# Patient Record
Sex: MALE | Race: WHITE | NOT HISPANIC OR LATINO | Employment: OTHER | URBAN - METROPOLITAN AREA
[De-identification: names, ages, dates, MRNs, and addresses within clinical notes are randomized per-mention and may not be internally consistent; named-entity substitution may affect disease eponyms.]

---

## 2017-01-09 ENCOUNTER — ALLSCRIPTS OFFICE VISIT (OUTPATIENT)
Dept: OTHER | Facility: OTHER | Age: 47
End: 2017-01-09

## 2017-01-20 ENCOUNTER — GENERIC CONVERSION - ENCOUNTER (OUTPATIENT)
Dept: OTHER | Facility: OTHER | Age: 47
End: 2017-01-20

## 2017-01-30 ENCOUNTER — HOSPITAL ENCOUNTER (OUTPATIENT)
Dept: RADIOLOGY | Facility: HOSPITAL | Age: 47
Discharge: HOME | End: 2017-01-30
Attending: ORTHOPAEDIC SURGERY
Payer: COMMERCIAL

## 2017-01-30 ENCOUNTER — ALLSCRIPTS OFFICE VISIT (OUTPATIENT)
Dept: OTHER | Facility: OTHER | Age: 47
End: 2017-01-30

## 2017-01-30 DIAGNOSIS — E55.9 VITAMIN D DEFICIENCY: ICD-10-CM

## 2017-01-30 DIAGNOSIS — M54.16 RADICULOPATHY OF LUMBAR REGION: ICD-10-CM

## 2017-01-30 DIAGNOSIS — Z79.899 OTHER LONG TERM (CURRENT) DRUG THERAPY: ICD-10-CM

## 2017-01-30 DIAGNOSIS — R03.0 ELEVATED BLOOD PRESSURE READING WITHOUT DIAGNOSIS OF HYPERTENSION: ICD-10-CM

## 2017-01-30 PROCEDURE — 72170 X-RAY EXAM OF PELVIS: CPT

## 2017-01-30 PROCEDURE — 72070 X-RAY EXAM THORAC SPINE 2VWS: CPT

## 2017-02-13 ENCOUNTER — ALLSCRIPTS OFFICE VISIT (OUTPATIENT)
Dept: OTHER | Facility: OTHER | Age: 47
End: 2017-02-13

## 2017-03-22 ENCOUNTER — GENERIC CONVERSION - ENCOUNTER (OUTPATIENT)
Dept: OTHER | Facility: OTHER | Age: 47
End: 2017-03-22

## 2017-03-22 DIAGNOSIS — G89.4 CHRONIC PAIN SYNDROME: ICD-10-CM

## 2017-03-27 ENCOUNTER — TRANSCRIBE ORDERS (OUTPATIENT)
Dept: ADMINISTRATIVE | Facility: HOSPITAL | Age: 47
End: 2017-03-27

## 2017-03-27 DIAGNOSIS — G89.4 CHRONIC PAIN SYNDROME: Primary | ICD-10-CM

## 2017-05-10 ENCOUNTER — ALLSCRIPTS OFFICE VISIT (OUTPATIENT)
Dept: OTHER | Facility: OTHER | Age: 47
End: 2017-05-10

## 2017-05-15 ENCOUNTER — ALLSCRIPTS OFFICE VISIT (OUTPATIENT)
Dept: OTHER | Facility: OTHER | Age: 47
End: 2017-05-15

## 2017-05-16 ENCOUNTER — OFFICE VISIT (OUTPATIENT)
Dept: LAB | Facility: CLINIC | Age: 47
End: 2017-05-16
Payer: COMMERCIAL

## 2017-05-16 ENCOUNTER — LAB REQUISITION (OUTPATIENT)
Dept: LAB | Facility: HOSPITAL | Age: 47
End: 2017-05-16
Payer: COMMERCIAL

## 2017-05-16 ENCOUNTER — TRANSCRIBE ORDERS (OUTPATIENT)
Dept: LAB | Facility: CLINIC | Age: 47
End: 2017-05-16

## 2017-05-16 ENCOUNTER — HOSPITAL ENCOUNTER (OUTPATIENT)
Dept: RADIOLOGY | Facility: HOSPITAL | Age: 47
Discharge: HOME | End: 2017-05-16
Attending: NEUROLOGICAL SURGERY
Payer: COMMERCIAL

## 2017-05-16 ENCOUNTER — APPOINTMENT (OUTPATIENT)
Dept: LAB | Facility: CLINIC | Age: 47
End: 2017-05-16
Payer: COMMERCIAL

## 2017-05-16 DIAGNOSIS — R03.0 ELEVATED BLOOD PRESSURE READING WITHOUT DIAGNOSIS OF HYPERTENSION: ICD-10-CM

## 2017-05-16 DIAGNOSIS — G89.4 CHRONIC PAIN SYNDROME: Primary | ICD-10-CM

## 2017-05-16 DIAGNOSIS — Z01.812 ENCOUNTER FOR PREPROCEDURAL LABORATORY EXAMINATION: ICD-10-CM

## 2017-05-16 DIAGNOSIS — Z79.01 LONG TERM (CURRENT) USE OF ANTICOAGULANTS: ICD-10-CM

## 2017-05-16 DIAGNOSIS — Z79.899 OTHER LONG TERM (CURRENT) DRUG THERAPY: ICD-10-CM

## 2017-05-16 DIAGNOSIS — Z01.812 PRE-OPERATIVE LABORATORY EXAMINATION: ICD-10-CM

## 2017-05-16 DIAGNOSIS — G89.4 CHRONIC PAIN SYNDROME: ICD-10-CM

## 2017-05-16 DIAGNOSIS — E55.9 VITAMIN D DEFICIENCY: ICD-10-CM

## 2017-05-16 DIAGNOSIS — Z79.01 LONG TERM CURRENT USE OF ANTICOAGULANT: ICD-10-CM

## 2017-05-16 LAB
25(OH)D3 SERPL-MCNC: 7.9 NG/ML (ref 30–100)
ABO GROUP BLD: NORMAL
ALBUMIN SERPL BCP-MCNC: 3.3 G/DL (ref 3.5–5)
ALP SERPL-CCNC: 82 U/L (ref 46–116)
ALT SERPL W P-5'-P-CCNC: 27 U/L (ref 12–78)
ANION GAP SERPL CALCULATED.3IONS-SCNC: 10 MMOL/L (ref 4–13)
APTT PPP: 30 SECONDS (ref 23–35)
AST SERPL W P-5'-P-CCNC: 44 U/L (ref 5–45)
ATRIAL RATE: 53 BPM
BASOPHILS # BLD AUTO: 0.06 THOUSANDS/ΜL (ref 0–0.1)
BASOPHILS NFR BLD AUTO: 1 % (ref 0–1)
BILIRUB SERPL-MCNC: 0.3 MG/DL (ref 0.2–1)
BILIRUB UR QL STRIP: NEGATIVE
BLD GP AB SCN SERPL QL: NEGATIVE
BUN SERPL-MCNC: 11 MG/DL (ref 5–25)
CALCIUM SERPL-MCNC: 8.5 MG/DL (ref 8.3–10.1)
CHLORIDE SERPL-SCNC: 110 MMOL/L (ref 100–108)
CLARITY UR: CLEAR
CO2 SERPL-SCNC: 27 MMOL/L (ref 21–32)
COLOR UR: YELLOW
CREAT SERPL-MCNC: 0.93 MG/DL (ref 0.6–1.3)
EOSINOPHIL # BLD AUTO: 0.5 THOUSAND/ΜL (ref 0–0.61)
EOSINOPHIL NFR BLD AUTO: 4 % (ref 0–6)
ERYTHROCYTE [DISTWIDTH] IN BLOOD BY AUTOMATED COUNT: 15.4 % (ref 11.6–15.1)
ERYTHROCYTE [DISTWIDTH] IN BLOOD BY AUTOMATED COUNT: 15.4 % (ref 11.6–15.1)
EST. AVERAGE GLUCOSE BLD GHB EST-MCNC: 117 MG/DL
GFR SERPL CREATININE-BSD FRML MDRD: >60 ML/MIN/1.73SQ M
GLUCOSE P FAST SERPL-MCNC: 104 MG/DL (ref 65–99)
GLUCOSE UR STRIP-MCNC: NEGATIVE MG/DL
HBA1C MFR BLD: 5.7 % (ref 4.2–6.3)
HCT VFR BLD AUTO: 46.1 % (ref 36.5–49.3)
HCT VFR BLD AUTO: 46.6 % (ref 36.5–49.3)
HGB BLD-MCNC: 15.1 G/DL (ref 12–17)
HGB BLD-MCNC: 15.3 G/DL (ref 12–17)
HGB UR QL STRIP.AUTO: NEGATIVE
INR PPP: 0.87 (ref 0.86–1.16)
KETONES UR STRIP-MCNC: NEGATIVE MG/DL
LEUKOCYTE ESTERASE UR QL STRIP: NEGATIVE
LYMPHOCYTES # BLD AUTO: 4.04 THOUSANDS/ΜL (ref 0.6–4.47)
LYMPHOCYTES NFR BLD AUTO: 35 % (ref 14–44)
MCH RBC QN AUTO: 29.7 PG (ref 26.8–34.3)
MCH RBC QN AUTO: 29.8 PG (ref 26.8–34.3)
MCHC RBC AUTO-ENTMCNC: 32.8 G/DL (ref 31.4–37.4)
MCHC RBC AUTO-ENTMCNC: 32.8 G/DL (ref 31.4–37.4)
MCV RBC AUTO: 91 FL (ref 82–98)
MCV RBC AUTO: 91 FL (ref 82–98)
MONOCYTES # BLD AUTO: 1.08 THOUSAND/ΜL (ref 0.17–1.22)
MONOCYTES NFR BLD AUTO: 9 % (ref 4–12)
NEUTROPHILS # BLD AUTO: 5.79 THOUSANDS/ΜL (ref 1.85–7.62)
NEUTS SEG NFR BLD AUTO: 51 % (ref 43–75)
NITRITE UR QL STRIP: NEGATIVE
P AXIS: 57 DEGREES
PH UR STRIP.AUTO: 5.5 [PH] (ref 4.5–8)
PLATELET # BLD AUTO: 344 THOUSANDS/UL (ref 149–390)
PLATELET # BLD AUTO: 350 THOUSANDS/UL (ref 149–390)
PMV BLD AUTO: 10.9 FL (ref 8.9–12.7)
PMV BLD AUTO: 11.5 FL (ref 8.9–12.7)
POTASSIUM SERPL-SCNC: 5 MMOL/L (ref 3.5–5.3)
PR INTERVAL: 158 MS
PROT SERPL-MCNC: 6.3 G/DL (ref 6.4–8.2)
PROT UR STRIP-MCNC: NEGATIVE MG/DL
PROTHROMBIN TIME: 12.1 SECONDS (ref 12.1–14.4)
QRS AXIS: 77 DEGREES
QRSD INTERVAL: 96 MS
QT INTERVAL: 408 MS
QTC INTERVAL: 382 MS
RBC # BLD AUTO: 5.07 MILLION/UL (ref 3.88–5.62)
RBC # BLD AUTO: 5.15 MILLION/UL (ref 3.88–5.62)
RH BLD: POSITIVE
SODIUM SERPL-SCNC: 147 MMOL/L (ref 136–145)
SP GR UR STRIP.AUTO: >=1.03 (ref 1–1.03)
SPECIMEN EXPIRATION DATE: NORMAL
T WAVE AXIS: 65 DEGREES
UROBILINOGEN UR QL STRIP.AUTO: 0.2 E.U./DL
VENTRICULAR RATE: 53 BPM
WBC # BLD AUTO: 11.23 THOUSAND/UL (ref 4.31–10.16)
WBC # BLD AUTO: 11.47 THOUSAND/UL (ref 4.31–10.16)

## 2017-05-16 PROCEDURE — 85730 THROMBOPLASTIN TIME PARTIAL: CPT

## 2017-05-16 PROCEDURE — 81003 URINALYSIS AUTO W/O SCOPE: CPT | Performed by: NEUROLOGICAL SURGERY

## 2017-05-16 PROCEDURE — 85025 COMPLETE CBC W/AUTO DIFF WBC: CPT

## 2017-05-16 PROCEDURE — 82306 VITAMIN D 25 HYDROXY: CPT

## 2017-05-16 PROCEDURE — 86900 BLOOD TYPING SEROLOGIC ABO: CPT | Performed by: NEUROLOGICAL SURGERY

## 2017-05-16 PROCEDURE — 80053 COMPREHEN METABOLIC PANEL: CPT

## 2017-05-16 PROCEDURE — 85610 PROTHROMBIN TIME: CPT

## 2017-05-16 PROCEDURE — 86850 RBC ANTIBODY SCREEN: CPT | Performed by: NEUROLOGICAL SURGERY

## 2017-05-16 PROCEDURE — 83036 HEMOGLOBIN GLYCOSYLATED A1C: CPT

## 2017-05-16 PROCEDURE — 86901 BLOOD TYPING SEROLOGIC RH(D): CPT | Performed by: NEUROLOGICAL SURGERY

## 2017-05-16 PROCEDURE — 85027 COMPLETE CBC AUTOMATED: CPT

## 2017-05-16 PROCEDURE — 93005 ELECTROCARDIOGRAM TRACING: CPT

## 2017-05-16 PROCEDURE — 36415 COLL VENOUS BLD VENIPUNCTURE: CPT

## 2017-05-16 PROCEDURE — 71020 HB CHEST X-RAY 2VW FRONTAL&LATL: CPT

## 2017-05-19 ENCOUNTER — ALLSCRIPTS OFFICE VISIT (OUTPATIENT)
Dept: OTHER | Facility: OTHER | Age: 47
End: 2017-05-19

## 2017-05-19 ENCOUNTER — HOSPITAL ENCOUNTER (OUTPATIENT)
Dept: CT IMAGING | Facility: HOSPITAL | Age: 47
Discharge: HOME | End: 2017-05-19
Attending: NEUROLOGICAL SURGERY
Payer: COMMERCIAL

## 2017-05-19 DIAGNOSIS — G89.4 CHRONIC PAIN SYNDROME: ICD-10-CM

## 2017-05-19 PROCEDURE — 72128 CT CHEST SPINE W/O DYE: CPT

## 2017-05-23 ENCOUNTER — ALLSCRIPTS OFFICE VISIT (OUTPATIENT)
Dept: OTHER | Facility: OTHER | Age: 47
End: 2017-05-23

## 2017-06-08 ENCOUNTER — ANESTHESIA EVENT (OUTPATIENT)
Dept: PERIOP | Facility: HOSPITAL | Age: 47
End: 2017-06-08
Payer: COMMERCIAL

## 2017-06-09 ENCOUNTER — HOSPITAL ENCOUNTER (OUTPATIENT)
Facility: HOSPITAL | Age: 47
Setting detail: OUTPATIENT SURGERY
Discharge: HOME | End: 2017-06-09
Attending: NEUROLOGICAL SURGERY | Admitting: NEUROLOGICAL SURGERY
Payer: COMMERCIAL

## 2017-06-09 ENCOUNTER — APPOINTMENT (OUTPATIENT)
Dept: RADIOLOGY | Facility: HOSPITAL | Age: 47
End: 2017-06-09
Payer: COMMERCIAL

## 2017-06-09 ENCOUNTER — ANESTHESIA (OUTPATIENT)
Dept: PERIOP | Facility: HOSPITAL | Age: 47
End: 2017-06-09
Payer: COMMERCIAL

## 2017-06-09 ENCOUNTER — GENERIC CONVERSION - ENCOUNTER (OUTPATIENT)
Dept: PERIOP | Facility: HOSPITAL | Age: 47
End: 2017-06-09

## 2017-06-09 VITALS
WEIGHT: 200 LBS | RESPIRATION RATE: 16 BRPM | DIASTOLIC BLOOD PRESSURE: 64 MMHG | HEIGHT: 72 IN | OXYGEN SATURATION: 99 % | TEMPERATURE: 97.6 F | SYSTOLIC BLOOD PRESSURE: 110 MMHG | HEART RATE: 64 BPM | BODY MASS INDEX: 27.09 KG/M2

## 2017-06-09 LAB
ABO GROUP BLD: NORMAL
BLD GP AB SCN SERPL QL: NEGATIVE
RH BLD: POSITIVE
SPECIMEN EXPIRATION DATE: NORMAL

## 2017-06-09 PROCEDURE — 86901 BLOOD TYPING SEROLOGIC RH(D): CPT | Performed by: NEUROLOGICAL SURGERY

## 2017-06-09 PROCEDURE — 86900 BLOOD TYPING SEROLOGIC ABO: CPT | Performed by: NEUROLOGICAL SURGERY

## 2017-06-09 PROCEDURE — 72070 X-RAY EXAM THORAC SPINE 2VWS: CPT

## 2017-06-09 PROCEDURE — 86850 RBC ANTIBODY SCREEN: CPT | Performed by: NEUROLOGICAL SURGERY

## 2017-06-09 RX ORDER — ROCURONIUM BROMIDE 10 MG/ML
INJECTION, SOLUTION INTRAVENOUS AS NEEDED
Status: DISCONTINUED | OUTPATIENT
Start: 2017-06-09 | End: 2017-06-09 | Stop reason: SURG

## 2017-06-09 RX ORDER — OXYCODONE HYDROCHLORIDE AND ACETAMINOPHEN 5; 325 MG/1; MG/1
2 TABLET ORAL EVERY 4 HOURS PRN
Status: DISCONTINUED | OUTPATIENT
Start: 2017-06-09 | End: 2017-06-09 | Stop reason: HOSPADM

## 2017-06-09 RX ORDER — SODIUM CHLORIDE, SODIUM LACTATE, POTASSIUM CHLORIDE, CALCIUM CHLORIDE 600; 310; 30; 20 MG/100ML; MG/100ML; MG/100ML; MG/100ML
20 INJECTION, SOLUTION INTRAVENOUS CONTINUOUS
Status: DISCONTINUED | OUTPATIENT
Start: 2017-06-09 | End: 2017-06-09 | Stop reason: HOSPADM

## 2017-06-09 RX ORDER — PROPOFOL 10 MG/ML
INJECTION, EMULSION INTRAVENOUS CONTINUOUS PRN
Status: DISCONTINUED | OUTPATIENT
Start: 2017-06-09 | End: 2017-06-09 | Stop reason: SURG

## 2017-06-09 RX ORDER — LIDOCAINE HYDROCHLORIDE AND EPINEPHRINE 5; 5 MG/ML; UG/ML
INJECTION, SOLUTION INFILTRATION; PERINEURAL AS NEEDED
Status: DISCONTINUED | OUTPATIENT
Start: 2017-06-09 | End: 2017-06-09 | Stop reason: HOSPADM

## 2017-06-09 RX ORDER — METOCLOPRAMIDE HYDROCHLORIDE 5 MG/ML
10 INJECTION INTRAMUSCULAR; INTRAVENOUS ONCE AS NEEDED
Status: DISCONTINUED | OUTPATIENT
Start: 2017-06-09 | End: 2017-06-09 | Stop reason: HOSPADM

## 2017-06-09 RX ORDER — ONDANSETRON 2 MG/ML
4 INJECTION INTRAMUSCULAR; INTRAVENOUS EVERY 6 HOURS PRN
Status: DISCONTINUED | OUTPATIENT
Start: 2017-06-09 | End: 2017-06-09 | Stop reason: HOSPADM

## 2017-06-09 RX ORDER — ONDANSETRON 2 MG/ML
4 INJECTION INTRAMUSCULAR; INTRAVENOUS ONCE AS NEEDED
Status: DISCONTINUED | OUTPATIENT
Start: 2017-06-09 | End: 2017-06-09 | Stop reason: HOSPADM

## 2017-06-09 RX ORDER — MIDAZOLAM HYDROCHLORIDE 1 MG/ML
INJECTION INTRAMUSCULAR; INTRAVENOUS AS NEEDED
Status: DISCONTINUED | OUTPATIENT
Start: 2017-06-09 | End: 2017-06-09 | Stop reason: SURG

## 2017-06-09 RX ORDER — ONDANSETRON 2 MG/ML
INJECTION INTRAMUSCULAR; INTRAVENOUS AS NEEDED
Status: DISCONTINUED | OUTPATIENT
Start: 2017-06-09 | End: 2017-06-09 | Stop reason: SURG

## 2017-06-09 RX ORDER — GLYCOPYRROLATE 0.2 MG/ML
INJECTION INTRAMUSCULAR; INTRAVENOUS AS NEEDED
Status: DISCONTINUED | OUTPATIENT
Start: 2017-06-09 | End: 2017-06-09 | Stop reason: SURG

## 2017-06-09 RX ORDER — LORAZEPAM 2 MG/ML
0.5 INJECTION INTRAMUSCULAR
Status: DISCONTINUED | OUTPATIENT
Start: 2017-06-09 | End: 2017-06-09 | Stop reason: HOSPADM

## 2017-06-09 RX ORDER — PROPOFOL 10 MG/ML
INJECTION, EMULSION INTRAVENOUS AS NEEDED
Status: DISCONTINUED | OUTPATIENT
Start: 2017-06-09 | End: 2017-06-09 | Stop reason: SURG

## 2017-06-09 RX ORDER — BUPIVACAINE HYDROCHLORIDE 5 MG/ML
INJECTION, SOLUTION EPIDURAL; INTRACAUDAL AS NEEDED
Status: DISCONTINUED | OUTPATIENT
Start: 2017-06-09 | End: 2017-06-09 | Stop reason: HOSPADM

## 2017-06-09 RX ORDER — FENTANYL CITRATE 50 UG/ML
INJECTION, SOLUTION INTRAMUSCULAR; INTRAVENOUS AS NEEDED
Status: DISCONTINUED | OUTPATIENT
Start: 2017-06-09 | End: 2017-06-09 | Stop reason: SURG

## 2017-06-09 RX ORDER — FENTANYL CITRATE/PF 50 MCG/ML
25 SYRINGE (ML) INJECTION
Status: DISCONTINUED | OUTPATIENT
Start: 2017-06-09 | End: 2017-06-09 | Stop reason: HOSPADM

## 2017-06-09 RX ADMIN — SODIUM CHLORIDE, SODIUM LACTATE, POTASSIUM CHLORIDE, AND CALCIUM CHLORIDE 20 ML/HR: .6; .31; .03; .02 INJECTION, SOLUTION INTRAVENOUS at 07:23

## 2017-06-09 RX ADMIN — FENTANYL CITRATE 25 MCG: 50 INJECTION INTRAMUSCULAR; INTRAVENOUS at 10:00

## 2017-06-09 RX ADMIN — FENTANYL CITRATE 100 MCG: 50 INJECTION, SOLUTION INTRAMUSCULAR; INTRAVENOUS at 08:36

## 2017-06-09 RX ADMIN — MIDAZOLAM HYDROCHLORIDE 2 MG: 1 INJECTION, SOLUTION INTRAMUSCULAR; INTRAVENOUS at 08:37

## 2017-06-09 RX ADMIN — DEXAMETHASONE SODIUM PHOSPHATE 4 MG: 10 INJECTION INTRAMUSCULAR; INTRAVENOUS at 08:59

## 2017-06-09 RX ADMIN — CEFAZOLIN SODIUM 2000 MG: 2 SOLUTION INTRAVENOUS at 08:36

## 2017-06-09 RX ADMIN — ONDANSETRON 4 MG: 2 INJECTION INTRAMUSCULAR; INTRAVENOUS at 09:36

## 2017-06-09 RX ADMIN — GLYCOPYRROLATE 0.8 MG: 0.2 INJECTION, SOLUTION INTRAMUSCULAR; INTRAVENOUS at 09:38

## 2017-06-09 RX ADMIN — GLYCOPYRROLATE 0.2 MG: 0.2 INJECTION, SOLUTION INTRAMUSCULAR; INTRAVENOUS at 08:36

## 2017-06-09 RX ADMIN — PROPOFOL 100 MCG/KG/MIN: 10 INJECTION, EMULSION INTRAVENOUS at 08:59

## 2017-06-09 RX ADMIN — SODIUM CHLORIDE, SODIUM LACTATE, POTASSIUM CHLORIDE, AND CALCIUM CHLORIDE 20 ML/HR: .6; .31; .03; .02 INJECTION, SOLUTION INTRAVENOUS at 10:31

## 2017-06-09 RX ADMIN — SODIUM CHLORIDE, SODIUM LACTATE, POTASSIUM CHLORIDE, AND CALCIUM CHLORIDE: .6; .31; .03; .02 INJECTION, SOLUTION INTRAVENOUS at 08:36

## 2017-06-09 RX ADMIN — PROPOFOL 200 MG: 10 INJECTION, EMULSION INTRAVENOUS at 08:46

## 2017-06-09 RX ADMIN — ROCURONIUM BROMIDE 50 MG: 10 INJECTION, SOLUTION INTRAVENOUS at 08:44

## 2017-06-09 RX ADMIN — OXYCODONE HYDROCHLORIDE AND ACETAMINOPHEN 2 TABLET: 5; 325 TABLET ORAL at 11:03

## 2017-06-09 RX ADMIN — MIDAZOLAM HYDROCHLORIDE 2 MG: 1 INJECTION, SOLUTION INTRAMUSCULAR; INTRAVENOUS at 08:36

## 2017-06-09 RX ADMIN — FENTANYL CITRATE 100 MCG: 50 INJECTION, SOLUTION INTRAMUSCULAR; INTRAVENOUS at 08:40

## 2017-06-09 RX ADMIN — NEOSTIGMINE METHYLSULFATE 4 MG: 1 INJECTION, SOLUTION INTRAMUSCULAR; INTRAVENOUS; SUBCUTANEOUS at 09:38

## 2017-06-23 ENCOUNTER — ALLSCRIPTS OFFICE VISIT (OUTPATIENT)
Dept: OTHER | Facility: OTHER | Age: 47
End: 2017-06-23

## 2017-07-18 ENCOUNTER — TRANSCRIBE ORDERS (OUTPATIENT)
Dept: ADMINISTRATIVE | Facility: HOSPITAL | Age: 47
End: 2017-07-18

## 2017-07-18 ENCOUNTER — ALLSCRIPTS OFFICE VISIT (OUTPATIENT)
Dept: OTHER | Facility: OTHER | Age: 47
End: 2017-07-18

## 2017-07-18 DIAGNOSIS — E87.5 HYPERKALEMIA: ICD-10-CM

## 2017-07-18 DIAGNOSIS — R61 GENERALIZED HYPERHIDROSIS: ICD-10-CM

## 2017-07-18 DIAGNOSIS — R29.810 FACIAL WEAKNESS: ICD-10-CM

## 2017-07-18 DIAGNOSIS — H93.12 TINNITUS OF LEFT EAR: ICD-10-CM

## 2017-07-18 DIAGNOSIS — R29.810 FACIAL DROOP: Primary | ICD-10-CM

## 2017-07-19 ENCOUNTER — APPOINTMENT (OUTPATIENT)
Dept: LAB | Facility: CLINIC | Age: 47
End: 2017-07-19
Payer: COMMERCIAL

## 2017-07-19 ENCOUNTER — HOSPITAL ENCOUNTER (OUTPATIENT)
Dept: NON INVASIVE DIAGNOSTICS | Facility: CLINIC | Age: 47
Discharge: HOME | End: 2017-07-19
Payer: COMMERCIAL

## 2017-07-19 DIAGNOSIS — R29.810 FACIAL WEAKNESS: ICD-10-CM

## 2017-07-19 LAB
ALBUMIN SERPL BCP-MCNC: 4 G/DL (ref 3.5–5)
ALP SERPL-CCNC: 88 U/L (ref 46–116)
ALT SERPL W P-5'-P-CCNC: 38 U/L (ref 12–78)
ANION GAP SERPL CALCULATED.3IONS-SCNC: 8 MMOL/L (ref 4–13)
AST SERPL W P-5'-P-CCNC: 21 U/L (ref 5–45)
BASOPHILS # BLD AUTO: 0.06 THOUSANDS/ΜL (ref 0–0.1)
BASOPHILS NFR BLD AUTO: 0 % (ref 0–1)
BILIRUB SERPL-MCNC: 0.2 MG/DL (ref 0.2–1)
BUN SERPL-MCNC: 18 MG/DL (ref 5–25)
CALCIUM SERPL-MCNC: 9.6 MG/DL (ref 8.3–10.1)
CHLORIDE SERPL-SCNC: 107 MMOL/L (ref 100–108)
CHOLEST SERPL-MCNC: 180 MG/DL (ref 50–200)
CO2 SERPL-SCNC: 29 MMOL/L (ref 21–32)
CREAT SERPL-MCNC: 1.07 MG/DL (ref 0.6–1.3)
EOSINOPHIL # BLD AUTO: 0.56 THOUSAND/ΜL (ref 0–0.61)
EOSINOPHIL NFR BLD AUTO: 4 % (ref 0–6)
ERYTHROCYTE [DISTWIDTH] IN BLOOD BY AUTOMATED COUNT: 15.2 % (ref 11.6–15.1)
GFR SERPL CREATININE-BSD FRML MDRD: >60 ML/MIN/1.73SQ M
GLUCOSE P FAST SERPL-MCNC: 102 MG/DL (ref 65–99)
HCT VFR BLD AUTO: 48.9 % (ref 36.5–49.3)
HDLC SERPL-MCNC: 54 MG/DL (ref 40–60)
HGB BLD-MCNC: 16 G/DL (ref 12–17)
LDLC SERPL CALC-MCNC: 97 MG/DL (ref 0–100)
LYMPHOCYTES # BLD AUTO: 3.83 THOUSANDS/ΜL (ref 0.6–4.47)
LYMPHOCYTES NFR BLD AUTO: 26 % (ref 14–44)
MCH RBC QN AUTO: 29 PG (ref 26.8–34.3)
MCHC RBC AUTO-ENTMCNC: 32.7 G/DL (ref 31.4–37.4)
MCV RBC AUTO: 89 FL (ref 82–98)
MONOCYTES # BLD AUTO: 1.1 THOUSAND/ΜL (ref 0.17–1.22)
MONOCYTES NFR BLD AUTO: 7 % (ref 4–12)
NEUTROPHILS # BLD AUTO: 9.24 THOUSANDS/ΜL (ref 1.85–7.62)
NEUTS SEG NFR BLD AUTO: 63 % (ref 43–75)
PLATELET # BLD AUTO: 395 THOUSANDS/UL (ref 149–390)
PMV BLD AUTO: 10.1 FL (ref 8.9–12.7)
POTASSIUM SERPL-SCNC: 5.7 MMOL/L (ref 3.5–5.3)
PROT SERPL-MCNC: 7.3 G/DL (ref 6.4–8.2)
RBC # BLD AUTO: 5.51 MILLION/UL (ref 3.88–5.62)
SODIUM SERPL-SCNC: 144 MMOL/L (ref 136–145)
TRIGL SERPL-MCNC: 144 MG/DL
WBC # BLD AUTO: 14.79 THOUSAND/UL (ref 4.31–10.16)

## 2017-07-19 PROCEDURE — 80053 COMPREHEN METABOLIC PANEL: CPT

## 2017-07-19 PROCEDURE — 85025 COMPLETE CBC W/AUTO DIFF WBC: CPT

## 2017-07-19 PROCEDURE — 93880 EXTRACRANIAL BILAT STUDY: CPT

## 2017-07-19 PROCEDURE — 80061 LIPID PANEL: CPT

## 2017-07-19 PROCEDURE — 36415 COLL VENOUS BLD VENIPUNCTURE: CPT

## 2017-07-20 ENCOUNTER — GENERIC CONVERSION - ENCOUNTER (OUTPATIENT)
Dept: OTHER | Facility: OTHER | Age: 47
End: 2017-07-20

## 2017-07-20 ENCOUNTER — APPOINTMENT (OUTPATIENT)
Dept: LAB | Facility: CLINIC | Age: 47
End: 2017-07-20
Payer: COMMERCIAL

## 2017-07-20 DIAGNOSIS — E87.5 HYPERKALEMIA: ICD-10-CM

## 2017-07-20 LAB
ANION GAP SERPL CALCULATED.3IONS-SCNC: 11 MMOL/L (ref 4–13)
BUN SERPL-MCNC: 22 MG/DL (ref 5–25)
CALCIUM SERPL-MCNC: 9.4 MG/DL (ref 8.3–10.1)
CHLORIDE SERPL-SCNC: 103 MMOL/L (ref 100–108)
CO2 SERPL-SCNC: 28 MMOL/L (ref 21–32)
CREAT SERPL-MCNC: 0.94 MG/DL (ref 0.6–1.3)
GFR SERPL CREATININE-BSD FRML MDRD: >60 ML/MIN/1.73SQ M
GLUCOSE SERPL-MCNC: 107 MG/DL (ref 65–140)
POTASSIUM SERPL-SCNC: 4.8 MMOL/L (ref 3.5–5.3)
SODIUM SERPL-SCNC: 142 MMOL/L (ref 136–145)

## 2017-07-20 PROCEDURE — 80048 BASIC METABOLIC PNL TOTAL CA: CPT

## 2017-07-20 PROCEDURE — 36415 COLL VENOUS BLD VENIPUNCTURE: CPT

## 2017-07-21 ENCOUNTER — APPOINTMENT (EMERGENCY)
Dept: ULTRASOUND IMAGING | Facility: HOSPITAL | Age: 47
End: 2017-07-21
Payer: COMMERCIAL

## 2017-07-21 ENCOUNTER — HOSPITAL ENCOUNTER (EMERGENCY)
Facility: HOSPITAL | Age: 47
Discharge: HOME | End: 2017-07-21
Attending: EMERGENCY MEDICINE | Admitting: EMERGENCY MEDICINE
Payer: COMMERCIAL

## 2017-07-21 ENCOUNTER — APPOINTMENT (EMERGENCY)
Dept: CT IMAGING | Facility: HOSPITAL | Age: 47
End: 2017-07-21
Payer: COMMERCIAL

## 2017-07-21 VITALS
OXYGEN SATURATION: 99 % | DIASTOLIC BLOOD PRESSURE: 84 MMHG | SYSTOLIC BLOOD PRESSURE: 132 MMHG | TEMPERATURE: 98.4 F | HEART RATE: 61 BPM | RESPIRATION RATE: 19 BRPM

## 2017-07-21 DIAGNOSIS — N50.811 PAIN IN RIGHT TESTICLE: Primary | ICD-10-CM

## 2017-07-21 DIAGNOSIS — M79.604 RIGHT LEG PAIN: ICD-10-CM

## 2017-07-21 LAB
ANION GAP SERPL CALCULATED.3IONS-SCNC: 11 MMOL/L (ref 4–13)
BASOPHILS # BLD AUTO: 0.05 THOUSANDS/ΜL (ref 0–0.1)
BASOPHILS NFR BLD AUTO: 0 % (ref 0–1)
BUN SERPL-MCNC: 22 MG/DL (ref 5–25)
CALCIUM SERPL-MCNC: 9.3 MG/DL (ref 8.3–10.1)
CHLORIDE SERPL-SCNC: 105 MMOL/L (ref 100–108)
CLARITY, POC: CLEAR
CO2 SERPL-SCNC: 27 MMOL/L (ref 21–32)
COLOR, POC: YELLOW
CREAT SERPL-MCNC: 1 MG/DL (ref 0.6–1.3)
EOSINOPHIL # BLD AUTO: 0.5 THOUSAND/ΜL (ref 0–0.61)
EOSINOPHIL NFR BLD AUTO: 4 % (ref 0–6)
ERYTHROCYTE [DISTWIDTH] IN BLOOD BY AUTOMATED COUNT: 15 % (ref 11.6–15.1)
EXT BILIRUBIN, UA: NEGATIVE
EXT BLOOD URINE: NEGATIVE
EXT GLUCOSE, UA: NEGATIVE
EXT KETONES: NEGATIVE
EXT NITRITE, UA: NEGATIVE
EXT PH, UA: 6
EXT PROTEIN, UA: NORMAL
EXT SPECIFIC GRAVITY, UA: 1.02
EXT UROBILINOGEN: 0.2
GFR SERPL CREATININE-BSD FRML MDRD: >60 ML/MIN/1.73SQ M
GLUCOSE SERPL-MCNC: 97 MG/DL (ref 65–140)
HCT VFR BLD AUTO: 45.7 % (ref 36.5–49.3)
HGB BLD-MCNC: 15.2 G/DL (ref 12–17)
LYMPHOCYTES # BLD AUTO: 3.15 THOUSANDS/ΜL (ref 0.6–4.47)
LYMPHOCYTES NFR BLD AUTO: 24 % (ref 14–44)
MCH RBC QN AUTO: 29.2 PG (ref 26.8–34.3)
MCHC RBC AUTO-ENTMCNC: 33.3 G/DL (ref 31.4–37.4)
MCV RBC AUTO: 88 FL (ref 82–98)
MONOCYTES # BLD AUTO: 1.02 THOUSAND/ΜL (ref 0.17–1.22)
MONOCYTES NFR BLD AUTO: 8 % (ref 4–12)
NEUTROPHILS # BLD AUTO: 8.47 THOUSANDS/ΜL (ref 1.85–7.62)
NEUTS SEG NFR BLD AUTO: 64 % (ref 43–75)
PLATELET # BLD AUTO: 365 THOUSANDS/UL (ref 149–390)
PMV BLD AUTO: 10 FL (ref 8.9–12.7)
POTASSIUM SERPL-SCNC: 4.5 MMOL/L (ref 3.5–5.3)
RBC # BLD AUTO: 5.21 MILLION/UL (ref 3.88–5.62)
SODIUM SERPL-SCNC: 143 MMOL/L (ref 136–145)
WBC # BLD AUTO: 13.19 THOUSAND/UL (ref 4.31–10.16)
WBC # BLD EST: NEGATIVE 10*3/UL

## 2017-07-21 PROCEDURE — 80048 BASIC METABOLIC PNL TOTAL CA: CPT | Performed by: EMERGENCY MEDICINE

## 2017-07-21 PROCEDURE — 75635 CT ANGIO ABDOMINAL ARTERIES: CPT

## 2017-07-21 PROCEDURE — 99284 EMERGENCY DEPT VISIT MOD MDM: CPT

## 2017-07-21 PROCEDURE — 96361 HYDRATE IV INFUSION ADD-ON: CPT

## 2017-07-21 PROCEDURE — 36415 COLL VENOUS BLD VENIPUNCTURE: CPT | Performed by: EMERGENCY MEDICINE

## 2017-07-21 PROCEDURE — 96374 THER/PROPH/DIAG INJ IV PUSH: CPT

## 2017-07-21 PROCEDURE — 76870 US EXAM SCROTUM: CPT

## 2017-07-21 PROCEDURE — 81002 URINALYSIS NONAUTO W/O SCOPE: CPT | Performed by: EMERGENCY MEDICINE

## 2017-07-21 PROCEDURE — 85025 COMPLETE CBC W/AUTO DIFF WBC: CPT | Performed by: EMERGENCY MEDICINE

## 2017-07-21 RX ORDER — OXYCODONE HYDROCHLORIDE 10 MG/1
10 TABLET ORAL ONCE
Status: COMPLETED | OUTPATIENT
Start: 2017-07-21 | End: 2017-07-21

## 2017-07-21 RX ORDER — MORPHINE SULFATE 10 MG/ML
10 INJECTION, SOLUTION INTRAMUSCULAR; INTRAVENOUS ONCE
Status: COMPLETED | OUTPATIENT
Start: 2017-07-21 | End: 2017-07-21

## 2017-07-21 RX ORDER — IBUPROFEN 400 MG/1
400 TABLET ORAL ONCE
Status: COMPLETED | OUTPATIENT
Start: 2017-07-21 | End: 2017-07-21

## 2017-07-21 RX ORDER — DIPHENHYDRAMINE HCL 12.5MG/5ML
25 LIQUID (ML) ORAL ONCE
Status: COMPLETED | OUTPATIENT
Start: 2017-07-21 | End: 2017-07-21

## 2017-07-21 RX ADMIN — DIPHENHYDRAMINE HYDROCHLORIDE 25 MG: 12.5 SOLUTION ORAL at 18:31

## 2017-07-21 RX ADMIN — SODIUM CHLORIDE 1000 ML: 0.9 INJECTION, SOLUTION INTRAVENOUS at 17:29

## 2017-07-21 RX ADMIN — MORPHINE SULFATE 10 MG: 10 INJECTION, SOLUTION INTRAMUSCULAR; INTRAVENOUS at 17:29

## 2017-07-21 RX ADMIN — OXYCODONE HYDROCHLORIDE 10 MG: 10 TABLET ORAL at 14:51

## 2017-07-21 RX ADMIN — IBUPROFEN 400 MG: 400 TABLET, FILM COATED ORAL at 14:50

## 2017-07-21 RX ADMIN — IOHEXOL 100 ML: 350 INJECTION, SOLUTION INTRAVENOUS at 18:00

## 2017-07-31 ENCOUNTER — HOSPITAL ENCOUNTER (OUTPATIENT)
Dept: RADIOLOGY | Facility: HOSPITAL | Age: 47
Discharge: HOME | End: 2017-07-31
Payer: COMMERCIAL

## 2017-07-31 DIAGNOSIS — R29.810 FACIAL WEAKNESS: ICD-10-CM

## 2017-07-31 PROCEDURE — 70551 MRI BRAIN STEM W/O DYE: CPT

## 2017-08-02 ENCOUNTER — GENERIC CONVERSION - ENCOUNTER (OUTPATIENT)
Dept: OTHER | Facility: OTHER | Age: 47
End: 2017-08-02

## 2017-08-15 ENCOUNTER — ALLSCRIPTS OFFICE VISIT (OUTPATIENT)
Dept: OTHER | Facility: OTHER | Age: 47
End: 2017-08-15

## 2017-08-24 ENCOUNTER — APPOINTMENT (EMERGENCY)
Dept: RADIOLOGY | Facility: HOSPITAL | Age: 47
End: 2017-08-24
Payer: COMMERCIAL

## 2017-08-24 ENCOUNTER — HOSPITAL ENCOUNTER (EMERGENCY)
Facility: HOSPITAL | Age: 47
Discharge: HOME | End: 2017-08-24
Attending: EMERGENCY MEDICINE
Payer: COMMERCIAL

## 2017-08-24 ENCOUNTER — ALLSCRIPTS OFFICE VISIT (OUTPATIENT)
Dept: OTHER | Facility: OTHER | Age: 47
End: 2017-08-24

## 2017-08-24 VITALS
RESPIRATION RATE: 18 BRPM | BODY MASS INDEX: 26.41 KG/M2 | HEART RATE: 59 BPM | TEMPERATURE: 98.5 F | DIASTOLIC BLOOD PRESSURE: 63 MMHG | OXYGEN SATURATION: 99 % | WEIGHT: 195 LBS | SYSTOLIC BLOOD PRESSURE: 128 MMHG | HEIGHT: 72 IN

## 2017-08-24 DIAGNOSIS — Z96.89 STATUS POST INSERTION OF SPINAL CORD STIMULATOR: ICD-10-CM

## 2017-08-24 DIAGNOSIS — G44.009 CLUSTER HEADACHE: Primary | ICD-10-CM

## 2017-08-24 LAB
ALBUMIN SERPL BCP-MCNC: 3.8 G/DL (ref 3.5–5)
ALP SERPL-CCNC: 85 U/L (ref 46–116)
ALT SERPL W P-5'-P-CCNC: 20 U/L (ref 12–78)
ANION GAP SERPL CALCULATED.3IONS-SCNC: 5 MMOL/L (ref 4–13)
AST SERPL W P-5'-P-CCNC: 16 U/L (ref 5–45)
ATRIAL RATE: 58 BPM
BASOPHILS # BLD AUTO: 0.03 THOUSANDS/ΜL (ref 0–0.1)
BASOPHILS NFR BLD AUTO: 0 % (ref 0–1)
BILIRUB SERPL-MCNC: 0.43 MG/DL (ref 0.2–1)
BUN SERPL-MCNC: 19 MG/DL (ref 5–25)
CALCIUM SERPL-MCNC: 9 MG/DL (ref 8.3–10.1)
CHLORIDE SERPL-SCNC: 110 MMOL/L (ref 100–108)
CO2 SERPL-SCNC: 27 MMOL/L (ref 21–32)
CREAT SERPL-MCNC: 0.98 MG/DL (ref 0.6–1.3)
EOSINOPHIL # BLD AUTO: 0.52 THOUSAND/ΜL (ref 0–0.61)
EOSINOPHIL NFR BLD AUTO: 4 % (ref 0–6)
ERYTHROCYTE [DISTWIDTH] IN BLOOD BY AUTOMATED COUNT: 15 % (ref 11.6–15.1)
GFR SERPL CREATININE-BSD FRML MDRD: 90 ML/MIN/1.73SQ M
GLUCOSE SERPL-MCNC: 95 MG/DL (ref 65–140)
HCT VFR BLD AUTO: 45.2 % (ref 36.5–49.3)
HGB BLD-MCNC: 15.6 G/DL (ref 12–17)
HOLD SPECIMEN: NORMAL
LYMPHOCYTES # BLD AUTO: 2.63 THOUSANDS/ΜL (ref 0.6–4.47)
LYMPHOCYTES NFR BLD AUTO: 22 % (ref 14–44)
MCH RBC QN AUTO: 30.5 PG (ref 26.8–34.3)
MCHC RBC AUTO-ENTMCNC: 34.5 G/DL (ref 31.4–37.4)
MCV RBC AUTO: 88 FL (ref 82–98)
MONOCYTES # BLD AUTO: 0.72 THOUSAND/ΜL (ref 0.17–1.22)
MONOCYTES NFR BLD AUTO: 6 % (ref 4–12)
NEUTROPHILS # BLD AUTO: 7.86 THOUSANDS/ΜL (ref 1.85–7.62)
NEUTS SEG NFR BLD AUTO: 68 % (ref 43–75)
NRBC BLD AUTO-RTO: 0 /100 WBCS
P AXIS: 77 DEGREES
PLATELET # BLD AUTO: 361 THOUSANDS/UL (ref 149–390)
PMV BLD AUTO: 10.1 FL (ref 8.9–12.7)
POTASSIUM SERPL-SCNC: 4.3 MMOL/L (ref 3.5–5.3)
PR INTERVAL: 158 MS
PROT SERPL-MCNC: 7.2 G/DL (ref 6.4–8.2)
QRS AXIS: 86 DEGREES
QRSD INTERVAL: 86 MS
QT INTERVAL: 394 MS
QTC INTERVAL: 386 MS
RBC # BLD AUTO: 5.12 MILLION/UL (ref 3.88–5.62)
SODIUM SERPL-SCNC: 142 MMOL/L (ref 136–145)
T WAVE AXIS: 79 DEGREES
TROPONIN I SERPL-MCNC: <0.02 NG/ML
TSH SERPL DL<=0.05 MIU/L-ACNC: 1.94 UIU/ML (ref 0.36–3.74)
VENTRICULAR RATE: 58 BPM
WBC # BLD AUTO: 11.8 THOUSAND/UL (ref 4.31–10.16)

## 2017-08-24 PROCEDURE — 71020 HB CHEST X-RAY 2VW FRONTAL&LATL: CPT

## 2017-08-24 PROCEDURE — 96375 TX/PRO/DX INJ NEW DRUG ADDON: CPT

## 2017-08-24 PROCEDURE — 99285 EMERGENCY DEPT VISIT HI MDM: CPT

## 2017-08-24 PROCEDURE — 36415 COLL VENOUS BLD VENIPUNCTURE: CPT | Performed by: EMERGENCY MEDICINE

## 2017-08-24 PROCEDURE — 84484 ASSAY OF TROPONIN QUANT: CPT | Performed by: EMERGENCY MEDICINE

## 2017-08-24 PROCEDURE — 86617 LYME DISEASE ANTIBODY: CPT | Performed by: EMERGENCY MEDICINE

## 2017-08-24 PROCEDURE — 80053 COMPREHEN METABOLIC PANEL: CPT | Performed by: EMERGENCY MEDICINE

## 2017-08-24 PROCEDURE — 70450 CT HEAD/BRAIN W/O DYE: CPT

## 2017-08-24 PROCEDURE — 85025 COMPLETE CBC W/AUTO DIFF WBC: CPT | Performed by: EMERGENCY MEDICINE

## 2017-08-24 PROCEDURE — 84443 ASSAY THYROID STIM HORMONE: CPT | Performed by: EMERGENCY MEDICINE

## 2017-08-24 PROCEDURE — 93005 ELECTROCARDIOGRAM TRACING: CPT | Performed by: EMERGENCY MEDICINE

## 2017-08-24 PROCEDURE — 96365 THER/PROPH/DIAG IV INF INIT: CPT

## 2017-08-24 RX ORDER — KETOROLAC TROMETHAMINE 30 MG/ML
15 INJECTION, SOLUTION INTRAMUSCULAR; INTRAVENOUS ONCE
Status: COMPLETED | OUTPATIENT
Start: 2017-08-24 | End: 2017-08-24

## 2017-08-24 RX ORDER — METOCLOPRAMIDE HYDROCHLORIDE 5 MG/ML
10 INJECTION INTRAMUSCULAR; INTRAVENOUS ONCE
Status: COMPLETED | OUTPATIENT
Start: 2017-08-24 | End: 2017-08-24

## 2017-08-24 RX ADMIN — KETOROLAC TROMETHAMINE 15 MG: 30 INJECTION, SOLUTION INTRAMUSCULAR at 12:09

## 2017-08-24 RX ADMIN — HYDROMORPHONE HYDROCHLORIDE 1 MG: 1 INJECTION, SOLUTION INTRAMUSCULAR; INTRAVENOUS; SUBCUTANEOUS at 14:33

## 2017-08-24 RX ADMIN — SODIUM CHLORIDE 1000 MG: 0.9 INJECTION, SOLUTION INTRAVENOUS at 15:00

## 2017-08-24 RX ADMIN — METOCLOPRAMIDE 10 MG: 5 INJECTION, SOLUTION INTRAMUSCULAR; INTRAVENOUS at 11:10

## 2017-08-25 LAB
B BURGDOR IGG PATRN SER IB-IMP: NEGATIVE
B BURGDOR IGM PATRN SER IB-IMP: NEGATIVE
B BURGDOR18KD IGG SER QL IB: ABNORMAL
B BURGDOR23KD IGG SER QL IB: ABNORMAL
B BURGDOR23KD IGM SER QL IB: ABNORMAL
B BURGDOR28KD IGG SER QL IB: ABNORMAL
B BURGDOR30KD IGG SER QL IB: ABNORMAL
B BURGDOR39KD IGG SER QL IB: ABNORMAL
B BURGDOR39KD IGM SER QL IB: ABNORMAL
B BURGDOR41KD IGG SER QL IB: PRESENT
B BURGDOR41KD IGM SER QL IB: ABNORMAL
B BURGDOR45KD IGG SER QL IB: ABNORMAL
B BURGDOR58KD IGG SER QL IB: ABNORMAL
B BURGDOR66KD IGG SER QL IB: ABNORMAL
B BURGDOR93KD IGG SER QL IB: ABNORMAL

## 2017-08-28 ENCOUNTER — TRANSCRIBE ORDERS (OUTPATIENT)
Dept: ADMINISTRATIVE | Facility: HOSPITAL | Age: 47
End: 2017-08-28

## 2017-08-28 DIAGNOSIS — R29.810 FACIAL WEAKNESS: Primary | ICD-10-CM

## 2017-08-29 ENCOUNTER — HOSPITAL ENCOUNTER (OUTPATIENT)
Dept: NEUROLOGY | Facility: HOSPITAL | Age: 47
Discharge: HOME | End: 2017-08-29
Attending: FAMILY MEDICINE
Payer: COMMERCIAL

## 2017-08-29 ENCOUNTER — HOSPITAL ENCOUNTER (OUTPATIENT)
Dept: RADIOLOGY | Facility: HOSPITAL | Age: 47
Discharge: HOME | End: 2017-08-29
Payer: COMMERCIAL

## 2017-08-29 DIAGNOSIS — R29.810 FACIAL WEAKNESS: ICD-10-CM

## 2017-08-29 DIAGNOSIS — R29.810 FACIAL DROOP: ICD-10-CM

## 2017-08-29 PROCEDURE — 95816 EEG AWAKE AND DROWSY: CPT

## 2017-08-29 PROCEDURE — A9585 GADOBUTROL INJECTION: HCPCS | Performed by: RADIOLOGY

## 2017-08-29 PROCEDURE — 70553 MRI BRAIN STEM W/O & W/DYE: CPT

## 2017-08-29 RX ADMIN — GADOBUTROL 9 ML: 604.72 INJECTION INTRAVENOUS at 11:24

## 2017-08-30 ENCOUNTER — ALLSCRIPTS OFFICE VISIT (OUTPATIENT)
Dept: OTHER | Facility: OTHER | Age: 47
End: 2017-08-30

## 2017-10-05 NOTE — CONSULTS
Assessment  1  Cluster headache (339 00) (G44 009)   2  Ptosis of left eyelid (374 30) (H02 402)   3  Vasovagal syncope (780 2) (R55)    Plan  Weakness on left side of face    · 1 Makenzie Bertrand MD, Arlette CUEVAS Neurology Co-Management  Spoke with Dr Laine Chavez about  this patient  Patient has been having multiple episodes of facial weakness that resolve  in < 30 mins  We are concern for seizures  I have started him on Topamax  25mg with a plan of tapers  He is schedule to see you on Wednesday at 9am  I have  also ordered Repreat MRI, and EEG  PLease let me know if you need any further  informations  Status: Active  Requested for: 77Mhx5817   Ordered; For: Weakness on left side of face; Ordered By: Tia Curry Performed:  Due: 00Pdq5341; Last Updated By: Tia Curry; 8/28/2017 9:44:15 AM  Care Summary provided  : Yes   · * MRI BRAIN W WO CONTRAST; Status:Need Information - Financial Authorization; Requested for:[id number];    Perform:[facility]; Order Comments:Given persistent; Due:76Wal5002; Last Updated Eveliamarce Samantha; 8/28/2017 3:55:31 PM;Ordered; Stat;For:Weakness on left side of face; Ordered By:Breonna Garcia; Discussion/Summary  39-year-old man with long history of cluster headaches associated with left side ptosis more recently has developed intermittent left ptosis without headache  He also had 2 episodes of vasovagal syncope the summer  His neurological exam is essentially normal  No ptosis on exam today  --Cluster headaches  Headaches are actually improved  We discussed the idea of starting a preventative medication to treat his a cephalgia cluster headaches manifest as left-sided ptosis, but the left ptosis is not particularly bothersome and does not skip his vision and he does not want to start a new medication at this point  I have refered him to our headache specialist     --Vasovagal syncope  Two episodes in July were consistent with syncope    The event that occurred in the bathroom may have been prolonged and included some jerking movements because he was maintained on upright position which delayed adequate cerebral perfusion  We discussed ways to avoid syncope and safety  I do not believe he has ever had a seizure in his recent EEG was normal     --Abnormal brain MRI with small white matter lesion in the right frontal lobe present since at least 2012 and stable  Lesions nonspecific and does not need to be followed unless there are concerning symptoms  Discussion Summary:   Today's Plan: Schedule visit with headache specialist  Let us know if headaches or episodes of eyelid droop (left ptosis) get worse  Hold off on starting topiramate  Your passing out episodes were vasovagal syncope  Your recent Brain MRIs were stable  The small lesion showing up as a white spot in the right frontal lobe on your recent MRIs was also present on your 2012 CT scan and is not responsible for your recent symptoms  Your recent EEG was normal  There is no evidence of epilepsy or seizure  Counseling Documentation With Imm: The patient was counseled regarding diagnostic results,-instructions for management,-risk factor reductions,-prognosis,-patient and family education,-impressions,-risks and benefits of treatment options,-importance of compliance with treatment  Chief Complaint  Chief Complaint Free Text Note Form: Patient present for consultation regarding abnormal head CT      History of Present Illness    The patient presents to the [facility] for an initial visit regarding recent episodes of left ptosis without headache in the setting of long history of left sided cluster headache with associated with left ptosis, as well as abnormal brain MRI and episodes of passing out  His history also includes chronic pain syndrome, post laminectomy syndrome status post removal of thoracic spinal cord stimulator  He has had cluster headaches for about 25 years    There has been no cluster headaches for about 3 years until June or July of this year  The pain is left retro-orbital and left temporal   The pain is severe, like someone stepped and knife into his head  There is associated nausea and photo/phonophobia  The pain improves with Imitrex injection and would last hours without Imitrex  There is tearing in the left eye and rhinorrhea in the left nostril  Often his headaches involve left-sided eyelid droop and left-sided facial numbness  A trial oxygen during prior hospitalization did not help his headache  He presented to his PCP on 7/8/2017 complete 3 months of daily episodes of left-sided facial weakness and intermittent blurred vision the episodes would last 15 minutes to an hour  A brain MRI was ordered  There is a 7 mm focus of FLAIR hyperintensity involving the anterior right frontal periventricular white matter  Differential was listed as migraine headaches, neoplasm, solitary MS plaque or possible Lyme disease  Repeat MRI done 8/29/2017 with without contrast was stable  Again noted was subcentimeter nonspecific focus of high signal adjacent to the right frontal horn  No enhancement  He started aspirin for the possibility of TIA/stroke  8/24/2017 he was seen in the [facility] ER and Neurology was consulted  He was sent to the ER after left ptosis was witnessed by Neurosurgery in clinic when he was there regarding thoracic spine stimulator removal   In the ER the patient described in addition to left ptosis, left cheek numbness as well as pain when touching the left eyebrow  He did not have significant headache at the time  Neurology is impression was that he was suffering from a cephalgia variant of cluster headache manifesting as left ptosis that previously occurred with his painful cluster headaches    Further review of prior imaging showed that the lesion seen on the recent MRI was also seen on the CT scan in 2014 and therefore unrelated to the current presentation  He was instructed to continue with baby aspirin, manages blood pressure and have an outpatient carotid Doppler  He was given IV steroids and a steroid taper  There was an episode in July while he was sitting on the toilet  He felt like things were coming down on him  Like his body was out of control  He thinks he might have passed out  He remains sitting on the toilet  He had shaking for about 15 seconds in realized the shaking was occurring  He had just had a bowel movement but he was not straining  There is another episode of syncope in July when he was feeling sick  Reports daytime sleepiness and snoring  NELLI diagnosed about 1 year ago, but did not tolerate CPAP  Seizures/Spell characteristics:  1  2 episodes of vasovagal syncope in late June/early July 2017  Special Features:  - History of status epilepticus: No  - Self injury due to seizures: No  - Precipitating factors: None    Seizure risk factors:  He has suffered concussions  Normal birth and development  No history of seizures as a child  No family history of seizures  No history of brain tumor, stroke or CNS infection  Past Medical History includes:  NELLI on sleep study about a year ago, did not tolerate CPAP    Social History:    Current AEDs:  None    Past AEDs:  None    A full 12 system review was performed and is negative except for what is mentioned in the ROS section or in the HPI     ================================================================  Prior Work-up:  EEG done 8/29/2017:  Normal awake, drowsy and brief sleep  Review of Systems  Neurological ROS:   Constitutional: fatigue  HEENT: blurred vision-and-tinnitus  Musculoskeletal: myalgias,-head/neck/back pain-and-pain while walking  Psychiatric: mood swings  Neurological General: headache,-increased sleepiness-and-trouble falling asleep  Neurological Sensory: numbness  ROS Reviewed:   ROS reviewed  Active Problems  1  Abnormal blood chemistry (790 6) (R79 9)   2  Abnormal head CT (793 0) (R93 0)   3  Allergic rhinitis (477 9) (J30 9)   4  Anxiety (300 00) (F41 9)   5  Backache (724 5) (M54 9)   6  Blood pressure elevated without history of HTN (796 2) (R03 0)   7  Blurry vision (368 8) (H53 8)   8  Candidiasis of other specified sites (112 89) (B37 89)   9  Chronic left sacroiliac joint pain (724 6,338 29) (M53 3,G89 29)   10  Chronic pain syndrome (338 4) (G89 4)   11  Chronic right SI joint pain (724 6,338 29) (M53 3,G89 29)   12  Cluster headache (339 00) (G44 009)   13  Concussion (850 9) (S06 0X9A)   14  Cough (786 2) (R05)   15  Depression with anxiety (300 4) (F41 8)   16  Double vision (368 2) (H53 2)   17  Dry eye syndrome of right lacrimal gland (375 15) (H04 121)   18  Dyshidrotic dermatitis (705 81) (L30 1)   19  Elevated WBC count (288 60) (D72 829)   20  Erectile dysfunction (607 84) (N52 9)   21  Erectile dysfunction of non-organic origin (302 72) (F52 21)   22  Fatigue (780 79) (R53 83)   23  Fatigue (780 79) (R53 83)   24  Folliculitis (920 4) (Q48 5)   25  Head pain (784 0) (R51)   26  Hyperkalemia (276 7) (E87 5)   27  Jaw pain, non-TMJ (784 92) (R68 84)   28  Joint arthrodesis status (V45 4) (Z98 1)   29  Laryngospasm (478 75) (J38 5)   30  Left-sided tinnitus (388 30) (H93 12)   31  Leukocytosis (288 60) (D72 829)   32  Lightheadedness (780 4) (R42)   33  Lumbar degenerative disc disease (722 52) (M51 36)   34  Lumbar radiculopathy (724 4) (M54 16)   35  Lumbosacral spinal stenosis (724 02) (M48 07)   36  Medication management (V58 69) (Z79 899)   37  Migraine headache (346 90) (G43 909)   38  Moderate persistent asthma without complication (376 60) (N53 54)   39  Myalgia (729 1) (M79 1)   40  Need for immunization against influenza (V04 81) (Z23)   41  Nephrolithiasis (592 0) (N20 0)   42  Neutrophilia (288 8) (D72 9)   43  Night sweats (780 8) (R61)   44   Otitis externa (380 10) (H60 90)   45  Pain of finger, unspecified laterality (729 5) (M79 646)   46  Pain of upper extremity, unspecified laterality (729 5) (M79 603)   47  Post laminectomy syndrome (722 80) (M96 1)   48  Post-operative state (V45 89) (Z98 890)   49  Preoperative clearance (V72 84) (Z01 818)   50  Pre-operative clearance (V72 84) (Z01 818)   51  Sacroiliitis (720 2) (M46 1)   52  Screening for STD (sexually transmitted disease) (V74 5) (Z11 3)   53  Shortness of breath (786 05) (R06 02)   54  Sinusitis (473 9) (J32 9)   55  Smoking (305 1) (F17 200)   56  Snoring (786 09) (R06 83)   57  Temporomandibular joint pain (524 62) (M26 629)   58  Tingling (782 0) (R20 2)   59  TMJ syndrome (524 69) (M26 629)   60  Visual disturbances (368 9) (H53 9)   61  Vitamin D deficiency (268 9) (E55 9)   62  Vomiting (787 03) (R11 10)   63  Weakness on left side of face (781 94) (R29 810)    Past Medical History  1  History of Acute Myocardial Infarction (V12 59)   2  History of Aftercare following surgery of the musculoskeletal system (V58 78) (Z47 89)   3  History of Chronic obstructive asthma (493 20) (J44 9)   4  History of acute bronchitis (V12 69) (Z87 09)   5  History of acute bronchitis (V12 69) (Z87 09)   6  History of acute otitis media (V12 49) (Z86 69)   7  History of acute sinusitis (V12 69) (Z87 09)   8  History of depression (V11 8) (Z86 59)   9  History of hypertension (V12 59) (Z86 79)   10  History of wheezing (V12 69) (Z87 898)   11  History of Organic impotence (607 84) (N52 9)   12  History of Tick Bites (919 4)   13  History of Weakness on left side of face (781 94) (R29 810)  Active Problems And Past Medical History Reviewed: The active problems and past medical history were reviewed and updated today  Surgical History  1  History of Back Surgery   2  History of Complete Colonoscopy   3  History of Jaw Surgery   4  History of Kidney Surgery   5  History of Knee Surgery   6   History of Shoulder Surgery  Surgical History Reviewed: The surgical history was reviewed and updated today  Family History  Mother    1  Family history of leukemia (V16 6) (Z80 6)   2  Family history of Hypertension (V17 49)  Father    3  Family history of Diabetes Mellitus (V18 0)   4  Family history of Heart Disease (V17 49)  Brother    5  Family history of Hypertension (V17 49)  Family History    6  Family history of Reported Family History Of Cancer  Family History Reviewed: The family history was reviewed and updated today  Social History   · Alcohol Use (History)   · Denied: History of Current every day smoker   · Current Smoker (305 1)   · Disability   · Denied: History of Marijuana   · Marital History -  (V61 03)   · No drug use   · Smoking (305 1) (F17 200)  Social History Reviewed: The social history was reviewed and updated today  Current Meds   1  Advair Diskus 250-50 MCG/DOSE Inhalation Aerosol Powder Breath Activated; INHALE 1   PUFF BY MOUTH EVERY 12 HOURS; Therapy: 94GYO7477 to (Evaluate:67Ayn8203)  Requested for: 34Vnn2095; Last   Rx:49Omm8973 Ordered   2  Aspirin 81 MG Oral Tablet Delayed Release; ONE TAB DAILY; Therapy: 28Hxo9021 to (Evaluate:28Jan2018)  Requested for: 95Rlf1753; Last   Rx:01Aug2017 Ordered   3  DiazePAM 5 MG Oral Tablet; TAKE 1 TABLET 3 times daily PRN spasm; Therapy: 34PWS6494 to (Evaluate:18Zrt6465); Last Rx:03Kkw3892 Ordered   4  Fluticasone Propionate 50 MCG/ACT Nasal Suspension; USE 2 SPRAYS IN EACH   NOSTRIL ONCE DAILY; Therapy: 84VWK0647 to (Last Rx:58Zuu8930)  Requested for: 52CDN2269 Ordered   5  Ibuprofen 800 MG Oral Tablet; TAKE ONE (1) TABLET THREE TIMES DAILY ASNEEDED; Therapy: 14ZRX8028 to (Evaluate:84Swr5532)  Requested for: 99Slm2017; Last   Rx:74Bct7195 Ordered   6  OxyCODONE HCl - 10 MG Oral Tablet; 1 tab by mouth every 6 hours as needed for pain; Therapy: 82YXP0809 to (Evaluate:75Vku2478); Last Rx:06Lrf9636 Ordered   7   SUMAtriptan Succinate 6 MG/0 5ML Subcutaneous Solution; INJECT 0 5ML AT ONSET   OF HEADACHE  MAY REPEAT IN 2 HOURS  MAXIMUM OF 2 INJECTIONS PER   DAY, NO MORE THAN 2 DAYS PER WEEK; Therapy: 35EQP2191 to (Last Rx:30Ivb6569)  Requested for: 63JUL8128 Ordered   8  Topiramate 25 MG Oral Tablet; 25mg PO at bedtime for 1 week, then increase to 50mg at   bedtime; Therapy: 11Tjd9345 to (21 )  Requested for: 99Ciw7204; Last   Rx:54Jeb6028 Ordered   9  Ventolin  (90 Base) MCG/ACT Inhalation Aerosol Solution; INHALE 1 PUFF   EVERY 4 HOURS AS NEEDED; Therapy: 77XTX8394 to (Last SI:35TTC4358)  Requested for: 56VCE3986 Ordered   10  ZyrTEC Allergy 10 MG Oral Capsule; take 1 capsule PO QD; Therapy: 43NNF5416 to (Last Rx:31Vdp8986)  Requested for: 03Fyk6750 Ordered  Medication List Reviewed: The medication list was reviewed and updated today  Allergies  1  Adhesive Bandage MISC   2  OxyCONTIN T12A    Vitals  Signs   Recorded: 30Aug2017 09:15AM   Heart Rate: 61  Respiration: 16  Systolic: 786, RUE, Sitting  Diastolic: 68, RUE, Sitting  Weight: 208 lb 9 oz  BMI Calculated: 28 29  BSA Calculated: 2 17  O2 Saturation: 98    Physical Exam   GENERAL EXAM  General: well developed, no acute distress  HEENT: mucous membranes moist, anicteric sclera  Extremities: no clubbing, cyanosis or edema  Skin: no rash on visible skin  NEUROLOGICAL EXAM  Mental Status: awake, alert, and fully oriented to person, place, time, and situation  Attention and memory intact  Fund of knowledge is appropriate for age and education  There is no neglect  Language: fluency, naming, comprehension, and repetition normal        Cranial Nerves: Pupils equal and reactive to light  Visual fields full to confrontation  Fundoscopic exam showed sharp discs and normal vasculature  Extraocular motions intact with full versions, normal pursuits and saccades  No ptosis  Facial strength full and symmetric  Facial sensation intact in V1-V3  Hearing intact to finger rub bilaterally  Tongue protrudes to midline  Palate elevates symmetrically  Speech clear without notable dysarthria  Shoulder shrug activation full and symmetric  Motor: Normal bulk and tone  No pronator drift  Strength is 5/5 proximally and distally in all 4 extremities  No involuntary movements  Sensory: Sensation intact to light touch distally in all extremities  Coordination: Normal rapid alternating movements  Normal finger-to-nose  Station and gait: Casual and tandem gait normal     Reflexes: Reflexes 2+ BR, no patellar response  Results/Data  * MRI BRAIN W WO CONTRAST [id number] 08:03AM Roberth Glow     Test Name Result Flag Reference   MRI BRAIN W WO CONTRAST (Report)     This is a summary report  The complete report is available in the patient's medical record  If you cannot access the medical record, please contact the sending organization for a detailed fax or copy  MRI BRAIN WITH AND WITHOUT CONTRAST     INDICATION: Left-sided facial weakness, left eye drooping, left-sided migraine     COMPARISON: MR 7/31/2017     TECHNIQUE:   Sagittal T1, axial T2, axial FLAIR, axial T1, axial T2*, axial diffusion  Sagittal, axial and coronal T1 postcontrast  Axial BRAVO post contrast       IV Contrast: 9 mL of gadobutrol injection (MULTI-DOSE)       IMAGE QUALITY:  Diagnostic  FINDINGS:     BRAIN PARENCHYMA: MR appearance of brain is stable  There is no acute disease  No intracranial mass, mass effect or edema  No acute ischemia or pathologic enhancement  No hemosiderin deposition  Stable focus of high signal along the lateral margin of the right frontal horn  Given difference in slice selection, no substantive change in this  No additional lesions  This is entirely nonspecific as previously mentioned include, be related to    complicated migraine, collagen vascular or Lyme disease  No enhancement, no new lesions, no progression       VENTRICLES: Normal      SELLA AND PITUITARY GLAND: Normal      ORBITS: Normal      PARANASAL SINUSES: Trace sinus mucosal disease  Small retention cyst or polyp right sphenoid sinus  VASCULATURE: Evaluation of the major intracranial vasculature demonstrates appropriate flow voids  CALVARIUM AND SKULL BASE: Normal      EXTRACRANIAL SOFT TISSUES: Normal        IMPRESSION:     Stable MR appearance the brain  No acute disease  Subcentimeter nonspecific focus of high signal adjacent to the right frontal horn  No enhancement  No additional lesions  Workstation performed: [id number]     Signed by: Lois Virgen MD   8/29/17     (1) LYME ANTIBODY, WESTERN BLOT 83Dqw6719 10:13AM EPIC, Provider   Test ordered by: Omega Soles     Test Name Result Flag Reference   LYME 18 KD IGG Absent     LYME 23 KD IGG Absent     LYME 28 KD IGG Absent     LYME 30 KD IGG Absent     LYME 39 KD IGG Absent     LYME 41 KD IGG Present A    LYME 45 KD IGG Absent     LYME 58 KD IGG Absent     LYME 66 KD IGG Absent     LYME 93 KD IGG Absent     LYME 23 KD IGM Absent     LYME 39 KD IGM Absent     LYME 41 KD IGM Absent     LYME IGG WB INTERP  Negative     Positive: 5 of the following                                 Borrelia-specific bands:                                 18,23,28,30,39,41,45,58,                                 66, and 93  Negative: No bands or banding                                 patterns which do not                                 meet positive criteria  LYME IGM WB INTERP  Negative     Note: An equivocal or positive EIA result followed by a negative  Western Blot result is considered NEGATIVE  An equivocal or positive  EIA result followed by a positive Western Blot is considered POSITIVE  by the CDC  Positive: 2 of the following bands: 23,39 or 41  Negative: No bands or banding patterns which do not meet positive  criteria    Criteria for positivity are those recommended by CDC/ASTPHLD   p23=Osp C, p55=ogmvrxxcg  Note:  Sera from individuals with the following may cross react in the  Lyme Western Blot assays: other spirochetal diseases (periodontal  disease, leptospirosis, relapsing fever, yaws, and pinta);  connective autoimmune (Rheumatoid Arthritis and Systemic Lupus  Erythematosus and also individuals with Antinuclear Antibody);  other infections COFFEE [facility] Spotted Fever; Lowell-Barr Virus,  and Cytomegalovirus)  Performed at:  [address]  [id number]  : Ramya Schwarz MD, Phone:  [phone]     * MRI BRAIN [facility] 04:48PM Karl Anthony Order Number: [id number]    - Patient Instructions: To schedule this appointment, please contact Central Scheduling at [phone]  Test Name Result Flag Reference   MRI BRAIN WO CONTRAST (Report)     This is a summary report  The complete report is available in the patient's medical record  If you cannot access the medical record, please contact the sending organization for a detailed fax or copy  MRI BRAIN WITHOUT CONTRAST     INDICATION: 28-year-old male left facial droop, left-sided headaches     COMPARISON:  1/21/2012 head CT     TECHNIQUE: Sagittal T1, axial T2, axial FLAIR, axial T1, axial Wheeler and axial diffusion imaging  IV contrast was not utilized due to technician's inability to obtain reliable venous access     IMAGE QUALITY: Diagnostic  FINDINGS:     BRAIN PARENCHYMA:    A focus of FLAIR hyperintensity measuring approximately 7 mm is present within the periventricular white matter adjacent to the frontal horn of the right lateral ventricle in the anterior frontal region  The lesion is nonspecific  Such findings can be   associated with complicated migraine headaches  A small neoplastic lesion could appear similarly, however  Solitary focus of MS or Lyme disease not excluded   Further evaluation via IV contrast-enhanced MRI recommended, possibly with ultrasound-guided   venous access  There is no discrete mass, mass effect or midline shift  No additional abnormal white matter signal identified  Brainstem and cerebellum demonstrate normal signal  There is no intracranial hemorrhage  There is no evidence of acute infarction and    diffusion imaging is unremarkable  VENTRICLES: The ventricles are normal in size and contour  SELLA AND PITUITARY GLAND: Normal      ORBITS: Normal      PARANASAL SINUSES: Scattered paranasal sinus mucosal thickening     VASCULATURE: Evaluation of the major intracranial vasculature demonstrates appropriate flow voids  CALVARIUM AND SKULL BASE: Normal      EXTRACRANIAL SOFT TISSUES: Normal        IMPRESSION:   Approximately 7 mm focus of FLAIR hyperintensity involving anterior right frontal periventricular white matter  Possible etiologies include complicated migraine headaches, neoplasm, solitary MS plaque or possibly Lyme disease  Further evaluation via IV contrast-enhanced MRI recommended, possibly with ultrasound-guided venous access if necessary  ##sigslh##sigslh       Workstation performed: [id number]     Signed by:   Glo Medina MD   8/1/17     (1) BASIC METABOLIC PROFILE [id number] 12:46PM [facility] Order Number: [id number]     Test Name Result Flag Reference   GLUCOSE,RANDM 107 mg/dL     If the patient is fasting, the ADA then defines impaired fasting glucose as > 100 mg/dL and diabetes as > or equal to 123 mg/dL  SODIUM 142 mmol/L  136-145   POTASSIUM 4 8 mmol/L  3 5-5 3   Slightly Hemolyzed;  Results May be Affected   CHLORIDE 103 mmol/L  100-108   CARBON DIOXIDE 28 mmol/L  21-32   ANION GAP (CALC) 11 mmol/L  4-13   BLOOD UREA NITROGEN 22 mg/dL  5-25   CREATININE 0 94 mg/dL  0 60-1 30   Standardized to IDMS reference method   CALCIUM 9 4 mg/dL  8 3-10 1   eGFR Non-African American      >60 0 ml/min/1 73sq m   [facility] Disease Education Program recommendations are as follows:  GFR calculation is accurate only with a steady state creatinine  Chronic Kidney disease less than 60 ml/min/1 73 sq  meters  Kidney failure less than 15 ml/min/1 73 sq  meters  VAS CAROTID COMPLETE STUDY [id number] 02:17PM Zachariah Clayton Order Number: [id number]    - Patient Instructions: To schedule this appointment, please contact Central Scheduling at [phone]  Test Name Result Flag Reference   VAS CAROTID COMPLETE STUDY (Report)     [facility] REPORT   CLINICAL:   Indications: Patient presents with recent episode of dizziness / near syncope   lasting several minutes  He is currently asymptomatic  Risk Factors   The patient has history of smoking (current) 0 ppd  Clinical   Right Brachial Pressure: 128/80 mmHg, Left Brachial Pressure: 120/90 mmHg  FINDINGS:      Right    Impression PSV EDV (cm/s) Ratio    Dist  ICA         58     27  0 72    Mid  ICA         60     25  0 75    Prox  ICA  Normal    58      0  0 72    Dist CCA         65     22        Mid CCA          80     27  1 00    Prox CCA         80     19  1 13    Ext Carotid        55      4  0 69    Prox Vert         66     27        Subclavian        49      0        Innominate        71     12           Left     Impression PSV EDV (cm/s) Ratio    Dist  ICA         58     31  0 72    Mid  ICA         67     30  0 83    Prox  ICA  Normal    61     32  0 76    Dist CCA         84     33        Mid CCA          81     27  0 80    Prox CCA         101     32        Ext Carotid        74     21  0 92    Prox Vert         69     35        Subclavian        80      0                 CONCLUSION:      Impression   RIGHT:   There is no evidence of significant stenosis noted  Vertebral artery flow is antegrade  There is no significant subclavian artery   disease  LEFT:   There is no evidence of significant stenosis noted  Vertebral artery flow is antegrade   There is no significant subclavian artery disease  No prior study for comparison  Internal carotid artery stenosis determination by consensus criteria from:   Kristyn Pollard et al  Carotid Artery Stenosis: Gray-Scale and Doppler US Diagnosis   - [employer] in [address] [place] [address] Drive, Radiology 2003;   [phone]  SIGNATURE:   Electronically Signed by: Kevin Kelley on 2017-07-19 03:21:46 PM     (1) CBC/PLT/DIFF [id number] 08:40AM Breonna Garcia   TW Order Number: [id number]     Test Name Result Flag Reference   WBC COUNT 14 79 Thousand/uL H 4 31-10 16   RBC COUNT 5 51 Million/uL  3 88-5 62   HEMOGLOBIN 16 0 g/dL  12 0-17 0   HEMATOCRIT 48 9 %  36 5-49 3   MCV 89 fL  82-98   MCH 29 0 pg  26 8-34 3   MCHC 32 7 g/dL  31 4-37 4   RDW 15 2 % H 11 6-15 1   MPV 10 1 fL  8 9-12 7   PLATELET COUNT 419 Thousands/uL H 149-390   NEUTROPHILS RELATIVE PERCENT 63 %  43-75   LYMPHOCYTES RELATIVE PERCENT 26 %  14-44   MONOCYTES RELATIVE PERCENT 7 %  4-12   EOSINOPHILS RELATIVE PERCENT 4 %  0-6   BASOPHILS RELATIVE PERCENT 0 %  0-1   NEUTROPHILS ABSOLUTE COUNT 9 24 Thousands/? ??L H 1 85-7 62   LYMPHOCYTES ABSOLUTE COUNT 3 83 Thousands/? ??L  0 60-4 47   MONOCYTES ABSOLUTE COUNT 1 10 Thousand/? ??L  0 17-1 22   EOSINOPHILS ABSOLUTE COUNT 0 56 Thousand/? ??L  0 00-0 61   BASOPHILS ABSOLUTE COUNT 0 06 Thousands/? ??L  0 00-0 10     (1) COMPREHENSIVE METABOLIC PANEL 03BST1580 95:97YD Reford Phlegm Order Number: [id number]     Test Name Result Flag Reference   SODIUM 144 mmol/L  136-145   POTASSIUM 5 7 mmol/L H 3 5-5 3   CHLORIDE 107 mmol/L  100-108   CARBON DIOXIDE 29 mmol/L  21-32   ANION GAP (CALC) 8 mmol/L  4-13   BLOOD UREA NITROGEN 18 mg/dL  5-25   CREATININE 1 07 mg/dL  0 60-1 30   Standardized to IDMS reference method   CALCIUM 9 6 mg/dL  8 3-10 1   BILI, TOTAL 0 20 mg/dL  0 20-1 00   ALK PHOSPHATAS 88 U/L     ALT (SGPT) 38 U/L  12-78   AST(SGOT) 21 U/L  5-45   ALBUMIN 4 0 g/dL  3 5-5 0   TOTAL PROTEIN 7 3 g/dL  6 4-8 2   eGFR Non-African American      >60 0 ml/min/1 73sq m   [facility] [employer] Disease Education Program recommendations are as follows:  GFR calculation is accurate only with a steady state creatinine  Chronic Kidney disease less than 60 ml/min/1 73 sq  meters  Kidney failure less than 15 ml/min/1 73 sq  meters  GLUCOSE FASTING 102 mg/dL H 65-99     Future Appointments    Date/Time Provider Specialty Site   02/22/2018 09:30 AM Kaitlin Garner MD Neurology [place] [address]   11/13/2017 03:30 PM La Call [facility] Neurology [facility]  [facility]   10/12/2017 01:40 PM KHALIDA Mancia   [address] [facility]     Signatures   Electronically signed by : KHALIDA Carmen ; Oct  4 2017  5:21PM EST                       (Author)

## 2017-10-12 ENCOUNTER — GENERIC CONVERSION - ENCOUNTER (OUTPATIENT)
Dept: OTHER | Facility: OTHER | Age: 47
End: 2017-10-12

## 2017-10-12 DIAGNOSIS — R13.10 DYSPHAGIA: ICD-10-CM

## 2017-11-28 ENCOUNTER — GENERIC CONVERSION - ENCOUNTER (OUTPATIENT)
Dept: OTHER | Facility: OTHER | Age: 47
End: 2017-11-28

## 2017-12-07 ENCOUNTER — TRANSCRIBE ORDERS (OUTPATIENT)
Dept: LAB | Facility: CLINIC | Age: 47
End: 2017-12-07

## 2017-12-07 ENCOUNTER — APPOINTMENT (OUTPATIENT)
Dept: LAB | Facility: CLINIC | Age: 47
End: 2017-12-07
Payer: COMMERCIAL

## 2017-12-07 ENCOUNTER — ALLSCRIPTS OFFICE VISIT (OUTPATIENT)
Dept: OTHER | Facility: OTHER | Age: 47
End: 2017-12-07

## 2017-12-07 DIAGNOSIS — R53.83 OTHER FATIGUE: ICD-10-CM

## 2017-12-07 DIAGNOSIS — R93.0 ABNORMAL FINDINGS ON DIAGNOSTIC IMAGING OF SKULL AND HEAD, NOT ELSEWHERE CLASSIFIED (CODE): ICD-10-CM

## 2017-12-07 DIAGNOSIS — R61 GENERALIZED HYPERHIDROSIS: ICD-10-CM

## 2017-12-07 DIAGNOSIS — D72.829 ELEVATED WHITE BLOOD CELL COUNT: ICD-10-CM

## 2017-12-07 DIAGNOSIS — R79.9 ABNORMAL FINDING OF BLOOD CHEMISTRY: ICD-10-CM

## 2017-12-07 LAB
BASOPHILS # BLD AUTO: 0.07 THOUSANDS/ΜL (ref 0–0.1)
BASOPHILS NFR BLD AUTO: 1 % (ref 0–1)
CRP SERPL QL: <3 MG/L
EOSINOPHIL # BLD AUTO: 0.51 THOUSAND/ΜL (ref 0–0.61)
EOSINOPHIL NFR BLD AUTO: 4 % (ref 0–6)
ERYTHROCYTE [DISTWIDTH] IN BLOOD BY AUTOMATED COUNT: 14.2 % (ref 11.6–15.1)
ERYTHROCYTE [SEDIMENTATION RATE] IN BLOOD: 1 MM/HOUR (ref 0–10)
EST. AVERAGE GLUCOSE BLD GHB EST-MCNC: 123 MG/DL
HBA1C MFR BLD: 5.9 % (ref 4.2–6.3)
HCT VFR BLD AUTO: 47.2 % (ref 36.5–49.3)
HGB BLD-MCNC: 15.5 G/DL (ref 12–17)
LDH SERPL-CCNC: 176 U/L (ref 81–234)
LYMPHOCYTES # BLD AUTO: 3.32 THOUSANDS/ΜL (ref 0.6–4.47)
LYMPHOCYTES NFR BLD AUTO: 28 % (ref 14–44)
MCH RBC QN AUTO: 29.4 PG (ref 26.8–34.3)
MCHC RBC AUTO-ENTMCNC: 32.8 G/DL (ref 31.4–37.4)
MCV RBC AUTO: 89 FL (ref 82–98)
MONOCYTES # BLD AUTO: 1.08 THOUSAND/ΜL (ref 0.17–1.22)
MONOCYTES NFR BLD AUTO: 9 % (ref 4–12)
NEUTROPHILS # BLD AUTO: 6.97 THOUSANDS/ΜL (ref 1.85–7.62)
NEUTS SEG NFR BLD AUTO: 58 % (ref 43–75)
PLATELET # BLD AUTO: 428 THOUSANDS/UL (ref 149–390)
PMV BLD AUTO: 10.3 FL (ref 8.9–12.7)
RBC # BLD AUTO: 5.28 MILLION/UL (ref 3.88–5.62)
TSH SERPL DL<=0.05 MIU/L-ACNC: 2.94 UIU/ML (ref 0.36–3.74)
URATE SERPL-MCNC: 5.7 MG/DL (ref 4.2–8)
VIT B12 SERPL-MCNC: 847 PG/ML (ref 100–900)
WBC # BLD AUTO: 11.95 THOUSAND/UL (ref 4.31–10.16)

## 2017-12-07 PROCEDURE — 83615 LACTATE (LD) (LDH) ENZYME: CPT

## 2017-12-07 PROCEDURE — 81270 JAK2 GENE: CPT

## 2017-12-07 PROCEDURE — 84443 ASSAY THYROID STIM HORMONE: CPT

## 2017-12-07 PROCEDURE — 88184 FLOWCYTOMETRY/ TC 1 MARKER: CPT

## 2017-12-07 PROCEDURE — 85652 RBC SED RATE AUTOMATED: CPT

## 2017-12-07 PROCEDURE — 81206 BCR/ABL1 GENE MAJOR BP: CPT

## 2017-12-07 PROCEDURE — 36415 COLL VENOUS BLD VENIPUNCTURE: CPT

## 2017-12-07 PROCEDURE — 84550 ASSAY OF BLOOD/URIC ACID: CPT

## 2017-12-07 PROCEDURE — 82607 VITAMIN B-12: CPT

## 2017-12-07 PROCEDURE — 86038 ANTINUCLEAR ANTIBODIES: CPT

## 2017-12-07 PROCEDURE — 83036 HEMOGLOBIN GLYCOSYLATED A1C: CPT

## 2017-12-07 PROCEDURE — 85025 COMPLETE CBC W/AUTO DIFF WBC: CPT

## 2017-12-07 PROCEDURE — 86140 C-REACTIVE PROTEIN: CPT

## 2017-12-07 PROCEDURE — 81207 BCR/ABL1 GENE MINOR BP: CPT

## 2017-12-07 PROCEDURE — 88185 FLOWCYTOMETRY/TC ADD-ON: CPT

## 2017-12-08 LAB — RYE IGE QN: NEGATIVE

## 2017-12-08 NOTE — PROGRESS NOTES
Assessment  1  Leukocytosis (288 60) (D72 829)   2  Night sweats (780 8) (R61)   3  Fatigue (780 79) (R53 83)    Plan  Abnormal blood chemistry, Night sweats    · (1) HEMOGLOBIN A1C; Status:Active; Requested for:07Dec2017;    Perform:[facility]; ELIZABETH:01CQN4930; Ordered; For:Abnormal blood chemistry, Night sweats; Ordered By:Isabel Boyd; Abnormal head CT, Fatigue    · (1) TSH WITH FT4 REFLEX; Status:Active; Requested for:07Dec2017;    Perform:[facility]; OXO:57KXH9682; Ordered; For:Abnormal head CT, Fatigue; Ordered By:Isabel Boyd; Fatigue    · (1) TESTOSTERONE; Status:Active; Requested for:07Dec2017;    Perform:[facility]; HYW:98HJJ3384; Ordered; For:Fatigue; Ordered By:Martha Boyd;  Leukocytosis    · (1) BCR/ABL, PCR; Status:Active; Requested for:07Dec2017;    Perform:[facility]; IGJ:54GJP6195; Ordered; For:Leukocytosis; Ordered By:Isabel Boyd;   · (1) CBC/PLT/DIFF; Status:Active; Requested for:07Dec2017;    Perform:[facility]; UDT:32RAN7489; Ordered; For:Leukocytosis; Ordered By:Isabel Boyd;   · (1) JAK2; Status:Active; Requested for:07Dec2017;    Perform:[facility]; WCK:32XIN0815; Ordered; For:Leukocytosis; Ordered By:Isabel Boyd;   · (1) LEUKEMIA PROFILE, CHRONIC; Status:Active; Requested for:07Dec2017;    Perform:[facility]; XYB:40VWC3407; Ordered;Ordered By:Isabel Boyd;  Night sweats    · (1) MACKENZIE SCREEN W/REFLEX TO TITER/PATTERN; Status:Active; Requestedfor:07Dec2017;    Perform:[facility]; NOQ:91AZP0970; Ordered;For:Night sweats; Ordered By:Isabel Boyd;   · (1) C-REACTIVE PROTEIN; Status:Active; Requested for:11Bze8374;    Perform:[facility]; BD:02KHJ4420; Ordered;For:Night sweats; Ordered By:Martha Boyd;   · (1) LD (LDH); Status:Active; Requested for:04Qfp3908;    Perform:[facility]; ZSP:76OBV6279; Ordered;For:Night sweats;  Ordered By:Everton Boyd;   · (1) SED RATE; Status:Active; Requested for:25Msv9542;    Perform:[facility]; XJB:00LTY5074; Ordered;For:Night sweats; Ordered By:Everton Boyd;   · (1) URIC ACID; Status:Active; Requested for:47Ixl8578;    Perform:[facility]; YG82FEJ3089; Ordered;For:Night sweats; Ordered By:Everton Boyd;   · (1) VITAMIN B12; Status:Active; Requested for:35Eui5737;    Perform:[facility]; QSM:44WUN0976; Ordered;sweats; Ordered By:Everton Byod;   · Follow-up visit in 3 weeks Evaluation and Treatment  Follow-up  Status: Complete  Done:37Kfy4989   Ordered;Night sweats; Ordered By: Khloe Conklin Performed:  Due: 30XQP0959; Last Updated By: Ericka Zacarias; 2017 2:36:43 PM    Discussion/Summary  Discussion Summary:   Leukocytosis intermittent since at least 3/2014 Range has been 8 8 2016 - 31 10/2014  Differential has been normal  no evidence of abnormal hemoglobin or platelets  CMP has been normal  there is no evidence of abnormal liver spleen on CT imaging 2017  He is not on any medications suspected of causing leukocytosis no evidence of chronic infection  No palpable adenopathy  This may be related to his cigarette smoking  past month, he has had hypersomnolence, fatigue, feeling poorly  Will perform additional laboratory assessment  He is asked to f/u in approximately 2-3 weeks for review  Any issues or concerns in the the interim, he is asked to call the office  Chief Complaint  Chief Complaint Free Text Note Form: I have elevated white blood count      History of Present Illness  HPI: Patient is a 50 yeal Old gentleman seen 2017 regarding chronic leukocytosis  He was seen x1 2016 regarding the same issue  From the office, he was advised to go to the ED secondary to pain over his kidney  recollection of that day was that when I heard bone marrow, I just walked out  has a history of nephrolithiasis, nicotine abuse, lower back pain, multiple tattoos, no evidence of hepatitis C or HIV 5/2016  May 2016 WBC 9 0, hemoglobin 15 8, platelets 614, normal differentialApril 2016 WBC 15 2, hemoglobin 17 1, platelets 916,016NMQKNHHI 2015 WBC 10 9, hemoglobin 15 9, Platelets 989,183ATFMBJN 5, sedimentation rate 2smokes 1 pack of cigarette daily, he denied any drug abuse he drinks alcohol occasionally  unremarkable on CT A/P 7/2017  Interval History: Lyme via WB negative  TIA; Cluster HA  Low grade frontal HA x 2 weeks  right teeth issues  Root canal recommended  Doesn't have money to do so at this time  Was put on antibiotics 3 separate occasions  States he had eval with PCP previously and was told he had LEFT sided cervical adenopathy  by PCP for possible flu eating  Chronic night sweats x 1 year, have become drenching x 2 weeks, sleeping excessively  States he can wake up and go back to sleep in 1 hour  sore throat  Thought he may have had a sinus infection 1 month ago  Denies any GI or  symptoms  LBP  Review of Systems  Complete-Male:  Constitutional: feeling poorly,-- chills,-- feeling tired-- and-- secondary to nephrolithiasis  Eyes: No complaints of eye pain, no red eyes, no discharge from eyes, no itchy eyes  ENT: as noted in HPI  Cardiovascular: No complaints of slow heart rate, no fast heart rate, no chest pain, no palpitations, no leg claudication, no lower extremity  Respiratory: No complaints of shortness of breath, no wheezing, no cough, no SOB on exertion, no orthopnea or PND  Gastrointestinal: No complaints of abdominal pain, no constipation, no nausea or vomiting, no diarrhea or bloody stools  Genitourinary: No complaints of dysuria, no incontinence, no hesitancy, no nocturia, no genital lesion, no testicular pain  Musculoskeletal: arthralgias-- and-- joint stiffness  Integumentary: No complaints of skin rash or skin lesions, no itching, no skin wound, no dry skin    Neurological: No compliants of headache, no confusion, no convulsions, no numbness or tingling, no dizziness or fainting, no limb weakness, no difficulty walking  Psychiatric: Is not suicidal, no sleep disturbances, no anxiety or depression, no change in personality, no emotional problems  Endocrine: No complaints of proptosis, no hot flashes, no muscle weakness, no erectile dysfunction, no deepening of the voice, no feelings of weakness  Hematologic/Lymphatic: No complaints of swollen glands, no swollen glands in the neck, does not bleed easily, no easy bruising  Active Problems  1  Abnormal blood chemistry (790 6) (R79 9)   2  Abnormal head CT (793 0) (R93 0)   3  Allergic rhinitis (477 9) (J30 9)   4  Anxiety (300 00) (F41 9)   5  Backache (724 5) (M54 9)   6  Blood pressure elevated without history of HTN (796 2) (R03 0)   7  Blurry vision (368 8) (H53 8)   8  Candidiasis of other specified sites (112 89) (B37 89)   9  Chronic left sacroiliac joint pain (724 6,338 29) (M53 3,G89 29)   10  Chronic pain syndrome (338 4) (G89 4)   11  Chronic right SI joint pain (724 6,338 29) (M53 3,G89 29)   12  Cluster headache (339 00) (G44 009)   13  Concussion (850 9) (S06 0X9A)   14  Cough (786 2) (R05)   15  Depression with anxiety (300 4) (F41 8)   16  Difficulty in swallowing (787 20) (R13 10)   17  Double vision (368 2) (H53 2)   18  Dry eye syndrome of right lacrimal gland (375 15) (H04 121)   19  Dyshidrotic dermatitis (705 81) (L30 1)   20  Elevated WBC count (288 60) (D72 829)   21  Erectile dysfunction (607 84) (N52 9)   22  Erectile dysfunction of non-organic origin (302 72) (F52 21)   23  Fatigue (780 79) (R53 83)   24  Fatigue (780 79) (R53 83)   25  Folliculitis (689 6) (L78 7)   26  Head pain (784 0) (R51)   27  Hyperkalemia (276 7) (E87 5)   28  Jaw pain, non-TMJ (784 92) (R68 84)   29  Joint arthrodesis status (V45 4) (Z98 1)   30  Laryngospasm (478 75) (J38 5)   31  Left-sided tinnitus (388 30) (H93 12)   32  Leukocytosis (288 60) (D72 829)   33  Lightheadedness (780 4) (R42)   34  Lumbar degenerative disc disease (722 52) (M51 36)   35  Lumbar radiculopathy (724 4) (M54 16)   36  Lumbosacral spinal stenosis (724 02) (M48 07)   37  Medication management (V58 69) (Z79 899)   38  Migraine headache (346 90) (G43 909)   39  Moderate persistent asthma without complication (186 29) (Q82 97)   40  Myalgia (729 1) (M79 1)   41  Need for immunization against influenza (V04 81) (Z23)   42  Nephrolithiasis (592 0) (N20 0)   43  Neutrophilia (288 8) (D72 9)   44  Night sweats (780 8) (R61)   45  Otitis externa (380 10) (H60 90)   46  Pain of finger, unspecified laterality (729 5) (M79 646)   47  Pain of upper extremity, unspecified laterality (729 5) (M79 603)   48  Post laminectomy syndrome (722 80) (M96 1)   49  Post-operative state (V45 89) (Z98 890)   50  Preoperative clearance (V72 84) (Z01 818)   51  Pre-operative clearance (V72 84) (Z01 818)   52  Ptosis of left eyelid (374 30) (H02 402)   53  Sacroiliitis (720 2) (M46 1)   54  Screening for STD (sexually transmitted disease) (V74 5) (Z11 3)   55  Shortness of breath (786 05) (R06 02)   56  Sinusitis (473 9) (J32 9)   57  Smoking (305 1) (F17 200)   58  Snoring (786 09) (R06 83)   59  Temporomandibular joint pain (524 62) (M26 629)   60  Tingling (782 0) (R20 2)   61  TMJ syndrome (524 69) (M26 629)   62  Tooth pain (525 9) (K08 89)   63  Vasovagal syncope (780 2) (R55)   64  Visual disturbances (368 9) (H53 9)   65  Vitamin D deficiency (268 9) (E55 9)   66  Vomiting (787 03) (R11 10)   67  Weakness on left side of face (781 94) (R29 810)    Past Medical History  1  History of Acute Myocardial Infarction (V12 59)   2  History of Aftercare following surgery of the musculoskeletal system (V58 78) (Z47 89)   3  History of Chronic obstructive asthma (493 20) (J44 9)   4  History of acute bronchitis (V12 69) (Z87 09)   5  History of acute bronchitis (V12 69) (Z87 09)   6   History of acute otitis media (V12 49) (Z86 69)   7  History of acute sinusitis (V12 69) (Z87 09)   8  History of depression (V11 8) (Z86 59)   9  History of hypertension (V12 59) (Z86 79)   10  History of wheezing (V12 69) (Z87 898)   11  History of Organic impotence (607 84) (N52 9)   12  History of Tick Bites (919 4)   13  History of Weakness on left side of face (781 94) (R29 810)  Active Problems And Past Medical History Reviewed: The active problems and past medical history were reviewed and updated today  Surgical History  1  History of Back Surgery   2  History of Complete Colonoscopy   3  History of Jaw Surgery   4  History of Kidney Surgery   5  History of Knee Surgery   6  History of Shoulder Surgery  Surgical History Reviewed: The surgical history was reviewed and updated today  Family History  Mother    1  Family history of leukemia (V16 6) (Z80 6)   2  Family history of Hypertension (V17 49)  Father    3  Family history of Diabetes Mellitus (V18 0)   4  Family history of Heart Disease (V17 49)  Brother    5  Family history of Hypertension (V17 49)  Family History    6  Family history of Reported Family History Of Cancer  Family History Reviewed: The family history was reviewed and updated today  Social History     · Alcohol Use (History)   · Denied: History of Current every day smoker   · Current Smoker (305 1)   · Disability   · Denied: History of Marijuana   · Marital History -  (V61 03)   · No drug use   · Smoking (305 1) (F17 200)  Social History Reviewed: The social history was reviewed and updated today  Current Meds   1  Advair Diskus 250-50 MCG/DOSE Inhalation Aerosol Powder Breath Activated; INHALE 1 PUFF BY MOUTH EVERY 12 HOURS; Therapy: 16CMR7862 to (Evaluate:11Nov2017)  Requested for: 85PLZ4552; Last Rx:12Oct2017 Ordered   2  Aspirin 81 MG Oral Tablet Delayed Release; ONE TAB DAILY; Therapy: 53Egc6620 to (Evaluate:28Jan2018)  Requested for: 70Mny7522; Last Rx:94Lxw6153 Ordered   3  Cyclobenzaprine HCl - 5 MG Oral Tablet; Take 1 tablet 2 times daily as needed; Therapy: 37BPI6841 to (Bettyjo Manner)  Requested for: 10NMB8406; Last Rx:12Oct2017 Ordered   4  DiazePAM 5 MG Oral Tablet; TAKE 1 TABLET 3 times daily PRN spasm; Therapy: 90MTK8297 to (Evaluate:27Qbn2901); Last Rx:16Nov2017 Ordered   5  Fluticasone Propionate 50 MCG/ACT Nasal Suspension; USE 2 SPRAYS IN EACH NOSTRIL ONCE DAILY; Therapy: 37WAA5043 to (Last Sebastian Paddock)  Requested for: 98SYU0512 Ordered   6  Ibuprofen 800 MG Oral Tablet; TAKE ONE (1) TABLET THREE TIMES DAILY ASNEEDED; Therapy: 81HFP0110 to (Evaluate:84Luj4335)  Requested for: 81Ijr2141; Last Rx:36Xyz5657 Ordered   7  OxyCODONE HCl - 10 MG Oral Tablet; 1 tab by mouth every 6 hours as needed for pain; Therapy: 00SSI3187 to (Evaluate:21Eqy2644); Last Rx:16Nov2017 Ordered   8  Topiramate 25 MG Oral Tablet; 25mg PO at bedtime for 1 week, then increase to 50mg at bedtime; Therapy: 74Nff6496 to [phone])  Requested for: 05Szk8292; Last Rx:43Bbj6469 Ordered   9  Ventolin  (90 Base) MCG/ACT Inhalation Aerosol Solution; INHALE 1 PUFF EVERY 4 HOURS AS NEEDED; Therapy: 63VTF5387 to (Last LT:02IGL4140)  Requested for: 54GTP0840 Ordered   10  ZyrTEC Allergy 10 MG Oral Capsule; take 1 capsule PO QD; Therapy: 97RUO1800 to (Last Sebastian Paddock)  Requested for: 37KSW4943 Ordered  Medication List Reviewed: The medication list was reviewed and updated today  Allergies  1  Adhesive Bandage MISC   2  OxyCONTIN T12A    Vitals  Vital Signs    Recorded: 72XBZ3662 01:55PM   Temperature 97 2 F   Heart Rate 75   Respiration 16   Systolic 293   Diastolic 88   Height 6 ft 2 in   Weight 203 lb    BMI Calculated 26 06   BSA Calculated 2 19   O2 Saturation 98   Pain Scale 0       Physical Exam   Constitutional  General appearance: Abnormal  -- looks tired  Eyes  Conjunctiva and lids: No swelling, erythema, or discharge  Pupils and irises: Equal, round and reactive to light  Ears, Nose, Mouth, and Throat  External inspection of ears and nose: Normal    Oropharynx: Normal with no erythema, edema, exudate or lesions  Pulmonary  Respiratory effort: No increased work of breathing or signs of respiratory distress  Auscultation of lungs: Clear to auscultation, equal breath sounds bilaterally, no wheezes, no rales, no rhonci  Cardiovascular  Auscultation of heart: Normal rate and rhythm, normal S1 and S2, without murmurs  Examination of extremities for edema and/or varicosities: Normal    Abdomen  Abdomen: Non-tender, no masses  Liver and spleen: No hepatomegaly or splenomegaly  Lymphatic  Palpation of lymph nodes in neck: No lymphadenopathy  Musculoskeletal  Gait and station: Normal    Digits and nails: Normal without clubbing or cyanosis  Inspection/palpation of joints, bones, and muscles: Normal    Skin  Skin and subcutaneous tissue: Abnormal   multiple tattoos  Psychiatric  Orientation to person, place and time: Normal    Mood and affect: Normal         ECOG 1       Future Appointments    Date/Time Provider Specialty Site   02/22/2018 09:30 AM Jyoti Watt MD Neurology [address]   12/18/2017 08:20 AM KHALIDA Snowden  Hematology Oncology [facility] [place]   02/13/2018 08:15 AM Amado Hood [facility] Neurology [address]   12/28/2017 04:40 PM KHALIDA Rojas  [address]       Signatures   Electronically signed by : Dharmesh Guan [facility]; Dec  7 2017  2:40PM EST                       (Author)    Electronically signed by :  KHALIDA Castillo ; Dec  7 2017  4:14PM EST                       (Author)

## 2017-12-11 LAB — SCAN RESULT: NORMAL

## 2017-12-12 LAB — SCAN RESULT: NORMAL

## 2017-12-13 LAB — SCAN RESULT: NORMAL

## 2017-12-18 ENCOUNTER — APPOINTMENT (OUTPATIENT)
Dept: LAB | Facility: CLINIC | Age: 47
End: 2017-12-18
Payer: COMMERCIAL

## 2017-12-18 ENCOUNTER — GENERIC CONVERSION - ENCOUNTER (OUTPATIENT)
Dept: OTHER | Facility: OTHER | Age: 47
End: 2017-12-18

## 2017-12-18 DIAGNOSIS — R53.83 OTHER FATIGUE: ICD-10-CM

## 2017-12-18 LAB — TESTOST SERPL-MCNC: 296 NG/DL (ref 113–1065)

## 2017-12-18 PROCEDURE — 36415 COLL VENOUS BLD VENIPUNCTURE: CPT

## 2017-12-18 PROCEDURE — 84403 ASSAY OF TOTAL TESTOSTERONE: CPT

## 2017-12-23 ENCOUNTER — GENERIC CONVERSION - ENCOUNTER (OUTPATIENT)
Dept: OTHER | Facility: OTHER | Age: 47
End: 2017-12-23

## 2018-01-09 NOTE — MISCELLANEOUS
Message  S/w pt on telephone who is scheduled for surgery tomorrow, 6/9/17  Verified allergies and pharmacy with patient and went over pre-op instructions, including bathing and NPO status  He currently denies taking any blood thinning medications  Script for Keflex was sent to patient's pharmacy and he understands that he is to start this medication tomorrow after surgery  No pain medication script printed for patient since he recently had percocet and oxycodone filled by his family doctor  He does not have any questions or concerns at this time        Signatures   Electronically signed by : Roberto Mejia RN; Jun 8 2017  3:13PM EST                       (Author)

## 2018-01-10 NOTE — MISCELLANEOUS
Provider Comments  Provider Comments:   pt no showed today      Signatures   Electronically signed by : Sania Rider, ; May 23 2017  9:01AM EST                       (Author)

## 2018-01-10 NOTE — PROGRESS NOTES
Assessment    1  Chronic pain syndrome (338 4) (G89 4)   2  Post laminectomy syndrome (722 80) (M96 1)   3  Lumbar degenerative disc disease (722 52) (M51 36)    Discussion/Summary    -year-old male, presents for preoperative evaluation, he has planned "removal of a thoracic SCS placed via laminectomy and removal left buttocks generator", scheduled for 06/09/17  The procedure is scheduled with Dr Glory Neri, and the patient reports Dr Glory Neri has explained in a very detailed fashion the proposed surgical procedure, risks and possible complications, as well as the benefits of the procedure  He expresses understanding of this information/explanation, and is in agreement with the proposed procedure and wishes to proceed  As noted:  He is a "former smoker "having discontinued 5 months ago, prior 1-1/2 packs per day for 30 years    He has a prior history of surgery performed under general anesthesia which was tolerated without complication    He denies SOB or CP with activity  He denies bleeding disorder or history of same  He reports reactive airway which exacerbated his regular each year with tree pollen and and grass pollen, treated with Advair inhaler and albuterol inhaler in season  He is under the care of a primary care provider and is followed regularly for wellness and health maintenance    He denies medication allergies, but does report multiple skin tapes causes reaction  He understands to discontinue aspirin and Motrin 800 mg 7 days prior to his surgery  He understands he is to take his usual medications with a small sip of water in early AM day of surgery  Postoperative activities were briefly reviewed, he understands he is to lift no greater than 10 pounds until follow-up in approximately 6 weeks, he also understands to avoid immersion in water for approximately 3 weeks, and additionally understands he may ambulate as tolerated and is encouraged to do so      Pre operative skin preparation was discussed with the patient and they understand to wash/ shower with Hibiclens (chlorhexidine) and utilize Messi cloths as directed  These findings, impressions and recommendations are reviewed in great detail with the patient, he expressed understanding and agreement, his questions were answered completely and to his satisfaction  Follow up has been scheduled  The patient was counseled regarding instructions for management, importance of compliance with treatment  Patient is able to Self-Care  Chief Complaint  Preoperative history and physical, planned removal of spinal cord stimulator system      History of Present Illness  Pleasant 43-year-old male, presents as noted above  He has planned "removal of a thoracic SCS placed via laminectomy and removal left buttocks generator", this is scheduled for 6/9/17, to be performed by Dr Deuce Hamm  He has a history of multiple lumbar surgeries in the past, has had spinal cord severely replaced and finds it is ineffective and has requested be removed  He is a former smoker, he reports he stopped smoking approximately 5 months ago, prior to that he has a 30 year 1 1/2 pack per day history  He is a history of multiple surgeries under general anesthesia including ORIF for multiple facial fractures, left shoulder rotator cuff repair, left shoulder impingement surgery, and multiple spine surgeries these were all performed under general anesthesia which he tolerated without    He denies shortness of breath and chest pain with activity including climbing 2 flights of steps  He reports a history of reactive airway exacerbated in the spring each year by tree pollen and grass pollen, he reports this is only bothersome March through about May  The balance of the year he has no regular medications for reactive airway  He is currently treated with albuterol inhaler and Advair inhaler daily  He denies history of bleeding tendencies      He reports he takes aspirin 81 mg once daily and ibuprofen 800 mg 3 times daily, he understands and discontinue these medications 7 days prior surgery, he is on no antiplatelet agents  He follows his primary care provider [facility] on a periodic basis for wellness, and his reactive airway  He reports no known medication allergies although he does report several different skin tapes causes skin reaction  He otherwise denies diabetes mellitus, heart disease, lung disease, kidney or liver disorder, seizure, cancer, ulcer, rheumatic fever, tuberculosis  He has planned preoperative clearance his primary care provider in the next week, and is proceeding for his preoperative laboratory studies following visit today  Review of Systems    Constitutional: feeling tired  Eyes: No complaints of eye pain, no red eyes, no discharge from eyes, no itchy eyes  ENT: no complaints of earache, no hearing loss, no nosebleeds, no nasal discharge, no sore throat, no hoarseness  Cardiovascular: No complaints of slow heart rate, no fast heart rate, no chest pain, no palpitations, no leg claudication, no lower extremity  Respiratory: No complaints of shortness of breath, no wheezing, no cough, no SOB on exertion, no orthopnea or PND  Gastrointestinal: constipation  Genitourinary: No complaints of dysuria, no incontinence, no hesitancy, no nocturia, no genital lesion, no testicular pain  Musculoskeletal: limb pain, myalgias and joint stiffness  Integumentary: No complaints of skin rash or skin lesions, no itching, no skin wound, no dry skin  Neurological: headache  Psychiatric: Is not suicidal, no sleep disturbances, no anxiety or depression, no change in personality, no emotional problems  Endocrine: No complaints of proptosis, no hot flashes, no muscle weakness, no erectile dysfunction, no deepening of the voice, no feelings of weakness     Hematologic/Lymphatic: No complaints of swollen glands, no swollen glands in the neck, does not bleed easily, no easy bruising  ROS reviewed  Active Problems    1  Abnormal blood chemistry (790 6) (R79 9)   2  Aftercare following surgery of the musculoskeletal system (V58 78) (Z47 89)   3  Allergic rhinitis (477 9) (J30 9)   4  Anxiety (300 00) (F41 9)   5  Backache (724 5) (M54 9)   6  Blood pressure elevated without history of HTN (796 2) (R03 0)   7  Blurry vision (368 8) (H53 8)   8  Candidiasis of other specified sites (112 89) (B37 89)   9  Chronic left sacroiliac joint pain (724 6,338 29) (M53 3,G89 29)   10  Chronic pain syndrome (338 4) (G89 4)   11  Chronic right SI joint pain (724 6,338 29) (M53 3,G89 29)   12  Cluster headache (339 00) (G44 009)   13  Concussion (850 9) (S06 0X9A)   14  Cough (786 2) (R05)   15  Depression with anxiety (300 4) (F41 8)   16  Double vision (368 2) (H53 2)   17  Dry eye syndrome of right lacrimal gland (375 15) (H04 121)   18  Dyshidrotic dermatitis (705 81) (L30 1)   19  Erectile dysfunction (607 84) (N52 9)   20  Erectile dysfunction of non-organic origin (302 72) (F52 21)   21  Fatigue (780 79) (R53 83)   22  Fatigue (780 79) (R53 83)   23  Folliculitis (090 7) (P34 4)   24  Head pain (784 0) (R51)   25  Jaw pain, non-TMJ (784 92) (R68 84)   26  Joint arthrodesis status (V45 4) (Z98 1)   27  Laryngospasm (478 75) (J38 5)   28  Left-sided tinnitus (388 30) (H93 12)   29  Leukocytosis (288 60) (D72 829)   30  Lightheadedness (780 4) (R42)   31  Lumbar degenerative disc disease (722 52) (M51 36)   32  Lumbar radiculopathy (724 4) (M54 16)   33  Lumbosacral spinal stenosis (724 02) (M48 07)   34  Medication management (V58 69) (Z79 899)   35  Migraine headache (346 90) (G43 909)   36  Moderate persistent asthma without complication (562 40) (O46 64)   37  Myalgia (729 1) (M79 1)   38  Need for immunization against influenza (V04 81) (Z23)   39  Nephrolithiasis (592 0) (N20 0)   40   Neutrophilia (288 8) (D72 9)   41  Night sweats (780 8) (R61)   42  Otitis externa (380 10) (H60 90)   43  Pain of finger, unspecified laterality (729 5) (M79 646)   44  Pain of upper extremity, unspecified laterality (729 5) (M79 603)   45  Post laminectomy syndrome (722 80) (M96 1)   46  Preoperative clearance (V72 84) (Z01 818)   47  Sacroiliitis (720 2) (M46 1)   48  Screening for STD (sexually transmitted disease) (V74 5) (Z11 3)   49  Shortness of breath (786 05) (R06 02)   50  Sinusitis (473 9) (J32 9)   51  Smoking (305 1) (F17 200)   52  Snoring (786 09) (R06 83)   53  Temporomandibular joint pain (524 62) (M26 629)   54  Tick Bites (919 4)   55  Tingling (782 0) (R20 2)   56  TMJ syndrome (524 69) (M26 629)   57  Visual disturbances (368 9) (H53 9)   58  Vitamin D deficiency (268 9) (E55 9)   59  Vomiting (787 03) (R11 10)   60  Wheezing (786 07) (R06 2)    Past Medical History    1  History of Acute Myocardial Infarction (V12 59)   2  History of Chronic obstructive asthma (493 20) (J44 9,J45 909)   3  History of acute bronchitis (V12 69) (Z87 09)   4  History of acute bronchitis (V12 69) (Z87 09)   5  History of acute otitis media (V12 49) (Z86 69)   6  History of acute sinusitis (V12 69) (Z87 09)   7  History of depression (V11 8) (Z86 59)   8  History of hypertension (V12 59) (Z86 79)   9  History of Organic impotence (607 84) (N52 9)    The active problems and past medical history were reviewed and updated today  Surgical History    1  History of Back Surgery   2  History of Complete Colonoscopy   3  History of Jaw Surgery   4  History of Kidney Surgery   5  History of Knee Surgery   6  History of Shoulder Surgery    The surgical history was reviewed and updated today  Family History  Mother    1  Family history of leukemia (V16 6) (Z80 6)   2  Family history of Hypertension (V17 49)  Father    3  Family history of Diabetes Mellitus (V18 0)   4  Family history of Heart Disease (V17 49)  Brother    5   Family history of Hypertension (V17 49)  Family History    6  Family history of Reported Family History Of Cancer    The family history was reviewed and updated today  Social History    · Alcohol Use (History)   · Denied: History of Current every day smoker   · Current Smoker (305 1)   · Disability   · Denied: History of Marijuana   · Marital History -  (V61 03)   · No drug use   · Smoking (305 1) (F17 200)  The social history was reviewed and updated today  Current Meds   1  Advair Diskus 250-50 MCG/DOSE Inhalation Aerosol Powder Breath Activated; inhale 1   puff every 12 hours; Therapy: 06AVV7577 to (Evaluate:79Hri7563)  Requested for: 47GMC0084; Last   Rx:72Hvk8957 Ordered   2  DiazePAM 5 MG Oral Tablet; TAKE 1 TABLET 3 times daily PRN spasm; Therapy: 40HUL6634 to (Evaluate:05Jun2017); Last IW:41TKW5093 Ordered   3  Fluticasone Propionate 50 MCG/ACT Nasal Suspension; USE 2 SPRAYS IN EACH   NOSTRIL ONCE DAILY; Therapy: 64CDA3188 to (Last Rx:94Kml6653)  Requested for: 72NTR2960 Ordered   4  Ibuprofen 800 MG Oral Tablet; TAKE ONE (1) TABLET THREE TIMES DAILY ASNEEDED; Therapy: 66VSV4558 to (Evaluate:08Apr2017)  Requested for: 08WWS3613; Last   Rx:11Zfe4493 Ordered   5  Oxycodone-Acetaminophen  MG Oral Tablet; TAKE 1 TABLET 4 TIMES DAILY AS   NEEDED FOR PAIN;   Therapy: 28ZDF9665 to (Evaluate:05Jun2017); Last QS:18AZS3571 Ordered   6  ProAir  (90 Base) MCG/ACT Inhalation Aerosol Solution; INHALE 1-2 PUFFS   EVERY 4-6 HOURS AS NEEDED AND AS DIRECTED; Therapy: 16GDR9932 to (Last Rx:45Ucw6932)  Requested for: 68IDG2496 Ordered   7  TraMADol HCl - 50 MG Oral Tablet; TAKE 1 TABLET Every 8 hours; Therapy: 80QCE0696 to (Evaluate:05Jun2017)  Requested for: 01VPP5244; Last   EV:48CZS7279 Ordered   8  ZyrTEC Allergy 10 MG Oral Capsule; take 1 capsule PO QD; Therapy: 48MAQ6674 to (Last Rx:42Rbq2668)  Requested for: 38Spp2136 Ordered    The medication list was reviewed and updated today  Allergies    1  Adhesive Bandage MISC   2  OxyCONTIN T12A    Vitals  Vital Signs    Recorded: 79KKW6617 11:04AM   Temperature 97 5 F   Heart Rate 103   Respiration 16   Systolic 429   Diastolic 97   Height 6 ft    Weight 198 lb    BMI Calculated 26 85   BSA Calculated 2 12     Physical Exam     Constitutional Patient appears healthy and well developed  No signs of acute distress present  Respiratory Respiratory effort: Normal   Auscultation of lungs: Clear to auscultation bilaterally  Cardiovascular Auscultation of heart: Rate is regular  Rhythm is regular  No heart murmur appreciated  Neurologic - Mental Status: Alert and Oriented x3  Mood and affect: Affect is normal   Grossly nonfocal    Motor System General Motor Strength: No pronator drift and no parietal drift  Gait and Station: [place] with a normal gait  Future Appointments    Date/Time Provider Specialty Site   06/09/2017 10:00 AM KHALIDA Hayes  Neurosurgery [address] OR   07/21/2017 11:30 AM KHALIDA Hayes   Neurosurgery [facility]   06/23/2017 11:30 AM NeuroSX, [facility]ur[facility] Schedule  [facility]     Signatures   Electronically signed by : CHELSEY Myrick; May 10 2017 12:00PM EST                       (Author)    Electronically signed by : KHALIDA Leonardo ; May 11 2017 12:31PM EST                       (Author)    Electronically signed by : KHALIDA Leonardo ; May 11 2017 12:31PM EST                       (Author)

## 2018-01-11 NOTE — PROGRESS NOTES
Assessment    1  Sacroiliitis (720 2) (M46 1)   2  Anxiety (300 00) (F41 9)    Plan  Anxiety    · ALPRAZolam 1 MG Oral Tablet (Xanax); TAKE 1 TABLET Daily PRN anxiety  Lumbar radiculopathy    · TraMADol HCl - 50 MG Oral Tablet; TAKE 1 TABLET EVERY DAY AS NEEDED  FOR PAIN  Moderate persistent asthma without complication    · Advair Diskus 250-50 MCG/DOSE Inhalation Aerosol Powder Breath Activated;  INHALE 1 PUFF EVERY 12 HOURS    Discussion/Summary    The patient is a 50year old male  1  Sacroiliitis - The patient is being sent with a refill of his tramadol  The patient will continue with his PT exercises  The patient will follow up with orthopedic surgery for possible treatment with a nerve stimulator  2  Anxiety - The patient had his alprazolam refilled  The possibility of switching to clonazepam or another medication to control his symptoms was discussed  The patient would like to finish his orthopedic work up and treatment before switching medications  3  Asthma - Well controlled  The patient had his Advair refilled  Chief Complaint  Sacroiliitis, Anxiety      History of Present Illness  The patient continues to have sacroiliac  He states since his fusion surgery he has improved greatly and has weaned himself off percocet  He states he still experiences pain and stiffness  He is being evaluated by orthopedic surgery for a nerve stimulator  He continues to do his PT exercises, and recently joined a gym with a pool to keep active  The patient states that his anxiety is well controlled  He states he most frequently has symptoms at night when he is going to bed  He states that alprazolam has been helpful in decreasing symptoms and helping him sleep  Review of Systems    Constitutional: no fever and no chills  Eyes: no eye pain, no eyesight problems, eyes not red, no purulent discharge from the eyes and no itching of the eyes     ENT: no earache, no nosebleeds, no sore throat, no hearing loss and no nasal discharge  Cardiovascular: no chest pain and no palpitations  Respiratory: no shortness of breath, no cough, no wheezing and no shortness of breath during exertion  Gastrointestinal: no nausea and no diarrhea  Musculoskeletal: arthralgias, but as noted in HPI and no myalgias  Neurological: no headache, no numbness, no tingling, no dizziness, no limb weakness and no fainting  Psychiatric: no anxiety and no depression  Active Problems    1  Abnormal blood chemistry (790 6) (R79 9)   2  Acute bronchitis (466 0) (J20 9)   3  Acute otitis media (382 9) (H66 90)   4  Allergic rhinitis (477 9) (J30 9)   5  Anxiety (300 00) (F41 9)   6  Backache (724 5) (M54 9)   7  Chronic right SI joint pain (724 6,338 29) (M53 3,G89 29)   8  Cluster headache (339 00) (G44 009)   9  Cough (786 2) (R05)   10  Depression with anxiety (300 4) (F41 8)   11  Double vision (368 2) (H53 2)   12  Dry eye syndrome of right lacrimal gland (375 15) (H04 121)   13  Dyshidrotic dermatitis (705 81) (L30 1)   14  Erectile dysfunction of non-organic origin (302 72) (F52 21)   15  Fatigue (780 79) (R53 83)   16  Folliculitis (021 2) (K57 5)   17  Joint arthrodesis status (V45 4) (Z98 1)   18  Laryngospasm (478 75) (J38 5)   19  Leukocytosis (288 60) (D72 829)   20  Lightheadedness (780 4) (R42)   21  Lumbar radiculopathy (724 4) (M54 16)   22  Migraine headache (346 90) (G43 909)   23  Moderate persistent asthma without complication (894 55) (D75 53)   24  Nephrolithiasis (592 0) (N20 0)   25  Otitis externa (380 10) (H60 90)   26  Pain of finger, unspecified laterality (729 5) (M79 646)   27  Pain of upper extremity, unspecified laterality (729 5) (M79 603)   28  Preoperative clearance (V72 84) (Z01 818)   29  Sacroiliitis (720 2) (M46 1)   30  Screening for STD (sexually transmitted disease) (V74 5) (Z11 3)   31  Shortness of breath (786 05) (R06 02)   32  Sinusitis (473 9) (J32 9)   33  Smoking (305 1) (F17 200)   34   Snoring (786 09) (R06 83)   35  Tick Bites (919 4)   36  Tingling (782 0) (R20 2)   37  Wheezing (786 07) (R06 2)    Past Medical History    1  History of Acute Myocardial Infarction (V12 59)   2  History of hypertension (V12 59) (Z86 79)   3  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)    Surgical History    1  History of Back Surgery   2  History of Complete Colonoscopy   3  History of Jaw Surgery   4  History of Kidney Surgery   5  History of Knee Surgery   6  History of Shoulder Surgery    Family History    1  Family history of Hypertension (V17 49)    2  Family history of Diabetes Mellitus (V18 0)   3  Family history of Heart Disease (V17 49)    4  Family history of Hypertension (V17 49)    5  Family history of Reported Family History Of Cancer    Social History    · Alcohol Use (History)   · Current every day smoker (305 1) (F17 200)   · Current Smoker (305 1)   · Marital History -  (V61 03)   · Smoking (305 1) (F17 200)    Current Meds   1  Advair Diskus 250-50 MCG/DOSE Inhalation Aerosol Powder Breath Activated; INHALE 1   PUFF EVERY 12 HOURS; Therapy: 61XUL8651 to [id number])  Requested for: 39JVQ0269; Last   Rx:26Oct2015 Ordered   2  ALPRAZolam 1 MG Oral Tablet; TAKE 1 TABLET Daily PRN anxiety; Therapy: 81NVI3338 to (Teod[place])  Requested for: 444 14 907; Last   Rx:26Oct2015 Ordered   3  Loratadine 10 MG Oral Tablet; Please take 1 tab daily; Therapy: 91IXL8726 to (Evaluate:19Jan2016)  Requested for: 68Hsq2139; Last   Rx:50Ftx3450 Ordered   4  ProAir  (90 Base) MCG/ACT Inhalation Aerosol Solution; INHALE 1-2 PUFFS   EVERY 4-6 HOURS AS NEEDED AND AS DIRECTED; Therapy: 67EMJ9974 to (Last Rx:42Tts5386)  Requested for: 13CVE1831 Ordered   5  TraMADol HCl - 50 MG Oral Tablet; TAKE 1 TABLET EVERY DAY AS NEEDED FOR PAIN;   Therapy: 86URT5936 to (Genie Rascon)  Requested for: 03MXV5877; Last   Rx:66Szd7109 Ordered    Allergies    1   OxyCONTIN T12A    Vitals  Vital Signs Kimberley Simmons Includes: Current Encounter]    Recorded: 70YYV9565 02:35PM   Temperature 97 3 F   Heart Rate 76   Respiration 16   Systolic 701   Diastolic 84   Weight 429 lb 6 08 oz     Physical Exam    Constitutional   General appearance: No acute distress, well appearing and well nourished  Pulmonary   Respiratory effort: No increased work of breathing or signs of respiratory distress  Auscultation of lungs: Clear to auscultation, equal breath sounds bilaterally, no wheezes, no rales, no rhonci  Cardiovascular   Auscultation of heart: Normal rate and rhythm, normal S1 and S2, without murmurs  Carotid pulses: Normal     Musculoskeletal   Gait and station: Normal     Digits and nails: Normal without clubbing or cyanosis  Inspection/palpation of joints, bones, and muscles: Abnormal   TTP over R SI joint with positive compression test    Neurologic   Sensation: No sensory loss  Psychiatric   Orientation to person, place and time: Normal     Mood and affect: Normal          Attending Note  Attending Note: I discussed the case with the Resident and reviewed the Resident's note  Level of Participation: I was present in clinic, but did not examine the patient  I agree with the Resident's note  I agree with the Resident's note except Readress anxiety at next visit        Future Appointments    Date/Time Provider Specialty Site   02/11/2016 11:00 AM Iona Le, DO Orthopedic Surgery [facility]     Signatures   Electronically signed by : Gerardo Crocker, ; Jan 18 2016  7:47AM EST                       (Author)    Electronically signed by : KHALIDA Maher ; Jan 26 2016 12:57PM EST                       (Review)

## 2018-01-12 VITALS
SYSTOLIC BLOOD PRESSURE: 101 MMHG | WEIGHT: 205 LBS | DIASTOLIC BLOOD PRESSURE: 72 MMHG | HEART RATE: 75 BPM | BODY MASS INDEX: 27.8 KG/M2

## 2018-01-12 NOTE — MISCELLANEOUS
Message  Message Free Text Note Form: Patient called the after hours triage line for complaints of rash on the back of his neck and groin/testicle pain  He notes that the rash started after having carotid ultrasound completed yesterday; he notes that the jelly used was very warm/hot and thinks it may have caused a skin reaction  He describes his groin/testicular pain as sharp, stabbing, traveling from his right testicle to his inner groin to calf  Notes his skin feels very sensitive, as if it's on fire  Denies swelling, redness  Notes that he feels his testicle has migrated up his groin  He has used ice over the area off and on for the past 1 5-2 hours  Spoke at length with the patient and extensively counseled that his symptoms are concerning for possible testicular torsion  Discussed that it's also possible that the prolonged ice usage could have caused skin irritation and could lead to frostbite  Advised to seek medical attention in the ER, but patient was hesitant 2/2 previous poor experiences with prejudice 2/2 his tattoos and oxycodone use  I further advised that if his pain does not improve, if the pain worsens, develops redness or color change, or scrotal swelling that it could lead to irreversible damage and should immediately seek medical attention  Patient voiced understanding and agreed that if his symptoms worsened as above, he would go to the hospital for evaluation  Also advised he could try Benadryl for potential irritation reaction from hives on back of neck  Denies trouble breathing, new facial droop, or other complaints  Will task triage to follow up on his condition in the morning        Signatures   Electronically signed by : Dennis Riddle DO; Jul 20 2017  9:48PM EST                       (Author)    Electronically signed by : KHALIDA Jackman ; Jul 23 2017  6:49PM EST                       (Author)

## 2018-01-12 NOTE — PROGRESS NOTES
Assessment    1  Concussion (850 9) (S06 0X9A)   2  TMJ syndrome (524 69) (M26 69)    Plan  Allergic rhinitis    · ZyrTEC Allergy 10 MG Oral Capsule; take 1 capsule PO QD  Backache    · Lidoderm 5 % External Patch (Lidocaine); APPLY 1 PATCH TO THE AFFECTED  AREA AND LEAVE IN PLACE FOR 12 HOURS, THEN REMOVE AND LEAVE OFF FOR 12  HOURS  Concussion    · 1 - [facility], [facility]  (Orthopedic Surgery) Physician Referral  Consult  Status: Hold  For - Scheduling  Requested for: 05CIM6600  Care Summary provided  : Yes  TMJ syndrome    · Cyclobenzaprine HCl - 5 MG Oral Tablet; TAKE 1 TABLET AT BEDTIME AS NEEDED    Discussion/Summary    50year old male here with   1  concussion - no acute neurologic deficits  importance of cognitive rest emphasized  Hydration  Referral to concussion clinic at [facility]  2  TMJ - x ray shows no fracture  Flexeril 5 mg po qhs prn pain  ibuprofen with meals  RTC 2 weeks for recheck  Possible side effects of new medications were reviewed with the patient/guardian today  The treatment plan was reviewed with the patient/guardian  The patient/guardian understands and agrees with the treatment plan      Chief Complaint  f/u head trauma      History of Present Illness  50year old male here for follow up head trauma  Around 10 days ago, hit the left side of his head and jaw against an attic ceiling rafter when he stood up from vacuuming  He was diagnosed with concussion on 1/27/16, he vomited again yesterday morning  Still nauseated, feels like he cannot remember things like usual  He states his vision is blurry since this occurred and he cannot read anything on his phone  His jaw continuously clicks and feels like its "popping out "      Review of Systems    Constitutional: feeling poorly, but no fever and no chills  Eyes: eyesight problems, but as noted in HPI  Cardiovascular: no chest pain  Respiratory: no shortness of breath  Gastrointestinal: nausea  Neurological: headache  ROS reviewed  Active Problems    1  Abnormal blood chemistry (790 6) (R79 9)   2  Acute bronchitis (466 0) (J20 9)   3  Acute otitis media (382 9) (H66 90)   4  Allergic rhinitis (477 9) (J30 9)   5  Anxiety (300 00) (F41 9)   6  Backache (724 5) (M54 9)   7  Blurry vision (368 8) (H53 8)   8  Chronic right SI joint pain (724 6,338 29) (M53 3,G89 29)   9  Cluster headache (339 00) (G44 009)   10  Concussion (850 9) (S06 0X9A)   11  Cough (786 2) (R05)   12  Depression with anxiety (300 4) (F41 8)   13  Double vision (368 2) (H53 2)   14  Dry eye syndrome of right lacrimal gland (375 15) (H04 121)   15  Dyshidrotic dermatitis (705 81) (L30 1)   16  Erectile dysfunction of non-organic origin (302 72) (F52 21)   17  Fatigue (780 79) (R53 83)   18  Folliculitis (059 9) (Z65 5)   19  Head pain (784 0) (R51)   20  Jaw pain, non-TMJ (784 92) (R68 84)   21  Joint arthrodesis status (V45 4) (Z98 1)   22  Laryngospasm (478 75) (J38 5)   23  Leukocytosis (288 60) (D72 829)   24  Lightheadedness (780 4) (R42)   25  Lumbar radiculopathy (724 4) (M54 16)   26  Migraine headache (346 90) (G43 909)   27  Moderate persistent asthma without complication (799 14) (M03 28)   28  Nephrolithiasis (592 0) (N20 0)   29  Otitis externa (380 10) (H60 90)   30  Pain of finger, unspecified laterality (729 5) (M79 646)   31  Pain of upper extremity, unspecified laterality (729 5) (M79 603)   32  Preoperative clearance (V72 84) (Z01 818)   33  Sacroiliitis (720 2) (M46 1)   34  Screening for STD (sexually transmitted disease) (V74 5) (Z11 3)   35  Shortness of breath (786 05) (R06 02)   36  Sinusitis (473 9) (J32 9)   37  Smoking (305 1) (F17 200)   38  Snoring (786 09) (R06 83)   39  Tick Bites (919 4)   40  Tingling (782 0) (R20 2)   41  Vomiting (787 03) (R11 10)   42  Wheezing (786 07) (R06 2)    Past Medical History    1  History of Acute Myocardial Infarction (V12 59)   2  History of hypertension (V12 59) (Z86 79)   3   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)    The active problems and past medical history were reviewed and updated today  Surgical History    1  History of Back Surgery   2  History of Complete Colonoscopy   3  History of Jaw Surgery   4  History of Kidney Surgery   5  History of Knee Surgery   6  History of Shoulder Surgery    Family History    1  Family history of Hypertension (V17 49)    2  Family history of Diabetes Mellitus (V18 0)   3  Family history of Heart Disease (V17 49)    4  Family history of Hypertension (V17 49)    5  Family history of Reported Family History Of Cancer    Social History    · Alcohol Use (History)   · Current every day smoker (305 1) (F17 200)   · Current Smoker (305 1)   · Marital History -  (V61 03)   · Smoking (305 1) (F17 200)    Current Meds   1  Advair Diskus 250-50 MCG/DOSE Inhalation Aerosol Powder Breath Activated; INHALE 1   PUFF EVERY 12 HOURS; Therapy: 97WCJ2411 to (Evaluate:16Vmu7223)  Requested for: 09BGE0545; Last   Rx:11Jan2016 Ordered   2  ALPRAZolam 1 MG Oral Tablet; TAKE 1 TABLET Daily PRN anxiety; Therapy: 90MUP6226 to (Evaluate:90Orr9468)  Requested for: 69KIW8538; Last   Rx:11Jan2016 Ordered   3  Loratadine 10 MG Oral Tablet; take 1 tablet by mouth daily; Therapy: 73MHQ7729 to (Renard Omer)  Requested for: 65FNR8755; Last   Rx:27Jan2016 Ordered   4  ProAir  (90 Base) MCG/ACT Inhalation Aerosol Solution; INHALE 1-2 PUFFS   EVERY 4-6 HOURS AS NEEDED AND AS DIRECTED; Therapy: 45XPG6364 to (Last Rx:28Pdv7781)  Requested for: 63OYC3970 Ordered   5  TraMADol HCl - 50 MG Oral Tablet; TAKE 1 TABLET EVERY DAY AS NEEDED FOR PAIN;   Therapy: 10QBY5033 to (Evaluate:73Evf7195)  Requested for: 12EJV2113; Last   Rx:11Jan2016 Ordered    Allergies    1   OxyCONTIN T12A    Vitals  Vital Signs [Data Includes: Current Encounter]    Recorded: [id number] 09:27AM   Temperature 96 3 F   Heart Rate 82   Respiration 14   Systolic 943   Diastolic 90   Height 6 ft Weight 222 lb    BMI Calculated 30 11   BSA Calculated 2 23   Pain Scale 5     Physical Exam    Constitutional   General appearance: No acute distress, well appearing and well nourished  Eyes   Conjunctiva and lids: No swelling, erythema, or discharge  Pupils and irises: Equal, round and reactive to light  Pulmonary   Respiratory effort: No increased work of breathing or signs of respiratory distress  Auscultation of lungs: Clear to auscultation, equal breath sounds bilaterally, no wheezes, no rales, no rhonci  Cardiovascular   Auscultation of heart: Normal rate and rhythm, normal S1 and S2, without murmurs  Abdomen   Abdomen: Non-tender, no masses  Neurologic   Cranial nerves: Cranial nerves 2-12 intact  Psychiatric   Orientation to person, place and time: Normal     Mood and affect: Normal          Results/Data  Encounter Results   * XR MANDIBLE 4+ VIEW 08ZNJ8416 04:33PM [employer]    Order Number: [id number]   Performing Comments: Pt hit left side of head and face 4 days ago, continues to have pain  Hx of jaw fx with surgical correction  Test Name Result Flag Reference   XR MANDIBLE 4+ VW (Report)     MANDIBLE     INDICATION:  Mandibular pain  COMPARISON:  None     VIEWS: 5; 5 images     FINDINGS:     Cerclage wire is seen in the left mandibular angle  No evidence of acute fracture  No lytic or blastic lesion  The paranasal sinuses are clear  IMPRESSION:     Cerclage wires fixating left mandibular angle  No acute fracture identified  Signed by:   Angie Ramirez MD   1/28/16     * XR SKULL COMPLETE 4+ VIEW 29LPV7523 04:33PM [employer]    Order Number: [id number]     Test Name Result Flag Reference   XR SKULL COMPLETE 4+ VW (Report)     SKULL     INDICATION: Injury  COMPARISON: None     VIEWS: 4; 4 images     FINDINGS:     There is no fracture  No lytic or blastic osseous lesions are seen  Cerclage wire left mandible seen  IMPRESSION:     No fracture       Signed by:   Marisol Leigh MD   1/28/16       Future Appointments    Date/Time Provider Specialty Site   02/11/2016 11:00 AM Ayleen Sethi DO Orthopedic Surgery [facility]     Signatures   Electronically signed by : Judy Jade DO; Feb 1 2016  9:54AM EST                       (Author)

## 2018-01-13 VITALS
SYSTOLIC BLOOD PRESSURE: 164 MMHG | RESPIRATION RATE: 16 BRPM | HEART RATE: 78 BPM | HEIGHT: 72 IN | WEIGHT: 210.13 LBS | DIASTOLIC BLOOD PRESSURE: 102 MMHG | TEMPERATURE: 97.4 F | BODY MASS INDEX: 28.46 KG/M2

## 2018-01-13 VITALS
WEIGHT: 198 LBS | SYSTOLIC BLOOD PRESSURE: 140 MMHG | HEART RATE: 72 BPM | BODY MASS INDEX: 26.82 KG/M2 | TEMPERATURE: 97.8 F | RESPIRATION RATE: 16 BRPM | DIASTOLIC BLOOD PRESSURE: 98 MMHG | HEIGHT: 72 IN

## 2018-01-14 VITALS
BODY MASS INDEX: 28.31 KG/M2 | DIASTOLIC BLOOD PRESSURE: 88 MMHG | WEIGHT: 209 LBS | TEMPERATURE: 97.4 F | SYSTOLIC BLOOD PRESSURE: 138 MMHG | HEART RATE: 68 BPM | RESPIRATION RATE: 16 BRPM | OXYGEN SATURATION: 97 % | HEIGHT: 72 IN

## 2018-01-14 VITALS
BODY MASS INDEX: 26.82 KG/M2 | TEMPERATURE: 97.8 F | WEIGHT: 198 LBS | SYSTOLIC BLOOD PRESSURE: 140 MMHG | DIASTOLIC BLOOD PRESSURE: 80 MMHG | HEART RATE: 82 BPM | RESPIRATION RATE: 16 BRPM | HEIGHT: 72 IN

## 2018-01-14 VITALS
BODY MASS INDEX: 26.82 KG/M2 | SYSTOLIC BLOOD PRESSURE: 150 MMHG | HEIGHT: 72 IN | TEMPERATURE: 97.5 F | RESPIRATION RATE: 16 BRPM | HEART RATE: 103 BPM | WEIGHT: 198 LBS | DIASTOLIC BLOOD PRESSURE: 97 MMHG

## 2018-01-14 VITALS
SYSTOLIC BLOOD PRESSURE: 120 MMHG | HEIGHT: 72 IN | WEIGHT: 197 LBS | RESPIRATION RATE: 16 BRPM | TEMPERATURE: 96.7 F | BODY MASS INDEX: 26.68 KG/M2 | DIASTOLIC BLOOD PRESSURE: 80 MMHG

## 2018-01-14 VITALS
HEART RATE: 61 BPM | DIASTOLIC BLOOD PRESSURE: 68 MMHG | BODY MASS INDEX: 28.29 KG/M2 | WEIGHT: 208.56 LBS | RESPIRATION RATE: 16 BRPM | SYSTOLIC BLOOD PRESSURE: 142 MMHG | OXYGEN SATURATION: 98 %

## 2018-01-14 VITALS
WEIGHT: 191.13 LBS | HEART RATE: 68 BPM | RESPIRATION RATE: 16 BRPM | SYSTOLIC BLOOD PRESSURE: 138 MMHG | DIASTOLIC BLOOD PRESSURE: 86 MMHG | TEMPERATURE: 98.6 F | BODY MASS INDEX: 25.89 KG/M2 | HEIGHT: 72 IN

## 2018-01-14 NOTE — MISCELLANEOUS
January 20, 2017      To whom it may concern,    Albert Clay is currently being treated for chronic pain syndrome, status post multiple lumbar spine surgeries, including dorsal column stimulator  He is currently  homebound, receiving physical therapy at home  He is not able to stand, sit or walk for any length of time  At this time, I am requesting all considerations in excusing Yefri from jury duty at this time or at anytime in the near future  Thank  you in advance for all considerations  Sincerely,        KHALIDA Menjivar        Electronically signed Ana Carbajal   Jan 20 2017 10:11AM EST

## 2018-01-14 NOTE — PROGRESS NOTES
Chief Complaint  Patient presents post: Removal of a thoracic SCS paddle electrode via laminectomy; T9-10 laminectomy for decompression stenosis/adhesions and scar tissue capsule; Removal left buttock IPG  History of Present Illness  HPI: Patient presents for 2 week POV for incision check  He reports that he is having some pain, that he describes as "sharp", that comes and goes  He states that this pain is improving every day, however, he still ambulates with a cane for balance reasons  He states that the pain medication that he is using is helping to keep his pain under control  He denies any incisional issues or fevers, other than some bloody drainage for about 2 days after surgery, which he continued to monitor  Active Problems    1  Abnormal blood chemistry (790 6) (R79 9)   2  Aftercare following surgery of the musculoskeletal system (V58 78) (Z47 89)   3  Allergic rhinitis (477 9) (J30 9)   4  Anxiety (300 00) (F41 9)   5  Backache (724 5) (M54 9)   6  Blood pressure elevated without history of HTN (796 2) (R03 0)   7  Blurry vision (368 8) (H53 8)   8  Candidiasis of other specified sites (112 89) (B37 89)   9  Chronic left sacroiliac joint pain (724 6,338 29) (M53 3,G89 29)   10  Chronic pain syndrome (338 4) (G89 4)   11  Chronic right SI joint pain (724 6,338 29) (M53 3,G89 29)   12  Cluster headache (339 00) (G44 009)   13  Concussion (850 9) (S06 0X9A)   14  Cough (786 2) (R05)   15  Depression with anxiety (300 4) (F41 8)   16  Double vision (368 2) (H53 2)   17  Dry eye syndrome of right lacrimal gland (375 15) (H04 121)   18  Dyshidrotic dermatitis (705 81) (L30 1)   19  Elevated WBC count (288 60) (D72 829)   20  Erectile dysfunction (607 84) (N52 9)   21  Erectile dysfunction of non-organic origin (302 72) (F52 21)   22  Fatigue (780 79) (R53 83)   23  Fatigue (780 79) (R53 83)   24  Folliculitis (798 1) (N47 5)   25   Head pain (784 0) (R51)   26  Jaw pain, non-TMJ (784 92) (R61 84) 27  Joint arthrodesis status (V45 4) (Z98 1)   28  Laryngospasm (478 75) (J38 5)   29  Left-sided tinnitus (388 30) (H93 12)   30  Leukocytosis (288 60) (D72 829)   31  Lightheadedness (780 4) (R42)   32  Lumbar degenerative disc disease (722 52) (M51 36)   33  Lumbar radiculopathy (724 4) (M54 16)   34  Lumbosacral spinal stenosis (724 02) (M48 07)   35  Medication management (V58 69) (Z79 899)   36  Migraine headache (346 90) (G43 909)   37  Moderate persistent asthma without complication (720 82) (Z39 14)   38  Myalgia (729 1) (M79 1)   39  Need for immunization against influenza (V04 81) (Z23)   40  Nephrolithiasis (592 0) (N20 0)   41  Neutrophilia (288 8) (D72 9)   42  Night sweats (780 8) (R61)   43  Otitis externa (380 10) (H60 90)   44  Pain of finger, unspecified laterality (729 5) (M79 646)   45  Pain of upper extremity, unspecified laterality (729 5) (M79 603)   46  Post laminectomy syndrome (722 80) (M96 1)   47  Post-operative state (V45 89) (Z98 890)   48  Preoperative clearance (V72 84) (Z01 818)   49  Pre-operative clearance (V72 84) (Z01 818)   50  Sacroiliitis (720 2) (M46 1)   51  Screening for STD (sexually transmitted disease) (V74 5) (Z11 3)   52  Shortness of breath (786 05) (R06 02)   53  Sinusitis (473 9) (J32 9)   54  Smoking (305 1) (F17 200)   55  Snoring (786 09) (R06 83)   56  Temporomandibular joint pain (524 62) (M26 629)   57  Tick Bites (919 4)   58  Tingling (782 0) (R20 2)   59  TMJ syndrome (524 69) (M26 629)   60  Visual disturbances (368 9) (H53 9)   61  Vitamin D deficiency (268 9) (E55 9)   62  Vomiting (787 03) (R11 10)   63  Wheezing (786 07) (R06 2)    Current Meds   1  Advair Diskus 250-50 MCG/DOSE Inhalation Aerosol Powder Breath Activated; INHALE   1 PUFF BY MOUTH EVERY 12 HOURS; Therapy: 22SMF4837 to (Evaluate:27Fgw4131)  Requested for: 87VXS7641; Last   Rx:57Gfw6907 Ordered   2  DiazePAM 5 MG Oral Tablet; TAKE 1 TABLET 3 times daily PRN spasm;    Therapy: 29ULG9082 to (Evaluate:82Rko7746); Last Rx:78Hnt4551 Ordered   3  Fluticasone Propionate 50 MCG/ACT Nasal Suspension; USE 2 SPRAYS IN EACH   NOSTRIL ONCE DAILY; Therapy: 94UBA6334 to (Last Rx:10Pgi6482)  Requested for: 13HGF8495 Ordered   4  OxyCODONE HCl - 10 MG Oral Tablet; 1 tab by mouth every 6 hours as needed for pain; Therapy: 98QHD9703 to (Evaluate:04Mqy4430); Last Rx:20Jun2017 Ordered   5  SUMAtriptan Succinate 6 MG/0 5ML Subcutaneous Solution; INJECT 0 5ML AT ONSET   OF HEADACHE  MAY REPEAT IN 2 HOURS  MAXIMUM OF 2 INJECTIONS   PER DAY, NO MORE THAN 2 DAYS PER WEEK; Therapy: 76GXU5136 to (Last Rx:66Xmh0321)  Requested for: 15GRI6815 Ordered   6  Ventolin  (90 Base) MCG/ACT Inhalation Aerosol Solution; INHALE 1 PUFF   EVERY 4 HOURS AS NEEDED; Therapy: 99KRZ6471 to (Last DV:79SVK6446)  Requested for: 57QBJ0858 Ordered   7  Vitamin D3 95172 UNIT Oral Capsule; TAKE 1 CAPSULE Weekly; Therapy: 26SON7461 to (Last Rx:60Wfb3908)  Requested for: 96JND8245 Ordered   8  ZyrTEC Allergy 10 MG Oral Capsule; take 1 capsule PO QD; Therapy: 28IWA4718 to (Last Rx:09Sdn5018)  Requested for: 21Mis6065 Ordered    Allergies    1  Adhesive Bandage MISC   2  OxyCONTIN T12A    Vitals  Signs    Temperature: 96 7 F  Respiration: 16  Systolic: 507  Diastolic: 80  Height: 6 ft   Weight: 197 lb   BMI Calculated: 26 72  BSA Calculated: 2 12    Procedure  Procedure: suture removal  The wound was located on the (Midline back and left buttock)  Wound Exam: well healed with no sign of infection  Procedure Note: 21 total staples were removed  Patient Status:  the patient tolerated the procedure well  Complications: Incisions are CHUY  there were no complications  Discussion/Summary    Reviewed incision care with patient including daily observation for s/s infection including: increased erythema, edema, drainage, dehiscence of incision or fever >101  Advised to continue cleansing areas with mild soap and water and pat dry  He is not to apply any lotions, creams, or ointments directly on the incision and not to submerge incisions in water (IE swimming pool, bath tub, hot tub)  He is to maintain activity restrictions until cleared by the surgeon  Verified date/time/location of his upcoming POV and advised him to call the office with any further questions or concerns, or if any incisional issues or fevers would arise  He was appreciative and verbalized understanding  Future Appointments    Date/Time Provider Specialty Site   07/21/2017 11:30 AM KHALIDA Souza   [place] [address] [address] [facility]     Signatures   Electronically signed by : Joana Hamilton RN; Jun 23 2017 11:30AM EST                       (Author)    Electronically signed by : KHALIDA Winston ; Jun 23 2017  1:30PM EST                       (Author)

## 2018-01-15 VITALS
HEART RATE: 74 BPM | RESPIRATION RATE: 18 BRPM | WEIGHT: 202.38 LBS | TEMPERATURE: 95.3 F | SYSTOLIC BLOOD PRESSURE: 140 MMHG | HEIGHT: 72 IN | BODY MASS INDEX: 27.41 KG/M2 | DIASTOLIC BLOOD PRESSURE: 92 MMHG

## 2018-01-15 VITALS
DIASTOLIC BLOOD PRESSURE: 90 MMHG | RESPIRATION RATE: 16 BRPM | TEMPERATURE: 98.8 F | HEART RATE: 88 BPM | HEIGHT: 72 IN | BODY MASS INDEX: 27.67 KG/M2 | WEIGHT: 204.25 LBS | SYSTOLIC BLOOD PRESSURE: 140 MMHG

## 2018-01-15 NOTE — MISCELLANEOUS
Message  S/w pt on telephone POD 3  He reports that she is doing very well overall and he denies any incisional issues or fevers  He is experiencing some incisional soreness, which he expected  Verified date/time/location of her upcoming POV and advised her to call the office with any further questions or concerns, or if any incisional issues or fevers would arise  Went over incision care with patient and he had no further questions at this time  He was appreciative for the call        Signatures   Electronically signed by : Sharda Atkins RN; Jun 12 2017  3:03PM EST                       (Author)

## 2018-01-15 NOTE — MISCELLANEOUS
Reason For Visit  Reason For Visit Free Text Note Form: Received request to assist pt  with obtaining a [facility] provider for a psychological evaluation for the benefit of having a pain device implanted in his spinal cord, per MD  Call placed to list of providers provided by MD however they do not contract with [facility]  Call to [facility] and obtained list of providers  Calls placed to several providers  [facility] does not provide psychological services and they refer to Dr Alfonso Serrano however he does not accept [facility]  Await return calls from additional providers  D/W pt  who will speak with MD about allowing a [facility] provider to conduct this type of evaluation for him  Pt  will advise  Will continue to provide support  Active Problems    1  Abnormal blood chemistry (790 6) (R79 9)   2  Acute bronchitis (466 0) (J20 9)   3  Acute otitis media (382 9) (H66 90)   4  Acute sinusitis (461 9) (J01 90)   5  Allergic rhinitis (477 9) (J30 9)   6  Anxiety (300 00) (F41 9)   7  Backache (724 5) (M54 9)   8  Blurry vision (368 8) (H53 8)   9  Chronic pain syndrome (338 4) (G89 4)   10  Chronic right SI joint pain (724 6,338 29) (M53 3,G89 29)   11  Cluster headache (339 00) (G44 009)   12  Concussion (850 9) (S06 0X9A)   13  Cough (786 2) (R05)   14  Depression with anxiety (300 4) (F41 8)   15  Double vision (368 2) (H53 2)   16  Dry eye syndrome of right lacrimal gland (375 15) (H04 121)   17  Dyshidrotic dermatitis (705 81) (L30 1)   18  Erectile dysfunction of non-organic origin (302 72) (F52 21)   19  Fatigue (780 79) (R53 83)   20  Folliculitis (279 2) (R84 1)   21  Head pain (784 0) (R51)   22  Jaw pain, non-TMJ (784 92) (R68 84)   23  Joint arthrodesis status (V45 4) (Z98 1)   24  Laryngospasm (478 75) (J38 5)   25  Left-sided tinnitus (388 30) (H93 12)   26  Leukocytosis (288 60) (D72 829)   27  Lightheadedness (780 4) (R42)   28   Lumbar radiculopathy (724 4) (M54 16)   29  Migraine headache (346 90) (G43 909)   30  Moderate persistent asthma without complication (513 51) (H20 59)   31  Nephrolithiasis (592 0) (N20 0)   32  Otitis externa (380 10) (H60 90)   33  Pain of finger, unspecified laterality (729 5) (M79 646)   34  Pain of upper extremity, unspecified laterality (729 5) (M79 603)   35  Preoperative clearance (V72 84) (Z01 818)   36  Sacroiliitis (720 2) (M46 1)   37  Screening for STD (sexually transmitted disease) (V74 5) (Z11 3)   38  Shortness of breath (786 05) (R06 02)   39  Sinusitis (473 9) (J32 9)   40  Smoking (305 1) (F17 200)   41  Snoring (786 09) (R06 83)   42  Temporomandibular joint pain (524 62) (M26 62)   43  Tick Bites (919 4)   44  Tingling (782 0) (R20 2)   45  TMJ syndrome (524 69) (M26 69)   46  Visual disturbances (368 9) (H53 9)   47  Vitamin D deficiency (268 9) (E55 9)   48  Vomiting (787 03) (R11 10)   49  Wheezing (786 07) (R06 2)    Current Meds   1  Advair Diskus 250-50 MCG/DOSE Inhalation Aerosol Powder Breath Activated; inhale 1   puff every 12 hours; Therapy: 60ABW8612 to (Evaluate:15Cop9729)  Requested for: 65BUH7952; Last   Rx:25Qut6200 Ordered   2  ALPRAZolam 1 MG Oral Tablet (Xanax); TAKE 1 TABLET Daily PRN anxiety; Therapy: 11YHM1714 to (Evaluate:47Xtm8703)  Requested for: 88Loh0752; Last   Rx:21Mar2016 Ordered   3  Magnesium 400 MG Oral Tablet; Take 1 tablet daily; Therapy: 59RET3042 to (Evaluate:60Rob4091)  Requested for: 16CVR2840; Last   Rx:75Mkg1873 Ordered   4  ProAir  (90 Base) MCG/ACT Inhalation Aerosol Solution; INHALE 1-2 PUFFS   EVERY 4-6 HOURS AS NEEDED AND AS DIRECTED; Therapy: 89ULD0724 to (Last Rx:09Cwo4655)  Requested for: 12RNB3607 Ordered   5  TraMADol HCl - 50 MG Oral Tablet; TAKE 1 TABLET Every 8 hours; Therapy: 02FZF5235 to (Evaluate:20Apr2016)  Requested for: 89WWZ1213; Last   Rx:21Mar2016 Ordered   6  Vitamin D3 44679 UNIT Oral Capsule; Take 50,000 U weekly for 8 weeks;    Therapy: 90ERN3088 to (Last Rx:16Ogi6358)  Requested for: 92FXO3817 Ordered   7  ZyrTEC Allergy 10 MG Oral Capsule; take 1 capsule PO QD; Therapy: 33UHQ2860 to (Last Rx:00Vql3942)  Requested for: 61Uxl3451 Ordered    Allergies    1   Adhesive Bandage MISC   2  OxyCONTIN T12A    Signatures   Electronically signed by : MAGALY Blount; Mar 29 2016  2:08PM EST                       (Author)

## 2018-01-15 NOTE — MISCELLANEOUS
Message   Recorded as Task   Date: 11/23/2016 09:17 AM, Created By: Sonia Martinez   Task Name: Miscellaneous   Assigned To: Sonia Martinez   Regarding Patient: RUSTAM BANDA, Status: Active   Comment:    Sonia Martinez - 23 Nov 2016 9:17 AM     TASK CREATED  PT CXED APPT TODAY BECAUSE HE WANTS TO SEE SOKSUDHA FIRST TO GET HIS MRI RESULTS  HE SAID HE DIDNT WANT AN INJ TODAY WITHOUT KNOWING HIS MRI RESULTS FOR THE 'SPECIAL MRI' DUE TO HIS IMPLANT FIRST  EXPLAINED TO HIM THAT TODAY WAS AN OFFICE VISIT TO DISCUSS INJ AND HIS MRI RESULTS  PT STATED 'THATS WRONG, ITS WRITTEN RIGHT HERE ON MY CALANDAR THAT I WAS SCHEDULED TO HAVE AN INJ TODAY AND I'M NOT DOING THAT, YOU GUYS DIDN'T EVEN KNOW I HAD THE MRI YET  ITS A 45 MINUTE DRIVE AND I AM NOT DRIVING UP TO [place] FOR NOTHING'  PT STATED THAT HE WILL SEE SOKUNBI ON THE 28TH AND CALL US BACK TO RESCHEDULE ONCE HE KNOWS WHATS GOING ON  HE DIDNT WANT TO HEAR ANYTHING, JUST KEPT TALKING ABOUT US NOT EVEN KNOWING HE HAD HAD THE MRI SO HOW COULD THAT BE WRITTEN ON THE SCHEDULE  Delores Schultz - 23 Nov 2016 10:08 AM     TASK REPLIED TO: Previously Assigned To Delores reed md aware        Active Problems    1  Abnormal blood chemistry (790 6) (R79 9)   2  Acute bronchitis (466 0) (J20 9)   3  Acute otitis media (382 9) (H66 90)   4  Acute sinusitis (461 9) (J01 90)   5  Aftercare following surgery of the musculoskeletal system (V58 78) (Z47 89)   6  Allergic rhinitis (477 9) (J30 9)   7  Anxiety (300 00) (F41 9)   8  Backache (724 5) (M54 9)   9  Blurry vision (368 8) (H53 8)   10  Chronic left sacroiliac joint pain (724 6,338 29) (M53 3,G89 29)   11  Chronic pain syndrome (338 4) (G89 4)   12  Chronic right SI joint pain (724 6,338 29) (M53 3,G89 29)   13  Cluster headache (339 00) (G44 009)   14  Concussion (850 9) (S06 0X9A)   15  Cough (786 2) (R05)   16  Depression with anxiety (300 4) (F41 8)   17  Double vision (368 2) (H53 2)   18   Dry eye syndrome of right lacrimal gland (375 15) (H04 121)   19  Dyshidrotic dermatitis (705 81) (L30 1)   20  Erectile dysfunction of non-organic origin (302 72) (F52 21)   21  Fatigue (780 79) (R53 83)   22  Fatigue (780 79) (R53 83)   23  Folliculitis (965 7) (I28 6)   24  Head pain (784 0) (R51)   25  Jaw pain, non-TMJ (784 92) (R68 84)   26  Joint arthrodesis status (V45 4) (Z98 1)   27  Laryngospasm (478 75) (J38 5)   28  Left-sided tinnitus (388 30) (H93 12)   29  Leukocytosis (288 60) (D72 829)   30  Lightheadedness (780 4) (R42)   31  Lumbar radiculopathy (724 4) (M54 16)   32  Lumbosacral spinal stenosis (724 02) (M48 07)   33  Migraine headache (346 90) (G43 909)   34  Moderate persistent asthma without complication (887 96) (J20 39)   35  Myalgia (729 1) (M79 1)   36  Need for immunization against influenza (V04 81) (Z23)   37  Nephrolithiasis (592 0) (N20 0)   38  Neutrophilia (288 8) (D72 9)   39  Night sweats (780 8) (R61)   40  Otitis externa (380 10) (H60 90)   41  Pain of finger, unspecified laterality (729 5) (M79 646)   42  Pain of upper extremity, unspecified laterality (729 5) (M79 603)   43  Preoperative clearance (V72 84) (Z01 818)   44  Sacroiliitis (720 2) (M46 1)   45  Screening for STD (sexually transmitted disease) (V74 5) (Z11 3)   46  Shortness of breath (786 05) (R06 02)   47  Sinusitis (473 9) (J32 9)   48  Smoking (305 1) (F17 200)   49  Snoring (786 09) (R06 83)   50  Temporomandibular joint pain (524 62) (M26 629)   51  Tick Bites (919 4)   52  Tingling (782 0) (R20 2)   53  TMJ syndrome (524 69) (M26 629)   54  Visual disturbances (368 9) (H53 9)   55  Vitamin D deficiency (268 9) (E55 9)   56  Vomiting (787 03) (R11 10)   57  Wheezing (786 07) (R06 2)    Current Meds   1  Advair Diskus 250-50 MCG/DOSE Inhalation Aerosol Powder Breath Activated; inhale 1   puff every 12 hours; Therapy: 09CEF5229 to (Evaluate:58Pem6039)  Requested for: 77WQX3111; Last   Rx:09Jun2016 Ordered   2   Cialis 5 MG Oral Tablet; take 1 tablet every other day; Therapy: 83Fdd8241 to (Evaluate:30Jun2016)  Requested for: 21Jun2016; Last   Rx:20Jun2016; Status: ACTIVE - Renewal Denied Ordered   3  DiazePAM 5 MG Oral Tablet; TAKE 1 TABLET 3 times daily PRN spasm; Therapy: 05ZHN6868 to (Evaluate:38Ycg2115); Last Rx:21Nov2016 Ordered   4  Diclofenac Sodium 1 % Transdermal Gel (Voltaren); APPLY TO LOWER EXTREMITIES,   4 GM OF GEL TO AFFECTED AREA 4 TIMES DAILY  DO NOT   APPLY MORE THAN 16 GM DAILY TO ANY ONE AFFECTED JOINT; Therapy: 56EIP9839 to (Last Rx:17Oct2016) Ordered   5  Ketorolac Tromethamine 10 MG Oral Tablet; TAKE 1 TABLET 3 TIMES DAILY AFTER   MEALS; Therapy: 56NXU0246 to (Scott Casey)  Requested for: 31Oct2016; Last   Rx:31Oct2016 Ordered   6  Loratadine 10 MG Oral Tablet; take 1 tablet by mouth daily; Therapy: 49CCX2481 to (Evaluate:94Bim1303)  Requested for: 19QVN2775; Last   Rx:85Cuj3373 Ordered   7  MethylPREDNISolone 4 MG Oral Tablet Therapy Pack (Medrol); please use as directed; Therapy: 51UOP7187 to (Last Rx:17Oct2016) Ordered   8  Oxycodone-Acetaminophen  MG Oral Tablet; TAKE 1 TABLET Every 6 hours PRN   pain; Therapy: 20Jun2016 to (Evaluate:93Lud2833); Last Rx:21Nov2016 Ordered   9  ProAir  (90 Base) MCG/ACT Inhalation Aerosol Solution; INHALE 1-2 PUFFS   EVERY 4-6 HOURS AS NEEDED AND AS DIRECTED; Therapy: 33UGH1142 to (Last Rx:46Isy3237)  Requested for: 67CLN2450 Ordered   10  TraMADol HCl - 50 MG Oral Tablet; TAKE 1 TABLET Every 8 hours; Therapy: 26BRV2327 to (Ladon Peabody)  Requested for: 33RPF1565; Last    Rx:13Oct2016 Ordered   11  ZyrTEC Allergy 10 MG Oral Capsule; take 1 capsule PO QD; Therapy: 02PDU0735 to (Last Rx:69Ycl2267)  Requested for: 05Sps0420 Ordered    Allergies    1  Adhesive Bandage MISC   2  OxyCONTIN T12A    Signatures   Electronically signed by :  Jonathan Schilling, ; Nov 23 2016 11:58AM EST                       (Author)

## 2018-01-15 NOTE — MISCELLANEOUS
Message  Spoke with Neurology on call about the patients recent MRI scan  At this time they stated that the patient likely suffered from a lacunar infarct  Given that it is chronic no need for acute interventions  The patient will be started on a baby ASA and a high intensity statin  The patient is to repeat MRI in 6 months  Will await for upcoming Neuro appointment  Plan was discussed with the patient  He understands ER precautions if symptoms are return        Signatures   Electronically signed by : KHALIDA Velasco ; Aug  3 2017  9:22AM EST                       (Author)

## 2018-01-16 NOTE — MISCELLANEOUS
Reason For Visit  Reason For Visit Free Text Note Form: ASSISTANCE WITH PAIN PSYCHOLOGIST EVALUATION      Active Problems    1  Abnormal blood chemistry (790 6) (R79 9)   2  Acute bronchitis (466 0) (J20 9)   3  Acute otitis media (382 9) (H66 90)   4  Acute sinusitis (461 9) (J01 90)   5  Allergic rhinitis (477 9) (J30 9)   6  Anxiety (300 00) (F41 9)   7  Backache (724 5) (M54 9)   8  Blurry vision (368 8) (H53 8)   9  Chronic pain syndrome (338 4) (G89 4)   10  Chronic right SI joint pain (724 6,338 29) (M53 3,G89 29)   11  Cluster headache (339 00) (G44 009)   12  Concussion (850 9) (S06 0X9A)   13  Cough (786 2) (R05)   14  Depression with anxiety (300 4) (F41 8)   15  Double vision (368 2) (H53 2)   16  Dry eye syndrome of right lacrimal gland (375 15) (H04 121)   17  Dyshidrotic dermatitis (705 81) (L30 1)   18  Erectile dysfunction of non-organic origin (302 72) (F52 21)   19  Fatigue (780 79) (R53 83)   20  Folliculitis (036 7) (L11 8)   21  Head pain (784 0) (R51)   22  Jaw pain, non-TMJ (784 92) (R68 84)   23  Joint arthrodesis status (V45 4) (Z98 1)   24  Laryngospasm (478 75) (J38 5)   25  Left-sided tinnitus (388 30) (H93 12)   26  Leukocytosis (288 60) (D72 829)   27  Lightheadedness (780 4) (R42)   28  Lumbar radiculopathy (724 4) (M54 16)   29  Migraine headache (346 90) (G43 909)   30  Moderate persistent asthma without complication (001 43) (I14 03)   31  Nephrolithiasis (592 0) (N20 0)   32  Otitis externa (380 10) (H60 90)   33  Pain of finger, unspecified laterality (729 5) (M79 646)   34  Pain of upper extremity, unspecified laterality (729 5) (M79 603)   35  Preoperative clearance (V72 84) (Z01 818)   36  Sacroiliitis (720 2) (M46 1)   37  Screening for STD (sexually transmitted disease) (V74 5) (Z11 3)   38  Shortness of breath (786 05) (R06 02)   39  Sinusitis (473 9) (J32 9)   40  Smoking (305 1) (F17 200)   41  Snoring (786 09) (R06 83)   42   Temporomandibular joint pain (524 62) (M26 62)   43  Tick Bites (919 4)   44  Tingling (782 0) (R20 2)   45  TMJ syndrome (524 69) (M26 69)   46  Visual disturbances (368 9) (H53 9)   47  Vitamin D deficiency (268 9) (E55 9)   48  Vomiting (787 03) (R11 10)   49  Wheezing (786 07) (R06 2)    Current Meds   1  Advair Diskus 250-50 MCG/DOSE Inhalation Aerosol Powder Breath Activated; inhale 1   puff every 12 hours; Therapy: 60MMB3976 to (Evaluate:17May2016)  Requested for: 56DMX2770; Last   Rx:17Feb2016 Ordered   2  ALPRAZolam 1 MG Oral Tablet (Xanax); TAKE 1 TABLET Daily PRN anxiety; Therapy: 47TMN1189 to (Evaluate:20Apr2016)  Requested for: 22Mar2016; Last   Rx:21Mar2016 Ordered   3  Magnesium 400 MG Oral Tablet; Take 1 tablet daily; Therapy: 46TSA8229 to (Evaluate:16Mar2016)  Requested for: 27PHW2771; Last   Rx:15Feb2016 Ordered   4  ProAir  (90 Base) MCG/ACT Inhalation Aerosol Solution; INHALE 1-2 PUFFS   EVERY 4-6 HOURS AS NEEDED AND AS DIRECTED; Therapy: 44ZTA6198 to (Last Rx:84Wop4681)  Requested for: 47LIX4792 Ordered   5  TraMADol HCl - 50 MG Oral Tablet; TAKE 1 TABLET Every 8 hours; Therapy: 89NVP0781 to (Evaluate:20Apr2016)  Requested for: 17KBY6867; Last   Rx:21Mar2016 Ordered   6  Vitamin D3 19608 UNIT Oral Capsule; Take 50,000 U weekly for 8 weeks; Therapy: 35WHM4006 to (Last Rx:61Dma6769)  Requested for: 01QRM1162 Ordered   7  ZyrTEC Allergy 10 MG Oral Capsule; take 1 capsule PO QD; Therapy: 58UZT1065 to (Last Rx:66Aya2505)  Requested for: 34Fxf8114 Ordered    Allergies    1  Adhesive Bandage MISC   2  OxyCONTIN T12A    Discussion/Summary  Discussion Summary:   BRITTNEY HAS PLACED SEVERAL CALLS OUT TO PAIN PSYCHOLOGIST PROVIDERS TO ASSIST PT WITH LOCATING A PROVIDER  BRITTNEY AWAITS RETURN CALLS  BRITTNEY WAS ABLE TO FIND ONE PROVIDER BUT THIS PROVIDER DOES NOT ACCEPT PT's INSURANCE  SW DID DISCUSS THE OUT OF POCKET EXPENSE WITH PT WHO RELATES HE CAN NOT AFFORD SAME   HE RELATES THAT RICHELLE CARTER ALSO HAS A PROVIDER BUT AGAIN OUT OF POCKET EXPENSE TOO HIGH DUE TO HIS LIMITED INCOME  SW TO F/U AND ASSIST PT AS INDICATED        Signatures   Electronically signed by : Jared Diaz LCSW; Mar 25 2016 11:25AM EST                       (Author)

## 2018-01-16 NOTE — RESULT NOTES
Message   Recorded as Task   Date: 06/29/2016 11:22 AM, Created By: Adriana Mensah   Task Name: Follow Up   Assigned To: Patrica Calderon   Regarding Patient: RUSTAM BANDA, Status: Active   Comment:    Patrica Calderon - 29 Jun 2016 11:22 AM     TASK CREATED  F/u procedure-[facility] Trial on 6/28/16  Pt states that he had a difficult time yesterday with back pain that he rated a 9/10  Is better today-c/o site sorness, but no severe pain  Rates today's pain as a 5-6/10  Did have thoracic x ray done  Is trying different programs and seems to like program #7  Dressing is dry and intact with no s/s of bleeding  No fever  Is taking his antibioic    Has f/u appoint for stim removalPeggmine Calhoun - 29 Jun 2016 12:50 PM     TASK REPLIED TO: Previously Assigned To Bert reed md aware        Signatures   Electronically signed by : Liyah Dougherty, ; Jun 29 2016  1:07PM EST                       (Author)

## 2018-01-16 NOTE — MISCELLANEOUS
Reason For Visit  Reason For Visit Free Text Note Form: Spoke with pt  regarding status of need for referral for psychological evaluation for pain device implant  Pt  reports he has contacted his MD regarding this and now the office has informed him that they can perform the evaluation for him and pt  has scheduled an appointment for 4/7/16  Informed pt  that Geovanna Blue ([phone]), in [place], [place]  is in network with his insurance and although she does not specialize in this type of eval  she is willing to perform the eval  Pt  reports he will advise if further info  needed  Will continue to be available to provide support  Active Problems    1  Abnormal blood chemistry (790 6) (R79 9)   2  Acute bronchitis (466 0) (J20 9)   3  Acute otitis media (382 9) (H66 90)   4  Acute sinusitis (461 9) (J01 90)   5  Allergic rhinitis (477 9) (J30 9)   6  Anxiety (300 00) (F41 9)   7  Backache (724 5) (M54 9)   8  Blurry vision (368 8) (H53 8)   9  Chronic pain syndrome (338 4) (G89 4)   10  Chronic right SI joint pain (724 6,338 29) (M53 3,G89 29)   11  Cluster headache (339 00) (G44 009)   12  Concussion (850 9) (S06 0X9A)   13  Cough (786 2) (R05)   14  Depression with anxiety (300 4) (F41 8)   15  Double vision (368 2) (H53 2)   16  Dry eye syndrome of right lacrimal gland (375 15) (H04 121)   17  Dyshidrotic dermatitis (705 81) (L30 1)   18  Erectile dysfunction of non-organic origin (302 72) (F52 21)   19  Fatigue (780 79) (R53 83)   20  Folliculitis (889 1) (O13 0)   21  Head pain (784 0) (R51)   22  Jaw pain, non-TMJ (784 92) (R68 84)   23  Joint arthrodesis status (V45 4) (Z98 1)   24  Laryngospasm (478 75) (J38 5)   25  Left-sided tinnitus (388 30) (H93 12)   26  Leukocytosis (288 60) (D72 829)   27  Lightheadedness (780 4) (R42)   28  Lumbar radiculopathy (724 4) (M54 16)   29  Migraine headache (346 90) (G43 909)   30  Moderate persistent asthma without complication (545 16) (H56 24)   31  Nephrolithiasis (592 0) (N20 0)   32  Otitis externa (380 10) (H60 90)   33  Pain of finger, unspecified laterality (729 5) (M79 646)   34  Pain of upper extremity, unspecified laterality (729 5) (M79 603)   35  Preoperative clearance (V72 84) (Z01 818)   36  Sacroiliitis (720 2) (M46 1)   37  Screening for STD (sexually transmitted disease) (V74 5) (Z11 3)   38  Shortness of breath (786 05) (R06 02)   39  Sinusitis (473 9) (J32 9)   40  Smoking (305 1) (F17 200)   41  Snoring (786 09) (R06 83)   42  Temporomandibular joint pain (524 62) (M26 62)   43  Tick Bites (919 4)   44  Tingling (782 0) (R20 2)   45  TMJ syndrome (524 69) (M26 69)   46  Visual disturbances (368 9) (H53 9)   47  Vitamin D deficiency (268 9) (E55 9)   48  Vomiting (787 03) (R11 10)   49  Wheezing (786 07) (R06 2)    Current Meds   1  Advair Diskus 250-50 MCG/DOSE Inhalation Aerosol Powder Breath Activated; inhale 1   puff every 12 hours; Therapy: 08ISC4065 to (Evaluate:18Aor7291)  Requested for: 03QAQ0593; Last   Rx:19Ydu9085 Ordered   2  ALPRAZolam 1 MG Oral Tablet (Xanax); TAKE 1 TABLET Daily PRN anxiety; Therapy: 71JFH8051 to (Evaluate:87Wnn7123)  Requested for: 22Mar2016; Last   Rx:21Mar2016 Ordered   3  Magnesium 400 MG Oral Tablet; Take 1 tablet daily; Therapy: 76QEW9101 to (Evaluate:16Mar2016)  Requested for: 48UOK3491; Last   Rx:17Wbl9079 Ordered   4  ProAir  (90 Base) MCG/ACT Inhalation Aerosol Solution; INHALE 1-2 PUFFS   EVERY 4-6 HOURS AS NEEDED AND AS DIRECTED; Therapy: 09OVC7708 to (Last Rx:88Mlz8712)  Requested for: 05SJS3747 Ordered   5  TraMADol HCl - 50 MG Oral Tablet; TAKE 1 TABLET Every 8 hours; Therapy: 71TRC6612 to (Evaluate:20Apr2016)  Requested for: 93MZS8617; Last   Rx:21Mar2016 Ordered   6  Vitamin D3 61892 UNIT Oral Capsule; Take 50,000 U weekly for 8 weeks; Therapy: 23CSY4405 to (Last Rx:01Czj4750)  Requested for: 07PIW5062 Ordered   7   ZyrTEC Allergy 10 MG Oral Capsule; take 1 capsule PO QD; Therapy: 89AHR7569 to (Last Rx:78Gjn9880)  Requested for: 51Vnp8292 Ordered    Allergies    1   Adhesive Bandage MISC   2  OxyCONTIN T12A    Signatures   Electronically signed by : MAGALY Manuel; Apr 1 2016  9:46AM EST                       (Author)

## 2018-01-17 NOTE — MISCELLANEOUS
This is in reference to patient Keke Urbina regarding request for MRI of the lumbar spine  Patient has acute onset lower back pain as well as radiation to the left hip, groin and thigh  While  therapy is typically first line treatment, this patient is recently status post implantation of a dorsal column stimulator and physical therapy is contraindicated at this time as he is still at risk for displacement of his thoracic lead with strenuous  activity or exercise  Our goal for treatment for this patient is to pursue lumbar injections which would be safest once an up to date image is available  Thank you for your attention to this matter and assistance with the care of this patient       Sincerely,      Kaveh Talbert [facility]      Electronically signed Debbie Pierce [facility]  Nov  3 2016  1:02PM EST Author

## 2018-01-22 VITALS
HEART RATE: 61 BPM | WEIGHT: 200 LBS | TEMPERATURE: 97.8 F | HEIGHT: 72 IN | RESPIRATION RATE: 16 BRPM | DIASTOLIC BLOOD PRESSURE: 84 MMHG | SYSTOLIC BLOOD PRESSURE: 124 MMHG | BODY MASS INDEX: 27.09 KG/M2

## 2018-01-22 VITALS
SYSTOLIC BLOOD PRESSURE: 122 MMHG | RESPIRATION RATE: 16 BRPM | BODY MASS INDEX: 25.6 KG/M2 | DIASTOLIC BLOOD PRESSURE: 80 MMHG | HEIGHT: 74 IN | WEIGHT: 199.5 LBS | HEART RATE: 82 BPM | TEMPERATURE: 96.9 F

## 2018-01-22 VITALS
WEIGHT: 208.25 LBS | HEIGHT: 72 IN | BODY MASS INDEX: 28.21 KG/M2 | HEART RATE: 78 BPM | SYSTOLIC BLOOD PRESSURE: 142 MMHG | TEMPERATURE: 97.8 F | DIASTOLIC BLOOD PRESSURE: 98 MMHG | RESPIRATION RATE: 18 BRPM

## 2018-01-23 ENCOUNTER — GENERIC CONVERSION - ENCOUNTER (OUTPATIENT)
Dept: OTHER | Facility: OTHER | Age: 48
End: 2018-01-23

## 2018-01-23 VITALS
HEART RATE: 75 BPM | BODY MASS INDEX: 26.05 KG/M2 | WEIGHT: 203 LBS | SYSTOLIC BLOOD PRESSURE: 158 MMHG | RESPIRATION RATE: 16 BRPM | TEMPERATURE: 97.2 F | OXYGEN SATURATION: 98 % | HEIGHT: 74 IN | DIASTOLIC BLOOD PRESSURE: 88 MMHG

## 2018-01-23 NOTE — MISCELLANEOUS
Message  Message Free Text Note Form: patient called with complaints of tooth pain and requesting a script for abx  States he has had pain for several weeks and required a root canal but cannot afford to pay for the procedure  He endorses subjective fevers and swollen gum  States he has been treated twice in the past with abx and believes his tooth might be infected  Has an appointment next week with PCP but does not want to wait that long without treatment  Discussion/Summary  Discussion Summary:   Explained to patient that i am not able to prescribe abx over the phone without proper assessment  Pain could possibly be related to exposed nerve vs infection and abx not be the right treatment  i recommended that he go to the ED should he need further evaluation        Signatures   Electronically signed by : KHALIDA Denis ; Dec 23 2017  9:35PM EST                       (Author)

## 2018-01-24 ENCOUNTER — TELEPHONE (OUTPATIENT)
Dept: FAMILY MEDICINE CLINIC | Facility: CLINIC | Age: 48
End: 2018-01-24

## 2018-01-24 VITALS
HEART RATE: 65 BPM | SYSTOLIC BLOOD PRESSURE: 142 MMHG | HEIGHT: 74 IN | OXYGEN SATURATION: 99 % | WEIGHT: 197 LBS | RESPIRATION RATE: 16 BRPM | BODY MASS INDEX: 25.28 KG/M2 | DIASTOLIC BLOOD PRESSURE: 80 MMHG | TEMPERATURE: 96.3 F

## 2018-01-24 NOTE — TELEPHONE ENCOUNTER
I left the pt a message stated he should call clinic which I gave the number to and I told him to call back if too long of a wait  I also told him after discussing with Dr Ruchi Jay there is not med for the night sweats and he was to discuss this with heme onc as they do not yet know why he has night sweats

## 2018-01-25 ENCOUNTER — OFFICE VISIT (OUTPATIENT)
Dept: MULTI SPECIALTY CLINIC | Facility: CLINIC | Age: 48
End: 2018-01-25
Payer: COMMERCIAL

## 2018-01-25 VITALS
HEIGHT: 72 IN | BODY MASS INDEX: 25.86 KG/M2 | DIASTOLIC BLOOD PRESSURE: 90 MMHG | WEIGHT: 190.92 LBS | SYSTOLIC BLOOD PRESSURE: 124 MMHG

## 2018-01-25 DIAGNOSIS — R13.12 OROPHARYNGEAL DYSPHAGIA: ICD-10-CM

## 2018-01-25 DIAGNOSIS — D37.02 NEOPLASM OF UNCERTAIN BEHAVIOR OF BASE OF TONGUE: Primary | ICD-10-CM

## 2018-01-25 PROCEDURE — 99214 OFFICE O/P EST MOD 30 MIN: CPT | Performed by: OTOLARYNGOLOGY

## 2018-01-25 NOTE — PROGRESS NOTES
During a recent PCP exam he was told that he had a lump on the back of his tongue  He has been having some swallowing issues and is scheduled for a barium swallow  Px:  Neck: no adenopathy  Oropharynx: unremarkable  Possible prominence of right lingual tonsil  AU: EAC- normal, TM- m/i  A/P:   Possible lingual tonsil hypertrophy: CT ordered  Barium Swallow pending    F/u after exam

## 2018-02-02 ENCOUNTER — HOSPITAL ENCOUNTER (OUTPATIENT)
Dept: CT IMAGING | Facility: HOSPITAL | Age: 48
Discharge: HOME | End: 2018-02-02
Attending: OTOLARYNGOLOGY
Payer: COMMERCIAL

## 2018-02-02 DIAGNOSIS — R13.12 OROPHARYNGEAL DYSPHAGIA: ICD-10-CM

## 2018-02-02 DIAGNOSIS — D37.02 NEOPLASM OF UNCERTAIN BEHAVIOR OF BASE OF TONGUE: ICD-10-CM

## 2018-02-02 PROCEDURE — 70491 CT SOFT TISSUE NECK W/DYE: CPT

## 2018-02-02 RX ADMIN — IOHEXOL 85 ML: 350 INJECTION, SOLUTION INTRAVENOUS at 15:00

## 2018-02-06 ENCOUNTER — TELEPHONE (OUTPATIENT)
Dept: INTERNAL MEDICINE CLINIC | Facility: CLINIC | Age: 48
End: 2018-02-06

## 2018-02-09 ENCOUNTER — TELEPHONE (OUTPATIENT)
Dept: OTHER | Facility: HOSPITAL | Age: 48
End: 2018-02-09

## 2018-02-09 NOTE — TELEPHONE ENCOUNTER
Contacted patient about his CT scan  He notes ongoing problems with dysphagia  He has not had his barium swallow yet  He does not have scheduled follow-up  We discussed that there was an enlargement in the right base of tongue consistent with Dr Cathy Daniels exam   He was driving at the time of the telephone conversation but will contact our office to reschedule follow-up after his barium swallow in the near future  We discussed with him need for urgent follow-up due to his ongoing smoking  He has cut down to approximately 10 cigarettes per day

## 2018-02-12 ENCOUNTER — TELEPHONE (OUTPATIENT)
Dept: FAMILY MEDICINE CLINIC | Facility: CLINIC | Age: 48
End: 2018-02-12

## 2018-02-12 ENCOUNTER — OFFICE VISIT (OUTPATIENT)
Dept: GASTROENTEROLOGY | Facility: AMBULARY SURGERY CENTER | Age: 48
End: 2018-02-12
Payer: COMMERCIAL

## 2018-02-12 VITALS
RESPIRATION RATE: 16 BRPM | DIASTOLIC BLOOD PRESSURE: 80 MMHG | BODY MASS INDEX: 27.36 KG/M2 | HEART RATE: 65 BPM | WEIGHT: 202 LBS | HEIGHT: 72 IN | SYSTOLIC BLOOD PRESSURE: 130 MMHG | TEMPERATURE: 96 F

## 2018-02-12 DIAGNOSIS — R13.19 ESOPHAGEAL DYSPHAGIA: Primary | ICD-10-CM

## 2018-02-12 DIAGNOSIS — R63.4 WEIGHT LOSS: ICD-10-CM

## 2018-02-12 DIAGNOSIS — Z86.010 HISTORY OF COLON POLYPS: ICD-10-CM

## 2018-02-12 DIAGNOSIS — R13.10 DYSPHAGIA, UNSPECIFIED TYPE: Primary | ICD-10-CM

## 2018-02-12 DIAGNOSIS — K76.0 HEPATIC STEATOSIS: ICD-10-CM

## 2018-02-12 DIAGNOSIS — R13.12 OROPHARYNGEAL DYSPHAGIA: Primary | ICD-10-CM

## 2018-02-12 PROCEDURE — 99204 OFFICE O/P NEW MOD 45 MIN: CPT | Performed by: INTERNAL MEDICINE

## 2018-02-12 RX ORDER — OMEPRAZOLE 40 MG/1
40 CAPSULE, DELAYED RELEASE ORAL DAILY
Qty: 30 CAPSULE | Refills: 5 | Status: ON HOLD | OUTPATIENT
Start: 2018-02-12 | End: 2018-02-22

## 2018-02-12 NOTE — TELEPHONE ENCOUNTER
Calling form dr busby's office they have scoot in the office for an office visit for trouble swallowing and need an order

## 2018-02-12 NOTE — TELEPHONE ENCOUNTER
There was an order in [employer] listing a different G  [facility]  Provider  I entered a new order but not sure it went to you  Please review  Thanks!

## 2018-02-12 NOTE — PROGRESS NOTES
Consultation - [address] [facility]  Ursula Rain 50 y o  male MRN: [id number]  Unit/Bed#:  Encounter: [id number]        Consults    ASSESSMENT/PLAN:     1  Dysphagia: progressive now including liquids, may be secondary to the pharyngeal lesion noted on CT neck however given chronicity of symptoms and history of smoking, will plan for EGD to rule out esophageal luminal lesion  Other possibility includes eosinophilic esophagitis or esophageal dysmotility  We will start him omeprazole 40 mg daily, 30 minutes before breakfast   Discussed with patient regarding the importance of smoking cessation   -  Hemoglobin is 15 5, liver function tests are normal, TSH is normal     2  Personal history of colon polyps at the age of 22: Will plan for colonoscopy if EGD is unremarkable  Patient would like to hold off for now  While he is undergoing workup for dysphagia and ENT workup    3  Follow-up in 2-3 months  4  Weight loss attributes this to multiple surgeries this year, CT of abdomen and pelvis in July 2017 with normal pancreas, hepatic steatosis and previously seen arterial enhancement in the liver not seen on the most recent CT scan  -  He will need a colonoscopy which will be planned after an EGD   - ENT follow up for possible Pharyngeal lesion  5  Hepatic steatosis: liver function tests are normal     - monitor LFTs  No evidence of cirrhosis on exam or labs  ______________________________________________________________________    Reason for Consult / Principal Problem: [unfilled]    HPI: Ursula Rain is a 50y o  year old male  With history of multiple spinal  Surgeries, comes in for evaluation of longstanding symptoms of dysphagia to both solids and liquids  Patient states that this is been happening for many years however has worsened over the past 1 year  He states that most recently he has been having difficulty even swallowing liquids, feels  Them coming back up    He states that he has tried Nexium in the past without improvement for 1 month  Most recently he had a CT of neck done last week which showed mild prominence and asymmetry of the lingual tonsils with right slightly larger than left without a discrete mass  He  Has follow-up with ENT for possible biopsy  He is a current smoker but denies use of alcohol  He states that he has had unintentional weight loss over the past 6 months, attributes this to undergoing surgeries and also due to worsening dysphagia  He denies heartburn symptoms, hematemesis, melena or hematochezia  He denies family history of GI malignancy but does have family history of lymphoma in his mother  he states that he had colonoscopy at age of 22 and was informed that he had multiple polyps and was recommended a repeat  He denies any change in bowel habits and has not had a repeat colonoscopy since  He denies family history of colon cancer  Review of Systems: The remainder of the review of systems was negative except for the pertinent positives noted in HPI       Historical Information   Past Medical History:   Diagnosis Date    Allergic rhinitis     Anxiety     Arthritis     Back pain     Chronic pain syndrome     Cluster headaches     Depression     Insomnia     Kidney stones     Laryngospasm     Leukocytosis     Migraine     Nephrolithiasis     Seasonal allergies     Sleep apnea     does not use CPAP    TMJ (temporomandibular joint syndrome)      Past Surgical History:   Procedure Laterality Date    BACK SURGERY      *9, [id number], nervectomy 2006, total of 10    HERNIA REPAIR      KNEE ARTHROSCOPY      LITHOTRIPSY      multiple    LUMBAR DISC SURGERY  2015    MANDIBLE FRACTURE SURGERY      titanium plate    PELVIC SYMPHYSIS FUSION      ID REVISE/REMOVE SPINAL NEUROSTIM/ Left 6/9/2017    Procedure: REMOVAL OF A THORACIC SCS PLACED VIA LAMINECTOMY AND REMOVAL LEFT BUTTOCK GENERATOR; IMPULSE MONITORING;  Surgeon: Quinn Hatchet, MD;  Location:  MAIN OR;  Service: Neurosurgery    ME SURG IMPLNT [address] N/A 9/8/2016    Procedure: DORSAL COLUMN STIMULATOR PLACEMENT (IMPULSE MONITORING); Surgeon: Luz Cai MD;  [facility];  Service: Orthopedics    SACROILIAC JOINT FUSION  2015    SHOULDER SURGERY Left     2     Social History   History   Alcohol Use    Yes     Comment: rare     History   Drug Use    Types: Marijuana     History   Smoking Status    Current Every Day Smoker    Packs/day: 1 00    Years: 25 00    Last attempt to quit: 7/24/2016   Smokeless Tobacco    Never Used     Comment: using patch     Family History   Problem Relation Age of Onset    Diabetes Father     Obesity Father     Diabetes Brother        Meds/Allergies       (Not in a hospital admission)  No current facility-administered medications for this visit  Allergies   Allergen Reactions    Oxycodone     Other Rash     Adhesive tape       Objective     Blood pressure 130/80, pulse 65, temperature (!) 96 °F (35 6 °C), temperature source Tympanic, resp  rate 16, height 6' (1 829 m), weight 91 6 kg (202 lb)  [unfilled]    PHYSICAL EXAM     GEN: well nourished, well developed, no acute distress  HEENT: anicteric, MMM, no cervical or supraclavicular lymphadenopathy,  No thrush or oral lesions seen on exam  CV: RRR, no m/r/g  CHEST: CTA b/l, no WRR  ABD: +BS, soft, NT/ND, no hepatosplenomegaly  EXT: no c/c/e  SKIN: no rashes,  NEURO: aaox3    Lab Results:   No visits with results within 1 Day(s) from this visit     Latest known visit with results is:   Appointment on 12/18/2017   Component Date Value    Testosterone 12/18/2017 296 0      Imaging Studies: I have personally reviewed pertinent films in PACS

## 2018-02-13 ENCOUNTER — OFFICE VISIT (OUTPATIENT)
Dept: NEUROLOGY | Facility: CLINIC | Age: 48
End: 2018-02-13
Payer: COMMERCIAL

## 2018-02-13 VITALS
HEIGHT: 72 IN | BODY MASS INDEX: 27.36 KG/M2 | HEART RATE: 61 BPM | WEIGHT: 202 LBS | DIASTOLIC BLOOD PRESSURE: 90 MMHG | SYSTOLIC BLOOD PRESSURE: 130 MMHG

## 2018-02-13 DIAGNOSIS — G89.4 CHRONIC PAIN SYNDROME: Primary | ICD-10-CM

## 2018-02-13 DIAGNOSIS — G44.029 CHRONIC CLUSTER HEADACHE, NOT INTRACTABLE: Primary | ICD-10-CM

## 2018-02-13 PROCEDURE — 99213 OFFICE O/P EST LOW 20 MIN: CPT | Performed by: PHYSICIAN ASSISTANT

## 2018-02-13 RX ORDER — OXYCODONE HYDROCHLORIDE 10 MG/1
TABLET ORAL
COMMUNITY
Start: 2014-10-31 | End: 2018-02-13 | Stop reason: SDUPTHER

## 2018-02-13 RX ORDER — OXYCODONE HYDROCHLORIDE 10 MG/1
TABLET ORAL
Refills: 0 | OUTPATIENT
Start: 2018-02-13

## 2018-02-13 RX ORDER — OXYCODONE HYDROCHLORIDE 10 MG/1
10 TABLET ORAL EVERY 6 HOURS PRN
Qty: 120 TABLET | Refills: 0 | Status: SHIPPED | OUTPATIENT
Start: 2018-02-13 | End: 2018-03-15 | Stop reason: SDUPTHER

## 2018-02-13 NOTE — PATIENT INSTRUCTIONS
Consider oxygen tank, we will order this for home for you  Continue Imitrex, no more than 2x/day and 4 per week  High dose melatonin and/or verapamil for prevention

## 2018-02-13 NOTE — ASSESSMENT & PLAN NOTE
Cluster headaches are stable  He denies headaches for several months  He is unable to identify a trigger or pattern even after keeping an extensive headache journal in the past   I encouraged him to continue keeping a journal   We discussed the common triggers for cluster headaches including foods with nitrates, msg, aged cheese, alcohol and smoking , to name a few  To treat these I recommended high dose melatonin 10-12 mg q h s  He can start this now or he can start it when cluster headaches start again  I would highly recommend that he start it now to prevent the headaches  I also recommended starting verapamil for prevention, but he declined this now because he is dealing with other issues including possible throat cancer  At the onset of a headache he will continue Imitrex injections, but no more than 2 doses per day and 4 doses per week to avoid vasoconstriction, cerebral vascular disease and medication overuse headache  We discussed also adding indomethacin if needed  He was encouraged to call if assistance is needed prior to a 1 year follow-up

## 2018-02-13 NOTE — PROGRESS NOTES
Patient ID: Deann Epstein is a 50 y o  male  Assessment/Plan:    Cluster headache, chronic  Cluster headaches are stable  He denies headaches for several months  He is unable to identify a trigger or pattern even after keeping an extensive headache journal in the past   I encouraged him to continue keeping a journal   We discussed the common triggers for cluster headaches including foods with nitrates, msg, aged cheese, alcohol and smoking , to name a few  To treat these I recommended high dose melatonin 10-12 mg q h s  He can start this now or he can start it when cluster headaches start again  I would highly recommend that he start it now to prevent the headaches  I also recommended starting verapamil for prevention, but he declined this now because he is dealing with other issues including possible throat cancer  At the onset of a headache he will continue Imitrex injections, but no more than 2 doses per day and 4 doses per week to avoid vasoconstriction, cerebral vascular disease and medication overuse headache  We discussed also adding indomethacin if needed  He was encouraged to call if assistance is needed prior to a 1 year follow-up  We discussed the importance of controlling sleep apnea with a CPAP  He is not interested in using one at this time  I wonder how this affects his cluster headaches with abnormal oxygenation and carbon dioxide increase in the brain  Most of his headaches do wake him up from sleep  I offered to prescribe an at-home oxygen tank (for cluster abortive tx) but he declined this for now as well  Diagnoses and all orders for this visit:    Chronic cluster headache, not intractable        Subjective:    NEGRA Moss is a very pleasant 53-year-old male who presents for neurological follow-up for cluster headaches  He was last seen by Dr Alexis Reid on 8/30/2017 for the cluster headaches and to rule out seizures    He was diagnosed with vasovagal syncope as a cause for his symptoms, and no true seizure activity was found  He has a long history of left-sided headaches with associated left ptosis, rhinorrhea and left sided lacrimation, sometimes facial pain on the left side as well  The pain is left retro-orbital and left temporal   The pain is severe, like someone stepped and knife into his head  These started in his late teenage years while he was in high school  He reports that he has seen multiple physicians for this and overall Imitrex injection is the most effective medication to abort the headaches  He finds these more painful than his back pain or anything else he has dealt with  When he gets headache it lasts for the entire day unless he takes Imitrex  The Imitrex tends to kick out the headache in several seconds to minutes  He does get some associated photophobia and phonophobia, but no nausea or vomiting  Headaches do not run in his family  He also has a history of an abnormal brain MRI and episodes of passing out  He has not had any episodes of presyncope or syncope since he was last seen in August 2017  His brain MRI from 8/29/2017 reveals a stable appearance of the brain with no acute disease, however there is a subcentimeter nonspecific focus of high signal adjacent to the right frontal horn without enhancement  His history also includes chronic pain syndrome, post laminectomy syndrome status post removal of thoracic spinal cord stimulator  He reports daytime sleepiness and snoring  NELLI diagnosed about 1 year ago, but did not tolerate CPAP  Seizures/Spell characteristics: 2 episodes of vasovagal syncope in late June/early July 2017  Seizure risk factors:  He has suffered concussions  Normal birth and development  No history of seizures as a child  No family history of seizures  No history of brain tumor, stroke or CNS infection  Past Medical History includes: NELLI on sleep study about a year ago, did not tolerate CPAP    ---    The following portions of the patient's history were reviewed and updated as appropriate:   He  has a past medical history of Allergic rhinitis; Anxiety; Arthritis; Back pain; Chronic pain syndrome; Cluster headaches; Depression; Insomnia; Kidney stones; Laryngospasm; Leukocytosis; Migraine; Nephrolithiasis; Seasonal allergies; Sleep apnea; and TMJ (temporomandibular joint syndrome)  He  does not have any pertinent problems on file  He  has a past surgical history that includes Mandible fracture surgery; Lumbar disc surgery (2015); SACROILIAC JOINT FUSION (2015); Hernia repair; Lithotripsy; Shoulder surgery (Left); Back surgery; Pelvic symphysis fusion; Knee arthroscopy; pr surg implnt neuroelect,epidural (N/A, 9/8/2016); and pr revise/remove spinal neurostim/ (Left, 6/9/2017)  His family history includes Diabetes in his brother and father; Obesity in his father  He  reports that he has been smoking  He has a 25 00 pack-year smoking history  He has never used smokeless tobacco  He reports that he drinks alcohol  He reports that he uses drugs, including Marijuana  Current Outpatient Prescriptions   Medication Sig Dispense Refill    albuterol (PROVENTIL HFA,VENTOLIN HFA) 90 mcg/act inhaler Inhale 2 puffs every 6 (six) hours as needed for wheezing   diazepam (VALIUM) 5 mg tablet Take 1 tablet by mouth every 6 (six) hours as needed for anxiety for up to 10 days 60 tablet 0    fluticasone-salmeterol (ADVAIR) 250-50 mcg/dose inhaler Inhale 1 puff every 12 (twelve) hours   gabapentin (NEURONTIN) 100 mg capsule Take 1 capsule by mouth 3 (three) times a day for 30 days 90 capsule 0    loratadine (CLARITIN) 10 mg tablet Take 10 mg by mouth daily   nicotine (NICODERM CQ) 21 mg/24 hr TD 24 hr patch Place 1 patch on the skin every 24 hours        omeprazole (PriLOSEC) 40 MG capsule Take 1 capsule (40 mg total) by mouth daily 30 capsule 5    oxyCODONE (ROXICODONE) 5 mg immediate release tablet 1-2 tab PO q4h PRN pain 60 tablet 0    oxyCODONE-acetaminophen (PERCOCET)  mg per tablet Take 2 tablets by mouth every 4 (four) hours as needed for moderate pain      RABIES VACCINE, PCEC IM Inject into the shoulder, thigh, or buttocks   SUMAtriptan Succinate (IMITREX IJ) Inject as directed      tadalafil (CIALIS) 5 MG tablet Take 5 mg by mouth daily as needed for erectile dysfunction   traMADol (ULTRAM) 50 mg tablet Take 50 mg by mouth every 6 (six) hours as needed for moderate pain       No current facility-administered medications for this visit  Current Outpatient Prescriptions on File Prior to Visit   Medication Sig    albuterol (PROVENTIL HFA,VENTOLIN HFA) 90 mcg/act inhaler Inhale 2 puffs every 6 (six) hours as needed for wheezing   diazepam (VALIUM) 5 mg tablet Take 1 tablet by mouth every 6 (six) hours as needed for anxiety for up to 10 days    fluticasone-salmeterol (ADVAIR) 250-50 mcg/dose inhaler Inhale 1 puff every 12 (twelve) hours   gabapentin (NEURONTIN) 100 mg capsule Take 1 capsule by mouth 3 (three) times a day for 30 days    loratadine (CLARITIN) 10 mg tablet Take 10 mg by mouth daily   nicotine (NICODERM CQ) 21 mg/24 hr TD 24 hr patch Place 1 patch on the skin every 24 hours   omeprazole (PriLOSEC) 40 MG capsule Take 1 capsule (40 mg total) by mouth daily    oxyCODONE (ROXICODONE) 5 mg immediate release tablet 1-2 tab PO q4h PRN pain    oxyCODONE-acetaminophen (PERCOCET)  mg per tablet Take 2 tablets by mouth every 4 (four) hours as needed for moderate pain    RABIES VACCINE, PCEC IM Inject into the shoulder, thigh, or buttocks   SUMAtriptan Succinate (IMITREX IJ) Inject as directed    tadalafil (CIALIS) 5 MG tablet Take 5 mg by mouth daily as needed for erectile dysfunction   traMADol (ULTRAM) 50 mg tablet Take 50 mg by mouth every 6 (six) hours as needed for moderate pain     No current facility-administered medications on file prior to visit  He is allergic to oxycodone and other            Objective:    Blood pressure 130/90, pulse 61, height 6' (1 829 m), weight 91 6 kg (202 lb)  Physical Exam  The patient is well-developed and well-nourished, and is in no apparent distress  The patient is pleasant and cooperative with the examination  Head is normocephalic  Oropharynx is clear and mucous membranes are moist  Neck is supple and non-tender  Neurological Exam  On neurologic exam, the patient is alert and oriented to time and place  Speech is fluent and articulate, and the patient follows commands appropriately  Judgment and affect appear normal  Pupils are equally round and reactive to light, extraocular muscles are intact without nystagmus, visual fields are full to confrontation, and optic discs are sharp and flat bilaterally  Face is symmetric, and tongue, uvula, and palate are midline  Facial sensation is normal and symmetric, in all 3 divisions of the trigeminal nerve  Hearing is intact  Neck flexor and extensor strength is 5/5  Motor examination reveals intact strength, tone, and bulk throughout  Negative pronator drift on both sides  Reflexes are 1+ t/o and symmetric, although trace in the ankles b/l  Sensation is intact to light touch in all 4 extremities  Proprioception is intact t/o  Coordination is intact on rapid alternating movement and finger-to-nose testing  Normal gait is steady  ROS:    Review of Systems   Constitutional: Negative  HENT: Negative  Eyes: Negative  Respiratory: Negative  Cardiovascular: Negative  Gastrointestinal: Negative  Endocrine: Negative  Genitourinary: Negative  Musculoskeletal: Negative  Skin: Negative  Allergic/Immunologic: Negative  Neurological: Positive for headaches  Hematological: Negative  Psychiatric/Behavioral: Negative          Review of systems, Past medical history, Surgical history, Family history, Social history and Medication history were reviewed and otherwise unremarkable from a neurological perspective

## 2018-02-14 NOTE — TELEPHONE ENCOUNTER
Order complete in triage pt refill can be picked up on Friday it was not dated with a do not fill by date

## 2018-02-16 ENCOUNTER — TELEPHONE (OUTPATIENT)
Dept: OTHER | Facility: HOSPITAL | Age: 48
End: 2018-02-16

## 2018-02-16 ENCOUNTER — OFFICE VISIT (OUTPATIENT)
Dept: MULTI SPECIALTY CLINIC | Facility: CLINIC | Age: 48
End: 2018-02-16
Payer: COMMERCIAL

## 2018-02-16 VITALS
HEIGHT: 72 IN | SYSTOLIC BLOOD PRESSURE: 120 MMHG | WEIGHT: 197.53 LBS | DIASTOLIC BLOOD PRESSURE: 100 MMHG | BODY MASS INDEX: 26.76 KG/M2

## 2018-02-16 DIAGNOSIS — R13.14 PHARYNGOESOPHAGEAL DYSPHAGIA: Primary | ICD-10-CM

## 2018-02-16 PROCEDURE — 99215 OFFICE O/P EST HI 40 MIN: CPT | Performed by: OTOLARYNGOLOGY

## 2018-02-16 RX ORDER — FLUTICASONE PROPIONATE 50 MCG
2 SPRAY, SUSPENSION (ML) NASAL DAILY PRN
COMMUNITY
Start: 2016-12-09 | End: 2018-11-26 | Stop reason: SDUPTHER

## 2018-02-16 RX ORDER — AMOXICILLIN 500 MG/1
CAPSULE ORAL
Status: ON HOLD | COMMUNITY
Start: 2018-01-03 | End: 2018-02-22

## 2018-02-16 RX ORDER — TOPIRAMATE 25 MG/1
TABLET ORAL
Status: ON HOLD | COMMUNITY
Start: 2017-08-28 | End: 2018-02-22

## 2018-02-16 RX ORDER — DIAZEPAM 5 MG/1
TABLET ORAL
COMMUNITY
Start: 2018-01-24 | End: 2018-02-19 | Stop reason: SDUPTHER

## 2018-02-16 RX ORDER — IBUPROFEN 800 MG/1
TABLET ORAL
COMMUNITY
Start: 2016-12-09 | End: 2018-08-24 | Stop reason: SDUPTHER

## 2018-02-16 RX ORDER — ACETAMINOPHEN AND CODEINE PHOSPHATE 300; 30 MG/1; MG/1
TABLET ORAL
Status: ON HOLD | COMMUNITY
Start: 2018-01-03 | End: 2018-02-22

## 2018-02-16 RX ORDER — CYCLOBENZAPRINE HCL 5 MG
1 TABLET ORAL 2 TIMES DAILY PRN
Status: ON HOLD | COMMUNITY
Start: 2017-10-12 | End: 2018-02-22

## 2018-02-16 RX ORDER — CLINDAMYCIN HYDROCHLORIDE 300 MG/1
CAPSULE ORAL
Status: ON HOLD | COMMUNITY
Start: 2017-11-21 | End: 2018-02-22

## 2018-02-16 RX ORDER — ALBUTEROL SULFATE 90 UG/1
1 AEROSOL, METERED RESPIRATORY (INHALATION) EVERY 4 HOURS PRN
COMMUNITY
Start: 2017-05-20 | End: 2018-03-12

## 2018-02-16 NOTE — PROGRESS NOTES
[facility]    Subjective: Pt  Is a 50 years all man with history of dysphagia that his describes as "a valve closing on his throat when he swallows"  For approximately 10 years  Denies odynophagia, he denies reflux  In addition he has had persistent leukocytosis that his being followed by Dr Jose Angel Brooks  In a prior ENT consultation he was seen by Dr Orion Santos that suspected and increase size of the lingual tonsil is CT scan of the neck was requested  CT scan on  02/02/2018 reveals a prominent lingual tonsil that is more notorious on the right side, there is some enhancement with contrast   No abnormal lymph nodes on the neck , larynx, parapharyngeal spaces are within normal limits, there are no significant findings on the palatine tonsils  Edmar Manifold He was called by Dr Higinio Oreilly reviewed the CT scan and this suggested a biopsy  Past med Hx:  Anxiety; Arthritis; Back pain; Chronic pain syndrome; Cluster headaches; Depression; Insomnia; Kidney stones; Laryngospasm; Leukocytosis; Migraine; Nephrolithiasis; Seasonal allergies; Sleep apnea; and TMJ (temporomandibular joint syndrome)  Patient has had multiple surgeries  Medications:   List of medication is reviewed  Objective:   Vitals:    02/16/18 1438   BP: 120/100       Gen: NAD, A/O x 3  Nose: Right septal deviation, no discharge  Oral cavity within normal limits  Partially edentulous  No mucosal lesions, no masses  Tongue mobile, no masses palpable on the tongue  Oropharynx:  Status post tonsillectomy, he does have a couple of nodules of remnant tonsil tissue  Symmetric elevation of the soft palate  Posterior pharynx clear  Mirror examination performed:  Mild to moderate hypertrophy of lingual tonsil, no evident exophytic mass, no ulceration, there is mild asymmetry in the hypertrophy being slightly more notorious on the right side near the inferior  Tonsillar fossa adjacent to the anterior pillar  Vallecula is visualized and free   Epiglottis and larynx appear normal  Neuro: CN 2-12 intact except as below  Lungs: Breathing easily  No Stertor or Stridor  CV: RRR  Good distal perfusion  Neck: Soft and flat,   No mass or enlarged lymph node in the neck  Larynx and trachea are midline  Assessment/Plan:      1  Dysphagia  2  Hypertrophy lingual tonsil  3  Deviated nasal septum     discussed the patient that given the findings on the physical exam and the long term history the likelihood of a significant finding on the biopsy is relatively low  He we still will proceed with a biopsy  Patient is also in agreement to proceed with a barium swallow, I suspect that this will provide more information

## 2018-02-19 NOTE — TELEPHONE ENCOUNTER
I submitted prior auth to [facility] for his medication  One of the things they look at are urine drug screens as well as pain management contracts  At the next visit, please address  Thanks!

## 2018-02-21 ENCOUNTER — ANESTHESIA EVENT (OUTPATIENT)
Dept: GASTROENTEROLOGY | Facility: HOSPITAL | Age: 48
End: 2018-02-21
Payer: COMMERCIAL

## 2018-02-21 ENCOUNTER — DOCUMENTATION (OUTPATIENT)
Dept: INTERNAL MEDICINE CLINIC | Facility: CLINIC | Age: 48
End: 2018-02-21

## 2018-02-21 ENCOUNTER — TELEPHONE (OUTPATIENT)
Dept: INTERNAL MEDICINE CLINIC | Facility: CLINIC | Age: 48
End: 2018-02-21

## 2018-02-21 NOTE — TELEPHONE ENCOUNTER
Faxed surgery packet to Gerry Carlson at [facility]  Waiting for Gerry Carlson to call back with a surgery date

## 2018-02-22 ENCOUNTER — HOSPITAL ENCOUNTER (OUTPATIENT)
Facility: HOSPITAL | Age: 48
Setting detail: OUTPATIENT SURGERY
Discharge: HOME | End: 2018-02-22
Attending: INTERNAL MEDICINE | Admitting: INTERNAL MEDICINE
Payer: COMMERCIAL

## 2018-02-22 ENCOUNTER — ANESTHESIA (OUTPATIENT)
Dept: GASTROENTEROLOGY | Facility: HOSPITAL | Age: 48
End: 2018-02-22
Payer: COMMERCIAL

## 2018-02-22 VITALS
WEIGHT: 197 LBS | DIASTOLIC BLOOD PRESSURE: 87 MMHG | SYSTOLIC BLOOD PRESSURE: 117 MMHG | OXYGEN SATURATION: 99 % | HEART RATE: 88 BPM | RESPIRATION RATE: 18 BRPM | TEMPERATURE: 97.5 F | HEIGHT: 72 IN | BODY MASS INDEX: 26.68 KG/M2

## 2018-02-22 DIAGNOSIS — R13.10 DYSPHAGIA, UNSPECIFIED TYPE: ICD-10-CM

## 2018-02-22 PROCEDURE — 88342 IMHCHEM/IMCYTCHM 1ST ANTB: CPT | Performed by: PATHOLOGY

## 2018-02-22 PROCEDURE — 43239 EGD BIOPSY SINGLE/MULTIPLE: CPT | Performed by: INTERNAL MEDICINE

## 2018-02-22 PROCEDURE — 88342 IMHCHEM/IMCYTCHM 1ST ANTB: CPT | Performed by: INTERNAL MEDICINE

## 2018-02-22 PROCEDURE — 88305 TISSUE EXAM BY PATHOLOGIST: CPT | Performed by: INTERNAL MEDICINE

## 2018-02-22 PROCEDURE — 88305 TISSUE EXAM BY PATHOLOGIST: CPT | Performed by: PATHOLOGY

## 2018-02-22 RX ORDER — SODIUM CHLORIDE 9 MG/ML
100 INJECTION, SOLUTION INTRAVENOUS CONTINUOUS
Status: DISCONTINUED | OUTPATIENT
Start: 2018-02-22 | End: 2018-02-22 | Stop reason: HOSPADM

## 2018-02-22 RX ORDER — PROPOFOL 10 MG/ML
INJECTION, EMULSION INTRAVENOUS AS NEEDED
Status: DISCONTINUED | OUTPATIENT
Start: 2018-02-22 | End: 2018-02-22 | Stop reason: SURG

## 2018-02-22 RX ORDER — KETAMINE HYDROCHLORIDE 50 MG/ML
INJECTION, SOLUTION, CONCENTRATE INTRAMUSCULAR; INTRAVENOUS AS NEEDED
Status: DISCONTINUED | OUTPATIENT
Start: 2018-02-22 | End: 2018-02-22 | Stop reason: SURG

## 2018-02-22 RX ORDER — FENTANYL CITRATE 50 UG/ML
INJECTION, SOLUTION INTRAMUSCULAR; INTRAVENOUS AS NEEDED
Status: DISCONTINUED | OUTPATIENT
Start: 2018-02-22 | End: 2018-02-22 | Stop reason: SURG

## 2018-02-22 RX ORDER — GLYCOPYRROLATE 0.2 MG/ML
INJECTION INTRAMUSCULAR; INTRAVENOUS AS NEEDED
Status: DISCONTINUED | OUTPATIENT
Start: 2018-02-22 | End: 2018-02-22 | Stop reason: SURG

## 2018-02-22 RX ORDER — LIDOCAINE HYDROCHLORIDE 10 MG/ML
INJECTION, SOLUTION INFILTRATION; PERINEURAL AS NEEDED
Status: DISCONTINUED | OUTPATIENT
Start: 2018-02-22 | End: 2018-02-22 | Stop reason: SURG

## 2018-02-22 RX ADMIN — PROPOFOL 50 MG: 10 INJECTION, EMULSION INTRAVENOUS at 08:15

## 2018-02-22 RX ADMIN — SODIUM CHLORIDE: 0.9 INJECTION, SOLUTION INTRAVENOUS at 07:45

## 2018-02-22 RX ADMIN — KETAMINE HYDROCHLORIDE 50 MG: 50 INJECTION INTRAMUSCULAR; INTRAVENOUS at 08:11

## 2018-02-22 RX ADMIN — FENTANYL CITRATE 100 MCG: 50 INJECTION INTRAMUSCULAR; INTRAVENOUS at 08:07

## 2018-02-22 RX ADMIN — GLYCOPYRROLATE 0.2 MG: 0.2 INJECTION, SOLUTION INTRAMUSCULAR; INTRAVENOUS at 08:07

## 2018-02-22 RX ADMIN — PROPOFOL 150 MG: 10 INJECTION, EMULSION INTRAVENOUS at 08:11

## 2018-02-22 RX ADMIN — LIDOCAINE HYDROCHLORIDE 100 MG: 10 INJECTION, SOLUTION INFILTRATION; PERINEURAL at 08:11

## 2018-02-22 NOTE — ANESTHESIA PREPROCEDURE EVALUATION
Review of Systems/Medical History  Patient summary reviewed  Chart reviewed  No history of anesthetic complications     Cardiovascular  Exercise tolerance: good,     Pulmonary  Smoker cigarette smoker  , Sleep apnea ,   Comment: Does not wear CPAP    Daily use of cigarettes and marijuana     GI/Hepatic  Dysphagia,   GERD ,        Kidney stones,        Endo/Other     GYN       Hematology   Musculoskeletal    Arthritis     Neurology    CVA , Headaches,    Psychology   Anxiety, Depression ,              Physical Exam    Airway    Mallampati score: II  TM Distance: >3 FB  Neck ROM: full     Dental       Cardiovascular      Pulmonary      Other Findings        Anesthesia Plan  ASA Score- 3     Anesthesia Type- IV sedation with anesthesia with ASA Monitors  Additional Monitors:   Airway Plan:         Plan Factors- Patient instructed to abstain from smoking on day of procedure  Patient smoked on day of surgery  Induction- intravenous  Postoperative Plan-     Informed Consent- Anesthetic plan and risks discussed with patient  I personally reviewed this patient with the CRNA  Discussed and agreed on the Anesthesia Plan with the CRNA  Soraya Rankin

## 2018-02-22 NOTE — H&P
History and Physical - [facility]  Denise Mcclain 50 y o  male MRN: [id number]    HPI: Denise Mcclain is a 50y o  year old male who presents with dysphagia  Review of Systems    Historical Information   Past Medical History:   Diagnosis Date    Allergic rhinitis     Anxiety     Arthritis     Back pain     Chronic pain syndrome     Cluster headaches     Depression     Insomnia     Kidney stones     Laryngospasm     Leukocytosis     Migraine     Nephrolithiasis     Seasonal allergies     Sleep apnea     does not use CPAP    Stroke (HCC)     TMJ (temporomandibular joint syndrome)      Past Surgical History:   Procedure Laterality Date    BACK SURGERY      *9, [id number], nervectomy 2006, total of 10    HERNIA REPAIR      KNEE ARTHROSCOPY      LITHOTRIPSY      multiple    LUMBAR DISC SURGERY  2015    MANDIBLE FRACTURE SURGERY      titanium plate    PELVIC SYMPHYSIS FUSION      CO REVISE/REMOVE SPINAL NEUROSTIM/ Left 6/9/2017    Procedure: REMOVAL OF A THORACIC SCS PLACED VIA LAMINECTOMY AND REMOVAL LEFT BUTTOCK GENERATOR; IMPULSE MONITORING;  Surgeon: Sara Mireles MD;  Location[facility];  Service: Neurosurgery    CO SURG IMPLNT Kulwinder Miller N/A 9/8/2016    Procedure: DORSAL COLUMN STIMULATOR PLACEMENT (IMPULSE MONITORING);   Surgeon: Carlota Castrejon MD;  [facility];  Service: Orthopedics    SACROILIAC JOINT FUSION  2015    SHOULDER SURGERY Left     2     Social History   History   Alcohol Use    1 2 oz/week    2 Cans of beer per week     Comment: rare     History   Drug Use    Frequency: 7 0 times per week    Types: Marijuana     Comment: daily     History   Smoking Status    Current Every Day Smoker    Packs/day: 0 50    Years: 25 00    Last attempt to quit: 7/24/2016   Smokeless Tobacco    Never Used     Comment: using patch     Family History   Problem Relation Age of Onset    Diabetes Father     Obesity Father     Liver cancer Father     Diabetes Brother        Meds/Allergies     Prescriptions Prior to Admission   Medication    albuterol (PROVENTIL HFA,VENTOLIN HFA) 90 mcg/act inhaler    albuterol (VENTOLIN HFA) 90 mcg/act inhaler    aspirin 81 MG tablet    diazepam (VALIUM) 5 mg tablet    fluticasone-salmeterol (ADVAIR DISKUS) 250-50 mcg/dose inhaler    ibuprofen (MOTRIN) 800 mg tablet    oxyCODONE (ROXICODONE) 10 MG TABS    fluticasone (FLONASE) 50 mcg/act nasal spray    SUMAtriptan Succinate (IMITREX IJ)    tadalafil (CIALIS) 5 MG tablet       Allergies   Allergen Reactions    Oxycodone     Other Rash     Adhesive tape       Objective     Blood pressure 123/89, pulse 102, temperature (!) 97 2 °F (36 2 °C), temperature source Temporal, resp  rate 20, height 6' (1 829 m), weight 89 4 kg (197 lb), SpO2 98 %  PHYSICAL EXAM    Gen: NAD  CV: RRR  CHEST: Clear  ABD: soft, NT/ND  EXT: no edema  Neuro: AAO      ASSESSMENT/PLAN:  This is a 50y o  year old male here for dysphagia  PLAN:   Procedure:  EGD

## 2018-02-22 NOTE — OP NOTE
**** GI/ENDOSCOPY REPORT ****     PATIENT NAME: RUSTAM BANDA - VISIT ID:  Patient ID: [id number] YOB: 1970     INTRODUCTION: Esophagogastroduodenoscopy - A 50 male patient presents for   an outpatient Esophagogastroduodenoscopy at [facility]  INDICATIONS: Dysphagia in response to both liquids and solids  CONSENT: The benefits, risks, and alternatives to the procedure were   discussed and informed consent was obtained from the patient  PREPARATION:  EKG, pulse, pulse oximetry and blood pressure were monitored   throughout the procedure  ASA Classification: Class 2 - Patient has mild   to moderate systemic disturbance that may or may not be related to the   disorder requiring surgery  MEDICATIONS: Anesthesia-check records Anesthesia administered by   anesthesiologist      PROCEDURE:  The endoscope was passed without difficulty through the mouth   under direct visualization and advanced to the 2nd portion of the   duodenum  The scope was withdrawn and the mucosa was carefully examined  FINDINGS:   Esophagus: The esophagus appeared to be normal  Normal z line   noted at 40 cm  Biopsies were obtained from the esophagus to assess for   EoE  Stomach: Acute gastritis was found in the antrum, body of the   stomach, cardia, and fundus  The mucosa appeared erosive and erythematous  Four forceps biopsies was taken from the antrum and body of the stomach  On retroflexed view, the stomach appeared to be normal   Duodenum: The   duodenal bulb, 1st portion of the duodenum, and 2nd portion of the   duodenum appeared to be normal  Right sided vocal cord appeared edematous,   more prominent than left  COMPLICATIONS: There were no complications  IMPRESSIONS: Normal esophagus  Normal z line noted at 40 cm  Acute   gastritis found in the antrum, body of the stomach, cardia, and fundus  Four biopsies taken  Normal stomach   Normal duodenal bulb, 1st portion of the duodenum, and 2nd portion of the duodenum  Right sided vocal cord   appeared edematous, more prominent than left  RECOMMENDATIONS: Discharge home when standard parameters are met  Avoid   all non-steroidal anti-inflammatory drugs (NSAID's) including but not   limited to Ibuprofen, Advil, Motrin, and Nuprin  Anti-reflux measures:   Raise the head of the bed 4 to 6 inches  Avoid smoking  Avoid excess   coffee, tea or other caffeinated beverages  Avoid garments that fit   tightly through the abdomen  Avoid eating before bed  Start anti-reflux   diet  Follow-up on the results of the biopsy specimens in 2 weeks  Continue Omeprazole 40mg daily  ESTIMATED BLOOD LOSS: None  PATHOLOGY SPECIMENS: Four forceps biopsies taken from antrum and body of   the stomach  Associated finding: Gastritis  Yes     PROCEDURE CODES: 34257 - EGD flexible; with biopsy     ICD-9 Codes: 787 2 DYSPHAGIA 535 00 Acute gastritis, without mention of   hemorrhage     ICD-10 Codes: R13 10 Dysphagia, unspecified K29 0 Acute gastritis     PERFORMED BY: KHALIDA Astudillo  on 02/22/2018  Version 1, electronically signed by KHALIDA Trinidad  on 02/22/2018   at 08:26

## 2018-02-22 NOTE — DISCHARGE INSTRUCTIONS
Upper Endoscopy   WHAT YOU NEED TO KNOW:   An upper endoscopy is also called an upper gastrointestinal (GI) endoscopy, or an esophagogastroduodenoscopy (EGD)  You may feel bloated, gassy, or have some abdominal discomfort after your procedure  Your throat may be sore for 24 to 36 hours  You may burp or pass gas from air that is still inside your body  DISCHARGE INSTRUCTIONS:   Call 911 for any of the following:   · You have sudden chest pain or trouble breathing  Seek care immediately if:   · You feel dizzy or faint  · You have trouble swallowing  · Your bowel movements are very dark or black  · Your abdomen is hard and firm and you have severe pain  · You vomit blood  Contact your healthcare provider if:   · You feel full or bloated and cannot burp or pass gas  · You have not had a bowel movement for 3 days after your procedure  · You have neck pain  · You have a fever or chills  · You have nausea or are vomiting  · You have a rash or hives  · You have questions or concerns about your endoscopy  Relieve a sore throat:  Suck on throat lozenges or crushed ice  Gargle with a small amount of warm salt water  Mix 1 teaspoon of salt and 1 cup of warm water to make salt water  Relieve gas and discomfort from bloating:  Lie on your right side with a heating pad on your abdomen  Take short walks to help pass gas  Eat small meals until bloating is relieved  Rest after your procedure: You have been given medicine to relax you  Do not  drive or make important decisions until the day after your procedure  Return to your normal activity as directed  You can usually return to work the day after your procedure  Follow up with your healthcare provider as directed:  Write down your questions so you remember to ask them during your visits  © 2017 0768 [employer]er is for End User's use only and may not be sold, redistributed or otherwise used for commercial purposes  All illustrations and images included in CareNotes® are the copyrighted property of A D A M , Inc  or Niles Herrmann  The above information is an  only  It is not intended as medical advice for individual conditions or treatments  Talk to your doctor, nurse or pharmacist before following any medical regimen to see if it is safe and effective for you

## 2018-02-22 NOTE — ANESTHESIA POSTPROCEDURE EVALUATION
Post-Op Assessment Note      CV Status:  Stable    Mental Status:  Alert and awake    Hydration Status:  Euvolemic    PONV Controlled:  Controlled    Airway Patency:  Patent    Post Op Vitals Reviewed: Yes          Staff: CRNA           BP   115/76   Temp     Pulse  76   Resp   18   SpO2   96%

## 2018-02-26 NOTE — TELEPHONE ENCOUNTER
Tried calling Diana Hartman at ENT for a surgery date, but no answer - left msg  Will try again at a later time

## 2018-02-28 ENCOUNTER — OFFICE VISIT (OUTPATIENT)
Dept: FAMILY MEDICINE CLINIC | Facility: CLINIC | Age: 48
End: 2018-02-28
Payer: COMMERCIAL

## 2018-02-28 VITALS
DIASTOLIC BLOOD PRESSURE: 78 MMHG | HEART RATE: 84 BPM | BODY MASS INDEX: 25.24 KG/M2 | RESPIRATION RATE: 16 BRPM | SYSTOLIC BLOOD PRESSURE: 122 MMHG | TEMPERATURE: 96.2 F | WEIGHT: 203 LBS | HEIGHT: 75 IN

## 2018-02-28 DIAGNOSIS — K04.7 DENTAL ABSCESS: ICD-10-CM

## 2018-02-28 DIAGNOSIS — Z11.3 SCREENING FOR STD (SEXUALLY TRANSMITTED DISEASE): Primary | ICD-10-CM

## 2018-02-28 PROBLEM — M96.1 POST LAMINECTOMY SYNDROME: Status: ACTIVE | Noted: 2017-03-22

## 2018-02-28 PROBLEM — R03.0 BLOOD PRESSURE ELEVATED WITHOUT HISTORY OF HTN: Status: ACTIVE | Noted: 2017-01-09

## 2018-02-28 PROBLEM — R93.0 ABNORMAL HEAD CT: Status: ACTIVE | Noted: 2017-08-03

## 2018-02-28 PROCEDURE — 99213 OFFICE O/P EST LOW 20 MIN: CPT | Performed by: FAMILY MEDICINE

## 2018-02-28 RX ORDER — AMOXICILLIN 500 MG/1
500 CAPSULE ORAL EVERY 12 HOURS SCHEDULED
Qty: 20 CAPSULE | Refills: 0 | Status: SHIPPED | OUTPATIENT
Start: 2018-02-28 | End: 2018-03-10

## 2018-02-28 NOTE — PATIENT INSTRUCTIONS
Condom Use   WHAT YOU NEED TO KNOW:   How do I use a condom correctly? A condom can help prevent pregnancy and sexually transmitted infections (STIs) when used correctly during sex  Lambskin (also called sheepskin or natural membrane) condoms do not protect against STIs  · Store condoms in a cool, dry place:  Do not keep them in your wallet or pocket for a long time because heat may damage the condom  · Use condoms with a second form of protection: The best way to protect you and your partner is to use a condom with a sponge, cervical cap, or spermicide  Ask your healthcare provider for information about other methods of contraception  · Use a new condom every time you have sex:  Do not use condoms past the expiration date  Make sure the package is sealed and in good shape  Open the package carefully  · Check the condom during sex: If your condom rolls up during sex, roll it back down  If it slips off, replace it with a new one  · Use water-based lubricant: If you use lubricant, it must be water-based lubricant  Do not use petroleum jelly, cooking oil, mineral oil, lotion or saliva because these may damage the condom  If it is used for anal sex, use extra lubricant to prevent the condom from breaking  · Wash after sex: You and your partner should wash your hands and genitals thoroughly after sex to help prevent STIs  How do I put on a condom? · Place the rolled condom over the tip of your erect penis:  If you are not circumcised, pull back your foreskin before you put the condom on  You may choose to put 1 or 2 drops of water-based lubricant inside the rolled condom before you put it on  · Leave ½ inch space at the tip to collect semen:  Use your forefinger and thumb to pinch the tip of the condom to remove extra air  Trapped air may cause the condom to burst      · Unroll the condom over your penis:  Roll it all the way down to the base of your penis   Smooth out any air bubbles and lubricate the outside of the condom  If the condom does not reach the base of your penis, remove it  The condom may be too small or inside out  Throw it away and use a new one  How do I remove a used condom? · Remove your condom before your penis softens:  Hold the condom against the base of your penis while you slide it off so you do not spill the semen  Do not pull it from the tip or roll it off  · Throw away your used condom in a trash container:  Do not flush it down the toilet  What are the risks of condom use? You may have decreased sensation or loss of erection  You may have an allergy or irritation to a latex condom  If it is not used correctly, a condom may break, leak, or slip and increase the risk of pregnancy or an STI  What if my condom breaks, leaks, or slips? Your female partner can use emergency contraception to decrease the risk of a possible pregnancy  It should be taken as soon as possible after a condom accident  Ask your healthcare provider for more information about emergency contraception  Ask your healthcare provider to test you for STIs if you are concerned about an infection  When should I contact my healthcare provider? · You have sudden itching, redness, or swelling on your penis  · You have nausea, vomiting, or stomach cramps  · A condom breaks or slips off after ejaculation and your female partner could become pregnant    · You have questions or concerns about your condition or care  CARE AGREEMENT:   You have the right to help plan your care  Learn about your health condition and how it may be treated  Discuss treatment options with your caregivers to decide what care you want to receive  You always have the right to refuse treatment  The above information is an  only  It is not intended as medical advice for individual conditions or treatments   Talk to your doctor, nurse or pharmacist before following any medical regimen to see if it is safe and effective for you  © 2017 [address] [employer] Information is for End User's use only and may not be sold, redistributed or otherwise used for commercial purposes  All illustrations and images included in CareNotes® are the copyrighted property of A D A M , Inc  or Niles Herrmann

## 2018-02-28 NOTE — PROGRESS NOTES
Assessment/Plan:              Visit Diagnoses     Screening for STD (sexually transmitted disease)    -  Primary    Relevant Orders    Rapid HIV 1/2 AB-AG Combo    Chlamydia/GC amplified DNA by PCR    Hepatitis B surface antigen    RPR    Advised to use condom   advised to refrain from sex until lab result available    Dental abscess        Relevant Medications    amoxicillin (AMOXIL) 500 mg capsule  the patient advised to schedule appointment with dentist as soon as possible to discuss  extraction          Subjective:      Patient ID: Marilee Tirvedi is a 50 y o  male  47-year-old male went to the dentist 3 months ago and was told that he needs  root canal in his right upper molars  Patient did not do it because he did not have money to pay for root canal    Patient started developing pain in right upper jaw, chills  He denies any fevers, drainage  Patient also would like to be tested for STDs because he just found out that his girlfriend was cheating on him  He did not use protection with his girlfriend, he denies any discharge, history of STDs  The following portions of the patient's history were reviewed and updated as appropriate: allergies, current medications, past family history, past medical history, past social history, past surgical history and problem list     Review of Systems   Constitutional: Positive for chills  Negative for activity change, diaphoresis, fatigue, fever and unexpected weight change  HENT: Positive for dental problem  Negative for congestion, ear discharge, ear pain, facial swelling, sore throat, trouble swallowing and voice change  Respiratory: Negative for cough  Gastrointestinal: Negative for abdominal pain, nausea and vomiting  Genitourinary: Negative for decreased urine volume, difficulty urinating, discharge, dysuria, frequency, genital sores, hematuria, penile pain, penile swelling, scrotal swelling, testicular pain and urgency  Objective:    Vitals:    02/28/18 1253   BP: 122/78   Pulse: 84   Resp: 16   Temp: (!) 96 2 °F (35 7 °C)        Physical Exam   Constitutional: He is oriented to person, place, and time  He appears well-developed and well-nourished  No distress  HENT:   Head: Normocephalic  Right Ear: External ear normal    Left Ear: External ear normal    Mouth/Throat: Uvula is midline, oropharynx is clear and moist and mucous membranes are normal  Mucous membranes are not pale  No oral lesions  Abnormal dentition  Dental abscesses present  No oropharyngeal exudate, posterior oropharyngeal erythema or tonsillar abscesses  Gum swelling and redness over 1st and 2nd right upper molar   Eyes: Conjunctivae are normal  Pupils are equal, round, and reactive to light  Neck: Normal range of motion  Cardiovascular: Normal rate, regular rhythm, normal heart sounds and intact distal pulses  Exam reveals no gallop and no friction rub  No murmur heard  Pulmonary/Chest: Effort normal and breath sounds normal  No respiratory distress  He has no wheezes  He has no rales  He exhibits no tenderness  Abdominal: Soft  He exhibits no distension and no mass  There is no tenderness  There is no rebound and no guarding  Musculoskeletal: Normal range of motion  He exhibits no edema, tenderness or deformity  Lymphadenopathy:     He has no cervical adenopathy  Neurological: He is alert and oriented to person, place, and time  Skin: Skin is warm and dry  No rash noted  He is not diaphoretic  No pallor  Psychiatric: He has a normal mood and affect  Vitals reviewed

## 2018-02-28 NOTE — TELEPHONE ENCOUNTER
Received surgery date from [facility] at [facility]  Tried calling pt to provide surgery date, but no answer - left msg  Will try again at a later time

## 2018-02-28 NOTE — TELEPHONE ENCOUNTER
Attempted to call Diana Hartman at ENT for surgery date, but no answer - left msg  Spoke to [place] allis, also surgery scheduler at ENT for status  West allis did not have status, but advised Diana Hartman would be in the office later and she will have her call me back

## 2018-03-01 NOTE — TELEPHONE ENCOUNTER
Pt returned my call and confirmed surgery date 3/26/2018 with Dr Arthur Molina for Biopsy Lingual Tonsil Flow/ Standard Path Evaluation under Anesthesia at [facility]  Pt is aware the hospital will call the day before surgery to provide time  H&P to be done on admission

## 2018-03-02 ENCOUNTER — TELEPHONE (OUTPATIENT)
Dept: INTERNAL MEDICINE CLINIC | Facility: CLINIC | Age: 48
End: 2018-03-02

## 2018-03-02 PROBLEM — J35.1 LINGUAL TONSIL HYPERTROPHY: Status: ACTIVE | Noted: 2018-03-02

## 2018-03-02 NOTE — TELEPHONE ENCOUNTER
Pt is scheduled for surgery on 3/26/2018 with Dr Sole Sanchez for Biopsy Lingual Tonsil Flow/ Standard Path, Eval Under Anesthesia at [facility]  P code: [id number], D code: J35 1

## 2018-03-09 NOTE — TELEPHONE ENCOUNTER
NO AUTHORIZATION IS REQUIRED FOR THIS SURGERY [id number]  SPOKE TO INSURANCE REP AT [facility]  03/09/2018 10:20A   I WILL COMPLETE ALL REFERRAL PROCESS  THANKS!

## 2018-03-12 RX ORDER — DIAZEPAM 5 MG/1
5 TABLET ORAL 2 TIMES DAILY
COMMUNITY
End: 2018-03-15 | Stop reason: SDUPTHER

## 2018-03-12 NOTE — PRE-PROCEDURE INSTRUCTIONS
Pre-Surgery Instructions:   Medication Instructions    albuterol (PROVENTIL HFA,VENTOLIN HFA) 90 mcg/act inhaler Instructed patient per Anesthesia Guidelines   aspirin 81 MG tablet Instructed patient per Anesthesia Guidelines   diazepam (VALIUM) 5 mg tablet Instructed patient per Anesthesia Guidelines   fluticasone (FLONASE) 50 mcg/act nasal spray Instructed patient per Anesthesia Guidelines   fluticasone-salmeterol (ADVAIR DISKUS) 250-50 mcg/dose inhaler Instructed patient per Anesthesia Guidelines   ibuprofen (MOTRIN) 800 mg tablet Instructed patient per Anesthesia Guidelines   oxyCODONE (ROXICODONE) 10 MG TABS Instructed patient per Anesthesia Guidelines   SUMAtriptan Succinate (IMITREX IJ) Instructed patient per Anesthesia Guidelines   tadalafil (CIALIS) 5 MG tablet Instructed patient per Anesthesia Guidelines

## 2018-03-15 ENCOUNTER — OFFICE VISIT (OUTPATIENT)
Dept: FAMILY MEDICINE CLINIC | Facility: CLINIC | Age: 48
End: 2018-03-15
Payer: COMMERCIAL

## 2018-03-15 VITALS
SYSTOLIC BLOOD PRESSURE: 120 MMHG | HEART RATE: 76 BPM | BODY MASS INDEX: 26.01 KG/M2 | WEIGHT: 192 LBS | TEMPERATURE: 97.2 F | DIASTOLIC BLOOD PRESSURE: 80 MMHG | RESPIRATION RATE: 18 BRPM | HEIGHT: 72 IN

## 2018-03-15 DIAGNOSIS — G89.4 CHRONIC PAIN SYNDROME: Primary | ICD-10-CM

## 2018-03-15 DIAGNOSIS — F41.8 DEPRESSION WITH ANXIETY: ICD-10-CM

## 2018-03-15 DIAGNOSIS — J45.40 MODERATE PERSISTENT ASTHMA WITHOUT COMPLICATION: ICD-10-CM

## 2018-03-15 PROCEDURE — 80307 DRUG TEST PRSMV CHEM ANLYZR: CPT | Performed by: FAMILY MEDICINE

## 2018-03-15 PROCEDURE — 99213 OFFICE O/P EST LOW 20 MIN: CPT | Performed by: FAMILY MEDICINE

## 2018-03-15 PROCEDURE — 88185 FLOWCYTOMETRY/TC ADD-ON: CPT | Performed by: FAMILY MEDICINE

## 2018-03-15 RX ORDER — ALBUTEROL SULFATE 90 UG/1
2 AEROSOL, METERED RESPIRATORY (INHALATION) EVERY 6 HOURS PRN
Qty: 1 INHALER | Refills: 3 | Status: SHIPPED | OUTPATIENT
Start: 2018-03-15 | End: 2018-05-15 | Stop reason: SDUPTHER

## 2018-03-15 RX ORDER — OXYCODONE HYDROCHLORIDE 10 MG/1
10 TABLET ORAL EVERY 6 HOURS PRN
Qty: 120 TABLET | Refills: 0 | Status: SHIPPED | OUTPATIENT
Start: 2018-03-15 | End: 2018-04-12 | Stop reason: SDUPTHER

## 2018-03-15 RX ORDER — DIAZEPAM 5 MG/1
5 TABLET ORAL EVERY 8 HOURS PRN
Qty: 90 TABLET | Refills: 0 | Status: SHIPPED | OUTPATIENT
Start: 2018-03-15 | End: 2018-04-12 | Stop reason: SDUPTHER

## 2018-03-16 ENCOUNTER — TELEPHONE (OUTPATIENT)
Dept: GASTROENTEROLOGY | Facility: AMBULARY SURGERY CENTER | Age: 48
End: 2018-03-16

## 2018-03-17 NOTE — PROGRESS NOTES
Samanta Dove 1970 male MRN: [id number]    Family Medicine Follow-up Visit    ASSESSMENT/PLAN  Samanta Dove is a 50 y o  male presents to the office for    Chronic pain syndrome  Comments:  - UDS preformed today  Patient recently certified for Marijuana  ( card scanned in) Unable to refill his script given the cost of Marijuana  Patient with goal to wean off Oxycodone by this year  History of being on 6 pain medications  Currently on 2  Hx of stimulator, that needed to be removed given pain from the device  - Controlled on Oxycodone 10mg PRN, and Valium 5mg PRN for muscle spasms    - Explained in detail the Black box warning of the medications above  - Continues to follow Ortho [facility]      Orders:  -     Toxicology screen, urine  -     oxyCODONE (ROXICODONE) 10 MG TABS; Take 1 tablet (10 mg total) by mouth every 6 (six) hours as needed for moderate pain Earliest Fill Date: 3/15/18 Max Daily Amount: 40 mg    Moderate persistent asthma without complication  Comments:  - Controlled on Advair, and Albuterol as needed  Orders:  -     albuterol (PROVENTIL HFA,VENTOLIN HFA) 90 mcg/act inhaler; Inhale 2 puffs every 6 (six) hours as needed for wheezing  -     fluticasone-salmeterol (ADVAIR DISKUS) 250-50 mcg/dose inhaler; Inhale 1 puff daily    Depression with anxiety  Comments:  - Currently uncontrolled given the acute situation of his personal life  Denies any SI  If the symptoms continue at our next visit, will transition to a long acting medications  Orders:  -     diazepam (VALIUM) 5 mg tablet; Take 1 tablet (5 mg total) by mouth every 8 (eight) hours as needed for anxiety      - Currently being evaluated by ENT for tongue mass      Disposition: Return to the clinic in  3 months        Future Appointments  Date Time Provider Drew Knight   4/19/2018 5:40 PM Holger Goodwin MD [facility]   4/26/2018 1:00 PM Holger Goodwin MD [facility]   12/17/2018 1:00 PM Maura Georges MD [facility]          SUBJECTIVE  CC: Follow-up (needs med refills)      HPI:  Michael Guzman is a 50 y o  male who presents for refills on medications  The patient has recently  from his Fiance  He states that he is sleeping for > 12 hrs a day  He denies any SI, but doesn't understand how these things happen to him  He has been using Valium as needed for the muscles spasms/ anxiety  The patient has just recently been certified [facility] [employer] and brought his card in  He is upset because the cost is to much for him to refill his script  Patient goal is to wean off the oxycodone by the end of the year  He states that this back has been very controlled with the medications  Asthma, has been controlled on Advair and albuterol as needed  States that he uses his inhaler rescue 2-3 times a month  - Removing tongue mass this month  ENT concerned for possible mailgnancy per patient  Review of Systems    Historical Information   The patient history was reviewed as follows:    Past Medical History:   Diagnosis Date    Allergic rhinitis     Anxiety     Arthritis     Back pain     Chronic pain syndrome     Cluster headaches     Depression     Insomnia     Kidney stones     Laryngospasm     Leukocytosis     Migraine     Nephrolithiasis     Seasonal allergies     Sleep apnea     does not use CPAP    Stroke (HCC)     TMJ (temporomandibular joint syndrome)      Past Surgical History:   Procedure Laterality Date    BACK SURGERY      *9, [id number], nervectomy 2006, total of 10    HERNIA REPAIR      KNEE ARTHROSCOPY      LITHOTRIPSY      multiple    LUMBAR DISC SURGERY  2015    MANDIBLE FRACTURE SURGERY      titanium plate    PELVIC SYMPHYSIS FUSION      MD ESOPHAGOGASTRODUODENOSCOPY TRANSORAL DIAGNOSTIC N/A 2/22/2018    Procedure: ESOPHAGOGASTRODUODENOSCOPY (EGD); Surgeon: Lakisha Osorio MD;  [facility];   Service: Gastroenterology    MD REVISE/REMOVE SPINAL NEUROSTIM/ Left 6/9/2017    Procedure: REMOVAL OF A THORACIC SCS PLACED VIA LAMINECTOMY AND REMOVAL LEFT BUTTOCK GENERATOR; IMPULSE MONITORING;  Surgeon: Magalis Castaneda MD;  Location: [facility];  Service: Neurosurgery    MS SURG IMPLNT [address] N/A 9/8/2016    Procedure: DORSAL COLUMN STIMULATOR PLACEMENT (IMPULSE MONITORING);   Surgeon: Shiv Baltazar MD;  Location[facility];  Service: Orthopedics    SACROILIAC JOINT FUSION  2015    SHOULDER SURGERY Left     2     Family History   Problem Relation Age of Onset    Diabetes Father     Obesity Father     Liver cancer Father     Diabetes Brother       Social History   History   Alcohol Use No     History   Drug Use    Frequency: 7 0 times per week    Types: Marijuana     Comment: daily     History   Smoking Status    Former Smoker    Packs/day: 0 50    Years: 25 00   Smokeless Tobacco    Never Used     Comment: using patch       Medications:     Current Outpatient Prescriptions:     albuterol (PROVENTIL HFA,VENTOLIN HFA) 90 mcg/act inhaler, Inhale 2 puffs every 6 (six) hours as needed for wheezing, Disp: 1 Inhaler, Rfl: 3    aspirin 81 MG tablet, Take 1 tablet by mouth daily, Disp: , Rfl:     diazepam (VALIUM) 5 mg tablet, Take 1 tablet (5 mg total) by mouth every 8 (eight) hours as needed for anxiety, Disp: 90 tablet, Rfl: 0    fluticasone (FLONASE) 50 mcg/act nasal spray, 2 sprays into each nostril daily as needed  , Disp: , Rfl:     fluticasone-salmeterol (ADVAIR DISKUS) 250-50 mcg/dose inhaler, Inhale 1 puff daily, Disp: 1 Inhaler, Rfl: 3    ibuprofen (MOTRIN) 800 mg tablet, Take by mouth, Disp: , Rfl:     oxyCODONE (ROXICODONE) 10 MG TABS, Take 1 tablet (10 mg total) by mouth every 6 (six) hours as needed for moderate pain Earliest Fill Date: 3/15/18 Max Daily Amount: 40 mg, Disp: 120 tablet, Rfl: 0    SUMAtriptan Succinate (IMITREX IJ), Inject as directed, Disp: , Rfl:     tadalafil (CIALIS) 5 MG tablet, Take 5 mg by mouth daily as needed for erectile dysfunction  , Disp: , Rfl:   Allergies   Allergen Reactions    Other Rash     Adhesive tape       OBJECTIVE    Vitals:   Vitals:    03/15/18 1812   BP: 120/80   Pulse: 76   Resp: 18   Temp: (!) 97 2 °F (36 2 °C)   Weight: 87 1 kg (192 lb)   Height: 6' (1 829 m)           Physical Exam   Constitutional: He is oriented to person, place, and time  He appears well-developed and well-nourished  HENT:   Head: Normocephalic and atraumatic  Eyes: Conjunctivae and EOM are normal  Pupils are equal, round, and reactive to light  Neck: Normal range of motion  Neck supple  Cardiovascular: Normal rate and regular rhythm  No murmur heard  Pulmonary/Chest: Effort normal and breath sounds normal  No respiratory distress  He has no wheezes  Abdominal: Soft  Bowel sounds are normal  He exhibits no distension  There is no tenderness  Musculoskeletal: Normal range of motion  He exhibits no edema  Limited range of motion when going from lying down to standing position  Tender over the lumbar spine  Muscle spasm felt over b/l deltoids  Neurological: He is alert and oriented to person, place, and time  Skin: Skin is warm and dry  No rash noted  Tattoos thoughtout the body, with no signs of infections   Psychiatric: He has a normal mood and affect  His behavior is normal  Judgment and thought content normal    Vitals reviewed           Geri Benitez MD, PGY-3  [place] [id number]

## 2018-03-24 LAB
AMPHETAMINES UR QL SCN: NEGATIVE NG/ML
BARBITURATES UR QL SCN: NEGATIVE NG/ML
BENZODIAZ UR QL SCN: POSITIVE
BZE UR QL SCN: NEGATIVE NG/ML
CANNABINOIDS UR QL SCN: POSITIVE
METHADONE UR QL SCN: NEGATIVE NG/ML
OPIATES UR QL: NEGATIVE
PCP UR QL: NEGATIVE NG/ML
PROPOXYPH UR QL: NEGATIVE NG/ML

## 2018-03-26 ENCOUNTER — HOSPITAL ENCOUNTER (OUTPATIENT)
Facility: HOSPITAL | Age: 48
Setting detail: OUTPATIENT SURGERY
Discharge: HOME | End: 2018-03-26
Attending: OTOLARYNGOLOGY | Admitting: OTOLARYNGOLOGY
Payer: COMMERCIAL

## 2018-03-26 ENCOUNTER — ANESTHESIA (OUTPATIENT)
Dept: PERIOP | Facility: HOSPITAL | Age: 48
End: 2018-03-26
Payer: COMMERCIAL

## 2018-03-26 ENCOUNTER — ANESTHESIA EVENT (OUTPATIENT)
Dept: PERIOP | Facility: HOSPITAL | Age: 48
End: 2018-03-26
Payer: COMMERCIAL

## 2018-03-26 VITALS
BODY MASS INDEX: 26.01 KG/M2 | HEART RATE: 65 BPM | OXYGEN SATURATION: 98 % | DIASTOLIC BLOOD PRESSURE: 82 MMHG | WEIGHT: 192 LBS | HEIGHT: 72 IN | RESPIRATION RATE: 16 BRPM | SYSTOLIC BLOOD PRESSURE: 111 MMHG | TEMPERATURE: 97.5 F

## 2018-03-26 DIAGNOSIS — J35.1 LINGUAL TONSIL HYPERTROPHY: ICD-10-CM

## 2018-03-26 PROCEDURE — 88368 INSITU HYBRIDIZATION MANUAL: CPT | Performed by: PATHOLOGY

## 2018-03-26 PROCEDURE — 88305 TISSUE EXAM BY PATHOLOGIST: CPT | Performed by: PATHOLOGY

## 2018-03-26 PROCEDURE — 88342 IMHCHEM/IMCYTCHM 1ST ANTB: CPT | Performed by: PATHOLOGY

## 2018-03-26 PROCEDURE — 88185 FLOWCYTOMETRY/TC ADD-ON: CPT | Performed by: NURSE ANESTHETIST, CERTIFIED REGISTERED

## 2018-03-26 PROCEDURE — 88365 INSITU HYBRIDIZATION (FISH): CPT | Performed by: PATHOLOGY

## 2018-03-26 PROCEDURE — 88188 FLOWCYTOMETRY/READ 9-15: CPT | Performed by: PATHOLOGY

## 2018-03-26 PROCEDURE — 88184 FLOWCYTOMETRY/ TC 1 MARKER: CPT | Performed by: OTOLARYNGOLOGY

## 2018-03-26 PROCEDURE — 88341 IMHCHEM/IMCYTCHM EA ADD ANTB: CPT | Performed by: PATHOLOGY

## 2018-03-26 RX ORDER — LIDOCAINE HYDROCHLORIDE 10 MG/ML
INJECTION, SOLUTION INFILTRATION; PERINEURAL AS NEEDED
Status: DISCONTINUED | OUTPATIENT
Start: 2018-03-26 | End: 2018-03-26 | Stop reason: SURG

## 2018-03-26 RX ORDER — SODIUM CHLORIDE, SODIUM LACTATE, POTASSIUM CHLORIDE, CALCIUM CHLORIDE 600; 310; 30; 20 MG/100ML; MG/100ML; MG/100ML; MG/100ML
50 INJECTION, SOLUTION INTRAVENOUS CONTINUOUS
Status: CANCELLED | OUTPATIENT
Start: 2018-03-26

## 2018-03-26 RX ORDER — METOCLOPRAMIDE HYDROCHLORIDE 5 MG/ML
10 INJECTION INTRAMUSCULAR; INTRAVENOUS ONCE AS NEEDED
Status: DISCONTINUED | OUTPATIENT
Start: 2018-03-26 | End: 2018-03-26 | Stop reason: HOSPADM

## 2018-03-26 RX ORDER — ONDANSETRON 2 MG/ML
4 INJECTION INTRAMUSCULAR; INTRAVENOUS ONCE AS NEEDED
Status: DISCONTINUED | OUTPATIENT
Start: 2018-03-26 | End: 2018-03-26 | Stop reason: HOSPADM

## 2018-03-26 RX ORDER — ONDANSETRON 2 MG/ML
4 INJECTION INTRAMUSCULAR; INTRAVENOUS EVERY 6 HOURS PRN
Status: DISCONTINUED | OUTPATIENT
Start: 2018-03-26 | End: 2018-03-26 | Stop reason: HOSPADM

## 2018-03-26 RX ORDER — OXYCODONE HYDROCHLORIDE AND ACETAMINOPHEN 5; 325 MG/1; MG/1
2 TABLET ORAL EVERY 4 HOURS PRN
Status: DISCONTINUED | OUTPATIENT
Start: 2018-03-26 | End: 2018-03-26 | Stop reason: HOSPADM

## 2018-03-26 RX ORDER — SODIUM CHLORIDE, SODIUM LACTATE, POTASSIUM CHLORIDE, CALCIUM CHLORIDE 600; 310; 30; 20 MG/100ML; MG/100ML; MG/100ML; MG/100ML
125 INJECTION, SOLUTION INTRAVENOUS CONTINUOUS
Status: DISCONTINUED | OUTPATIENT
Start: 2018-03-26 | End: 2018-03-26 | Stop reason: HOSPADM

## 2018-03-26 RX ORDER — SUCCINYLCHOLINE CHLORIDE 20 MG/ML
INJECTION INTRAMUSCULAR; INTRAVENOUS AS NEEDED
Status: DISCONTINUED | OUTPATIENT
Start: 2018-03-26 | End: 2018-03-26 | Stop reason: SURG

## 2018-03-26 RX ORDER — MIDAZOLAM HYDROCHLORIDE 1 MG/ML
INJECTION INTRAMUSCULAR; INTRAVENOUS AS NEEDED
Status: DISCONTINUED | OUTPATIENT
Start: 2018-03-26 | End: 2018-03-26 | Stop reason: SURG

## 2018-03-26 RX ORDER — FENTANYL CITRATE/PF 50 MCG/ML
50 SYRINGE (ML) INJECTION
Status: COMPLETED | OUTPATIENT
Start: 2018-03-26 | End: 2018-03-26

## 2018-03-26 RX ORDER — SODIUM CHLORIDE, SODIUM LACTATE, POTASSIUM CHLORIDE, CALCIUM CHLORIDE 600; 310; 30; 20 MG/100ML; MG/100ML; MG/100ML; MG/100ML
20 INJECTION, SOLUTION INTRAVENOUS CONTINUOUS
Status: DISCONTINUED | OUTPATIENT
Start: 2018-03-26 | End: 2018-03-26 | Stop reason: HOSPADM

## 2018-03-26 RX ORDER — GLYCOPYRROLATE 0.2 MG/ML
INJECTION INTRAMUSCULAR; INTRAVENOUS AS NEEDED
Status: DISCONTINUED | OUTPATIENT
Start: 2018-03-26 | End: 2018-03-26 | Stop reason: SURG

## 2018-03-26 RX ORDER — OXYCODONE HYDROCHLORIDE 10 MG/1
10 TABLET ORAL ONCE
Status: DISCONTINUED | OUTPATIENT
Start: 2018-03-26 | End: 2018-03-26 | Stop reason: HOSPADM

## 2018-03-26 RX ORDER — ONDANSETRON 2 MG/ML
INJECTION INTRAMUSCULAR; INTRAVENOUS AS NEEDED
Status: DISCONTINUED | OUTPATIENT
Start: 2018-03-26 | End: 2018-03-26 | Stop reason: SURG

## 2018-03-26 RX ORDER — PROPOFOL 10 MG/ML
INJECTION, EMULSION INTRAVENOUS AS NEEDED
Status: DISCONTINUED | OUTPATIENT
Start: 2018-03-26 | End: 2018-03-26 | Stop reason: SURG

## 2018-03-26 RX ORDER — OXYCODONE HYDROCHLORIDE AND ACETAMINOPHEN 5; 325 MG/1; MG/1
2 TABLET ORAL EVERY 4 HOURS PRN
Qty: 28 TABLET | Refills: 0 | Status: SHIPPED | OUTPATIENT
Start: 2018-03-26 | End: 2018-03-30

## 2018-03-26 RX ORDER — FENTANYL CITRATE 50 UG/ML
INJECTION, SOLUTION INTRAMUSCULAR; INTRAVENOUS AS NEEDED
Status: DISCONTINUED | OUTPATIENT
Start: 2018-03-26 | End: 2018-03-26 | Stop reason: SURG

## 2018-03-26 RX ADMIN — SODIUM CHLORIDE, SODIUM LACTATE, POTASSIUM CHLORIDE, AND CALCIUM CHLORIDE 20 ML/HR: .6; .31; .03; .02 INJECTION, SOLUTION INTRAVENOUS at 10:19

## 2018-03-26 RX ADMIN — PROPOFOL 200 MG: 10 INJECTION, EMULSION INTRAVENOUS at 11:45

## 2018-03-26 RX ADMIN — LIDOCAINE HYDROCHLORIDE 50 MG: 10 INJECTION, SOLUTION INFILTRATION; PERINEURAL at 11:43

## 2018-03-26 RX ADMIN — ONDANSETRON 4 MG: 2 INJECTION INTRAMUSCULAR; INTRAVENOUS at 11:49

## 2018-03-26 RX ADMIN — FENTANYL CITRATE 50 MCG: 50 INJECTION, SOLUTION INTRAMUSCULAR; INTRAVENOUS at 13:25

## 2018-03-26 RX ADMIN — MIDAZOLAM HYDROCHLORIDE 2 MG: 1 INJECTION, SOLUTION INTRAMUSCULAR; INTRAVENOUS at 11:41

## 2018-03-26 RX ADMIN — OXYCODONE HYDROCHLORIDE AND ACETAMINOPHEN 2 TABLET: 5; 325 TABLET ORAL at 14:05

## 2018-03-26 RX ADMIN — FENTANYL CITRATE 50 MCG: 50 INJECTION, SOLUTION INTRAMUSCULAR; INTRAVENOUS at 13:08

## 2018-03-26 RX ADMIN — FENTANYL CITRATE 50 MCG: 50 INJECTION, SOLUTION INTRAMUSCULAR; INTRAVENOUS at 13:16

## 2018-03-26 RX ADMIN — PROPOFOL 50 MG: 10 INJECTION, EMULSION INTRAVENOUS at 11:46

## 2018-03-26 RX ADMIN — FENTANYL CITRATE 100 MCG: 50 INJECTION, SOLUTION INTRAMUSCULAR; INTRAVENOUS at 11:43

## 2018-03-26 RX ADMIN — DEXAMETHASONE SODIUM PHOSPHATE 10 MG: 10 INJECTION INTRAMUSCULAR; INTRAVENOUS at 11:49

## 2018-03-26 RX ADMIN — GLYCOPYRROLATE 0.2 MG: 0.2 INJECTION, SOLUTION INTRAMUSCULAR; INTRAVENOUS at 12:04

## 2018-03-26 RX ADMIN — SUCCINYLCHOLINE CHLORIDE 100 MG: 20 INJECTION, SOLUTION INTRAMUSCULAR; INTRAVENOUS at 11:46

## 2018-03-26 NOTE — ANESTHESIA POSTPROCEDURE EVALUATION
Post-Op Assessment Note      CV Status:  Stable    Mental Status:  Alert and awake    Hydration Status:  Euvolemic    PONV Controlled:  Controlled    Airway Patency:  Patent    Post Op Vitals Reviewed: Yes          Staff: CRNA           /69 (03/26/18 1242)    Temp (!) 96 9 °F (36 1 °C) (03/26/18 1242)    Pulse 81 (03/26/18 1242)   Resp 16 (03/26/18 1242)    SpO2 100 % (03/26/18 1242)

## 2018-03-26 NOTE — ANESTHESIA PREPROCEDURE EVALUATION
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    GYN       Hematology  Negative hematology ROS      Musculoskeletal  Back pain , lumbar pain,   Comment: S/p spinal cord stimulator Arthritis     Neurology    CVA , no residual symptoms, Headaches,   Comment: Cluster headache Psychology   Anxiety, Depression ,   Chronic opioid dependence Chronic pain  Comment: Patient takes oxycodone 40mg and valium 20mg daily         Physical Exam    Airway    Mallampati score: II  TM Distance: >3 FB  Neck ROM: full     Dental   No notable dental hx     Cardiovascular  Comment: Negative ROS, Rhythm: regular, Rate: normal, Cardiovascular exam normal    Pulmonary  Pulmonary exam normal Breath sounds clear to auscultation,     Other Findings        Anesthesia Plan  ASA Score- 3     Anesthesia Type- general with ASA Monitors  Additional Monitors:   Airway Plan: ETT  Plan Factors-    Induction- intravenous  Postoperative Plan- Plan for postoperative opioid use  Informed Consent- Anesthetic plan and risks discussed with patient  I personally reviewed this patient with the CRNA  Discussed and agreed on the Anesthesia Plan with the CRNA  Franklyn Bennett           Recent labs personally reviewed:  Lab Results   Component Value Date    WBC 11 95 (H) 12/07/2017    HGB 15 5 12/07/2017     (H) 12/07/2017     Lab Results   Component Value Date     08/24/2017    K 4 3 08/24/2017    BUN 19 08/24/2017    CREATININE 0 98 08/24/2017    GLUCOSE 95 08/24/2017     Lab Results   Component Value Date    PTT 30 05/16/2017      Lab Results   Component Value Date    INR 0 87 05/16/2017       Blood type A    Lab Results   Component Value Date    HGBA1C 5 9 12/07/2017       I, Kel Underwood MD, have personally seen and evaluated the patient prior to anesthetic care  I have reviewed the pre-anesthetic record, and other medical records if appropriate to the anesthetic care  If a CRNA is involved in the case, I have reviewed the CRNA assessment, if present, and agree  Risks/benefits and alternatives discussed with patient including possible PONV, sore throat, and possibility of rare anesthetic and surgical emergencies

## 2018-03-26 NOTE — H&P
History: 3/26/18 Roxi Snyder is a 50 years all man with history of dysphagia that his describes as "a valve closing on his throat when he swallows"  For approximately 10 years  Denies odynophagia, he denies reflux  In addition he has had persistent leukocytosis that his being followed by Dr Claire Vann  In a prior ENT consultation he was seen by Dr Betsy Jacobo that suspected and increase size of the lingual tonsil is CT scan of the neck was requested  CT scan on  02/02/2018 reveals a prominent lingual tonsil that is more notorious on the right side, there is some enhancement with contrast   No abnormal lymph nodes on the neck , larynx, parapharyngeal spaces are within normal limits, there are no significant findings on the palatine tonsils  Lynita Ped He was called by Dr Bonnie Villegas reviewed the CT scan and suggested a biopsy  Past med Hx:  Anxiety; Arthritis; Back pain; Chronic pain syndrome; Cluster headaches; Depression; Insomnia; Kidney stones; Laryngospasm; Leukocytosis; Migraine; Nephrolithiasis; Seasonal allergies; Sleep apnea; and TMJ (temporomandibular joint syndrome)  Patient has had multiple surgeries  Medications:   List of medication is reviewed in [employer]      Objective:       Vitals:     02/16/18 1438   BP: 120/100         Gen: NAD, A/O x 3  Nose: Right septal deviation, no discharge  Oral cavity within normal limits  Partially edentulous  No mucosal lesions, no masses  Tongue mobile, no masses palpable on the tongue  Oropharynx:  Status post tonsillectomy, he does have a couple of nodules of remnant tonsil tissue  Symmetric elevation of the soft palate  Posterior pharynx clear  Mirror examination performed:  Mild to moderate hypertrophy of lingual tonsil, no evident exophytic mass, no ulceration, there is mild asymmetry in the hypertrophy being slightly more notorious on the right side near the inferior  Tonsillar fossa adjacent to the anterior pillar  Vallecula is visualized and free  Epiglottis and larynx appear normal  Neuro: CN 2-12 intact except as below  Lungs: Breathing easily  No Stertor or Stridor  CV: RRR  Good distal perfusion  Neck: Soft and flat,   No mass or enlarged lymph node in the neck  Larynx and trachea are midline         Assessment/Plan:      1  Dysphagia  2  Hypertrophy lingual tonsil  3  Deviated nasal septum  4   GERD, LPR      discussed the patient that given the findings on the physical exam and the long term history the likelihood of a significant finding on the biopsy is relatively low  R/B/A discussed, patient opted for biopsy  Exam under anesthesia , microlaryngoscopy and biopsy  Patient reports he had barium swallow study, no reports in PACS   Recently started a PPI

## 2018-03-26 NOTE — OP NOTE
OPERATIVE REPORT  PATIENT NAME: Jennifer Jensen    :  1970  MRN: [id number]  Pt Location: [facility] OR ROOM 06    SURGERY DATE: 3/26/2018    Surgeon(s) and Role:     Catherine Tomas MD - Primary    Preop Diagnosis:  Lingual tonsil hypertrophy [J35 1]    Post-Op Diagnosis Codes:     * Lingual tonsil hypertrophy [J35 1]    Procedure(s) (LRB):  BIOPSY LINGUAL TONSIL (FLOW/ STANDARD PATH)  EVAL UNDER ANESTHESIA (N/A)    Specimen(s):  ID Type Source Tests Collected by Time Destination   1 : right lingual tonsil bx Tissue Tonsil TISSUE Asia Chen MD 3/26/2018 1212    2 : left lingual tonsil bx Tissue Tonsil TISSUE Jose Cornell MD 3/26/2018 1217    3 : right tonsillar fossa  Tissue Tonsil TISSUE EXAM Dany Cornell MD 3/26/2018 1222    A : right lingual tonsil bx Tissue Tonsil LEUKEMIA/LYMPHOMA FLOW CYTOMETRY Dany Cornell MD 3/26/2018 1210    B : left lingual tonsil bx Tissue Tonsil LEUKEMIA/LYMPHOMA FLOW CYTOMETRY Dany Cornell MD 3/26/2018 1215        Estimated Blood Loss:   Minimal    Drains:       Anesthesia Type:   General    Operative Indications:  Lingual tonsil hypertrophy [J35 1]  Dysphagia     Operative Findings:  Exam under anesthesia of the neck does not reveal any masses or enlarged lymph nodes, exam under anesthesia of the oral cavity oropharynx and hypopharynx by bimanual digital palpation does not reveal any evident mass/tumor  Microlaryngoscopy reveals normal-appearing true vocal cords and supraglottic structures including the epiglottis that is sharp with no mucosal lesions  Left and right piriform sinuses are noticed to be normal in appearance with no masses or mucosal lesions  The post cricoid space has a normal appearing mucosa, no stricture noticed into the esophageal inlet  Examination of the base of tongue area reveals vallecula to be free of disease  There is hypertrophy of the lingual tonsil that appears to be relatively benign in nature    Multiple biopsies are taken from right and left lingual tonsil  An additional biopsy was taken from the right tonsillar fossa at a location of a very small nodule  Complications:   None    Procedure and Technique:  Patient was met in the holding area  Patient was positively identified  Risks benefits and alternatives of the surgery were discussed with the patient  Questions were answered as needed and the patient signed informed consent  Patient was transferred to the operating room and placed in supine position the operating table  General anesthesia was administered in the standard fashion for microlaryngoscopy, a 6 endotracheal tube was used for the endotracheal intubation and it was fixed to the left labial commissure  The table was shifted 90°, the patient was prepped and draped in the usual fashion for microlaryngoscopy  A time-out was carried out in which patient identification and planned procedure was confirmed by the staff present in the operating room  Exam under anesthesia is then performed with palpation of the or pharyngeal walls as well as the hypopharynx and base of tongue, bimanual palpation also is done on the tongue itself and floor of mouth  No palpable lesions are noticed on this part of the exam     A silicon dental protector was then placed, the Jako laryngoscope was then advanced under direct visualization through the oral cavity hypopharynx and no significant lesions were noticed on the oropharynx, hypopharynx, base of tongue, vallecula  Epiglottis is noticed to be completely free of disease  The laryngoscope was then advanced behind the epiglottis and this gives excellent exposure larynx that is also noticed to be free of disease  attention is then directed to the piriform sinuses as well as the post cricoid area that are visually inspected and noticed to be free of any mucosal lesions or masses  Attention is then directed to the lingual tonsil were a relatively benign appearing hypertrophy is noticed    Multiple biopsies are taken with sample sent to regular pathology process and samples to flow cytometry  Blood and secretions are diligently suction, good hemostasis obtained spontaneously after approximately 5 minutes  Attention is then directed to the oropharynx visualizing both tonsillar fossa, on the right tonsillar fossa there is a small indurated nodule  An additional biopsy is taken of this nodule  After 5 minutes of waiting time during which additional oral examination is performed, Blood and secretions are diligently suction, good hemostasis noted  The laryngoscope is then  removed, suctioning any remnant secretions of blood while the laryngoscope is withdrawn  The dental protector was then removed  All counts were correct at the completion of the procedure there were no complications    The patient is then handed back to the anesthesiologist who proceeds to wean the patient from anesthesia  Patient is extubated without complication and subsequently transferred to recovery in good stable condition         I was present for the entire procedure    Patient Disposition:  PACU     SIGNATURE: Yi Alatorre MD  DATE: March 26, 2018  TIME: 12:34 PM

## 2018-03-26 NOTE — DISCHARGE INSTRUCTIONS
Minimize bending, lifting and standing  Tonsillectomy   WHAT YOU SHOULD KNOW:   A tonsillectomy is surgery to remove your tonsils  Tonsils are 2 large lumps of tissue in the back of your throat  Adenoids are small lumps of tissue on the top of your throat  Tonsils and adenoids both fight infection  Sometimes only your tonsils are removed  Your adenoids may be taken out at the same time if they are large or infected  AFTER YOU LEAVE:   Medicines:   · NSAIDs:  These medicines decrease swelling and pain  You can buy NSAIDs without a doctor's order  Ask your primary healthcare provider which medicine is right for you, and how much to take  Take as directed  NSAIDs can cause stomach bleeding or kidney problems if not taken correctly  Do not take aspirin  This can increase your risk of bleeding  · Acetaminophen: This medicine is available without a doctor's order  It may decrease your pain and fever  Ask how much medicine you need and how often to take it  · Pain medicines: You may be given a prescription medicine to decrease pain  Do not wait until the pain is severe before you take this medicine  · Antibiotics: This medicine is given to fight or prevent an infection caused by bacteria  Take as directed  · Take your medicine as directed  Call your healthcare provider if you think your medicine is not helping or if you have side effects  Tell him if you are allergic to any medicine  Keep a list of the medicines, vitamins, and herbs you take  Include the amounts, and when and why you take them  Bring the list or the pill bottles to follow-up visits  Carry your medicine list with you in case of an emergency  Follow up with your healthcare provider as directed:  Write down your questions so you remember to ask them during your visits  What to expect after surgery:   · Pain and swelling: Your throat may be sore up to 2 weeks after surgery  Your face and neck may be swollen or tender   It may be hard to turn your head  · Mild fever: You may have a low fever while your tonsil areas heal  Drink liquids often to help reduce it  · Bleeding:  A small amount of bleeding is normal within 24 hours after surgery  Bleeding can also happen 5 to 7 days after surgery when your scabs fall off, or if you have an infection  Ask how much bleeding to expect  Mouth care: It is normal to have mouth pain and bad breath for a few days after surgery  Care for your mouth as follows:  · Brush your teeth gently  Avoid harsh gargling or tooth brushing  This can cause bleeding  · Gently rinse your mouth as directed to remove blood and mucus  Food and drink:  You will need to follow a liquid diet or soft food diet for several days after surgery  · Drink plenty of liquids: This will help prevent fluid loss, keep your temperature down, decrease pain, and speed healing  Liquids and foods that are cool or cold, such as water, apple or grape juice, and popsicles, will help decrease pain and swelling  Do not drink orange juice or grapefruit juice  They may bother your throat  · Start with soft foods: Once you can drink liquids and your stomach is not upset, you may then have soft, plain foods  Begin with foods like applesauce, oatmeal, soft-boiled eggs, mashed potatoes, gelatin, and ice cream  Once you can eat soft food easily, you may slowly begin to eat solid foods  Avoid anything hot, spicy, or sharp, such as chips  These foods can hurt your tonsil areas  · Avoid hot food and drinks:  Avoid coffee, tea, soup, and other hot or warm foods and drinks  They can increase your risk for bleeding  Avoid milk and dairy foods if you have problems with thick mucus in your throat  This can cause you to cough, which could hurt your surgery areas  Self-care:   · Use ice:  Ice helps decrease swelling and pain  Use an ice pack or put crushed ice in a plastic bag   Cover the ice pack with a towel and place it on your throat for 15 to 20 minutes every hour for 2 days  · Use a cool humidifier: This will help moisten the air and soothe your throat  · Get plenty of rest:  Limit your activity for 7 to 10 days after surgery  It may take 2 weeks for you to recover  Ask when you can drive or return to work  · Do not smoke or go to smoky areas:  Until you heal, smoke may cause you to cough or your throat to start bleeding heavily  · Stay away from people who have colds, sore throats, or the flu: You may get sick more easily after surgery  Contact your surgeon or primary healthcare provider if:   · You have a fever  · You have throat pain or an earache that is worse than expected  · You have pus or blood draining down your throat  · You have itchy skin or a rash  · You have any questions or concerns about your condition or care  Seek care immediately or call 911 if:   · You have bright red bleeding from your nose or mouth, or bleeding that is worse than what you were told to expect  · You feel weak, dizzy, or like you might faint when you sit up or stand  · You have severe throat pain with drooling or voice changes  · Your neck is stiff and painful  · You have swelling or pain in your face or neck  · You have back or chest pain  · You have trouble breathing or swallowing  © 2014 [employer] is for End User's use only and may not be sold, redistributed or otherwise used for commercial purposes  All illustrations and images included in CareNotes® are the copyrighted property of [employer]  or Niles Herrmann  The above information is an  only  It is not intended as medical advice for individual conditions or treatments  Talk to your doctor, nurse or pharmacist before following any medical regimen to see if it is safe and effective for you

## 2018-03-28 LAB
SCAN RESULT: NORMAL
SCAN RESULT: NORMAL

## 2018-04-02 ENCOUNTER — TELEPHONE (OUTPATIENT)
Dept: FAMILY MEDICINE CLINIC | Facility: CLINIC | Age: 48
End: 2018-04-02

## 2018-04-02 DIAGNOSIS — Z09: Primary | ICD-10-CM

## 2018-04-02 DIAGNOSIS — Z87.442 HISTORY OF NEPHROLITHIASIS: ICD-10-CM

## 2018-04-02 NOTE — TELEPHONE ENCOUNTER
Pt requesting Dr johnson to make another appt with [facility] Dr Asuncion Harkins, says he been going to them but needs new order   Can be faxed [phone]

## 2018-04-02 NOTE — TELEPHONE ENCOUNTER
From chart review looks like Dr Kenyatta Billings sees him the most frequently   Request forwarded for her review

## 2018-04-02 NOTE — TELEPHONE ENCOUNTER
Patient follows with multiple residents  Did check allscripts, patient has seen urology in the past for Nephrolithiasis and Erectile dysfunction

## 2018-04-11 ENCOUNTER — TELEPHONE (OUTPATIENT)
Dept: FAMILY MEDICINE CLINIC | Facility: CLINIC | Age: 48
End: 2018-04-11

## 2018-04-11 NOTE — TELEPHONE ENCOUNTER
Checked PDMP, Oxycodone 10mg dispensed on 3/19/18 and Diazepam was dispensed on 3/15/18  Please review

## 2018-04-11 NOTE — TELEPHONE ENCOUNTER
Can you please review the PDMP for me given that I don't have access to it for some reason today  Then forward this to preceptor  This patient well controlled on these medications  And has been using it for Lumbar pain  UDS was just performed showed Adriana Guerrero, however the patient has a valid medical card for this  It was already discussed in detail in the past with a preceptor  He is to continue with medications given that the Rosellen Beecham was to expensive for him to continue  Thank you!

## 2018-04-11 NOTE — TELEPHONE ENCOUNTER
Voicemail:    Patient requesting refill    Valium 5mg #120  Oxycodone 10mg #120    Please advise  Thank you

## 2018-04-12 DIAGNOSIS — G89.4 CHRONIC PAIN SYNDROME: ICD-10-CM

## 2018-04-12 DIAGNOSIS — F41.8 DEPRESSION WITH ANXIETY: ICD-10-CM

## 2018-04-12 RX ORDER — OXYCODONE HYDROCHLORIDE 10 MG/1
10 TABLET ORAL EVERY 6 HOURS PRN
Qty: 120 TABLET | Refills: 0 | Status: SHIPPED | OUTPATIENT
Start: 2018-04-13 | End: 2018-05-15 | Stop reason: SDUPTHER

## 2018-04-12 RX ORDER — DIAZEPAM 5 MG/1
5 TABLET ORAL EVERY 8 HOURS PRN
Qty: 90 TABLET | Refills: 0 | Status: SHIPPED | OUTPATIENT
Start: 2018-04-13 | End: 2018-05-15 | Stop reason: SDUPTHER

## 2018-05-15 ENCOUNTER — OFFICE VISIT (OUTPATIENT)
Dept: FAMILY MEDICINE CLINIC | Facility: CLINIC | Age: 48
End: 2018-05-15
Payer: COMMERCIAL

## 2018-05-15 VITALS
SYSTOLIC BLOOD PRESSURE: 130 MMHG | TEMPERATURE: 97.4 F | BODY MASS INDEX: 27.22 KG/M2 | HEIGHT: 72 IN | HEART RATE: 80 BPM | WEIGHT: 201 LBS | RESPIRATION RATE: 18 BRPM | DIASTOLIC BLOOD PRESSURE: 80 MMHG

## 2018-05-15 DIAGNOSIS — F41.8 DEPRESSION WITH ANXIETY: ICD-10-CM

## 2018-05-15 DIAGNOSIS — M54.50 LUMBAR BACK PAIN: Primary | ICD-10-CM

## 2018-05-15 DIAGNOSIS — J45.40 MODERATE PERSISTENT ASTHMA WITHOUT COMPLICATION: ICD-10-CM

## 2018-05-15 DIAGNOSIS — G89.4 CHRONIC PAIN SYNDROME: ICD-10-CM

## 2018-05-15 PROCEDURE — 99213 OFFICE O/P EST LOW 20 MIN: CPT | Performed by: FAMILY MEDICINE

## 2018-05-15 RX ORDER — OXYCODONE HYDROCHLORIDE 10 MG/1
10 TABLET ORAL EVERY 6 HOURS PRN
Qty: 100 TABLET | Refills: 0 | Status: SHIPPED | OUTPATIENT
Start: 2018-05-15 | End: 2018-06-13 | Stop reason: SDUPTHER

## 2018-05-15 RX ORDER — OMEPRAZOLE 40 MG/1
CAPSULE, DELAYED RELEASE ORAL
COMMUNITY
Start: 2018-05-13 | End: 2018-06-24

## 2018-05-15 RX ORDER — CYCLOBENZAPRINE HCL 10 MG
10 TABLET ORAL 3 TIMES DAILY PRN
Qty: 30 TABLET | Refills: 0 | Status: SHIPPED | OUTPATIENT
Start: 2018-05-15 | End: 2018-06-24

## 2018-05-15 RX ORDER — ALBUTEROL SULFATE 90 UG/1
2 AEROSOL, METERED RESPIRATORY (INHALATION) EVERY 6 HOURS PRN
Qty: 1 INHALER | Refills: 0 | Status: SHIPPED | OUTPATIENT
Start: 2018-05-15 | End: 2018-06-11 | Stop reason: SDUPTHER

## 2018-05-15 RX ORDER — DIAZEPAM 5 MG/1
5 TABLET ORAL EVERY 12 HOURS PRN
Qty: 60 TABLET | Refills: 0 | Status: SHIPPED | OUTPATIENT
Start: 2018-05-15 | End: 2018-06-13 | Stop reason: SDUPTHER

## 2018-05-15 NOTE — PROGRESS NOTES
Jennifer Jensen 1970 male MRN: [id number]    Family Medicine Follow-up Visit    ASSESSMENT/PLAN  Jennifer Jensen is a 50 y o  male presents to the office for    1) Acute on Chronic back pain:  -  Neurological exam intact, didn't appreciate any changes in sensation over the face, or weakness  The patient did have aggravated pain after strength exam, however 5/5 strength  Spasm felt of the thoracic, and lumber spine   - Acute: Start Flexeril 10mg TID for 4 days, then as needed  - Chronic: per patient request taper Oxycodone 10mg to 100 pills rather then 120  Patient stating that [facility] doctor has been helping him, and he believe he can wean off of opoid  - No need for imaging at this time, if symptoms don't resolve will have the patient discussed with neurosurg/ ortho surg  2) Depression with anxiety  - Controlled on Valium 5 mg, patient requesting that he be tapered down to BID dosing, given that he has been responding to treatment, other then this acute episode  - Valium also used to help with muscle spasms  3) Moderate persistent Asthma:  - Controlled on Advair, and Proventil PRN  Disposition: Return to the clinic in 2 weeks for f/u on back pain  Following Specialist Providers:           Future Appointments  Date Time Provider Drew Knight   6/5/2018 10:30 AM Radha Emery MD [facility]   12/17/2018 1:00 PM Nigel Thorpe MD [facility]          SUBJECTIVE  CC: Back Pain and Numbness (facial and fingers)      HPI:  Jennifer Jensen is a 50 y o  male   Presents to the office for follow-up /acute onset of lumbar chronic back pain  The patient states for the last 2 weeks he has had a half of a dozen times of numbness in the upper lip as if someone poored lidocaine on his lip  He states the episodes last about 20-30 seconds  The patient also admits to having right-sided arm  numbness as well as lower extremity weakness    He after much education the patient believes it is likely secondary to his cluster headache and was hoping I would states something differently  He was concerned about stroke  The patient also reports 10/10 lumbar  And thoracic pain  He states that his thoracic pain is 10/10 and does not feel like it is muscle spasms but it radiates down his arms and causes excruciating pains  The pain lasts about 2-3 minutes  He is unable to have anyone touch between his scapulas without a presenting with pain  He denies any weakness  The patient is concerned about the opioids and benzos he is taking  He was just recently had a urine drug screen test with his marijuana physician  And was positive for quite a few things  The patient was comparing are UDS and his UDS which were 1 week at of apart  I encouraged the patient that we will repeat his UDS at her next visit spontaneously  He agrees with plan  Review of Systems   Constitutional: Positive for activity change  Negative for appetite change  HENT: Negative for congestion and sore throat  Respiratory: Negative for cough, chest tightness and shortness of breath  Cardiovascular: Negative for chest pain and palpitations  Gastrointestinal: Negative for abdominal distention and abdominal pain  Musculoskeletal: Positive for arthralgias and back pain  Skin: Negative for rash  Neurological: Positive for speech difficulty and numbness  Negative for dizziness and weakness  Diffculty swallowing   All other systems reviewed and are negative        Historical Information   The patient history was reviewed as follows:    Past Medical History:   Diagnosis Date    Allergic rhinitis     Anxiety     Arthritis     Back pain     Chronic obstructive asthma (HCC)     Chronic pain syndrome     Cluster headaches     Depression     Hypertension     Insomnia     Kidney stones     Laryngospasm     Leukocytosis     Migraine     Myocardial infarction (HCC)     Nephrolithiasis     Organic impotence     Seasonal allergies     Sleep apnea     does not use CPAP    Stroke ([facility] [address] )     TMJ (temporomandibular joint syndrome)     Wheezing     last assessed 05/19/17     Past Surgical History:   Procedure Laterality Date    BACK SURGERY      *9, [id number], nervectomy 2006, total of 10    BUCCAL MASS EXCISION N/A 3/26/2018    Procedure: BIOPSY LINGUAL TONSIL (FLOW/ STANDARD PATH)  EVAL UNDER ANESTHESIA;  Surgeon: Yi Alatorre MD;  [facility];  Service: ENT    COLONOSCOPY      HERNIA REPAIR      KIDNEY SURGERY      KNEE ARTHROSCOPY      LITHOTRIPSY      multiple    LUMBAR One [address] SURGERY  2015    MANDIBLE FRACTURE SURGERY      titanium plate    PELVIC SYMPHYSIS FUSION      OK ESOPHAGOGASTRODUODENOSCOPY TRANSORAL DIAGNOSTIC N/A 2/22/2018    Procedure: ESOPHAGOGASTRODUODENOSCOPY (EGD); Surgeon: Jessica Medrano MD;  [facility]; Service: Gastroenterology    OK REVISE/REMOVE SPINAL NEUROSTIM/ Left 6/9/2017    Procedure: REMOVAL OF A THORACIC SCS PLACED VIA LAMINECTOMY AND REMOVAL LEFT BUTTOCK GENERATOR; IMPULSE MONITORING;  Surgeon: Carina Toledo MD;  [facility];  Service: Neurosurgery    OK SURG IMPLNT [address] N/A 9/8/2016    Procedure: DORSAL COLUMN STIMULATOR PLACEMENT (IMPULSE MONITORING);   Surgeon: Parvin Duggan MD;  [facility];  Service: Orthopedics    SACROILIAC JOINT FUSION  2015    SHOULDER SURGERY Left     2     Family History   Problem Relation Age of Onset    Diabetes Father     Obesity Father     Liver cancer Father     Heart disease Father     Diabetes Brother     Hypertension Brother     Leukemia Mother     Hypertension Mother     Cancer Family       Social History   History   Alcohol Use No     History   Drug Use    Frequency: 7 0 times per week    Types: Marijuana     Comment: daily     History   Smoking Status    Former Smoker    Packs/day: 0 50    Years: 25 00   Smokeless Tobacco    Never Used     Comment: using patch       Medications:     Current Outpatient Prescriptions:     albuterol (PROVENTIL HFA,VENTOLIN HFA) 90 mcg/act inhaler, Inhale 2 puffs every 6 (six) hours as needed for wheezing, Disp: 1 Inhaler, Rfl: 0    aspirin 81 MG tablet, Take 1 tablet by mouth daily, Disp: , Rfl:     diazepam (VALIUM) 5 mg tablet, Take 1 tablet (5 mg total) by mouth every 12 (twelve) hours as needed for anxiety, Disp: 60 tablet, Rfl: 0    fluticasone (FLONASE) 50 mcg/act nasal spray, 2 sprays into each nostril daily as needed  , Disp: , Rfl:     fluticasone-salmeterol (ADVAIR DISKUS) 250-50 mcg/dose inhaler, Inhale 1 puff daily, Disp: 1 Inhaler, Rfl: 3    ibuprofen (MOTRIN) 800 mg tablet, Take by mouth, Disp: , Rfl:     oxyCODONE (ROXICODONE) 10 MG TABS, Take 1 tablet (10 mg total) by mouth every 6 (six) hours as needed for moderate pain Max Daily Amount: 40 mg, Disp: 100 tablet, Rfl: 0    SUMAtriptan Succinate (IMITREX IJ), Inject as directed, Disp: , Rfl:     cyclobenzaprine (FLEXERIL) 10 mg tablet, Take 1 tablet (10 mg total) by mouth 3 (three) times a day as needed for muscle spasms, Disp: 30 tablet, Rfl: 0    omeprazole (PriLOSEC) 40 MG capsule, , Disp: , Rfl:     tadalafil (CIALIS) 5 MG tablet, Take 5 mg by mouth daily as needed for erectile dysfunction  , Disp: , Rfl:   Allergies   Allergen Reactions    Other Rash     Adhesive tape       OBJECTIVE    Vitals:   Vitals:    05/15/18 1106   BP: 130/80   Pulse: 80   Resp: 18   Temp: (!) 97 4 °F (36 3 °C)   Weight: 91 2 kg (201 lb)   Height: 6' (1 829 m)           Physical Exam   Constitutional: He is oriented to person, place, and time  He appears well-developed and well-nourished  HENT:   Head: Normocephalic and atraumatic  Eyes: Conjunctivae and EOM are normal  Pupils are equal, round, and reactive to light  Neck: Normal range of motion  Neck supple  Cardiovascular: Normal rate and regular rhythm  No murmur heard    Pulmonary/Chest: Effort normal and breath sounds normal  No respiratory distress  He has no wheezes  Abdominal: Soft  Bowel sounds are normal  He exhibits no distension  There is no tenderness  Musculoskeletal: Normal range of motion  He exhibits no edema  Thoracic back: He exhibits tenderness, pain and spasm  Lumbar back: He exhibits tenderness and spasm  Back:    Limited range of motion when going from lying down to standing position  Tender over the lumbar spine  Muscle spasm felt over b/l deltoids  Neurological: He is alert and oriented to person, place, and time  No cranial nerve deficit (No abnormalites at this time)  Coordination normal    Skin: Skin is warm and dry  No rash noted  Psychiatric: He has a normal mood and affect  His behavior is normal  Judgment and thought content normal    Vitals reviewed           Leni Haskins MD, PGY-3  [facility]  2

## 2018-05-21 ENCOUNTER — TELEPHONE (OUTPATIENT)
Dept: FAMILY MEDICINE CLINIC | Facility: CLINIC | Age: 48
End: 2018-05-21

## 2018-05-21 NOTE — TELEPHONE ENCOUNTER
Dr Sarah Gibbs,  Patient cqalling to let her know he will nolonger be taking Flexeral due to side affects of frequent sleeping

## 2018-05-24 ENCOUNTER — TELEPHONE (OUTPATIENT)
Dept: FAMILY MEDICINE CLINIC | Facility: CLINIC | Age: 48
End: 2018-05-24

## 2018-05-24 DIAGNOSIS — K08.89 PAIN, DENTAL: Primary | ICD-10-CM

## 2018-05-24 RX ORDER — AMOXICILLIN 500 MG/1
500 CAPSULE ORAL EVERY 8 HOURS SCHEDULED
Qty: 9 CAPSULE | Refills: 0 | Status: SHIPPED | OUTPATIENT
Start: 2018-05-24 | End: 2018-05-27

## 2018-05-24 NOTE — TELEPHONE ENCOUNTER
Patient doesn't need an appoinment please advise him that I called in amoxcillin 500mg q 8hrs for 3 days, and to please see the dentist as soon as possible    Thank you

## 2018-05-24 NOTE — TELEPHONE ENCOUNTER
Spoke to patient he has no transportation to make appt for today  He stated,  He just wanted to get through the night until appt tomorrow but thought since he was seen recently and talked to doctor during that visit about dental insurance issues,  she would call something to pharmacy

## 2018-05-24 NOTE — TELEPHONE ENCOUNTER
Patient was scheduled to see dentis tomorrow for a consultation but precedure will not happen until next week  He is currently taking IbProfin OT but not working  Patient requesting an antibiotic be called in to pharmacy until appt

## 2018-06-11 DIAGNOSIS — J45.40 MODERATE PERSISTENT ASTHMA WITHOUT COMPLICATION: ICD-10-CM

## 2018-06-12 ENCOUNTER — TELEPHONE (OUTPATIENT)
Dept: FAMILY MEDICINE CLINIC | Facility: CLINIC | Age: 48
End: 2018-06-12

## 2018-06-12 NOTE — TELEPHONE ENCOUNTER
Voice message requesting refill of Oxycodone 10mg #100, checked PDMP, last refill 5/15/18  He is also requesting refill of Valium 5mg #90    I did see in [address] that patient did receive Tylenol with codeine #3 (quantity-20) by dentist

## 2018-06-12 NOTE — TELEPHONE ENCOUNTER
This is fine , he had a dental procedure to remove abcess and tooth extraction  please refill the below  Patient is very controlled  With recent UDS on file!   Oxycodone 10mg q6hrs prn disp 100 ( recently weaned down therefore liekly why he is requesting sooner)  Valium 5mg #90 TID prn for anxiety

## 2018-06-13 DIAGNOSIS — G89.4 CHRONIC PAIN SYNDROME: ICD-10-CM

## 2018-06-13 DIAGNOSIS — F41.8 DEPRESSION WITH ANXIETY: ICD-10-CM

## 2018-06-13 RX ORDER — OXYCODONE HYDROCHLORIDE 10 MG/1
10 TABLET ORAL EVERY 6 HOURS PRN
Qty: 100 TABLET | Refills: 0 | Status: SHIPPED | OUTPATIENT
Start: 2018-06-13 | End: 2018-07-13 | Stop reason: SDUPTHER

## 2018-06-13 RX ORDER — DIAZEPAM 5 MG/1
5 TABLET ORAL EVERY 12 HOURS PRN
Qty: 60 TABLET | Refills: 0 | Status: SHIPPED | OUTPATIENT
Start: 2018-06-13 | End: 2018-07-13 | Stop reason: SDUPTHER

## 2018-06-13 NOTE — TELEPHONE ENCOUNTER
I'm refilling the Percocet as requested  However, the patient had been getting #60 Valium for BID use previously and I don't want to change that without a good reason or an office visit to discuss why he would need increased dosing  So I filled #60 Valium for now

## 2018-06-14 ENCOUNTER — TELEPHONE (OUTPATIENT)
Dept: FAMILY MEDICINE CLINIC | Facility: CLINIC | Age: 48
End: 2018-06-14

## 2018-06-14 NOTE — TELEPHONE ENCOUNTER
[facility] pharmacy called pt needs prior auth for oxycodone        Demetria [phone]

## 2018-06-16 ENCOUNTER — TELEPHONE (OUTPATIENT)
Dept: OTHER | Facility: HOSPITAL | Age: 48
End: 2018-06-16

## 2018-06-17 NOTE — TELEPHONE ENCOUNTER
Pt called triage line requesting a refill for Oxycodone  He put in a request this week and again Friday but states he did not get a call back about filling a [facility]  Pt is frustrated because he's been off his pain meds for 3 days and fears he's withdrawing  I apologized for the inconvenience this has caused him but explained that I could not refill his medication without the approval of an attending and/or prescribing provider  I suggested he go to the ED if he felt like his pain was unbearable

## 2018-06-18 ENCOUNTER — TELEPHONE (OUTPATIENT)
Dept: FAMILY MEDICINE CLINIC | Facility: CLINIC | Age: 48
End: 2018-06-18

## 2018-06-18 NOTE — TELEPHONE ENCOUNTER
Pt called and is still waiting on his prior auth for his Oxycodone that was prescribed to him 6/13,  He states he is in pain and is furious that hes been waiting this long without even a phone call  He has spoke to a on call doctor Friday but still hasn't heard a response since then  Please give pt a call with info , regarding this   Thanks

## 2018-06-18 NOTE — TELEPHONE ENCOUNTER
Submitted or auth but not yet approved can we order the pt a small supply to hold him 1-2 days  Please let me know so I can call the pt   Thank you

## 2018-06-19 NOTE — TELEPHONE ENCOUNTER
Patient doesn't need an appoinment to discuss this above  The patient was attempting to wean himself from the dose and likely didn't tolerate the decrease from 60- 90 pills 2/2 to anxiety and increase in muscle spasm  All prior scripts were 90 pills if the patient is ok with 60 pills we can continue, if he wants 90 I am totally fine with represcribing the extra 30 tabs given that is his original dose  If he feels like he needs to be seen then please bring him in

## 2018-06-19 NOTE — TELEPHONE ENCOUNTER
PRIOR AUTH SUBMITTED PT STATES WAITING SINCE Tuesday FOR THIS  SUBMITTED TODAY PT REFUSED TO PAY FOR SMALL AMOUNT ADDITIONAL UNTIL AUTH WENT THRU  [facility] WILL GIVE A SMALL AMOUNT TO HOLD OVER UNTIL [address] [facility], [facility]   WILL NEED FU ON 6/19/18

## 2018-06-19 NOTE — TELEPHONE ENCOUNTER
I realize I was addressing that when I was precepting before when this first came up, but I am not willing to do this change by phone despite Dr Duncan Dawson' request   Leandra Jj may want to address with a preceptor who is in the office today

## 2018-06-19 NOTE — TELEPHONE ENCOUNTER
Keep appointment as scheduled to further discuss, continued titration is in this patient's best interest

## 2018-06-19 NOTE — TELEPHONE ENCOUNTER
I realize you are not precepting however since you addressed this originally  I do believe patient needs to keep his scheduled follow up on the 26th since he receives monthly pain rx  Please review

## 2018-06-24 ENCOUNTER — APPOINTMENT (EMERGENCY)
Dept: RADIOLOGY | Facility: HOSPITAL | Age: 48
DRG: 054 | End: 2018-06-24
Payer: COMMERCIAL

## 2018-06-24 ENCOUNTER — HOSPITAL ENCOUNTER (INPATIENT)
Facility: HOSPITAL | Age: 48
LOS: 1 days | Discharge: HOME | DRG: 054 | End: 2018-06-26
Attending: EMERGENCY MEDICINE | Admitting: EMERGENCY MEDICINE
Payer: COMMERCIAL

## 2018-06-24 ENCOUNTER — APPOINTMENT (OUTPATIENT)
Dept: CT IMAGING | Facility: HOSPITAL | Age: 48
DRG: 054 | End: 2018-06-24
Payer: COMMERCIAL

## 2018-06-24 DIAGNOSIS — R51.9 HEADACHE: ICD-10-CM

## 2018-06-24 DIAGNOSIS — R07.9 CHEST PAIN: Primary | ICD-10-CM

## 2018-06-24 LAB
ALBUMIN SERPL BCP-MCNC: 4 G/DL (ref 3.5–5)
ALP SERPL-CCNC: 72 U/L (ref 46–116)
ALT SERPL W P-5'-P-CCNC: 23 U/L (ref 12–78)
ANION GAP SERPL CALCULATED.3IONS-SCNC: 12 MMOL/L (ref 4–13)
APTT PPP: 30 SECONDS (ref 24–36)
AST SERPL W P-5'-P-CCNC: 15 U/L (ref 5–45)
ATRIAL RATE: 59 BPM
ATRIAL RATE: 79 BPM
BASOPHILS # BLD AUTO: 0.04 THOUSANDS/ΜL (ref 0–0.1)
BASOPHILS NFR BLD AUTO: 0 % (ref 0–1)
BILIRUB SERPL-MCNC: 0.3 MG/DL (ref 0.2–1)
BUN SERPL-MCNC: 12 MG/DL (ref 5–25)
CALCIUM SERPL-MCNC: 8.8 MG/DL (ref 8.3–10.1)
CHLORIDE SERPL-SCNC: 109 MMOL/L (ref 100–108)
CK SERPL-CCNC: 85 U/L (ref 39–308)
CO2 SERPL-SCNC: 23 MMOL/L (ref 21–32)
CREAT SERPL-MCNC: 1.02 MG/DL (ref 0.6–1.3)
DEPRECATED D DIMER PPP: <270 NG/ML (FEU) (ref 0–424)
EOSINOPHIL # BLD AUTO: 0.21 THOUSAND/ΜL (ref 0–0.61)
EOSINOPHIL NFR BLD AUTO: 2 % (ref 0–6)
ERYTHROCYTE [DISTWIDTH] IN BLOOD BY AUTOMATED COUNT: 15.4 % (ref 11.6–15.1)
GFR SERPL CREATININE-BSD FRML MDRD: 86 ML/MIN/1.73SQ M
GLUCOSE SERPL-MCNC: 93 MG/DL (ref 65–140)
HCT VFR BLD AUTO: 43.2 % (ref 36.5–49.3)
HGB BLD-MCNC: 14.6 G/DL (ref 12–17)
INR PPP: 0.93 (ref 0.86–1.17)
LIPASE SERPL-CCNC: 164 U/L (ref 73–393)
LYMPHOCYTES # BLD AUTO: 3.73 THOUSANDS/ΜL (ref 0.6–4.47)
LYMPHOCYTES NFR BLD AUTO: 28 % (ref 14–44)
MCH RBC QN AUTO: 29.4 PG (ref 26.8–34.3)
MCHC RBC AUTO-ENTMCNC: 33.8 G/DL (ref 31.4–37.4)
MCV RBC AUTO: 87 FL (ref 82–98)
MONOCYTES # BLD AUTO: 0.91 THOUSAND/ΜL (ref 0.17–1.22)
MONOCYTES NFR BLD AUTO: 7 % (ref 4–12)
NEUTROPHILS # BLD AUTO: 8.49 THOUSANDS/ΜL (ref 1.85–7.62)
NEUTS SEG NFR BLD AUTO: 63 % (ref 43–75)
NT-PROBNP SERPL-MCNC: 74 PG/ML
P AXIS: 68 DEGREES
P AXIS: 69 DEGREES
PLATELET # BLD AUTO: 300 THOUSANDS/UL (ref 149–390)
PLATELET # BLD AUTO: 336 THOUSANDS/UL (ref 149–390)
PMV BLD AUTO: 10.1 FL (ref 8.9–12.7)
PMV BLD AUTO: 10.2 FL (ref 8.9–12.7)
POTASSIUM SERPL-SCNC: 3.8 MMOL/L (ref 3.5–5.3)
PR INTERVAL: 148 MS
PR INTERVAL: 166 MS
PROT SERPL-MCNC: 7.1 G/DL (ref 6.4–8.2)
PROTHROMBIN TIME: 12.2 SECONDS (ref 11.8–14.2)
QRS AXIS: 75 DEGREES
QRS AXIS: 77 DEGREES
QRSD INTERVAL: 88 MS
QRSD INTERVAL: 90 MS
QT INTERVAL: 356 MS
QT INTERVAL: 398 MS
QTC INTERVAL: 394 MS
QTC INTERVAL: 401 MS
RBC # BLD AUTO: 4.97 MILLION/UL (ref 3.88–5.62)
SODIUM SERPL-SCNC: 144 MMOL/L (ref 136–145)
T WAVE AXIS: 63 DEGREES
T WAVE AXIS: 68 DEGREES
TROPONIN I SERPL-MCNC: <0.02 NG/ML
VENTRICULAR RATE: 59 BPM
VENTRICULAR RATE: 76 BPM
WBC # BLD AUTO: 13.38 THOUSAND/UL (ref 4.31–10.16)

## 2018-06-24 PROCEDURE — 70496 CT ANGIOGRAPHY HEAD: CPT

## 2018-06-24 PROCEDURE — 99285 EMERGENCY DEPT VISIT HI MDM: CPT

## 2018-06-24 PROCEDURE — 96375 TX/PRO/DX INJ NEW DRUG ADDON: CPT

## 2018-06-24 PROCEDURE — 84484 ASSAY OF TROPONIN QUANT: CPT | Performed by: EMERGENCY MEDICINE

## 2018-06-24 PROCEDURE — 85379 FIBRIN DEGRADATION QUANT: CPT | Performed by: EMERGENCY MEDICINE

## 2018-06-24 PROCEDURE — 96374 THER/PROPH/DIAG INJ IV PUSH: CPT

## 2018-06-24 PROCEDURE — 93010 ELECTROCARDIOGRAM REPORT: CPT | Performed by: INTERNAL MEDICINE

## 2018-06-24 PROCEDURE — 84484 ASSAY OF TROPONIN QUANT: CPT | Performed by: PHYSICIAN ASSISTANT

## 2018-06-24 PROCEDURE — 83690 ASSAY OF LIPASE: CPT | Performed by: EMERGENCY MEDICINE

## 2018-06-24 PROCEDURE — 71045 X-RAY EXAM CHEST 1 VIEW: CPT

## 2018-06-24 PROCEDURE — 80053 COMPREHEN METABOLIC PANEL: CPT | Performed by: EMERGENCY MEDICINE

## 2018-06-24 PROCEDURE — 85049 AUTOMATED PLATELET COUNT: CPT | Performed by: PHYSICIAN ASSISTANT

## 2018-06-24 PROCEDURE — 85025 COMPLETE CBC W/AUTO DIFF WBC: CPT | Performed by: EMERGENCY MEDICINE

## 2018-06-24 PROCEDURE — 85610 PROTHROMBIN TIME: CPT | Performed by: EMERGENCY MEDICINE

## 2018-06-24 PROCEDURE — 93005 ELECTROCARDIOGRAM TRACING: CPT

## 2018-06-24 PROCEDURE — 36415 COLL VENOUS BLD VENIPUNCTURE: CPT | Performed by: EMERGENCY MEDICINE

## 2018-06-24 PROCEDURE — 83880 ASSAY OF NATRIURETIC PEPTIDE: CPT | Performed by: EMERGENCY MEDICINE

## 2018-06-24 PROCEDURE — 70498 CT ANGIOGRAPHY NECK: CPT

## 2018-06-24 PROCEDURE — 82550 ASSAY OF CK (CPK): CPT | Performed by: EMERGENCY MEDICINE

## 2018-06-24 PROCEDURE — 99220 PR INITIAL OBSERVATION CARE/DAY 70 MINUTES: CPT | Performed by: PHYSICIAN ASSISTANT

## 2018-06-24 PROCEDURE — 85730 THROMBOPLASTIN TIME PARTIAL: CPT | Performed by: EMERGENCY MEDICINE

## 2018-06-24 RX ORDER — DIPHENHYDRAMINE HYDROCHLORIDE 50 MG/ML
25 INJECTION INTRAMUSCULAR; INTRAVENOUS EVERY 8 HOURS PRN
Status: DISCONTINUED | OUTPATIENT
Start: 2018-06-24 | End: 2018-06-26 | Stop reason: HOSPADM

## 2018-06-24 RX ORDER — KETOROLAC TROMETHAMINE 30 MG/ML
30 INJECTION, SOLUTION INTRAMUSCULAR; INTRAVENOUS EVERY 12 HOURS SCHEDULED
Status: DISCONTINUED | OUTPATIENT
Start: 2018-06-24 | End: 2018-06-26 | Stop reason: HOSPADM

## 2018-06-24 RX ORDER — NICOTINE 21 MG/24HR
1 PATCH, TRANSDERMAL 24 HOURS TRANSDERMAL DAILY
Status: DISCONTINUED | OUTPATIENT
Start: 2018-06-24 | End: 2018-06-26 | Stop reason: HOSPADM

## 2018-06-24 RX ORDER — ONDANSETRON 2 MG/ML
4 INJECTION INTRAMUSCULAR; INTRAVENOUS ONCE
Status: COMPLETED | OUTPATIENT
Start: 2018-06-24 | End: 2018-06-24

## 2018-06-24 RX ORDER — ASPIRIN 81 MG/1
81 TABLET, CHEWABLE ORAL DAILY
Status: DISCONTINUED | OUTPATIENT
Start: 2018-06-25 | End: 2018-06-26 | Stop reason: HOSPADM

## 2018-06-24 RX ORDER — ASPIRIN 81 MG/1
162 TABLET, CHEWABLE ORAL ONCE
Status: COMPLETED | OUTPATIENT
Start: 2018-06-24 | End: 2018-06-24

## 2018-06-24 RX ORDER — ALBUTEROL SULFATE 90 UG/1
2 AEROSOL, METERED RESPIRATORY (INHALATION) EVERY 6 HOURS PRN
Status: DISCONTINUED | OUTPATIENT
Start: 2018-06-24 | End: 2018-06-26 | Stop reason: HOSPADM

## 2018-06-24 RX ORDER — METOCLOPRAMIDE HYDROCHLORIDE 5 MG/ML
10 INJECTION INTRAMUSCULAR; INTRAVENOUS EVERY 8 HOURS SCHEDULED
Status: DISCONTINUED | OUTPATIENT
Start: 2018-06-24 | End: 2018-06-26 | Stop reason: HOSPADM

## 2018-06-24 RX ORDER — FLUTICASONE PROPIONATE 50 MCG
2 SPRAY, SUSPENSION (ML) NASAL DAILY PRN
Status: DISCONTINUED | OUTPATIENT
Start: 2018-06-24 | End: 2018-06-26 | Stop reason: HOSPADM

## 2018-06-24 RX ORDER — MORPHINE SULFATE 4 MG/ML
4 INJECTION, SOLUTION INTRAMUSCULAR; INTRAVENOUS ONCE
Status: COMPLETED | OUTPATIENT
Start: 2018-06-24 | End: 2018-06-24

## 2018-06-24 RX ORDER — OXYCODONE HYDROCHLORIDE 10 MG/1
10 TABLET ORAL EVERY 6 HOURS PRN
Status: DISCONTINUED | OUTPATIENT
Start: 2018-06-24 | End: 2018-06-26 | Stop reason: HOSPADM

## 2018-06-24 RX ORDER — NITROGLYCERIN 0.4 MG/1
0.4 TABLET SUBLINGUAL
Status: DISCONTINUED | OUTPATIENT
Start: 2018-06-24 | End: 2018-06-26 | Stop reason: HOSPADM

## 2018-06-24 RX ORDER — ACETAMINOPHEN 325 MG/1
650 TABLET ORAL EVERY 6 HOURS PRN
Status: DISCONTINUED | OUTPATIENT
Start: 2018-06-24 | End: 2018-06-26 | Stop reason: HOSPADM

## 2018-06-24 RX ORDER — ONDANSETRON 2 MG/ML
4 INJECTION INTRAMUSCULAR; INTRAVENOUS EVERY 6 HOURS PRN
Status: DISCONTINUED | OUTPATIENT
Start: 2018-06-24 | End: 2018-06-26 | Stop reason: HOSPADM

## 2018-06-24 RX ORDER — DIAZEPAM 5 MG/1
5 TABLET ORAL EVERY 12 HOURS PRN
Status: DISCONTINUED | OUTPATIENT
Start: 2018-06-24 | End: 2018-06-26 | Stop reason: HOSPADM

## 2018-06-24 RX ORDER — SUMATRIPTAN 6 MG/.5ML
6 INJECTION, SOLUTION SUBCUTANEOUS ONCE
Status: COMPLETED | OUTPATIENT
Start: 2018-06-24 | End: 2018-06-24

## 2018-06-24 RX ORDER — ASPIRIN 81 MG/1
81 TABLET, CHEWABLE ORAL DAILY
Status: DISCONTINUED | OUTPATIENT
Start: 2018-06-24 | End: 2018-06-24

## 2018-06-24 RX ADMIN — ONDANSETRON 4 MG: 2 INJECTION INTRAMUSCULAR; INTRAVENOUS at 04:38

## 2018-06-24 RX ADMIN — MORPHINE SULFATE 4 MG: 4 INJECTION INTRAVENOUS at 04:40

## 2018-06-24 RX ADMIN — ACETAMINOPHEN 650 MG: 325 TABLET, FILM COATED ORAL at 10:06

## 2018-06-24 RX ADMIN — ASPIRIN 162 MG: 81 TABLET, CHEWABLE ORAL at 03:36

## 2018-06-24 RX ADMIN — DIAZEPAM 5 MG: 5 TABLET ORAL at 08:33

## 2018-06-24 RX ADMIN — NITROGLYCERIN 0.5 INCH: 20 OINTMENT TOPICAL at 03:37

## 2018-06-24 RX ADMIN — OXYCODONE HYDROCHLORIDE 10 MG: 10 TABLET ORAL at 08:33

## 2018-06-24 RX ADMIN — NICOTINE 1 PATCH: 14 PATCH TRANSDERMAL at 08:33

## 2018-06-24 RX ADMIN — KETOROLAC TROMETHAMINE 30 MG: 30 INJECTION, SOLUTION INTRAMUSCULAR at 21:02

## 2018-06-24 RX ADMIN — METOCLOPRAMIDE 10 MG: 5 INJECTION, SOLUTION INTRAMUSCULAR; INTRAVENOUS at 21:02

## 2018-06-24 RX ADMIN — SUMATRIPTAN 6 MG: 6 INJECTION, SOLUTION SUBCUTANEOUS at 18:58

## 2018-06-24 RX ADMIN — ENOXAPARIN SODIUM 40 MG: 100 INJECTION SUBCUTANEOUS at 08:33

## 2018-06-24 RX ADMIN — FLUTICASONE PROPIONATE AND SALMETEROL 1 PUFF: 50; 250 POWDER RESPIRATORY (INHALATION) at 08:33

## 2018-06-24 RX ADMIN — OXYCODONE HYDROCHLORIDE 10 MG: 10 TABLET ORAL at 15:13

## 2018-06-24 RX ADMIN — IOHEXOL 85 ML: 350 INJECTION, SOLUTION INTRAVENOUS at 12:35

## 2018-06-24 NOTE — ASSESSMENT & PLAN NOTE
· ACS r/o  Prior Hx of MI  Likely secondary to anxiety  · CXR- No acute pulmonary disease  · EKG- NSR, rate 76  · D-Dimer <270  · Troponin <0 02  Will trend  · Telemetry  · EKG for chest pain  · Nitro, ASA  · DERIC Score 2- can likely do outpatient stress

## 2018-06-24 NOTE — H&P
H&P- Katerin Castillo 1970, 50 y o  male MRN: [id number]    Unit/Bed#: -01 Encounter: [id number]    Primary Care Provider: Savannah Stockton MD   Date and time admitted to hospital: 6/24/2018  3:09 AM    * Chest pain   Assessment & Plan    · ACS r/o  Prior Hx of MI  Likely secondary to anxiety  · CXR- No acute pulmonary disease  · EKG- NSR, rate 76  · D-Dimer <270  · Troponin <0 02  Will trend  · Telemetry  · EKG for chest pain  · Nitro, ASA  · DERIC Score 2- can likely do outpatient stress  Hypertension   Assessment & Plan    · /89  · Not currently on any antihypertensives  Sleep apnea   Assessment & Plan    · Noncompliant with CPAP  VTE Prophylaxis: Enoxaparin (Lovenox)  / sequential compression device   Code Status:  Full code  POLST: POLST form is not discussed and not completed at this time  Discussion with family:  Discussed with patient at bedside  Anticipated Length of Stay:  Patient will be admitted on an Observation basis with an anticipated length of stay of  < 2 midnights  Justification for Hospital Stay:  Chest pain, ACS r/o  Total Time for Visit, including Counseling / Coordination of Care: 45 minutes  Greater than 50% of this total time spent on direct patient counseling and coordination of care  Chief Complaint:   Chest pain  History of Present Illness:    Katerin Castillo is a 50 y o  male, PMHx of prior MI, hypertension, NELLI, who presents with chest pain since 2:00 a m  this morning  Patient reports that last evening, he had a big fight with his family members and got into an argument with his aunt  Upon returning home, the patient states that he had severe pressure in his chest, 7/10 in severity with radiation into his left neck and shoulder  He checked his blood pressure at home and it was 409 systolic  He reports that he has been diaphoretic with palpitations as well  He additionally reports having dizziness and a mild headache  He denies any head trauma, loss of consciousness, syncopal episodes  He denies slurred speech, facial asymmetry, extremity numbness/tingling/weakness  Review of Systems:    Review of Systems   Constitutional: Positive for diaphoresis  Negative for chills and fever  Respiratory: Negative for shortness of breath  Cardiovascular: Positive for chest pain and palpitations  Negative for leg swelling  Gastrointestinal: Negative for abdominal pain, constipation, diarrhea, nausea and vomiting  Genitourinary: Negative for dysuria, frequency, hematuria and urgency  Neurological: Positive for dizziness and headaches  Negative for tremors, seizures, syncope, facial asymmetry, speech difficulty, weakness, light-headedness and numbness  All other systems reviewed and are negative        Past Medical and Surgical History:     Past Medical History:   Diagnosis Date    Allergic rhinitis     Anxiety     Arthritis     Back pain     Chronic obstructive asthma (HCC)     Chronic pain syndrome     Cluster headaches     Depression     Hypertension     Insomnia     Kidney stones     Laryngospasm     Leukocytosis     Migraine     Myocardial infarction (HCC)     Nephrolithiasis     Organic impotence     Seasonal allergies     Sleep apnea     does not use CPAP    Stroke (HCC)     TMJ (temporomandibular joint syndrome)     Wheezing     last assessed 05/19/17       Past Surgical History:   Procedure Laterality Date    BACK SURGERY      *9, [id number], nervectomy 2006, total of 10    BUCCAL MASS EXCISION N/A 3/26/2018    Procedure: BIOPSY LINGUAL TONSIL (FLOW/ STANDARD PATH)  EVAL UNDER ANESTHESIA;  Surgeon: Laura Adams MD;  [facility];  Service: ENT    COLONOSCOPY      HERNIA REPAIR      KIDNEY SURGERY      KNEE ARTHROSCOPY      LITHOTRIPSY      multiple    LUMBAR One [address] SURGERY  2015    MANDIBLE FRACTURE SURGERY      titanium plate    PELVIC SYMPHYSIS FUSION      WA ESOPHAGOGASTRODUODENOSCOPY TRANSORAL DIAGNOSTIC N/A 2/22/2018    Procedure: ESOPHAGOGASTRODUODENOSCOPY (EGD); Surgeon: Laura Zamora MD;  [facility]; Service: Gastroenterology    NE REVISE/REMOVE SPINAL NEUROSTIM/ Left 6/9/2017    Procedure: REMOVAL OF A THORACIC SCS PLACED VIA LAMINECTOMY AND REMOVAL LEFT BUTTOCK GENERATOR; IMPULSE MONITORING;  Surgeon: Nilsa Jackson MD;  [facility];  Service: Neurosurgery    NE SURG IMPLNT [address] N/A 9/8/2016    Procedure: DORSAL COLUMN STIMULATOR PLACEMENT (IMPULSE MONITORING); Surgeon: Jessy Perez MD;  [facility];  Service: Orthopedics    SACROILIAC JOINT FUSION  2015    SHOULDER SURGERY Left     2       Meds/Allergies:    Prior to Admission medications    Medication Sig Start Date End Date Taking?  Authorizing Provider   aspirin 81 MG tablet Take 1 tablet by mouth daily 8/1/17  Yes Historical Provider, MD   diazepam (VALIUM) 5 mg tablet Take 1 tablet (5 mg total) by mouth every 12 (twelve) hours as needed for anxiety 6/13/18  Yes Jacqui Buenrostro MD   fluticasone-salmeterol (ADVAIR DISKUS) 250-50 mcg/dose inhaler Inhale 1 puff daily 3/15/18  Yes Harish Em MD   oxyCODONE (ROXICODONE) 10 MG TABS Take 1 tablet (10 mg total) by mouth every 6 (six) hours as needed for moderate pain Max Daily Amount: 40 mg 6/13/18  Yes Jacqui Buenrostro MD   VENTOLIN  (90 Base) MCG/ACT inhaler INHALE 2 PUFFS BY MOUTH EVERY 6 HOURS AS NEEDED FOR WHEEZING 6/12/18  Yes Harish Em MD   omeprazole (PriLOSEC) 40 MG capsule  5/13/18 6/24/18 Yes Historical Provider, MD   fluticasone (FLONASE) 50 mcg/act nasal spray 2 sprays into each nostril daily as needed   12/9/16   Historical Provider, MD   ibuprofen (MOTRIN) 800 mg tablet Take by mouth 12/9/16   Historical Provider, MD   SUMAtriptan Succinate (IMITREX IJ) Inject as directed    Historical Provider, MD   cyclobenzaprine (FLEXERIL) 10 mg tablet Take 1 tablet (10 mg total) by mouth 3 (three) times a day as needed for muscle spasms 5/15/18 6/24/18  Lakeisha Bush MD   tadalafil (CIALIS) 5 MG tablet Take 5 mg by mouth daily as needed for erectile dysfunction  6/24/18  Historical Provider, MD     I have reviewed home medications with patient personally  Allergies: Allergies   Allergen Reactions    Other Rash     Adhesive tape       Social History:     Marital Status:    Occupation:  Unknown  Patient Pre-hospital Living Situation:  Home  Patient Pre-hospital Level of Mobility:  Independent  Patient Pre-hospital Diet Restrictions:  None  Substance Use History:   History   Alcohol Use    Yes     Comment: twice a month      History   Smoking Status    Current Every Day Smoker    Packs/day: 0 50    Years: 25 00   Smokeless Tobacco    Never Used     Comment: using patch     History   Drug Use    Frequency: 7 0 times per week    Types: Marijuana     Comment: daily       Family History:    non-contributory    Physical Exam:     Vitals:   Blood Pressure: 137/89 (06/24/18 0620)  Pulse: 63 (06/24/18 0620)  Temperature: 97 9 °F (36 6 °C) (06/24/18 0620)  Temp Source: Oral (06/24/18 0620)  Respirations: 18 (06/24/18 0620)  Height: 6' 1" (185 4 cm) (06/24/18 7490)  Weight - Scale: 86 8 kg (191 lb 5 8 oz) (06/24/18 0620)  SpO2: 98 % (06/24/18 0620)    Physical Exam   Constitutional: He is oriented to person, place, and time  He appears well-developed and well-nourished  He is cooperative  He does not appear ill  No distress  HENT:   Head: Normocephalic and atraumatic  Eyes: Conjunctivae, EOM and lids are normal  Pupils are equal, round, and reactive to light  Cardiovascular: Normal rate, regular rhythm, normal heart sounds and normal pulses  No murmur heard  Pulmonary/Chest: Effort normal and breath sounds normal  He has no wheezes  He has no rhonchi  He has no rales  Abdominal: Soft  Normal appearance and bowel sounds are normal  There is no tenderness  Musculoskeletal: Normal range of motion  Neurological: He is alert and oriented to person, place, and time  He has normal strength  He is not disoriented  No sensory deficit  Skin: Skin is warm, dry and intact  He is not diaphoretic  Psychiatric: He has a normal mood and affect  His speech is normal and behavior is normal  Cognition and memory are normal    Vitals reviewed  Additional Data:     Lab Results: I have personally reviewed pertinent reports  Results from last 7 days  Lab Units 06/24/18  0331   WBC Thousand/uL 13 38*   HEMOGLOBIN g/dL 14 6   HEMATOCRIT % 43 2   PLATELETS Thousands/uL 336   NEUTROS PCT % 63   LYMPHS PCT % 28   MONOS PCT % 7   EOS PCT % 2       Results from last 7 days  Lab Units 06/24/18  0331   SODIUM mmol/L 144   POTASSIUM mmol/L 3 8   CHLORIDE mmol/L 109*   CO2 mmol/L 23   BUN mg/dL 12   CREATININE mg/dL 1 02   CALCIUM mg/dL 8 8   TOTAL PROTEIN g/dL 7 1   BILIRUBIN TOTAL mg/dL 0 30   ALK PHOS U/L 72   ALT U/L 23   AST U/L 15   GLUCOSE RANDOM mg/dL 93       Results from last 7 days  Lab Units 06/24/18  0331   INR  0 93               Imaging: I have personally reviewed pertinent reports  XR chest 1 view portable   ED Interpretation by Tiffanie Glez MD (06/24 7434)   No acute disease read by me  EKG, Pathology, and Other Studies Reviewed on Admission:   · EKG: NSR, rate 76  Allscripts / Epic Records Reviewed: Yes     ** Please Note: This note has been constructed using a voice recognition system   **

## 2018-06-24 NOTE — PROGRESS NOTES
Patient agreed to take imitrex injection after refusing this AM wanting to try pressure points instead  Upon entering room, patient's L eyelid appeared drooped and patient reports lip numbness, both of which he states are normal symptoms for his cluster HAs  Notified Star with SLIM  Gave imitrex injection and applied oxygen to patient per his recommendation  Will continue to monitor

## 2018-06-24 NOTE — ED NOTES
Pt complaining of worsening neck and jaw pain and headache  Dr Herrera All made aware, will order medications        Donna Aguirre, KRYSTAL  06/24/18 8169

## 2018-06-24 NOTE — ED PROVIDER NOTES
History  Chief Complaint   Patient presents with    Chest Pain     Pt reports CP beginning approx 0215 this AM pt reports SOB  Pt reports mid sternal CP that feels like pressure  Pt reports pain radiates to left shoulder  Patient is a 50year old male with left sided chest pain with radiation to left shoulder and left jaw into head causing headache  No fever  No cough  (+) sob  (+) pleuritic pain  (+) stress/anxiety  (+) sweating  Has had prior MI in the past  Was last seen at [facility] AND [facility] ED on 17 for cluster headache  [facility] website checked on this patient and last Rx filled was on 18 for oxycodone for 25 day supply  States it feels like someone is "sitting" on his chest          History provided by:  Patient   used: No    Chest Pain   Associated symptoms: diaphoresis, headache and shortness of breath    Associated symptoms: no cough, no fever, no nausea and not vomiting        Prior to Admission Medications   Prescriptions Last Dose Informant Patient Reported? Taking?    SUMAtriptan Succinate (IMITREX IJ)  Self Yes Yes   Sig: Inject as directed   VENTOLIN  (90 Base) MCG/ACT inhaler   No Yes   Sig: INHALE 2 PUFFS BY MOUTH EVERY 6 HOURS AS NEEDED FOR WHEEZING   aspirin 81 MG tablet  Self Yes Yes   Sig: Take 1 tablet by mouth daily   diazepam (VALIUM) 5 mg tablet   No Yes   Sig: Take 1 tablet (5 mg total) by mouth every 12 (twelve) hours as needed for anxiety   fluticasone (FLONASE) 50 mcg/act nasal spray  Self Yes Yes   Si sprays into each nostril daily as needed     fluticasone-salmeterol (ADVAIR DISKUS) 250-50 mcg/dose inhaler  Self No Yes   Sig: Inhale 1 puff daily   ibuprofen (MOTRIN) 800 mg tablet  Self Yes Yes   Sig: Take by mouth   oxyCODONE (ROXICODONE) 10 MG TABS   No Yes   Sig: Take 1 tablet (10 mg total) by mouth every 6 (six) hours as needed for moderate pain Max Daily Amount: 40 mg      Facility-Administered Medications: None       Past Medical History: Diagnosis Date    Allergic rhinitis     Anxiety     Arthritis     Back pain     Chronic obstructive asthma (HCC)     Chronic pain syndrome     Cluster headaches     Depression     Hypertension     Insomnia     Kidney stones     Laryngospasm     Leukocytosis     Migraine     Myocardial infarction ([address] )     Nephrolithiasis     Organic impotence     Seasonal allergies     Sleep apnea     does not use CPAP    Stroke (HCC)     TMJ (temporomandibular joint syndrome)     Wheezing     last assessed 05/19/17       Past Surgical History:   Procedure Laterality Date    BACK SURGERY      *9, [id number], nervectomy 2006, total of 10    BUCCAL MASS EXCISION N/A 3/26/2018    Procedure: BIOPSY LINGUAL TONSIL (FLOW/ STANDARD PATH)  EVAL UNDER ANESTHESIA;  Surgeon: Laura Adams MD;  [facility];  Service: ENT    COLONOSCOPY      HERNIA REPAIR      KIDNEY SURGERY      KNEE ARTHROSCOPY      LITHOTRIPSY      multiple    LUMBAR [address]  2015    MANDIBLE FRACTURE SURGERY      titanium plate    PELVIC SYMPHYSIS FUSION      CO ESOPHAGOGASTRODUODENOSCOPY TRANSORAL DIAGNOSTIC N/A 2/22/2018    Procedure: ESOPHAGOGASTRODUODENOSCOPY (EGD); Surgeon: Tahira Shipman MD;  Location: [facility]; Service: Gastroenterology    CO REVISE/REMOVE SPINAL NEUROSTIM/ Left 6/9/2017    Procedure: REMOVAL OF A THORACIC SCS PLACED VIA LAMINECTOMY AND REMOVAL LEFT BUTTOCK GENERATOR; IMPULSE MONITORING;  Surgeon: Lucy Valdez MD;  Location: [facility];  Service: Neurosurgery    CO SURG IMPLNT [address] N/A 9/8/2016    Procedure: DORSAL COLUMN STIMULATOR PLACEMENT (IMPULSE MONITORING);   Surgeon: Carissa Matos MD;  [facility];  Service: Orthopedics    SACROILIAC JOINT FUSION  2015    SHOULDER SURGERY Left     2       Family History   Problem Relation Age of Onset    Diabetes Father     Obesity Father     Liver cancer Father     Heart disease Father     Diabetes Brother    Juliana Shelley Hypertension Brother     Leukemia Mother     Hypertension Mother     Cancer Family      I have reviewed and agree with the history as documented  Social History   Substance Use Topics    Smoking status: Current Every Day Smoker     Packs/day: 0 50     Years: 25 00    Smokeless tobacco: Never Used      Comment: using patch    Alcohol use No        Review of Systems   Constitutional: Positive for diaphoresis  Negative for fever  HENT:        Jaw pain     Respiratory: Positive for shortness of breath  Negative for cough  Cardiovascular: Positive for chest pain  Gastrointestinal: Negative for nausea and vomiting  Musculoskeletal: Positive for arthralgias  Neurological: Positive for headaches  Psychiatric/Behavioral: The patient is nervous/anxious  All other systems reviewed and are negative  Physical Exam  Physical Exam   Constitutional: He is oriented to person, place, and time  He appears well-developed and well-nourished  He appears distressed (moderate)  HENT:   Head: Normocephalic and atraumatic  Moist mucous membranes  Eyes: No scleral icterus  Neck: No JVD present  No tracheal deviation present  Cardiovascular: Normal rate, regular rhythm and normal heart sounds  No murmur heard  Pulmonary/Chest: Effort normal and breath sounds normal  No stridor  No respiratory distress  He has no wheezes  He has no rales  He exhibits no tenderness  Abdominal: Soft  Bowel sounds are normal  He exhibits no distension  There is no tenderness  Musculoskeletal: He exhibits no edema, tenderness (No calf tenderness) or deformity  Neurological: He is alert and oriented to person, place, and time  Skin: Skin is warm and dry  No rash noted  Psychiatric:   Anxious  Nursing note and vitals reviewed        Vital Signs  ED Triage Vitals   Temperature Pulse Respirations Blood Pressure SpO2   06/24/18 0337 06/24/18 0314 06/24/18 0314 06/24/18 0314 06/24/18 0314   98 7 °F (37 1 °C) 79 20 144/100 98 %      Temp Source Heart Rate Source Patient Position - Orthostatic VS BP Location FiO2 (%)   06/24/18 0337 06/24/18 0314 06/24/18 0314 06/24/18 0314 --   Oral Monitor Sitting Right arm       Pain Score       06/24/18 0314       8           Vitals:    06/24/18 0314 06/24/18 0323 06/24/18 0430   BP: 144/100 130/70 156/86   Pulse: 79 75 72   Patient Position - Orthostatic VS: Sitting Sitting        Visual Acuity      ED Medications  Medications   nitroglycerin (NITRO-BID) 2 % TD ointment 0 5 inch (0 5 inches Topical Given 6/24/18 0337)   aspirin chewable tablet 162 mg (162 mg Oral Given 6/24/18 0336)   morphine (PF) 4 mg/mL injection 4 mg (4 mg Intravenous Given 6/24/18 0440)   ondansetron (ZOFRAN) injection 4 mg (4 mg Intravenous Given 6/24/18 0438)       Diagnostic Studies  Results Reviewed     Procedure Component Value Units Date/Time    Lipase [[id number]]  (Normal) Collected:  06/24/18 0331    Lab Status:  Final result Specimen:  Blood from Arm, Right Updated:  06/24/18 0400     Lipase 164 u/L     CK Total with Reflex CKMB [[id number]]  (Normal) Collected:  06/24/18 0331    Lab Status:  Final result Specimen:  Blood from Arm, Right Updated:  06/24/18 0400     Total CK 85 U/L     B-type natriuretic peptide [[id number]]  (Normal) Collected:  06/24/18 0331    Lab Status:  Final result Specimen:  Blood from Arm, Right Updated:  06/24/18 0400     NT-proBNP 74 pg/mL     Comprehensive metabolic panel [[id number]]  (Abnormal) Collected:  06/24/18 0331    Lab Status:  Final result Specimen:  Blood from Arm, Right Updated:  06/24/18 0358     Sodium 144 mmol/L      Potassium 3 8 mmol/L      Chloride 109 (H) mmol/L      CO2 23 mmol/L      Anion Gap 12 mmol/L      BUN 12 mg/dL      Creatinine 1 02 mg/dL      Glucose 93 mg/dL      Calcium 8 8 mg/dL      AST 15 U/L      ALT 23 U/L      Alkaline Phosphatase 72 U/L      Total Protein 7 1 g/dL      Albumin 4 0 g/dL      Total Bilirubin 0 30 mg/dL      eGFR 86 ml/min/1 73sq m Narrative:         National Kidney Disease Education Program recommendations are as follows:  GFR calculation is accurate only with a steady state creatinine  Chronic Kidney disease less than 60 ml/min/1 73 sq  meters  Kidney failure less than 15 ml/min/1 73 sq  meters  Troponin I [[id number]]  (Normal) Collected:  06/24/18 0331    Lab Status:  Final result Specimen:  Blood from Arm, Right Updated:  06/24/18 0356     Troponin I <0 02 ng/mL     D-Dimer [[id number]]  (Normal) Collected:  06/24/18 0331    Lab Status:  Final result Specimen:  Blood from Arm, Right Updated:  06/24/18 0354     D-Dimer, Quant <270 ng/ml (FEU)     Protime-INR [[id number]]  (Normal) Collected:  06/24/18 0331    Lab Status:  Final result Specimen:  Blood from Arm, Right Updated:  06/24/18 0348     Protime 12 2 seconds      INR 0 93    APTT [[id number]]  (Normal) Collected:  06/24/18 0331    Lab Status:  Final result Specimen:  Blood from Arm, Right Updated:  06/24/18 0348     PTT 30 seconds     CBC and differential [[id number]]  (Abnormal) Collected:  06/24/18 0331    Lab Status:  Final result Specimen:  Blood from Arm, Right Updated:  06/24/18 0336     WBC 13 38 (H) Thousand/uL      RBC 4 97 Million/uL      Hemoglobin 14 6 g/dL      Hematocrit 43 2 %      MCV 87 fL      MCH 29 4 pg      MCHC 33 8 g/dL      RDW 15 4 (H) %      MPV 10 2 fL      Platelets 286 Thousands/uL      Neutrophils Relative 63 %      Lymphocytes Relative 28 %      Monocytes Relative 7 %      Eosinophils Relative 2 %      Basophils Relative 0 %      Neutrophils Absolute 8 49 (H) Thousands/µL      Lymphocytes Absolute 3 73 Thousands/µL      Monocytes Absolute 0 91 Thousand/µL      Eosinophils Absolute 0 21 Thousand/µL      Basophils Absolute 0 04 Thousands/µL                  XR chest 1 view portable   ED Interpretation by Sherwin Diez MD (06/24 0359)   No acute disease read by me                    Procedures  ECG 12 Lead Documentation  Date/Time: 6/24/2018 3:47 AM  Performed by: Mimi Paige  Authorized by: Mimi Paige     Indications / Diagnosis:  Chest pain  ECG reviewed by me, the ED Provider: yes    Patient location:  ED  Previous ECG:     Previous ECG:  Compared to current    Comparison ECG info:  8/24/17    Similarity:  Changes noted (not s  manav now)  Interpretation:     Interpretation: normal    Rate:     ECG rate:  76    ECG rate assessment: normal    Rhythm:     Rhythm: sinus rhythm    Ectopy:     Ectopy: none    QRS:     QRS axis:  Normal    QRS intervals:  Normal  Conduction:     Conduction: normal    ST segments:     ST segments:  Normal  T waves:     T waves: normal             Phone Contacts  ED Phone Contact    ED Course  ED Course as of Jun 24 0508   Sun Jun 24, 2018   0419 Patient still with pain so IV morphine ordered  4589 Labs, EKG and CXR d/w patient             HEART Risk Score      Most Recent Value   History  2 Filed at: 06/24/2018 0507   ECG  0 Filed at: 06/24/2018 0507   Age  1 Filed at: 06/24/2018 0507   Risk Factors  2 Filed at: 06/24/2018 0507   Troponin  0 Filed at: 06/24/2018 0507   Heart Score Risk Calculator   History  2 Filed at: 06/24/2018 0507   ECG  0 Filed at: 06/24/2018 0507   Age  1 Filed at: 06/24/2018 0507   Risk Factors  2 Filed at: 06/24/2018 0507   Troponin  0 Filed at: 06/24/2018 0507   HEART Score  5 Filed at: 06/24/2018 0507   HEART Score  5 Filed at: 06/24/2018 0507                    DERIC Risk Score      Most Recent Value   Age >= 72  0 Filed at: 06/24/2018 0507   Known CAD (stenosis >= 50%)  0 Filed at: 06/24/2018 0507   Recent (<=24 hrs) Service Angina  0 Filed at: 06/24/2018 0507   ST Deviation >= 0 5 mm  0 Filed at: 06/24/2018 0507   3+ CAD Risk Factors (FHx, HTN, HLP, DM, Smoker)  1 Filed at: 06/24/2018 0507   Aspirin Use Past 7 Days  1 Filed at: 06/24/2018 0507   Elevated Cardiac Markers  0 Filed at: 06/24/2018 0507   DERIC Risk Score (Calculated)  2 Filed at: 06/24/2018 8616 MDM  Number of Diagnoses or Management Options  Diagnosis management comments: DDX including but not limited to: ACS, MI, PE, PTX, pneumonia, doubt dissection, pleurisy, pericarditis, myocarditis, rhabdomyolysis, GI etiology  Doubt ICH or SAH or meningitis or tumor  Amount and/or Complexity of Data Reviewed  Clinical lab tests: ordered and reviewed  Tests in the radiology section of CPT®: ordered and reviewed  Decide to obtain previous medical records or to obtain history from someone other than the patient: yes  Review and summarize past medical records: yes  Independent visualization of images, tracings, or specimens: yes      CritCare Time    Disposition  Final diagnoses:   Chest pain     Time reflects when diagnosis was documented in both MDM as applicable and the Disposition within this note     Time User Action Codes Description Comment    6/24/2018  5:07 AM Piper [place] Add [R07 9] Chest pain       ED Disposition     ED Disposition Condition Comment    Admit  Case was discussed with FAMILIA Graff and the patient's admission status was agreed to be Admission Status: observation status to the service of Dr Matty Coates   Follow-up Information    None         Patient's Medications   Discharge Prescriptions    No medications on file     No discharge procedures on file      ED Provider  Electronically Signed by           Maryanne Pierson MD  06/24/18 6220

## 2018-06-24 NOTE — CASE MANAGEMENT
Initial Clinical Review    Admission: Date/Time/Statement: 6/24/2018  0509 OBSERVATION  AND CHANGED TO INPATIENT 6/25/2018  254 RE: 50year old male admitted with chest pain and headaches  His chest pain is being worked up with a stress test   Additionally, his headache has been refractory and requires further management by neurology    Start   Ordered   06/25/18 1844  Inpatient Admission Once     Transfer Service: General Medicine       Question Answer Comment   Admitting Physician Fili YOUNG    Level of Care Med Surg    Estimated length of stay More than 2 Midnights    Certification I certify that inpatient services are medically necessary for this patient for a duration of greater than two midnights  See H&P and MD Progress Notes for additional information about the patient's course of treatment  06/25/18 1844       ED: Date/Time/Mode of Arrival:   ED Arrival Information     Expected Arrival Acuity Means of Arrival Escorted By Service Admission Type    - 6/24/2018 02:53 Urgent Walk-In Self General Medicine Urgent    Arrival Complaint    Chest pain          Chief Complaint:   Chief Complaint   Patient presents with    Chest Pain     Pt reports CP beginning approx 0215 this AM pt reports SOB  Pt reports mid sternal CP that feels like pressure  Pt reports pain radiates to left shoulder  History of Illness: 50 y o  male, PMHx of prior MI, hypertension, NELLI, who presents with chest pain since 2:00 a m  this morning  Patient reports that last evening, he had a big fight with his family members and got into an argument with his aunt  Upon returning home, the patient states that he had severe pressure in his chest, 7/10 in severity with radiation into his left neck and shoulder  He checked his blood pressure at home and it was 464 systolic  He reports that he has been diaphoretic with palpitations as well  He additionally reports having dizziness and a mild headache    He denies any head trauma, loss of consciousness, syncopal episodes  On 6/25- persistent headache    ED Vital Signs:   ED Triage Vitals   Temperature Pulse Respirations Blood Pressure SpO2   06/24/18 0337 06/24/18 0314 06/24/18 0314 06/24/18 0314 06/24/18 0314   98 7 °F (37 1 °C) 79 20 144/100 98 %      Temp Source Heart Rate Source Patient Position - Orthostatic VS BP Location FiO2 (%)   06/24/18 0337 06/24/18 0314 06/24/18 0314 06/24/18 0314 --   Oral Monitor Sitting Right arm       Pain Score       06/24/18 0314       8        Wt Readings from Last 1 Encounters:   06/24/18 86 8 kg (191 lb 5 8 oz)       Vital Signs (abnormal): none  Heart score 5  DERIC risk score - 2    06/25/18 2000  --  73  --   161/113  --  --  None (Room air)  Standing for 3 minutes - Orthostatic VS   06/25/18 1957  --  68  --  149/84  --  --  --  Standing - Orthostatic VS   06/25/18 1956  --  66  --  145/78  --  --  --  Sitting - Orthostatic VS   06/25/18 1955  --  69  --  155/96  --  --  --  Lying - Orthostatic VS         Abnormal Labs/Diagnostic Test Results:   Cl 109  Wbc 13 38    Troponin negative x 3     6/25/2018-  Wbc 10 84  cta head and neck - No acute intracranial disease  No large vessel flow restrictive disease within the head or neck  Stress -  Stress results: There was no chest pain during stress  -  ECG conclusions: The stress ECG was negative for ischemia and normal   -  Perfusion imaging: There were no perfusion defects   -  Gated SPECT: The calculated left ventricular ejection fraction was 62 %  Left ventricular ejection fraction was within normal limits by visual estimate  There was no left ventricular regional abnormality      IMPRESSIONS: Normal study after pharmacologic vasodilation  Myocardial perfusion imaging was normal at rest and with stress  Left ventricular systolic function was normal     6/26/2018-  Glucose 185    Wbc 19 07    ED Treatment:   Medication Administration from 06/24/2018 0253 to 06/24/2018 9817       Date/Time Order Dose Route Action Comments     06/24/2018 0337 nitroglycerin (NITRO-BID) 2 % TD ointment 0 5 inch 0 5 inch Topical Given      06/24/2018 0336 aspirin chewable tablet 162 mg 162 mg Oral Given      06/24/2018 0440 morphine (PF) 4 mg/mL injection 4 mg 4 mg Intravenous Given      06/24/2018 0438 ondansetron (ZOFRAN) injection 4 mg 4 mg Intravenous Given           Past Medical/Surgical History:    Active Ambulatory Problems     Diagnosis Date Noted    Back pain 05/27/2016    Chronic pain syndrome     Status post insertion of spinal cord stimulator 09/08/2016    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    Dysphagia 02/12/2018    Cluster headache, chronic 02/13/2018    Allergic rhinitis 05/22/2015    Abnormal head CT 08/03/2017    Blood pressure elevated without history of HTN 01/09/2017    Chronic right SI joint pain 09/23/2015    Depression with anxiety 09/05/2013    Dyshidrotic dermatitis 06/12/2014    Erectile dysfunction of non-organic origin 09/05/2013    Moderate persistent asthma without complication 79/90/5316    Post laminectomy syndrome 03/22/2017    TMJ syndrome 02/01/2016    Dental abscess 02/28/2018    Screening for STD (sexually transmitted disease) 02/28/2018    Lingual tonsil hypertrophy 03/02/2018     Resolved Ambulatory Problems     Diagnosis Date Noted    Abdominal pain 05/27/2016    Kidney stone 05/27/2016    [facility] 05/27/2016     Past Medical History:   Diagnosis Date    Allergic rhinitis     Anxiety     Arthritis     Back pain     Chronic obstructive asthma (HCC)     Chronic pain syndrome     Cluster headaches     Depression     Hypertension     Insomnia     Kidney stones     Laryngospasm     Leukocytosis     Migraine     Myocardial infarction (HCC)     Nephrolithiasis     Organic impotence     Seasonal allergies     Sleep apnea     Stroke (HCC)     TMJ (temporomandibular joint syndrome)     Wheezing Admitting Diagnosis: Chest pain [R07 9]    Age/Sex: 50 y o  male    Assessment/Plan:   Chest pain   Assessment & Plan     · ACS r/o  Prior Hx of MI  Likely secondary to anxiety  · CXR- No acute pulmonary disease  · EKG- NSR, rate 76  · D-Dimer <270  · Troponin <0 02  Will trend  · Telemetry  · EKG for chest pain  · Nitro, ASA  · DERIC Score 2- can likely do outpatient stress           Hypertension   Assessment & Plan     · /89  · Not currently on any antihypertensives           Sleep apnea   Assessment & Plan     · Noncompliant with CPAP  On 6/25-   Headache   Assessment & Plan     Initially diagnosed with cluster headache  On imitrex for this     Over the last months has had associated neurological symptoms       I am considering the possibility that he may be suffering from migraines, and that his additional "chest pain" and jaw pain on admission may be related to his headaches, which may be actual migraines       His symptoms have not abated with imitrex, as they usually do, and he is also reporting transient numbness on both sides of his face and both UEs       I have consulted Neurology as I am concerned about his persistent headache and am wondering if this is in fact a migraine with aura       I did discuss with David Ewing from Neurology and they are at this time recommending IV steroids to attempt to abort headache         Admission Orders: 6/24/2018  0509 OBSERVATION  AND CHANGED TO INPATIENT 6/25/2018 1844   Scheduled Meds:   Current Facility-Administered Medications:  [START ON 6/25/2018] aspirin 81 mg Oral Daily   enoxaparin 40 mg Subcutaneous Daily   fluticasone 2 spray Nasal Daily PRN   fluticasone-salmeterol 1 puff Inhalation Daily   nicotine 1 patch Transdermal Daily     toradol 30 mg iv q 12h started 6/24/2018  @ 2100  + magnesium 2 gms iv daily started 6/25  Solumedrol 1 gm iv given 6/25 @ 1637  reglan 10 mg iv q 8h started 6/24/2018  2200  Continuous Infusions:  none  PRN Meds:    Diazepam - used x 1    oxyCODONE - used x 2 (on 6/25- 0224; 1927)  antivert 25 mg po - used x 1  Oxycodone 10 mg - used x 4 on 6/25    scds  Telemetry  Serial troponin

## 2018-06-25 ENCOUNTER — APPOINTMENT (OUTPATIENT)
Dept: NUCLEAR MEDICINE | Facility: HOSPITAL | Age: 48
DRG: 054 | End: 2018-06-25
Payer: COMMERCIAL

## 2018-06-25 ENCOUNTER — APPOINTMENT (OUTPATIENT)
Dept: NON INVASIVE DIAGNOSTICS | Facility: HOSPITAL | Age: 48
DRG: 054 | End: 2018-06-25
Payer: COMMERCIAL

## 2018-06-25 PROBLEM — R51.9 HEADACHE: Status: ACTIVE | Noted: 2018-06-25

## 2018-06-25 LAB
ANION GAP SERPL CALCULATED.3IONS-SCNC: 10 MMOL/L (ref 4–13)
BASOPHILS # BLD AUTO: 0.03 THOUSANDS/ΜL (ref 0–0.1)
BASOPHILS NFR BLD AUTO: 0 % (ref 0–1)
BUN SERPL-MCNC: 15 MG/DL (ref 5–25)
CALCIUM SERPL-MCNC: 8.8 MG/DL (ref 8.3–10.1)
CHLORIDE SERPL-SCNC: 108 MMOL/L (ref 100–108)
CO2 SERPL-SCNC: 25 MMOL/L (ref 21–32)
CREAT SERPL-MCNC: 1.01 MG/DL (ref 0.6–1.3)
EOSINOPHIL # BLD AUTO: 0.45 THOUSAND/ΜL (ref 0–0.61)
EOSINOPHIL NFR BLD AUTO: 4 % (ref 0–6)
ERYTHROCYTE [DISTWIDTH] IN BLOOD BY AUTOMATED COUNT: 15.7 % (ref 11.6–15.1)
GFR SERPL CREATININE-BSD FRML MDRD: 87 ML/MIN/1.73SQ M
GLUCOSE P FAST SERPL-MCNC: 97 MG/DL (ref 65–99)
GLUCOSE SERPL-MCNC: 97 MG/DL (ref 65–140)
HCT VFR BLD AUTO: 44.1 % (ref 36.5–49.3)
HGB BLD-MCNC: 14.4 G/DL (ref 12–17)
LYMPHOCYTES # BLD AUTO: 2.53 THOUSANDS/ΜL (ref 0.6–4.47)
LYMPHOCYTES NFR BLD AUTO: 23 % (ref 14–44)
MCH RBC QN AUTO: 29.2 PG (ref 26.8–34.3)
MCHC RBC AUTO-ENTMCNC: 32.7 G/DL (ref 31.4–37.4)
MCV RBC AUTO: 90 FL (ref 82–98)
MONOCYTES # BLD AUTO: 0.81 THOUSAND/ΜL (ref 0.17–1.22)
MONOCYTES NFR BLD AUTO: 8 % (ref 4–12)
NEUTROPHILS # BLD AUTO: 7.02 THOUSANDS/ΜL (ref 1.85–7.62)
NEUTS SEG NFR BLD AUTO: 65 % (ref 43–75)
PLATELET # BLD AUTO: 302 THOUSANDS/UL (ref 149–390)
PMV BLD AUTO: 10.8 FL (ref 8.9–12.7)
POTASSIUM SERPL-SCNC: 4.4 MMOL/L (ref 3.5–5.3)
RBC # BLD AUTO: 4.93 MILLION/UL (ref 3.88–5.62)
SODIUM SERPL-SCNC: 143 MMOL/L (ref 136–145)
WBC # BLD AUTO: 10.84 THOUSAND/UL (ref 4.31–10.16)

## 2018-06-25 PROCEDURE — A9502 TC99M TETROFOSMIN: HCPCS

## 2018-06-25 PROCEDURE — 93017 CV STRESS TEST TRACING ONLY: CPT

## 2018-06-25 PROCEDURE — 99255 IP/OBS CONSLTJ NEW/EST HI 80: CPT | Performed by: PSYCHIATRY & NEUROLOGY

## 2018-06-25 PROCEDURE — 93016 CV STRESS TEST SUPVJ ONLY: CPT | Performed by: INTERNAL MEDICINE

## 2018-06-25 PROCEDURE — 78452 HT MUSCLE IMAGE SPECT MULT: CPT

## 2018-06-25 PROCEDURE — 99232 SBSQ HOSP IP/OBS MODERATE 35: CPT | Performed by: INTERNAL MEDICINE

## 2018-06-25 PROCEDURE — 85025 COMPLETE CBC W/AUTO DIFF WBC: CPT | Performed by: PHYSICIAN ASSISTANT

## 2018-06-25 PROCEDURE — 93018 CV STRESS TEST I&R ONLY: CPT | Performed by: INTERNAL MEDICINE

## 2018-06-25 PROCEDURE — 80048 BASIC METABOLIC PNL TOTAL CA: CPT | Performed by: PHYSICIAN ASSISTANT

## 2018-06-25 RX ORDER — MECLIZINE HYDROCHLORIDE 25 MG/1
25 TABLET ORAL EVERY 8 HOURS PRN
Status: DISCONTINUED | OUTPATIENT
Start: 2018-06-25 | End: 2018-06-26 | Stop reason: HOSPADM

## 2018-06-25 RX ORDER — MAGNESIUM SULFATE HEPTAHYDRATE 40 MG/ML
2 INJECTION, SOLUTION INTRAVENOUS DAILY
Status: DISCONTINUED | OUTPATIENT
Start: 2018-06-25 | End: 2018-06-26 | Stop reason: HOSPADM

## 2018-06-25 RX ADMIN — DIPHENHYDRAMINE HYDROCHLORIDE 25 MG: 50 INJECTION, SOLUTION INTRAMUSCULAR; INTRAVENOUS at 02:24

## 2018-06-25 RX ADMIN — OXYCODONE HYDROCHLORIDE 10 MG: 10 TABLET ORAL at 23:05

## 2018-06-25 RX ADMIN — MAGNESIUM SULFATE HEPTAHYDRATE 2 G: 40 INJECTION, SOLUTION INTRAVENOUS at 16:29

## 2018-06-25 RX ADMIN — OXYCODONE HYDROCHLORIDE 10 MG: 10 TABLET ORAL at 02:25

## 2018-06-25 RX ADMIN — METOCLOPRAMIDE 10 MG: 5 INJECTION, SOLUTION INTRAMUSCULAR; INTRAVENOUS at 05:39

## 2018-06-25 RX ADMIN — KETOROLAC TROMETHAMINE 30 MG: 30 INJECTION, SOLUTION INTRAMUSCULAR at 11:00

## 2018-06-25 RX ADMIN — FLUTICASONE PROPIONATE AND SALMETEROL 1 PUFF: 50; 250 POWDER RESPIRATORY (INHALATION) at 08:46

## 2018-06-25 RX ADMIN — MECLIZINE HYDROCHLORIDE 25 MG: 25 TABLET ORAL at 21:40

## 2018-06-25 RX ADMIN — REGADENOSON 0.4 MG: 0.08 INJECTION, SOLUTION INTRAVENOUS at 15:06

## 2018-06-25 RX ADMIN — SODIUM CHLORIDE 1000 MG: 0.9 INJECTION, SOLUTION INTRAVENOUS at 16:37

## 2018-06-25 RX ADMIN — ASPIRIN 81 MG: 81 TABLET, CHEWABLE ORAL at 08:44

## 2018-06-25 RX ADMIN — NICOTINE 1 PATCH: 14 PATCH TRANSDERMAL at 08:45

## 2018-06-25 RX ADMIN — METOCLOPRAMIDE 10 MG: 5 INJECTION, SOLUTION INTRAMUSCULAR; INTRAVENOUS at 21:40

## 2018-06-25 RX ADMIN — METOCLOPRAMIDE 10 MG: 5 INJECTION, SOLUTION INTRAMUSCULAR; INTRAVENOUS at 16:29

## 2018-06-25 RX ADMIN — ENOXAPARIN SODIUM 40 MG: 100 INJECTION SUBCUTANEOUS at 08:45

## 2018-06-25 RX ADMIN — OXYCODONE HYDROCHLORIDE 10 MG: 10 TABLET ORAL at 08:45

## 2018-06-25 RX ADMIN — KETOROLAC TROMETHAMINE 30 MG: 30 INJECTION, SOLUTION INTRAMUSCULAR at 21:40

## 2018-06-25 RX ADMIN — DIPHENHYDRAMINE HYDROCHLORIDE 25 MG: 50 INJECTION, SOLUTION INTRAMUSCULAR; INTRAVENOUS at 19:27

## 2018-06-25 RX ADMIN — NICOTINE 1 PATCH: 14 PATCH TRANSDERMAL at 10:59

## 2018-06-25 RX ADMIN — OXYCODONE HYDROCHLORIDE 10 MG: 10 TABLET ORAL at 16:28

## 2018-06-25 NOTE — CASE MANAGEMENT
Thank you,  [facility] in the [facility] by Niles Herrmann for 2017  Network Utilization Review Department  Phone: [phone]; Fax [phone]  ATTENTION: The Network Utilization Review Department is now centralized for our 7 Facilities  Make a note that we have a new phone and fax numbers for our Department  Please call with any questions or concerns to [phone] and carefully follow the prompts so that you are directed to the right person  All voicemails are confidential  Fax any determinations, approvals, denials, and requests for initial or continue stay review clinical to [phone]  Due to HIGH CALL volume, it would be easier if you could please send faxed requests to expedite your requests and in part, help us provide discharge notifications faster    Continued Stay Review    Date: 06/25/2018 ADMISSION OBSERVATION 06/24/2018 0508    Vital Signs: /61 (BP Location: Left arm)   Pulse (!) 48   Temp 98 1 °F (36 7 °C)   Resp 18   Ht 6' 1" (1 854 m)   Wt 86 8 kg (191 lb 5 8 oz)   SpO2 96%   BMI 25 25 kg/m²     Medications:   Scheduled Meds:   Current Facility-Administered Medications:  acetaminophen 650 mg Oral Q6H PRN   albuterol 2 puff Inhalation Q6H PRN   aspirin 81 mg Oral Daily   diazepam 5 mg Oral Q12H PRN   diphenhydrAMINE 25 mg Intravenous Q8H PRN   enoxaparin 40 mg Subcutaneous Daily   fluticasone 2 spray Nasal Daily PRN   fluticasone-salmeterol 1 puff Inhalation Daily   ketorolac 30 mg Intravenous Q12H MERRILL   metoclopramide 10 mg Intravenous Q8H [address]   nicotine 1 patch Transdermal Daily   nitroglycerin 0 4 mg Sublingual Q5 Min PRN   ondansetron 4 mg Intravenous Q6H PRN   oxyCODONE 10 mg Oral Q6H PRN     Continuous Infusions:    PRN Meds:   acetaminophen    albuterol    diazepam    diphenhydrAMINE    fluticasone    nitroglycerin    ondansetron    oxyCODONE    Abnormal Labs/Diagnostic Results: 10/24/2018 WBC 10 84    Age/Sex: 47 y o  male     Assessment/Plan: Neurology Consult:  Assessment:  Christina Martino is a 50 y o  male with a history of MI, HTN, NELLI noncompliant with CPAP, TMJ s/p jaw surgery with metal implant, cluster headaches, insomnia, chronic back pain pain s/p back surgery and s/p removal of spinal stimulator on chronic narcotics, depression, anxiety and tobacco use who presented to the ED at [facility] on 06/24/2018 at 0300 due to complaints of shortness of breath and chest pain that radiated to the left shoulder and left jaw resulting in a headache  His headache has persisted  He received Imitrex last evening with little effect  CTA head/neck completed, though read is pending      Plan:  Headache with history of cluster headaches  Patient's current symptoms appear to be different than his typical presentation of a cluster headache  Additionally, the patient's head/face/neck dull discomfort has persisted for the past roughly 48 hr despite his Imitrex which is atypical for him  Additionally, though he admits to some minor photo and phonophobia he does not appear overly bothered by it and he denies nausea  Although his symptoms do not classically fit into cluster or migraine headache, it is most likely that the etiology of his head/face/neck pain are secondary to a primary headache, possibly combined with some anxiety/stress  We did discuss the patient's headaches with him at length  At this time recommendations are below:   Will order SoluMedrol 1g IVx 1, followed by 500mg q12 hrs prn x 4 doses  IV Mg 2g/day x 3 days  Imitrex prn - received last evening  Ketorolac 30mg x 2  Reglan 10mg IV q 8hrs x 2  Zofran prn - none used  Benadryl 25mg q 8hrs x1  Patient on chronic oxycodone for back pain  CTA head/neck final read pending, no gross acute abnormalities appreciated  With regards to the patient's chronic diagnosis of cluster headaches, he did disclose that when they come the cycle seems to be lasting longer and longer  As a result we did recommend consideration of verapamil as a preventative medication  The patient states that he was on this before however I suspect that he was given low-doses  At the patient's follow-up outpatient neurology visit this can be further discussed  The patient did explain that he is resistant to being on any sort of daily medication  He does state that he feels his medical marijuana has improved all of his symptoms and would prefer to stay on this instead of any other medications  With regards to his Imitrex, we did discuss the associated risks that exist in a patient with a history of cardiovascular disease  Patient states that he understands this and that in his past cardiac caths everything has   "looked good " He also states that it is the only medication that has worked for him  The patient should follow-up with outpatient neurology at his next previously scheduled appointment with Dr Francisco Minor on 07/26/2018      Chest pain with radiating pain to the left arm and jaw  History of MI  Patient with known anxiety  Patient noted to become diaphoretic during my exam  EKG NSR  Negative troponins x3  Patient unable to complete exercise stress test   Nuclear stress test pending  Telemetry  As per primary team     Chronic back pain  S/p multiple surgeries  Patient on disability  S/p removal of spinal stimulator  On chronic Oxycodone 10mg p r n    Patient states that his narcotic use as decreased since initiation of medical marijuana      Discharge Plan: TBD

## 2018-06-25 NOTE — TELEPHONE ENCOUNTER
Pt is currently hospitalized and had to cancel appt for tomorrow 6/26/18 with Dr Graciela Johnston  He said he just had to come in for a urine test and is wondering if  could call [facility] where he is and have them take it there so he can get his medication

## 2018-06-25 NOTE — ASSESSMENT & PLAN NOTE
Initially diagnosed with cluster headache  On imitrex for this  Over the last months has had associated neurological symptoms  I am considering the possibility that he may be suffering from migraines, and that his additional "chest pain" and jaw pain on admission may be related to his headaches, which may be actual migraines  His symptoms have not abated with imitrex, as they usually do, and he is also reporting transient numbness on both sides of his face and both UEs  I have consulted Neurology as I am concerned about his persistent headache and am wondering if this is in fact a migraine with aura  I did discuss with Michael Cleaning from Neurology and they are at this time recommending IV steroids to attempt to abort headache

## 2018-06-25 NOTE — ASSESSMENT & PLAN NOTE
Initial cardiac work up negative  Checking rx stress test today as patient was unable to tolerate exercise stress

## 2018-06-25 NOTE — PHYSICIAN ADVISOR
Current patient class: Observation  The patient is currently on Hospital Day: 2 at [address] [facility]      This patient was originally admitted to the hospital under observation status  After admission the patient was reevaluated and determined to require further hospitalization  The patient is now documented to require at least a 2nd midnight in the hospital  As such the patient is now expected to satisfy the 2 midnight benchmark and is therefore eligible for inpatient admission  After review of the relevant documentation, labs, vital signs and test results, the patient is appropriate for INPATIENT ADMISSION  Admission to the hospital as an inpatient is a complex decision making process which requires the practitioner to consider the patients presenting complaint, history and physical examination and all relevant testing  With this in mind, in this case, the patient was deemed appropriate for INPATIENT ADMISSION  After review of the documentation and testing available at the time of the admission I concur with this clinical determination of medical necessity  50year old male admitted with chest pain and headaches  His chest pain is being worked up with a stress test   Additionally, his headache has been refractory and requires further management by neurology  Rationale is as follows: The patient is a 50 yrs Male who presented to the ED at 6/24/2018  3:09 AM with a chief complaint of Chest Pain (Pt reports CP beginning approx 0215 this AM pt reports SOB  Pt reports mid sternal CP that feels like pressure   Pt reports pain radiates to left shoulder  )    The patients vitals on arrival were ED Triage Vitals   Temperature Pulse Respirations Blood Pressure SpO2   06/24/18 0337 06/24/18 0314 06/24/18 0314 06/24/18 0314 06/24/18 0314   98 7 °F (37 1 °C) 79 20 144/100 98 %      Temp Source Heart Rate Source Patient Position - Orthostatic VS BP Location FiO2 (%)   06/24/18 4864 06/24/18 0314 06/24/18 0314 06/24/18 0314 --   Oral Monitor Sitting Right arm       Pain Score       06/24/18 0314       8           Past Medical History:   Diagnosis Date    Allergic rhinitis     Anxiety     Arthritis     Back pain     Chronic obstructive asthma (HCC)     Chronic pain syndrome     Cluster headaches     Depression     Hypertension     Insomnia     Kidney stones     Laryngospasm     Leukocytosis     Migraine     Myocardial infarction ([facility] [address] )     Nephrolithiasis     Organic impotence     Seasonal allergies     Sleep apnea     does not use CPAP    Stroke ([facility] [address] )     TMJ (temporomandibular joint syndrome)     Wheezing     last assessed 05/19/17     Past Surgical History:   Procedure Laterality Date    BACK SURGERY      *9, [id number], nervectomy 2006, total of 10    BUCCAL MASS EXCISION N/A 3/26/2018    Procedure: BIOPSY LINGUAL TONSIL (FLOW/ STANDARD PATH)  EVAL UNDER ANESTHESIA;  Surgeon: Layla Barragan MD;  [facility];  Service: ENT    COLONOSCOPY      HERNIA REPAIR      KIDNEY SURGERY      KNEE ARTHROSCOPY      LITHOTRIPSY      multiple    LUMBAR [address]  2015    MANDIBLE FRACTURE SURGERY      titanium plate    PELVIC SYMPHYSIS FUSION      UT ESOPHAGOGASTRODUODENOSCOPY TRANSORAL DIAGNOSTIC N/A 2/22/2018    Procedure: ESOPHAGOGASTRODUODENOSCOPY (EGD); Surgeon: Laura Zamora MD;  [facility]: [facility]; Service: Gastroenterology    UT REVISE/REMOVE SPINAL NEUROSTIM/ Left 6/9/2017    Procedure: REMOVAL OF A THORACIC SCS PLACED VIA LAMINECTOMY AND REMOVAL LEFT BUTTOCK GENERATOR; IMPULSE MONITORING;  Surgeon: Nilsa Jackson MD;  [facility]: [facility];  Service: Neurosurgery    UT SURG IMPLNT [address] N/A 9/8/2016    Procedure: DORSAL COLUMN STIMULATOR PLACEMENT (IMPULSE MONITORING);   Surgeon: Jessy Perez MD;  [facility];  Service: Orthopedics    SACROILIAC JOINT FUSION  2015    SHOULDER SURGERY Left     2       The patient was admitted to the hospital at N/A on N/A for the following diagnosis:  Chest pain [R07 9]    Consults have been placed to:   IP CONSULT TO NEUROLOGY    Vitals:    06/24/18 1516 06/24/18 2230 06/25/18 0613 06/25/18 1500   BP: 130/82 143/79 107/61 141/85   BP Location: Right arm Right arm Left arm Right arm   Pulse: 56 (!) 44 (!) 48 64   Resp: 18 18 18 18   Temp: 98 °F (36 7 °C) 98 2 °F (36 8 °C) 98 1 °F (36 7 °C) 98 3 °F (36 8 °C)   TempSrc: Oral Oral  Oral   SpO2: 97% 98% 96% 100%   Weight:       Height:           Most recent labs:    Recent Labs      06/24/18   0331   06/24/18   1022  06/25/18   0535   WBC  13 38*   --    --   10 84*   HGB  14 6   --    --   14 4   HCT  43 2   --    --   44 1   PLT  336   --   300  302   K  3 8   --    --   4 4   NA  144   --    --   143   CALCIUM  8 8   --    --   8 8   BUN  12   --    --   15   CREATININE  1 02   --    --   1 01   LIPASE  164   --    --    --    INR  0 93   --    --    --    TROPONINI  <0 02   < >  <0 02   --    CKTOTAL  85   --    --    --    AST  15   --    --    --    ALT  23   --    --    --    ALKPHOS  72   --    --    --    BILITOT  0 30   --    --    --     < > = values in this interval not displayed         Scheduled Meds:  Current Facility-Administered Medications:  acetaminophen 650 mg Oral Q6H PRN Becky Etienne PA-C   albuterol 2 puff Inhalation Q6H PRN Becky Etienne PA-C   aspirin 81 mg Oral Daily Becky Etienne PA-C   diazepam 5 mg Oral Q12H PRN Becky Etienne PA-C   diphenhydrAMINE 25 mg Intravenous Q8H PRN US Michelet PA-C   enoxaparin 40 mg Subcutaneous Daily Becky Etienne PA-C   fluticasone 2 spray Nasal Daily PRN Becky Etienne PA-C   fluticasone-salmeterol 1 puff Inhalation Daily Becky Etienne PA-C   ketorolac 30 mg Intravenous Q12H [address] Nevaeh Harrington PA-C   magnesium sulfate 2 g Intravenous Daily Nuzhat evan [place]   [START ON 6/26/2018] methylPREDNISolone sodium succinate 500 mg Intravenous Q12H PRN Shani Yanez PA-C   metoclopramide 10 mg Intravenous [facility] Ananya Humphries PA-C   nicotine 1 patch Transdermal Daily Loni Reina PA-C   nitroglycerin 0 4 mg Sublingual Q5 Min PRN Loni Reina PA-C   ondansetron 4 mg Intravenous Q6H PRN Loni Reina PA-C   oxyCODONE 10 mg Oral Q6H PRN Loni Reina PA-C     Continuous Infusions:   PRN Meds:   acetaminophen    albuterol    diazepam    diphenhydrAMINE    fluticasone    [START ON 6/26/2018] methylPREDNISolone sodium succinate    nitroglycerin    ondansetron    oxyCODONE

## 2018-06-25 NOTE — CONSULTS
Consultation - Neurology   Dangelo Talley 50 y o  male MRN: [id number]  Unit/Bed#: -01 Encounter: [id number]      Assessment/Plan   Assessment:  Dangelo Talley is a 50 y o  male with a history of MI, HTN, NELLI noncompliant with CPAP, TMJ s/p jaw surgery with metal implant, cluster headaches, insomnia, chronic back pain pain s/p back surgery and s/p removal of spinal stimulator on chronic narcotics, depression, anxiety and tobacco use who presented to the ED at [facility] on 06/24/2018 at 0300 due to complaints of shortness of breath and chest pain that radiated to the left shoulder and left jaw resulting in a headache  His headache has persisted  He received Imitrex last evening with little effect  CTA head/neck completed, though read is pending  Plan:  Headache with history of cluster headaches  Patient's current symptoms appear to be different than his typical presentation of a cluster headache  Additionally, the patient's head/face/neck dull discomfort has persisted for the past roughly 48 hr despite his Imitrex which is atypical for him  Additionally, though he admits to some minor photo and phonophobia he does not appear overly bothered by it and he denies nausea  Although his symptoms do not classically fit into cluster or migraine headache, it is most likely that the etiology of his head/face/neck pain are secondary to a primary headache, possibly combined with some anxiety/stress  We did discuss the patient's headaches with him at length  At this time recommendations are below:   Will order SoluMedrol 1g IVx 1, followed by 500mg q12 hrs prn x 4 doses  IV Mg 2g/day x 3 days  Imitrex prn - received last evening  Ketorolac 30mg x 2  Reglan 10mg IV q 8hrs x 2  Zofran prn - none used  Benadryl 25mg q 8hrs x1  Patient on chronic oxycodone for back pain  CTA head/neck final read pending, no gross acute abnormalities appreciated  With regards to the patient's chronic diagnosis of cluster headaches, he did disclose that when they come the cycle seems to be lasting longer and longer  As a result we did recommend consideration of verapamil as a preventative medication  The patient states that he was on this before however I suspect that he was given low-doses  At the patient's follow-up outpatient neurology visit this can be further discussed  The patient did explain that he is resistant to being on any sort of daily medication  He does state that he feels his medical marijuana has improved all of his symptoms and would prefer to stay on this instead of any other medications  With regards to his Imitrex, we did discuss the associated risks that exist in a patient with a history of cardiovascular disease  Patient states that he understands this and that in his past cardiac caths everything has   "looked good " He also states that it is the only medication that has worked for him  The patient should follow-up with outpatient neurology at his next previously scheduled appointment with Dr Luis Dailey on 07/26/2018  Chest pain with radiating pain to the left arm and jaw  History of MI  Patient with known anxiety  Patient noted to become diaphoretic during my exam  EKG NSR  Negative troponins x3  Patient unable to complete exercise stress test   Nuclear stress test pending  Telemetry  As per primary team    Chronic back pain  S/p multiple surgeries  Patient on disability  S/p removal of spinal stimulator  On chronic Oxycodone 10mg p r n  Patient states that his narcotic use as decreased since initiation of medical marijuana        History of Present Illness     Reason for Consult / Principal Problem: Headache    HPI: Dangelo Talley is a 50 y o  male with a history of MI (unclear if this is accurate), HTN, NELLI noncompliant with CPAP, TMJ s/p jaw surgery with metal implant, cluster headaches, insomnia, chronic back pain pain s/p back surgery and s/p removal of spinal stimulator on chronic narcotics, depression, anxiety and tobacco use who presented to the ED at [facility] on 06/24/2018 at 0300 due to complaints of shortness of breath and chest pain that radiated to the left shoulder and left jaw resulting in a headache  Patient apparently had a big fight with family members evening before following which she developed these symptoms  His BP apparently at home was 449 systolic  He reported diaphoresis and palpitations as well as dizziness  Vital signs stable since admission with first recorded BP of 144/100  EKG NSR according to ED note  Troponins negative x3  ProBNP 74   CK 85  Labs revealed a leukocytosis of 13 now down to 10  Since admission patient has complained of worsening neck and jaw pain, as well as worsening headache  Patient typically takes Imitrex for his headaches  According to nursing notes the patient initially refused Imitrex injection stating he wanted to try pressure points instead however he eventually agreed to take this  Nursing notes also noted left ptosis and subjective reports of lip numbness which patient reported at that time  Following this is when he received his Imitrex injection  CTA head and neck was ordered  Final read is pending  Exercise stress test was attempted however patient was unable to complete this  Patient is scheduled to return for nuclear stress test shortly  The patient was previously seen in the ED in August of 2017 and treated for a cluster headache  The patient does the last time he had cluster headaches  He stated that he ended up being in a cycle of these headaches which lasted approximately 4 months long  He has previously been seen at [facility] outpatient Neurology on 02/13/2018 as a follow-up appointment  He had been seen initially secondary to cluster headaches and to rule out seizures    The patient was diagnosed with two episodes of vasovagal syncope (June and July of 2017) and EEG was noted to be normal  Aside from a history of concussions, patient has no additional seizure risk factors  Patient has a longstanding history of unilateral headaches with associated ipsilateral ptosis, rhinorrhea and ipsilateral lacrimation occasionally associated with facial pain as well  Headache pain is located retro-orbital and temporal and is typically described as severe like a knife being stabbed into his head  The patient's headaches began in his teenage years  He has previously tried multiple medications with Imitrex being the most effective  A trial of oxygen in the past was not noted to help his headaches  The patient last underwent a brain MRI in 08/2017 which was stable at that time though noted a subcentimeter nonspecific focus of high signal adjacent to the right frontal horn, findings which has have been present since at least 2012 according to notes  With regards to his headaches, the patient was previously offered the option of starting a preventative medication though he deferred  Today, upon my arrival, the patient is seated upright in bed with multiple family members at bedside  Currently he rates his head/face/neck discomfort and 6-7/10  He states that it is improved from yesterday where he rated it 8/10  Currently he describes his left-sided head/face pain as a steady aching throb again different than his typical description of his cluster headache  The patient states that the symptoms are very unusual for him  At 1st he thought he may be developing 1 of his cluster headaches however his symptoms have not peaked the weighted typically do  Additionally, he did not get relief after his Imitrex as he typically does  He states when taking his Imitrex with cluster headaches his symptoms typically resolve in 2-5 minutes which did not occur this time  The patient states he has gotten ketorolac on 2 occasions and that has helped somewhat    He states that he is not sure if his pain is radiating down from his head or if his pain is radiating up from his chest, axilla and neck/jaw pain  Additional symptoms include minor photo and phonophobia, left eye blurriness, decreased hearing though this has improved since yesterday, transient left sided lip tingling/numbness, occasional dizziness with standing, and subjective left upper extremity weakness  The patient denies nausea or cervicalgia  The patient also admits to persistent left chest pain radiating into the axilla and left upper extremity as well as the above-mentioned neck and face/head pain  Of note, during my conversation with the patient, he was noted to become acutely diaphoretic  He states that this has been occurring on and off over the last 24-48 hours  During this event symptoms of headache or chest pain did not seem to worsen  Inpatient consult to Neurology  Consult performed by: Oanh Campbell ordered by: Aguila Taylor        Review of Systems   Constitutional: Positive for diaphoresis  HENT: Positive for dental problem (2 teeth on the right recently removed) and hearing loss (Improved now, but yesterday felt like he was in a tunnel and couldn't hear right especially on the left)  Negative for ear discharge, ear pain, facial swelling, rhinorrhea, sinus pain and sinus pressure  Feels like he can taste anything   Eyes: Positive for photophobia (minimal) and visual disturbance (left eye blurriness - stated that both eyes were blurry yesterday)  Negative for pain, discharge and redness  Respiratory: Positive for chest tightness and shortness of breath  Negative for cough, wheezing and stridor  Cardiovascular: Positive for chest pain  Gastrointestinal: Negative  Endocrine: Negative  Genitourinary: Negative for difficulty urinating and dysuria  Musculoskeletal: Positive for back pain (chronic)  Face and left neck ache   Skin: Negative      Neurological: Positive for dizziness (occasionally when transitioning from sit to stand), syncope (history of fainting x 2 in 2017), weakness (L arm) and headaches (Cluster)  Psychiatric/Behavioral: Negative  Historical Information   Past Medical History:   Diagnosis Date    Allergic rhinitis     Anxiety     Arthritis     Back pain     Chronic obstructive asthma (HCC)     Chronic pain syndrome     Cluster headaches     Depression     Hypertension     Insomnia     Kidney stones     Laryngospasm     Leukocytosis     Migraine     Myocardial infarction ([facility] [address] )     Nephrolithiasis     Organic impotence     Seasonal allergies     Sleep apnea     does not use CPAP    Stroke ([facility] [address] [facility]     TMJ (temporomandibular joint syndrome)     Wheezing     last assessed 05/19/17     Past Surgical History:   Procedure Laterality Date    BACK SURGERY      *9, 3550-7650, nervectomy 2006, total of 10    BUCCAL MASS EXCISION N/A 3/26/2018    Procedure: BIOPSY LINGUAL TONSIL (FLOW/ STANDARD PATH)  EVAL UNDER ANESTHESIA;  Surgeon: Lyla Ramirez MD;  [facility];  Service: ENT    COLONOSCOPY      HERNIA REPAIR      KIDNEY SURGERY      KNEE ARTHROSCOPY      LITHOTRIPSY      multiple    LUMBAR [address]  2015    MANDIBLE FRACTURE SURGERY      titanium plate    PELVIC SYMPHYSIS FUSION      TN ESOPHAGOGASTRODUODENOSCOPY TRANSORAL DIAGNOSTIC N/A 2/22/2018    Procedure: ESOPHAGOGASTRODUODENOSCOPY (EGD); Surgeon: Chaka Leon MD;  [facility]: [facility]; Service: Gastroenterology    TN REVISE/REMOVE SPINAL NEUROSTIM/ Left 6/9/2017    Procedure: REMOVAL OF A THORACIC SCS PLACED VIA LAMINECTOMY AND REMOVAL LEFT BUTTOCK GENERATOR; IMPULSE MONITORING;  Surgeon: Shelli Conrad MD;  Location:  MAIN OR;  Service: Neurosurgery    TN SURG IMPLNT [address] N/A 9/8/2016    Procedure: DORSAL COLUMN STIMULATOR PLACEMENT (IMPULSE MONITORING);   Surgeon: Evelia Garrison MD;  [facility];  Service: Orthopedics    SACROILIAC JOINT FUSION  2015    SHOULDER SURGERY Left     2     Social History   History   Alcohol Use    Yes     Comment: twice a month      History   Drug Use    Frequency: 7 0 times per week    Types: Marijuana     Comment: daily     History   Smoking Status    Current Every Day Smoker    Packs/day: 0 50    Years: 25 00   Smokeless Tobacco    Never Used     Comment: using patch     Family History:   Family History   Problem Relation Age of Onset    Diabetes Father     Obesity Father     Liver cancer Father     Heart disease Father     Diabetes Brother     Hypertension Brother     Leukemia Mother     Hypertension Mother     Cancer Family        Review of previous medical records was completed  Meds/Allergies   all current active meds have been reviewed    Allergies   Allergen Reactions    Other Rash     Adhesive tape       Objective   Vitals:Blood pressure 107/61, pulse (!) 48, temperature 98 1 °F (36 7 °C), resp  rate 18, height 6' 1" (1 854 m), weight 86 8 kg (191 lb 5 8 oz), SpO2 96 %  ,Body mass index is 25 25 kg/m²  Intake/Output Summary (Last 24 hours) at 06/25/18 1048  Last data filed at 06/24/18 1703   Gross per 24 hour   Intake              240 ml   Output                0 ml   Net              240 ml       Invasive Devices: Invasive Devices     Peripheral Intravenous Line            Peripheral IV 06/24/18 Right Antecubital 1 day                Physical Exam   Constitutional: He is oriented to person, place, and time  He appears well-developed and well-nourished  No distress  Seated upright in bed   HENT:   Head: Normocephalic and atraumatic  No bruits bilaterally   Eyes: Conjunctivae and EOM are normal  Pupils are equal, round, and reactive to light  Right eye exhibits no discharge  Left eye exhibits no discharge  No scleral icterus  Neck: Normal range of motion  Neck supple  Cardiovascular: Normal rate, regular rhythm and normal heart sounds  Exam reveals no gallop and no friction rub      No murmur heard   Pulmonary/Chest: Effort normal and breath sounds normal  No stridor  No respiratory distress  He has no wheezes  He has no rales  He exhibits no tenderness  Musculoskeletal: Normal range of motion  He exhibits no edema, tenderness or deformity  Neurological: He is alert and oriented to person, place, and time  He has a normal Finger-Nose-Finger Test    Reflex Scores:       Tricep reflexes are 2+ on the right side and 2+ on the left side  Bicep reflexes are 2+ on the right side and 2+ on the left side  Brachioradialis reflexes are 2+ on the right side and 2+ on the left side  Patellar reflexes are 1+ on the right side and 1+ on the left side  Achilles reflexes are 2+ on the right side and 2+ on the left side  Skin: Skin is warm and dry  No rash noted  No erythema  Psychiatric: He has a normal mood and affect  His speech is normal and behavior is normal  Judgment and thought content normal      Neurologic Exam     Mental Status   Oriented to person, place, and time  Follows 2 step commands  Attention: normal  Concentration: normal    Speech: speech is normal   Level of consciousness: alert  Knowledge: good  Normal comprehension  Cranial Nerves     CN II   Visual acuity: (Grossly normal though patient states left eye appears somewhat blurry, though improved from yesterday )  Right visual field deficit: none  Left visual field deficit: none     CN III, IV, VI   Pupils are equal, round, and reactive to light  Extraocular motions are normal    Nystagmus: none   Conjugate gaze: present    CN VII   Facial expression full, symmetric  CN VIII   Hearing: intact (Grossly)    CN XI   CN XI normal      CN XII   CN XII normal    Patient states that there is an asymmetry with light touch of his face  He states that the left side seems to be hypersensitive       Motor Exam   Muscle bulk: normal  Overall muscle tone: normal  Right arm pronator drift: absent  Left arm pronator drift: absent    Strength   Strength 5/5 except as noted  Left lower extremity proximal give-way     Sensory Exam   Light touch normal    Right arm vibration: normal  Left arm vibration: normal  Right leg vibration: normal  Left leg vibration: normal  Decreased left upper extremity proprioception when asked to touch his nose with his eyes closed  Pinprick sensation intact throughout with the exception of occasional areas of decreased sensation on the right lower anterior extremity  Decreased temperature sensation of RLE       Gait, Coordination, and Reflexes     Coordination   Finger to nose coordination: normal    Reflexes   Right brachioradialis: 2+  Left brachioradialis: 2+  Right biceps: 2+  Left biceps: 2+  Right triceps: 2+  Left triceps: 2+  Right patellar: 1+  Left patellar: 1+  Right achilles: 2+  Left achilles: 2+  Right plantar: normal  Left plantar: normal      Lab Results:   Recent Results (from the past 24 hour(s))   Basic metabolic panel    Collection Time: 06/25/18  5:35 AM   Result Value Ref Range    Sodium 143 136 - 145 mmol/L    Potassium 4 4 3 5 - 5 3 mmol/L    Chloride 108 100 - 108 mmol/L    CO2 25 21 - 32 mmol/L    Anion Gap 10 4 - 13 mmol/L    BUN 15 5 - 25 mg/dL    Creatinine 1 01 0 60 - 1 30 mg/dL    Glucose 97 65 - 140 mg/dL    Glucose, Fasting 97 65 - 99 mg/dL    Calcium 8 8 8 3 - 10 1 mg/dL    eGFR 87 ml/min/1 73sq m   CBC and differential    Collection Time: 06/25/18  5:35 AM   Result Value Ref Range    WBC 10 84 (H) 4 31 - 10 16 Thousand/uL    RBC 4 93 3 88 - 5 62 Million/uL    Hemoglobin 14 4 12 0 - 17 0 g/dL    Hematocrit 44 1 36 5 - 49 3 %    MCV 90 82 - 98 fL    MCH 29 2 26 8 - 34 3 pg    MCHC 32 7 31 4 - 37 4 g/dL    RDW 15 7 (H) 11 6 - 15 1 %    MPV 10 8 8 9 - 12 7 fL    Platelets 995 396 - 457 Thousands/uL    Neutrophils Relative 65 43 - 75 %    Lymphocytes Relative 23 14 - 44 %    Monocytes Relative 8 4 - 12 %    Eosinophils Relative 4 0 - 6 %    Basophils Relative 0 0 - 1 %    Neutrophils Absolute 7 02 1 85 - 7 62 Thousands/µL    Lymphocytes Absolute 2 53 0 60 - 4 47 Thousands/µL    Monocytes Absolute 0 81 0 17 - 1 22 Thousand/µL    Eosinophils Absolute 0 45 0 00 - 0 61 Thousand/µL    Basophils Absolute 0 03 0 00 - 0 10 Thousands/µL   Stress test only, exercise    Collection Time: 06/25/18 10:53 AM   Result Value Ref Range    Target HR  bpm     Imaging Studies: I have personally reviewed pertinent films in PACS  EKG, Pathology, and Other Studies: I have personally reviewed pertinent reports      VTE Prophylaxis: Enoxaparin (Lovenox)    Code Status: Level 1 - Full Code

## 2018-06-25 NOTE — TELEPHONE ENCOUNTER
Spoke with patient, currently inpatient @ [facility]   Advised we will f/u with him upon discharge

## 2018-06-25 NOTE — PROGRESS NOTES
Progress Note - Anali Soto 1970, 50 y o  male MRN: [id number]    Unit/Bed#: -01 Encounter: [id number]    Primary Care Provider: Jean Carlos Doe MD   Date and time admitted to hospital: 6/24/2018  3:09 AM        Chest pain   Assessment & Plan    Initial cardiac work up negative  Checking rx stress test today as patient was unable to tolerate exercise stress  Hypertension   Assessment & Plan    /61  Not currently on any antihypertensives  Likely does not need any given this pressure  Sleep apnea   Assessment & Plan    Noncompliant with CPAP  May be making his headaches worse  Urged to follow up outpatient  * Headache   Assessment & Plan    Initially diagnosed with cluster headache  On imitrex for this  Over the last months has had associated neurological symptoms  I am considering the possibility that he may be suffering from migraines, and that his additional "chest pain" and jaw pain on admission may be related to his headaches, which may be actual migraines  His symptoms have not abated with imitrex, as they usually do, and he is also reporting transient numbness on both sides of his face and both UEs  I have consulted Neurology as I am concerned about his persistent headache and am wondering if this is in fact a migraine with aura  I did discuss with Sonia leyva from Neurology and they are at this time recommending IV steroids to attempt to abort headache  VTE Pharmacologic Prophylaxis:   Pharmacologic: Heparin  Mechanical VTE Prophylaxis in Place: Yes    Patient Centered Rounds: I have performed bedside rounds with nursing staff today  Discussions with Specialists or Other Care Team Provider: Discussed with neurology  Education and Discussions with Family / Patient: **    Time Spent for Care: 30 minutes  More than 50% of total time spent on counseling and coordination of care as described above      Current Length of Stay: 0 day(s)    Current Patient Status: Observation   Certification Statement: The patient will continue to require additional inpatient hospital stay due to trial of IV steroids for headache   Discharge Plan: Not medically stable for discharge  Code Status: Level 1 - Full Code      Subjective:   Patient seen and examined  Complaining of "numbness and tingling" , "all over"  Also facial numbness and neck and arm pain  His ear pain has subsided  Objective:     Vitals:   Temp (24hrs), Av 2 °F (36 8 °C), Min:98 1 °F (36 7 °C), Max:98 2 °F (36 8 °C)    HR:  [44-48] 48  Resp:  [18] 18  BP: (107-143)/(61-79) 107/61  SpO2:  [96 %-98 %] 96 %  Body mass index is 25 25 kg/m²  Input and Output Summary (last 24 hours): Intake/Output Summary (Last 24 hours) at 18 1602  Last data filed at 18 1703   Gross per 24 hour   Intake              240 ml   Output                0 ml   Net              240 ml       Physical Exam:     Physical Exam   Constitutional: He is oriented to person, place, and time  He appears well-developed and well-nourished  No distress  HENT:   Head: Normocephalic and atraumatic  Mouth/Throat: No oropharyngeal exudate  Eyes: Right eye exhibits no discharge  Left eye exhibits no discharge  No scleral icterus  Neck: No JVD present  No tracheal deviation present  No thyromegaly present  Cardiovascular: Normal rate  Exam reveals no gallop and no friction rub  No murmur heard  Pulmonary/Chest: Effort normal  No stridor  No respiratory distress  He has no wheezes  He has no rales  He exhibits no tenderness  Abdominal: Soft  He exhibits no distension and no mass  There is no tenderness  There is no rebound and no guarding  Genitourinary: Rectal exam shows guaiac negative stool  No penile tenderness  Musculoskeletal: Normal range of motion  He exhibits no edema, tenderness or deformity     Lymphadenopathy:     He has no cervical adenopathy  Neurological: He is alert and oriented to person, place, and time  He displays normal reflexes  No cranial nerve deficit  He exhibits normal muscle tone  Coordination normal    Skin: Skin is warm  No rash noted  He is not diaphoretic  No erythema  No pallor  Psychiatric: He has a normal mood and affect  Additional Data:     Labs:      Results from last 7 days  Lab Units 06/25/18  0535   WBC Thousand/uL 10 84*   HEMOGLOBIN g/dL 14 4   HEMATOCRIT % 44 1   PLATELETS Thousands/uL 302   NEUTROS PCT % 65   LYMPHS PCT % 23   MONOS PCT % 8   EOS PCT % 4       Results from last 7 days  Lab Units 06/25/18  0535 06/24/18  0331   SODIUM mmol/L 143 144   POTASSIUM mmol/L 4 4 3 8   CHLORIDE mmol/L 108 109*   CO2 mmol/L 25 23   BUN mg/dL 15 12   CREATININE mg/dL 1 01 1 02   CALCIUM mg/dL 8 8 8 8   TOTAL PROTEIN g/dL  --  7 1   BILIRUBIN TOTAL mg/dL  --  0 30   ALK PHOS U/L  --  72   ALT U/L  --  23   AST U/L  --  15   GLUCOSE RANDOM mg/dL 97 93       Results from last 7 days  Lab Units 06/24/18  0331   INR  0 93                 * I Have Reviewed All Lab Data Listed Above  * Additional Pertinent Lab Tests Reviewed: All Labs For Current Hospital Admission Reviewed    Imaging:    Imaging Reports Reviewed Today Include:   CT head with contrast -   "  No acute intracranial disease  No large vessel flow restrictive disease within the head or neck  Findings were directly discussed with on Current Date "  Imaging Personally Reviewed by Myself Includes:    As above       Recent Cultures (last 7 days):           Last 24 Hours Medication List:     Current Facility-Administered Medications:  acetaminophen 650 mg Oral Q6H PRN Jojo Osborne, PA-C   albuterol 2 puff Inhalation Q6H PRN Jojo Pattersonle, PA-C   aspirin 81 mg Oral Daily Jojo Osborne PA-C   diazepam 5 mg Oral Q12H PRN Jojo Osborne, PA-C   diphenhydrAMINE 25 mg Intravenous Q8H PRN Rebeka Swain PA-C   enoxaparin 40 mg Subcutaneous Daily Carlos Faster, PA-C   fluticasone 2 spray Nasal Daily PRN [place] Faster, PA-C   fluticasone-salmeterol 1 puff Inhalation Daily Carlos Faster, PA-C   ketorolac 30 mg Intravenous Q12H [facility], PA-C   magnesium sulfate 2 g Intravenous Daily [facility], PA-C   methylPREDNISolone sodium succinate 1,000 mg Intravenous Once [place], [place]   [START ON 6/26/2018] methylPREDNISolone sodium succinate 500 mg Intravenous Q12H PRN [facility], PA-C   metoclopramide 10 mg Intravenous Q8H [facility], PA-C   nicotine 1 patch Transdermal Daily Carlos Faster, PA-C   nitroglycerin 0 4 mg Sublingual Q5 Min PRN [place] Faster, PA-C   ondansetron 4 mg Intravenous Q6H PRN [place] Faster, PA-C   oxyCODONE 10 mg Oral Q6H PRN Carlos Faster, PA-C        Today, Patient Was Seen By: Quinn Salvador MD    ** Please Note: Dictation voice to text software may have been used in the creation of this document   **

## 2018-06-26 VITALS
WEIGHT: 191.36 LBS | SYSTOLIC BLOOD PRESSURE: 125 MMHG | TEMPERATURE: 97.6 F | HEART RATE: 65 BPM | HEIGHT: 73 IN | RESPIRATION RATE: 18 BRPM | BODY MASS INDEX: 25.36 KG/M2 | DIASTOLIC BLOOD PRESSURE: 87 MMHG | OXYGEN SATURATION: 97 %

## 2018-06-26 PROBLEM — G43.C0 PERIODIC HEADACHE SYNDROME, NOT INTRACTABLE: Status: ACTIVE | Noted: 2018-06-25

## 2018-06-26 PROBLEM — R07.9 CHEST PAIN: Status: RESOLVED | Noted: 2018-06-24 | Resolved: 2018-06-26

## 2018-06-26 LAB
ALBUMIN SERPL BCP-MCNC: 3.6 G/DL (ref 3.5–5)
ALP SERPL-CCNC: 68 U/L (ref 46–116)
ALT SERPL W P-5'-P-CCNC: 24 U/L (ref 12–78)
ANION GAP SERPL CALCULATED.3IONS-SCNC: 11 MMOL/L (ref 4–13)
AST SERPL W P-5'-P-CCNC: 13 U/L (ref 5–45)
BASOPHILS # BLD MANUAL: 0 THOUSAND/UL (ref 0–0.1)
BASOPHILS NFR MAR MANUAL: 0 % (ref 0–1)
BILIRUB SERPL-MCNC: 0.4 MG/DL (ref 0.2–1)
BUN SERPL-MCNC: 14 MG/DL (ref 5–25)
CALCIUM SERPL-MCNC: 8.6 MG/DL (ref 8.3–10.1)
CHLORIDE SERPL-SCNC: 106 MMOL/L (ref 100–108)
CO2 SERPL-SCNC: 24 MMOL/L (ref 21–32)
CREAT SERPL-MCNC: 1.01 MG/DL (ref 0.6–1.3)
EOSINOPHIL # BLD MANUAL: 0 THOUSAND/UL (ref 0–0.4)
EOSINOPHIL NFR BLD MANUAL: 0 % (ref 0–6)
ERYTHROCYTE [DISTWIDTH] IN BLOOD BY AUTOMATED COUNT: 15.2 % (ref 11.6–15.1)
GFR SERPL CREATININE-BSD FRML MDRD: 87 ML/MIN/1.73SQ M
GLUCOSE SERPL-MCNC: 185 MG/DL (ref 65–140)
HCT VFR BLD AUTO: 42 % (ref 36.5–49.3)
HGB BLD-MCNC: 13.9 G/DL (ref 12–17)
LYMPHOCYTES # BLD AUTO: 0.57 THOUSAND/UL (ref 0.6–4.47)
LYMPHOCYTES # BLD AUTO: 3 % (ref 14–44)
MCH RBC QN AUTO: 29.5 PG (ref 26.8–34.3)
MCHC RBC AUTO-ENTMCNC: 33.1 G/DL (ref 31.4–37.4)
MCV RBC AUTO: 89 FL (ref 82–98)
MONOCYTES # BLD AUTO: 0.57 THOUSAND/UL (ref 0–1.22)
MONOCYTES NFR BLD: 3 % (ref 4–12)
NEUTROPHILS # BLD MANUAL: 17.93 THOUSAND/UL (ref 1.85–7.62)
NEUTS BAND NFR BLD MANUAL: 1 % (ref 0–8)
NEUTS SEG NFR BLD AUTO: 93 % (ref 43–75)
PLATELET # BLD AUTO: 298 THOUSANDS/UL (ref 149–390)
PLATELET BLD QL SMEAR: ADEQUATE
PMV BLD AUTO: 11.4 FL (ref 8.9–12.7)
POTASSIUM SERPL-SCNC: 3.9 MMOL/L (ref 3.5–5.3)
PROT SERPL-MCNC: 6.5 G/DL (ref 6.4–8.2)
RBC # BLD AUTO: 4.71 MILLION/UL (ref 3.88–5.62)
SODIUM SERPL-SCNC: 141 MMOL/L (ref 136–145)
TOTAL CELLS COUNTED SPEC: 100
WBC # BLD AUTO: 19.07 THOUSAND/UL (ref 4.31–10.16)

## 2018-06-26 PROCEDURE — 99239 HOSP IP/OBS DSCHRG MGMT >30: CPT | Performed by: INTERNAL MEDICINE

## 2018-06-26 PROCEDURE — 99232 SBSQ HOSP IP/OBS MODERATE 35: CPT | Performed by: PSYCHIATRY & NEUROLOGY

## 2018-06-26 PROCEDURE — 80053 COMPREHEN METABOLIC PANEL: CPT | Performed by: INTERNAL MEDICINE

## 2018-06-26 PROCEDURE — 85027 COMPLETE CBC AUTOMATED: CPT | Performed by: INTERNAL MEDICINE

## 2018-06-26 PROCEDURE — 85007 BL SMEAR W/DIFF WBC COUNT: CPT | Performed by: INTERNAL MEDICINE

## 2018-06-26 RX ADMIN — ALBUTEROL SULFATE 2 PUFF: 90 AEROSOL, METERED RESPIRATORY (INHALATION) at 08:10

## 2018-06-26 RX ADMIN — ENOXAPARIN SODIUM 40 MG: 100 INJECTION SUBCUTANEOUS at 08:09

## 2018-06-26 RX ADMIN — VALPROATE SODIUM 1000 MG: 100 INJECTION, SOLUTION INTRAVENOUS at 12:58

## 2018-06-26 RX ADMIN — OXYCODONE HYDROCHLORIDE 10 MG: 10 TABLET ORAL at 05:13

## 2018-06-26 RX ADMIN — FLUTICASONE PROPIONATE 2 SPRAY: 50 SPRAY, METERED NASAL at 08:10

## 2018-06-26 RX ADMIN — METOCLOPRAMIDE 10 MG: 5 INJECTION, SOLUTION INTRAMUSCULAR; INTRAVENOUS at 05:12

## 2018-06-26 RX ADMIN — ASPIRIN 81 MG: 81 TABLET, CHEWABLE ORAL at 08:08

## 2018-06-26 RX ADMIN — KETOROLAC TROMETHAMINE 30 MG: 30 INJECTION, SOLUTION INTRAMUSCULAR at 08:09

## 2018-06-26 RX ADMIN — MAGNESIUM SULFATE HEPTAHYDRATE 2 G: 40 INJECTION, SOLUTION INTRAVENOUS at 08:11

## 2018-06-26 RX ADMIN — FLUTICASONE PROPIONATE AND SALMETEROL 1 PUFF: 50; 250 POWDER RESPIRATORY (INHALATION) at 08:10

## 2018-06-26 RX ADMIN — DIAZEPAM 5 MG: 5 TABLET ORAL at 00:48

## 2018-06-26 RX ADMIN — OXYCODONE HYDROCHLORIDE 10 MG: 10 TABLET ORAL at 13:01

## 2018-06-26 RX ADMIN — NICOTINE 1 PATCH: 14 PATCH TRANSDERMAL at 08:10

## 2018-06-26 RX ADMIN — METOCLOPRAMIDE 10 MG: 5 INJECTION, SOLUTION INTRAMUSCULAR; INTRAVENOUS at 13:01

## 2018-06-26 NOTE — PROGRESS NOTES
Neurology Progress Note - Anamaria Shirley 1970, 50 y o  male   MRN: [id number] Unit/Bed#: -01 Encounter: [id number]        * Periodic headache syndrome, not intractable, this episode- not cluster headache   Assessment & Plan    This patients history of cluster headache is different than the episode he is currently experiencing  His reports of pain and exam findings yesterday and today are characteristic of a mixed tension headache syndrome w some migraine features  He reports ittle benefit from the steroids yesterday afternoon, so rather than repeat them today we will try 1 dose depakote  I have d/w patient that his headache will most likely resolve over the course of the next day or two  We discussed non-pharmacologic measures as well for home use  Froma neuro point of view he is free to be discharged later this afternoon after his Depakote infusion  He should follow up in the neuro office at his pre-existing appointment time  Subjective/Objective     Subjective: It still feels like a vice right here, not as bad though    ROS: Reports continued, but improved headache in the area of the L ant forehead, face and ant L neck area to the ant arm pit  Denies Photo-phonophobia  Denies N/V  Had dizziness last night   No chest pain, palp, or SOB all others denied on full query      Current Facility-Administered Medications:  acetaminophen 650 mg Oral Q6H PRN   albuterol 2 puff Inhalation Q6H PRN   aspirin 81 mg Oral Daily   diazepam 5 mg Oral Q12H PRN   diphenhydrAMINE 25 mg Intravenous Q8H PRN   enoxaparin 40 mg Subcutaneous Daily   fluticasone 2 spray Nasal Daily PRN   fluticasone-salmeterol 1 puff Inhalation Daily   ketorolac 30 mg Intravenous Q12H [address]   magnesium sulfate 2 g Intravenous Daily   meclizine 25 mg Oral Q8H PRN   methylPREDNISolone sodium succinate 500 mg Intravenous Q12H PRN   metoclopramide 10 mg Intravenous Q8H [address]   nicotine 1 patch Transdermal Daily   nitroglycerin 0 4 mg Sublingual Q5 Min PRN   ondansetron 4 mg Intravenous Q6H PRN   oxyCODONE 10 mg Oral Q6H PRN   valproate sodium (DEPACON) IV bolus 1,000 mg Intravenous Once       acetaminophen    albuterol    diazepam    diphenhydrAMINE    fluticasone    meclizine    methylPREDNISolone sodium succinate    nitroglycerin    ondansetron    oxyCODONE    Vitals: Blood pressure 125/87, pulse 65, temperature 97 6 °F (36 4 °C), temperature source Oral, resp  rate 18, height 6' 1" (1 854 m), weight 86 8 kg (191 lb 5 8 oz), SpO2 97 %  ,Body mass index is 25 25 kg/m²  Physical Exam:     Darrell Leos seen in: bed, watching TV, no family present  General appearance: alert,   Neck, Lungs, Heart, & abdomen: WNL  Extremities: atraumatic, no cyanosis or edema    Neurologic:   Mental status: Alert, spont conversant and talkative, oriented, thought content appropriate, no dysarthria or aphasia, no cog slowing or word finding diff  CN: Intact CN exam  EOM's I, Gaze conjugate  Mildly L lateralizing sensory & motor exam, w a slight diminution of PP on L face  Reminder CNVIII-XII normal    Motor: non-acutely lateralizing power age appropriate x 4 limbs  Sensory: grossly intact  X 4 limbs, PP = on limbs  Cerebellar: no gross ataxia or past pointing w pronation from a seated Romberg position  Finger taps slightly coarse  Mild tremor w anxiety on pronation and fine manuvers  Gait: Mildly antalgic but fluid & smooth, no LOB w cadance or directional change  able to manipulate IV pole when walking        Lab Results:   I have personally reviewed pertinent reports    , CBC:   Results from last 7 days  Lab Units 06/26/18  0532 06/25/18  0535 06/24/18  1022 06/24/18  0331   WBC Thousand/uL 19 07* 10 84*  --  13 38*   RBC Million/uL 4 71 4 93  --  4 97   HEMOGLOBIN g/dL 13 9 14 4  --  14 6   HEMATOCRIT % 42 0 44 1  --  43 2   MCV fL 89 90  --  87   PLATELETS Thousands/uL 298 302 300 336   , BMP/CMP:   Results from last 7 days  Lab Units 06/26/18  0532 06/25/18  0535 06/24/18  0331   SODIUM mmol/L 141 143 144   POTASSIUM mmol/L 3 9 4 4 3 8   CHLORIDE mmol/L 106 108 109*   CO2 mmol/L 24 25 23   ANION GAP mmol/L 11 10 12   BUN mg/dL 14 15 12   CREATININE mg/dL 1 01 1 01 1 02   GLUCOSE RANDOM mg/dL 185* 97 93   CALCIUM mg/dL 8 6 8 8 8 8   AST U/L 13  --  15   ALT U/L 24  --  23   ALK PHOS U/L 68  --  72   TOTAL PROTEIN g/dL 6 5  --  7 1   BILIRUBIN TOTAL mg/dL 0 40  --  0 30   EGFR ml/min/1 73sq m 87 87 86   , Vitamin B12:   , HgBA1C:   , TSH:   , Coagulation:   Results from last 7 days  Lab Units 06/24/18  0331   INR  0 93   , Lipid Profile:        Imaging Studies: I have personally reviewed pertinent films in PACS and d/w patient at bedside    EEG, Echo, Pathology, and Other Studies: Also reported cardiac studies to patient and discussed w him the implication of negative stress test and patent coronary arteries       Counseling / Coordination of Care  Total time spent today 35 minutes  Greater than 50% of total time was spent with the patient and / or family counseling and / or coordination of care  A description of the counseling / coordination of care: All of the above was d/w patient at bedside  Multiplequestions all answered to his satisfaction

## 2018-06-26 NOTE — PLAN OF CARE
DISCHARGE PLANNING     Discharge to home or other facility with appropriate resources Adequate for Discharge        Knowledge Deficit     Patient/family/caregiver demonstrates understanding of disease process, treatment plan, medications, and discharge instructions Adequate for Discharge        Nutrition/Hydration-ADULT     Nutrient/Hydration intake appropriate for improving, restoring or maintaining nutritional needs Adequate for Discharge        PAIN - ADULT     Verbalizes/displays adequate comfort level or baseline comfort level Adequate for Discharge        Potential for Falls     Patient will remain free of falls Adequate for Discharge        SAFETY ADULT     Maintain or return to baseline ADL function Adequate for Discharge     Maintain or return mobility status to optimal level Adequate for Discharge

## 2018-06-26 NOTE — DISCHARGE SUMMARY
Discharge Summary - [facility] - Internal Medicine      Patient Information: Hazel Diamond 50 y o  male MRN: [id number]  Unit/Bed#: -01 Encounter: [id number]    Discharging Physician / Practitioner: Benjie Castillo MD  PCP: Jadon Mcnamara MD  Admission Date:   Admission Orders     Ordered        06/25/18 1844  Inpatient Admission  Once         06/24/18 0508  Place in Observation (expected length of stay for this patient is less than two midnights)  Once             Discharge Date: 06/26/18      Reason for Admission:  Chest pain and recurrent headaches       Discharge Diagnoses:     Principal Problem:    Recurrent headaches     Chronic Problems:    Obstructive Sleep apnea    Resolved Problems:    Chest pain    Elevated blood pressure      Consultations During Hospital Stay:  · Neurology      Hospital Course:     Hazel Diamond is a 50 y o  male patient who originally presented to the hospital on 6/24/2018 due to an inconsistent left-sided chest pain which initiated in his left face/neck with radiation  He underwent a myocardial stress test that was negative for acute ischemia  He does have a prior history of intractable headaches which were recently diagnosed as cluster headaches in nature although the presentation and complaints of this current headache was different  He was seen by neurology and underwent a CT angiogram of head/neck which was negative for acute intracranial disease or large vessel flow obstruction  He was trialed a dose of IV steroids which were ineffective and the following day was given a trial of IV Depakote which did somewhat alleviate his symptoms  He was advised to continue his home Imitrex for acute attacks along with as needed Valium/Ibuprofen for any recurrent symptoms until seen in the outpatient setting by neurology  He understands and agrees with plan        Condition at Discharge: fair       Discharge Day Visit / Exam:     Vitals: Blood Pressure: 125/87 (06/26/18 0720)  Pulse: 65 (06/26/18 0720)  Temperature: 97 6 °F (36 4 °C) (06/26/18 0720)  Temp Source: Oral (06/26/18 0720)  Respirations: 18 (06/26/18 0720)  Height: 6' 1" (185 4 cm) (06/24/18 2543)  Weight - Scale: 86 8 kg (191 lb 5 8 oz) (06/24/18 0620)  SpO2: 97 % (06/26/18 0720)      Physical exam - I had a face-to-face encounter with the patient on day of discharge  Discussion with Patient and/or Family:  The patient has been advised to return to the ER immediately if any symptoms recur or worsen  Discharge instructions/Information to Patient and/or Family:   See after visit summary for information provided to patient and/or family  Provisions for Follow-Up Care:  See after visit summary for information related to follow-up care and any pertinent home health orders  Disposition:   Home      Discharge Medications:  See after visit summary for reconciled discharge medications provided to patient and/or family  Discharge Statement:  I spent 37 minutes discharging the patient  This time was spent on the day of discharge  I had direct contact with the patient on the day of discharge  Greater than 50% of the total time was spent examining patient, answering all patient questions, arranging and discussing plan of care with patient as well as directly providing post-discharge instructions  Additional time then spent on discharge activities     ** Please Note: This note is constructed using a voice recognition dictation system   **

## 2018-06-26 NOTE — PROGRESS NOTES
Pt complains of 8/10 back pain  Administered PRN medication  Pt has no further complaints  Fluids offered  Safety measures are in place  Will continue to monitor

## 2018-06-26 NOTE — ASSESSMENT & PLAN NOTE
This patients history of cluster headache is different than the episode he is currently experiencing  His reports of pain and exam findings yesterday and today are characteristic of a mixed tension headache syndrome w some migraine features  He reports ittle benefit from the steroids yesterday afternoon, so rather than repeat them today we will try 1 dose depakote  I have d/w patient that his headache will most likely resolve over the course of the next day or two  We discussed non-pharmacologic measures as well for home use  Froma neuro point of view he is free to be discharged later this afternoon after his Depakote infusion  He should follow up in the neuro office at his pre-existing appointment time

## 2018-06-27 ENCOUNTER — TRANSITIONAL CARE MANAGEMENT (OUTPATIENT)
Dept: FAMILY MEDICINE CLINIC | Facility: CLINIC | Age: 48
End: 2018-06-27

## 2018-06-27 LAB
CHEST PAIN STATEMENT: NORMAL
CHEST PAIN STATEMENT: NORMAL
MAX DIASTOLIC BP: 90 MMHG
MAX DIASTOLIC BP: 90 MMHG
MAX HEART RATE: 88 BPM
MAX HEART RATE: 90 BPM
MAX PREDICTED HEART RATE: 171 BPM
MAX PREDICTED HEART RATE: 171 BPM
MAX. SYSTOLIC BP: 146 MMHG
MAX. SYSTOLIC BP: 160 MMHG
PROTOCOL NAME: NORMAL
PROTOCOL NAME: NORMAL
REASON FOR TERMINATION: NORMAL
REASON FOR TERMINATION: NORMAL
TARGET HR FORMULA: NORMAL
TARGET HR FORMULA: NORMAL
TEST INDICATION: NORMAL
TEST INDICATION: NORMAL
TIME IN EXERCISE PHASE: NORMAL
TIME IN EXERCISE PHASE: NORMAL

## 2018-07-04 ENCOUNTER — APPOINTMENT (EMERGENCY)
Dept: CT IMAGING | Facility: HOSPITAL | Age: 48
End: 2018-07-04
Payer: COMMERCIAL

## 2018-07-04 ENCOUNTER — HOSPITAL ENCOUNTER (EMERGENCY)
Facility: HOSPITAL | Age: 48
Discharge: HOME | End: 2018-07-04
Attending: EMERGENCY MEDICINE
Payer: COMMERCIAL

## 2018-07-04 VITALS
RESPIRATION RATE: 18 BRPM | WEIGHT: 182.98 LBS | DIASTOLIC BLOOD PRESSURE: 84 MMHG | BODY MASS INDEX: 24.14 KG/M2 | OXYGEN SATURATION: 99 % | HEART RATE: 64 BPM | TEMPERATURE: 97.4 F | SYSTOLIC BLOOD PRESSURE: 140 MMHG

## 2018-07-04 DIAGNOSIS — H81.399 PERIPHERAL POSITIONAL VERTIGO: Primary | ICD-10-CM

## 2018-07-04 LAB
ALBUMIN SERPL BCP-MCNC: 4.2 G/DL (ref 3.5–5)
ALP SERPL-CCNC: 81 U/L (ref 46–116)
ALT SERPL W P-5'-P-CCNC: 29 U/L (ref 12–78)
ANION GAP SERPL CALCULATED.3IONS-SCNC: 9 MMOL/L (ref 4–13)
AST SERPL W P-5'-P-CCNC: 12 U/L (ref 5–45)
BASOPHILS # BLD AUTO: 0.02 THOUSANDS/ΜL (ref 0–0.1)
BASOPHILS NFR BLD AUTO: 0 % (ref 0–1)
BILIRUB SERPL-MCNC: 0.8 MG/DL (ref 0.2–1)
BUN SERPL-MCNC: 20 MG/DL (ref 5–25)
CALCIUM SERPL-MCNC: 9.8 MG/DL (ref 8.3–10.1)
CHLORIDE SERPL-SCNC: 107 MMOL/L (ref 100–108)
CO2 SERPL-SCNC: 30 MMOL/L (ref 21–32)
CREAT SERPL-MCNC: 1.33 MG/DL (ref 0.6–1.3)
EOSINOPHIL # BLD AUTO: 0.07 THOUSAND/ΜL (ref 0–0.61)
EOSINOPHIL NFR BLD AUTO: 1 % (ref 0–6)
ERYTHROCYTE [DISTWIDTH] IN BLOOD BY AUTOMATED COUNT: 15.1 % (ref 11.6–15.1)
GFR SERPL CREATININE-BSD FRML MDRD: 62 ML/MIN/1.73SQ M
GLUCOSE SERPL-MCNC: 100 MG/DL (ref 65–140)
HCT VFR BLD AUTO: 50 % (ref 36.5–49.3)
HGB BLD-MCNC: 16.7 G/DL (ref 12–17)
LYMPHOCYTES # BLD AUTO: 2.09 THOUSANDS/ΜL (ref 0.6–4.47)
LYMPHOCYTES NFR BLD AUTO: 18 % (ref 14–44)
MAGNESIUM SERPL-MCNC: 2.3 MG/DL (ref 1.6–2.6)
MCH RBC QN AUTO: 29.1 PG (ref 26.8–34.3)
MCHC RBC AUTO-ENTMCNC: 33.4 G/DL (ref 31.4–37.4)
MCV RBC AUTO: 87 FL (ref 82–98)
MONOCYTES # BLD AUTO: 1.03 THOUSAND/ΜL (ref 0.17–1.22)
MONOCYTES NFR BLD AUTO: 9 % (ref 4–12)
NEUTROPHILS # BLD AUTO: 8.63 THOUSANDS/ΜL (ref 1.85–7.62)
NEUTS SEG NFR BLD AUTO: 73 % (ref 43–75)
PLATELET # BLD AUTO: 401 THOUSANDS/UL (ref 149–390)
PMV BLD AUTO: 10 FL (ref 8.9–12.7)
POTASSIUM SERPL-SCNC: 4.7 MMOL/L (ref 3.5–5.3)
PROT SERPL-MCNC: 7.5 G/DL (ref 6.4–8.2)
RBC # BLD AUTO: 5.73 MILLION/UL (ref 3.88–5.62)
SODIUM SERPL-SCNC: 146 MMOL/L (ref 136–145)
WBC # BLD AUTO: 11.84 THOUSAND/UL (ref 4.31–10.16)

## 2018-07-04 PROCEDURE — 80053 COMPREHEN METABOLIC PANEL: CPT | Performed by: EMERGENCY MEDICINE

## 2018-07-04 PROCEDURE — 93005 ELECTROCARDIOGRAM TRACING: CPT

## 2018-07-04 PROCEDURE — 96361 HYDRATE IV INFUSION ADD-ON: CPT

## 2018-07-04 PROCEDURE — 99284 EMERGENCY DEPT VISIT MOD MDM: CPT

## 2018-07-04 PROCEDURE — 70450 CT HEAD/BRAIN W/O DYE: CPT

## 2018-07-04 PROCEDURE — 36415 COLL VENOUS BLD VENIPUNCTURE: CPT | Performed by: EMERGENCY MEDICINE

## 2018-07-04 PROCEDURE — 93010 ELECTROCARDIOGRAM REPORT: CPT | Performed by: INTERNAL MEDICINE

## 2018-07-04 PROCEDURE — 85025 COMPLETE CBC W/AUTO DIFF WBC: CPT | Performed by: EMERGENCY MEDICINE

## 2018-07-04 PROCEDURE — 96365 THER/PROPH/DIAG IV INF INIT: CPT

## 2018-07-04 PROCEDURE — 83735 ASSAY OF MAGNESIUM: CPT | Performed by: EMERGENCY MEDICINE

## 2018-07-04 RX ORDER — MECLIZINE HYDROCHLORIDE 25 MG/1
25 TABLET ORAL 3 TIMES DAILY PRN
Qty: 30 TABLET | Refills: 0 | Status: SHIPPED | OUTPATIENT
Start: 2018-07-04 | End: 2018-12-17

## 2018-07-04 RX ORDER — DIAZEPAM 5 MG/1
5 TABLET ORAL ONCE
Status: COMPLETED | OUTPATIENT
Start: 2018-07-04 | End: 2018-07-04

## 2018-07-04 RX ORDER — MECLIZINE HCL 12.5 MG/1
25 TABLET ORAL ONCE
Status: COMPLETED | OUTPATIENT
Start: 2018-07-04 | End: 2018-07-04

## 2018-07-04 RX ORDER — OXYCODONE HYDROCHLORIDE 10 MG/1
10 TABLET ORAL ONCE
Status: COMPLETED | OUTPATIENT
Start: 2018-07-04 | End: 2018-07-04

## 2018-07-04 RX ADMIN — OXYCODONE HYDROCHLORIDE 10 MG: 10 TABLET ORAL at 17:36

## 2018-07-04 RX ADMIN — DIAZEPAM 5 MG: 5 TABLET ORAL at 17:36

## 2018-07-04 RX ADMIN — DEXTROSE 500 ML: 5 SOLUTION INTRAVENOUS at 16:58

## 2018-07-04 RX ADMIN — SODIUM CHLORIDE 1000 ML: 0.9 INJECTION, SOLUTION INTRAVENOUS at 15:22

## 2018-07-04 RX ADMIN — MECLIZINE 25 MG: 12.5 TABLET ORAL at 15:22

## 2018-07-04 NOTE — DISCHARGE INSTRUCTIONS
Vertigo   WHAT YOU NEED TO KNOW:   Vertigo is a condition that causes you to feel dizzy  You may feel that you or everything around you is moving or spinning  You may also feel like you are being pulled down or toward your side  DISCHARGE INSTRUCTIONS:   Return to the emergency department if:   · You have a headache and a stiff neck  · You have shaking chills and a fever  · You vomit over and over with no relief  · You have blood, pus, or fluid coming out of your ears  · You are confused  Contact your healthcare provider if:   · Your symptoms do not get better with treatment  · You have questions about your condition or care  Medicines:   · Medicine  may be given to help relieve your symptoms  · Take your medicine as directed  Contact your healthcare provider if you think your medicine is not helping or if you have side effects  Tell him or her if you are allergic to any medicine  Keep a list of the medicines, vitamins, and herbs you take  Include the amounts, and when and why you take them  Bring the list or the pill bottles to follow-up visits  Carry your medicine list with you in case of an emergency  Manage your symptoms:   · Do not drive , walk without help, or operate heavy machinery when you are dizzy  · Move slowly  when you move from one position to another position  Get up slowly from sitting or lying down  Sit or lie down right away if you feel dizzy  · Drink plenty of liquids  Liquids help prevent dehydration  Ask how much liquid to drink each day and which liquids are best for you  · Vestibular and balance rehabilitation therapy (VBRT)  is used to teach you exercises to improve your balance and strength  These exercises may help decrease your vertigo and improve your balance  Ask for more information about this therapy  Follow up with your healthcare provider as directed:  Write down your questions so you remember to ask them during your visits     © 2017 [facility] [address] is for End User's use only and may not be sold, redistributed or otherwise used for commercial purposes  All illustrations and images included in CareNotes® are the copyrighted property of [employer] D [employer] M [employer]  or Niles Herrmann  The above information is an  only  It is not intended as medical advice for individual conditions or treatments  Talk to your doctor, nurse or pharmacist before following any medical regimen to see if it is safe and effective for you

## 2018-07-04 NOTE — ED PROVIDER NOTES
History  Chief Complaint   Patient presents with    Dizziness     Pt reports onset of dizziness yesterday, reports one episode of vomiting approx 40 min PTA  Pt reports recent admission for dizziness and HTN  Reports increased sleepiness, denies weakness  History provided by:  Patient   used: No     58-year-old male presented complaining of vertigo beginning yesterday  Symptoms are much worse if he turns his head but still little bit present at rest   No falls as a result but he has had many near falls  Has had some history of dizziness in the past associated with migraines but denies any headache at the moment  No neck pain  No injuries  No fever, chills, neck stiffness  On exam here he is well appearing overall  He has leftward nystagmus  Normal test of skew and head impulse     TMs normal  Plan EKG, labs, CT head to rule out any significant pathology  Prior to Admission Medications   Prescriptions Last Dose Informant Patient Reported? Taking?    SUMAtriptan Succinate (IMITREX IJ) 7/3/2018 at Unknown time Self Yes Yes   Sig: Inject as directed   VENTOLIN  (90 Base) MCG/ACT inhaler   No No   Sig: INHALE 2 PUFFS BY MOUTH EVERY 6 HOURS AS NEEDED FOR WHEEZING   aspirin 81 MG tablet 7/3/2018 at Unknown time Self Yes Yes   Sig: Take 1 tablet by mouth daily   diazepam (VALIUM) 5 mg tablet 7/3/2018 at Unknown time  No Yes   Sig: Take 1 tablet (5 mg total) by mouth every 12 (twelve) hours as needed for anxiety   fluticasone (FLONASE) 50 mcg/act nasal spray 7/3/2018 at Unknown time Self Yes Yes   Si sprays into each nostril daily as needed     fluticasone-salmeterol (ADVAIR DISKUS) 250-50 mcg/dose inhaler 7/3/2018 at Unknown time Self No Yes   Sig: Inhale 1 puff daily   ibuprofen (MOTRIN) 800 mg tablet 7/3/2018 at Unknown time Self Yes Yes   Sig: Take by mouth   oxyCODONE (ROXICODONE) 10 MG TABS 7/3/2018 at Unknown time  No Yes   Sig: Take 1 tablet (10 mg total) by mouth every 6 (six) hours as needed for moderate pain Max Daily Amount: 40 mg      Facility-Administered Medications: None       Past Medical History:   Diagnosis Date    Allergic rhinitis     Anxiety     Arthritis     Back pain     Chronic obstructive asthma (HCC)     Chronic pain syndrome     Cluster headaches     Depression     Hypertension     Insomnia     Kidney stones     Laryngospasm     Leukocytosis     Migraine     Myocardial infarction ([address]     Nephrolithiasis     Organic impotence     Seasonal allergies     Sleep apnea     does not use CPAP    Stroke ([address]     TMJ (temporomandibular joint syndrome)     Wheezing     last assessed 05/19/17       Past Surgical History:   Procedure Laterality Date    BACK SURGERY      *9, [id number], nervectomy 2006, total of 10    BUCCAL MASS EXCISION N/A 3/26/2018    Procedure: BIOPSY LINGUAL TONSIL (FLOW/ STANDARD PATH)  EVAL UNDER ANESTHESIA;  Surgeon: Lisa Schmidt MD;  [facility];  Service: ENT    COLONOSCOPY      HERNIA REPAIR      KIDNEY SURGERY      KNEE ARTHROSCOPY      LITHOTRIPSY      multiple    LUMBAR [address] SURGERY  2015    MANDIBLE FRACTURE SURGERY      titanium plate    PELVIC SYMPHYSIS FUSION      UT ESOPHAGOGASTRODUODENOSCOPY TRANSORAL DIAGNOSTIC N/A 2/22/2018    Procedure: ESOPHAGOGASTRODUODENOSCOPY (EGD); Surgeon: Scooter Philippe MD;  Location: [facility]; Service: Gastroenterology    UT REVISE/REMOVE SPINAL NEUROSTIM/ Left 6/9/2017    Procedure: REMOVAL OF A THORACIC SCS PLACED VIA LAMINECTOMY AND REMOVAL LEFT BUTTOCK GENERATOR; IMPULSE MONITORING;  Surgeon: Frannie Davila MD;  Location:  MAIN OR;  Service: Neurosurgery    UT SURG IMPLNT [address] N/A 9/8/2016    Procedure: DORSAL COLUMN STIMULATOR PLACEMENT (IMPULSE MONITORING);   Surgeon: Manny Sanchez MD;  [facility];  Service: Orthopedics    SACROILIAC JOINT FUSION  2015    SHOULDER SURGERY Left     2       Family History Problem Relation Age of Onset    Diabetes Father     Obesity Father     Liver cancer Father     Heart disease Father     Diabetes Brother     Hypertension Brother     Leukemia Mother     Hypertension Mother     Cancer Family      I have reviewed and agree with the history as documented  Social History   Substance Use Topics    Smoking status: Current Some Day Smoker     Packs/day: 0 50     Years: 25 00    Smokeless tobacco: Never Used      Comment: using patch    Alcohol use Yes      Comment: twice a month         Review of Systems   Constitutional: Negative for activity change and appetite change  Respiratory: Positive for chest tightness  Negative for cough and shortness of breath  Neurological: Negative for dizziness, tremors and weakness  All other systems reviewed and are negative        Physical Exam  Physical Exam    Vital Signs  ED Triage Vitals   Temperature Pulse Respirations Blood Pressure SpO2   07/04/18 1353 07/04/18 1353 07/04/18 1353 07/04/18 1353 07/04/18 1353   (!) 97 4 °F (36 3 °C) (!) 106 14 169/89 99 %      Temp Source Heart Rate Source Patient Position - Orthostatic VS BP Location FiO2 (%)   07/04/18 1353 07/04/18 1518 07/04/18 1518 07/04/18 1518 --   Oral Monitor Lying Left arm       Pain Score       07/04/18 1353       8           Vitals:    07/04/18 1353 07/04/18 1518 07/04/18 1614 07/04/18 1707   BP: 169/89 150/97 146/93 140/84   Pulse: (!) 106 65 75 64   Patient Position - Orthostatic VS:  Lying Lying Lying       Visual Acuity  Visual Acuity      Most Recent Value   L Pupil Size (mm)  2   R Pupil Size (mm)  2          ED Medications  Medications   sodium chloride 0 9 % bolus 1,000 mL (0 mL Intravenous Stopped 7/4/18 1658)   meclizine (ANTIVERT) tablet 25 mg (25 mg Oral Given 7/4/18 1522)   dextrose 5 % bolus 500 mL (0 mL Intravenous Stopped 7/4/18 1736)   diazepam (VALIUM) tablet 5 mg (5 mg Oral Given 7/4/18 1736)   oxyCODONE (ROXICODONE) immediate release tablet 10 mg (10 mg Oral Given 7/4/18 7715)       Diagnostic Studies  Results Reviewed     Procedure Component Value Units Date/Time    Comprehensive metabolic panel [[id number]]  (Abnormal) Collected:  07/04/18 1510    Lab Status:  Final result Specimen:  Blood from Arm, Right Updated:  07/04/18 1535     Sodium 146 (H) mmol/L      Potassium 4 7 mmol/L      Chloride 107 mmol/L      CO2 30 mmol/L      Anion Gap 9 mmol/L      BUN 20 mg/dL      Creatinine 1 33 (H) mg/dL      Glucose 100 mg/dL      Calcium 9 8 mg/dL      AST 12 U/L      ALT 29 U/L      Alkaline Phosphatase 81 U/L      Total Protein 7 5 g/dL      Albumin 4 2 g/dL      Total Bilirubin 0 80 mg/dL      eGFR 62 ml/min/1 73sq m     Narrative:         National Kidney Disease Education Program recommendations are as follows:  GFR calculation is accurate only with a steady state creatinine  Chronic Kidney disease less than 60 ml/min/1 73 sq  meters  Kidney failure less than 15 ml/min/1 73 sq  meters      Magnesium [[id number]]  (Normal) Collected:  07/04/18 1510    Lab Status:  Final result Specimen:  Blood from Arm, Right Updated:  07/04/18 1535     Magnesium 2 3 mg/dL     CBC and differential [[id number]]  (Abnormal) Collected:  07/04/18 1510    Lab Status:  Final result Specimen:  Blood from Arm, Right Updated:  07/04/18 1519     WBC 11 84 (H) Thousand/uL      RBC 5 73 (H) Million/uL      Hemoglobin 16 7 g/dL      Hematocrit 50 0 (H) %      MCV 87 fL      MCH 29 1 pg      MCHC 33 4 g/dL      RDW 15 1 %      MPV 10 0 fL      Platelets 616 (H) Thousands/uL      Neutrophils Relative 73 %      Lymphocytes Relative 18 %      Monocytes Relative 9 %      Eosinophils Relative 1 %      Basophils Relative 0 %      Neutrophils Absolute 8 63 (H) Thousands/µL      Lymphocytes Absolute 2 09 Thousands/µL      Monocytes Absolute 1 03 Thousand/µL      Eosinophils Absolute 0 07 Thousand/µL      Basophils Absolute 0 02 Thousands/µL                  CT head without contrast   Final Result by Juan Jose Grace MD (07/04 1553)      No acute intracranial abnormality  Workstation performed: [id number]                    Procedures  ECG 12 Lead Documentation  Date/Time: 7/4/2018 5:17 PM  Performed by: Vipin Chan  Authorized by: Vipin Chan     Indications / Diagnosis:  Vertigo  ECG reviewed by me, the ED Provider: yes    Patient location:  ED  Rate:     ECG rate:  58  Rhythm:     Rhythm: sinus bradycardia    Ectopy:     Ectopy: none    QRS:     QRS axis:  Normal  ST segments:     ST segments:  Normal  T waves:     T waves: normal               Phone Contacts  ED Phone Contact    ED Course  ED Course as of Jul 04 2336 Wed Jul 04, 2018   1752 Patient improved with meclizine and IV fluids  Able to ambulate unassisted as long as he had something to hold intermittently like the wall of the bed  Feels comfortable going home  Will give script for meclizine for home and also give info for vestibular therapy  MDM  Number of Diagnoses or Management Options  Peripheral positional vertigo:   Diagnosis management comments: 57-year-old male presented with vertigo, worse when he looks to the left but also the other side as well  Given meclizine with some improvement, IV fluids  Very mild hyponatremia, acute kidney injury  Patient is eating and drinking okay  Able to ambulate with the assistance of the wall and feelsStable enough for discharge         Amount and/or Complexity of Data Reviewed  Clinical lab tests: ordered and reviewed  Tests in the radiology section of CPT®: ordered and reviewed      CritCare Time    Disposition  Final diagnoses:   Peripheral positional vertigo     Time reflects when diagnosis was documented in both MDM as applicable and the Disposition within this note     Time User Action Codes Description Comment    7/4/2018  5:53 PM Aaron Murphy Add [M29 206] Peripheral positional vertigo       ED Disposition     ED Disposition Condition Comment    Discharge  Alexandra Bennett discharge to home/self care  Condition at discharge: Stable        Follow-up Information     Follow up With Specialties Details Why Contact Info Additional [address] MD Family Medicine, Obstetrics and Gynecology, Obstetrics, Gynecology   [facility] [address] [phone]       [facility] [address] Emergency Department Emergency Medicine  If symptoms worsen [address] [address] [phone] [facility] ED, [address], [place], [place], [address]          Discharge Medication List as of 7/4/2018  5:54 PM      START taking these medications    Details   meclizine (ANTIVERT) 25 mg tablet Take 1 tablet (25 mg total) by mouth 3 (three) times a day as needed for dizziness, Starting Wed 7/4/2018, Print         CONTINUE these medications which have NOT CHANGED    Details   aspirin 81 MG tablet Take 1 tablet by mouth daily, Starting Tue 8/1/2017, Historical Med      diazepam (VALIUM) 5 mg tablet Take 1 tablet (5 mg total) by mouth every 12 (twelve) hours as needed for anxiety, Starting Wed 6/13/2018, Normal      fluticasone (FLONASE) 50 mcg/act nasal spray 2 sprays into each nostril daily as needed  , Starting Fri 12/9/2016, Historical Med      fluticasone-salmeterol (ADVAIR DISKUS) 250-50 mcg/dose inhaler Inhale 1 puff daily, Starting Thu 3/15/2018, Normal      ibuprofen (MOTRIN) 800 mg tablet Take by mouth, Starting Fri 12/9/2016, Historical Med      oxyCODONE (ROXICODONE) 10 MG TABS Take 1 tablet (10 mg total) by mouth every 6 (six) hours as needed for moderate pain Max Daily Amount: 40 mg, Starting Wed 6/13/2018, Normal      SUMAtriptan Succinate (IMITREX IJ) Inject as directed, Historical Med      VENTOLIN  (90 Base) MCG/ACT inhaler INHALE 2 PUFFS BY MOUTH EVERY 6 HOURS AS NEEDED FOR WHEEZING, Normal           No discharge procedures on file      ED Provider  Electronically Signed by           Shelly Doyle MD  07/04/18 3483

## 2018-07-06 LAB
ATRIAL RATE: 58 BPM
P AXIS: 69 DEGREES
PR INTERVAL: 152 MS
QRS AXIS: 78 DEGREES
QRSD INTERVAL: 84 MS
QT INTERVAL: 394 MS
QTC INTERVAL: 386 MS
T WAVE AXIS: 72 DEGREES
VENTRICULAR RATE: 58 BPM

## 2018-07-09 ENCOUNTER — OFFICE VISIT (OUTPATIENT)
Dept: FAMILY MEDICINE CLINIC | Facility: CLINIC | Age: 48
End: 2018-07-09
Payer: COMMERCIAL

## 2018-07-09 VITALS
SYSTOLIC BLOOD PRESSURE: 118 MMHG | HEART RATE: 88 BPM | HEIGHT: 73 IN | TEMPERATURE: 95.7 F | DIASTOLIC BLOOD PRESSURE: 80 MMHG | RESPIRATION RATE: 16 BRPM | WEIGHT: 202.2 LBS | BODY MASS INDEX: 26.8 KG/M2

## 2018-07-09 DIAGNOSIS — R42 DIZZINESS: Primary | ICD-10-CM

## 2018-07-09 DIAGNOSIS — F11.90 CHRONIC, CONTINUOUS USE OF OPIOIDS: ICD-10-CM

## 2018-07-09 DIAGNOSIS — Z79.899 CHRONIC PRESCRIPTION BENZODIAZEPINE USE: ICD-10-CM

## 2018-07-09 DIAGNOSIS — Z79.899 MEDICAL MARIJUANA USE: ICD-10-CM

## 2018-07-09 PROCEDURE — 80365 DRUG SCREENING OXYCODONE: CPT | Performed by: FAMILY MEDICINE

## 2018-07-09 PROCEDURE — 99213 OFFICE O/P EST LOW 20 MIN: CPT | Performed by: FAMILY MEDICINE

## 2018-07-09 PROCEDURE — 80307 DRUG TEST PRSMV CHEM ANLYZR: CPT | Performed by: FAMILY MEDICINE

## 2018-07-09 PROCEDURE — 3725F SCREEN DEPRESSION PERFORMED: CPT | Performed by: FAMILY MEDICINE

## 2018-07-09 NOTE — PROGRESS NOTES
Assessment/Plan:      Diagnoses and all orders for this visit:    Dizziness/Vertigo, improved; fatigue       -    Clinical presentation described by patient not overtly suggestive of BPPV alone, as patient had significant gait abnormality and generalized fatigue with vertigo & headache       -    Possibly multifactorial, due to a combination on opioid, benzo, and marijuana use in the setting of poor sleep, labile BP & HR        -    Symptoms greatly improved, no longer requires meclizine, and ex-fiance apparently performed Mary-Hallpike maneuver, which helped       -    Advised patient to ensure adequate PO intake due to KRISTINE       -    Also advised patient to get lots of rest and not over-exert himself at this time due to risk of recurrent weakness/dizziness at risk for fall       -    Fatigue today may be 2/2 poor sleep, marijuana use earlier today       -    Continue meclizine PRN, vesibular therapy, outpatient Neurology       -    Close follow up with re-assessment in 2 weeks    Chronic, continuous use of opioids  -     Oxycodone/Oxymorphone urine  -     Per PDMP: Oxycodone 10 mg PO Q6H PRN Disp #100, 0 refills written on 6/13/18, Rx filled 6/19/18         -     Pending urine results come back as expected, will E-prescribe Oxycodone 10 mg PO Q6H PRN Disp #90, 0 refills  -     Reduced Dispense amount above (#90) per patient request  -     Will have patient complete pain contract with me at next visit    Chronic prescription benzodiazepine use  -     Benzodiazepines Clinical Screen, with Confirmation, Urine  -     Per PDMP: Diazepam 5 mg 1 tablet Q12H PRN Disp #60, 0 refills written 6/13/18, also filled 6/13/18        -     Pending urine results come back as expected, will E-prescribe Diazepam 5 mg PO Q12H PRN Disp #60, 0 refills         -     Patient states he wants to reduce use of benzos as well, will readdress plan for cutting down on prescribed amount at next visit    Medical marijuana use  -     Toxicology screen, urine  -     Continue use of medical marijuana as indicated  -     Discussed safety measures including not operating vehicle while under the influence of marijuana       Subjective:     Patient ID: Narendra Arriaza is a 50 y o  male  Patient here for transition of care visit s/p observation admission (6/24/18 - 6/25/18) to [facility] overnight after presenting to ED on 6/24/2018 with headache (different than his typical migraines per patient) & "muffled hearing" (which he states began a few days prior), found to have elevated BP and left-sided chest pain with radiation; troponin negative and EKG NSR w/o abnormalities; myocardial stress test (-) for acute ischemia  Due to headache, Neurology was consulted and CTA of head/neck done 6/24/18, (-) negative for acute intracranial disease or large vessel flow obstruction  Patient was discharged on home Imitrex, as well as Valium & Ibuprofen for any recurrent symptoms until seen by Neurology outpatient, which is scheduled 7/26/18  Patient then had recurrent ED visit 7/4/18 due to acute onset of significant dizziness, blurry/double vision, worsening headache, and unbalanced gait; in ED, initial  but then had sinus bradycardia with HR 58 on EKG, but overall unchanged from previous EKG 6/28/18 7/4/18 CT head negative, /89, mild KRISTINE with Cr 1 33; patient did have some improvement with IVF and meclizine, which he was prescribed at discharge, along with vestibular therapy, which he has not yet followed up on  Instead, apparently his ex-fiance' performed Mary-Hallpike maneuver several times since then with some improvement to symptoms  Today, he denies vertigo or dizziness, but just continues to feel very tired, which he attributes to poor sleep and having lots of stress due to 2 friends dying over past 2 weeks  Currently denies CP, SOB, vision changes, headache, syncope, or known seizure activity      Patient also will need refills for 7/13/18 for his chronic opioid and benzo Rxs due to chronic pain syndrome and muscle spasms  He states he is working to cut down on opioid use  He states he was previously getting Disp #260, then cut down to Disp #200, currently receives Disp #100 and would like to possible cut down to #90 with next fill  Also using Rx marijuana  Per review of PDMP, 6/13/18 Rxs written for:  1) Oxycodone 10 mg PO Q6H PRN Disp #100 0 refills          -Filled 6/19/18 per PDMP  2) Diazepam 5 mg PO Q12H PRN Disp #60 0 refills         -Filled 6/13/18 per PDMP          Review of Systems   Constitutional: Positive for fatigue  Negative for fever  HENT: Negative for sore throat and trouble swallowing  Eyes: Negative for visual disturbance  Respiratory: Negative for cough, chest tightness, shortness of breath, wheezing and stridor  Cardiovascular: Negative for chest pain, palpitations and leg swelling  Gastrointestinal: Negative for abdominal pain  Had 1 episode of vomiting in ED, resolved without recurrence, no nausea or diarrhea currently   Genitourinary: Negative for decreased urine volume and hematuria  Musculoskeletal: Positive for back pain  Neurological: Negative for seizures, syncope and weakness  Still slightly dizzy when moving around very fast, but vertigo resolved   Psychiatric/Behavioral: Positive for sleep disturbance  All other systems reviewed and are negative  Objective:  Vitals:    07/09/18 1627   BP: 118/80   Pulse: 88   Resp: 16   Temp: (!) 95 7 °F (35 4 °C)          Physical Exam   Constitutional: He is oriented to person, place, and time  He appears well-developed and well-nourished  No distress  HENT:   Head: Normocephalic and atraumatic  Right Ear: External ear normal    Left Ear: External ear normal    Nose: Nose normal    Mouth/Throat: Oropharynx is clear and moist    Eyes: Conjunctivae and EOM are normal  Pupils are equal, round, and reactive to light  Right eye exhibits no discharge  Left eye exhibits no discharge  Mild conjunctival injection OU   Neck: Normal range of motion  Neck supple  Cardiovascular: Normal rate, regular rhythm, normal heart sounds and intact distal pulses  Pulmonary/Chest: Effort normal and breath sounds normal  No respiratory distress  He has no wheezes  Abdominal: Soft  Bowel sounds are normal  He exhibits no distension  There is no tenderness  Musculoskeletal: Normal range of motion  He exhibits no edema  Diffuse lumbar spine tenderness, more pronounced in B/L paravertebral musculature   Neurological: He is alert and oriented to person, place, and time  No cranial nerve deficit  Skin: Skin is warm and dry  No rash noted  Several tattoos over B/L UE and LE, appear well-healed   Psychiatric: He has a normal mood and affect  His behavior is normal    Vitals reviewed  Vitals:    07/09/18 1627   BP: 118/80   Pulse: 88   Resp: 16   Temp: (!) 95 7 °F (35 4 °C)   Weight: 91 7 kg (202 lb 3 2 oz)   Height: 6' 1" (1 854 m)       Transitional Care Management Review:  Arsalan Melchor is a 50 y o  male here for TCM follow up       During the TCM phone call patient stated:    Date and time hospital follow up call was made:  6/27/2018  6:51 PM  Patient was hopsitalized at:  [facility]  Date of admission:  6/24/18  Date of discharge:  6/26/18  Diagnosis:  Chest pain, elevated blood pressure, recurrent headache, obstructive sleep apnea  Were the patients medicaitons reviewed and updated:  Yes  Current symptoms:  Fatigue  Fatigue severity:  Mild  Post hospital issues:  Reduced activity  Should patient be enrolled in anticoag monitoring?:  No  Scheduled for follow up?:  Yes  Did you obtain your prescribed medications:  Yes  Do you need help managing your perscriptions or medications:  No  Is transportation to your appointments needed:  No  I have advised the patient to call PCP with any new or worsening symptoms (please type in name along with any credentials):  David Watters LPN  Living Arrangements:  Alone  Support System:  None  Are you recieving outpatient services:  No  Are you recieving home care services:  No  Are you using any community resources:  No  Current waiver service:  No  Have you fallen in the last 12 months:  No  Interperter language line required?:  No  Counseling:  Patient  Counseling topics:  Activities of daily living, Importance of RX compliance, instructions for management             Hope Goldberg, MD PGY-2  [facility]  2

## 2018-07-09 NOTE — PATIENT INSTRUCTIONS
Benign Paroxysmal Positional Vertigo   AMBULATORY CARE:   Benign paroxysmal positional vertigo (BPPV)  is an inner ear condition that causes you to suddenly feel dizzy  Benign means it is not serious or life-threatening  BPPV is caused by a problem with the nerves and structure of your inner ear  BPPV happens when small pieces of calcium break loose and lump together in one of your inner ear canals  Common symptoms include the following: You may feel that you or the room is moving or spinning  Turning your head, rolling over in bed, getting up or lying down may lead to sudden vertigo  You may also have any of the following symptoms:  · Nystagmus (quick shaky eye movement that you cannot control)    · Nausea    · Poor balance and feeling unsteady when you walk  Seek care immediately if:   · You fall during a BPPV episode and are injured  · You have a severe headache that does not go away  · You have new changes in your vision or feel weak or confused  · You have problems hearing, or you have ringing or buzzing in your ears  Contact your healthcare provider if:   · Your BPPV symptoms do not go away or they return  · You have problems with your balance, or you are falling often  · You have new or increased nausea or vomiting with vertigo  · You feel anxious or depressed and do not want to leave your home  · You have questions or concerns about your condition or care  Management of BPPV:   · Your healthcare provider will teach you how to move your head and body to prevent symptoms  For example, he or she may teach you certain ways to move your head or body  These movements usually help relieve your symptoms and keep the dizziness from returning  The exercises help move the calcium pieces to a different part of your ear  Do the movements only as directed  · Vestibular and balance rehabilitation therapy (VBRT)  is used to teach you exercises to improve your balance and strength   VBRT may help decrease your dizziness and prevent injuries if you are at risk for falls  · Medicines  may be recommended or prescribed to treat dizziness or nausea  Prevent your symptoms:   · Try to avoid sudden head movements  Stand up and lie down slowly  · Raise and support your head when you lie down  Place pillows under your upper back and head or rest in a recliner  · Change your position often when you are lying down  Try not to lie with your head on the same side for long periods of time  Roll over slowly  · Wear protective gear  when you ride a bike or play sports  A helmet helps protect your head from injury  Follow up with your healthcare provider as directed: You may need to return in 1 month to check the progress of your treatment  Write down your questions so you remember to ask them during your visits  © 2017 [address] [employer] Information is for End User's use only and may not be sold, redistributed or otherwise used for commercial purposes  All illustrations and images included in CareNotes® are the copyrighted property of A D A M , Inc  or Niles Herrmann  The above information is an  only  It is not intended as medical advice for individual conditions or treatments  Talk to your doctor, nurse or pharmacist before following any medical regimen to see if it is safe and effective for you

## 2018-07-12 DIAGNOSIS — J45.40 MODERATE PERSISTENT ASTHMA WITHOUT COMPLICATION: ICD-10-CM

## 2018-07-13 ENCOUNTER — TELEPHONE (OUTPATIENT)
Dept: FAMILY MEDICINE CLINIC | Facility: CLINIC | Age: 48
End: 2018-07-13

## 2018-07-13 DIAGNOSIS — G89.4 CHRONIC PAIN SYNDROME: ICD-10-CM

## 2018-07-13 DIAGNOSIS — F41.8 DEPRESSION WITH ANXIETY: ICD-10-CM

## 2018-07-13 RX ORDER — DIAZEPAM 5 MG/1
5 TABLET ORAL EVERY 12 HOURS PRN
Qty: 60 TABLET | Refills: 0 | Status: SHIPPED | OUTPATIENT
Start: 2018-07-13 | End: 2018-08-13 | Stop reason: SDUPTHER

## 2018-07-13 RX ORDER — OXYCODONE HYDROCHLORIDE 10 MG/1
10 TABLET ORAL EVERY 6 HOURS PRN
Qty: 100 TABLET | Refills: 0 | Status: SHIPPED | OUTPATIENT
Start: 2018-07-13 | End: 2018-08-09 | Stop reason: SDUPTHER

## 2018-07-13 NOTE — TELEPHONE ENCOUNTER
I called pt who yelled and angry about not having his narcotic prescription ready earlier today  I did check for the UDS from lab not yet complete  I spoke with preceptor and Dr Kamran Rosario and they did fill med eventhough screening was not back  Med has been filled   At this time

## 2018-07-13 NOTE — TELEPHONE ENCOUNTER
Pt calling again regarding his Oxycodone refill   He is very anxious to find out when it will be filled

## 2018-07-13 NOTE — TELEPHONE ENCOUNTER
Spoke with pharmacist at [facility], Oxycodone is ready to be picked up  Informed patient Via voicemail that Oxycodone was ready

## 2018-07-14 LAB
OXYCODONE UR QL CFM: 7762 NG/ML
OXYCODONE+OXYMORPHONE SERPLBLD QL SCN: POSITIVE
OXYCODONE+OXYMORPHONE UR QL SCN: NORMAL NG/ML
OXYCODONE+OXYMORPHONE UR QL SCN: POSITIVE
OXYMORPHONE UR CFM-MCNC: 8453 NG/ML
OXYMORPHONE UR QL CFM: POSITIVE

## 2018-07-17 LAB
AMPHETAMINES UR QL SCN: NEGATIVE NG/ML
BARBITURATES UR QL SCN: NEGATIVE NG/ML
BENZODIAZ UR QL SCN: NEGATIVE
BZE UR QL: NEGATIVE NG/ML
CANNABINOIDS UR QL SCN: POSITIVE
METHADONE UR QL SCN: NEGATIVE NG/ML
MISCELLANEOUS LAB TEST RESULT: NORMAL
OPIATES UR QL: NEGATIVE
PCP UR QL: NEGATIVE NG/ML
PROPOXYPH UR QL: NEGATIVE NG/ML

## 2018-07-23 ENCOUNTER — OFFICE VISIT (OUTPATIENT)
Dept: FAMILY MEDICINE CLINIC | Facility: CLINIC | Age: 48
End: 2018-07-23
Payer: COMMERCIAL

## 2018-07-23 VITALS
RESPIRATION RATE: 16 BRPM | BODY MASS INDEX: 26.37 KG/M2 | WEIGHT: 199 LBS | HEIGHT: 73 IN | SYSTOLIC BLOOD PRESSURE: 136 MMHG | TEMPERATURE: 96.4 F | HEART RATE: 84 BPM | DIASTOLIC BLOOD PRESSURE: 88 MMHG

## 2018-07-23 DIAGNOSIS — Z79.899 MEDICAL MARIJUANA USE: ICD-10-CM

## 2018-07-23 DIAGNOSIS — F11.90 CHRONIC, CONTINUOUS USE OF OPIOIDS: ICD-10-CM

## 2018-07-23 DIAGNOSIS — Z79.899 CHRONIC PRESCRIPTION BENZODIAZEPINE USE: ICD-10-CM

## 2018-07-23 DIAGNOSIS — G89.4 CHRONIC PAIN SYNDROME: ICD-10-CM

## 2018-07-23 DIAGNOSIS — R13.14 PHARYNGOESOPHAGEAL DYSPHAGIA: Primary | ICD-10-CM

## 2018-07-23 PROCEDURE — 99213 OFFICE O/P EST LOW 20 MIN: CPT | Performed by: FAMILY MEDICINE

## 2018-07-23 PROCEDURE — 3008F BODY MASS INDEX DOCD: CPT | Performed by: FAMILY MEDICINE

## 2018-07-23 NOTE — PATIENT INSTRUCTIONS
Barium Swallow   WHAT YOU NEED TO KNOW:   Barium swallow, or esophagram, is an x-ray procedure used to examine your esophagus  Liquid barium is a white, chalky solution that helps healthcare providers see the esophagus more clearly  The esophagus attaches your throat to your stomach  DISCHARGE INSTRUCTIONS:   Medicines:   · Medicines  can help empty your bowels or soften your bowel movements to prevent constipation  · Take your medicine as directed  Contact your healthcare provider if you think your medicine is not helping or if you have side effects  Tell him if you are allergic to any medicine  Keep a list of the medicines, vitamins, and herbs you take  Include the amounts, and when and why you take them  Bring the list or the pill bottles to follow-up visits  Carry your medicine list with you in case of an emergency  Bowel movements:  Your bowel movements may be light in color for up to 72 hours after your procedure  Do not strain or push hard to have a bowel movement  This can cause bleeding  Eat a variety of healthy foods: This will help make it easier to have a bowel movement  Healthy foods include fruits, vegetables, whole-grain breads, low-fat dairy products, beans, lean meats, and fish  Ask if you need to be on a special diet  Drink liquids as directed:  Ask your healthcare provider how much liquid to drink each day and which liquids are best for you  Liquids will help flush the barium from your system and prevent constipation  Limit caffeine  Prune juice may help make your bowel movement softer  Follow up with your healthcare provider as directed: You may need to return to go over the results of your procedure  Write down your questions so you remember to ask them during your visits  Contact your healthcare provider if:   · You feel too full or bloated  · You have nausea or are vomiting  · Your bowel movements are thin and light in color after 3 days  · You have a fever      · You have questions or concerns about your condition or care  Seek care immediately or call 911 if:   · Your abdomen becomes tender and hard  · You are unable to have a bowel movement  · Your bowel movements are black or have blood in them  · Your vomit has blood or bile in it  · You have sudden trouble breathing  © 2017 [address] [employer] Information is for End User's use only and may not be sold, redistributed or otherwise used for commercial purposes  All illustrations and images included in CareNotes® are the copyrighted property of [employer]  or [address]  The above information is an  only  It is not intended as medical advice for individual conditions or treatments  Talk to your doctor, nurse or pharmacist before following any medical regimen to see if it is safe and effective for you

## 2018-07-23 NOTE — PROGRESS NOTES
Marivel Todd 1970 male MRN: [id number]    Family Medicine Follow-up Visit    ASSESSMENT/PLAN  Diagnoses and all orders for this visit:    Pharyngoesophageal dysphagia  -     FL barium swallow; Future  -     Evaluate for possible stricture, Schatzski ring, esophageal web, hiatal hernia  -     Will refer to GI depending on result of barium swallow    Chronic pain syndrome, Continuous use of opioids/benzodiazepines, Medical marijuana use        -      Chronic back pain due to complications with several past spinal surgeries        -      Last refill 7/13/18 of Oxycodone 10 mg PO Q6H PRN Disp #100        -      Last refill 7/13/18 of Diazepam 5 mg 1 tablet Q12H PRN Disp #60        -      Pain contract signed by patient today        -      Patient counseled extensively regarding safety with use of controlled substances, including medical marijuana, and never to drive under the influence, to which patient agreed         -      Will need next refills around 8/13/18, can defer additional office visit until 9/2018     Hx of NELLI         -      [place] sleepiness scale and STOP-BANG today not high risk for NELLI         -      Discussed referral to Sleep Medicine if becomes symptomatic      Follow up in 2 months  Refills may be given without office visit until then, as long as PDMP report remains consistent  Future Appointments  Date Time Provider Drew Knight   7/26/2018 9:30 AM Richa Foreman MD NEURO [facility]   9/24/2018 2:00 PM Tyree Benjamin MD [facility]   12/17/2018 1:00 PM Darrell Platt MD [facility]          SUBJECTIVE  CC: Follow-up      HPI:  Marivel Todd is a 50 y o  male who presents for follow up on controlled substances (oxycodone and valium), last refill 7/13/18   Urine oxycodone ordered at last visit was (+) but urine benzo (including valium) was (-); however, patient today says he didn't take the valium 2 days prior to visit 7/9/18, as he desires cutting down to get off of oxycodone and valium eventually if possible for chronic back pain due to complications with past spinal surgeries  Using medical marijuana, has found success with [employer] at night for sleep and has been preferring that instead of benzo at night  Pain contract to be completed today  Vertigo discussed at last visit has fully resolved, asymptomatic for about a week without recurrence, improved with Epley maneuver done by GF  Says he doesn't drive under the influence of marijuana or opiates/benzos  In addition, patient reports for a while he sometimes has trouble swallowing certain foods, feels it gets stuck in his esophagus and he has to manually push it back upward so he can spit it out  Reports he had EGD in the past but patient uncertain of what they found  Barium swallow ordered in the past but not done  Patient has documented hx of NELLI but not using CPAP due to intolerance  Review of Systems   Constitutional: Negative for chills and fever  Eyes: Negative for visual disturbance  Respiratory: Negative for chest tightness and shortness of breath  Cardiovascular: Negative for chest pain, palpitations and leg swelling  Gastrointestinal: Negative for abdominal pain, diarrhea, nausea and vomiting  Genitourinary: Negative for decreased urine volume  Musculoskeletal: Positive for arthralgias and back pain  Skin: Negative for rash  Neurological: Negative for dizziness, seizures and syncope  Psychiatric/Behavioral: Positive for sleep disturbance  All other systems reviewed and are negative        Historical Information   The patient history was reviewed as follows:    Past Medical History:   Diagnosis Date    Allergic rhinitis     Anxiety     Arthritis     Back pain     Chronic obstructive asthma (HCC)     Chronic pain syndrome     Cluster headaches     Depression     Hypertension     Insomnia     Kidney stones     Laryngospasm     Leukocytosis     Migraine     [facility] [address]     Nephrolithiasis     Organic impotence     Seasonal allergies     Sleep apnea     does not use CPAP    Stroke ([facility] [address] [facility]     TMJ (temporomandibular joint syndrome)     Wheezing     last assessed 05/19/17     Past Surgical History:   Procedure Laterality Date    BACK SURGERY      *9, 1409-5190, nervectomy 2006, total of 10    BUCCAL MASS EXCISION N/A 3/26/2018    Procedure: BIOPSY LINGUAL TONSIL (FLOW/ STANDARD PATH)  EVAL UNDER ANESTHESIA;  Surgeon: Enriqueta Conde MD;  [facility];  Service: ENT    COLONOSCOPY      HERNIA REPAIR      KIDNEY SURGERY      KNEE ARTHROSCOPY      LITHOTRIPSY      multiple    LUMBAR [address] SURGERY  2015    MANDIBLE FRACTURE SURGERY      titanium plate    PELVIC SYMPHYSIS FUSION      IN ESOPHAGOGASTRODUODENOSCOPY TRANSORAL DIAGNOSTIC N/A 2/22/2018    Procedure: ESOPHAGOGASTRODUODENOSCOPY (EGD); Surgeon: Rafia Altman MD;  Location: [facility]; Service: Gastroenterology    IN REVISE/REMOVE SPINAL NEUROSTIM/ Left 6/9/2017    Procedure: REMOVAL OF A THORACIC SCS PLACED VIA LAMINECTOMY AND REMOVAL LEFT BUTTOCK GENERATOR; IMPULSE MONITORING;  Surgeon: Patti Castillo MD;  [facility];  Service: Neurosurgery    IN SURG IMPLNT [address] N/A 9/8/2016    Procedure: DORSAL COLUMN STIMULATOR PLACEMENT (IMPULSE MONITORING);   Surgeon: Jaime Alba MD;  [facility];  Service: Orthopedics    SACROILIAC JOINT FUSION  2015    SHOULDER SURGERY Left     2     Family History   Problem Relation Age of Onset    Diabetes Father     Obesity Father     Liver cancer Father     Heart disease Father     Diabetes Brother     Hypertension Brother     Leukemia Mother     Hypertension Mother     Cancer Family       Social History   History   Alcohol Use    Yes     Comment: twice a month      History   Drug Use    Frequency: 7 0 times per week    Types: Marijuana     Comment: daily     History   Smoking Status    Current Some Day Smoker    Packs/day: 0 50    Years: 25 00   Smokeless Tobacco    Never Used     Comment: using patch       Medications:     Current Outpatient Prescriptions:     aspirin 81 MG tablet, Take 1 tablet by mouth daily, Disp: , Rfl:     diazepam (VALIUM) 5 mg tablet, Take 1 tablet (5 mg total) by mouth every 12 (twelve) hours as needed for anxiety, Disp: 60 tablet, Rfl: 0    fluticasone (FLONASE) 50 mcg/act nasal spray, 2 sprays into each nostril daily as needed  , Disp: , Rfl:     fluticasone-salmeterol (ADVAIR DISKUS) 250-50 mcg/dose inhaler, Inhale 1 puff daily, Disp: 1 Inhaler, Rfl: 3    ibuprofen (MOTRIN) 800 mg tablet, Take by mouth, Disp: , Rfl:     meclizine (ANTIVERT) 25 mg tablet, Take 1 tablet (25 mg total) by mouth 3 (three) times a day as needed for dizziness, Disp: 30 tablet, Rfl: 0    oxyCODONE (ROXICODONE) 10 MG TABS, Take 1 tablet (10 mg total) by mouth every 6 (six) hours as needed for moderate pain Max Daily Amount: 40 mg, Disp: 100 tablet, Rfl: 0    SUMAtriptan Succinate (IMITREX IJ), Inject as directed, Disp: , Rfl:     VENTOLIN  (90 Base) MCG/ACT inhaler, INHALE 2 PUFFS BY MOUTH EVERY 6 HOURS AS NEEDED FOR WHEEZING, Disp: 18 g, Rfl: 0  Allergies   Allergen Reactions    Oxycontin [Oxycodone]      NOT allergic, but does not want to have it "ever again"    Other Rash     Adhesive tape       OBJECTIVE    Vitals:   Vitals:    07/23/18 1357   BP: 136/88   Pulse: 84   Resp: 16   Temp: (!) 96 4 °F (35 8 °C)   Weight: 90 3 kg (199 lb)   Height: 6' 1" (1 854 m)           Physical Exam   Constitutional: He is oriented to person, place, and time  He appears well-developed and well-nourished  No distress  HENT:   Head: Normocephalic and atraumatic  Right Ear: External ear normal    Left Ear: External ear normal    Nose: Nose normal    Mouth/Throat: Oropharynx is clear and moist    Eyes: Conjunctivae and EOM are normal  Pupils are equal, round, and reactive to light   Right eye exhibits no discharge  Left eye exhibits no discharge  Neck: Normal range of motion  Neck supple  39 cm circumference   Cardiovascular: Normal rate, regular rhythm, normal heart sounds and intact distal pulses  Pulmonary/Chest: Breath sounds normal  No respiratory distress  Abdominal: Soft  Bowel sounds are normal    Musculoskeletal: Normal range of motion  He exhibits no edema  Persistent spine tenderness to palpation, more pronounced in B/L paravertebral musculature    Neurological: He is alert and oriented to person, place, and time  Skin: Skin is warm and dry  Several tattoos over B/L UE and LE, appear well-healed    Psychiatric: He has a normal mood and affect  His behavior is normal    Pleasant, cooperative, talkative   Vitals reviewed  · [place] sleepiness scale score: 3 (lower normal daytime sleepiness)  · STOP-BANG score: 2 (low-risk for NELLI)        Sloane Barrett MD, PGY-2  [facility]   7/23/2018

## 2018-07-26 ENCOUNTER — OFFICE VISIT (OUTPATIENT)
Dept: NEUROLOGY | Facility: CLINIC | Age: 48
End: 2018-07-26
Payer: COMMERCIAL

## 2018-07-26 VITALS
WEIGHT: 196 LBS | HEIGHT: 73 IN | BODY MASS INDEX: 25.98 KG/M2 | HEART RATE: 73 BPM | DIASTOLIC BLOOD PRESSURE: 78 MMHG | SYSTOLIC BLOOD PRESSURE: 122 MMHG | RESPIRATION RATE: 12 BRPM

## 2018-07-26 DIAGNOSIS — R03.0 BLOOD PRESSURE ELEVATED WITHOUT HISTORY OF HTN: ICD-10-CM

## 2018-07-26 DIAGNOSIS — G47.30 SLEEP APNEA, UNSPECIFIED TYPE: ICD-10-CM

## 2018-07-26 DIAGNOSIS — G44.029 CHRONIC CLUSTER HEADACHE, NOT INTRACTABLE: Primary | ICD-10-CM

## 2018-07-26 PROCEDURE — 99215 OFFICE O/P EST HI 40 MIN: CPT | Performed by: PSYCHIATRY & NEUROLOGY

## 2018-07-26 RX ORDER — DIHYDROERGOTAMINE MESYLATE 4 MG/ML
1 SPRAY NASAL AS NEEDED
Qty: 16 ML | Refills: 0 | Status: SHIPPED | OUTPATIENT
Start: 2018-07-26 | End: 2018-08-30

## 2018-07-26 RX ORDER — INDOMETHACIN 50 MG/1
50 CAPSULE ORAL
Qty: 90 CAPSULE | Refills: 1 | Status: SHIPPED | OUTPATIENT
Start: 2018-07-26 | End: 2018-12-17

## 2018-07-26 NOTE — PATIENT INSTRUCTIONS
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, most consistent with cluster headache     - for headache prevention we will begin verapamil extended release at a dose of 120 mg daily taken in the morning  Should continue this for 2-4 weeks  If he notes no side effects he can increase to  240 mg taken in the morning for another 2 weeks  I would like for him to contact our office in no more than 6 weeks to report on his progress  - to abort a cluster headache at the time of occurrence we will transition him to Migranal nasal spray  He can use 1 spray in each nostril and repeat that once in 15 min if necessary  He can use a maximum of 4 sprays per attack, 6  Sprays per 24 hr, 8 sprays in a week  He should not use Imitrex within the same 24 hr that he uses Migranal nasal spray  - additionally, to abort an attack, we will begin at that time indomethacin at a dose of 50 mg taken at the time of his attack and then 3 times daily for 14 days  It should be taken with food milk or antacids  He should ensure that he remains well hydrated while taking this medication  He should not use any other nonsteroidal anti-inflammatories such as ibuprofen while he is taking indomethacin     - additionally to abort intact we will prescribe high flow oxygen which should be used as directed as soon as possible when attack begins    - should this strategy prove ineffective only partially effective I have a low threshold to consider at home steroid treatment or outpatient steroid infusion at the onset of a cluster considering  The effectiveness of this strategy during his recent hospitalization  I will plan for him to return to the office to see me in 6 months time but I would be happy to see him sooner if the need should arise  Particularly on day 1 of his next cluster cycle, if he has another cluster cycle, I would like for him to contact our office and at that time we will set up an urgent follow-up and reinforced to him the plan to abort that cluster

## 2018-07-26 NOTE — PROGRESS NOTES
Patient ID: Angélica Hickey is a 50 y o  male  Assessment/Plan:    No problem-specific Assessment & Plan notes found for this encounter  Diagnoses and all orders for this visit:    Chronic cluster headache, not intractable  -     dihydroergotamine (MIGRANAL) 4 MG/ML nasal spray; 1 spray into each nostril as needed for migraine Can repeat in 15 mins, Max 4 sprays per attack/ 6/day, 8/week  -     verapamil (CALAN-SR) 120 mg CR tablet; Take 1 tablet (120 mg total) by mouth daily  -     indomethacin (INDOCIN) 50 mg capsule; Take 1 capsule (50 mg total) by mouth 3 (three) times a day with meals For 2 weeks at start of cluster and call MD to continue    Sleep apnea, unspecified type    Blood pressure elevated without history of HTN  -     verapamil (CALAN-SR) 120 mg CR tablet; Take 1 tablet (120 mg total) by mouth daily       Patient Instructions   Cluster headaches:  Adilene Mcdaniel presents for a follow-up appoint with regard to his known history of cluster headaches  He was recently hospitalized with a significant headache and hypertension  Although this did not response to his typical Imitrex it did respond quite well to a steroid infusion  His prior headache history was confused because of the requirement for high-dose narcotic medications for treatment of long-term chronic back pain  He does describe what sounds clear like a 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, most consistent with cluster headache     - for headache prevention we will begin verapamil extended release at a dose of 120 mg daily taken in the morning  Should continue this for 2-4 weeks  If he notes no side effects he can increase to  240 mg taken in the morning for another 2 weeks  I would like for him to contact our office in no more than 6 weeks to report on his progress  - to abort a cluster headache at the time of occurrence we will transition him to Migranal nasal spray    He can use 1 spray in each nostril and repeat that once in 15 min if necessary  He can use a maximum of 4 sprays per attack, 6  Sprays per 24 hr, 8 sprays in a week  He should not use Imitrex within the same 24 hr that he uses Migranal nasal spray  - additionally, to abort an attack, we will begin at that time indomethacin at a dose of 50 mg taken at the time of his attack and then 3 times daily for 14 days  It should be taken with food milk or antacids  He should ensure that he remains well hydrated while taking this medication  He should not use any other nonsteroidal anti-inflammatories such as ibuprofen while he is taking indomethacin     - additionally to abort intact we will prescribe high flow oxygen which should be used as directed as soon as possible when attack begins  - should this strategy prove ineffective only partially effective I have a low threshold to consider at home steroid treatment or outpatient steroid infusion at the onset of a cluster considering  The effectiveness of this strategy during his recent hospitalization  I will plan for him to return to the office to see me in 6 months time but I would be happy to see him sooner if the need should arise  Particularly on day 1 of his next cluster cycle, if he has another cluster cycle, I would like for him to contact our office and at that time we will set up an urgent follow-up and reinforced to him the plan to abort that cluster  Subjective:    HPI    44 Halima Max presents for follow-up with regard to his history of cluster headaches  He reports that on a yearly basis he will have a cluster which can last for 3-4 months during which time he will have a daily headache  He notes that the headache begins with left eye ptosis and frequently causes left facial drooping  Team towards affect the left hand side  The pain is extremely severe and debilitating  The headache will last for several hours    He typically treats it with an injection of Imitrex, and although he finds that to be quite effective he actually has to wait until the headache is quite severe prior to treating  At times and he is treated with Imitrex injection earlier than that the medication was ineffective and he had to go to the hospital   He reports that he has had over 40 hospitalizations for acute treatment of these headaches  He thinks he may have been on preventive medications including verapamil in the past but he is not certain, and this history is clouded by his prior requirement for long-term use of narcotic medications due to significant spinal surgeries  He was recently hospitalized for a severe headache as well as hypertension and although Imitrex was ineffective, a steroid infusion did provide significant relief  He has had multiple MRIs  I reviewed the results of the most recent scan which does indicate a small area of white matter hyperintensity around the periventricular area on the right-hand side but no obvious large strokes  He has also had his heart evaluated in the past and has no actual history of myocardial infarction although this has been questioned previously  He has a history of obstructive sleep apnea but has not been treating it  Reports that he had been trying multiple different mask types including the nasal pillows in the full mask but without significant benefit  He also reports he continues to smoke approximately 1/2 pack per day  We spent a significant amount of time in the office today discussing potential options for smoking cessation  And reviewed the importance of behavior modification  Objective:    Blood pressure 122/78, pulse 73, resp  rate 12, height 6' 1" (1 854 m), weight 88 9 kg (196 lb)  Physical Exam    Neurological Exam      At the time of my evaluation was awake, alert, and in no distress  He was clearly stiff and had some pain while moving around  There were no obvious cranial neuropathies or lateralizing signs      ROS:    Review of Systems Constitutional: Positive for fatigue  HENT: Positive for congestion  Eyes: Negative  Respiratory: Negative  Cardiovascular: Negative  Gastrointestinal: Negative  Endocrine: Negative  Genitourinary: Negative  Musculoskeletal: Positive for back pain  Skin: Negative  Allergic/Immunologic: Negative  Neurological: Positive for dizziness, weakness and headaches  Vertigo    Hematological: Negative  Psychiatric/Behavioral: Negative            Total encounter time 40 min

## 2018-07-27 ENCOUNTER — TELEPHONE (OUTPATIENT)
Dept: NEUROLOGY | Facility: CLINIC | Age: 48
End: 2018-07-27

## 2018-07-27 NOTE — TELEPHONE ENCOUNTER
Not formulary   [employer]: [phone]  Zainab Jade [id number]  Group [id number]  [facility]  ID [id number]    Cover my meds down at the moment, will try again later

## 2018-07-27 NOTE — TELEPHONE ENCOUNTER
I called this in to [employer], they stated to call them back in 20 mins to see if this is covered/or who the [address] [place] [address] will go through

## 2018-07-27 NOTE — TELEPHONE ENCOUNTER
----- Message from Yee Alarcon MD sent at 7/26/2018 10:55 AM EDT -----  Regarding: Prior Sheyla Subramanian has been diagnosed with cluster headaches/trigeminal autonomic cephalgia  I have written him a prescription for Migranal nasal spray however I believe this is going to require prior authorization  Would you please reach out to his insurance in this regard and confirm/let me know? Thanks!

## 2018-07-30 NOTE — TELEPHONE ENCOUNTER
Received determination- this was denied  Please see the denial scanned into media, there are alternatives listed, as well as a number for the prescribing physician to perform a peer to peer if requested

## 2018-08-09 DIAGNOSIS — J45.40 MODERATE PERSISTENT ASTHMA WITHOUT COMPLICATION: ICD-10-CM

## 2018-08-09 DIAGNOSIS — G89.4 CHRONIC PAIN SYNDROME: ICD-10-CM

## 2018-08-11 ENCOUNTER — TELEPHONE (OUTPATIENT)
Dept: OTHER | Facility: HOSPITAL | Age: 48
End: 2018-08-11

## 2018-08-11 NOTE — TELEPHONE ENCOUNTER
Received triage call from patient that he requested refill of medications  I called patient two times, but he did not picked call

## 2018-08-12 ENCOUNTER — TELEPHONE (OUTPATIENT)
Dept: OTHER | Facility: HOSPITAL | Age: 48
End: 2018-08-12

## 2018-08-12 RX ORDER — OXYCODONE HYDROCHLORIDE 10 MG/1
10 TABLET ORAL EVERY 6 HOURS PRN
Qty: 100 TABLET | Refills: 0 | Status: SHIPPED | OUTPATIENT
Start: 2018-08-12 | End: 2018-08-13 | Stop reason: SDUPTHER

## 2018-08-12 NOTE — TELEPHONE ENCOUNTER
Triage call from patient called asking for medication refills for oxycodone and valium; refill due 8/13/18 as last Rx filled 7/13/18  Refill request sent to Dr Yevgeniy Booth for E-prescribing      KHALIDA Linn  PGY-2  [place] [address]

## 2018-08-13 ENCOUNTER — TELEPHONE (OUTPATIENT)
Dept: FAMILY MEDICINE CLINIC | Facility: CLINIC | Age: 48
End: 2018-08-13

## 2018-08-13 DIAGNOSIS — G89.4 CHRONIC PAIN SYNDROME: ICD-10-CM

## 2018-08-13 DIAGNOSIS — F41.8 DEPRESSION WITH ANXIETY: ICD-10-CM

## 2018-08-13 RX ORDER — OXYCODONE HYDROCHLORIDE 10 MG/1
10 TABLET ORAL EVERY 6 HOURS PRN
Qty: 100 TABLET | Refills: 0 | Status: SHIPPED | OUTPATIENT
Start: 2018-08-13 | End: 2018-09-12 | Stop reason: SDUPTHER

## 2018-08-13 RX ORDER — DIAZEPAM 5 MG/1
5 TABLET ORAL EVERY 12 HOURS PRN
Qty: 60 TABLET | Refills: 0 | Status: SHIPPED | OUTPATIENT
Start: 2018-08-13 | End: 2018-10-16 | Stop reason: SDUPTHER

## 2018-08-13 NOTE — TELEPHONE ENCOUNTER
----- Message from Eduardo Hinson MD sent at 8/12/2018  3:54 PM EDT -----  Regarding: Controlled meds- refills  Hi Dr Brady Monroe! This patient was last seen by me 7/23/18; he has a pain contract with me and recently had urine drug testing  He is now due for his refill of the following, which is appropriate for him due to history of chronic back pain due to complications with several past spinal surgeries:    1) Oxycodone 10 mg PO Q6H PRN Disp #100 (Last refill 7/13/18 per PDMP)  2) Diazepam 5 mg 1 tablet Q12H PRN Disp #60 (Last refill 7/13/18 per PDMP)    Could you possibly E-prescribe these 2 medications for my patient? He uses [address]  Patient has been adherent to his office visits, and we are working to wean his Rxs down per his request  Please let me know when the refills have been sent so I can notify the patient  Thank you!   Shawna Tinajero

## 2018-08-23 ENCOUNTER — TELEPHONE (OUTPATIENT)
Dept: NEUROLOGY | Facility: CLINIC | Age: 48
End: 2018-08-23

## 2018-08-23 NOTE — TELEPHONE ENCOUNTER
Pt states that he stopped taking verapamil, last dose was 1 week ago  He states that it gives him headaches  At this time, he denies any migraine  He states that [address] [place] [address] not covered by his insurance, will cost about $3,000  PA was denied  Pt is asking for alt meds  Pls send rx to [facility]      Thank you        [phone]

## 2018-08-24 DIAGNOSIS — G89.29 OTHER CHRONIC PAIN: Primary | ICD-10-CM

## 2018-08-28 DIAGNOSIS — G44.011 INTRACTABLE EPISODIC CLUSTER HEADACHE: Primary | ICD-10-CM

## 2018-08-28 RX ORDER — IBUPROFEN 800 MG/1
TABLET ORAL
Qty: 90 TABLET | Refills: 0 | Status: SHIPPED | OUTPATIENT
Start: 2018-08-28 | End: 2018-09-26 | Stop reason: SDUPTHER

## 2018-08-28 NOTE — TELEPHONE ENCOUNTER
Please call Rigo Wright    The insurance denied the Migranal spray as they want to try other options first  I did some research and there are reasonable options and if are ineffective or only marginally effective we should be able to get the Migrinal spray in the future  To abort a headache    He should trial the indomethacin as we talked about in the office, and use that on a regular basis to try and abort an entire cluster  I did send a prescription to [address] [place] [address] ([address] ) to get him high flow O2 to use  He should use 12 Liters per minute as the setting for 20 minutes per attach via a facemask  There are 2 options for medication    The one with the best evidence is for Sumatriptan injectable 6mg    Does he already have that? If so, or if he would prefer to avoid an injectable, I will send an rx for Zolmitriptan Nasal spray to be used once per attack  As a preventative    There are other options for prevention    If the verapamil had untoward side effects there is some good data for the use of Topamax in patients with Cluster Headache  The medication is started at 25mg and would be titrated up  In some patients it can cause some weight loss, and at high doses it can cause some fogginess of thinking  Rarely people may feel a little dizzy/unsteady on this medication  Overall it is very well tolerated  If he has not had any attacks, and would like to use the abortive strategies above and hold on the Topamax until he knows if/when the next attack is coming that is not unreasonable  Let me know either way

## 2018-08-29 NOTE — TELEPHONE ENCOUNTER
I am mobile, so I will not be able to send the prescription until I get back home, but I will send a prescription for the Zomig nasal spray  [place] [facility] [place], our experts in medical equipment and should be able to get the oxygen provided for him  Could you please make sure he has the contact information  If he has not had a cluster in the last eight months, I think that we can defer st arting an additional preventative medication For the time being  Most often, for patients with episodic cluster headache like this, they usually will initiate a preventative medicine and titrated up at the time a cluster begins, or when one would be anticipated, rather than being stuck on all the time  That having been said, how often is he getting regular headaches; and are they just typical/mild headaches (does he have to treat  them with anything) or are they more migraine/significant to the point that we should consider doing something to get them under better control?

## 2018-08-29 NOTE — TELEPHONE ENCOUNTER
Pt made aware of the below, states that he hasn't had a cluster for about 8 months, states he has just been getting regular headaches, so he is not sure that he needs to take the indomethacin yet, but would be agreeable to trying that if his clusters come back  He will look into obtaining the oxygen  Pt reports that he does have imatrex on hand, and would be willing to the zomig as well  Reports that he has tried topamax before, and he had side effects, he can't remember what exactly they were  When asking if he would like a different preventative, he stated that he would do what ever you recommended he would do

## 2018-08-30 DIAGNOSIS — G44.011 INTRACTABLE EPISODIC CLUSTER HEADACHE: Primary | ICD-10-CM

## 2018-08-30 PROBLEM — G43.C0 PERIODIC HEADACHE SYNDROME, NOT INTRACTABLE: Status: RESOLVED | Noted: 2018-06-25 | Resolved: 2018-08-30

## 2018-08-30 RX ORDER — ZOLMITRIPTAN 5 MG/1
SPRAY NASAL
Qty: 12 ML | Refills: 5 | Status: SHIPPED | OUTPATIENT
Start: 2018-08-30 | End: 2018-12-17

## 2018-08-30 NOTE — TELEPHONE ENCOUNTER
Understood  Zomig nasal spray sent  We will see how he does with the Indocin and Zomig combinations  He should call immediately if he starts in on another cluster

## 2018-08-30 NOTE — TELEPHONE ENCOUNTER
Pt denies HA recently, has no complaints  Pt states [facility] did not get rx, I did fax to [phone]    Re indomethacin, pt did stopped this med as it was not really helping him, he was having a small cluster of HAs @that time, and he did not like how he felt, cannot tell me how it was that he felt as he has been on a lot of meds lately & has  Some memory issues as well

## 2018-09-03 DIAGNOSIS — J45.40 MODERATE PERSISTENT ASTHMA WITHOUT COMPLICATION: ICD-10-CM

## 2018-09-04 NOTE — TELEPHONE ENCOUNTER
Called and Left a message on pt's answering machine for a call back    3rd attempt to reach pt- letter mailed home to ask that he call the office

## 2018-09-06 ENCOUNTER — DOCUMENTATION (OUTPATIENT)
Dept: NEUROLOGY | Facility: CLINIC | Age: 48
End: 2018-09-06

## 2018-09-07 ENCOUNTER — TELEPHONE (OUTPATIENT)
Dept: NEUROLOGY | Facility: CLINIC | Age: 48
End: 2018-09-07

## 2018-09-07 NOTE — TELEPHONE ENCOUNTER
Pt's insurance faxed stating the prior authorization for pt's Zomig nasal spray was denied  Pt must first try and fail other formulary alternatives including Rizatriptan ODT or Sumatriptan nasal spray  Please advise if you would like to prescribe an alternative

## 2018-09-10 DIAGNOSIS — G89.4 CHRONIC PAIN SYNDROME: ICD-10-CM

## 2018-09-10 NOTE — TELEPHONE ENCOUNTER
Voice message requesting refill of Oxycodone 10mg #100  Checked PDMP, last refill 8/13/18   Patient is scheduled to see you 9/24/18

## 2018-09-12 RX ORDER — OXYCODONE HYDROCHLORIDE 10 MG/1
10 TABLET ORAL EVERY 6 HOURS PRN
Qty: 100 TABLET | Refills: 0 | Status: SHIPPED | OUTPATIENT
Start: 2018-09-12 | End: 2018-10-12 | Stop reason: SDUPTHER

## 2018-09-12 NOTE — TELEPHONE ENCOUNTER
Per PDMP, Rx written and filled 8/13/18 for oxycodone, so patient is appropriate for refill on oxycodone 10 mg tablet Q6H PRN #100 for 30 day supply  Patient has pain contract with me and is adherent to appointments  Message sent to Dr Geraldo Sanchez requesting refill      KHALIDA Barney  PGY-2  [facility]  2

## 2018-09-26 DIAGNOSIS — G89.29 OTHER CHRONIC PAIN: ICD-10-CM

## 2018-09-26 RX ORDER — IBUPROFEN 800 MG/1
TABLET ORAL
Qty: 90 TABLET | Refills: 0 | Status: SHIPPED | OUTPATIENT
Start: 2018-09-26 | End: 2019-04-26

## 2018-10-09 DIAGNOSIS — J45.40 MODERATE PERSISTENT ASTHMA WITHOUT COMPLICATION: ICD-10-CM

## 2018-10-09 NOTE — TELEPHONE ENCOUNTER
Pt called requested oxycodone refill PDMP shows last refill on 9/12/18 for 100 tabs  Please review for preceptor to refill   Thank you

## 2018-10-12 DIAGNOSIS — G89.4 CHRONIC PAIN SYNDROME: Primary | ICD-10-CM

## 2018-10-12 DIAGNOSIS — M53.3 CHRONIC RIGHT SI JOINT PAIN: ICD-10-CM

## 2018-10-12 DIAGNOSIS — G89.4 CHRONIC PAIN SYNDROME: ICD-10-CM

## 2018-10-12 DIAGNOSIS — G89.29 CHRONIC RIGHT SI JOINT PAIN: ICD-10-CM

## 2018-10-12 DIAGNOSIS — M96.1 POST LAMINECTOMY SYNDROME: ICD-10-CM

## 2018-10-12 RX ORDER — OXYCODONE HYDROCHLORIDE 10 MG/1
10 TABLET ORAL EVERY 6 HOURS PRN
Qty: 90 TABLET | Refills: 0 | Status: SHIPPED | OUTPATIENT
Start: 2018-10-12 | End: 2018-10-17 | Stop reason: SDUPTHER

## 2018-10-12 NOTE — TELEPHONE ENCOUNTER
· Patient requesting refill of oxycodone 10 mg, 1 tablet PO Q6H PRN (Disp #90) for the indication of chronic pain syndrome, post laminectomy syndrome, chronic SI joint pain  Patient has been trying to use his oxycodone less now that he is on medical marijuana  · Reviewed PDMP:  · Last Rx written on 7/13/18 & filled on 7/13/18   · Rx: oxycodone 10 mg, 1 tablet PO Q6H PRN, Disp #90 for a 30-day supply  · Patient is adherent to all visits, no red flags on PDMP  · Pain contract signed with me previously  · Next office visit is scheduled 10-16-18  · Request sent to preceptor for refill      KHALIDA Barker  PGY-2  [facility]

## 2018-10-16 ENCOUNTER — OFFICE VISIT (OUTPATIENT)
Dept: FAMILY MEDICINE CLINIC | Facility: CLINIC | Age: 48
End: 2018-10-16
Payer: COMMERCIAL

## 2018-10-16 VITALS
WEIGHT: 199 LBS | HEART RATE: 76 BPM | HEIGHT: 73 IN | RESPIRATION RATE: 18 BRPM | BODY MASS INDEX: 26.37 KG/M2 | DIASTOLIC BLOOD PRESSURE: 80 MMHG | TEMPERATURE: 97.4 F | SYSTOLIC BLOOD PRESSURE: 142 MMHG

## 2018-10-16 DIAGNOSIS — F41.8 DEPRESSION WITH ANXIETY: ICD-10-CM

## 2018-10-16 DIAGNOSIS — R13.14 PHARYNGOESOPHAGEAL DYSPHAGIA: ICD-10-CM

## 2018-10-16 DIAGNOSIS — M96.1 POST LAMINECTOMY SYNDROME: ICD-10-CM

## 2018-10-16 DIAGNOSIS — G89.4 CHRONIC PAIN SYNDROME: Primary | ICD-10-CM

## 2018-10-16 PROCEDURE — 3008F BODY MASS INDEX DOCD: CPT | Performed by: FAMILY MEDICINE

## 2018-10-16 PROCEDURE — 99213 OFFICE O/P EST LOW 20 MIN: CPT | Performed by: FAMILY MEDICINE

## 2018-10-16 RX ORDER — DIAZEPAM 5 MG/1
5 TABLET ORAL EVERY 12 HOURS PRN
Qty: 40 TABLET | Refills: 0 | Status: SHIPPED | OUTPATIENT
Start: 2018-10-16 | End: 2018-12-02 | Stop reason: SDUPTHER

## 2018-10-16 NOTE — PROGRESS NOTES
Corbin Vogel 1970 male MRN: [id number]    Family Medicine Follow-up Visit    ASSESSMENT/PLAN  Diagnoses and all orders for this visit:    Pharyngoesophageal dysphagia        -     Reprinted barium swallow order form for patient, advised him to make appointment for testing    Chronic pain syndrome, Post laminectomy syndrome, depression with anxiety  -       Refilled diazepam (VALIUM) 5 mg tablet; Take 1 tablet (5 mg total) by mouth every 12 (twelve) hours as needed for anxiety, spasms, Disp #40        -      Patient counseled extensively regarding safety with use of controlled substances, including medical marijuana, and never to drive under the influence, to which patient agreed       Controlled substance medication follow up:    - The patient is currently taking controlled substance(s) for the following condition(s): Chronic back pain, post laminectomy syndrome (history of lumbar surgery by Dr Jose Recinos and SI fusion by Dr Christiano Mcmullen) (oxycodone); depression with anxiety, back spasms (diazepam)    - Prior to the use of controlled substances, the patient tried with incomplete symptom control the following medications/interventions: physical therapy, methocarbamol, cyclobenzaprine, gabapentin, insertion and removal of thoracic spinal cord stimulator due inefficacy  - Patient was previously on the following controlled substances, which did not provide adequate symptom control of his pain: percocet, tramadol      - Current medication regimen: Oxycodone & Diazepam     - Medication name: Oxycodone   - Medication dose: 10 mg    - Medication frequency: Q6H PRN    - Patient last took the above medication: Today    - Date of last refill: 10/12/18 for #90 tabs for 30 day supply  - Medication name: Diazepam   - Medication dose: 5 mg   - Medication frequency: Q12H PRN   - Patient last took the above medication: 2 days ago   - Date of last refill: 8/13/18 for #60 tabs for 30 day supply       - The patient is taking the medication(s) as prescribed  Refills in recent history (last 6 months) only filled by prescribers at our office, filled at the same pharmacy under the same insurance coverage      - The patient reports their symptoms are well-controlled on their current medication regimen  2-Item Chronic Pain Scale:  1  In the last month, on average, how would you rate your pain? (0 no pain, 10 is the worst possible pain): 5/10  2  In the last month, how much has pain interfered with your daily activities? (0 is no interference, 10 is unable to carry on any activities): 5/10    - Adverse effects: none  - Missed doses since last refill: 0 missed doses of oxycodone; uses diazepam less frequently  - [place]MP checked today: yes  - Patient has a controlled substances contract verified within the last 1 year: yes (10/16/18)  - Patient last had drug of abuse screening on the following date 7/9/18 with normal findings (positive for cannabinoids, patient on medical marijuana currently)  - Patient last had confirmatory urine testing on the following date 7/9/18 showing appropriate confirmation of prescribed medications: oxycodone (+), diazepam (-), but patient had reported no doses 48H prior to testing   - Patient has had a relevant physical exam pertaining to the above diagnosis in the last 6 months  Yes (10/16/18)  - Patient is a candidate for naloxone: no    - We reviewed the risks/benefits of being on the above controlled substance(s)  While taking this medication, patient will comply with all rules as stipulated in the contract and follow up in the office at least every 3-6 months according to our office policy  Hawa Lowry [facility] pharmacy 10/16/18 @5482 and spoke with pharmacy staff, who informed me that prior authorization request was not sent to our office because patient opted to just take the 5 day supply, which nullified the rest of the prescription and prevented the prior auth process from being initiated   They require a new Rx to be written before any additional dispenses will be allowed, as it is their pharmacy policy not to give multiple 5-day fills off of the same Rx  They gave me phone number for patient's prior auth line, [phone], will try to call tomorrow as patient will be out of medication 10/17/18  Discussed the above with supervising physician, Dr Mercedes Olsen  Follow up in 2 months  Future Appointments  Date Time Provider Drew Knight   12/17/2018 1:00 PM Maura Georges MD [facility]   12/21/2018 1:00 PM Melissa Zamora MD [facility]   1/23/2019 9:00 AM Princess Cronin MD [facility]            SUBJECTIVE  CC: Follow-up (back pain needs meds)      HPI:  Samanta Dove is a 46 y o  male who presents for:    · Chronic back pain & post-laminectomy syndrome s/p lumbar and SI joint fusion, s/p placement and removal of thoracic SCS: Patient reports symptoms are overall well-controlled on current regimen of oxycodone 10 mg Q6H PRN and diazepam 5 mg Q12H PRN  He has been reducing his oxycodone use (last fill was for #90, previously #100) and has been cutting down on diazepam use significantly and desires to continue reducing his dispense amount to only 40 today  Expresses his continued desires to continue cutting down to eventually get off of oxycodone and valium eventually, if possible, for chronic back pain due to complications with past spinal surgeries  Using medical marijuana, has found improvement to his symptoms and that has allowed him to reduce his oxycodone and valium use  Continues to have some intermittently worse back spasms that responds well to valium  Patient is considering reaching out to a new Orthopedic surgery or Neurosurgery team for re-evaluation  New pain contract to be completed today  Says he doesn't drive under the influence of marijuana or opiates/benzos  · Oxycodone: Last refill written/filled 10/12/18 of Oxycodone 10 mg PO Q6H PRN Disp #90   However, patient only received a 5 day supply (Disp #20) due to requirement of a prior authorization that was never done, and he states he then lost out on the rest of the Rx dispense amount  · Diazepam: Last refill written 8/13/18, filled 8/16/18 of Diazepam 5 mg 1 tablet Q12H PRN Disp #60    · Dysphagia: Patient remains symptomatic of dysphagia to both solids and liquids, no odynophagia, patient has not yet had barium swallow yet, needs re-print of order form  Hx of lingual tonsil biopsy 3/26/18 for dysphagia and lingual tonsil hypertrophy by Dr Kaylee Blackwell, but states this feels different  Review of Systems   Constitutional: Negative for appetite change, chills, fatigue, fever and unexpected weight change  Respiratory: Negative for chest tightness and shortness of breath  Cardiovascular: Negative for chest pain and palpitations  Gastrointestinal: Negative for abdominal pain, constipation, diarrhea, nausea and vomiting  Musculoskeletal: Positive for back pain, gait problem and myalgias  Skin: Negative for rash  Neurological: Negative for dizziness and light-headedness  All other systems reviewed and are negative        Historical Information   The patient history was reviewed as follows:    Past Medical History:   Diagnosis Date    Allergic rhinitis     Anxiety     Arthritis     Back pain     Chronic obstructive asthma (HCC)     Chronic pain syndrome     Cluster headaches     Depression     Hypertension     Insomnia     Kidney stones     Laryngospasm     Leukocytosis     Migraine     Myocardial infarction ([address] )     Nephrolithiasis     Organic impotence     Seasonal allergies     Sleep apnea     does not use CPAP    Stroke [address] [address]     TMJ (temporomandibular joint syndrome)     Wheezing     last assessed 05/19/17     Past Surgical History:   Procedure Laterality Date    BACK SURGERY      *9, [id number], nervectomy 2006, total of 10   Sin Ruff BUCCAL MASS EXCISION N/A 3/26/2018    Procedure: BIOPSY LINGUAL TONSIL (FLOW/ STANDARD PATH)  EVAL UNDER ANESTHESIA;  Surgeon: Evens Lopez MD;  [facility];  Service: ENT    COLONOSCOPY      HERNIA REPAIR      KIDNEY SURGERY      KNEE ARTHROSCOPY      LITHOTRIPSY      multiple    LUMBAR One [address] SURGERY  2015    MANDIBLE FRACTURE SURGERY      titanium plate    PELVIC SYMPHYSIS FUSION      AL ESOPHAGOGASTRODUODENOSCOPY TRANSORAL DIAGNOSTIC N/A 2/22/2018    Procedure: ESOPHAGOGASTRODUODENOSCOPY (EGD); Surgeon: Arlene Jauregui MD;  Location: [facility]; Service: Gastroenterology    AL REVISE/REMOVE SPINAL NEUROSTIM/ Left 6/9/2017    Procedure: REMOVAL OF A THORACIC SCS PLACED VIA LAMINECTOMY AND REMOVAL LEFT BUTTOCK GENERATOR; IMPULSE MONITORING;  Surgeon: Denise Mensah MD;  [facility];  Service: Neurosurgery    AL SURG IMPLNT Jose Mai N/A 9/8/2016    Procedure: DORSAL COLUMN STIMULATOR PLACEMENT (IMPULSE MONITORING);   Surgeon: Aimee Prajapati MD;  [facility];  Service: Orthopedics    SACROILIAC JOINT FUSION  2015    SHOULDER SURGERY Left     2     Family History   Problem Relation Age of Onset    Diabetes Father     Obesity Father     Liver cancer Father     Heart disease Father     Diabetes Brother     Hypertension Brother     Leukemia Mother     Hypertension Mother     Cancer Family       Social History   History   Alcohol Use    Yes     Comment: twice a month      History   Drug Use    Frequency: 7 0 times per week    Types: Marijuana     Comment: daily     History   Smoking Status    Current Some Day Smoker    Packs/day: 0 50    Years: 25 00   Smokeless Tobacco    Never Used     Comment: using patch       Medications:     Current Outpatient Prescriptions:     aspirin 81 MG tablet, Take 1 tablet by mouth daily, Disp: , Rfl:     diazepam (VALIUM) 5 mg tablet, Take 1 tablet (5 mg total) by mouth every 12 (twelve) hours as needed for anxiety, Disp: 60 tablet, Rfl: 0    fluticasone (FLONASE) 50 mcg/act nasal spray, 2 sprays into each nostril daily as needed  , Disp: , Rfl:     fluticasone-salmeterol (ADVAIR DISKUS) 250-50 mcg/dose inhaler, Inhale 1 puff daily, Disp: 1 Inhaler, Rfl: 3    ibuprofen (MOTRIN) 800 mg tablet, TAKE 1 TABLET BY MOUTH THREE TIMES DAILY AS NEEDED, Disp: 90 tablet, Rfl: 0    indomethacin (INDOCIN) 50 mg capsule, Take 1 capsule (50 mg total) by mouth 3 (three) times a day with meals For 2 weeks at start of cluster and call MD to continue, Disp: 90 capsule, Rfl: 1    meclizine (ANTIVERT) 25 mg tablet, Take 1 tablet (25 mg total) by mouth 3 (three) times a day as needed for dizziness, Disp: 30 tablet, Rfl: 0    oxyCODONE (ROXICODONE) 10 MG TABS, Take 1 tablet (10 mg total) by mouth every 6 (six) hours as needed for moderate pain Max Daily Amount: 40 mg, Disp: 90 tablet, Rfl: 0    VENTOLIN  (90 Base) MCG/ACT inhaler, USE 2 PUFFS EVERY 6 HOURS AS NEEDED FOR WHEEZING, Disp: 18 g, Rfl: 0    ZOLMitriptan (ZOMIG) 5 MG nasal solution, 1 spray in 1 nostril X1 at onset of migraine, may repeat X1 in 2 hours, max 10mg/24 hours  Dispense 2 boxes, Disp: 12 mL, Rfl: 5  Allergies   Allergen Reactions    Other Rash     Adhesive tape       OBJECTIVE    Vitals:   Vitals:    10/16/18 1755   BP: 142/80   Pulse: 76   Resp: 18   Temp: (!) 97 4 °F (36 3 °C)   Weight: 90 3 kg (199 lb)   Height: 6' 1" (1 854 m)     Physical Exam   Constitutional: He is oriented to person, place, and time  He appears well-developed and well-nourished  HENT:   Head: Normocephalic and atraumatic  Eyes: Pupils are equal, round, and reactive to light  Conjunctivae and EOM are normal    Neck: Normal range of motion  Cardiovascular: Normal rate and regular rhythm  Pulmonary/Chest: Effort normal and breath sounds normal    Abdominal: Soft   Bowel sounds are normal    Musculoskeletal:   Midline tenderness in thoracic and lumbar spine with tenderness to palpation and hypertonicity of B/L lumbar paravertebral musculature   Neurological: He is alert and oriented to person, place, and time  Skin: Skin is warm and dry  Psychiatric: He has a normal mood and affect  His behavior is normal    Vitals reviewed         Isaías Steele MD, PGY-2  [facility]   10/16/2018

## 2018-10-16 NOTE — PATIENT INSTRUCTIONS
Barium Swallow   WHAT YOU NEED TO KNOW:   Barium swallow, or esophagram, is an x-ray procedure used to examine your esophagus  Liquid barium is a white, chalky solution that helps healthcare providers see the esophagus more clearly  The esophagus attaches your throat to your stomach  HOW TO PREPARE:   The week before your procedure:   · Write down the correct date, time, and location of your procedure  · Arrange a ride home  Ask a family member or friend to drive you home after your surgery or procedure  Do not drive yourself home  · Ask your caregiver if you need to stop using aspirin or any other prescribed or over-the-counter medicine before your procedure or surgery  · Bring your medicine bottles or a list of your medicines when you see your caregiver  Tell your caregiver if you are allergic to any medicine  Tell your caregiver if you use any herbs, food supplements, or over-the-counter medicine  · Tell the healthcare provider if you have ever had an allergic reaction to contrast solution  · You may need blood, urine, or bowel movement tests before your procedure  You may also need a chest x-ray  Talk to your healthcare provider about these or other tests you may need  Write down the date, time, and location for each test   The day before your procedure:  Ask your healthcare provider about directions for eating and drinking  Do not eat or drink dairy products  Limit sugar  You may need to drink clear liquids, such as gelatin, broth, or clear fruit juice  The night before your procedure:  Do not eat or drink 12 hours before your procedure  The day of your procedure:   · Ask your caregiver before taking any medicine on the day of your procedure  These medicines include insulin, diabetic pills, high blood pressure pills, or heart pills   Bring a list of all the medicines you take, or your pill bottles, with you to the hospital     · You or a close family member will be asked to sign a legal document called a consent form  It gives caregivers permission to do the procedure or surgery  It also explains the problems that may happen, and your choices  Make sure all your questions are answered before you sign this form  · Caregivers may insert an intravenous tube (IV) into your vein  A vein in the arm is usually chosen  Through the IV tube, you may be given liquids and medicine  · An anesthesiologist will talk to you before your surgery  You may need medicine to keep you asleep or numb an area of your body during surgery  Tell caregivers if you or anyone in your family has had a problem with anesthesia in the past   WHAT WILL HAPPEN:   What will happen:   · Your healthcare provider will give you medicine that will help relax your esophagus and prevent spasms  Healthcare providers will take x-rays while you stand, before you drink the barium  In a single contrast barium swallow, you will drink up to 2 large barium mixtures  In a double contrast barium swallow, you will first drink a mixture of baking soda and an anti-foam agent  These substances produce gas to help expand your stomach and bowel  Pictures will be taken to see how the gas moves inside your stomach and bowel  Then, you will drink the barium mixture  · Healthcare providers will ask you sit or  different positions while they take pictures  You will then be asked to lie down on a table that will turn you in different positions  As the table moves, the barium will spread inside your stomach and bowels and more pictures will be taken  After your procedure: You will be taken to a room to rest until you are fully awake  Healthcare providers will monitor you closely for any problems  Do not get out of bed until your healthcare provider says it is okay  When your healthcare provider sees that you are okay, you will be able to go home or be taken to your hospital room     CONTACT YOUR HEALTHCARE PROVIDER IF:   · You cannot make it to your procedure  · You have a fever  · You get a cold or the flu  · You have questions or concerns about your procedure  SEEK CARE IMMEDIATELY IF:   · Your symptoms get worse  RISKS:   · You may have abdominal cramps, vomiting, or problems swallowing  The barium may become hard and cause a blockage in your bowels  Your esophagus may tear  The barium may leak out of this tear or go down your airway  If the barium goes down your airway, it can block oxygen from your heart and brain  This can be life-threatening  · Without this procedure, your condition may not be diagnosed  Correct treatment may not be given  Your signs and symptoms may get worse  You may have trouble eating, digesting food, and having a bowel movement  CARE AGREEMENT:   You have the right to help plan your care  Learn about your health condition and how it may be treated  Discuss treatment options with your caregivers to decide what care you want to receive  You always have the right to refuse treatment  © 2017 [facility] Information is for End User's use only and may not be sold, redistributed or otherwise used for commercial purposes  All illustrations and images included in CareNotes® are the copyrighted property of [employer]  or Niles Herrmann  The above information is an  only  It is not intended as medical advice for individual conditions or treatments  Talk to your doctor, nurse or pharmacist before following any medical regimen to see if it is safe and effective for you

## 2018-10-17 DIAGNOSIS — G89.4 CHRONIC PAIN SYNDROME: ICD-10-CM

## 2018-10-17 RX ORDER — OXYCODONE HYDROCHLORIDE 10 MG/1
10 TABLET ORAL EVERY 6 HOURS PRN
Qty: 20 TABLET | Refills: 0 | Status: SHIPPED | OUTPATIENT
Start: 2018-10-17 | End: 2018-10-17 | Stop reason: SDUPTHER

## 2018-10-17 RX ORDER — OXYCODONE HYDROCHLORIDE 10 MG/1
10 TABLET ORAL EVERY 6 HOURS PRN
Qty: 60 TABLET | Refills: 0 | Status: SHIPPED | OUTPATIENT
Start: 2018-10-21 | End: 2018-11-10

## 2018-10-17 NOTE — TELEPHONE ENCOUNTER
Spoke with patient, insurance did approve 100 tablets monthly thru April 2019, He received #20 on 10/12/18 and #20 on 10/17/18  Are you able to enter another prescription for the additional #60  Please review

## 2018-10-17 NOTE — TELEPHONE ENCOUNTER
-Pended Rx for additional #60 of oxycodone (see notes below) to complete patient's 30-day supply  -Pended Rx is dated to start 10/21/18, lasting 20 days (since he already filled 10 days worth), disp #60   -PDMP not yet showing the 10/17 Rx and fill written by Dr Geremias Ferro, still only showing the 5-day supply written and filled 10/12/18    -Request to sign off on the pended Rx sent to provider KHALIDA Jackson  PGY-2  [place] [id number]

## 2018-10-17 NOTE — TELEPHONE ENCOUNTER
Patient seen last night  Insurance requiring prior auth for his pain medication  Process started today  5 day Rx written as he is out of medication today, please let patient know   Thank you

## 2018-10-17 NOTE — TELEPHONE ENCOUNTER
Prior Auth submitted to [employer]  Medication was approved thru April 2019, Called patient's home, left message to call back  Need to know if he picked up the 20 that were ordered today, if not, we will have to get new Rx

## 2018-10-23 ENCOUNTER — HOSPITAL ENCOUNTER (OUTPATIENT)
Dept: RADIOLOGY | Facility: HOSPITAL | Age: 48
Discharge: HOME | End: 2018-10-23
Payer: COMMERCIAL

## 2018-10-23 DIAGNOSIS — R13.14 PHARYNGOESOPHAGEAL DYSPHAGIA: ICD-10-CM

## 2018-10-23 PROCEDURE — 74220 X-RAY XM ESOPHAGUS 1CNTRST: CPT

## 2018-10-26 ENCOUNTER — TELEPHONE (OUTPATIENT)
Dept: FAMILY MEDICINE CLINIC | Facility: CLINIC | Age: 48
End: 2018-10-26

## 2018-10-26 DIAGNOSIS — R13.14 PHARYNGOESOPHAGEAL DYSPHAGIA: ICD-10-CM

## 2018-10-26 DIAGNOSIS — R49.0 HOARSENESS: Primary | ICD-10-CM

## 2018-10-26 DIAGNOSIS — J35.1 LINGUAL TONSIL HYPERTROPHY: ICD-10-CM

## 2018-10-26 DIAGNOSIS — Z72.0 TOBACCO ABUSE: ICD-10-CM

## 2018-10-26 NOTE — TELEPHONE ENCOUNTER
Patient requesting Barium Swallow test he had at [facility] this past Tues 10/23/18  He also mentioned, since the test he is not able to keep liquids down at first try until he vomits and then after he's fine

## 2018-10-26 NOTE — TELEPHONE ENCOUNTER
Called patient with results  He states that over the last few weeks his symptoms have gotten worse with swallowing  Wants to know if you have any suggestions?

## 2018-10-29 NOTE — TELEPHONE ENCOUNTER
Patient called back stating he's not getting better and would like to know what to do regarding the following issue  Thank you in advance

## 2018-10-30 ENCOUNTER — TELEPHONE (OUTPATIENT)
Dept: FAMILY MEDICINE CLINIC | Facility: CLINIC | Age: 48
End: 2018-10-30

## 2018-10-30 DIAGNOSIS — R13.14 PHARYNGOESOPHAGEAL DYSPHAGIA: ICD-10-CM

## 2018-10-30 DIAGNOSIS — R49.0 HOARSENESS: Primary | ICD-10-CM

## 2018-10-30 PROBLEM — Z72.0 TOBACCO ABUSE: Status: ACTIVE | Noted: 2018-10-30

## 2018-10-30 NOTE — TELEPHONE ENCOUNTER
Barium swallow was unrevealing for etiology of esophageal dysphagia & regurgitation of food, as hiatal hernia was reported as small and there was no HESHAM observed to account for patient's symptoms; normal motility  Previous EGD 2/2018 WNL, Patient had similar symptoms at the time of ENT evaluation earlier this year; lingual tonsil biopsies showed reactive tonsillar hyperplasia  Will refer back to ENT for re-evaluation, especially since patient has a smoking hx  If ENT workup negative, will consider the diagnosis of functional dysphagia, as patient currently meets all [place] IV criteria functional dysphagia (sense of solid and/or liquid food lodging, sticking, or passing abnormally through the esophagus; no evidence that an esophageal mucosal or structural abnormality is the cause of the symptom; no evidence that GERD or EoE is the cause of the symptom; absence of a major esophageal motor disorder)  May consider trial of a smooth muscle relaxant (CCB, TCA) in the future if symptoms remain severe  Records review:    · 2/22/18 EGD noted right sided vocal cord appeared edematous, more prominent than left, esophageal biopsy negative for EoE, gastric bx neg for H  Pylori, noted no need for repeat EGD & recommended follow-up with ENT for pharyngeal thickening  · 3/26/18 had biopsy on B/L lingual tonsils by Dr Donovan Matos due to lingual tonsil hypertrophy, tissue exam showed reactive tonsillar hyperplasia B/L w/o overt evidence of lymphoma, flow cytometry negative for B/T cell lymphoproliferative disorders  · 10/23/18 Barium swallow showed small hiatal hernia but HESHAM not observed, esophageal motility & swallowing mechanism WNL  ·     ENT referral made (Dr Donovan Matos, who performed lingual tonsil biopsy), called and left a message for patient on his mobile #      KHALIDA Orellana  PGY-2  [place]  2

## 2018-10-30 NOTE — TELEPHONE ENCOUNTER
Pt returning call from Dr Sarthak Vargas  He said she told him to call back to discuss next step after swallow test    He said he tried to eat 2 times today and the food got stuck

## 2018-10-31 NOTE — TELEPHONE ENCOUNTER
So I spoke to the pt and discussed your plan for manometry and ENT follow up  He continued describing his symptoms and said what he feels is a  Feeling like vomitting he feels he can reproduce symptoms by drinking a soda like ginger ale he tawanna it feels like things dont get to his stomach and then just come back up his esophagus  He has the number to call fo ENT fu and also central scheduling ofr the manometry to be scheduled   I told him you would place the order in the am  Thank you

## 2018-11-09 DIAGNOSIS — J45.40 MODERATE PERSISTENT ASTHMA WITHOUT COMPLICATION: ICD-10-CM

## 2018-11-12 DIAGNOSIS — G89.4 CHRONIC PAIN SYNDROME: ICD-10-CM

## 2018-11-12 DIAGNOSIS — G89.29 CHRONIC RIGHT SI JOINT PAIN: ICD-10-CM

## 2018-11-12 DIAGNOSIS — M53.3 CHRONIC RIGHT SI JOINT PAIN: ICD-10-CM

## 2018-11-12 DIAGNOSIS — M96.1 POST LAMINECTOMY SYNDROME: Primary | ICD-10-CM

## 2018-11-14 DIAGNOSIS — M96.1 POST LAMINECTOMY SYNDROME: ICD-10-CM

## 2018-11-14 DIAGNOSIS — M53.3 CHRONIC RIGHT SI JOINT PAIN: ICD-10-CM

## 2018-11-14 DIAGNOSIS — G89.4 CHRONIC PAIN SYNDROME: ICD-10-CM

## 2018-11-14 DIAGNOSIS — G89.29 CHRONIC RIGHT SI JOINT PAIN: ICD-10-CM

## 2018-11-14 RX ORDER — OXYCODONE HYDROCHLORIDE 10 MG/1
10 TABLET ORAL EVERY 6 HOURS PRN
Qty: 90 TABLET | Refills: 0 | Status: SHIPPED | OUTPATIENT
Start: 2018-11-14 | End: 2018-12-02 | Stop reason: SDUPTHER

## 2018-11-14 RX ORDER — OXYCODONE HYDROCHLORIDE 10 MG/1
10 TABLET ORAL EVERY 6 HOURS PRN
Qty: 90 TABLET | Refills: 0 | Status: SHIPPED | OUTPATIENT
Start: 2018-11-14 | End: 2018-11-14 | Stop reason: SDUPTHER

## 2018-11-14 NOTE — TELEPHONE ENCOUNTER
· Patient requesting refill of oxycodone 10 mg, 1 tablet PO Q6H PRN (Disp #90) for the indication of chronic pain syndrome, post laminectomy syndrome, chronic SI joint pain  Patient continuing to try to use less oxycodone now that he is on medical marijuana  · Reviewed PDMP:  ? Due to insurance prior auth issues, patient had 2 sets of oxycodone Rxs in October  First Rx written on 10/17/18 (5 day supply, disp #20) & filled on 10/17/18; second Rx written 10/17/18 (15 day supply, disp #60) & filled on 10/23/18  ? Thus, patient is now appropriate for refill  ? Rx: oxycodone 10 mg, 1 tablet PO Q6H PRN, Disp #90 for a 30-day supply  · Patient is adherent to all visits, new pain contract signed with me at 10/16/18 office visit  · Next office visit is scheduled 12/21/18    · Request sent to preceptor for refill      KHALIDA Chandler  PGY-2  [place] [id number]

## 2018-11-23 ENCOUNTER — HOSPITAL ENCOUNTER (OUTPATIENT)
Dept: GASTROENTEROLOGY | Facility: HOSPITAL | Age: 48
Discharge: HOME | End: 2018-11-23
Payer: COMMERCIAL

## 2018-11-23 DIAGNOSIS — R13.14 PHARYNGOESOPHAGEAL DYSPHAGIA: ICD-10-CM

## 2018-11-23 DIAGNOSIS — R49.0 HOARSENESS: ICD-10-CM

## 2018-11-23 PROCEDURE — 91020 GASTRIC MOTILITY STUDIES: CPT

## 2018-11-23 NOTE — PERIOPERATIVE NURSING NOTE
Patient brought  In the room and explained procedure  Lidocaine viscous applied to right nostril and  Inserted manometry probe via right nostril and patient gagging and not tolerating Patient asking to pull it out  Probed pulled out intact   Reinserted the probe sucessfully to 54cm placement  Patient given 10 swallow, 10 viscous, 5 rapid swallows  Patient tolerated procedure  Manometry probe pulled out intact  Discharge instructions reviewed and verbalized understanding  Patient left the room in no distress

## 2018-11-26 DIAGNOSIS — G89.4 CHRONIC PAIN SYNDROME: ICD-10-CM

## 2018-11-26 DIAGNOSIS — M96.1 POST LAMINECTOMY SYNDROME: ICD-10-CM

## 2018-11-26 DIAGNOSIS — G89.29 CHRONIC RIGHT SI JOINT PAIN: ICD-10-CM

## 2018-11-26 DIAGNOSIS — M53.3 CHRONIC RIGHT SI JOINT PAIN: ICD-10-CM

## 2018-11-26 DIAGNOSIS — J45.40 MODERATE PERSISTENT ASTHMA WITHOUT COMPLICATION: ICD-10-CM

## 2018-11-26 DIAGNOSIS — F41.8 DEPRESSION WITH ANXIETY: ICD-10-CM

## 2018-11-26 NOTE — TELEPHONE ENCOUNTER
Patient left voice message requesting refill of his inhalers and flonase  He also stated in his message that his last refill of Oxycodone was #90, he usually gets #100, stated he will be running short

## 2018-11-27 RX ORDER — FLUTICASONE PROPIONATE 50 MCG
2 SPRAY, SUSPENSION (ML) NASAL DAILY PRN
Qty: 16 G | Refills: 2 | Status: SHIPPED | OUTPATIENT
Start: 2018-11-27 | End: 2019-03-18 | Stop reason: SDUPTHER

## 2018-11-27 RX ORDER — ALBUTEROL SULFATE 90 UG/1
2 AEROSOL, METERED RESPIRATORY (INHALATION) EVERY 6 HOURS PRN
Qty: 18 G | Refills: 2 | Status: SHIPPED | OUTPATIENT
Start: 2018-11-27 | End: 2019-03-18 | Stop reason: SDUPTHER

## 2018-11-29 NOTE — TELEPHONE ENCOUNTER
Patient calling again about medication refills  He also is requesting refill on his Valium #90  Please address  Thanks!

## 2018-11-30 ENCOUNTER — TELEPHONE (OUTPATIENT)
Dept: FAMILY MEDICINE CLINIC | Facility: CLINIC | Age: 48
End: 2018-11-30

## 2018-11-30 NOTE — TELEPHONE ENCOUNTER
Patient requesting refill of 2 medications:    1  Oxycodone 10 mg, 1 tablet PO Q6H PRN (Disp #90) for chronic pain syndrome, post laminectomy syndrome, chronic SI joint pain  Reviewed PDMP:  · Disp #20 (5 days) written & filled on 10/17/18  · Disp #60 (15 days) written 10/17/18 & filled on 10/23/18  · Disp #90 (23 days) written & filled on 11/14/18  · Patient should theoretically be due for next refill on 12/6/18  · Pain contract signed with me previously  · Patient is adherent to all visits, no red flags on PDMP  2  Valium 5 mg tab, 1 tab Q12H PRN (Disp#40) for anxiety  Reviewed PDMP:  · Disp #40 (20 days) written & filled on 10/17/18  · Patient is adherent to all visits, no red flags on PDMP       Next office visit is scheduled 12/21/18  Requests sent to preceptor for refills, with oxycodone written for 12/6/18 start date      KHALIDA Nascimento  PGY-2  [place] [id number]

## 2018-12-03 RX ORDER — OXYCODONE HYDROCHLORIDE 10 MG/1
10 TABLET ORAL EVERY 6 HOURS PRN
Qty: 90 TABLET | Refills: 0 | Status: SHIPPED | OUTPATIENT
Start: 2018-12-03 | End: 2019-01-09 | Stop reason: SDUPTHER

## 2018-12-03 RX ORDER — DIAZEPAM 5 MG/1
5 TABLET ORAL EVERY 12 HOURS PRN
Qty: 40 TABLET | Refills: 0 | Status: SHIPPED | OUTPATIENT
Start: 2018-12-03 | End: 2019-01-16 | Stop reason: SDUPTHER

## 2018-12-05 ENCOUNTER — TELEPHONE (OUTPATIENT)
Dept: FAMILY MEDICINE CLINIC | Facility: CLINIC | Age: 48
End: 2018-12-05

## 2018-12-05 NOTE — TELEPHONE ENCOUNTER
Voice message requesting refill of Oxycodone #100, states last month he only received #90, not sure why

## 2018-12-06 NOTE — TELEPHONE ENCOUNTER
Patient called again, reviewed his recent GI study with him, he states he continues to be symptomatic  Do you have any suggestions?

## 2018-12-08 DIAGNOSIS — J45.40 MODERATE PERSISTENT ASTHMA WITHOUT COMPLICATION: ICD-10-CM

## 2018-12-13 ENCOUNTER — APPOINTMENT (OUTPATIENT)
Dept: LAB | Facility: CLINIC | Age: 48
End: 2018-12-13
Payer: COMMERCIAL

## 2018-12-13 DIAGNOSIS — D72.829 ELEVATED WHITE BLOOD CELL COUNT: ICD-10-CM

## 2018-12-13 LAB
ALBUMIN SERPL BCP-MCNC: 4.3 G/DL (ref 3.5–5)
ALP SERPL-CCNC: 99 U/L (ref 46–116)
ALT SERPL W P-5'-P-CCNC: 23 U/L (ref 12–78)
ANION GAP SERPL CALCULATED.3IONS-SCNC: 9 MMOL/L (ref 4–13)
AST SERPL W P-5'-P-CCNC: 13 U/L (ref 5–45)
BASOPHILS # BLD AUTO: 0.09 THOUSANDS/ΜL (ref 0–0.1)
BASOPHILS NFR BLD AUTO: 1 % (ref 0–1)
BILIRUB SERPL-MCNC: 0.3 MG/DL (ref 0.2–1)
BUN SERPL-MCNC: 21 MG/DL (ref 5–25)
CALCIUM SERPL-MCNC: 9.6 MG/DL (ref 8.3–10.1)
CHLORIDE SERPL-SCNC: 105 MMOL/L (ref 100–108)
CO2 SERPL-SCNC: 32 MMOL/L (ref 21–32)
CREAT SERPL-MCNC: 1.34 MG/DL (ref 0.6–1.3)
EOSINOPHIL # BLD AUTO: 0.38 THOUSAND/ΜL (ref 0–0.61)
EOSINOPHIL NFR BLD AUTO: 2 % (ref 0–6)
ERYTHROCYTE [DISTWIDTH] IN BLOOD BY AUTOMATED COUNT: 15.6 % (ref 11.6–15.1)
GFR SERPL CREATININE-BSD FRML MDRD: 61 ML/MIN/1.73SQ M
GLUCOSE SERPL-MCNC: 90 MG/DL (ref 65–140)
HCT VFR BLD AUTO: 54.8 % (ref 36.5–49.3)
HGB BLD-MCNC: 17.8 G/DL (ref 12–17)
IMM GRANULOCYTES # BLD AUTO: 0.05 THOUSAND/UL (ref 0–0.2)
IMM GRANULOCYTES NFR BLD AUTO: 0 % (ref 0–2)
LYMPHOCYTES # BLD AUTO: 4.62 THOUSANDS/ΜL (ref 0.6–4.47)
LYMPHOCYTES NFR BLD AUTO: 29 % (ref 14–44)
MCH RBC QN AUTO: 29.3 PG (ref 26.8–34.3)
MCHC RBC AUTO-ENTMCNC: 32.5 G/DL (ref 31.4–37.4)
MCV RBC AUTO: 90 FL (ref 82–98)
MONOCYTES # BLD AUTO: 1.24 THOUSAND/ΜL (ref 0.17–1.22)
MONOCYTES NFR BLD AUTO: 8 % (ref 4–12)
NEUTROPHILS # BLD AUTO: 9.52 THOUSANDS/ΜL (ref 1.85–7.62)
NEUTS SEG NFR BLD AUTO: 60 % (ref 43–75)
NRBC BLD AUTO-RTO: 0 /100 WBCS
PLATELET # BLD AUTO: 431 THOUSANDS/UL (ref 149–390)
PMV BLD AUTO: 9.5 FL (ref 8.9–12.7)
POTASSIUM SERPL-SCNC: 4.1 MMOL/L (ref 3.5–5.3)
PROT SERPL-MCNC: 7.9 G/DL (ref 6.4–8.2)
RBC # BLD AUTO: 6.08 MILLION/UL (ref 3.88–5.62)
SODIUM SERPL-SCNC: 146 MMOL/L (ref 136–145)
WBC # BLD AUTO: 15.9 THOUSAND/UL (ref 4.31–10.16)

## 2018-12-13 PROCEDURE — 85025 COMPLETE CBC W/AUTO DIFF WBC: CPT

## 2018-12-13 PROCEDURE — 80053 COMPREHEN METABOLIC PANEL: CPT

## 2018-12-13 PROCEDURE — 36415 COLL VENOUS BLD VENIPUNCTURE: CPT

## 2018-12-14 ENCOUNTER — TELEPHONE (OUTPATIENT)
Dept: FAMILY MEDICINE CLINIC | Facility: CLINIC | Age: 48
End: 2018-12-14

## 2018-12-14 NOTE — PROGRESS NOTES
Hematology Outpatient Follow - Up Note  Stanislav Nelson 46 y o  male MRN: @ Encounter: [id number]        Date:  12/17/2018    Assessment/ Plan:    Intermittent leukocytosis since at least 2014, negative for bcr/ABL by PCR, JAK2 mutation, flow cytometry was negative 12/2017  Elevated WBC likely secondary/reactive to chronic bronchitis from daily smoking or sinusitis or tooth abscess  He does have sleep apnea but has not found an oxygen delivery cyst not been tolerable  2   Intermittently elevated H/H to 17/50  Most recent evaluation 12/13/2018 identified hemoglobin 17 8 and hematocrit 54 8  He is encouraged to remain hydrated  We discussed the possibility of blood donation  States he has not attempted to donate blood for many years due to his various tattoos  He is asked to follow up in 3 months  If still elevated and he was unable to donate blood we discussed phlebotomy    3  Platelet count intermittently elevated to 400 thousands since at least April 2016 December 2017 platelet count 020, June 2018 platelet count 663, July 2018 platelet count 115, December 2018 platelet count 733     4   Flesh like mass on the posterior aspect of the tongue,  EGD 2/2018:  No malignancy  B tonsillar bx 3/2018 by Dr Vi Kamara: Reactive tonsillar hyperplasia  Flow cytometry on specimen did not reveal lymphoma  History of Present Illness:  Patient seen12/7/2017 regarding chronic leukocytosis  He was seen x1 5/2016 regarding the same issue  From the office, he was advised to go to the ED secondary to pain over his kidney  Patient's recollection of that day was that when I heard bone marrow, I just walked out  He has a history of nephrolithiasis, nicotine abuse, lower back pain, multiple tattoos, no evidence of hepatitis C or HIV 5/2016   May 2016 WBC 9 0, hemoglobin 15 8, platelets 618, normal differentialIn April 2016 WBC 15 2, hemoglobin 17 1, platelets [id number] February 2015 WBC 10 9, hemoglobin 15 9, Platelets 368,000C-reactive protein 5, sedimentation rate 2  He smokes 1 pack of cigarette daily, he denied any drug abuse he drinks alcohol occasionally  Spleen unremarkable on CT A/P 7/2017  Lyme via WB negative  HX TIA; Cluster HA  Low grade frontal HA x 2 weeks  Had right teeth issues  Root canal recommended  Doesn't have money to do so at this time  Was put on antibiotics 3 separate occasions  States he had eval with PCP previously and was told he had LEFT sided cervical adenopathy  Evaluated by PCP for possible flu  Not eating  Chronic night sweats x 1 year, Become drenching x 2 weeks 12/2017 sleeping excessively  States he can wake up and go back to sleep in 1 hour  Denies sore throat  Denies any GI or  symptoms  Chronic LBP  Negative for bcr/ABL by PCR, JAK2 mutation, flow cytometry was negative 12/2017  TSH, CRP, MACKENZIE, ESR, B12, LDH, uric acid, testosterone normal 12/2017  Interval History:  EGD 2/2018:  No malignancy  B tonsillar bx 3/2018 by Dr Luis Freed: Reactive tonsillar hyperplasia  Flow cytometry on specimen did not reveal lymphoma  12/13/2018:  CBCD and CMP  Reviewed  Hemoglobin and hematocrit are elevated  RT has lost some weight  For the past 2 years he has had progressive dysphagia where he feels that after he eats or drinks any solids or liquids that he can't breathe for 4-50 seconds  He has had extensive workup including barium swallow laryngoscopy any etiologies not been revealing  He sees Dr Line Stanford regarding cluster headaches  His insurance will cover noted headedness up  He has sleep apnea but is unable to tolerate various CPAP machines               Test Results:      Labs:   Lab Results   Component Value Date    HGB 17 8 (H) 12/13/2018    HCT 54 8 (H) 12/13/2018    MCV 90 12/13/2018     (H) 12/13/2018    WBC 15 90 (H) 12/13/2018    NRBC 0 12/13/2018    BANDSPCT 1 06/26/2018    ATYLMPCT 3 (H) 08/01/2016     Lab Results   Component Value Date     (H) 10/30/2015    K 4 1 12/13/2018     12/13/2018    CO2 32 12/13/2018    ANIONGAP 7 10/30/2015    BUN 21 12/13/2018    CREATININE 1 34 (H) 12/13/2018    GLUCOSE 110 10/30/2015    GLUF 97 06/25/2018    CALCIUM 9 6 12/13/2018    AST 13 12/13/2018    ALT 23 12/13/2018    ALKPHOS 99 12/13/2018    PROT 7 7 10/30/2015    BILITOT 0 49 10/30/2015    EGFR 61 12/13/2018           Lab Results   Component Value Date    MNCDLHES82 847 12/07/2017         Imaging: No results found  ROS:   As mentioned in HPI & Interval History otherwise 14 point ROS negative  Allergies: Allergies   Allergen Reactions    Other Rash     Adhesive tape     Current Medications: Reviewed  PMH/FH/SH:  Reviewed      Physical Exam:    There is no height or weight on file to calculate BSA  Ht Readings from Last 3 Encounters:   10/16/18 6' 1" (1 854 m)   07/26/18 6' 1" (1 854 m)   07/23/18 6' 1" (1 854 m)       Wt Readings from Last 3 Encounters:   10/16/18 90 3 kg (199 lb)   07/26/18 88 9 kg (196 lb)   07/23/18 90 3 kg (199 lb)        Temp Readings from Last 3 Encounters:   10/16/18 (!) 97 4 °F (36 3 °C)   07/23/18 (!) 96 4 °F (35 8 °C)   07/09/18 (!) 95 7 °F (35 4 °C)        BP Readings from Last 3 Encounters:   10/16/18 142/80   07/26/18 122/78   07/23/18 136/88           Physical Exam   Constitutional: He is oriented to person, place, and time  He appears well-developed and well-nourished  No distress  HENT:   Head: Normocephalic and atraumatic  Eyes: Pupils are equal, round, and reactive to light  Conjunctivae and EOM are normal    Neck: Normal range of motion  Neck supple  No tracheal deviation present  Cardiovascular: Normal rate and regular rhythm  Exam reveals no gallop and no friction rub  No murmur heard  Pulmonary/Chest: Effort normal and breath sounds normal  No respiratory distress  He has no wheezes  He has no rales  He exhibits no tenderness  Abdominal: Soft  Bowel sounds are normal  He exhibits no distension   There is no tenderness  Lymphadenopathy:     He has no cervical adenopathy  Neurological: He is alert and oriented to person, place, and time  Skin: Skin is warm and dry  No rash noted  He is not diaphoretic  No erythema  No pallor  Psychiatric: He has a normal mood and affect  His behavior is normal  Judgment and thought content normal    Vitals reviewed

## 2018-12-17 ENCOUNTER — OFFICE VISIT (OUTPATIENT)
Dept: HEMATOLOGY ONCOLOGY | Facility: CLINIC | Age: 48
End: 2018-12-17
Payer: COMMERCIAL

## 2018-12-17 VITALS
HEART RATE: 70 BPM | RESPIRATION RATE: 14 BRPM | SYSTOLIC BLOOD PRESSURE: 126 MMHG | BODY MASS INDEX: 26.11 KG/M2 | WEIGHT: 197 LBS | HEIGHT: 73 IN | OXYGEN SATURATION: 98 % | DIASTOLIC BLOOD PRESSURE: 80 MMHG | TEMPERATURE: 98.7 F

## 2018-12-17 DIAGNOSIS — R71.8 ELEVATED HEMATOCRIT: Primary | ICD-10-CM

## 2018-12-17 DIAGNOSIS — D72.828 OTHER ELEVATED WHITE BLOOD CELL (WBC) COUNT: ICD-10-CM

## 2018-12-17 DIAGNOSIS — R79.89 ELEVATED PLATELET COUNT: ICD-10-CM

## 2018-12-17 PROBLEM — D72.829 LEUKOCYTOSIS: Status: ACTIVE | Noted: 2018-12-17

## 2018-12-17 PROCEDURE — 99214 OFFICE O/P EST MOD 30 MIN: CPT | Performed by: PHYSICIAN ASSISTANT

## 2018-12-18 PROCEDURE — 91010 ESOPHAGUS MOTILITY STUDY: CPT | Performed by: INTERNAL MEDICINE

## 2018-12-21 ENCOUNTER — OFFICE VISIT (OUTPATIENT)
Dept: FAMILY MEDICINE CLINIC | Facility: CLINIC | Age: 48
End: 2018-12-21
Payer: COMMERCIAL

## 2018-12-21 VITALS
TEMPERATURE: 97.3 F | HEART RATE: 74 BPM | HEIGHT: 73 IN | RESPIRATION RATE: 16 BRPM | BODY MASS INDEX: 25.45 KG/M2 | WEIGHT: 192 LBS | DIASTOLIC BLOOD PRESSURE: 70 MMHG | SYSTOLIC BLOOD PRESSURE: 126 MMHG

## 2018-12-21 DIAGNOSIS — M96.1 POST LAMINECTOMY SYNDROME: ICD-10-CM

## 2018-12-21 DIAGNOSIS — M54.12 CERVICAL RADICULOPATHY: ICD-10-CM

## 2018-12-21 DIAGNOSIS — M25.511 ACUTE PAIN OF RIGHT SHOULDER: ICD-10-CM

## 2018-12-21 DIAGNOSIS — K22.4 HYPERCONTRACTILE ESOPHAGUS: Primary | ICD-10-CM

## 2018-12-21 PROCEDURE — 99213 OFFICE O/P EST LOW 20 MIN: CPT | Performed by: FAMILY MEDICINE

## 2018-12-21 PROCEDURE — 3008F BODY MASS INDEX DOCD: CPT | Performed by: FAMILY MEDICINE

## 2018-12-21 NOTE — PATIENT INSTRUCTIONS
Esophageal Spasm   WHAT YOU NEED TO KNOW:   What is esophageal spasm? Esophageal spasm is a sudden, painful tightening of your lower esophagus  Your esophagus is the tube that food and liquids pass through from your mouth to your stomach  What causes esophageal spasm? The cause of esophageal spasm is not clear  It may be caused by problems with the nerves that control how your esophagus moves when you swallow   Esophageal spasm may be common among family members  Foods that are too hot or too cold may increase how often your esophagus spasms  Spasms may also happen on their own  What are the signs and symptoms of esophageal spasm? You may have any of the following:  · Trouble when you swallow: Food may get stuck in your esophagus  · Chest pain:  You may have chest pain or discomfort that starts behind your sternum (breastbone)  The pain may spread to your arms, jaw, or back  It may be mild or severe  It may also worsen when you eat  · Heartburn: This is a burning feeling in your chest or throat caused by stomach acid that rises into your throat  This may leave a bitter taste in your mouth, and it may be worse after meals or when you lie down  How is esophageal spasm diagnosed? You may receive the following tests:  · Manometry: Your healthcare provider will gently insert a tube into your throat and down into your stomach  The tube has sensors on it that measure the pressure in your esophagus  This pressure shows the strength of the spasms when you swallow  The test also shows how well food and fluids move down your esophagus when you swallow  · Endoscopy: Your healthcare provider will gently place a scope (long tube with a small camera on the end) into your throat to check for problems with the shape of your esophagus  Your healthcare provider may also check the thickness of your esophagus  Samples of your esophagus tissue may be taken and sent to a lab for tests  · X-ray with barium swallow:  An x-ray of your abdomen is a picture of your stomach and esophagus  You will drink a thick liquid called barium to help your esophagus and stomach show up better on the x-ray  Follow the instructions from your healthcare provider before and after the x-ray test   How is esophageal spasm treated? With treatment, your spasms, pain, and trouble swallowing may improve  Ask your healthcare provider for more information about these and other treatments for esophageal spasms:  · Medicine:      ¨ Pain medicine: This medicine helps take away or decrease pain caused by the spasms  ¨ Smooth muscle relaxants: This medicine may help your muscles and esophagus relax so it is easier for you to swallow  It may also decrease your pain and trouble swallowing  ¨ Proton pump inhibitors: This medicine may help reduce stomach acid and prevent heartburn  ¨ Botulinum toxin injections: This medicine is given as shots into your esophagus to relax the muscles  Your healthcare provider may use a scope as a guide for the injections  · Surgery: You may need surgery if other treatments do not improve your symptoms  ¨ Dilatation: Dilators to widen your esophagus are gently inserted through a scope into your esophagus  ¨ Myotomy:  Muscles in your esophagus are cut to widen the area and allow food and liquids to move into the stomach more easily  What other treatments may my healthcare provider suggest?  Ask for more information about the following:  · Biofeedback: Biofeedback is a type of therapy that helps you control how your body reacts to stress or pain  Your healthcare provider will use electrodes (wires) on different parts of your body, such as your chest, to monitor your body responses  This may help you learn ways to reduce your pain or spasms  · Relaxation therapy:  Stress may cause pain, lead to illness, and slow healing  Relaxation therapy teaches you how to feel less physical and emotional stress   Deep breathing, muscle relaxation, and music are some forms of relaxation therapy  When should I contact my healthcare provider? · Your symptoms do not improve even with treatment  · You have severe pain when you swallow  · You lose weight without trying  · You have questions about your condition or care  When should I seek immediate care or call 911? · You are drooling or have trouble swallowing   · You are choking, gagging, or vomiting  · You have pain when you swallow  · You have new or worse chest pain and shortness of breath  CARE AGREEMENT:   You have the right to help plan your care  Learn about your health condition and how it may be treated  Discuss treatment options with your caregivers to decide what care you want to receive  You always have the right to refuse treatment  The above information is an  only  It is not intended as medical advice for individual conditions or treatments  Talk to your doctor, nurse or pharmacist before following any medical regimen to see if it is safe and effective for you  © 2017 [employer] [employer] Information is for End User's use only and may not be sold, redistributed or otherwise used for commercial purposes  All illustrations and images included in CareNotes® are the copyrighted property of A D A M , Inc  or Niles Herrmann  Rotator Cuff Injury   WHAT YOU NEED TO KNOW:   A rotator cuff injury is damage to the muscles or tendons of your rotator cuff  The rotator cuff is made up of a group of muscles and tendons that hold the shoulder joint in place  The damage may include stretching of your muscle or tears in the tendons  It may also include inflammation of the bursa (small sack of fluid around the joint)  DISCHARGE INSTRUCTIONS:   Medicines:   · Acetaminophen: This medicine decreases pain  Acetaminophen is available without a doctor's order  Ask how much to take and how often to take it  Follow directions  Acetaminophen can cause liver damage if not taken correctly  · NSAIDs:  These medicines decrease swelling, pain, and fever  NSAIDs are available without a doctor's order  Ask your healthcare provider which medicine is right for you  Ask how much to take and when to take it  Take as directed  NSAIDs can cause stomach bleeding or kidney problems if not taken correctly  · Pain medicine: You may be given a prescription medicine to decrease pain  Do not wait until the pain is severe before you take this medicine  · Steroids: This medicine may be injected into the rotator cuff area to decrease inflammation and pain  · Take your medicine as directed  Contact your healthcare provider if you think your medicine is not helping or if you have side effects  Tell him or her if you are allergic to any medicine  Keep a list of the medicines, vitamins, and herbs you take  Include the amounts, and when and why you take them  Bring the list or the pill bottles to follow-up visits  Carry your medicine list with you in case of an emergency  Follow up with your healthcare provider or orthopedist as directed:  Write down your questions so you remember to ask them during your visits  Physical therapy:  A physical therapist can teach you exercises to help improve movement and strength, and to decrease pain  These exercises may help you go back to your usual activities or return to playing sports  Self-care:   · Rest:  Rest may help your shoulder heal  Overuse of your shoulder can make your injury worse  Avoid heavy lifting, using your arms over your head, or any other activity that makes the pain worse  · Put ice or heat on your shoulder:  Use ice on your shoulder every few hours for the first several days  This may help decrease pain and swelling  After the first several days, a heating pad may help relax the muscles in your shoulder    Contact your healthcare provider or orthopedist if:   · The pain in your shoulder or arm is not improving, or is worse than before you started treatment  · You have new pain in your neck  · You have a fever  · You have questions or concerns about your condition or care  Seek care immediately or call 911 if:  · You suddenly cannot move your arm  · You have severe stomach or back pain, are vomiting blood, or have black bowel movements  © 2017 [address] [employer] Information is for End User's use only and may not be sold, redistributed or otherwise used for commercial purposes  All illustrations and images included in CareNotes® are the copyrighted property of [employer]  or Niles Herrmann  The above information is an  only  It is not intended as medical advice for individual conditions or treatments  Talk to your doctor, nurse or pharmacist before following any medical regimen to see if it is safe and effective for you

## 2018-12-21 NOTE — PROGRESS NOTES
Michael Guzman 1970 male MRN: [id number]    Family Medicine Follow-up Visit    ASSESSMENT/PLAN  Problem List Items Addressed This Visit     Post laminectomy syndrome     -Lumbar back brace ordered today per patient request  -Patient will be due for Oxycodone and Valium refill 1/3/19  -Continue current weaning of opioid and benzo  -Patient continues to decline PT/pain management/NeuroSx referral         Relevant Medications    Elastic Bandages & Supports (LUMBAR BACK BRACE/SUPPORT PAD) MISC    Other Relevant Orders    Lumbar Back Brace    XR spine cervical complete 4 or 5 vw non injury    Hypercontractile esophagus - Primary     -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 diminutive hiatal hernia  -Report noted that dysphagia may be 2/2 hypercontractile disorder  -Recommended symptom treatments per report include CCBs or nitrates   -However, patient's BP today 126/70, defer starting CCB or nitrate at this time due to concern for iatrogenic hypotension from hypercontractility treatment  -Advised to make appointment with Dr Tianna Spring (GI), with whom he has already established care (2/2018 EGD), for management of hypercontractile esophagus  -GI referral to Dr Tianna Spring given today, patient agrees to call for appointment         Relevant Orders    Ambulatory referral to Gastroenterology    Right shoulder pain     -Acute right shoulder pain s/p traumatic jerking of arm last week  -No evidence of rotator cuff instability or weakness, only pain elicited on exam  -Obtain XR to assess for humeral migration suggestive of rotator cuff tear  -Discussed shoulder rest as desired w/ activity as tolerated, recommended application of ice/heat, OTC topical lidocaine, Tylenol, NSAIDs PRN         Relevant Orders    XR shoulder 2+ vw right    XR spine cervical complete 4 or 5 vw non injury    Cervical radiculopathy     -Intermittent tingling/pain in neck down toward R hand/fingers  -Obtain C-spine XR to evaluate for foraminal narrowing suggestive nerve involvement/impingement  -Multimodal management of neck pain via topical ice/heat, lidocaine, tylenol, NSAIDs, etc  as noted above         Relevant Orders    XR spine cervical complete 4 or 5 vw non injury          Follow up in 6 weeks  Patient will need refills on valium & oxycodone around 1/3/2019  Future Appointments  Date Time Provider Drew Knight   1/23/2019 9:00 AM Ifeoma Valle MD [facility]   2/1/2019 1:00 PM Shima Tony MD [facility]   3/18/2019 11:40 AM Ulysses Melbourne, MD [facility]          SUBJECTIVE  CC: Follow-up (barium swallow)      HPI:  Martin Rogers is a 46 y o  male who presents for follow up on:    · Dysphagia: Patient had abnormal esophageal manometry 11/23/18, per official read manometry showed jackhammer esophagus/hypercontractile disorder with diminutive hiatal hernia, recommended to start CCB or nitrates per report  Has established with GI (Dr Muna Thompson) in the past for EGD 2/2018, willing to go back to GI for treatment for dysphagia  · Shoulder pain/radiculopathy: Increasing pain when lifting R shoulder over past week, started after slipping on stairs and grabbing railing to steady himself, resulting in a jerking motion of his arm  No swelling or ecchymoses  Having pain when lifting arm up above 90 degrees  Also having some neck pain w/ R hand and finger tingling and numbness, intermittent  · Back pain, chronic: Patient has chronic back pain from several complicated spinal surgeries (post-laminectomy syndrome) necessitating chronic oxycodone & valium use (currently weaning down on both, receives #40 valium tabs per month and #90 oxycodone tabs per month currently, last fill 12/3/18)  Asks for a Rx  lumbar back brace if possible      · Tobacco dependence:  Patient currently smoking less than 1 pack per day; pre-contemplative of cessation at this time    Review of Systems   Constitutional: Negative for chills, fatigue and fever  HENT: Positive for trouble swallowing  Eyes: Negative for visual disturbance  Respiratory: Negative for chest tightness and shortness of breath  Cardiovascular: Negative for chest pain, palpitations and leg swelling  Gastrointestinal: Negative for abdominal pain, diarrhea, nausea and vomiting  Genitourinary: Negative for dysuria  Musculoskeletal: Positive for arthralgias and back pain  Negative for gait problem  As noted in HPI; also has chronic back pain, asks for lumbar back brace   Skin: Negative for rash  Neurological: Negative for syncope, weakness and light-headedness  Psychiatric/Behavioral: Negative for agitation, sleep disturbance and suicidal ideas  All other systems reviewed and are negative        Historical Information   The patient history was reviewed as follows:    Past Medical History:   Diagnosis Date    Allergic rhinitis     Anxiety     Arthritis     Back pain     Chronic obstructive asthma (HCC)     Chronic pain syndrome     Cluster headaches     Depression     Hypertension     Insomnia     Kidney stones     Laryngospasm     Leukocytosis     Migraine     [facility] infarction [facility] [address] [facility]     Nephrolithiasis     Organic impotence     Seasonal allergies     Sleep apnea     does not use CPAP    Stroke ([facility] [address] [facility]     TMJ (temporomandibular joint syndrome)     Wheezing     last assessed 05/19/17     Past Surgical History:   Procedure Laterality Date    BACK SURGERY      *9, [id number], nervectomy 2006, total of 10    BUCCAL MASS EXCISION N/A 3/26/2018    Procedure: BIOPSY LINGUAL TONSIL (FLOW/ STANDARD PATH)  EVAL UNDER ANESTHESIA;  Surgeon: Chauncey Johnston MD;  [facility];  Service: ENT    COLONOSCOPY      HERNIA REPAIR      KIDNEY SURGERY      KNEE ARTHROSCOPY      LITHOTRIPSY      multiple    LUMBAR [address] SURGERY  2015    MANDIBLE FRACTURE SURGERY      titanium plate    PELVIC SYMPHYSIS FUSION      DC ESOPHAGOGASTRODUODENOSCOPY TRANSORAL DIAGNOSTIC N/A 2/22/2018    Procedure: ESOPHAGOGASTRODUODENOSCOPY (EGD); Surgeon: Lesly Carbajal MD;  Location: [facility]; Service: Gastroenterology    DC REVISE/REMOVE SPINAL NEUROSTIM/ Left 6/9/2017    Procedure: REMOVAL OF A THORACIC SCS PLACED VIA LAMINECTOMY AND REMOVAL LEFT BUTTOCK GENERATOR; IMPULSE MONITORING;  Surgeon: Magalis Castaneda MD;  [facility];  Service: Neurosurgery    DC SURG IMPLNT [address] N/A 9/8/2016    Procedure: DORSAL COLUMN STIMULATOR PLACEMENT (IMPULSE MONITORING);   Surgeon: Shiv Baltazar MD;  [facility];  Service: Orthopedics    SACROILIAC JOINT FUSION  2015    SHOULDER SURGERY Left     2     Family History   Problem Relation Age of Onset    Diabetes Father     Obesity Father     Liver cancer Father     Heart disease Father     Diabetes Brother     Hypertension Brother     Leukemia Mother     Hypertension Mother     Cancer Family       Social History   History   Alcohol Use    Yes     Comment: twice a month      History   Drug Use    Frequency: 7 0 times per week    Types: Marijuana     Comment: daily     History   Smoking Status    Current Some Day Smoker    Packs/day: 0 50    Years: 25 00   Smokeless Tobacco    Never Used     Comment: using patch       Medications:     Current Outpatient Prescriptions:     albuterol (VENTOLIN HFA) 90 mcg/act inhaler, Inhale 2 puffs every 6 (six) hours as needed for wheezing, Disp: 18 g, Rfl: 2    aspirin 81 MG tablet, Take 1 tablet by mouth daily, Disp: , Rfl:     diazepam (VALIUM) 5 mg tablet, Take 1 tablet (5 mg total) by mouth every 12 (twelve) hours as needed for anxiety, Disp: 40 tablet, Rfl: 0    fluticasone (FLONASE) 50 mcg/act nasal spray, 2 sprays into each nostril daily as needed for rhinitis or allergies, Disp: 16 g, Rfl: 2    fluticasone-salmeterol (ADVAIR DISKUS) 250-50 mcg/dose inhaler, Inhale 1 puff daily, Disp: 1 Inhaler, Rfl: 2    ibuprofen (MOTRIN) 800 mg tablet, TAKE 1 TABLET BY MOUTH THREE TIMES DAILY AS NEEDED, Disp: 90 tablet, Rfl: 0    oxyCODONE (ROXICODONE) 10 MG TABS, Take 1 tablet (10 mg total) by mouth every 6 (six) hours as needed for severe pain Max Daily Amount: 40 mg, Disp: 90 tablet, Rfl: 0    VENTOLIN  (90 Base) MCG/ACT inhaler, USE 2 PUFFS EVERY 6 HOURS AS NEEDED FOR WHEEZING, Disp: 18 g, Rfl: 0  Allergies   Allergen Reactions    Other Rash     Adhesive tape       OBJECTIVE    Vitals:   Vitals:    12/21/18 1306   BP: 126/70   Pulse: 74   Resp: 16   Temp: (!) 97 3 °F (36 3 °C)   Weight: 87 1 kg (192 lb)   Height: 6' 1" (1 854 m)       Physical Exam   Constitutional: He is oriented to person, place, and time  He appears well-developed and well-nourished  No distress  HENT:   Head: Normocephalic and atraumatic  Eyes: Conjunctivae are normal  Right eye exhibits no discharge  Left eye exhibits no discharge  Neck: Normal range of motion  Neck supple  Cardiovascular: Normal rate, regular rhythm and intact distal pulses  Pulmonary/Chest: Effort normal and breath sounds normal  No respiratory distress  Abdominal: Soft  Bowel sounds are normal  There is no tenderness  Musculoskeletal: Normal range of motion  He exhibits no edema  Tenderness over bony midline L-spine & C-spine, Neer test @90 degrees and Dumont-Jason tests positive for pain but not weakness, no weakness in external rotation, drop-arm test negative,  no reproducibility of radiculopathy symptoms on exam; no visible swelling, effusions or deformities on gross exam   Neurological: He is alert and oriented to person, place, and time  Sensation grossly intact over B/L UE & LE, motor strength 5/5 B/L UE & LE   Skin: Skin is warm and dry  No rash noted  Psychiatric: He has a normal mood and affect  His behavior is normal    Vitals reviewed                   Андрей Young MD, PGY-2  [facility] Medicine   12/20/2018

## 2018-12-22 NOTE — ASSESSMENT & PLAN NOTE
-11/23/18 esophageal manometry showed jackhammer esophagus w/ Orefield classification hypercontractile esophagus with diminutive hiatal hernia  -Report noted that dysphagia may be 2/2 hypercontractile disorder  -Recommended symptom treatments per report include CCBs or nitrates   -However, patient's BP today 126/70, defer starting CCB or nitrate at this time due to concern for iatrogenic hypotension from hypercontractility treatment  -Advised to make appointment with Dr Darrell Serrano (GI), with whom he has already established care (2/2018 EGD), for management of hypercontractile esophagus  -GI referral to Dr Darrell Serrano given today, patient agrees to call for appointment

## 2018-12-22 NOTE — ASSESSMENT & PLAN NOTE
-Lumbar back brace ordered today per patient request  -Patient will be due for Oxycodone and Valium refill 1/3/19  -Continue current weaning of opioid and benzo  -Patient continues to decline PT/pain management/NeuroSx referral

## 2018-12-22 NOTE — ASSESSMENT & PLAN NOTE
-Intermittent tingling/pain in neck down toward R hand/fingers  -Obtain C-spine XR to evaluate for foraminal narrowing suggestive nerve involvement/impingement  -Multimodal management of neck pain via topical ice/heat, lidocaine, tylenol, NSAIDs, etc  as noted above

## 2018-12-22 NOTE — ASSESSMENT & PLAN NOTE
-Acute right shoulder pain s/p traumatic jerking of arm last week  -No evidence of rotator cuff instability or weakness, only pain elicited on exam  -Obtain XR to assess for humeral migration suggestive of rotator cuff tear  -Discussed shoulder rest as desired w/ activity as tolerated, recommended application of ice/heat, OTC topical lidocaine, Tylenol, NSAIDs PRN

## 2019-01-04 ENCOUNTER — HOSPITAL ENCOUNTER (OUTPATIENT)
Dept: RADIOLOGY | Facility: HOSPITAL | Age: 49
Discharge: HOME | End: 2019-01-04
Payer: COMMERCIAL

## 2019-01-04 DIAGNOSIS — M25.511 ACUTE PAIN OF RIGHT SHOULDER: ICD-10-CM

## 2019-01-04 DIAGNOSIS — M96.1 POST LAMINECTOMY SYNDROME: ICD-10-CM

## 2019-01-04 DIAGNOSIS — M54.12 CERVICAL RADICULOPATHY: ICD-10-CM

## 2019-01-04 PROCEDURE — 73030 X-RAY EXAM OF SHOULDER: CPT

## 2019-01-04 PROCEDURE — 72050 X-RAY EXAM NECK SPINE 4/5VWS: CPT

## 2019-01-07 ENCOUNTER — TELEPHONE (OUTPATIENT)
Dept: FAMILY MEDICINE CLINIC | Facility: CLINIC | Age: 49
End: 2019-01-07

## 2019-01-07 NOTE — TELEPHONE ENCOUNTER
Patient calling with a request for xray results of the nrck and shoulder  Had an appt this morning but cancelled,  He does have an appt already set for 02/01/19 w/Dr Fairchild Rang

## 2019-01-08 ENCOUNTER — TELEPHONE (OUTPATIENT)
Dept: FAMILY MEDICINE CLINIC | Facility: CLINIC | Age: 49
End: 2019-01-08

## 2019-01-08 DIAGNOSIS — G89.4 CHRONIC PAIN SYNDROME: ICD-10-CM

## 2019-01-08 DIAGNOSIS — M96.1 POST LAMINECTOMY SYNDROME: ICD-10-CM

## 2019-01-08 DIAGNOSIS — G89.29 CHRONIC RIGHT SI JOINT PAIN: ICD-10-CM

## 2019-01-08 DIAGNOSIS — M53.3 CHRONIC RIGHT SI JOINT PAIN: ICD-10-CM

## 2019-01-08 NOTE — TELEPHONE ENCOUNTER
Spoke with patient, reviewed results  Wants to know if he should be seeing specialist or if there is anything else he should be doing?

## 2019-01-08 NOTE — TELEPHONE ENCOUNTER
Please review this request  In last note states weaning off, but don't see what the plan is on last fill was 12/13/18 #90

## 2019-01-08 NOTE — TELEPHONE ENCOUNTER
Patient called requesting refill on his oxycodone 10 mg medication  Last office visit 12/2018  Please refill appropriately  Thank you, Casey Seals

## 2019-01-09 NOTE — TELEPHONE ENCOUNTER
Patient requesting refill of oxycodone  I was able to wean him down from #100 to #90 but when we discussed weaning him down further at last visit he was very anxious regarding his pain and was not ready for further weaning at that time  I feel patient is appropriate for refill at this time at the current dispense amount  Reviewed PDMP, last Rx written 12/3/18 & filled 12/13/18  Rx is Oxycodone 10 mg, 1 tablet PO Q6H PRN (Disp #90) for chronic pain syndrome, post laminectomy syndrome, chronic SI joint pain  Pain contract signed with me, adherent to all visits, no red flags on PDMP  Rx pended and refill request sent to preceptor      KHALIDA Nascimento  PGY-2  [place] [address]

## 2019-01-10 RX ORDER — OXYCODONE HYDROCHLORIDE 10 MG/1
10 TABLET ORAL EVERY 6 HOURS PRN
Qty: 90 TABLET | Refills: 0 | Status: SHIPPED | OUTPATIENT
Start: 2019-01-10 | End: 2019-02-11 | Stop reason: SDUPTHER

## 2019-01-11 DIAGNOSIS — F41.8 DEPRESSION WITH ANXIETY: ICD-10-CM

## 2019-01-11 DIAGNOSIS — J45.40 MODERATE PERSISTENT ASTHMA WITHOUT COMPLICATION: ICD-10-CM

## 2019-01-11 NOTE — TELEPHONE ENCOUNTER
Voice message requesting refill of Diazepam 5mg  Last visit 12/21/18, pending appointment 2/1/19   Checked PDMP, last refill 12/3/18 #40

## 2019-01-16 ENCOUNTER — TELEPHONE (OUTPATIENT)
Dept: FAMILY MEDICINE CLINIC | Facility: CLINIC | Age: 49
End: 2019-01-16

## 2019-01-16 ENCOUNTER — OFFICE VISIT (OUTPATIENT)
Dept: FAMILY MEDICINE CLINIC | Facility: CLINIC | Age: 49
End: 2019-01-16

## 2019-01-16 ENCOUNTER — TRANSCRIBE ORDERS (OUTPATIENT)
Dept: LAB | Facility: CLINIC | Age: 49
End: 2019-01-16

## 2019-01-16 ENCOUNTER — APPOINTMENT (OUTPATIENT)
Dept: RADIOLOGY | Facility: CLINIC | Age: 49
End: 2019-01-16
Payer: COMMERCIAL

## 2019-01-16 ENCOUNTER — APPOINTMENT (OUTPATIENT)
Dept: LAB | Facility: CLINIC | Age: 49
End: 2019-01-16
Payer: COMMERCIAL

## 2019-01-16 VITALS
TEMPERATURE: 96.5 F | DIASTOLIC BLOOD PRESSURE: 80 MMHG | BODY MASS INDEX: 24.52 KG/M2 | HEIGHT: 73 IN | RESPIRATION RATE: 16 BRPM | SYSTOLIC BLOOD PRESSURE: 132 MMHG | HEART RATE: 78 BPM | WEIGHT: 185 LBS

## 2019-01-16 DIAGNOSIS — J18.9 PNEUMONIA DUE TO INFECTIOUS ORGANISM, UNSPECIFIED LATERALITY, UNSPECIFIED PART OF LUNG: ICD-10-CM

## 2019-01-16 DIAGNOSIS — J45.40 MODERATE PERSISTENT ASTHMA WITHOUT COMPLICATION: ICD-10-CM

## 2019-01-16 DIAGNOSIS — R79.89 ABNORMAL CBC: ICD-10-CM

## 2019-01-16 DIAGNOSIS — J18.9 PNEUMONIA DUE TO INFECTIOUS ORGANISM, UNSPECIFIED LATERALITY, UNSPECIFIED PART OF LUNG: Primary | ICD-10-CM

## 2019-01-16 LAB
ALBUMIN SERPL BCP-MCNC: 4.5 G/DL (ref 3.5–5)
ALP SERPL-CCNC: 106 U/L (ref 46–116)
ALT SERPL W P-5'-P-CCNC: 29 U/L (ref 12–78)
ANION GAP SERPL CALCULATED.3IONS-SCNC: 11 MMOL/L (ref 4–13)
AST SERPL W P-5'-P-CCNC: 20 U/L (ref 5–45)
BASOPHILS # BLD AUTO: 0.07 THOUSANDS/ΜL (ref 0–0.1)
BASOPHILS NFR BLD AUTO: 1 % (ref 0–1)
BILIRUB SERPL-MCNC: 0.4 MG/DL (ref 0.2–1)
BUN SERPL-MCNC: 19 MG/DL (ref 5–25)
CALCIUM SERPL-MCNC: 9.9 MG/DL (ref 8.3–10.1)
CHLORIDE SERPL-SCNC: 106 MMOL/L (ref 100–108)
CO2 SERPL-SCNC: 28 MMOL/L (ref 21–32)
CREAT SERPL-MCNC: 1.3 MG/DL (ref 0.6–1.3)
EOSINOPHIL # BLD AUTO: 0.05 THOUSAND/ΜL (ref 0–0.61)
EOSINOPHIL NFR BLD AUTO: 0 % (ref 0–6)
ERYTHROCYTE [DISTWIDTH] IN BLOOD BY AUTOMATED COUNT: 15.9 % (ref 11.6–15.1)
GFR SERPL CREATININE-BSD FRML MDRD: 64 ML/MIN/1.73SQ M
GLUCOSE SERPL-MCNC: 83 MG/DL (ref 65–140)
HCT VFR BLD AUTO: 57.8 % (ref 36.5–49.3)
HGB BLD-MCNC: 18.6 G/DL (ref 12–17)
IMM GRANULOCYTES # BLD AUTO: 0.07 THOUSAND/UL (ref 0–0.2)
IMM GRANULOCYTES NFR BLD AUTO: 1 % (ref 0–2)
LYMPHOCYTES # BLD AUTO: 2.36 THOUSANDS/ΜL (ref 0.6–4.47)
LYMPHOCYTES NFR BLD AUTO: 15 % (ref 14–44)
MCH RBC QN AUTO: 28.9 PG (ref 26.8–34.3)
MCHC RBC AUTO-ENTMCNC: 32.2 G/DL (ref 31.4–37.4)
MCV RBC AUTO: 90 FL (ref 82–98)
MONOCYTES # BLD AUTO: 0.9 THOUSAND/ΜL (ref 0.17–1.22)
MONOCYTES NFR BLD AUTO: 6 % (ref 4–12)
NEUTROPHILS # BLD AUTO: 12.1 THOUSANDS/ΜL (ref 1.85–7.62)
NEUTS SEG NFR BLD AUTO: 77 % (ref 43–75)
NRBC BLD AUTO-RTO: 0 /100 WBCS
PLATELET # BLD AUTO: 445 THOUSANDS/UL (ref 149–390)
PMV BLD AUTO: 11.1 FL (ref 8.9–12.7)
POTASSIUM SERPL-SCNC: 4.2 MMOL/L (ref 3.5–5.3)
PROT SERPL-MCNC: 8.5 G/DL (ref 6.4–8.2)
RBC # BLD AUTO: 6.43 MILLION/UL (ref 3.88–5.62)
SODIUM SERPL-SCNC: 145 MMOL/L (ref 136–145)
WBC # BLD AUTO: 15.55 THOUSAND/UL (ref 4.31–10.16)

## 2019-01-16 PROCEDURE — 85025 COMPLETE CBC W/AUTO DIFF WBC: CPT

## 2019-01-16 PROCEDURE — 71046 X-RAY EXAM CHEST 2 VIEWS: CPT

## 2019-01-16 PROCEDURE — 80053 COMPREHEN METABOLIC PANEL: CPT

## 2019-01-16 PROCEDURE — 99213 OFFICE O/P EST LOW 20 MIN: CPT | Performed by: FAMILY MEDICINE

## 2019-01-16 PROCEDURE — 36415 COLL VENOUS BLD VENIPUNCTURE: CPT

## 2019-01-16 RX ORDER — AZITHROMYCIN 250 MG/1
TABLET, FILM COATED ORAL
Qty: 6 TABLET | Refills: 0 | Status: SHIPPED | OUTPATIENT
Start: 2019-01-16 | End: 2019-01-20

## 2019-01-16 RX ORDER — DIAZEPAM 5 MG/1
5 TABLET ORAL EVERY 12 HOURS PRN
Qty: 40 TABLET | Refills: 0 | Status: SHIPPED | OUTPATIENT
Start: 2019-01-16 | End: 2019-02-11 | Stop reason: SDUPTHER

## 2019-01-16 NOTE — TELEPHONE ENCOUNTER
Refill request via phone for Valium 5 mg tab, 1 tab Q12H PRN (Disp#40) for anxiety  Reviewed PDMP:  · Disp #40 (20 days) written & filled on 12/3/18  · Patient is adherent to all visits, no red flags on PDMP       Next office visit is scheduled 2/1/19  Requests sent to preceptor for refill      KHALIDA Jerome  PGY-2  [place] [address]

## 2019-01-16 NOTE — TELEPHONE ENCOUNTER
Patient called to report he had chest xray and blood work ordered at today's appt  done at [facility]

## 2019-01-16 NOTE — ASSESSMENT & PLAN NOTE
Check CBC and smear now  Will call him for results later today when it is in  Need to f/u w/ Hematologist as scheduled

## 2019-01-16 NOTE — ASSESSMENT & PLAN NOTE
Clinically diagnosed PNA, order Azithromycin, use Flovent and Ventolin  RTO in 1w  Check CXR and labs now

## 2019-01-16 NOTE — PROGRESS NOTES
Riki Carlson 1970 male MRN: [id number]    Family Medicine Acute Visit    ASSESSMENT/PLAN   Problem List Items Addressed This Visit        Respiratory    Moderate persistent asthma without complication     Using fluticasone and ventolin inhaler prn   F/u in 1w         Relevant Medications    fluticasone-salmeterol (ADVAIR DISKUS) 250-50 mcg/dose inhaler    Pneumonia due to infectious organism - Primary     Clinically diagnosed PNA, order Azithromycin, use Flovent and Ventolin  RTO in 1w  Check CXR and labs now  Relevant Medications    azithromycin (ZITHROMAX) 250 mg tablet    fluticasone-salmeterol (ADVAIR DISKUS) 250-50 mcg/dose inhaler    Other Relevant Orders    CBC and differential    XR chest pa & lateral       Other    Abnormal CBC     Check CBC and smear now  Will call him for results later today when it is in  Need to f/u w/ Hematologist as scheduled  Relevant Orders    Comprehensive metabolic panel    CBC (Includes Diff/PLT) With Smear Review              Future Appointments  Date Time Provider Drew Knight   1/23/2019 9:00 AM Lukasz Jang MD [facility]   1/23/2019 3:00 PM FAMILIA [facility]   1/24/2019 1:40 PM Vidya Ortiz MD  [facility]   2/1/2019 1:00 PM Pk Blunt MD [facility]   3/18/2019 11:40 AM Denisha Silveira MD [facility]          SUBJECTIVE  CC: Cough and Cold Like Symptoms (x2wks)      HPI:  Riki Carlson is a 46 y o  male who presents for cough and tired  Been coughing with green sputum, fever, chill, fever for the past 2 weeks, worsening for the past 3 days  Extremely tired, no appetitie  No SOB or wheezes, cp, palpitation  But R upper back pain when taking deep breaths  Fever gone but still chill  Cough worsening  Sick contact with mom having pna  No abd pain, n/v/d  Dorsal hands swelling for the past week, painful when touching    Scheduled to see hem/onc due to abnormal cbc     Review of Systems   Constitutional: Positive for chills, fatigue and fever  Negative for diaphoresis  HENT: Positive for ear pain  Negative for congestion, hearing loss, nosebleeds, rhinorrhea, sinus pain, sinus pressure, sneezing, sore throat, tinnitus, trouble swallowing and voice change  Eyes: Negative for visual disturbance  Respiratory: Positive for cough  Negative for shortness of breath and wheezing  Cardiovascular: Negative for chest pain and palpitations  Gastrointestinal: Negative for abdominal pain, constipation, diarrhea, nausea and vomiting  Genitourinary: Negative for dysuria, flank pain and hematuria  Skin: Positive for pallor  Negative for rash  Neurological: Negative for dizziness, seizures, weakness, light-headedness, numbness and headaches  Psychiatric/Behavioral: The patient is not nervous/anxious          Historical Information   The patient history was reviewed and updated as follows:  Past Medical History:   Diagnosis Date    Allergic rhinitis     Anxiety     Arthritis     Back pain     Chronic obstructive asthma (HCC)     Chronic pain syndrome     Cluster headaches     Depression     Hypertension     Insomnia     Kidney stones     Laryngospasm     Leukocytosis     Migraine     [facility] infarction [facility] [address]     Nephrolithiasis     Organic impotence     Pneumonia due to infectious organism 1/16/2019    Seasonal allergies     Sleep apnea     does not use CPAP    Stroke ([facility] [address] [facility]     TMJ (temporomandibular joint syndrome)     Wheezing     last assessed 05/19/17         Past Surgical History:   Procedure Laterality Date    BACK SURGERY      *9, [id number], nervectomy 2006, total of 10    BUCCAL MASS EXCISION N/A 3/26/2018    Procedure: BIOPSY LINGUAL TONSIL (FLOW/ STANDARD PATH)  EVAL UNDER ANESTHESIA;  Surgeon: Marly Ely MD;  [facility];  Service: ENT    COLONOSCOPY      HERNIA REPAIR      KIDNEY SURGERY      KNEE ARTHROSCOPY      LITHOTRIPSY      multiple    LUMBAR DISC SURGERY  2015    MANDIBLE FRACTURE SURGERY      titanium plate    PELVIC SYMPHYSIS FUSION      VA ESOPHAGOGASTRODUODENOSCOPY TRANSORAL DIAGNOSTIC N/A 2/22/2018    Procedure: ESOPHAGOGASTRODUODENOSCOPY (EGD); Surgeon: Kavon Koo MD;  [facility]; Service: Gastroenterology    VA REVISE/REMOVE SPINAL NEUROSTIM/ Left 6/9/2017    Procedure: REMOVAL OF A THORACIC SCS PLACED VIA LAMINECTOMY AND REMOVAL LEFT BUTTOCK GENERATOR; IMPULSE MONITORING;  Surgeon: Kenneth Lozano MD;  [facility];  Service: Neurosurgery    VA SURG IMPLNT [address] N/A 9/8/2016    Procedure: DORSAL COLUMN STIMULATOR PLACEMENT (IMPULSE MONITORING);   Surgeon: Dariana Griggs MD;  [facility];  Service: Orthopedics    SACROILIAC JOINT FUSION  2015    SHOULDER SURGERY Left     2     Family History   Problem Relation Age of Onset    Diabetes Father     Obesity Father     Liver cancer Father     Heart disease Father     Diabetes Brother     Hypertension Brother     Leukemia Mother     Hypertension Mother     Cancer Family       Social History   History   Alcohol Use    Yes     Comment: twice a month      History   Drug Use    Frequency: 7 0 times per week    Types: Marijuana     Comment: daily     History   Smoking Status    Current Some Day Smoker    Packs/day: 0 50    Years: 25 00   Smokeless Tobacco    Never Used     Comment: using patch       Medications:     Current Outpatient Prescriptions:     albuterol (VENTOLIN HFA) 90 mcg/act inhaler, Inhale 2 puffs every 6 (six) hours as needed for wheezing, Disp: 18 g, Rfl: 2    aspirin 81 MG tablet, Take 1 tablet by mouth daily, Disp: , Rfl:     Elastic Bandages & Supports (LUMBAR BACK BRACE/SUPPORT PAD) MISC, by Does not apply route daily, Disp: 1 each, Rfl: 0    fluticasone (FLONASE) 50 mcg/act nasal spray, 2 sprays into each nostril daily as needed for rhinitis or allergies, Disp: 16 g, Rfl: 2    fluticasone-salmeterol (ADVAIR DISKUS) 250-50 mcg/dose inhaler, Inhale 1 puff 2 (two) times a day, Disp: 1 Inhaler, Rfl: 2    ibuprofen (MOTRIN) 800 mg tablet, TAKE 1 TABLET BY MOUTH THREE TIMES DAILY AS NEEDED, Disp: 90 tablet, Rfl: 0    oxyCODONE (ROXICODONE) 10 MG TABS, Take 1 tablet (10 mg total) by mouth every 6 (six) hours as needed for severe pain Max Daily Amount: 40 mg, Disp: 90 tablet, Rfl: 0    VENTOLIN  (90 Base) MCG/ACT inhaler, USE 2 PUFFS EVERY 6 HOURS AS NEEDED FOR WHEEZING, Disp: 18 g, Rfl: 0    azithromycin (ZITHROMAX) 250 mg tablet, Take 2 tablets today then 1 tablet daily x 4 days, Disp: 6 tablet, Rfl: 0    diazepam (VALIUM) 5 mg tablet, Take 1 tablet (5 mg total) by mouth every 12 (twelve) hours as needed for anxiety, Disp: 40 tablet, Rfl: 0    Allergies   Allergen Reactions    Other Rash     Adhesive tape       OBJECTIVE  Vitals:   Vitals:    01/16/19 1448   BP: 132/80   Pulse: 78   Resp: 16   Temp: (!) 96 5 °F (35 8 °C)   Weight: 83 9 kg (185 lb)   Height: 6' 1" (1 854 m)         Physical Exam   Constitutional: He is oriented to person, place, and time  He appears well-developed and well-nourished  No distress  HENT:   Head: Normocephalic and atraumatic  Right Ear: External ear normal    Left Ear: External ear normal    Nose: Nose normal    Mouth/Throat: Oropharynx is clear and moist  No oropharyngeal exudate  Eyes: Pupils are equal, round, and reactive to light  Right eye exhibits no discharge  Left eye exhibits no discharge  No scleral icterus  Neck: Normal range of motion  Neck supple  No thyromegaly present  Cardiovascular: Normal rate, regular rhythm and normal heart sounds  No murmur heard  Pulmonary/Chest: Effort normal  No respiratory distress  He has wheezes  He has rales  Wheezes and crackles at RUL and RML   Abdominal: Soft  Bowel sounds are normal  He exhibits no distension  There is no tenderness  There is no rebound and no guarding  Musculoskeletal: Normal range of motion  He exhibits edema (dorsal hand edema b/l)  He exhibits no tenderness or deformity  Lymphadenopathy:     He has no cervical adenopathy  Neurological: He is alert and oriented to person, place, and time  Skin: Skin is warm  No rash noted  He is not diaphoretic  No erythema  There is pallor  Psychiatric: He has a normal mood and affect  Nursing note and vitals reviewed         Vidya Monahan MD, PGY-2  [facility]   1/16/2019

## 2019-01-17 NOTE — TELEPHONE ENCOUNTER
Called patient, left message advising you will review results  Please review so we can follow up  No

## 2019-01-17 NOTE — TELEPHONE ENCOUNTER
Pt calling back wondering what his lab results are , he's starting to get nervous due to not hearing anything back yet

## 2019-01-21 ENCOUNTER — TELEPHONE (OUTPATIENT)
Dept: FAMILY MEDICINE CLINIC | Facility: CLINIC | Age: 49
End: 2019-01-21

## 2019-01-21 NOTE — TELEPHONE ENCOUNTER
I already discussed with him over the phone and I told him to make appt to see Hem/Onc  I was very willing to help him make appt  However, he refused and said he wanted to talk with his regular doctor because his regular doctor f/u w/ him for his CBC for years    I already discussed with Dr Wicho Aguirre in person last week regarding his request

## 2019-01-21 NOTE — TELEPHONE ENCOUNTER
Patient called today and is requesting a call back from [facility] he stated he got a call from someone in our office regarding lab results done 01/16 and was very confused,he would like to know moving whats going to be the plan  Thank you in advance

## 2019-01-23 ENCOUNTER — OFFICE VISIT (OUTPATIENT)
Dept: GASTROENTEROLOGY | Facility: AMBULARY SURGERY CENTER | Age: 49
End: 2019-01-23
Payer: COMMERCIAL

## 2019-01-23 ENCOUNTER — OFFICE VISIT (OUTPATIENT)
Dept: NEUROLOGY | Facility: CLINIC | Age: 49
End: 2019-01-23
Payer: COMMERCIAL

## 2019-01-23 VITALS
SYSTOLIC BLOOD PRESSURE: 126 MMHG | HEART RATE: 75 BPM | WEIGHT: 200 LBS | RESPIRATION RATE: 18 BRPM | HEIGHT: 73 IN | TEMPERATURE: 97.5 F | BODY MASS INDEX: 26.51 KG/M2 | DIASTOLIC BLOOD PRESSURE: 90 MMHG

## 2019-01-23 VITALS
BODY MASS INDEX: 25.84 KG/M2 | DIASTOLIC BLOOD PRESSURE: 88 MMHG | HEART RATE: 68 BPM | HEIGHT: 73 IN | SYSTOLIC BLOOD PRESSURE: 120 MMHG | WEIGHT: 195 LBS

## 2019-01-23 DIAGNOSIS — K22.4 HYPERCONTRACTILE ESOPHAGUS: Primary | ICD-10-CM

## 2019-01-23 DIAGNOSIS — G44.011 INTRACTABLE EPISODIC CLUSTER HEADACHE: Primary | ICD-10-CM

## 2019-01-23 PROCEDURE — 99214 OFFICE O/P EST MOD 30 MIN: CPT | Performed by: PHYSICIAN ASSISTANT

## 2019-01-23 PROCEDURE — 99214 OFFICE O/P EST MOD 30 MIN: CPT | Performed by: PSYCHIATRY & NEUROLOGY

## 2019-01-23 RX ORDER — PANTOPRAZOLE SODIUM 40 MG/1
40 TABLET, DELAYED RELEASE ORAL DAILY
Qty: 90 TABLET | Refills: 0 | Status: SHIPPED | OUTPATIENT
Start: 2019-01-23 | End: 2019-02-28 | Stop reason: SDUPTHER

## 2019-01-23 NOTE — PROGRESS NOTES
Follow-up Note -  [facility]  Elaine Castro 1970 46 y o  male         Reason:  Follow-up; dysphagia/odynophagia    HPI:  77-year-old male with history of anxiety, chronic back pain, asthma presents for follow-up regarding dysphagia  He was actually seen by Dr Aminata Patton in February 2018 for reported symptoms of progressive dysphagia and weight loss, he underwent EGD (was recommended for colonoscopy also given previous history of polyps, the patient expressed desire to defer for that time), the EGD only showed some gastritis negative for H  Pylori, no esophageal abnormality, there is a prominent right-sided vocal cord for which he was recommended to see ENT, which he says turned up a benign workup  He did later undergo barium swallow on October 23, 2018, showing normal swallow function, small hiatal hernia  Recently in November he underwent esophageal manometry, however, demonstrating findings of hypercontractility  At this time, the patient says his swallowing issues seem to be getting worse  He says that when he eats or drinks almost anything, he gets a sudden squeezing pain in his epigastric region  He says it feels like his throat closes shot  He denies a choking episode specifically, he thinks he has been losing weight although it appears his weight has been stable since last February (202 lbs then, 200 lbs now)  He denies any changes in his bowel habits  REVIEW OF SYSTEMS:      CONSTITUTIONAL: Denies any fever, chills, or rigors  Good appetite, and no recent weight loss  HEENT: No earache or tinnitus  Denies hearing loss or visual disturbances  CARDIOVASCULAR: No chest pain or palpitations  RESPIRATORY: Denies any cough, hemoptysis, shortness of breath or dyspnea on exertion  GASTROINTESTINAL: As noted in the History of Present Illness  GENITOURINARY: No problems with urination  Denies any hematuria or dysuria  NEUROLOGIC: No dizziness or vertigo, denies headaches  MUSCULOSKELETAL: Denies any muscle or joint pain  SKIN: Denies skin rashes or itching  ENDOCRINE: Denies excessive thirst  Denies intolerance to heat or cold  PSYCHOSOCIAL: Denies depression or anxiety  Denies any recent memory loss  Past Medical History:   Diagnosis Date    Allergic rhinitis     Anxiety     Arthritis     Back pain     Chronic obstructive asthma (HCC)     Chronic pain syndrome     Cluster headaches     Depression     Hypertension     Insomnia     Kidney stones     Laryngospasm     Leukocytosis     Migraine     Myocardial infarction ([facility] [address] )     Nephrolithiasis     Organic impotence     Pneumonia due to infectious organism 1/16/2019    Seasonal allergies     Sleep apnea     does not use CPAP    Stroke ([facility] [address] [facility]     TMJ (temporomandibular joint syndrome)     Wheezing     last assessed 05/19/17      Past Surgical History:   Procedure Laterality Date    BACK SURGERY      *9, [id number], nervectomy 2006, total of 10    BUCCAL MASS EXCISION N/A 3/26/2018    Procedure: BIOPSY LINGUAL TONSIL (FLOW/ STANDARD PATH)  EVAL UNDER ANESTHESIA;  Surgeon: Lexi Encarnacion MD;  [facility];  Service: ENT    COLONOSCOPY      HERNIA REPAIR      KIDNEY SURGERY      KNEE ARTHROSCOPY      LITHOTRIPSY      multiple    LUMBAR [address]  2015    MANDIBLE FRACTURE SURGERY      titanium plate    PELVIC SYMPHYSIS FUSION      VT ESOPHAGOGASTRODUODENOSCOPY TRANSORAL DIAGNOSTIC N/A 2/22/2018    Procedure: ESOPHAGOGASTRODUODENOSCOPY (EGD); Surgeon: Jessiac Bae MD;  [facility];   Service: Gastroenterology    VT REVISE/REMOVE SPINAL NEUROSTIM/ Left 6/9/2017    Procedure: REMOVAL OF A THORACIC SCS PLACED VIA LAMINECTOMY AND REMOVAL LEFT BUTTOCK GENERATOR; IMPULSE MONITORING;  Surgeon: Reyes Seo MD;  Location:  MAIN OR;  Service: Neurosurgery    VT SURG IMPLNT [address] N/A 9/8/2016    Procedure: DORSAL COLUMN STIMULATOR PLACEMENT (IMPULSE MONITORING);   Surgeon: Santana Land MD;  [facility];  Service: Orthopedics    SACROILIAC JOINT FUSION  2015    SHOULDER SURGERY Left     2     Social History     Social History    Marital status:      Spouse name: N/A    Number of children: N/A    Years of education: N/A     Occupational History    Disability      Social History Main Topics    Smoking status: Current Some Day Smoker     Packs/day: 0 50     Years: 25 00    Smokeless tobacco: Never Used      Comment: using patch    Alcohol use Yes      Comment: twice a month     Drug use: Yes     Frequency: 7 0 times per week     Types: Marijuana      Comment: medical marijuana daily    Sexual activity: Not on file     Other Topics Concern    Not on file     Social History Narrative    As per All[employer]: Denied: History of Current smoker        As per allscripts: Smoking     Family History   Problem Relation Age of Onset    Diabetes Father     Obesity Father     Liver cancer Father     Heart disease Father     Diabetes Brother     Hypertension Brother     Leukemia Mother     Hypertension Mother     Cancer Family      Other  Current Outpatient Prescriptions   Medication Sig Dispense Refill    albuterol (VENTOLIN HFA) 90 mcg/act inhaler Inhale 2 puffs every 6 (six) hours as needed for wheezing 18 g 2    aspirin 81 MG tablet Take 1 tablet by mouth daily      diazepam (VALIUM) 5 mg tablet Take 1 tablet (5 mg total) by mouth every 12 (twelve) hours as needed for anxiety 40 tablet 0    Elastic Bandages & Supports (LUMBAR BACK BRACE/SUPPORT PAD) MISC by Does not apply route daily 1 each 0    fluticasone (FLONASE) 50 mcg/act nasal spray 2 sprays into each nostril daily as needed for rhinitis or allergies 16 g 2    fluticasone-salmeterol (ADVAIR DISKUS) 250-50 mcg/dose inhaler Inhale 1 puff 2 (two) times a day (Patient taking differently: Inhale 1 puff daily  ) 1 Inhaler 2    ibuprofen (MOTRIN) 800 mg tablet TAKE 1 TABLET BY MOUTH THREE TIMES DAILY AS NEEDED 90 tablet 0    oxyCODONE (ROXICODONE) 10 MG TABS Take 1 tablet (10 mg total) by mouth every 6 (six) hours as needed for severe pain Max Daily Amount: 40 mg 90 tablet 0    VENTOLIN  (90 Base) MCG/ACT inhaler USE 2 PUFFS EVERY 6 HOURS AS NEEDED FOR WHEEZING 18 g 0     No current facility-administered medications for this visit  Blood pressure 126/90, pulse 75, temperature 97 5 °F (36 4 °C), temperature source Tympanic, resp  rate 18, height 6' 1" (1 854 m), weight 90 7 kg (200 lb)  PHYSICAL EXAM:      General Appearance:   Alert, cooperative, no distress, appears stated age    HEENT:   Normocephalic, atraumatic, anicteric      Neck:  Supple, symmetrical, trachea midline, no adenopathy;    thyroid: no enlargement/tenderness/nodules; no carotid  bruit or JVD    Lungs:   Clear to auscultation bilaterally; no rales, rhonchi or wheezing; respirations unlabored    Heart[de-identified]   S1 and S2 normal; regular rate and rhythm; no murmur, rub, or gallop     Abdomen:   Soft, non-tender, non-distended; normal bowel sounds; no masses, no organomegaly    Extremities: No edema, erythema, wounds, rashes   Rectal:   Deferred                      Lab Results   Component Value Date    WBC 15 55 (H) 01/16/2019    HGB 18 6 (H) 01/16/2019    HCT 57 8 (H) 01/16/2019    MCV 90 01/16/2019     (H) 01/16/2019     Lab Results   Component Value Date    GLUCOSE 110 10/30/2015    CALCIUM 9 9 01/16/2019     (H) 10/30/2015    K 4 2 01/16/2019    CO2 28 01/16/2019     01/16/2019    BUN 19 01/16/2019    CREATININE 1 30 01/16/2019     Lab Results   Component Value Date    ALT 29 01/16/2019    AST 20 01/16/2019    ALKPHOS 106 01/16/2019    BILITOT 0 49 10/30/2015     Lab Results   Component Value Date    INR 0 93 06/24/2018    INR 0 87 05/16/2017    INR 0 89 08/24/2016    PROTIME 12 2 06/24/2018    PROTIME 12 1 05/16/2017    PROTIME 12 2 08/24/2016       Xr Spine Cervical Complete 4 Or 5 Vw Non Injury    Result Date: 1/7/2019  Impression: 1  Progressive degenerative changes, most pronounced C5-C6 and C6-C7  2   Stable compression fracture C4  Workstation performed: [id number]     Xr Shoulder 2+ Vw Right    Result Date: 1/7/2019  Impression: No acute osseous abnormality  Workstation performed: [id number]       ASSESSMENT & PLAN:    Hypercontractile esophagus  Symptomatic esophageal hypercontractility, demonstrated on esophageal manometry on November 23, as read by Dr Wen Sol       - Advised patient to try peppermint oil which can be obtained over-the-counter    - Advised patient to take room temperature liquids, avoid red wine    - Manage underlying GERD, recommend daily PPI therapy to minimize possibility of acid reflux triggering esophageal spasm    - I spoke with Dr David Cheek; he will likely require starting a calcium channel blocker for symptomatic management  This would be either diltiazem 60-90 mg 4 times daily, or nifedipine 10 mg to be taken one half hour before meals   Will discuss with patient's primary team, I sent message to Dr Meraz Members, Dr Mamie Dixon, Dr Linn and Dr Meena Clayton who had seen the patient recently per records    - Office follow-up with physician in a couple of months

## 2019-01-23 NOTE — ASSESSMENT & PLAN NOTE
Symptomatic esophageal hypercontractility, demonstrated on esophageal manometry on November 23, as read by Dr Caroline Boland       - Advised patient to try peppermint oil which can be obtained over-the-counter    - Advised patient to take room temperature liquids, avoid red wine    - Manage underlying GERD, recommend daily PPI therapy to minimize possibility of acid reflux triggering esophageal spasm    - I spoke with Dr Ida Castellanos; he will likely require starting a calcium channel blocker for symptomatic management  This would be either diltiazem 60-90 mg 4 times daily, or nifedipine 10 mg to be taken one half hour before meals   Will discuss with patient's primary team, I sent message to Dr Antonia Castro, Dr Mary Kay Lucia, Dr Valentin Barroso and Dr Silvina Barger who had seen the patient recently per records    - Office follow-up with physician in a couple of months

## 2019-01-23 NOTE — PATIENT INSTRUCTIONS
Cluster Headaches:  Gabriela Mejias presents for follow-up with regard to his prior diagnosis of cluster headache  He is doing reasonably well, he has not had any significant breakthroughs of his cluster headache in the last 12-18 months however he is having significant additional health issues at this time  In particular he has leukocytosis, thrombocytosis, and what appears to be polycythemic of unclear origin  He is working with his hematologist and oncologist   He is working on quitting smoking   -we did spend a little bit of time in the office today talking about habit modification and different smoking cessation strategies  -he can continue to treat his headaches on an as-needed basis with oral and injectable Imitrex at his current regimen   -we [facility] see what they need in order to get him oxygen to be used at a dose of 12 liters/minute if he were to have a breakthrough cluster headache, he should also make sure he is well hydrated at all times and has his medication with him if he is going to undergo therapeutic phlebotomy treatments  -if he does have any breakthrough clusters he should contact our office immediately and we will consider additional possible preventative medications at that point in time  I will plan for him to return to the office to see us in 8 months time but I would be happy to see him sooner if the need should arise

## 2019-01-27 NOTE — TELEPHONE ENCOUNTER
Spoke with patient regarding his tests results and advised him Dr Jesus Agee recommends bone marrow aspirate and bx via message I sent to him directly  GI also contacted me to consider starting patient on CCB for hypermotile esophagus  Patient thanked me for discussing in detail with him the plan

## 2019-02-01 ENCOUNTER — TELEPHONE (OUTPATIENT)
Dept: FAMILY MEDICINE CLINIC | Facility: CLINIC | Age: 49
End: 2019-02-01

## 2019-02-01 NOTE — TELEPHONE ENCOUNTER
Patient had to cancel appt with Dr Cherry Navarro for 1 PM today, no transportation   He is asking to speak with Dr Cherry Navarro regarding his upcoming tests on blood for white blood count

## 2019-02-02 DIAGNOSIS — G89.29 OTHER CHRONIC PAIN: ICD-10-CM

## 2019-02-05 RX ORDER — IBUPROFEN 800 MG/1
TABLET ORAL
Qty: 90 TABLET | Refills: 0 | Status: SHIPPED | OUTPATIENT
Start: 2019-02-05 | End: 2019-02-11 | Stop reason: SDUPTHER

## 2019-02-06 ENCOUNTER — TELEPHONE (OUTPATIENT)
Dept: FAMILY MEDICINE CLINIC | Facility: CLINIC | Age: 49
End: 2019-02-06

## 2019-02-10 ENCOUNTER — TELEPHONE (OUTPATIENT)
Dept: OTHER | Facility: OTHER | Age: 49
End: 2019-02-10

## 2019-02-10 NOTE — TELEPHONE ENCOUNTER
Jeasad Bennett 1970  CONFIDENTIALTY NOTICE: This fax transmission is intended only for the addressee  It contains information that is legally privileged,  confidential or otherwise protected from use or disclosure  If you are not the intended recipient, you are strictly prohibited from reviewing,  disclosing, copying using or disseminating any of this information or taking any action in reliance on or regarding this information  If you have  received this fax in error, please notify us immediately by telephone so that we can arrange for its return to us  Page: 1 of 2  Call Id: [id number]  Health Call  Standard Call Report  Health Call  Patient Name: Shahriar Durant  Gender: Male  : 1970  Age: 46 Y 11 M 24 D  Return Phone  Number: [phone] (Current)  Address: [address] [place] [address]/[facility]/Zip: [address] [place] [address]  Practice Name: [address] [place] [address]  Practice Charged:  Physician:  [address] [place] [address] Name:  Relationship To  Patient: Self  Return Phone Number: [phone] (Current)  Presenting Problem: " I am having chills my ears feel  clogged and have a fever of 102 00  check by ear  "  Service Type: Triage  Charged Service 1: Anna Cabrera U  [address]  Name and  Number:  Nurse Assessment  Nurse: Sami Kinney RN, Jeanann Backer Date/Time: 2/10/2019 4:51:19 PM  Type of assessment required:  ---General (Adult or Child)  Duration of Current S/S  ---2 weeks  Location/Radiation  ---Respiratory, Ear  Temperature (F) and route:  ---102 0 tympanic at 1400  Symptom Specific Meds (Dose/Time):  ---Tylenol 1000mg at 1400  Other S/S  ---Bilateral ear pain, nasal congestion, chest congestion, productive yellow cough  Wheezing, shortness of breath relieved with inhaler  Does not feel need for ECU  Chest  discomfort with cough  Throat pain due to cough  Pain Scale on scale of 1-10, 10 being the worst:  ---Ear    6-7/10 Chest discomfort    7-8/10  Symptom progression:  ---worse  Intake and Output  Yefri Bennett 1970  CONFIDENTIALTY NOTICE: This fax transmission is intended only for the addressee  It contains information that is legally privileged,  confidential or otherwise protected from use or disclosure  If you are not the intended recipient, you are strictly prohibited from reviewing,  disclosing, copying using or disseminating any of this information or taking any action in reliance on or regarding this information  If you have  received this fax in error, please notify us immediately by telephone so that we can arrange for its return to us  Page: 2 of 2  Call Id: [id number]  Nurse Assessment  ---WNL  Last Exam/Treatment:  ---2 weeks ago   sick visit  Started on Zithromax  Protocols  Protocol Title Nurse Date/Time  Common Lisseth Dc RN, Felipe Life 2/10/2019 5:01:55 PM  Question Caller Affirmed  Disp  Time Disposition Final User  2/10/2019 5:18:32 PM See Physician within 25 Hours KRYSTAL Scott Felipe [employer]  2/10/2019 5:19:26 PM RN Triaged Yes KRYSTAL Scott, [facility] Advice Given Per Protocol  SEE PHYSICIAN WITHIN 24 HOURS: * IF OFFICE WILL BE OPEN: You need to be seen within the next 24 hours  Call your doctor  when the office opens, and make an appointment  PAIN MEDICINES: * For pain relief, take acetaminophen, ibuprofen, or naproxen  * Use the lowest amount that makes your pain feel better  ACETAMINOPHEN (E G , TYLENOL): * Take 650 mg (two 325 mg pills)  by mouth every 4-6 hours as needed  Each Regular Strength Tylenol pill has 325 mg of acetaminophen  The most you should take each  day is 3,250 mg (10 Regular Strength pills a day)  * Another choice is to take 1,000 mg (two 500 mg pills) every 8 hours as needed  Each Extra Strength Tylenol pill has 500 mg of acetaminophen  The most you should take each day is 3,000 mg (6 Extra Strength pills a  day)  IBUPROFEN (E G , MOTRIN, ADVIL): * Take 400 mg (two 200 mg pills) by mouth every 6 hours as needed  * Another choice  is to take 600 mg (three 200 mg pills) by mouth every 8 hours as needed  CAUTION - NSAIDS (E G , IBUPROFEN, NAPROXEN): *  GASTROINTESTINAL RISK: There is an increased risk of stomach ulcers, GI bleeding, perforation  MEDICINES FOR STUFFY OR  RUNNY NOSE: * Most cold medicines that are available over-the-counter (OTC) are not helpful  * Antihistamines: Are only helpful  if you also have nasal allergies  * If you have a very runny nose and you really think you need a medicine, you can try using a nasal  decongestant for a couple days  HUMIDIFIER: If the air in your home is dry, use a humidifier  CALL BACK IF: * Fever over 104 F *  You have difficulty breathing * You become worse  CARE ADVICE given per Colds (Adult) guideline  Caller Understands: Yes  Caller Disagree/Comply: Comply  PreDisposition: Home Care  Comments  User: Telma Louis RN Date/Time: 2/10/2019 5:21:38 PM  Patient states has called office 4 to 5 times last week and has not had any return calls  Patient wants to be seen by Dr Gurinder Gonzalez only  as he has so many issues going on   No appts available tomorrow except around already scheduled appt with hematologist

## 2019-02-11 ENCOUNTER — OFFICE VISIT (OUTPATIENT)
Dept: HEMATOLOGY ONCOLOGY | Facility: CLINIC | Age: 49
End: 2019-02-11
Payer: COMMERCIAL

## 2019-02-11 VITALS
HEART RATE: 77 BPM | BODY MASS INDEX: 24.92 KG/M2 | SYSTOLIC BLOOD PRESSURE: 120 MMHG | TEMPERATURE: 97 F | OXYGEN SATURATION: 100 % | HEIGHT: 73 IN | RESPIRATION RATE: 18 BRPM | DIASTOLIC BLOOD PRESSURE: 88 MMHG | WEIGHT: 188 LBS

## 2019-02-11 DIAGNOSIS — M53.3 CHRONIC RIGHT SI JOINT PAIN: ICD-10-CM

## 2019-02-11 DIAGNOSIS — R79.89 ELEVATED PLATELET COUNT: ICD-10-CM

## 2019-02-11 DIAGNOSIS — M96.1 POST LAMINECTOMY SYNDROME: ICD-10-CM

## 2019-02-11 DIAGNOSIS — F41.8 DEPRESSION WITH ANXIETY: ICD-10-CM

## 2019-02-11 DIAGNOSIS — D72.828 OTHER ELEVATED WHITE BLOOD CELL (WBC) COUNT: ICD-10-CM

## 2019-02-11 DIAGNOSIS — R71.8 ELEVATED HEMATOCRIT: ICD-10-CM

## 2019-02-11 DIAGNOSIS — G89.29 CHRONIC RIGHT SI JOINT PAIN: ICD-10-CM

## 2019-02-11 DIAGNOSIS — G89.4 CHRONIC PAIN SYNDROME: ICD-10-CM

## 2019-02-11 DIAGNOSIS — G47.33 OBSTRUCTIVE SLEEP APNEA SYNDROME: Primary | ICD-10-CM

## 2019-02-11 PROCEDURE — 99214 OFFICE O/P EST MOD 30 MIN: CPT | Performed by: INTERNAL MEDICINE

## 2019-02-11 RX ORDER — DIAZEPAM 5 MG/1
5 TABLET ORAL EVERY 12 HOURS PRN
Qty: 40 TABLET | Refills: 0 | Status: SHIPPED | OUTPATIENT
Start: 2019-02-11 | End: 2019-03-13 | Stop reason: SDUPTHER

## 2019-02-11 RX ORDER — OXYCODONE HYDROCHLORIDE 10 MG/1
10 TABLET ORAL EVERY 6 HOURS PRN
Qty: 90 TABLET | Refills: 0 | Status: SHIPPED | OUTPATIENT
Start: 2019-02-11 | End: 2019-03-13 | Stop reason: SDUPTHER

## 2019-02-11 RX ORDER — IBUPROFEN 800 MG/1
1 TABLET ORAL 3 TIMES DAILY
Status: ON HOLD | COMMUNITY
End: 2019-04-01 | Stop reason: ALTCHOICE

## 2019-02-11 NOTE — TELEPHONE ENCOUNTER
Patient here in office  Did speak with Dr Meraz Members, she stated patient is due for refill  He is also requesting Diazepam  Are you able to refill?

## 2019-02-11 NOTE — TELEPHONE ENCOUNTER
Spoke with patient, he is feeling better  He is seeing Dr Yamila Jacques today  There is another message that has been sent to Dr Nael Campbell for medication concerns

## 2019-02-11 NOTE — PROGRESS NOTES
Hematology Outpatient Follow - Up Note  Elaine Castro 46 y o  male MRN: @ Encounter: [id number]        Date:  2/11/2019        Assessment/ Plan:     Progressive polycythemia, in a patient who has history of sleep apnea, declined CPAP machine, negative for JAK2 mutation which is supposed to be positive in about 95% of polycythemia vera, CT scan showed no evidence of hepatic splenomegaly    This might be secondary polycythemia with sleep apnea  Hemoglobin is 18 6, will arrange for phlebotomy of 1 unit of packed RBC    If persistent in 2-3 months I will proceed with bone marrow biopsy and aspirate    Leukocytosis negative for bcr/ABL by PCR, he has multiple dental issue, he smokes 1 pack of cigarettes daily    Mild thrombocytosis, I will order Calreticulin mutation, MPL mutation, LDH, iron panel, if are normal will proceed with bone marrow biopsy and aspirate           HPI:  Patient seen12/7/2017 regarding chronic leukocytosis  He was seen x1 5/2016 regarding the same issue  From the office, he was advised to go to the ED secondary to pain over his kidney  Patient's recollection of that day was that when I heard bone marrow, I just walked out  He has a history of nephrolithiasis, nicotine abuse, lower back pain, multiple tattoos, no evidence of hepatitis C or HIV 5/2016  May 2016 WBC 9 0, hemoglobin 15 8, platelets 996, normal differentialIn April 2016 WBC 15 2, hemoglobin 17 1, platelets [id number] February 2015 WBC 10 9, hemoglobin 15 9, Platelets 008,121X-QFDBCJYS protein 5, sedimentation rate 2  He smokes 1 pack of cigarette daily, he denied any drug abuse he drinks alcohol occasionally  Spleen unremarkable on CT A/P 7/2017  Lyme via WB negative  HX TIA; Cluster HA  Low grade frontal HA x 2 weeks  Had right teeth issues  Root canal recommended  Doesn't have money to do so at this time   Was put on antibiotics 3 separate occasions  Sleep apnea, declined CPAP    Workup for leukocytosis was negative for bcr/ABL by PCR, JAK2 mutation, flow cytometry was negative, CRP, MACKENZIE, sed rate, B12, LDH, uric acid, testosterone level are normal         Interval History:        Previous Treatment:         Test Results:    Imaging: Xr Chest Pa & Lateral    Result Date: 1/17/2019  Narrative: CHEST INDICATION:   J18 9: Pneumonia, unspecified organism  COMPARISON:  One view chest 6/24/2018 EXAM PERFORMED/VIEWS:  XR CHEST PA & LATERAL  The frontal view was performed utilizing dual energy radiographic technique  FINDINGS: Cardiomediastinal silhouette appears unremarkable  No airspace consolidation, pneumothorax, pulmonary edema, or pleural effusion  Widened left acromioclavicular joint attributed to prior intervention or injury  No change  Impression: No radiographic evidence of acute intrathoracic process or significant interval change  Workstation performed: [id number]       Labs:   Lab Results   Component Value Date    WBC 15 55 (H) 01/16/2019    HGB 18 6 (H) 01/16/2019    HCT 57 8 (H) 01/16/2019    MCV 90 01/16/2019     (H) 01/16/2019     Lab Results   Component Value Date     (H) 10/30/2015    K 4 2 01/16/2019     01/16/2019    CO2 28 01/16/2019    ANIONGAP 7 10/30/2015    BUN 19 01/16/2019    CREATININE 1 30 01/16/2019    GLUCOSE 110 10/30/2015    GLUF 97 06/25/2018    CALCIUM 9 9 01/16/2019    AST 20 01/16/2019    ALT 29 01/16/2019    ALKPHOS 106 01/16/2019    PROT 7 7 10/30/2015    BILITOT 0 49 10/30/2015    EGFR 64 01/16/2019       No results found for: IRON, TIBC, FERRITIN    Lab Results   Component Value Date    XOFMPQHX83 847 12/07/2017         ROS:   Review of Systems   Constitutional: Negative for activity change, appetite change, chills, diaphoresis, fatigue, fever and unexpected weight change  HENT: Negative for congestion, dental problem, facial swelling, hearing loss, mouth sores, nosebleeds, postnasal drip, rhinorrhea, sore throat, trouble swallowing and voice change      Eyes: Negative for photophobia, pain, discharge, redness, itching and visual disturbance  Respiratory: Negative for cough, choking, chest tightness, shortness of breath and wheezing  Cardiovascular: Negative for chest pain, palpitations and leg swelling  Gastrointestinal: Negative for abdominal distention, abdominal pain, anal bleeding, blood in stool, constipation, diarrhea, nausea, rectal pain and vomiting  Endocrine: Negative for cold intolerance and heat intolerance  Genitourinary: Negative for decreased urine volume, difficulty urinating, dysuria, flank pain, frequency, hematuria and urgency  Musculoskeletal: Negative for arthralgias, back pain, gait problem, joint swelling, myalgias, neck pain and neck stiffness  Skin: Negative for color change, pallor, rash and wound  Allergic/Immunologic: Negative for immunocompromised state  Neurological: Negative for dizziness, tremors, seizures, syncope, facial asymmetry, speech difficulty, weakness, light-headedness, numbness and headaches  Hematological: Negative for adenopathy  Does not bruise/bleed easily  Psychiatric/Behavioral: Negative for agitation, confusion, decreased concentration, dysphoric mood and sleep disturbance  The patient is not nervous/anxious  All other systems reviewed and are negative  Current Medications: Reviewed  Allergies: Reviewed  PMH/FH/SH:  Reviewed      Physical Exam:    Body surface area is 2 1 meters squared      Wt Readings from Last 3 Encounters:   02/11/19 85 3 kg (188 lb)   01/23/19 90 7 kg (200 lb)   01/23/19 88 5 kg (195 lb)        Temp Readings from Last 3 Encounters:   02/11/19 (!) 97 °F (36 1 °C) (Tympanic)   01/23/19 97 5 °F (36 4 °C) (Tympanic)   01/16/19 (!) 96 5 °F (35 8 °C)        BP Readings from Last 3 Encounters:   02/11/19 120/88   01/23/19 126/90   01/23/19 120/88         Pulse Readings from Last 3 Encounters:   02/11/19 77   01/23/19 75   01/23/19 68        Physical Exam   Constitutional: He is oriented to person, place, and time  He appears well-developed and well-nourished  No distress  HENT:   Head: Normocephalic and atraumatic  Mouth/Throat: Oropharynx is clear and moist  No oropharyngeal exudate  Eyes: Pupils are equal, round, and reactive to light  Conjunctivae and EOM are normal    Neck: Normal range of motion  Neck supple  No tracheal deviation present  No thyromegaly present  Cardiovascular: Normal rate and regular rhythm  Exam reveals no gallop and no friction rub  No murmur heard  Pulmonary/Chest: Effort normal and breath sounds normal  No respiratory distress  He has no wheezes  He has no rales  He exhibits no tenderness  Abdominal: Soft  Bowel sounds are normal  He exhibits no distension and no mass  There is no tenderness  There is no rebound and no guarding  Musculoskeletal: Normal range of motion  Lymphadenopathy:     He has no cervical adenopathy  Neurological: He is alert and oriented to person, place, and time  Skin: Skin is warm and dry  No rash noted  He is not diaphoretic  No erythema  No pallor  Psychiatric: He has a normal mood and affect  His behavior is normal  Judgment and thought content normal    Vitals reviewed  Goals and Barriers:  Current Goal: Minimize effects of disease  Barriers: None  Patient's Capacity to Self Care:  Patient is able to self care      Code Status: [unfilled]

## 2019-02-13 ENCOUNTER — TELEPHONE (OUTPATIENT)
Dept: FAMILY MEDICINE CLINIC | Facility: CLINIC | Age: 49
End: 2019-02-13

## 2019-02-13 NOTE — TELEPHONE ENCOUNTER
Patient called to request order for orthopedic Dr  He said he had xray of shoulder and was told he would be referred to ortho

## 2019-02-14 ENCOUNTER — APPOINTMENT (OUTPATIENT)
Dept: LAB | Facility: CLINIC | Age: 49
End: 2019-02-14
Payer: COMMERCIAL

## 2019-02-14 ENCOUNTER — TELEPHONE (OUTPATIENT)
Dept: FAMILY MEDICINE CLINIC | Facility: CLINIC | Age: 49
End: 2019-02-14

## 2019-02-14 DIAGNOSIS — R79.89 ELEVATED PLATELET COUNT: ICD-10-CM

## 2019-02-14 DIAGNOSIS — G47.33 OBSTRUCTIVE SLEEP APNEA SYNDROME: ICD-10-CM

## 2019-02-14 DIAGNOSIS — D72.828 OTHER ELEVATED WHITE BLOOD CELL (WBC) COUNT: ICD-10-CM

## 2019-02-14 DIAGNOSIS — R71.8 ELEVATED HEMATOCRIT: ICD-10-CM

## 2019-02-14 LAB
BASOPHILS # BLD AUTO: 0.11 THOUSANDS/ΜL (ref 0–0.1)
BASOPHILS NFR BLD AUTO: 1 % (ref 0–1)
EOSINOPHIL # BLD AUTO: 0.8 THOUSAND/ΜL (ref 0–0.61)
EOSINOPHIL NFR BLD AUTO: 7 % (ref 0–6)
ERYTHROCYTE [DISTWIDTH] IN BLOOD BY AUTOMATED COUNT: 15 % (ref 11.6–15.1)
FERRITIN SERPL-MCNC: 41 NG/ML (ref 8–388)
HCT VFR BLD AUTO: 47.8 % (ref 36.5–49.3)
HGB BLD-MCNC: 15.4 G/DL (ref 12–17)
IMM GRANULOCYTES # BLD AUTO: 0.09 THOUSAND/UL (ref 0–0.2)
IMM GRANULOCYTES NFR BLD AUTO: 1 % (ref 0–2)
IRON SATN MFR SERPL: 23 %
IRON SERPL-MCNC: 72 UG/DL (ref 65–175)
LDH SERPL-CCNC: 166 U/L (ref 81–234)
LYMPHOCYTES # BLD AUTO: 4.21 THOUSANDS/ΜL (ref 0.6–4.47)
LYMPHOCYTES NFR BLD AUTO: 34 % (ref 14–44)
MCH RBC QN AUTO: 29.2 PG (ref 26.8–34.3)
MCHC RBC AUTO-ENTMCNC: 32.2 G/DL (ref 31.4–37.4)
MCV RBC AUTO: 91 FL (ref 82–98)
MONOCYTES # BLD AUTO: 0.86 THOUSAND/ΜL (ref 0.17–1.22)
MONOCYTES NFR BLD AUTO: 7 % (ref 4–12)
NEUTROPHILS # BLD AUTO: 6.16 THOUSANDS/ΜL (ref 1.85–7.62)
NEUTS SEG NFR BLD AUTO: 50 % (ref 43–75)
NRBC BLD AUTO-RTO: 0 /100 WBCS
PLATELET # BLD AUTO: 352 THOUSANDS/UL (ref 149–390)
PMV BLD AUTO: 10.3 FL (ref 8.9–12.7)
RBC # BLD AUTO: 5.27 MILLION/UL (ref 3.88–5.62)
TIBC SERPL-MCNC: 317 UG/DL (ref 250–450)
WBC # BLD AUTO: 12.23 THOUSAND/UL (ref 4.31–10.16)

## 2019-02-14 PROCEDURE — 82728 ASSAY OF FERRITIN: CPT

## 2019-02-14 PROCEDURE — 81402 MOPATH PROCEDURE LEVEL 3: CPT

## 2019-02-14 PROCEDURE — 85025 COMPLETE CBC W/AUTO DIFF WBC: CPT

## 2019-02-14 PROCEDURE — 81403 MOPATH PROCEDURE LEVEL 4: CPT

## 2019-02-14 PROCEDURE — 81479 UNLISTED MOLECULAR PATHOLOGY: CPT

## 2019-02-14 PROCEDURE — 36415 COLL VENOUS BLD VENIPUNCTURE: CPT

## 2019-02-14 PROCEDURE — 83540 ASSAY OF IRON: CPT

## 2019-02-14 PROCEDURE — 83550 IRON BINDING TEST: CPT

## 2019-02-14 PROCEDURE — 81270 JAK2 GENE: CPT

## 2019-02-14 PROCEDURE — 83615 LACTATE (LD) (LDH) ENZYME: CPT

## 2019-02-14 NOTE — TELEPHONE ENCOUNTER
Dr Yvonne Nettles, patient requesting a call back regarding shoulder pain radiating to fingers  Would like a call abck with further advice and or if pain meds will be prescribed due to Xray results  Also wanted Dr Sarthak Vargas to know Bw

## 2019-02-18 ENCOUNTER — TELEPHONE (OUTPATIENT)
Dept: HEMATOLOGY ONCOLOGY | Facility: CLINIC | Age: 49
End: 2019-02-18

## 2019-02-18 DIAGNOSIS — R71.8 ELEVATED HEMATOCRIT: Primary | ICD-10-CM

## 2019-02-18 NOTE — TELEPHONE ENCOUNTER
Jarrett Dill called from [facility] out reach program about the JAK2 and Calreticulin needing to be reordered  The tests were not processed correctly so the patient is going to have to come back in for specimen collection  She wants the ordered placed again and she will contact the patient to come back in for the retest  Please contact the number provided and ask for Jarrett Dill if you need additional information

## 2019-02-19 ENCOUNTER — HOSPITAL ENCOUNTER (OUTPATIENT)
Dept: INFUSION CENTER | Facility: CLINIC | Age: 49
Discharge: HOME | End: 2019-02-19
Payer: COMMERCIAL

## 2019-02-19 VITALS
TEMPERATURE: 97.4 F | SYSTOLIC BLOOD PRESSURE: 142 MMHG | RESPIRATION RATE: 18 BRPM | OXYGEN SATURATION: 100 % | DIASTOLIC BLOOD PRESSURE: 88 MMHG | HEART RATE: 70 BPM

## 2019-02-19 DIAGNOSIS — D72.828 OTHER ELEVATED WHITE BLOOD CELL (WBC) COUNT: ICD-10-CM

## 2019-02-19 DIAGNOSIS — R71.8 ELEVATED HEMATOCRIT: ICD-10-CM

## 2019-02-19 DIAGNOSIS — R79.89 ELEVATED PLATELET COUNT: ICD-10-CM

## 2019-02-19 DIAGNOSIS — G47.33 OBSTRUCTIVE SLEEP APNEA SYNDROME: ICD-10-CM

## 2019-02-19 PROCEDURE — 99195 PHLEBOTOMY: CPT

## 2019-02-19 PROCEDURE — 81219 CALR GENE COM VARIANTS: CPT

## 2019-02-19 PROCEDURE — 81270 JAK2 GENE: CPT

## 2019-02-19 NOTE — PROGRESS NOTES
LATE NOTE: pt to clinic for first time phlebotomy, on third attempt phlebotomy started at 1438 till 1454 removing 375 ml  Pt drank 16 oz of water before and after phlebotomy, spoke [employer] RN no need to stick pt again to remove another 75 ml   Pt stayed until feeling stable and vitals rechecked, labs drawn from phlebotomy tubing, pt aware of follow appointments, declines avs

## 2019-02-19 NOTE — PLAN OF CARE
Problem: Nutrition/Hydration-ADULT  Goal: Nutrient/Hydration intake appropriate for improving, restoring or maintaining nutritional needs  Description  Monitor and assess patient's nutrition/hydration status for malnutrition (ex- brittle hair, bruises, dry skin, pale skin and conjunctiva, muscle wasting, smooth red tongue, and disorientation)  Collaborate with interdisciplinary team and initiate plan and interventions as ordered  Monitor patient's weight and dietary intake as ordered or per policy  Utilize nutrition screening tool and intervene per policy  Determine patient's food preferences and provide high-protein, high-caloric foods as appropriate  INTERVENTIONS:  - Monitor oral intake, urinary output, labs, and treatment plans  - Assess nutrition and hydration status and recommend course of action  - Evaluate amount of meals eaten  - Assist patient with eating if necessary   - Allow adequate time for meals  - Recommend/ encourage appropriate diets, oral nutritional supplements, and vitamin/mineral supplements  - Order, calculate, and assess calorie counts as needed  - Recommend, monitor, and adjust tube feedings and TPN/PPN based on assessed needs  - Assess need for intravenous fluids  - Provide specific nutrition/hydration education as appropriate  - Include patient/family/caregiver in decisions related to nutrition  Outcome: Progressing     Problem: Potential for Falls  Goal: Patient will remain free of falls  Description  INTERVENTIONS:  - Assess patient frequently for physical needs  -  Identify cognitive and physical deficits and behaviors that affect risk of falls    -  [place] fall precautions as indicated by assessment   - Educate patient/family on patient safety including physical limitations  - Instruct patient to call for assistance with activity based on assessment  - Modify environment to reduce risk of injury  - Consider OT/PT consult to assist with strengthening/mobility  Outcome: Progressing     Problem: Knowledge Deficit  Goal: Patient/family/caregiver demonstrates understanding of disease process, treatment plan, medications, and discharge instructions  Description  Complete learning assessment and assess knowledge base    Interventions:  - Provide teaching at level of understanding  - Provide teaching via preferred learning methods  Outcome: Progressing

## 2019-02-20 ENCOUNTER — OFFICE VISIT (OUTPATIENT)
Dept: FAMILY MEDICINE CLINIC | Facility: CLINIC | Age: 49
End: 2019-02-20

## 2019-02-20 VITALS
SYSTOLIC BLOOD PRESSURE: 130 MMHG | HEART RATE: 78 BPM | RESPIRATION RATE: 16 BRPM | HEIGHT: 73 IN | TEMPERATURE: 96.8 F | BODY MASS INDEX: 26.9 KG/M2 | WEIGHT: 203 LBS | DIASTOLIC BLOOD PRESSURE: 80 MMHG

## 2019-02-20 DIAGNOSIS — M75.101 ROTATOR CUFF SYNDROME OF RIGHT SHOULDER: Primary | ICD-10-CM

## 2019-02-20 DIAGNOSIS — M25.511 CHRONIC RIGHT SHOULDER PAIN: ICD-10-CM

## 2019-02-20 DIAGNOSIS — G89.29 CHRONIC RIGHT SHOULDER PAIN: ICD-10-CM

## 2019-02-20 PROCEDURE — 99213 OFFICE O/P EST LOW 20 MIN: CPT | Performed by: FAMILY MEDICINE

## 2019-02-20 NOTE — PROGRESS NOTES
Assessment/Plan:    Diagnoses and all orders for this visit:    Rotator cuff syndrome of right shoulder  -     MRI shoulder right wo contrast; Future  -     Ambulatory referral to Physical Therapy; Future    Chronic right shoulder pain  -     MRI shoulder right wo contrast; Future  -     Ambulatory referral to Physical Therapy; Future        · X-rays from January 2019 of right shoulder and cervical spine reviewed  · Yefri appears to be experiencing rotator cuff pathology of his right shoulder with concern of her rotator cuff tear based on history and physical exam findings  · I did prescribe physical therapy course however I feel that he is going to be unable to perform it due to his severe pain and physical exam findings today  He did attempt a home exercise program   · I do not currently suspect that his pain concerns are originating in his cervical spine, however this is something I will continue to monitor and will consider pain management referral for steroid injections if indicated in the future  · He will follow up with us after MRI of his right shoulder  · Gavin Esparza acknowledged understanding and agreement with the plan  Chief Complaint:  Right shoulder pain    Subjective:     HPI    Gavin Esparza is a 46year old male that comes to the office due to concerns of right shoulder pain  He states that he has been experiencing right shoulder pain for approximately 2 months that has been getting progressively worse  There was no inciting event or precipitating trauma  Pain is located on the anterior aspect of his right shoulder, sharp and throbbing in quality, severe intensity, occasional radiating down to his hand  Pain is exacerbated with overhead movements and at nighttime  Pain is alleviated with rest   He has been taking his routine the prescribed pain medications which involve opioids and NSAIDs  He is not experiencing significant relief from the medication    He he has also tried a home exercise program that he had with her prior rotator cuff tear on the left side in the 1980s for which she had arthroscopic surgery for  He states that this pain feels similar to his left rotator cuff tear  He is currently not working  Denies any numbness and tingling or weakness of his affected extremity  Review of Systems   Constitutional: Negative for chills and fever  HENT: Negative for congestion, rhinorrhea and sore throat  Eyes: Negative for visual disturbance  Respiratory: Negative for shortness of breath  Cardiovascular: Negative for chest pain  Gastrointestinal: Negative for abdominal pain  Musculoskeletal: Positive for arthralgias (As per HPI)  Skin: Negative for rash  Objective:    Vitals:    02/20/19 1032   BP: 130/80   Pulse: 78   Resp: 16   Temp: (!) 96 8 °F (36 °C)       Physical Exam   Constitutional: He is oriented to person, place, and time  He appears well-developed and well-nourished  No distress  HENT:   Head: Normocephalic and atraumatic  Right Ear: External ear normal    Left Ear: External ear normal    Nose: Nose normal    Eyes: EOM are normal  No scleral icterus  Neck: Neck supple  Pulmonary/Chest: Effort normal  No respiratory distress  Abdominal: Soft  Musculoskeletal:   Number call cervical neck range of motion with flexion, extension, rotation, side bending the    No tenderness to palpation of paracervical spine region    Negative Spurling's test bilaterally   Neurological: He is alert and oriented to person, place, and time  5/5  strength bilaterally   Skin: Skin is warm  He is not diaphoretic  Psychiatric: He has a normal mood and affect  Right Shoulder Exam     Tenderness   The patient is experiencing tenderness in the biceps tendon (Tenderness to palpation over the anterior shoulder, greater tuberosity)      Tests   Dumont test: positive  Impingement: positive    Other   Erythema: absent  Sensation: normal    Comments:  Limited range of motion secondary to pain  Was able to obtain active forward flexion of approximately 110°  Unable to push passive range of motion past this secondary to pain  Unable to test abduction secondary to pain  Internal rotation is 0° up to sacrum and patient had to stop secondary to pain  Muscle strength testin+ out of 5 supraspinatus and infraspinatus testing  4/5 subscapularis testing  Pain with resisted supraspinatus, infraspinatus and subscapularis testing  Positive belly press, speed's test, negative Laud[place]ale's test          I have personally reviewed pertinent films in PACS  Right Shoulder X-ray from 2019 shows no acute osseous abnormality    Cervical spine x-ray from 2019 shows degenerative changes most pronounced at C5-C6 and C6-C7  Stable compression fracture at C4

## 2019-02-21 ENCOUNTER — TELEPHONE (OUTPATIENT)
Dept: FAMILY MEDICINE CLINIC | Facility: CLINIC | Age: 49
End: 2019-02-21

## 2019-02-21 NOTE — TELEPHONE ENCOUNTER
WAS ABLE TO SPEAK TO PATIENT- MRI IS SCHEDULED FOR [facility] 03/06/19    I WILL BE STARTING AUTHORIZATION- NOTIFIED PATIENT OF PROCESS- I WILL BE CALLING HIM AS SOON AS I GET RESPONSE FROM INSURANCE

## 2019-02-21 NOTE — TELEPHONE ENCOUNTER
----- Message from Arturo Oconnor MD sent at 2/20/2019 11:12 AM EST -----  Gigi Johnson  Per our conversation  Can you please call patient to schedule MRI and see if we can get insurance authorization  Thank you

## 2019-02-21 NOTE — TELEPHONE ENCOUNTER
1ST ATTEMPT- LEFT VM MESSAGE FOR PATIENT     I REQUESTED FOR PATIENT TO GIVE US A CALL BACK IN REGARDS TO MRI SCHEDULING  WAITING FOR PATIENT'S CALL BACK

## 2019-02-23 LAB — MISCELLANEOUS LAB TEST RESULT: NORMAL

## 2019-02-25 NOTE — TELEPHONE ENCOUNTER
Hi Dr Gwen Hull has been approved for the MRI   Patient is all set for his appointment  I have notified patient of approval      Thank you!

## 2019-02-28 ENCOUNTER — OFFICE VISIT (OUTPATIENT)
Dept: GASTROENTEROLOGY | Facility: AMBULARY SURGERY CENTER | Age: 49
End: 2019-02-28
Payer: COMMERCIAL

## 2019-02-28 VITALS
BODY MASS INDEX: 25.05 KG/M2 | SYSTOLIC BLOOD PRESSURE: 121 MMHG | HEART RATE: 91 BPM | HEIGHT: 73 IN | WEIGHT: 189 LBS | TEMPERATURE: 97.6 F | RESPIRATION RATE: 18 BRPM | DIASTOLIC BLOOD PRESSURE: 85 MMHG

## 2019-02-28 DIAGNOSIS — K22.4 HYPERCONTRACTILE ESOPHAGUS: ICD-10-CM

## 2019-02-28 DIAGNOSIS — R13.19 ESOPHAGEAL DYSPHAGIA: Primary | ICD-10-CM

## 2019-02-28 PROCEDURE — 99214 OFFICE O/P EST MOD 30 MIN: CPT | Performed by: INTERNAL MEDICINE

## 2019-02-28 RX ORDER — PANTOPRAZOLE SODIUM 40 MG/1
40 TABLET, DELAYED RELEASE ORAL
Qty: 60 TABLET | Refills: 3 | Status: SHIPPED | OUTPATIENT
Start: 2019-02-28 | End: 2019-04-26

## 2019-02-28 NOTE — PROGRESS NOTES
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-has had no symptomatic improvement despite lifestyle modifications  Has currently lost 10 lb due to regurgitation of liquids and occasional solids  He also complains of significant substernal discomfort while eating   -will increase the Prilosec to 40 mg b i d  To minimize the acid reflux   -unfortunately, patient's blood pressure is borderline and starting a calcium channel blocker may make him hypotensive  His blood pressure today is 121/85 with pulse of 91   -given that he has had worsening substernal pain and dysphagia, will repeat EGD to assess for esophagitis, Candida esophagus and possibly dilation if there is any narrowing of the esophageal lumen   -meanwhile, patient is encouraged to continue supplementing nutrition with Ensure at room temperature and staying upright after eating for at least an hour, in addition to eating small meals  -will also obtain gastric emptying study to ensure that there is no evidence of gastroparesis a that may be contributing to the symptoms     -avoid the use of narcotics as they may be affecting motility  Subjective:   43-year-old male with history of chronic back pain, on oxycodone, asthma, presents for follow-up for dysphagia  Patient was previously seen 2 months ago for worsening dysphagia and weight loss  He had undergone an EGD by me in 2018 which was notable for gastritis alone  There was no esophageal abnormality, biopsies were negative for H pylori  He subsequently underwent ENT evaluation after there was prominence of the right vocal cord which came back negative  He also underwent barium swallow which showed normal swallow function and a small hiatal hernia    He subsequently underwent esophageal manometry study which demonstrated hyper contractility  He states that his symptoms have been getting progressively worse, he states that he has squeezing substernal chest pain as soon as he eats or drinks anything  States that he feels that his throat is closing  He states that he subsequently regurgitates the food material   He has been taking pantoprazole 40 mg on daily basis  His weight today is 189 lb from 200 2 months ago  Of note, he has refused colonoscopy for age-appropriate screening which was recommended previously  Objective:     Vitals: Blood pressure 121/85, pulse 91, temperature 97 6 °F (36 4 °C), temperature source Tympanic, resp  rate 18, height 6' 1" (1 854 m), weight 85 7 kg (189 lb)  ,Body mass index is 24 94 kg/m²  [unfilled]    Physical Exam:    GEN: wn/wd, NAD  HEENT: MMM, no cervical or supraclavicular LAD, anciteric, no oral thrush was seen on exam   CV: RRR, no m/r/g  CHEST: CTA b/l, no w/r/r  ABD: +BS, soft, NT/ND, no hepatosplenomegaly  EXT: no c/c/e  SKIN: no rashes  NEURO: aaox3      Invasive Devices     Peripheral Intravenous Line            Peripheral IV 07/04/18 Right Arm 239 days                        Lab, Imaging and other studies:     No visits with results within 1 Day(s) from this visit  Latest known visit with results is:   Hospital Outpatient Visit on 02/19/2019   Component Date Value    Miscellaneous Lab Test R* 02/19/2019 SEE WRITTEN REPORT          I have personally reviewed pertinent films in PACS    No current facility-administered medications for this visit

## 2019-02-28 NOTE — LETTER
March 1, 2019     Sydney Jansen MD  [address]    Patient: Luis Whitman   YOB: 1970   Date of Visit: 2/28/2019       Dear Dr Jolie Stroud: Thank you for referring Dario Weaver to me for evaluation  Below are my notes for this consultation  If you have questions, please do not hesitate to call me  I look forward to following your patient along with you  Sincerely,        Arlene Jauregui MD        CC: MD Arlene Babb MD  2/28/2019  2:57 PM  Sign at close encounter  [address] [facility]  Progress Note - Luis Whitman 46 y o  male MRN: [id number]    Unit/Bed#:  Encounter: [id number]    Assessment/Plan:  1  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-has had no symptomatic improvement despite lifestyle modifications  Has currently lost 10 lb due to regurgitation of liquids and occasional solids  He also complains of significant substernal discomfort while eating   -will increase the Prilosec to 40 mg b i d  To minimize the acid reflux   -unfortunately, patient's blood pressure is borderline and starting a calcium channel blocker may make him hypotensive  His blood pressure today is 121/85 with pulse of 91   -given that he has had worsening substernal pain and dysphagia, will repeat EGD to assess for esophagitis, Candida esophagus and possibly dilation if there is any narrowing of the esophageal lumen   -meanwhile, patient is encouraged to continue supplementing nutrition with Ensure at room temperature and staying upright after eating for at least an hour, in addition to eating small meals  -will also obtain gastric emptying study to ensure that there is no evidence of gastroparesis a that may be contributing to the symptoms     -avoid the use of narcotics as they may be affecting motility          Subjective:   57-year-old male with history of chronic back pain, on oxycodone, asthma, presents for follow-up for dysphagia  Patient was previously seen 2 months ago for worsening dysphagia and weight loss  He had undergone an EGD by me in 2018 which was notable for gastritis alone  There was no esophageal abnormality, biopsies were negative for H pylori  He subsequently underwent ENT evaluation after there was prominence of the right vocal cord which came back negative  He also underwent barium swallow which showed normal swallow function and a small hiatal hernia  He subsequently underwent esophageal manometry study which demonstrated hyper contractility  He states that his symptoms have been getting progressively worse, he states that he has squeezing substernal chest pain as soon as he eats or drinks anything  States that he feels that his throat is closing  He states that he subsequently regurgitates the food material   He has been taking pantoprazole 40 mg on daily basis  His weight today is 189 lb from 200 2 months ago  Of note, he has refused colonoscopy for age-appropriate screening which was recommended previously  Objective:     Vitals: Blood pressure 121/85, pulse 91, temperature 97 6 °F (36 4 °C), temperature source Tympanic, resp  rate 18, height 6' 1" (1 854 m), weight 85 7 kg (189 lb)  ,Body mass index is 24 94 kg/m²  @[facility]@    Physical Exam:    GEN: wn/wd, NAD  HEENT: MMM, no cervical or supraclavicular LAD, anciteric, no oral thrush was seen on exam   CV: RRR, no m/r/g  CHEST: CTA b/l, no w/r/r  ABD: +BS, soft, NT/ND, no hepatosplenomegaly  EXT: no c/c/e  SKIN: no rashes  NEURO: aaox3      Invasive Devices     Peripheral Intravenous Line            Peripheral IV 07/04/18 Right Arm 239 days                        Lab, Imaging and other studies:     No visits with results within 1 Day(s) from this visit     Latest known visit with results is:   Hospital Outpatient Visit on 02/19/2019   Component Date Value    Miscellaneous Lab Test R* 02/19/2019 SEE WRITTEN REPORT          I have personally reviewed pertinent films in PACS    No current facility-administered medications for this visit

## 2019-02-28 NOTE — PATIENT INSTRUCTIONS
Patient is scheduled for an egd with dilation on 4/1/19 at [facility]  Patient is scheduled for a gastric emptying study on 4/21/19 at [facility] at 8:00am   Patient was provided instructions at check out  3 month follow up scheduled with PA

## 2019-03-01 PROBLEM — R13.19 ESOPHAGEAL DYSPHAGIA: Status: ACTIVE | Noted: 2019-03-01

## 2019-03-01 LAB — SCAN RESULT: NORMAL

## 2019-03-06 ENCOUNTER — HOSPITAL ENCOUNTER (OUTPATIENT)
Dept: RADIOLOGY | Facility: HOSPITAL | Age: 49
Discharge: HOME | End: 2019-03-06
Payer: COMMERCIAL

## 2019-03-06 DIAGNOSIS — M25.511 CHRONIC RIGHT SHOULDER PAIN: ICD-10-CM

## 2019-03-06 DIAGNOSIS — M75.101 ROTATOR CUFF SYNDROME OF RIGHT SHOULDER: ICD-10-CM

## 2019-03-06 DIAGNOSIS — G89.29 CHRONIC RIGHT SHOULDER PAIN: ICD-10-CM

## 2019-03-06 PROCEDURE — 73221 MRI JOINT UPR EXTREM W/O DYE: CPT

## 2019-03-11 ENCOUNTER — TELEPHONE (OUTPATIENT)
Dept: FAMILY MEDICINE CLINIC | Facility: CLINIC | Age: 49
End: 2019-03-11

## 2019-03-11 DIAGNOSIS — F41.8 DEPRESSION WITH ANXIETY: ICD-10-CM

## 2019-03-11 DIAGNOSIS — M96.1 POST LAMINECTOMY SYNDROME: ICD-10-CM

## 2019-03-11 DIAGNOSIS — G89.29 CHRONIC RIGHT SI JOINT PAIN: ICD-10-CM

## 2019-03-11 DIAGNOSIS — M53.3 CHRONIC RIGHT SI JOINT PAIN: ICD-10-CM

## 2019-03-11 DIAGNOSIS — G89.4 CHRONIC PAIN SYNDROME: ICD-10-CM

## 2019-03-11 NOTE — TELEPHONE ENCOUNTER
Patient called made appt with DR Damián Ewing for 3/13   He wants to know MRI of shoulder results today if possible

## 2019-03-13 ENCOUNTER — OFFICE VISIT (OUTPATIENT)
Dept: FAMILY MEDICINE CLINIC | Facility: CLINIC | Age: 49
End: 2019-03-13

## 2019-03-13 VITALS
BODY MASS INDEX: 25.18 KG/M2 | DIASTOLIC BLOOD PRESSURE: 100 MMHG | RESPIRATION RATE: 18 BRPM | SYSTOLIC BLOOD PRESSURE: 140 MMHG | WEIGHT: 190 LBS | TEMPERATURE: 97.5 F | HEIGHT: 73 IN | HEART RATE: 82 BPM

## 2019-03-13 DIAGNOSIS — S46.811D PARTIAL TEAR OF RIGHT SUBSCAPULARIS TENDON, SUBSEQUENT ENCOUNTER: Primary | ICD-10-CM

## 2019-03-13 PROCEDURE — 99213 OFFICE O/P EST LOW 20 MIN: CPT | Performed by: FAMILY MEDICINE

## 2019-03-13 RX ORDER — DIAZEPAM 5 MG/1
5 TABLET ORAL EVERY 12 HOURS PRN
Qty: 40 TABLET | Refills: 0 | Status: SHIPPED | OUTPATIENT
Start: 2019-03-13 | End: 2019-04-12 | Stop reason: SDUPTHER

## 2019-03-13 RX ORDER — OXYCODONE HYDROCHLORIDE 10 MG/1
10 TABLET ORAL EVERY 6 HOURS PRN
Qty: 90 TABLET | Refills: 0 | Status: ON HOLD | OUTPATIENT
Start: 2019-03-13 | End: 2019-04-04 | Stop reason: SDUPTHER

## 2019-03-13 NOTE — PROGRESS NOTES
Assessment/Plan:    Diagnoses and all orders for this visit:    Partial tear of right subscapularis tendon, subsequent encounter  -     Ambulatory referral to Orthopedic Surgery; Future    · MRI results were discussed with Yefri at today's visit which showed an interstitial delaminating tear of the subscapularis muscle  · Offered trial of conservative management with physical therapy, however, Imelda Buck requested orthopedic referral for consideration of surgical intervention  · Referral to Dr Dayo Blood made today  · Imelda Buck can follow up with me on a PRN basis  · He acknowledged understanding and agreement with the plan    Chief Complaint:  Right shoulder follow up    Subjective:     HPI    Imelda Buck is a 49-year-old male that comes back to the office today to discuss MRI results of his right shoulder  He was last seen by myself at the office on 02/20/2019 at which time an MRI was ordered of his right shoulder due to concern of a rotator cuff tear  Since last visit Imelda Buck states that his pain has been persistent, located on the anterior aspect of the shoulder, 8/10 intensity, occasional radiation of pain down to his hands, not associated with numbness and tingling  States that he aggravated his injury recently when he tried to catch a bottle of falling salsa jar  He also reports that he recently got re-approved for medical marijuana however he has been taking an opioid medication that was prescribed previously for his pain  Review of Systems   Constitutional: Negative for chills and fever  HENT: Negative for congestion, rhinorrhea and sore throat  Eyes: Negative for visual disturbance  Respiratory: Negative for shortness of breath  Cardiovascular: Negative for chest pain  Gastrointestinal: Negative for abdominal pain  Musculoskeletal: Positive for arthralgias (As per HPI)  Skin: Negative for rash         Objective:    Vitals:    03/13/19 1023   BP: 140/100   Pulse: 82   Resp: 18   Temp: 97 5 °F (36 4 °C)       Physical Exam   Constitutional: He is oriented to person, place, and time  He appears well-developed and well-nourished  No distress  HENT:   Head: Normocephalic and atraumatic  Right Ear: External ear normal    Left Ear: External ear normal    Nose: Nose normal    Eyes: EOM are normal  No scleral icterus  Neck: Neck supple  Pulmonary/Chest: Effort normal  No respiratory distress  Abdominal: Soft  Neurological: He is alert and oriented to person, place, and time  Skin: Skin is warm  He is not diaphoretic  Psychiatric: He has a normal mood and affect  Right Shoulder Exam     Tenderness   Right shoulder tenderness location: anterior aspect of shoulder  Range of Motion   Active abduction: 90   External rotation: 70   Forward flexion: 110   Internal rotation 0 degrees: Sacrum     Other   Sensation: normal  Pulse: present    Comments:  Clinical suspicion of effort dependent muscle testing present on exam today  Was able to achieve 4+ out of 5 strength of supraspinatus, infraspinatus testing with distraction  4-/5 strength testing of subscapularis with distraction  Deferred other testing at patient's request due to pain  I have personally reviewed pertinent films in PACS  MRI of right shoulder from 3/to 6/2019 shows an interstitial the laminae eating tear in the superior bundle subscapularis approximately 1 2 cm  There is no evidence of muscle atrophy or tendon retraction

## 2019-03-13 NOTE — TELEPHONE ENCOUNTER
Patient appropriate for refills on valium 5 mg and oxycodone 10 mg, PDMP showed last refill 2/11/19, no red flags, contract signed  Orders pended thanks!

## 2019-03-14 ENCOUNTER — TRANSCRIBE ORDERS (OUTPATIENT)
Dept: LAB | Facility: CLINIC | Age: 49
End: 2019-03-14

## 2019-03-18 ENCOUNTER — OFFICE VISIT (OUTPATIENT)
Dept: FAMILY MEDICINE CLINIC | Facility: CLINIC | Age: 49
End: 2019-03-18

## 2019-03-18 ENCOUNTER — ANESTHESIA EVENT (OUTPATIENT)
Dept: PERIOP | Facility: AMBULARY SURGERY CENTER | Age: 49
End: 2019-03-18
Payer: COMMERCIAL

## 2019-03-18 VITALS
SYSTOLIC BLOOD PRESSURE: 118 MMHG | BODY MASS INDEX: 26.14 KG/M2 | DIASTOLIC BLOOD PRESSURE: 82 MMHG | HEIGHT: 73 IN | WEIGHT: 197.2 LBS | HEART RATE: 64 BPM | TEMPERATURE: 97.9 F | RESPIRATION RATE: 18 BRPM

## 2019-03-18 DIAGNOSIS — M75.111 INCOMPLETE TEAR OF RIGHT ROTATOR CUFF: Primary | ICD-10-CM

## 2019-03-18 DIAGNOSIS — J45.40 MODERATE PERSISTENT ASTHMA WITHOUT COMPLICATION: ICD-10-CM

## 2019-03-18 DIAGNOSIS — J30.9 ALLERGIC RHINITIS, UNSPECIFIED SEASONALITY, UNSPECIFIED TRIGGER: ICD-10-CM

## 2019-03-18 DIAGNOSIS — G89.29 OTHER CHRONIC PAIN: ICD-10-CM

## 2019-03-18 PROBLEM — J06.9 VIRAL UPPER RESPIRATORY TRACT INFECTION: Status: ACTIVE | Noted: 2019-03-18

## 2019-03-18 PROCEDURE — 99213 OFFICE O/P EST LOW 20 MIN: CPT | Performed by: FAMILY MEDICINE

## 2019-03-18 RX ORDER — FLUTICASONE PROPIONATE 50 MCG
2 SPRAY, SUSPENSION (ML) NASAL DAILY PRN
Qty: 16 G | Refills: 2 | Status: SHIPPED | OUTPATIENT
Start: 2019-03-18 | End: 2019-06-22 | Stop reason: SDUPTHER

## 2019-03-18 RX ORDER — ALBUTEROL SULFATE 90 UG/1
2 AEROSOL, METERED RESPIRATORY (INHALATION) EVERY 6 HOURS PRN
Qty: 18 G | Refills: 2 | Status: SHIPPED | OUTPATIENT
Start: 2019-03-18 | End: 2019-04-09 | Stop reason: SDUPTHER

## 2019-03-18 RX ORDER — FLUTICASONE FUROATE AND VILANTEROL 200; 25 UG/1; UG/1
1 POWDER RESPIRATORY (INHALATION) DAILY
Qty: 3 INHALER | Refills: 3 | Status: SHIPPED | OUTPATIENT
Start: 2019-03-18

## 2019-03-18 NOTE — ASSESSMENT & PLAN NOTE
-Suspect that increased cough is 2/2 post-nasal drip due to allergic rhinitis during seasonal transition period  -No evidence for asthma exacerbation as patient exhibits no wheezing on exam today and patient denies wheezing or SOB refractory to albuterol usage  -Refilled albuterol today, advised to use Q4H PRN for wheezing as usual  -Due to insurance coverage, will change advair to [facility] today, but will wait to D/C advair in the system until patient has received Breo, to prevent gaps in treatment  -Encouraged patient to sincerely consider smoking cessation; he is pre-contemplative at this time  -Declines singulair Rx, says "I don't want to add more medication unless I really need it"

## 2019-03-18 NOTE — PATIENT INSTRUCTIONS
I have sent the new inhaler to your pharmacy, and you take this one once daily  Continue to use albuterol every 4 hours as needed, but for 2 days, use every 4 hours scheduled to help with wheezing  Please come back in 2 months

## 2019-03-18 NOTE — PROGRESS NOTES
Riki Carlson 1970 male MRN: [id number]    Family Medicine Follow-up Visit    ASSESSMENT/PLAN  Problem List Items Addressed This Visit        Respiratory    Allergic rhinitis     -Refilled flonase today for allergic rhinitis, patient reports adherence to usage  -Aim to reduce post-nasal drip to reduce frequency of cough         Moderate persistent asthma without complication     -Suspect that increased cough is 2/2 post-nasal drip due to allergic rhinitis during seasonal transition period  -No evidence for asthma exacerbation as patient exhibits no wheezing on exam today and patient denies wheezing or SOB refractory to albuterol usage  -Refilled albuterol today, advised to use Q4H PRN for wheezing as usual  -Due to insurance coverage, will change advair to [facility] today, but will wait to D/C advair in the system until patient has received Breo, to prevent gaps in treatment  -Encouraged patient to sincerely consider smoking cessation; he is pre-contemplative at this time  -Declines singulair Rx, says "I don't want to add more medication unless I really need it"         Relevant Medications    fluticasone-vilanterol (BREO ELLIPTA) 200-25 MCG/INH inhaler    albuterol (VENTOLIN HFA) 90 mcg/act inhaler    fluticasone (FLONASE) 50 mcg/act nasal spray       Musculoskeletal and Integument    Incomplete tear of right rotator cuff - Primary     -Rx written for patient for supportive shoulder sling per his request  -Follow up with Ortho as scheduled, PT as recommended by Sports Medicine  -Careful use of R shoulder to prevent worsening of rotator cuff tear         Relevant Medications    Elastic Bandages & Supports (SHOULDER BRACE MEDIUM) MISC    Elastic Bandages & Supports (SHOULDER SUPPORT/NEOPRENE MED) MISC    Other Relevant Orders    Sling          Follow up in 2 months  Patient appropriate for refill on oxycodone and valium around 4/13/19      Future Appointments   Date Time Provider Drew Knight   3/27/2019 11:00 AM Quinn Shields MD ORTHO AND Practice-Ort   4/21/2019  8:00 AM AN NM 1 AN [address]   5/20/2019  2:30 PM Talon Glover PA-C [facility]STRO [facility]   5/22/2019  1:40 PM Stefania Wheatley MD [facility]   6/10/2019 11:20 AM Josiane Choudhary MD [facility] ONC [facility] Practice[facility]Onc          SUBJECTIVE  CC: Follow-up      HPI:  Corbin Vogel is a 46 y o  male who presents for follow up on:    · Allergic rhinitis/Asthma: Patient is adherent to flonase, advair BID & albuterol, but has had phlegmy cough (no blood), worse in the morning as he has a lot of postnasal drip at night  Denies respiratory distress, but uses albuterol for wheezing PRN, which he has used more recently  Says advair has been difficult to get covered by insurance  Is trying to cut down on smoking but is pre-contemplative of cessation at this time  · R Shoulder pain: Saw Sports Medicine 3/13/19, and MRI R shoulder showed interstitial delaminating tear in the superior bundle of the subscapularis, extending proximally by 1 2 cm  He has Ortho appointment scheduled 3/27/19, was given PT referral by Sports Medicine as well  Today, he asks for a sling for his shoulder for support, as he often feels he needs a little more support  · Dysphagia: 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  Unable to undergo medical therapy (CCBs) due to his BP at baseline running <120/80 and concern for hypotension  Instead, GI is planning for EGD w/ dilation, scheduled 4/1/19  · Hematologic derangements: Patient has chronic polycythemia, leukocytosis, & thrombocytosis, follows with Heme/Onc (Dr Cyril Nava), last appt 2/11/19 and underwent phlebotomy 2/19/19, with repeat labs drawn  Brady 2 and CALR mutations were negative  He has follow up appointment with Dr Cyril Nava 6/10/19, is unsure when he is due for next lab draw      · Chronic back pain/post-laminectomy syndrome: Up to date on oxycodone and valium refills, refilled 3/13/19, patient remains motivated to continue to reduce the dispense amount  Review of Systems   Constitutional: Negative for activity change, appetite change, chills, fatigue and fever  HENT: Positive for congestion, postnasal drip and trouble swallowing  Eyes: Negative for visual disturbance  Respiratory: Positive for cough and wheezing  Negative for chest tightness and shortness of breath  Cough worse in a m  Cardiovascular: Negative for chest pain, palpitations and leg swelling  Gastrointestinal: Negative for abdominal pain, diarrhea, nausea and vomiting  Genitourinary: Negative for dysuria  Musculoskeletal: Positive for arthralgias and back pain  Negative for gait problem  Skin: Negative for rash  Neurological: Negative for syncope, weakness and light-headedness  Psychiatric/Behavioral: Negative for agitation, sleep disturbance and suicidal ideas  All other systems reviewed and are negative        Historical Information   The patient history was reviewed as follows:    Past Medical History:   Diagnosis Date    Allergic rhinitis     Anxiety     Arthritis     Back pain     Chronic obstructive asthma (HCC)     Chronic pain syndrome     Cluster headaches     Depression     Hypertension     Insomnia     Kidney stones     Laryngospasm     Leukocytosis     Migraine     [facility] [address] [facility]     Nephrolithiasis     Organic impotence     Pneumonia due to infectious organism 1/16/2019    Seasonal allergies     Sleep apnea     does not use CPAP    Stroke ([facility] [address] [facility]     TMJ (temporomandibular joint syndrome)     Wheezing     last assessed 05/19/17     Past Surgical History:   Procedure Laterality Date    BACK SURGERY      *9, [id number], nervectomy 2006, total of 10    BUCCAL MASS EXCISION N/A 3/26/2018    Procedure: BIOPSY LINGUAL TONSIL (FLOW/ STANDARD PATH)  EVAL UNDER ANESTHESIA;  Surgeon: Thurston Leventhal, MD;  Location: [facility] MAIN OR;  Service: ENT    COLONOSCOPY      HERNIA REPAIR      KIDNEY SURGERY      KNEE ARTHROSCOPY      LITHOTRIPSY      multiple    LUMBAR DISC SURGERY  2015    MANDIBLE FRACTURE SURGERY      titanium plate    PELVIC SYMPHYSIS FUSION      DE ESOPHAGOGASTRODUODENOSCOPY TRANSORAL DIAGNOSTIC N/A 2/22/2018    Procedure: ESOPHAGOGASTRODUODENOSCOPY (EGD); Surgeon: Marcella Lewis MD;  Location: [facility] GI LAB; Service: Gastroenterology    DE REVISE/REMOVE SPINAL NEUROSTIM/ Left 6/9/2017    Procedure: REMOVAL OF A THORACIC SCS PLACED VIA LAMINECTOMY AND REMOVAL LEFT BUTTOCK GENERATOR; IMPULSE MONITORING;  Surgeon: Homer Barron MD;  Location: [facility];  Service: Neurosurgery    DE SURG IMPLNT [address] N/A 9/8/2016    Procedure: DORSAL COLUMN STIMULATOR PLACEMENT (IMPULSE MONITORING);   Surgeon: Vivien Pineda MD;  Location: [facility];  Service: Orthopedics    SACROILIAC JOINT FUSION  2015    SHOULDER SURGERY Left     2     Family History   Problem Relation Age of Onset    Diabetes Father     Obesity Father     Liver cancer Father     Heart disease Father     Diabetes Brother     Hypertension Brother     Leukemia Mother     Hypertension Mother     Cancer Family       Social History   Social History     Substance and Sexual Activity   Alcohol Use Yes    Comment: twice a month      Social History     Substance and Sexual Activity   Drug Use Yes    Frequency: 7 0 times per week    Types: Marijuana    Comment: medical marijuana daily     Social History     Tobacco Use   Smoking Status Current Some Day Smoker    Packs/day: 0 50    Years: 25 00    Pack years: 12 50   Smokeless Tobacco Never Used   Tobacco Comment    using patch       Medications:     Current Outpatient Medications:     albuterol (VENTOLIN HFA) 90 mcg/act inhaler, Inhale 2 puffs every 6 (six) hours as needed for wheezing, Disp: 18 g, Rfl: 2    aspirin 81 MG tablet, Take 1 tablet by mouth daily, Disp: , Rfl:     diazepam (VALIUM) 5 mg tablet, Take 1 tablet (5 mg total) by mouth every 12 (twelve) hours as needed for anxiety, Disp: 40 tablet, Rfl: 0    fluticasone (FLONASE) 50 mcg/act nasal spray, 2 sprays into each nostril daily as needed for rhinitis or allergies, Disp: 16 g, Rfl: 2    fluticasone-salmeterol (ADVAIR DISKUS) 250-50 mcg/dose inhaler, Inhale 1 puff 2 (two) times a day (Patient taking differently: Inhale 1 puff daily  ), Disp: 1 Inhaler, Rfl: 2    ibuprofen (MOTRIN) 800 mg tablet, TAKE 1 TABLET BY MOUTH THREE TIMES DAILY AS NEEDED, Disp: 90 tablet, Rfl: 0    oxyCODONE (ROXICODONE) 10 MG TABS, Take 1 tablet (10 mg total) by mouth every 6 (six) hours as needed for severe painMax Daily Amount: 40 mg, Disp: 90 tablet, Rfl: 0    pantoprazole (PROTONIX) 40 mg tablet, Take 1 tablet (40 mg total) by mouth 2 (two) times a day before meals for 30 days, Disp: 60 tablet, Rfl: 3    VENTOLIN  (90 Base) MCG/ACT inhaler, USE 2 PUFFS EVERY 6 HOURS AS NEEDED FOR WHEEZING, Disp: 18 g, Rfl: 0    ibuprofen (MOTRIN) 800 mg tablet, 1 tablet 3 (three) times a day, Disp: , Rfl:   Allergies   Allergen Reactions    Other Rash     Adhesive tape       OBJECTIVE    Vitals:   Vitals:    03/18/19 1617   BP: 118/82   BP Location: Left arm   Patient Position: Sitting   Cuff Size: Large   Pulse: 64   Resp: 18   Temp: 97 9 °F (36 6 °C)   TempSrc: Tympanic   Weight: 89 4 kg (197 lb 3 2 oz)   Height: 6' 1" (1 854 m)       Physical Exam   Constitutional: He is oriented to person, place, and time  He appears well-developed and well-nourished  No distress  HENT:   Head: Normocephalic and atraumatic  Mild cobblestoning posterior pharynx without erythema   Eyes: Conjunctivae and EOM are normal  Right eye exhibits no discharge  Left eye exhibits no discharge  Neck: Normal range of motion  Neck supple  Cardiovascular: Normal rate, regular rhythm and intact distal pulses  Pulmonary/Chest: Effort normal and breath sounds normal  No respiratory distress  He has no wheezes     Lungs CTABL, good aeration at B/L bases   Abdominal: Soft  Bowel sounds are normal  There is no tenderness  Musculoskeletal: He exhibits no edema  Tenderness along entire length of spine at T-spine and L-spine, limited ROM of R shoulder due to pain   Neurological: He is alert and oriented to person, place, and time  Skin: Skin is warm and dry  Capillary refill takes less than 2 seconds  No rash noted  Psychiatric: He has a normal mood and affect  His behavior is normal    Vitals reviewed         Ceci Doe MD, PGY-2  [facility]   3/18/2019

## 2019-03-18 NOTE — ASSESSMENT & PLAN NOTE
-Refilled flonase today for allergic rhinitis, patient reports adherence to usage  -Aim to reduce post-nasal drip to reduce frequency of cough

## 2019-03-18 NOTE — ASSESSMENT & PLAN NOTE
-Rx written for patient for supportive shoulder sling per his request  -Follow up with Ortho as scheduled, PT as recommended by Sports Medicine  -Careful use of R shoulder to prevent worsening of rotator cuff tear

## 2019-03-20 RX ORDER — IBUPROFEN 800 MG/1
TABLET ORAL
Qty: 90 TABLET | Refills: 0 | Status: ON HOLD | OUTPATIENT
Start: 2019-03-20 | End: 2019-04-01 | Stop reason: ALTCHOICE

## 2019-03-26 ENCOUNTER — TRANSCRIBE ORDERS (OUTPATIENT)
Dept: OBGYN CLINIC | Facility: OTHER | Age: 49
End: 2019-03-26

## 2019-03-27 ENCOUNTER — OFFICE VISIT (OUTPATIENT)
Dept: OBGYN CLINIC | Facility: CLINIC | Age: 49
End: 2019-03-27
Payer: COMMERCIAL

## 2019-03-27 VITALS
DIASTOLIC BLOOD PRESSURE: 82 MMHG | HEART RATE: 67 BPM | WEIGHT: 189 LBS | SYSTOLIC BLOOD PRESSURE: 133 MMHG | BODY MASS INDEX: 25.05 KG/M2 | HEIGHT: 73 IN

## 2019-03-27 DIAGNOSIS — S46.811D PARTIAL TEAR OF RIGHT SUBSCAPULARIS TENDON, SUBSEQUENT ENCOUNTER: ICD-10-CM

## 2019-03-27 PROBLEM — S46.811A PARTIAL TEAR OF RIGHT SUBSCAPULARIS TENDON: Status: ACTIVE | Noted: 2019-03-27

## 2019-03-27 PROCEDURE — 99204 OFFICE O/P NEW MOD 45 MIN: CPT | Performed by: ORTHOPAEDIC SURGERY

## 2019-03-27 RX ORDER — CEFAZOLIN SODIUM 2 G/50ML
2000 SOLUTION INTRAVENOUS ONCE
Status: CANCELLED | OUTPATIENT
Start: 2019-03-27 | End: 2019-03-27

## 2019-03-27 RX ORDER — CHLORHEXIDINE GLUCONATE 4 G/100ML
SOLUTION TOPICAL DAILY PRN
Status: CANCELLED | OUTPATIENT
Start: 2019-03-27

## 2019-03-27 NOTE — PROGRESS NOTES
Patient Name:  Corbin Vogel  MRN:  [id number]    Assessment & Plan     Right shoulder interstitial tear subscapularis tendon  1  Reviewed treatment options with the patient, including prescription NSAIDs, steroid injection, and formal physical therapy versus surgery consisting of diagnostic arthroscopy and possible rotator cuff repair  Risks and benefits of each were reviewed  Patient does not want to pursue conservative measures at this point  He elected to proceed with surgery  2  Follow-up 1-2 weeks after surgery  Chief Complaint     Right shoulder pain      History of the Present Illness     Corbin Vogel is a 46 y o  male referred by Dr Kalyani Thomas orthopedic consultation regarding his right shoulder pain  Patient reports pain for the past few months  He reports slipping on stairs and injuring his shoulder while holding onto the rail  He has pain localizing to the anterior posterior aspect worse with reaching back  The pain has been progressively worsening now interfering with sleep he has tried multiple conservative measures including activity modification, anti-inflammatory medication and home exercises  He remains very limited by his shoulder with no significant improvement  Physical Exam     /82   Pulse 67   Ht 6' 1" (1 854 m)   Wt 85 7 kg (189 lb)   BMI 24 94 kg/m²     Right shoulder:  Tenderness to palpation anterior posterior aspect  Full range of motion  Impingement signs are positive  Empty can test is positive  Speed's test is positive  Belly press test is negative  4/5 strength forward flexion, external rotation, and internal rotation limited by pain  Eyes:  Anicteric sclerae  Neck:  Supple  Lungs:  Unlabored breathing  Cardiovascular:  Capillary refill is less than 2 seconds  Skin:  Intact without erythema  Neurologic:  Sensation intact to light touch  Psychiatric:  Mood and affect are appropriate        Data Review     I have personally reviewed pertinent films in PACS, and my interpretation follows:    MRI right shoulder 3/6/19:  Interstitial tearing of the subscapularis tendon  Past Medical History:   Diagnosis Date    Allergic rhinitis     Anxiety     Arthritis     Back pain     Chronic obstructive asthma (HCC)     Chronic pain syndrome     Cluster headaches     Depression     Hypertension     Insomnia     Kidney stones     Laryngospasm     Leukocytosis     Migraine     Myocardial infarction ([address] )     Nephrolithiasis     Organic impotence     Pneumonia due to infectious organism 1/16/2019    Seasonal allergies     Sleep apnea     does not use CPAP    St[facility] [address] [facility]     TMJ (temporomandibular joint syndrome)     Wheezing     last assessed 05/19/17       Past Surgical History:   Procedure Laterality Date    BACK SURGERY      *9, [id number], nervectomy 2006, total of 10    BUCCAL MASS EXCISION N/A 3/26/2018    Procedure: BIOPSY LINGUAL TONSIL (FLOW/ STANDARD PATH)  EVAL UNDER ANESTHESIA;  Surgeon: Russell Cabello MD;  [facility];  Service: ENT    COLONOSCOPY      HERNIA REPAIR      KIDNEY SURGERY      KNEE ARTHROSCOPY      LITHOTRIPSY      multiple    LUMBAR [address]  [address]    MANDIBLE FRACTURE SURGERY      titanium plate    PELVIC SYMPHYSIS FUSION      IN ESOPHAGOGASTRODUODENOSCOPY TRANSORAL DIAGNOSTIC N/A 2/22/2018    Procedure: ESOPHAGOGASTRODUODENOSCOPY (EGD); Surgeon: Jeffy Hylton MD;  Location: [facility]; Service: Gastroenterology    IN REVISE/REMOVE SPINAL NEUROSTIM/ Left 6/9/2017    Procedure: REMOVAL OF A THORACIC SCS PLACED VIA LAMINECTOMY AND REMOVAL LEFT BUTTOCK GENERATOR; IMPULSE MONITORING;  Surgeon: Shan Olea MD;  Location:  MAIN OR;  Service: Neurosurgery    IN SURG IMPLNT [address] N/A 9/8/2016    Procedure: DORSAL COLUMN STIMULATOR PLACEMENT (IMPULSE MONITORING);   Surgeon: Fatou Moon MD;  [facility]: [facility];  Service: Orthopedics    [address] [place] [address] 2015    SHOULDER SURGERY Left     2       Allergies   Allergen Reactions    Other Rash     Adhesive tape       Current Outpatient Medications on File Prior to Visit   Medication Sig Dispense Refill    albuterol (VENTOLIN HFA) 90 mcg/act inhaler Inhale 2 puffs every 6 (six) hours as needed for wheezing 18 g 2    aspirin 81 MG tablet Take 1 tablet by mouth daily      diazepam (VALIUM) 5 mg tablet Take 1 tablet (5 mg total) by mouth every 12 (twelve) hours as needed for anxiety 40 tablet 0    Elastic Bandages & Supports (SHOULDER BRACE MEDIUM) MISC by Does not apply route daily Apply daily 1 each 0    Elastic Bandages & Supports (SHOULDER SUPPORT/NEOPRENE MED) MISC by Does not apply route daily Apply daily 1 each 0    fluticasone (FLONASE) 50 mcg/act nasal spray 2 sprays into each nostril daily as needed for rhinitis or allergies 16 g 2    fluticasone-salmeterol (ADVAIR DISKUS) 250-50 mcg/dose inhaler Inhale 1 puff 2 (two) times a day (Patient taking differently: Inhale 1 puff daily  ) 1 Inhaler 2    fluticasone-vilanterol (BREO ELLIPTA) 200-25 MCG/INH inhaler Inhale 1 puff daily Rinse mouth after use  3 Inhaler 3    ibuprofen (MOTRIN) 800 mg tablet TAKE 1 TABLET BY MOUTH THREE TIMES DAILY AS NEEDED 90 tablet 0    ibuprofen (MOTRIN) 800 mg tablet 1 tablet 3 (three) times a day      ibuprofen (MOTRIN) 800 mg tablet TAKE 1 TABLET BY MOUTH THREE TIMES DAILY AS NEEDED 90 tablet 0    oxyCODONE (ROXICODONE) 10 MG TABS Take 1 tablet (10 mg total) by mouth every 6 (six) hours as needed for severe painMax Daily Amount: 40 mg 90 tablet 0    pantoprazole (PROTONIX) 40 mg tablet Take 1 tablet (40 mg total) by mouth 2 (two) times a day before meals for 30 days 60 tablet 3    VENTOLIN  (90 Base) MCG/ACT inhaler USE 2 PUFFS EVERY 6 HOURS AS NEEDED FOR WHEEZING 18 g 0     No current facility-administered medications on file prior to visit          Social History     Tobacco Use    Smoking status: Current Some Day Smoker     Packs/day: 0 50     Years: 25 00     Pack years: 12 50    Smokeless tobacco: Never Used    Tobacco comment: using patch   Substance Use Topics    Alcohol use: Yes     Comment: twice a month     Drug use: Yes     Frequency: 7 0 times per week     Types: Marijuana     Comment: medical marijuana daily       Family History   Problem Relation Age of Onset    Diabetes Father     Obesity Father     Liver cancer Father     Heart disease Father     Diabetes Brother     Hypertension Brother     Leukemia Mother     Hypertension Mother     Cancer Family        Review of Systems     As stated in the HPI  All other systems were reviewed and are negative

## 2019-03-28 ENCOUNTER — ANESTHESIA EVENT (OUTPATIENT)
Dept: PERIOP | Facility: AMBULARY SURGERY CENTER | Age: 49
End: 2019-03-28
Payer: COMMERCIAL

## 2019-03-30 ENCOUNTER — APPOINTMENT (OUTPATIENT)
Dept: LAB | Facility: CLINIC | Age: 49
End: 2019-03-30
Payer: COMMERCIAL

## 2019-03-30 DIAGNOSIS — D72.828 OTHER ELEVATED WHITE BLOOD CELL (WBC) COUNT: ICD-10-CM

## 2019-03-30 DIAGNOSIS — R71.8 ELEVATED HEMATOCRIT: ICD-10-CM

## 2019-03-30 DIAGNOSIS — R79.89 ELEVATED PLATELET COUNT: ICD-10-CM

## 2019-03-30 DIAGNOSIS — S46.811D PARTIAL TEAR OF RIGHT SUBSCAPULARIS TENDON, SUBSEQUENT ENCOUNTER: ICD-10-CM

## 2019-03-30 LAB
ALBUMIN SERPL BCP-MCNC: 3.5 G/DL (ref 3.5–5)
ALP SERPL-CCNC: 80 U/L (ref 46–116)
ALT SERPL W P-5'-P-CCNC: 19 U/L (ref 12–78)
ANION GAP SERPL CALCULATED.3IONS-SCNC: 8 MMOL/L (ref 4–13)
AST SERPL W P-5'-P-CCNC: 10 U/L (ref 5–45)
BASOPHILS # BLD AUTO: 0.09 THOUSANDS/ΜL (ref 0–0.1)
BASOPHILS NFR BLD AUTO: 1 % (ref 0–1)
BILIRUB SERPL-MCNC: 0.2 MG/DL (ref 0.2–1)
BUN SERPL-MCNC: 15 MG/DL (ref 5–25)
CALCIUM SERPL-MCNC: 9 MG/DL (ref 8.3–10.1)
CHLORIDE SERPL-SCNC: 109 MMOL/L (ref 100–108)
CO2 SERPL-SCNC: 29 MMOL/L (ref 21–32)
CREAT SERPL-MCNC: 1.13 MG/DL (ref 0.6–1.3)
EOSINOPHIL # BLD AUTO: 0.77 THOUSAND/ΜL (ref 0–0.61)
EOSINOPHIL NFR BLD AUTO: 6 % (ref 0–6)
ERYTHROCYTE [DISTWIDTH] IN BLOOD BY AUTOMATED COUNT: 15.1 % (ref 11.6–15.1)
GFR SERPL CREATININE-BSD FRML MDRD: 75 ML/MIN/1.73SQ M
GLUCOSE SERPL-MCNC: 120 MG/DL (ref 65–140)
HCT VFR BLD AUTO: 43.2 % (ref 36.5–49.3)
HGB BLD-MCNC: 13.7 G/DL (ref 12–17)
IMM GRANULOCYTES # BLD AUTO: 0.1 THOUSAND/UL (ref 0–0.2)
IMM GRANULOCYTES NFR BLD AUTO: 1 % (ref 0–2)
LYMPHOCYTES # BLD AUTO: 4.28 THOUSANDS/ΜL (ref 0.6–4.47)
LYMPHOCYTES NFR BLD AUTO: 31 % (ref 14–44)
MCH RBC QN AUTO: 28.9 PG (ref 26.8–34.3)
MCHC RBC AUTO-ENTMCNC: 31.7 G/DL (ref 31.4–37.4)
MCV RBC AUTO: 91 FL (ref 82–98)
MONOCYTES # BLD AUTO: 0.87 THOUSAND/ΜL (ref 0.17–1.22)
MONOCYTES NFR BLD AUTO: 6 % (ref 4–12)
NEUTROPHILS # BLD AUTO: 7.66 THOUSANDS/ΜL (ref 1.85–7.62)
NEUTS SEG NFR BLD AUTO: 55 % (ref 43–75)
NRBC BLD AUTO-RTO: 0 /100 WBCS
PLATELET # BLD AUTO: 399 THOUSANDS/UL (ref 149–390)
PMV BLD AUTO: 9.9 FL (ref 8.9–12.7)
POTASSIUM SERPL-SCNC: 4.5 MMOL/L (ref 3.5–5.3)
PROT SERPL-MCNC: 6.6 G/DL (ref 6.4–8.2)
RBC # BLD AUTO: 4.74 MILLION/UL (ref 3.88–5.62)
SODIUM SERPL-SCNC: 146 MMOL/L (ref 136–145)
WBC # BLD AUTO: 13.77 THOUSAND/UL (ref 4.31–10.16)

## 2019-03-30 PROCEDURE — 36415 COLL VENOUS BLD VENIPUNCTURE: CPT

## 2019-03-30 PROCEDURE — 80053 COMPREHEN METABOLIC PANEL: CPT

## 2019-03-30 PROCEDURE — 85025 COMPLETE CBC W/AUTO DIFF WBC: CPT

## 2019-04-01 ENCOUNTER — ANESTHESIA (OUTPATIENT)
Dept: PERIOP | Facility: AMBULARY SURGERY CENTER | Age: 49
End: 2019-04-01
Payer: COMMERCIAL

## 2019-04-01 ENCOUNTER — HOSPITAL ENCOUNTER (OUTPATIENT)
Facility: AMBULARY SURGERY CENTER | Age: 49
Setting detail: OUTPATIENT SURGERY
Discharge: HOME | End: 2019-04-01
Attending: INTERNAL MEDICINE | Admitting: INTERNAL MEDICINE
Payer: COMMERCIAL

## 2019-04-01 VITALS
DIASTOLIC BLOOD PRESSURE: 84 MMHG | TEMPERATURE: 97.9 F | SYSTOLIC BLOOD PRESSURE: 132 MMHG | HEART RATE: 87 BPM | OXYGEN SATURATION: 96 % | WEIGHT: 185 LBS | RESPIRATION RATE: 20 BRPM | BODY MASS INDEX: 24.52 KG/M2 | HEIGHT: 73 IN

## 2019-04-01 DIAGNOSIS — R13.19 ESOPHAGEAL DYSPHAGIA: ICD-10-CM

## 2019-04-01 DIAGNOSIS — K22.4 HYPERCONTRACTILE ESOPHAGUS: ICD-10-CM

## 2019-04-01 PROCEDURE — 43239 EGD BIOPSY SINGLE/MULTIPLE: CPT | Performed by: INTERNAL MEDICINE

## 2019-04-01 PROCEDURE — 88305 TISSUE EXAM BY PATHOLOGIST: CPT | Performed by: PATHOLOGY

## 2019-04-01 PROCEDURE — 43248 EGD GUIDE WIRE INSERTION: CPT | Performed by: INTERNAL MEDICINE

## 2019-04-01 PROCEDURE — 99024 POSTOP FOLLOW-UP VISIT: CPT | Performed by: INTERNAL MEDICINE

## 2019-04-01 RX ORDER — SODIUM CHLORIDE 9 MG/ML
INJECTION, SOLUTION INTRAVENOUS CONTINUOUS PRN
Status: DISCONTINUED | OUTPATIENT
Start: 2019-04-01 | End: 2019-04-01 | Stop reason: SURG

## 2019-04-01 RX ORDER — PROPOFOL 10 MG/ML
INJECTION, EMULSION INTRAVENOUS AS NEEDED
Status: DISCONTINUED | OUTPATIENT
Start: 2019-04-01 | End: 2019-04-01 | Stop reason: SURG

## 2019-04-01 RX ORDER — LIDOCAINE HYDROCHLORIDE 10 MG/ML
INJECTION, SOLUTION INFILTRATION; PERINEURAL AS NEEDED
Status: DISCONTINUED | OUTPATIENT
Start: 2019-04-01 | End: 2019-04-01 | Stop reason: SURG

## 2019-04-01 RX ADMIN — PROPOFOL 80 MG: 10 INJECTION, EMULSION INTRAVENOUS at 11:38

## 2019-04-01 RX ADMIN — PROPOFOL 50 MG: 10 INJECTION, EMULSION INTRAVENOUS at 11:47

## 2019-04-01 RX ADMIN — PROPOFOL 120 MG: 10 INJECTION, EMULSION INTRAVENOUS at 11:36

## 2019-04-01 RX ADMIN — LIDOCAINE HYDROCHLORIDE ANHYDROUS 50 MG: 10 INJECTION, SOLUTION INFILTRATION at 11:33

## 2019-04-01 RX ADMIN — SODIUM CHLORIDE: 0.9 INJECTION, SOLUTION INTRAVENOUS at 11:29

## 2019-04-01 RX ADMIN — PROPOFOL 50 MG: 10 INJECTION, EMULSION INTRAVENOUS at 11:40

## 2019-04-03 ENCOUNTER — TELEPHONE (OUTPATIENT)
Dept: GASTROENTEROLOGY | Facility: AMBULARY SURGERY CENTER | Age: 49
End: 2019-04-03

## 2019-04-04 ENCOUNTER — HOSPITAL ENCOUNTER (OUTPATIENT)
Facility: AMBULARY SURGERY CENTER | Age: 49
Setting detail: OUTPATIENT SURGERY
Discharge: HOME | End: 2019-04-04
Attending: ORTHOPAEDIC SURGERY | Admitting: ORTHOPAEDIC SURGERY
Payer: COMMERCIAL

## 2019-04-04 ENCOUNTER — ANESTHESIA (OUTPATIENT)
Dept: PERIOP | Facility: AMBULARY SURGERY CENTER | Age: 49
End: 2019-04-04
Payer: COMMERCIAL

## 2019-04-04 VITALS
HEART RATE: 68 BPM | HEIGHT: 73 IN | RESPIRATION RATE: 16 BRPM | BODY MASS INDEX: 24.52 KG/M2 | OXYGEN SATURATION: 99 % | DIASTOLIC BLOOD PRESSURE: 60 MMHG | SYSTOLIC BLOOD PRESSURE: 132 MMHG | WEIGHT: 185 LBS | TEMPERATURE: 98 F

## 2019-04-04 DIAGNOSIS — M53.3 CHRONIC RIGHT SI JOINT PAIN: ICD-10-CM

## 2019-04-04 DIAGNOSIS — M96.1 POST LAMINECTOMY SYNDROME: ICD-10-CM

## 2019-04-04 DIAGNOSIS — S46.811A PARTIAL TEAR OF RIGHT SUBSCAPULARIS TENDON, INITIAL ENCOUNTER: Primary | ICD-10-CM

## 2019-04-04 DIAGNOSIS — G89.4 CHRONIC PAIN SYNDROME: ICD-10-CM

## 2019-04-04 DIAGNOSIS — G89.29 CHRONIC RIGHT SI JOINT PAIN: ICD-10-CM

## 2019-04-04 PROCEDURE — C1713 ANCHOR/SCREW BN/BN,TIS/BN: HCPCS | Performed by: ORTHOPAEDIC SURGERY

## 2019-04-04 PROCEDURE — C9290 INJ, BUPIVACAINE LIPOSOME: HCPCS | Performed by: ORTHOPAEDIC SURGERY

## 2019-04-04 PROCEDURE — 29827 SHO ARTHRS SRG RT8TR CUF RPR: CPT | Performed by: PHYSICIAN ASSISTANT

## 2019-04-04 PROCEDURE — 29827 SHO ARTHRS SRG RT8TR CUF RPR: CPT | Performed by: ORTHOPAEDIC SURGERY

## 2019-04-04 DEVICE — DEPUY MITEK HEALIX ADVANCE 4.5 BR: Type: IMPLANTABLE DEVICE | Site: SHOULDER | Status: FUNCTIONAL

## 2019-04-04 RX ORDER — ONDANSETRON 2 MG/ML
4 INJECTION INTRAMUSCULAR; INTRAVENOUS EVERY 4 HOURS PRN
Status: DISCONTINUED | OUTPATIENT
Start: 2019-04-04 | End: 2019-04-04 | Stop reason: HOSPADM

## 2019-04-04 RX ORDER — NEOSTIGMINE METHYLSULFATE 1 MG/ML
INJECTION INTRAVENOUS AS NEEDED
Status: DISCONTINUED | OUTPATIENT
Start: 2019-04-04 | End: 2019-04-04 | Stop reason: SURG

## 2019-04-04 RX ORDER — CHLORHEXIDINE GLUCONATE 4 G/100ML
SOLUTION TOPICAL DAILY PRN
Status: DISCONTINUED | OUTPATIENT
Start: 2019-04-04 | End: 2019-04-04 | Stop reason: HOSPADM

## 2019-04-04 RX ORDER — CEFAZOLIN SODIUM 2 G/50ML
2000 SOLUTION INTRAVENOUS ONCE
Status: COMPLETED | OUTPATIENT
Start: 2019-04-04 | End: 2019-04-04

## 2019-04-04 RX ORDER — SODIUM CHLORIDE 9 MG/ML
75 INJECTION, SOLUTION INTRAVENOUS CONTINUOUS
Status: DISCONTINUED | OUTPATIENT
Start: 2019-04-04 | End: 2019-04-04 | Stop reason: HOSPADM

## 2019-04-04 RX ORDER — SODIUM CHLORIDE 9 MG/ML
INJECTION, SOLUTION INTRAVENOUS CONTINUOUS PRN
Status: DISCONTINUED | OUTPATIENT
Start: 2019-04-04 | End: 2019-04-04 | Stop reason: SURG

## 2019-04-04 RX ORDER — BUPIVACAINE HYDROCHLORIDE 5 MG/ML
INJECTION, SOLUTION PERINEURAL
Status: COMPLETED | OUTPATIENT
Start: 2019-04-04 | End: 2019-04-04

## 2019-04-04 RX ORDER — GLYCOPYRROLATE 0.2 MG/ML
INJECTION INTRAMUSCULAR; INTRAVENOUS AS NEEDED
Status: DISCONTINUED | OUTPATIENT
Start: 2019-04-04 | End: 2019-04-04 | Stop reason: SURG

## 2019-04-04 RX ORDER — OXYCODONE HYDROCHLORIDE 10 MG/1
10 TABLET ORAL EVERY 4 HOURS PRN
Qty: 45 TABLET | Refills: 0 | Status: SHIPPED | OUTPATIENT
Start: 2019-04-04 | End: 2019-04-12 | Stop reason: SDUPTHER

## 2019-04-04 RX ORDER — EPHEDRINE SULFATE 50 MG/ML
INJECTION INTRAVENOUS AS NEEDED
Status: DISCONTINUED | OUTPATIENT
Start: 2019-04-04 | End: 2019-04-04 | Stop reason: SURG

## 2019-04-04 RX ORDER — METOCLOPRAMIDE HYDROCHLORIDE 5 MG/ML
10 INJECTION INTRAMUSCULAR; INTRAVENOUS EVERY 4 HOURS PRN
Status: DISCONTINUED | OUTPATIENT
Start: 2019-04-04 | End: 2019-04-04 | Stop reason: HOSPADM

## 2019-04-04 RX ORDER — ROCURONIUM BROMIDE 10 MG/ML
INJECTION, SOLUTION INTRAVENOUS AS NEEDED
Status: DISCONTINUED | OUTPATIENT
Start: 2019-04-04 | End: 2019-04-04 | Stop reason: SURG

## 2019-04-04 RX ORDER — ONDANSETRON 2 MG/ML
INJECTION INTRAMUSCULAR; INTRAVENOUS AS NEEDED
Status: DISCONTINUED | OUTPATIENT
Start: 2019-04-04 | End: 2019-04-04 | Stop reason: SURG

## 2019-04-04 RX ORDER — FENTANYL CITRATE/PF 50 MCG/ML
25 SYRINGE (ML) INJECTION
Status: DISCONTINUED | OUTPATIENT
Start: 2019-04-04 | End: 2019-04-04 | Stop reason: HOSPADM

## 2019-04-04 RX ORDER — SODIUM CHLORIDE, SODIUM LACTATE, POTASSIUM CHLORIDE, CALCIUM CHLORIDE 600; 310; 30; 20 MG/100ML; MG/100ML; MG/100ML; MG/100ML
125 INJECTION, SOLUTION INTRAVENOUS CONTINUOUS
Status: DISCONTINUED | OUTPATIENT
Start: 2019-04-04 | End: 2019-04-04 | Stop reason: HOSPADM

## 2019-04-04 RX ORDER — FENTANYL CITRATE/PF 50 MCG/ML
50 SYRINGE (ML) INJECTION
Status: DISCONTINUED | OUTPATIENT
Start: 2019-04-04 | End: 2019-04-04 | Stop reason: HOSPADM

## 2019-04-04 RX ORDER — PROPOFOL 10 MG/ML
INJECTION, EMULSION INTRAVENOUS AS NEEDED
Status: DISCONTINUED | OUTPATIENT
Start: 2019-04-04 | End: 2019-04-04 | Stop reason: SURG

## 2019-04-04 RX ORDER — FENTANYL CITRATE 50 UG/ML
INJECTION, SOLUTION INTRAMUSCULAR; INTRAVENOUS AS NEEDED
Status: DISCONTINUED | OUTPATIENT
Start: 2019-04-04 | End: 2019-04-04 | Stop reason: SURG

## 2019-04-04 RX ORDER — MIDAZOLAM HYDROCHLORIDE 1 MG/ML
INJECTION INTRAMUSCULAR; INTRAVENOUS AS NEEDED
Status: DISCONTINUED | OUTPATIENT
Start: 2019-04-04 | End: 2019-04-04 | Stop reason: SURG

## 2019-04-04 RX ORDER — DEXAMETHASONE SODIUM PHOSPHATE 10 MG/ML
INJECTION, SOLUTION INTRAMUSCULAR; INTRAVENOUS AS NEEDED
Status: DISCONTINUED | OUTPATIENT
Start: 2019-04-04 | End: 2019-04-04 | Stop reason: SURG

## 2019-04-04 RX ORDER — LIDOCAINE HYDROCHLORIDE 10 MG/ML
INJECTION, SOLUTION INFILTRATION; PERINEURAL AS NEEDED
Status: DISCONTINUED | OUTPATIENT
Start: 2019-04-04 | End: 2019-04-04 | Stop reason: SURG

## 2019-04-04 RX ADMIN — DEXAMETHASONE SODIUM PHOSPHATE 4 MG: 10 INJECTION, SOLUTION INTRAMUSCULAR; INTRAVENOUS at 13:40

## 2019-04-04 RX ADMIN — ONDANSETRON 4 MG: 2 INJECTION INTRAMUSCULAR; INTRAVENOUS at 14:44

## 2019-04-04 RX ADMIN — NEOSTIGMINE METHYLSULFATE 2 MG: 1 INJECTION INTRAVENOUS at 14:44

## 2019-04-04 RX ADMIN — FENTANYL CITRATE 100 MCG: 50 INJECTION, SOLUTION INTRAMUSCULAR; INTRAVENOUS at 12:40

## 2019-04-04 RX ADMIN — MIDAZOLAM HYDROCHLORIDE 2 MG: 1 INJECTION, SOLUTION INTRAMUSCULAR; INTRAVENOUS at 12:40

## 2019-04-04 RX ADMIN — CEFAZOLIN SODIUM 2000 MG: 2 SOLUTION INTRAVENOUS at 13:30

## 2019-04-04 RX ADMIN — GLYCOPYRROLATE 0.4 MG: 0.2 INJECTION, SOLUTION INTRAMUSCULAR; INTRAVENOUS at 14:44

## 2019-04-04 RX ADMIN — EPHEDRINE SULFATE 5 MG: 50 INJECTION, SOLUTION INTRAVENOUS at 13:52

## 2019-04-04 RX ADMIN — BUPIVACAINE HYDROCHLORIDE 5 ML: 5 INJECTION, SOLUTION PERINEURAL at 12:45

## 2019-04-04 RX ADMIN — LIDOCAINE HYDROCHLORIDE ANHYDROUS 30 MG: 10 INJECTION, SOLUTION INFILTRATION at 13:21

## 2019-04-04 RX ADMIN — SODIUM CHLORIDE: 0.9 INJECTION, SOLUTION INTRAVENOUS at 13:18

## 2019-04-04 RX ADMIN — PROPOFOL 200 MG: 10 INJECTION, EMULSION INTRAVENOUS at 13:21

## 2019-04-04 RX ADMIN — EPHEDRINE SULFATE 5 MG: 50 INJECTION, SOLUTION INTRAVENOUS at 13:46

## 2019-04-04 RX ADMIN — ROCURONIUM BROMIDE 30 MG: 10 INJECTION, SOLUTION INTRAVENOUS at 13:21

## 2019-04-08 DIAGNOSIS — G89.4 CHRONIC PAIN SYNDROME: ICD-10-CM

## 2019-04-08 DIAGNOSIS — M53.3 CHRONIC RIGHT SI JOINT PAIN: ICD-10-CM

## 2019-04-08 DIAGNOSIS — M96.1 POST LAMINECTOMY SYNDROME: ICD-10-CM

## 2019-04-08 DIAGNOSIS — F41.8 DEPRESSION WITH ANXIETY: ICD-10-CM

## 2019-04-08 DIAGNOSIS — G89.29 CHRONIC RIGHT SI JOINT PAIN: ICD-10-CM

## 2019-04-09 DIAGNOSIS — J45.40 MODERATE PERSISTENT ASTHMA WITHOUT COMPLICATION: ICD-10-CM

## 2019-04-12 RX ORDER — DIAZEPAM 5 MG/1
5 TABLET ORAL EVERY 12 HOURS PRN
Qty: 60 TABLET | Refills: 0 | Status: SHIPPED | OUTPATIENT
Start: 2019-04-12 | End: 2019-05-15 | Stop reason: SDUPTHER

## 2019-04-12 RX ORDER — OXYCODONE HYDROCHLORIDE 10 MG/1
10 TABLET ORAL EVERY 4 HOURS PRN
Qty: 120 TABLET | Refills: 0 | Status: SHIPPED | OUTPATIENT
Start: 2019-04-12 | End: 2019-05-15 | Stop reason: SDUPTHER

## 2019-04-16 ENCOUNTER — OFFICE VISIT (OUTPATIENT)
Dept: OBGYN CLINIC | Facility: CLINIC | Age: 49
End: 2019-04-16

## 2019-04-16 VITALS
HEART RATE: 76 BPM | WEIGHT: 186 LBS | BODY MASS INDEX: 24.65 KG/M2 | SYSTOLIC BLOOD PRESSURE: 134 MMHG | HEIGHT: 73 IN | DIASTOLIC BLOOD PRESSURE: 89 MMHG

## 2019-04-16 DIAGNOSIS — S46.811A PARTIAL TEAR OF RIGHT SUBSCAPULARIS TENDON, INITIAL ENCOUNTER: ICD-10-CM

## 2019-04-16 DIAGNOSIS — M75.111 NONTRAUMATIC INCOMPLETE TEAR OF RIGHT ROTATOR CUFF: Primary | ICD-10-CM

## 2019-04-16 DIAGNOSIS — G89.29 OTHER CHRONIC PAIN: ICD-10-CM

## 2019-04-16 PROCEDURE — 99024 POSTOP FOLLOW-UP VISIT: CPT | Performed by: PHYSICIAN ASSISTANT

## 2019-04-17 ENCOUNTER — EVALUATION (OUTPATIENT)
Dept: PHYSICAL THERAPY | Facility: CLINIC | Age: 49
End: 2019-04-17
Payer: COMMERCIAL

## 2019-04-17 DIAGNOSIS — S46.811D PARTIAL TEAR OF RIGHT SUBSCAPULARIS TENDON, SUBSEQUENT ENCOUNTER: Primary | ICD-10-CM

## 2019-04-17 PROCEDURE — 97162 PT EVAL MOD COMPLEX 30 MIN: CPT | Performed by: PHYSICAL THERAPIST

## 2019-04-17 RX ORDER — IBUPROFEN 800 MG/1
TABLET ORAL
Qty: 90 TABLET | Refills: 0 | Status: SHIPPED | OUTPATIENT
Start: 2019-04-17 | End: 2019-05-24 | Stop reason: SDUPTHER

## 2019-04-19 ENCOUNTER — APPOINTMENT (OUTPATIENT)
Dept: PHYSICAL THERAPY | Facility: CLINIC | Age: 49
End: 2019-04-19
Payer: COMMERCIAL

## 2019-04-24 ENCOUNTER — OFFICE VISIT (OUTPATIENT)
Dept: PHYSICAL THERAPY | Facility: CLINIC | Age: 49
End: 2019-04-24
Payer: COMMERCIAL

## 2019-04-24 DIAGNOSIS — S46.811D PARTIAL TEAR OF RIGHT SUBSCAPULARIS TENDON, SUBSEQUENT ENCOUNTER: Primary | ICD-10-CM

## 2019-04-24 PROCEDURE — 97112 NEUROMUSCULAR REEDUCATION: CPT | Performed by: PHYSICAL THERAPIST

## 2019-04-24 PROCEDURE — 97140 MANUAL THERAPY 1/> REGIONS: CPT | Performed by: PHYSICAL THERAPIST

## 2019-04-24 PROCEDURE — 97110 THERAPEUTIC EXERCISES: CPT | Performed by: PHYSICAL THERAPIST

## 2019-04-26 ENCOUNTER — TELEPHONE (OUTPATIENT)
Dept: OBGYN CLINIC | Facility: CLINIC | Age: 49
End: 2019-04-26

## 2019-04-26 ENCOUNTER — HOSPITAL ENCOUNTER (EMERGENCY)
Facility: HOSPITAL | Age: 49
Discharge: HOME | End: 2019-04-26
Attending: EMERGENCY MEDICINE
Payer: COMMERCIAL

## 2019-04-26 ENCOUNTER — APPOINTMENT (OUTPATIENT)
Dept: PHYSICAL THERAPY | Facility: CLINIC | Age: 49
End: 2019-04-26
Payer: COMMERCIAL

## 2019-04-26 VITALS
TEMPERATURE: 98.2 F | RESPIRATION RATE: 18 BRPM | SYSTOLIC BLOOD PRESSURE: 141 MMHG | HEART RATE: 66 BPM | OXYGEN SATURATION: 98 % | DIASTOLIC BLOOD PRESSURE: 100 MMHG | WEIGHT: 186.29 LBS | BODY MASS INDEX: 24.58 KG/M2

## 2019-04-26 DIAGNOSIS — G89.18 POSTOPERATIVE PAIN: ICD-10-CM

## 2019-04-26 DIAGNOSIS — M25.511 RIGHT SHOULDER PAIN: Primary | ICD-10-CM

## 2019-04-26 PROCEDURE — 93005 ELECTROCARDIOGRAM TRACING: CPT

## 2019-04-26 PROCEDURE — 99284 EMERGENCY DEPT VISIT MOD MDM: CPT | Performed by: PHYSICIAN ASSISTANT

## 2019-04-26 PROCEDURE — 99283 EMERGENCY DEPT VISIT LOW MDM: CPT

## 2019-04-26 PROCEDURE — 96372 THER/PROPH/DIAG INJ SC/IM: CPT

## 2019-04-26 RX ORDER — CYCLOBENZAPRINE HCL 10 MG
10 TABLET ORAL ONCE
Status: COMPLETED | OUTPATIENT
Start: 2019-04-26 | End: 2019-04-26

## 2019-04-26 RX ORDER — KETOROLAC TROMETHAMINE 30 MG/ML
30 INJECTION, SOLUTION INTRAMUSCULAR; INTRAVENOUS ONCE
Status: COMPLETED | OUTPATIENT
Start: 2019-04-26 | End: 2019-04-26

## 2019-04-26 RX ORDER — CYCLOBENZAPRINE HCL 10 MG
10 TABLET ORAL
Qty: 5 TABLET | Refills: 0 | Status: SHIPPED | OUTPATIENT
Start: 2019-04-26 | End: 2019-05-03 | Stop reason: SDUPTHER

## 2019-04-26 RX ADMIN — CYCLOBENZAPRINE HYDROCHLORIDE 10 MG: 10 TABLET, FILM COATED ORAL at 14:10

## 2019-04-26 RX ADMIN — KETOROLAC TROMETHAMINE 30 MG: 30 INJECTION, SOLUTION INTRAMUSCULAR; INTRAVENOUS at 14:57

## 2019-04-28 ENCOUNTER — HOSPITAL ENCOUNTER (OUTPATIENT)
Dept: NUCLEAR MEDICINE | Facility: HOSPITAL | Age: 49
Discharge: HOME | End: 2019-04-28
Attending: INTERNAL MEDICINE
Payer: COMMERCIAL

## 2019-04-28 DIAGNOSIS — R13.19 ESOPHAGEAL DYSPHAGIA: ICD-10-CM

## 2019-04-28 LAB
ATRIAL RATE: 66 BPM
P AXIS: 58 DEGREES
PR INTERVAL: 154 MS
QRS AXIS: 66 DEGREES
QRSD INTERVAL: 82 MS
QT INTERVAL: 382 MS
QTC INTERVAL: 391 MS
T WAVE AXIS: 60 DEGREES
VENTRICULAR RATE: 63 BPM

## 2019-04-28 PROCEDURE — 93010 ELECTROCARDIOGRAM REPORT: CPT | Performed by: INTERNAL MEDICINE

## 2019-04-28 PROCEDURE — 78264 GASTRIC EMPTYING IMG STUDY: CPT

## 2019-04-28 PROCEDURE — A9541 TC99M SULFUR COLLOID: HCPCS

## 2019-04-29 ENCOUNTER — TELEPHONE (OUTPATIENT)
Dept: GASTROENTEROLOGY | Facility: AMBULARY SURGERY CENTER | Age: 49
End: 2019-04-29

## 2019-05-01 ENCOUNTER — OFFICE VISIT (OUTPATIENT)
Dept: PHYSICAL THERAPY | Facility: CLINIC | Age: 49
End: 2019-05-01
Payer: COMMERCIAL

## 2019-05-01 ENCOUNTER — TELEPHONE (OUTPATIENT)
Dept: OBGYN CLINIC | Facility: HOSPITAL | Age: 49
End: 2019-05-01

## 2019-05-01 DIAGNOSIS — S46.811D PARTIAL TEAR OF RIGHT SUBSCAPULARIS TENDON, SUBSEQUENT ENCOUNTER: Primary | ICD-10-CM

## 2019-05-01 PROCEDURE — 97010 HOT OR COLD PACKS THERAPY: CPT | Performed by: PHYSICAL THERAPIST

## 2019-05-02 DIAGNOSIS — G89.18 POSTOPERATIVE PAIN: ICD-10-CM

## 2019-05-02 DIAGNOSIS — M25.511 RIGHT SHOULDER PAIN: ICD-10-CM

## 2019-05-02 DIAGNOSIS — S46.811D PARTIAL TEAR OF RIGHT SUBSCAPULARIS TENDON, SUBSEQUENT ENCOUNTER: Primary | ICD-10-CM

## 2019-05-02 RX ORDER — METHOCARBAMOL 500 MG/1
500 TABLET, FILM COATED ORAL 3 TIMES DAILY
Qty: 30 TABLET | Refills: 0 | Status: SHIPPED | OUTPATIENT
Start: 2019-05-02 | End: 2019-10-25

## 2019-05-03 ENCOUNTER — OFFICE VISIT (OUTPATIENT)
Dept: PHYSICAL THERAPY | Facility: CLINIC | Age: 49
End: 2019-05-03
Payer: COMMERCIAL

## 2019-05-03 DIAGNOSIS — S46.811D PARTIAL TEAR OF RIGHT SUBSCAPULARIS TENDON, SUBSEQUENT ENCOUNTER: Primary | ICD-10-CM

## 2019-05-03 DIAGNOSIS — G89.18 POSTOPERATIVE PAIN: ICD-10-CM

## 2019-05-03 DIAGNOSIS — M25.511 RIGHT SHOULDER PAIN: ICD-10-CM

## 2019-05-03 PROCEDURE — 97140 MANUAL THERAPY 1/> REGIONS: CPT | Performed by: PHYSICAL THERAPIST

## 2019-05-03 PROCEDURE — 97014 ELECTRIC STIMULATION THERAPY: CPT | Performed by: PHYSICAL THERAPIST

## 2019-05-03 PROCEDURE — 97110 THERAPEUTIC EXERCISES: CPT | Performed by: PHYSICAL THERAPIST

## 2019-05-03 RX ORDER — CYCLOBENZAPRINE HCL 10 MG
10 TABLET ORAL
Qty: 10 TABLET | Refills: 0 | Status: SHIPPED | OUTPATIENT
Start: 2019-05-03 | End: 2019-10-25

## 2019-05-06 DIAGNOSIS — F41.8 DEPRESSION WITH ANXIETY: ICD-10-CM

## 2019-05-06 DIAGNOSIS — M96.1 POST LAMINECTOMY SYNDROME: ICD-10-CM

## 2019-05-06 DIAGNOSIS — G89.4 CHRONIC PAIN SYNDROME: ICD-10-CM

## 2019-05-06 DIAGNOSIS — G89.29 CHRONIC RIGHT SI JOINT PAIN: ICD-10-CM

## 2019-05-06 DIAGNOSIS — M53.3 CHRONIC RIGHT SI JOINT PAIN: ICD-10-CM

## 2019-05-07 ENCOUNTER — OFFICE VISIT (OUTPATIENT)
Dept: PHYSICAL THERAPY | Facility: CLINIC | Age: 49
End: 2019-05-07
Payer: COMMERCIAL

## 2019-05-07 DIAGNOSIS — S46.811D PARTIAL TEAR OF RIGHT SUBSCAPULARIS TENDON, SUBSEQUENT ENCOUNTER: Primary | ICD-10-CM

## 2019-05-07 PROCEDURE — 97112 NEUROMUSCULAR REEDUCATION: CPT | Performed by: PHYSICAL THERAPIST

## 2019-05-07 PROCEDURE — 97110 THERAPEUTIC EXERCISES: CPT | Performed by: PHYSICAL THERAPIST

## 2019-05-07 PROCEDURE — 97140 MANUAL THERAPY 1/> REGIONS: CPT | Performed by: PHYSICAL THERAPIST

## 2019-05-08 ENCOUNTER — APPOINTMENT (OUTPATIENT)
Dept: PHYSICAL THERAPY | Facility: CLINIC | Age: 49
End: 2019-05-08
Payer: COMMERCIAL

## 2019-05-10 ENCOUNTER — APPOINTMENT (OUTPATIENT)
Dept: PHYSICAL THERAPY | Facility: CLINIC | Age: 49
End: 2019-05-10
Payer: COMMERCIAL

## 2019-05-13 ENCOUNTER — TELEPHONE (OUTPATIENT)
Dept: OBGYN CLINIC | Facility: HOSPITAL | Age: 49
End: 2019-05-13

## 2019-05-13 DIAGNOSIS — J45.40 MODERATE PERSISTENT ASTHMA WITHOUT COMPLICATION: ICD-10-CM

## 2019-05-14 ENCOUNTER — APPOINTMENT (OUTPATIENT)
Dept: PHYSICAL THERAPY | Facility: CLINIC | Age: 49
End: 2019-05-14
Payer: COMMERCIAL

## 2019-05-14 DIAGNOSIS — F41.8 DEPRESSION WITH ANXIETY: ICD-10-CM

## 2019-05-15 ENCOUNTER — TELEPHONE (OUTPATIENT)
Dept: FAMILY MEDICINE CLINIC | Facility: CLINIC | Age: 49
End: 2019-05-15

## 2019-05-15 ENCOUNTER — APPOINTMENT (OUTPATIENT)
Dept: PHYSICAL THERAPY | Facility: CLINIC | Age: 49
End: 2019-05-15
Payer: COMMERCIAL

## 2019-05-15 RX ORDER — DIAZEPAM 5 MG/1
TABLET ORAL
Qty: 60 TABLET | Refills: 0 | OUTPATIENT
Start: 2019-05-15

## 2019-05-15 RX ORDER — OXYCODONE HYDROCHLORIDE 10 MG/1
10 TABLET ORAL EVERY 4 HOURS PRN
Qty: 120 TABLET | Refills: 0 | Status: SHIPPED | OUTPATIENT
Start: 2019-05-15 | End: 2019-06-12 | Stop reason: SDUPTHER

## 2019-05-15 RX ORDER — DIAZEPAM 5 MG/1
5 TABLET ORAL EVERY 12 HOURS PRN
Qty: 60 TABLET | Refills: 0 | Status: SHIPPED | OUTPATIENT
Start: 2019-05-15 | End: 2019-06-12 | Stop reason: SDUPTHER

## 2019-05-17 ENCOUNTER — APPOINTMENT (OUTPATIENT)
Dept: PHYSICAL THERAPY | Facility: CLINIC | Age: 49
End: 2019-05-17
Payer: COMMERCIAL

## 2019-05-20 ENCOUNTER — OFFICE VISIT (OUTPATIENT)
Dept: GASTROENTEROLOGY | Facility: AMBULARY SURGERY CENTER | Age: 49
End: 2019-05-20
Payer: COMMERCIAL

## 2019-05-20 VITALS
DIASTOLIC BLOOD PRESSURE: 72 MMHG | BODY MASS INDEX: 24.52 KG/M2 | HEART RATE: 77 BPM | WEIGHT: 185 LBS | SYSTOLIC BLOOD PRESSURE: 130 MMHG | HEIGHT: 73 IN | TEMPERATURE: 97.5 F

## 2019-05-20 DIAGNOSIS — R13.19 ESOPHAGEAL DYSPHAGIA: Primary | ICD-10-CM

## 2019-05-20 PROCEDURE — 99213 OFFICE O/P EST LOW 20 MIN: CPT | Performed by: PHYSICIAN ASSISTANT

## 2019-05-20 RX ORDER — FAMOTIDINE 20 MG/1
20 TABLET, FILM COATED ORAL DAILY
Qty: 30 TABLET | Refills: 5 | Status: SHIPPED | OUTPATIENT
Start: 2019-05-20 | End: 2019-10-25

## 2019-05-21 ENCOUNTER — APPOINTMENT (OUTPATIENT)
Dept: PHYSICAL THERAPY | Facility: CLINIC | Age: 49
End: 2019-05-21
Payer: COMMERCIAL

## 2019-05-21 ENCOUNTER — OFFICE VISIT (OUTPATIENT)
Dept: OBGYN CLINIC | Facility: CLINIC | Age: 49
End: 2019-05-21

## 2019-05-21 VITALS — HEIGHT: 73 IN | BODY MASS INDEX: 24.52 KG/M2 | WEIGHT: 185 LBS

## 2019-05-21 DIAGNOSIS — G89.18 POSTOPERATIVE PAIN: ICD-10-CM

## 2019-05-21 DIAGNOSIS — S46.811A PARTIAL TEAR OF RIGHT SUBSCAPULARIS TENDON, INITIAL ENCOUNTER: Primary | ICD-10-CM

## 2019-05-21 PROCEDURE — 99024 POSTOP FOLLOW-UP VISIT: CPT | Performed by: ORTHOPAEDIC SURGERY

## 2019-05-22 ENCOUNTER — APPOINTMENT (OUTPATIENT)
Dept: PHYSICAL THERAPY | Facility: CLINIC | Age: 49
End: 2019-05-22
Payer: COMMERCIAL

## 2019-05-24 ENCOUNTER — APPOINTMENT (OUTPATIENT)
Dept: PHYSICAL THERAPY | Facility: CLINIC | Age: 49
End: 2019-05-24
Payer: COMMERCIAL

## 2019-05-24 DIAGNOSIS — G89.29 OTHER CHRONIC PAIN: ICD-10-CM

## 2019-05-28 ENCOUNTER — APPOINTMENT (OUTPATIENT)
Dept: PHYSICAL THERAPY | Facility: CLINIC | Age: 49
End: 2019-05-28
Payer: COMMERCIAL

## 2019-05-29 ENCOUNTER — APPOINTMENT (OUTPATIENT)
Dept: PHYSICAL THERAPY | Facility: CLINIC | Age: 49
End: 2019-05-29
Payer: COMMERCIAL

## 2019-05-31 ENCOUNTER — APPOINTMENT (OUTPATIENT)
Dept: PHYSICAL THERAPY | Facility: CLINIC | Age: 49
End: 2019-05-31
Payer: COMMERCIAL

## 2019-06-01 RX ORDER — IBUPROFEN 800 MG/1
TABLET ORAL
Qty: 90 TABLET | Refills: 0 | Status: SHIPPED | OUTPATIENT
Start: 2019-06-01 | End: 2020-08-24 | Stop reason: SDUPTHER

## 2019-06-07 ENCOUNTER — TELEPHONE (OUTPATIENT)
Dept: HEMATOLOGY ONCOLOGY | Facility: CLINIC | Age: 49
End: 2019-06-07

## 2019-06-07 DIAGNOSIS — D72.828 OTHER ELEVATED WHITE BLOOD CELL (WBC) COUNT: Primary | ICD-10-CM

## 2019-06-10 ENCOUNTER — TELEPHONE (OUTPATIENT)
Dept: HEMATOLOGY ONCOLOGY | Facility: CLINIC | Age: 49
End: 2019-06-10

## 2019-06-11 DIAGNOSIS — G89.4 CHRONIC PAIN SYNDROME: ICD-10-CM

## 2019-06-11 DIAGNOSIS — F41.8 DEPRESSION WITH ANXIETY: ICD-10-CM

## 2019-06-11 DIAGNOSIS — G89.29 CHRONIC RIGHT SI JOINT PAIN: ICD-10-CM

## 2019-06-11 DIAGNOSIS — M53.3 CHRONIC RIGHT SI JOINT PAIN: ICD-10-CM

## 2019-06-11 DIAGNOSIS — M96.1 POST LAMINECTOMY SYNDROME: ICD-10-CM

## 2019-06-13 RX ORDER — DIAZEPAM 5 MG/1
5 TABLET ORAL EVERY 12 HOURS PRN
Qty: 60 TABLET | Refills: 0 | Status: SHIPPED | OUTPATIENT
Start: 2019-06-14 | End: 2019-07-14 | Stop reason: SDUPTHER

## 2019-06-13 RX ORDER — OXYCODONE HYDROCHLORIDE 10 MG/1
10 TABLET ORAL EVERY 4 HOURS PRN
Qty: 120 TABLET | Refills: 0 | Status: SHIPPED | OUTPATIENT
Start: 2019-06-14 | End: 2019-07-14 | Stop reason: SDUPTHER

## 2019-06-22 DIAGNOSIS — J45.40 MODERATE PERSISTENT ASTHMA WITHOUT COMPLICATION: ICD-10-CM

## 2019-06-22 RX ORDER — FLUTICASONE PROPIONATE 50 MCG
SPRAY, SUSPENSION (ML) NASAL
Qty: 1 BOTTLE | Refills: 4 | Status: SHIPPED | OUTPATIENT
Start: 2019-06-22 | End: 2019-11-28 | Stop reason: SDUPTHER

## 2019-06-27 ENCOUNTER — TELEPHONE (OUTPATIENT)
Dept: OBGYN CLINIC | Facility: HOSPITAL | Age: 49
End: 2019-06-27

## 2019-07-02 ENCOUNTER — TELEPHONE (OUTPATIENT)
Dept: HEMATOLOGY ONCOLOGY | Facility: CLINIC | Age: 49
End: 2019-07-02

## 2019-07-02 NOTE — TELEPHONE ENCOUNTER
Left message to reschedule appt on July 29 th    The appt was with   [facility] and he is rounding that week at the hospital  He can be scheduled with Caitlin Belcher or Dr CLAROS [facility]

## 2019-07-09 ENCOUNTER — OFFICE VISIT (OUTPATIENT)
Dept: OBGYN CLINIC | Facility: CLINIC | Age: 49
End: 2019-07-09
Payer: COMMERCIAL

## 2019-07-09 VITALS
HEART RATE: 73 BPM | DIASTOLIC BLOOD PRESSURE: 100 MMHG | WEIGHT: 184 LBS | HEIGHT: 73 IN | BODY MASS INDEX: 24.39 KG/M2 | SYSTOLIC BLOOD PRESSURE: 157 MMHG

## 2019-07-09 DIAGNOSIS — G89.18 POSTOPERATIVE PAIN: Primary | ICD-10-CM

## 2019-07-09 DIAGNOSIS — M25.511 ACUTE PAIN OF RIGHT SHOULDER: ICD-10-CM

## 2019-07-09 PROCEDURE — 99213 OFFICE O/P EST LOW 20 MIN: CPT | Performed by: ORTHOPAEDIC SURGERY

## 2019-07-09 NOTE — PROGRESS NOTES
Patient Name:  Milton Anthony  MRN:  [id number]    Assessment & Plan     Right shoulder pain with acute onset  Status post right shoulder arthroscopic subscapularis tear repair and debridement of subacromial bursa performed on for 04/04/2019  1  MRI of right shoulder ordered to evaluate rotator cuff and long head biceps tendon  2  Activity as tolerated, modify as needed for now  3  Patient will follow up in the office after testing      Subjective     Patient is a 41-year-old male presents the office status post right shoulder arthroscopic subscapularis tear repair and debridement of the subacromial bursa performed on 04/04/2019  Patient states in the last 2 weeks he experienced a large pop to his shoulder while he was scratching a lottery ticket with sudden onset of severe pain anteriorly in his shoulder and upper arm  He reports limited range of motion and strength secondary to pain  In the past few days he has been unable to drive due to this pain and limitation  Prior to the injury he reports doing well and gradually increasing his activity level  He has been doing the physical therapy on his own since the surgery over 3 months ago  He has been taking additional oxycodone beyond his normal daily requirement due to the right shoulder pain with no significant relief  General ROS:  Negative for fever or chills  Neurological ROS:  Negative for numbness or tingling  Objective     /100   Pulse 73   Ht 6' 1" (1 854 m)   Wt 83 5 kg (184 lb)   BMI 24 28 kg/m²       Right Shoulder Exam     Tenderness   The patient is experiencing tenderness in the biceps tendon  Range of Motion   External rotation: 60   Forward flexion: 90   Internal rotation 90 degrees: 20     Muscle Strength   Internal rotation: 4/5   External rotation: 4/5   Supraspinatus: 4/5     Tests   Dumont test: positive    Other   Sensation: normal  Pulse: present    Comments:  Portal sites are healed without erythema    Mild biceps atrophy without gross deformity  Empty can test is positive  Speed's test is positive  Belly press test is positive and limited by pain              Social History     Tobacco Use    Smoking status: Current Some Day Smoker     Packs/day: 0 50     Years: 25 00     Pack years: 12 50    Smokeless tobacco: Former User   Substance Use Topics    Alcohol use: Yes     Frequency: Monthly or less     Drinks per session: 3 or 4     Comment: 3 mixed drinks approx twice a month     Drug use: Yes     Frequency: 7 0 times per week     Types: Marijuana     Comment: medical marijuana daily for chronic pain       Scribe Attestation    I,:   Lou Brown MA am acting as a scribe while in the presence of the attending physician :        I,:   Yolie Talley MD personally performed the services described in this documentation    as scribed in my presence :

## 2019-07-10 DIAGNOSIS — F41.8 DEPRESSION WITH ANXIETY: ICD-10-CM

## 2019-07-10 DIAGNOSIS — M96.1 POST LAMINECTOMY SYNDROME: ICD-10-CM

## 2019-07-10 DIAGNOSIS — M53.3 CHRONIC RIGHT SI JOINT PAIN: ICD-10-CM

## 2019-07-10 DIAGNOSIS — G89.4 CHRONIC PAIN SYNDROME: ICD-10-CM

## 2019-07-10 DIAGNOSIS — G89.29 CHRONIC RIGHT SI JOINT PAIN: ICD-10-CM

## 2019-07-14 NOTE — TELEPHONE ENCOUNTER
Patient appropriate for refills on:     · Valium 5 mg Q12H PRN (Disp #60)   PDMP showed last refill written & filled 6/13/19  · Oxycodone 10 mg Q4H PRN (Disp #120)   PDMP showed last refill written & filled 6/13/19       Patient is adherent to follow-ups chronically with me, PDMP w/o red flags, contract signed    Urine oxycodone and valium obtained 7/9/18, will repeat at next visit 8/13/19       Orders pended, thanks!

## 2019-07-15 DIAGNOSIS — G89.29 CHRONIC RIGHT SI JOINT PAIN: ICD-10-CM

## 2019-07-15 DIAGNOSIS — M53.3 CHRONIC RIGHT SI JOINT PAIN: ICD-10-CM

## 2019-07-15 DIAGNOSIS — M96.1 POST LAMINECTOMY SYNDROME: ICD-10-CM

## 2019-07-15 DIAGNOSIS — G89.4 CHRONIC PAIN SYNDROME: ICD-10-CM

## 2019-07-15 RX ORDER — DIAZEPAM 5 MG/1
5 TABLET ORAL EVERY 12 HOURS PRN
Qty: 60 TABLET | Refills: 0 | Status: SHIPPED | OUTPATIENT
Start: 2019-07-15 | End: 2019-08-13 | Stop reason: SDUPTHER

## 2019-07-15 RX ORDER — OXYCODONE HYDROCHLORIDE 10 MG/1
10 TABLET ORAL EVERY 4 HOURS PRN
Qty: 120 TABLET | Refills: 0 | Status: SHIPPED | OUTPATIENT
Start: 2019-07-15 | End: 2019-07-15 | Stop reason: SDUPTHER

## 2019-07-15 RX ORDER — OXYCODONE HYDROCHLORIDE 10 MG/1
10 TABLET ORAL EVERY 4 HOURS PRN
Qty: 120 TABLET | Refills: 0 | Status: SHIPPED | OUTPATIENT
Start: 2019-07-15 | End: 2019-08-13 | Stop reason: SDUPTHER

## 2019-07-15 NOTE — TELEPHONE ENCOUNTER
Pharmacy called stating they are unable to fill the oxycodone from fax  Valium was accepted  I called yanira to let him know that he would need to  script from office because DR Olga Rodriguez is unable to send scripts electronically at the moment  He stated he is unable to pick it up  Sheeba Callahan know and left the script with her

## 2019-07-15 NOTE — TELEPHONE ENCOUNTER
Diazepam 5mg tablet and Oxycodone 10mg signed by Dr Vitaliy Ferrer, faxed to [facility] @ [phone]

## 2019-07-22 ENCOUNTER — TELEPHONE (OUTPATIENT)
Dept: GASTROENTEROLOGY | Facility: AMBULARY SURGERY CENTER | Age: 49
End: 2019-07-22

## 2019-07-22 ENCOUNTER — TELEPHONE (OUTPATIENT)
Dept: FAMILY MEDICINE CLINIC | Facility: CLINIC | Age: 49
End: 2019-07-22

## 2019-07-22 DIAGNOSIS — K22.4 HYPERCONTRACTILE ESOPHAGUS: ICD-10-CM

## 2019-07-22 DIAGNOSIS — R49.0 HOARSENESS OF VOICE: Primary | ICD-10-CM

## 2019-07-22 DIAGNOSIS — R07.89 OTHER CHEST PAIN: ICD-10-CM

## 2019-07-22 DIAGNOSIS — R13.19 ESOPHAGEAL DYSPHAGIA: ICD-10-CM

## 2019-07-22 RX ORDER — DILTIAZEM HYDROCHLORIDE 60 MG/1
60 TABLET, FILM COATED ORAL 2 TIMES DAILY
Qty: 60 TABLET | Refills: 5 | Status: SHIPPED | OUTPATIENT
Start: 2019-07-22 | End: 2019-08-27

## 2019-07-22 NOTE — TELEPHONE ENCOUNTER
Patient of Dr Ayad Gabriel from out of town requesting our office  to forward records for the purpose of medical [address] [facility]  Also request for a letter of medical necessity and additions records supporting his medical diagnoses  He was unable to provide a doctors name, address or phone number,  only a fax number and email to forward records  I asked if he could contact this facility again to provide us with a doctor's name, address and phone number for us to send doctor to doctor request   He was very resistant and demanded we give records to mother who is in town and who does not have a Commincation Form on file or a POA document to forward to him  After a 15 to 20 minute conversation he agreed to contact the other facility to send a request for records  Patient also mentioned records must be received within 2 days or the process will have to be repeated  At this point we have no fax  Supporting diagnoses request:         Letter of medical necessity (needed)       Med list       Problem list       Urine results       Lab results  Please advise otherwise

## 2019-07-22 NOTE — TELEPHONE ENCOUNTER
Are you able to form a letter of medical necessity for medical [facility]? We can then copy notes, etc that need to be completed

## 2019-07-22 NOTE — LETTER
July 28, 2019       Patient: Anamaria Shirley   YOB: 1970       To Whom It May Concern: The above-named patient is currently under my care  Atul Hanna has a history of chronic back pain due to post laminectomy syndrome s/p several complicated spinal surgeries (including lumbar surgery by Dr Funmi Bell and SI fusion by Dr Noah Vicente)  The severity of Yefri's pain symptoms have necessitated the chronic use of oxycodone  Prior to the use of controlled substances, Atul Hanna tried with incomplete symptom control the following medications/interventions: physical therapy, methocarbamol, cyclobenzaprine, gabapentin, and insertion/removal of thoracic spinal cord stimulator due to inefficacy  Atul Hanna was previously on the following controlled substances, which did not provide adequate symptom control of his pain: percocet & tramadol  He also suffers from frequent back spasms & anxiety, necessitating the chronic use of PRN diazepam  Atul Hanna has consistently exercised adherence to our office's controlled substance policies, including urine drug testing  In addition, Atul Hanna is under the care of another provider from whom he receives medical marijuana  Although I am not the prescribing physician for Yefri's medical marijuana, I feel this patient has gained benefit from the use of medical marijuana that outweigh the risks of its usage  Atul Hanna has reported subjective improvement to his pain and anxiety symptoms which he attributes to medical marijuana, used as an adjunct to the treatment regimen outlined above  As a result, I have been successful in periodically reducing Yefri's oxycodone and diazepam usage over time  If you have further questions, please do not hesitate to call me  Your help and cooperation in the care of my patient is greatly appreciated        Sincerely,        Everton Patiño MD

## 2019-07-22 NOTE — TELEPHONE ENCOUNTER
Discussed with patient  He is away on vacation until 7/30  Over the past month he has had worsening issues with his swallowing  He feels that both liquids and solids get stuck, sometimes the food slowly travels back up his esophagus and her regurgitates it but he does not vomit  The food feels like it is getting stuck farther down in his sternum now  He also has reports hoarseness of his voice over the past week which is new  His BP has actually been on the higher end recently  Advised that we get a CT of the chest when he gets back due to the change in voice and worsening/change in symptoms  Will order BMP to be done prior to CT scan  Please mail these scripts  I also will send a low dose diltiazem to his pharmacy to start when he gets back for esophageal spasm  He will monitor his BP and watch for symptoms such as lightheadedness and dizziness

## 2019-07-28 NOTE — TELEPHONE ENCOUNTER
I wrote a letter stating that I am aware Sherrie Ramos is on medical marijuana and that I support his continued use, but since I am not the prescribing provider for the marijuana, I cannot really write a typical letter of medical necessity for the marijuana  I have written a letter which I feel is appropriate, and I hope it meets his needs  Please review the letter (chart review-->letters) and let me know if any changes are needed  You can then include office visit notes from all of his visits with me, the urine drug testing results, and some of the surgery notes outlining more detail regarding his spinal issues if needed  Let me know what more you need from me, if anything, as I am on NF for the next 2 weeks and will not be in the office at all  Thanks!   Manju Spaulding

## 2019-07-30 ENCOUNTER — APPOINTMENT (EMERGENCY)
Dept: MRI IMAGING | Facility: HOSPITAL | Age: 49
End: 2019-07-30
Payer: COMMERCIAL

## 2019-07-30 ENCOUNTER — HOSPITAL ENCOUNTER (EMERGENCY)
Facility: HOSPITAL | Age: 49
Discharge: HOME | End: 2019-07-30
Attending: EMERGENCY MEDICINE | Admitting: EMERGENCY MEDICINE
Payer: COMMERCIAL

## 2019-07-30 VITALS
BODY MASS INDEX: 25.07 KG/M2 | DIASTOLIC BLOOD PRESSURE: 99 MMHG | TEMPERATURE: 97.7 F | WEIGHT: 190 LBS | SYSTOLIC BLOOD PRESSURE: 129 MMHG | HEART RATE: 85 BPM | RESPIRATION RATE: 20 BRPM | OXYGEN SATURATION: 98 %

## 2019-07-30 DIAGNOSIS — M54.50 LOW BACK PAIN: Primary | ICD-10-CM

## 2019-07-30 LAB
ANION GAP SERPL CALCULATED.3IONS-SCNC: 9 MMOL/L (ref 4–13)
BASOPHILS # BLD AUTO: 0.07 THOUSANDS/ΜL (ref 0–0.1)
BASOPHILS NFR BLD AUTO: 1 % (ref 0–1)
BUN SERPL-MCNC: 13 MG/DL (ref 5–25)
CALCIUM SERPL-MCNC: 9.2 MG/DL (ref 8.3–10.1)
CHLORIDE SERPL-SCNC: 110 MMOL/L (ref 100–108)
CO2 SERPL-SCNC: 28 MMOL/L (ref 21–32)
CREAT SERPL-MCNC: 1.09 MG/DL (ref 0.6–1.3)
EOSINOPHIL # BLD AUTO: 0.47 THOUSAND/ΜL (ref 0–0.61)
EOSINOPHIL NFR BLD AUTO: 3 % (ref 0–6)
ERYTHROCYTE [DISTWIDTH] IN BLOOD BY AUTOMATED COUNT: 15.5 % (ref 11.6–15.1)
GFR SERPL CREATININE-BSD FRML MDRD: 79 ML/MIN/1.73SQ M
GLUCOSE SERPL-MCNC: 90 MG/DL (ref 65–140)
HCT VFR BLD AUTO: 48.1 % (ref 36.5–49.3)
HGB BLD-MCNC: 15.9 G/DL (ref 12–17)
IMM GRANULOCYTES # BLD AUTO: 0.07 THOUSAND/UL (ref 0–0.2)
IMM GRANULOCYTES NFR BLD AUTO: 1 % (ref 0–2)
LYMPHOCYTES # BLD AUTO: 2.67 THOUSANDS/ΜL (ref 0.6–4.47)
LYMPHOCYTES NFR BLD AUTO: 18 % (ref 14–44)
MCH RBC QN AUTO: 29.7 PG (ref 26.8–34.3)
MCHC RBC AUTO-ENTMCNC: 33.1 G/DL (ref 31.4–37.4)
MCV RBC AUTO: 90 FL (ref 82–98)
MONOCYTES # BLD AUTO: 0.8 THOUSAND/ΜL (ref 0.17–1.22)
MONOCYTES NFR BLD AUTO: 5 % (ref 4–12)
NEUTROPHILS # BLD AUTO: 10.63 THOUSANDS/ΜL (ref 1.85–7.62)
NEUTS SEG NFR BLD AUTO: 72 % (ref 43–75)
NRBC BLD AUTO-RTO: 0 /100 WBCS
PLATELET # BLD AUTO: 408 THOUSANDS/UL (ref 149–390)
PMV BLD AUTO: 9.7 FL (ref 8.9–12.7)
POTASSIUM SERPL-SCNC: 4.5 MMOL/L (ref 3.5–5.3)
RBC # BLD AUTO: 5.35 MILLION/UL (ref 3.88–5.62)
SODIUM SERPL-SCNC: 147 MMOL/L (ref 136–145)
WBC # BLD AUTO: 14.71 THOUSAND/UL (ref 4.31–10.16)

## 2019-07-30 PROCEDURE — 99284 EMERGENCY DEPT VISIT MOD MDM: CPT

## 2019-07-30 PROCEDURE — 96376 TX/PRO/DX INJ SAME DRUG ADON: CPT

## 2019-07-30 PROCEDURE — 96374 THER/PROPH/DIAG INJ IV PUSH: CPT

## 2019-07-30 PROCEDURE — A9585 GADOBUTROL INJECTION: HCPCS | Performed by: EMERGENCY MEDICINE

## 2019-07-30 PROCEDURE — 85025 COMPLETE CBC W/AUTO DIFF WBC: CPT | Performed by: EMERGENCY MEDICINE

## 2019-07-30 PROCEDURE — 80048 BASIC METABOLIC PNL TOTAL CA: CPT | Performed by: EMERGENCY MEDICINE

## 2019-07-30 PROCEDURE — 96375 TX/PRO/DX INJ NEW DRUG ADDON: CPT

## 2019-07-30 PROCEDURE — 72158 MRI LUMBAR SPINE W/O & W/DYE: CPT

## 2019-07-30 PROCEDURE — 99285 EMERGENCY DEPT VISIT HI MDM: CPT | Performed by: EMERGENCY MEDICINE

## 2019-07-30 PROCEDURE — 36415 COLL VENOUS BLD VENIPUNCTURE: CPT | Performed by: EMERGENCY MEDICINE

## 2019-07-30 RX ORDER — LIDOCAINE 50 MG/G
1 PATCH TOPICAL ONCE
Status: DISCONTINUED | OUTPATIENT
Start: 2019-07-30 | End: 2019-07-30 | Stop reason: HOSPADM

## 2019-07-30 RX ORDER — DIAZEPAM 5 MG/ML
5 INJECTION, SOLUTION INTRAMUSCULAR; INTRAVENOUS ONCE
Status: COMPLETED | OUTPATIENT
Start: 2019-07-30 | End: 2019-07-30

## 2019-07-30 RX ORDER — HYDROMORPHONE HCL/PF 1 MG/ML
1 SYRINGE (ML) INJECTION ONCE
Status: COMPLETED | OUTPATIENT
Start: 2019-07-30 | End: 2019-07-30

## 2019-07-30 RX ORDER — NAPROXEN 500 MG/1
500 TABLET ORAL 2 TIMES DAILY WITH MEALS
Qty: 14 TABLET | Refills: 0 | Status: SHIPPED | OUTPATIENT
Start: 2019-07-30 | End: 2019-10-25

## 2019-07-30 RX ORDER — NAPROXEN 250 MG/1
500 TABLET ORAL ONCE
Status: COMPLETED | OUTPATIENT
Start: 2019-07-30 | End: 2019-07-30

## 2019-07-30 RX ORDER — LIDOCAINE 50 MG/G
1 PATCH TOPICAL DAILY
Qty: 14 PATCH | Refills: 0 | Status: SHIPPED | OUTPATIENT
Start: 2019-07-30 | End: 2019-10-25

## 2019-07-30 RX ADMIN — NAPROXEN 500 MG: 250 TABLET ORAL at 21:03

## 2019-07-30 RX ADMIN — HYDROMORPHONE HYDROCHLORIDE 1 MG: 1 INJECTION, SOLUTION INTRAMUSCULAR; INTRAVENOUS; SUBCUTANEOUS at 18:38

## 2019-07-30 RX ADMIN — DIAZEPAM 5 MG: 5 INJECTION, SOLUTION INTRAMUSCULAR; INTRAVENOUS at 18:38

## 2019-07-30 RX ADMIN — LIDOCAINE 1 PATCH: 50 PATCH TOPICAL at 21:00

## 2019-07-30 RX ADMIN — GADOBUTROL 8 ML: 604.72 INJECTION INTRAVENOUS at 19:39

## 2019-07-30 RX ADMIN — HYDROMORPHONE HYDROCHLORIDE 1 MG: 1 INJECTION, SOLUTION INTRAMUSCULAR; INTRAVENOUS; SUBCUTANEOUS at 19:49

## 2019-07-30 NOTE — ED PROVIDER NOTES
History  Chief Complaint   Patient presents with    Back Pain     pt states he has chronic back pain but about 2 days ago he thinks his "back blew out again " pt states he has tingling down both legs  pt states he also has a tore right shoulder and is supposed to get an MRI for it  HPI     38-year-old male with history of chronic low back pain with prior anterior and posterior L4-L5 fusion, known L3-L4 central disc protrusion, prior spinal cord stimulator in place that was completely removed, 12 prior back surgeries with most recent in 2016, who presents for evaluation of severe low back pain radiating down the back of his left leg with numbness in his groin area and left buttocks  Symptoms started 2 days ago, states that he woke up with them  No known inciting factors  Patient states the pain is worse when he cough, sneeze, or moves  Described as severe and sharp  No bowel or bladder incontinence  Reports decreased sensation in his left buttocks and left side of his groin  He has been able to ambulate but reports severe pain in his back with ambulation, and mild weakness to his left leg  Denies fevers or chills  No current or remote history of IV drug use  States that he takes 10 mg of oxycodone 5 times daily, as well as oral valium, which is chronically prescribed to him by his PCP for low back pain  Prior to Admission Medications   Prescriptions Last Dose Informant Patient Reported? Taking?    Elastic Bandages & Supports (SHOULDER BRACE MEDIUM) MISC  Self No No   Sig: by Does not apply route daily Apply daily   Elastic Bandages & Supports (SHOULDER SUPPORT/NEOPRENE MED) MISC  Self No No   Sig: by Does not apply route daily Apply daily   VENTOLIN  (90 Base) MCG/ACT inhaler  Self No Yes   Sig: USE 2 PUFFS EVERY 6 HOURS AS NEEDED FOR WHEEZING   cyclobenzaprine (FLEXERIL) 10 mg tablet   No No   Sig: Take 1 tablet (10 mg total) by mouth daily at bedtime for 5 days   diazepam (VALIUM) 5 mg tablet   No Yes   Sig: Take 1 tablet (5 mg total) by mouth every 12 (twelve) hours as needed for anxiety or muscle spasms   diltiazem (CARDIZEM) 60 mg tablet Not Taking at Unknown time  No No   Sig: Take 1 tablet (60 mg total) by mouth 2 (two) times a day   Patient not taking: Reported on 7/30/2019   famotidine (PEPCID) 20 mg tablet Not Taking at Unknown time  No No   Sig: Take 1 tablet (20 mg total) by mouth daily   Patient not taking: Reported on 7/30/2019   fluticasone (FLONASE) 50 mcg/act nasal spray   No Yes   Sig: USE 2 SPRAYS IN EACH NOSTRIL DAILY AS NEEDED FOR RHINITIS OR ALLERGIES   fluticasone-salmeterol (ADVAIR DISKUS, WIXELA INHUB) 250-50 mcg/dose inhaler  Self No Yes   Sig: INHALE 1 PUFF BY MOUTH DAILY   fluticasone-vilanterol (BREO ELLIPTA) 200-25 MCG/INH inhaler  Self No Yes   Sig: Inhale 1 puff daily Rinse mouth after use     ibuprofen (MOTRIN) 800 mg tablet   No Yes   Sig: TAKE 1 TABLET BY MOUTH THREE TIMES DAILY AS NEEDED   methocarbamol (ROBAXIN) 500 mg tablet Not Taking at Unknown time Self No No   Sig: Take 1 tablet (500 mg total) by mouth 3 (three) times a day   Patient not taking: Reported on 7/30/2019   oxyCODONE (ROXICODONE) 10 MG TABS   No Yes   Sig: Take 1 tablet (10 mg total) by mouth every 4 (four) hours as needed for severe pain      Facility-Administered Medications: None       Past Medical History:   Diagnosis Date    Allergic rhinitis     Anxiety     Arthritis     Back pain     Chronic obstructive asthma (HCC)     Chronic pain syndrome     Cluster headaches     Depression     Insomnia     Kidney stones     Laryngospasm     Leukocytosis     Migraine     Myocardial infarction ([facility] [address] )     Nephrolithiasis     Organic impotence     Pneumonia due to infectious organism 1/16/2019    Seasonal allergies     Sleep apnea     does not use CPAP    Stroke ([facility] [address] ) 2015    TMJ (temporomandibular joint syndrome)     Wheezing     last assessed 05/19/17       Past Surgical History: Procedure Laterality Date   [facility] SURGERY      *9, [id number], nervectomy 2006, total of 10    BUCCAL MASS EXCISION N/A 3/26/2018    Procedure: BIOPSY LINGUAL TONSIL (FLOW/ STANDARD PATH)  EVAL UNDER ANESTHESIA;  Surgeon: Betina Andrew MD;  [facility];  Service: ENT    COLONOSCOPY      HERNIA REPAIR      KIDNEY SURGERY      KNEE ARTHROSCOPY      LITHOTRIPSY      multiple    LUMBAR [address] SURGERY  2015    MANDIBLE FRACTURE SURGERY      titanium plate    PELVIC SYMPHYSIS FUSION      CO ESOPHAGOGASTRODUODENOSCOPY TRANSORAL DIAGNOSTIC N/A 2/22/2018    Procedure: ESOPHAGOGASTRODUODENOSCOPY (EGD); Surgeon: Hayley Garcia MD;  Location: [facility]; Service: Gastroenterology    CO ESOPHAGOGASTRODUODENOSCOPY TRANSORAL DIAGNOSTIC N/A 4/1/2019    Procedure: EGD W/ DILATION;  Surgeon: Hayley Garcia MD;  [facility]; Service: Gastroenterology    CO REVISE/REMOVE SPINAL NEUROSTIM/ Left 6/9/2017    Procedure: REMOVAL OF A THORACIC SCS PLACED VIA LAMINECTOMY AND REMOVAL LEFT BUTTOCK GENERATOR; IMPULSE MONITORING;  Surgeon: Rikki Runner, MD;  Location:  MAIN OR;  Service: Neurosurgery    CO [address] ARTHROSCOP,SURG,W/ROTAT CUFF REPR Right 4/4/2019    Procedure: RIGHT SHOULDER ARTHROSCOPIC SUBSCAPULARIS TENDON REPAIR;  Surgeon: Deshawn Clark MD;  [facility];  Service: Orthopedics    CO SURG IMPLNT [address] N/A 9/8/2016    Procedure: DORSAL COLUMN STIMULATOR PLACEMENT (IMPULSE MONITORING); Surgeon: Yang Ernst MD;  [facility];  Service: Orthopedics    SACROILIAC JOINT FUSION  2015    SHOULDER SURGERY Left     2       Family History   Problem Relation Age of Onset    Diabetes Father     Obesity Father     Liver cancer Father     Heart disease Father     Diabetes Brother     Hypertension Brother     Leukemia Mother     Hypertension Mother     Cancer Family      I have reviewed and agree with the history as documented      Social History Tobacco Use    Smoking status: Current Some Day Smoker     Packs/day: 0 50     Years: 25 00     Pack years: 12 50    Smokeless tobacco: Former User   Substance Use Topics    Alcohol use: Yes     Frequency: Monthly or less     Drinks per session: 3 or 4     Comment: rarely     Drug use: Yes     Frequency: 7 0 times per week     Types: Marijuana     Comment: medical marijuana daily for chronic pain        Review of Systems   Constitutional: Negative for chills and fever  HENT: Negative for congestion  Eyes: Negative for visual disturbance  Respiratory: Negative for cough and shortness of breath  Cardiovascular: Negative for chest pain and leg swelling  Gastrointestinal: Negative for abdominal pain, diarrhea, nausea and vomiting  Genitourinary: Negative for dysuria and frequency  Musculoskeletal: Positive for back pain  Negative for arthralgias, neck pain and neck stiffness  Skin: Negative for rash  Neurological: Positive for weakness (L leg) and numbness (L buttocks/groin)  Negative for headaches  Psychiatric/Behavioral: Negative for agitation, behavioral problems and confusion  Physical Exam  Physical Exam   Constitutional: He is oriented to person, place, and time  He appears well-developed and well-nourished  No distress  HENT:   Head: Normocephalic and atraumatic  Right Ear: External ear normal    Left Ear: External ear normal    Nose: Nose normal    Mouth/Throat: Oropharynx is clear and moist    Eyes: Conjunctivae are normal    Neck: Normal range of motion  Neck supple  Cardiovascular: Normal rate, regular rhythm and normal heart sounds  Exam reveals no gallop and no friction rub  No murmur heard  Pulmonary/Chest: Effort normal and breath sounds normal  No respiratory distress  He has no wheezes  He has no rales  Abdominal: Soft  Bowel sounds are normal  He exhibits no distension  There is no tenderness  There is no guarding     Musculoskeletal: Normal range of motion  He exhibits no edema or deformity  Midline tenderness to palpation of the lower lumbar spine, with tenderness to palpation over the left paraspinous muscles of the lower lumbar spine  Neurological: He is alert and oriented to person, place, and time  He exhibits normal muscle tone  5/5 strength in the proximal and distal bilateral lower extremities  Movement of the left lower extremity is limited secondary to pain back, however strength is normal when muscle groups are isolated  1+ patellar reflexes bilaterally with no ankle clonus  Decreased sensation to light touch and pinprick over the left buttocks and left medial thigh, though patient does have some sensation  Normal rectal tone  Skin: Skin is warm and dry  He is not diaphoretic         Vital Signs  ED Triage Vitals [07/30/19 1756]   Temperature Pulse Respirations Blood Pressure SpO2   97 7 °F (36 5 °C) 74 20 (!) 137/101 100 %      Temp Source Heart Rate Source Patient Position - Orthostatic VS BP Location FiO2 (%)   Oral Monitor Sitting Right arm --      Pain Score       Worst Possible Pain           Vitals:    07/30/19 1756 07/30/19 1948   BP: (!) 137/101 129/99   Pulse: 74 85   Patient Position - Orthostatic VS: Sitting Sitting         Visual Acuity      ED Medications  Medications   lidocaine (LIDODERM) 5 % patch 1 patch (1 patch Topical Medication Applied 7/30/19 2100)   HYDROmorphone (DILAUDID) injection 1 mg (1 mg Intravenous Given 7/30/19 1838)   diazepam (VALIUM) injection 5 mg (5 mg Intravenous Given 7/30/19 1838)   HYDROmorphone (DILAUDID) injection 1 mg (1 mg Intravenous Given 7/30/19 1949)   gadobutrol injection (MULTI-DOSE) SOLN 8 mL (8 mL Intravenous Given 7/30/19 1939)   naproxen (NAPROSYN) tablet 500 mg (500 mg Oral Given 7/30/19 2103)       Diagnostic Studies  Results Reviewed     Procedure Component Value Units Date/Time    Basic metabolic panel [[id number]]  (Abnormal) Collected:  07/30/19 1842    Lab Status:  Final result Specimen:  Blood from Arm, Right Updated:  19     Sodium 147 mmol/L      Potassium 4 5 mmol/L      Chloride 110 mmol/L      CO2 28 mmol/L      ANION GAP 9 mmol/L      BUN 13 mg/dL      Creatinine 1 09 mg/dL      Glucose 90 mg/dL      Calcium 9 2 mg/dL      eGFR 79 ml/min/1 73sq m     Narrative:       [place] guidelines for Chronic Kidney Disease (CKD):     Stage 1 with normal or high GFR (GFR > 90 mL/min/1 73 square meters)    Stage 2 Mild CKD (GFR = 60-89 mL/min/1 73 square meters)    Stage 3A Moderate CKD (GFR = 45-59 mL/min/1 73 square meters)    Stage 3B Moderate CKD (GFR = 30-44 mL/min/1 73 square meters)    Stage 4 Severe CKD (GFR = 15-29 mL/min/1 73 square meters)    Stage 5 End Stage CKD (GFR <15 mL/min/1 73 square meters)  Note: GFR calculation is accurate only with a steady state creatinine    CBC and differential [[id number]]  (Abnormal) Collected:  19    Lab Status:  Final result Specimen:  Blood from Arm, Right Updated:  19     WBC 14 71 Thousand/uL      RBC 5 35 Million/uL      Hemoglobin 15 9 g/dL      Hematocrit 48 1 %      MCV 90 fL      MCH 29 7 pg      MCHC 33 1 g/dL      RDW 15 5 %      MPV 9 7 fL      Platelets 577 Thousands/uL      nRBC 0 /100 WBCs      Neutrophils Relative 72 %      Immat GRANS % 1 %      Lymphocytes Relative 18 %      Monocytes Relative 5 %      Eosinophils Relative 3 %      Basophils Relative 1 %      Neutrophils Absolute 10 63 Thousands/µL      Immature Grans Absolute 0 07 Thousand/uL      Lymphocytes Absolute 2 67 Thousands/µL      Monocytes Absolute 0 80 Thousand/µL      Eosinophils Absolute 0 47 Thousand/µL      Basophils Absolute 0 07 Thousands/µL                  MRI lumbar spine w wo contrast   Final Result by Ramonia Apgar, MD (1954)         1  Stable postsurgical change without evidence of discitis, osteomyelitis, epidural or paraspinal abscess     2   Chronic disc and facet degenerative change, most prominent at L3-4  Workstation performed: [id number]                    Procedures  Procedures       ED Course                               MDM  Number of Diagnoses or Management Options  Low back pain: new and requires workup  Diagnosis management comments: Patient appears uncomfortable on arrival   Afebrile and hemodynamically stable  He was given Dilaudid and Valium for pain control  Physical exam as above is partially concerning for cauda equina or acute nerve compression  Also with leukocytosis to 14  No fevers and patient denies IV drug use  MRI of the lumbar spine with stable postsurgical changes without evidence of diskitis, osteomyelitis, epidural or paraspinal abscess, chronic disc and facet degenerative changes  Patient is concerned that he has another "burst disc " Patient reassured that there are no acute findings on his MRI  Suspect acute exacerbation of his chronic back pain  He is already taking opiates as well as Valium  Will prescribe naproxen and lidocaine patches  Return precautions discussed for 2 weakness in his legs, inability to ambulate, bowel or bladder incontinence, fevers, or numbness in his legs  Patient discharged in good condition  Amount and/or Complexity of Data Reviewed  Clinical lab tests: ordered and reviewed  Tests in the radiology section of CPT®: ordered and reviewed    Patient Progress  Patient progress: stable           Disposition  Final diagnoses:   Low back pain     Time reflects when diagnosis was documented in both MDM as applicable and the Disposition within this note     Time User Action Codes Description Comment    7/30/2019  8:57 PM Ravi Proctor Add [M54 5] Low back pain       ED Disposition     ED Disposition Condition Date/Time Comment    Discharge Stable Tue Jul 30, 2019  8:56 PM Jen Davis discharge to home/self care              Follow-up Information     Follow up With Specialties Details Why Contact Info Additional Information    [facility]  Please follow-up for management of your low back pain  [address] [facility] [address] [phone]  [place] Jones, [address], [place], [place], [address] [facility] Emergency Department Emergency Medicine  Return to the Emergency Department immediately for weakness in your legs, numbness in your legs or groin, or bowel or bladder incontinence   [address] [facility] [address]  [phone] [facility], [address], [place], [place], [address]          Discharge Medication List as of 7/30/2019  8:58 PM      START taking these medications    Details   lidocaine (LIDODERM) 5 % Apply 1 patch topically daily for 14 days Remove & Discard patch within 12 hours or as directed by MD, Starting Tue 7/30/2019, Until Tue 8/13/2019, Print      naproxen (NAPROSYN) 500 mg tablet Take 1 tablet (500 mg total) by mouth 2 (two) times a day with meals for 7 days, Starting Tue 7/30/2019, Until Tue 8/6/2019, Print         CONTINUE these medications which have NOT CHANGED    Details   diazepam (VALIUM) 5 mg tablet Take 1 tablet (5 mg total) by mouth every 12 (twelve) hours as needed for anxiety or muscle spasms, Starting Mon 7/15/2019, Print      fluticasone (FLONASE) 50 mcg/act nasal spray USE 2 SPRAYS IN EACH NOSTRIL DAILY AS NEEDED FOR RHINITIS OR ALLERGIES, Normal      fluticasone-salmeterol (ADVAIR DISKUS, WIXELA INHUB) 250-50 mcg/dose inhaler INHALE 1 PUFF BY MOUTH DAILY, Normal      fluticasone-vilanterol (BREO ELLIPTA) 200-25 MCG/INH inhaler Inhale 1 puff daily Rinse mouth after use , Starting Mon 3/18/2019, Normal      ibuprofen (MOTRIN) 800 mg tablet TAKE 1 TABLET BY MOUTH THREE TIMES DAILY AS NEEDED, Normal      oxyCODONE (ROXICODONE) 10 MG TABS Take 1 tablet (10 mg total) by mouth every 4 (four) hours as needed for severe pain, Starting Mon 7/15/2019, Normal      VENTOLIN  (90 Base) MCG/ACT inhaler USE 2 PUFFS EVERY 6 HOURS AS NEEDED FOR WHEEZING, Normal      cyclobenzaprine (FLEXERIL) 10 mg tablet Take 1 tablet (10 mg total) by mouth daily at bedtime for 5 days, Starting Fri 5/3/2019, Until Wed 5/8/2019, Normal      diltiazem (CARDIZEM) 60 mg tablet Take 1 tablet (60 mg total) by mouth 2 (two) times a day, Starting Mon 7/22/2019, Normal      !! Elastic Bandages & Supports (SHOULDER BRACE MEDIUM) MISC by Does not apply route daily Apply daily, Starting Mon 3/18/2019, Print      !! Elastic Bandages & Supports (SHOULDER SUPPORT/NEOPRENE MED) MISC by Does not apply route daily Apply daily, Starting Mon 3/18/2019, Print      famotidine (PEPCID) 20 mg tablet Take 1 tablet (20 mg total) by mouth daily, Starting Mon 5/20/2019, Normal      methocarbamol (ROBAXIN) 500 mg tablet Take 1 tablet (500 mg total) by mouth 3 (three) times a day, Starting Thu 5/2/2019, Normal       !! - Potential duplicate medications found  Please discuss with provider  No discharge procedures on file      ED Provider  Electronically Signed by           Lashell Guardado MD  07/30/19 6695

## 2019-07-31 NOTE — ED NOTES
[facility]  Report of Consultation    Jaime Negron Patient Status:  Inpatient    1950 MRN [id number]   [facility] 7NE-A Attending Amy Batres MD   [facility] Day # 1 PCP Christopher Thomas MD     Reason for Consultation:  VT Provider at bedside       Tien Gilliland [facility]  07/30/19 2052 problem     spine stenosis   • DVT (deep venous thrombosis) ([facility] [address] 2008    left leg   • Essential hypertension    • High blood pressure    • Osteoarthritis     injected 2013/ 2015-- left knee injected     Past Surgical History:   Procedure Laterality Date °C), temperature source Oral, resp. rate 15, height 1.803 m (5' 11\"), weight 93 kg (205 lb), SpO2 100 %. General: Patient is alert and oriented x 3, not in acute distress. HEENT: EOMs intact. PERRL. Oropharynx is clear. Neck: No JVD.  No palpable ly findings discussed with the ED MD, Dr. Denis Lacy at approximately 1125 hr on 2019. Read back performed.       Dictated by: Tata Israel MD on 2019 at 11:22     Approved by: Tata Israel MD              Impression and Plan:    Patient Active Problem List

## 2019-08-06 ENCOUNTER — TELEPHONE (OUTPATIENT)
Dept: OBGYN CLINIC | Facility: HOSPITAL | Age: 49
End: 2019-08-06

## 2019-08-06 NOTE — TELEPHONE ENCOUNTER
Patient is calling to reschedule the right shoulder MRI  Patient was unable to attend previously scheduled MRI due to hospitalization  [id number]#[id number] ([id number]) EXP 9/15/19-SK    Patient has rescheduled his MRI FU with Dr Idalmis Crow for 9/4 at 12:30P  Please contact patient and reschedule the right shoulder MRI

## 2019-08-09 ENCOUNTER — HOSPITAL ENCOUNTER (OUTPATIENT)
Dept: RADIOLOGY | Facility: HOSPITAL | Age: 49
Discharge: HOME | End: 2019-08-09
Attending: ORTHOPAEDIC SURGERY
Payer: COMMERCIAL

## 2019-08-09 DIAGNOSIS — M25.511 ACUTE PAIN OF RIGHT SHOULDER: ICD-10-CM

## 2019-08-09 DIAGNOSIS — G89.18 POSTOPERATIVE PAIN: ICD-10-CM

## 2019-08-09 PROCEDURE — 73221 MRI JOINT UPR EXTREM W/O DYE: CPT

## 2019-08-12 ENCOUNTER — TELEPHONE (OUTPATIENT)
Dept: GASTROENTEROLOGY | Facility: CLINIC | Age: 49
End: 2019-08-12

## 2019-08-12 DIAGNOSIS — M96.1 POST LAMINECTOMY SYNDROME: ICD-10-CM

## 2019-08-12 DIAGNOSIS — F41.8 DEPRESSION WITH ANXIETY: ICD-10-CM

## 2019-08-12 DIAGNOSIS — G89.4 CHRONIC PAIN SYNDROME: ICD-10-CM

## 2019-08-12 DIAGNOSIS — M53.3 CHRONIC RIGHT SI JOINT PAIN: ICD-10-CM

## 2019-08-12 DIAGNOSIS — G89.29 CHRONIC RIGHT SI JOINT PAIN: ICD-10-CM

## 2019-08-12 NOTE — TELEPHONE ENCOUNTER
Voice message requesting refill of Diazepam 5mg #60 and Oxycodone 10mg #120   Checked PDMP, last refill for both 7/15/19

## 2019-08-13 RX ORDER — OXYCODONE HYDROCHLORIDE 10 MG/1
10 TABLET ORAL EVERY 4 HOURS PRN
Qty: 120 TABLET | Refills: 0 | Status: SHIPPED | OUTPATIENT
Start: 2019-08-13 | End: 2019-09-13 | Stop reason: SDUPTHER

## 2019-08-13 RX ORDER — DIAZEPAM 5 MG/1
5 TABLET ORAL EVERY 12 HOURS PRN
Qty: 60 TABLET | Refills: 0 | Status: SHIPPED | OUTPATIENT
Start: 2019-08-13 | End: 2019-09-13 | Stop reason: SDUPTHER

## 2019-08-13 NOTE — TELEPHONE ENCOUNTER
LV for pt regarding the change in bola due to insurance and he is normally seen at [facility]  Please advise if pt calls back to confirm 10/7 work for him

## 2019-08-13 NOTE — TELEPHONE ENCOUNTER
Patient appropriate for refills on:     · Valium 5 mg Q12H PRN (Disp #60)   PDMP showed last refill written & filled 7/1519  · Oxycodone 10 mg Q4H PRN (Disp #120)   PDMP showed last refill written & filled 7/15/19       Patient is adherent to follow-ups chronically with me, PDMP w/o red flags, contract signed 10/18/18    Urine oxycodone and valium obtained 7/9/18, will repeat at next visit today      Orders pended, thanks!

## 2019-08-13 NOTE — TELEPHONE ENCOUNTER
Due to his insurance please reschedule to 10/7/19 @330pm with Dr Annita Boyd in our [place] location

## 2019-08-15 ENCOUNTER — TELEPHONE (OUTPATIENT)
Dept: GASTROENTEROLOGY | Facility: CLINIC | Age: 49
End: 2019-08-15

## 2019-08-21 ENCOUNTER — OFFICE VISIT (OUTPATIENT)
Dept: OBGYN CLINIC | Facility: CLINIC | Age: 49
End: 2019-08-21
Payer: COMMERCIAL

## 2019-08-21 VITALS
BODY MASS INDEX: 24.25 KG/M2 | SYSTOLIC BLOOD PRESSURE: 124 MMHG | HEIGHT: 73 IN | WEIGHT: 183 LBS | HEART RATE: 90 BPM | DIASTOLIC BLOOD PRESSURE: 86 MMHG

## 2019-08-21 DIAGNOSIS — M25.511 ACUTE PAIN OF RIGHT SHOULDER: ICD-10-CM

## 2019-08-21 DIAGNOSIS — S46.811D PARTIAL TEAR OF RIGHT SUBSCAPULARIS TENDON, SUBSEQUENT ENCOUNTER: Primary | ICD-10-CM

## 2019-08-21 PROCEDURE — 99213 OFFICE O/P EST LOW 20 MIN: CPT | Performed by: ORTHOPAEDIC SURGERY

## 2019-08-21 NOTE — PROGRESS NOTES
Assessment:  1  Partial tear of right subscapularis tendon, subsequent encounter  Ambulatory referral to Physical Therapy   2  Acute pain of right shoulder  Ambulatory referral to Physical Therapy       Plan:  Discussed with the patient his MRI does not show a re-tear  I encouraged the patient to start using the shoulder  He will start a course of physical therapy and follow up as needed  To do next visit:  Return if symptoms worsen or fail to improve  The above stated was discussed in layman's terms and the patient expressed understanding  All questions were answered to the patient's satisfaction  Scribe Attestation    I,:   Nelson Hahn am acting as a scribe while in the presence of the attending physician :        I,:   Jeannie Callahan MD personally performed the services described in this documentation    as scribed in my presence :              Subjective:   Patient is a 25-year-old male presents the office status post right shoulder arthroscopic subscapularis tear repair and debridement of the subacromial bursa performed on 04/04/2019  Patient states in the last month he experienced a large pop to his shoulder while he was scratching a lottery ticket with sudden onset of severe pain anteriorly in his shoulder and upper arm  He reports limited range of motion and strength secondary to pain  In the past few days he has been unable to drive due to this pain and limitation  Prior to the injury he reports doing well and gradually increasing his activity level  He has been doing the physical therapy on his own since the surgery over 3 months ago         Review of systems negative unless otherwise specified in HPI    Past Medical History:   Diagnosis Date    Allergic rhinitis     Anxiety     Arthritis     Back pain     Chronic obstructive asthma (HCC)     Chronic pain syndrome     Cluster headaches     Depression     Insomnia     Kidney stones     Laryngospasm     Leukocytosis     Migraine     Myocardial infarction ([facility] [address]     Nephrolithiasis     Organic impotence     Pneumonia due to infectious organism 1/16/2019    Seasonal allergies     Sleep apnea     does not use CPAP    Stroke [facility]) 2015    TMJ (temporomandibular joint syndrome)     Wheezing     last assessed 05/19/17       Past Surgical History:   Procedure Laterality Date    BACK SURGERY      *9, [id number], nervectomy 2006, total of 10    BUCCAL MASS EXCISION N/A 3/26/2018    Procedure: BIOPSY LINGUAL TONSIL (FLOW/ STANDARD PATH)  EVAL UNDER ANESTHESIA;  Surgeon: Lisette Barrett MD;  Location[facility];  Service: ENT    COLONOSCOPY      HERNIA REPAIR      KIDNEY SURGERY      KNEE ARTHROSCOPY      LITHOTRIPSY      multiple    [address]UMBAR [address]  [address]    MANDIBLE FRACTURE SURGERY      titanium plate    PELVIC SYMPHYSIS FUSION      ME ESOPHAGOGASTRODUODENOSCOPY TRANSORAL DIAGNOSTIC N/A 2/22/2018    Procedure: ESOPHAGOGASTRODUODENOSCOPY (EGD); Surgeon: Gm Watters MD;  Location: [facility]; Service: Gastroenterology    ME ESOPHAGOGASTRODUODENOSCOPY TRANSORAL DIAGNOSTIC N/A 4/1/2019    Procedure: EGD W/ DILATION;  Surgeon: Gm Watters MD;  [facility]: [facility]; Service: Gastroenterology    ME REVISE/REMOVE SPINAL NEUROSTIM/ Left 6/9/2017    Procedure: REMOVAL OF A THORACIC SCS PLACED VIA LAMINECTOMY AND REMOVAL LEFT BUTTOCK GENERATOR; IMPULSE MONITORING;  Surgeon: Mitesh Rockwell MD;  [facility];  Service: Neurosurgery    ME [address] ARTHROSCOP,SURG,W/ROTAT CUFF REPR Right 4/4/2019    Procedure: RIGHT SHOULDER ARTHROSCOPIC SUBSCAPULARIS TENDON REPAIR;  Surgeon: Debbie Pete MD;  [facility];  Service: Orthopedics    ME SURG IMPLNT [address] N/A 9/8/2016    Procedure: DORSAL COLUMN STIMULATOR PLACEMENT (IMPULSE MONITORING);   Surgeon: Jese Pham MD;  Location: [facility];  Service: Orthopedics    SACROILIAC JOINT FUSION  2015    SHOULDER SURGERY Left     2 Family History   Problem Relation Age of Onset    Diabetes Father     Obesity Father     Liver cancer Father     Heart disease Father     Diabetes Brother     Hypertension Brother     Leukemia Mother     Hypertension Mother     Cancer Family        Social History     Occupational History    Occupation: Disability   Tobacco Use    Smoking status: Current Some Day Smoker     Packs/day: 0 50     Years: 25 00     Pack years: 12 50    Smokeless tobacco: Former User   Substance and Sexual Activity    Alcohol use: Yes     Frequency: Monthly or less     Drinks per session: 3 or 4     Comment: rarely     Drug use: Yes     Frequency: 7 0 times per week     Types: Marijuana     Comment: medical marijuana daily for chronic pain    Sexual activity: Not on file         Current Outpatient Medications:     cyclobenzaprine (FLEXERIL) 10 mg tablet, Take 1 tablet (10 mg total) by mouth daily at bedtime for 5 days, Disp: 10 tablet, Rfl: 0    diazepam (VALIUM) 5 mg tablet, Take 1 tablet (5 mg total) by mouth every 12 (twelve) hours as needed for anxiety or muscle spasms, Disp: 60 tablet, Rfl: 0    diltiazem (CARDIZEM) 60 mg tablet, Take 1 tablet (60 mg total) by mouth 2 (two) times a day (Patient not taking: Reported on 7/30/2019), Disp: 60 tablet, Rfl: 5    Elastic Bandages & Supports (SHOULDER BRACE MEDIUM) MISC, by Does not apply route daily Apply daily, Disp: 1 each, Rfl: 0    Elastic Bandages & Supports (SHOULDER SUPPORT/NEOPRENE MED) MISC, by Does not apply route daily Apply daily, Disp: 1 each, Rfl: 0    famotidine (PEPCID) 20 mg tablet, Take 1 tablet (20 mg total) by mouth daily (Patient not taking: Reported on 7/30/2019), Disp: 30 tablet, Rfl: 5    fluticasone (FLONASE) 50 mcg/act nasal spray, USE 2 SPRAYS IN EACH NOSTRIL DAILY AS NEEDED FOR RHINITIS OR ALLERGIES, Disp: 1 Bottle, Rfl: 4    fluticasone-salmeterol (ADVAIR DISKUS, WIXELA INHUB) 250-50 mcg/dose inhaler, INHALE 1 PUFF BY MOUTH DAILY, Disp: 1 Inhaler, Rfl: 12    fluticasone-vilanterol (BREO ELLIPTA) 200-25 MCG/INH inhaler, Inhale 1 puff daily Rinse mouth after use , Disp: 3 Inhaler, Rfl: 3    ibuprofen (MOTRIN) 800 mg tablet, TAKE 1 TABLET BY MOUTH THREE TIMES DAILY AS NEEDED, Disp: 90 tablet, Rfl: 0    lidocaine (LIDODERM) 5 %, Apply 1 patch topically daily for 14 days Remove & Discard patch within 12 hours or as directed by MD, Disp: 14 patch, Rfl: 0    methocarbamol (ROBAXIN) 500 mg tablet, Take 1 tablet (500 mg total) by mouth 3 (three) times a day (Patient not taking: Reported on 7/30/2019), Disp: 30 tablet, Rfl: 0    naproxen (NAPROSYN) 500 mg tablet, Take 1 tablet (500 mg total) by mouth 2 (two) times a day with meals for 7 days, Disp: 14 tablet, Rfl: 0    oxyCODONE (ROXICODONE) 10 MG TABS, Take 1 tablet (10 mg total) by mouth every 4 (four) hours as needed for severe pain, Disp: 120 tablet, Rfl: 0    VENTOLIN  (90 Base) MCG/ACT inhaler, USE 2 PUFFS EVERY 6 HOURS AS NEEDED FOR WHEEZING, Disp: 18 g, Rfl: 12    Allergies   Allergen Reactions    Other Rash     Adhesive tape            Vitals:    08/21/19 0837   BP: 124/86   Pulse: 90       Objective:                    Right Shoulder Exam     Tenderness   Right shoulder tenderness location: anterior aspect  Range of Motion   External rotation: 90   Forward flexion: 110 (limited by pain)   Internal rotation 90 degrees: 60     Muscle Strength   Abduction: 4/5   Internal rotation: 4/5   External rotation: 4/5     Tests   Dumont test: positive  Impingement: positive    Other   Erythema: absent  Sensation: normal  Pulse: present    Comments:    Empty can test: Positive  Speed's test: Positive  Belly press test: Negative            Diagnostics, reviewed and taken today if performed as documented:     The attending physician has personally reviewed the pertinent films in PACS and interpretation is as follows: Right shoulder MRI right shoulder 8/9/19: Supraspinatus and infraspinatus tendinosis, post surgical changes, no evidence of re-tear, moderate biceps tendinosis  Procedures, if performed today:    Procedures    None performed      Portions of the record may have been created with voice recognition software  Occasional wrong word or "sound a like" substitutions may have occurred due to the inherent limitations of voice recognition software  Read the chart carefully and recognize, using context, where substitutions have occurred

## 2019-08-27 ENCOUNTER — TELEPHONE (OUTPATIENT)
Dept: GASTROENTEROLOGY | Facility: AMBULARY SURGERY CENTER | Age: 49
End: 2019-08-27

## 2019-08-27 ENCOUNTER — OFFICE VISIT (OUTPATIENT)
Dept: GASTROENTEROLOGY | Facility: AMBULARY SURGERY CENTER | Age: 49
End: 2019-08-27
Payer: COMMERCIAL

## 2019-08-27 VITALS
DIASTOLIC BLOOD PRESSURE: 80 MMHG | RESPIRATION RATE: 18 BRPM | HEART RATE: 78 BPM | SYSTOLIC BLOOD PRESSURE: 130 MMHG | BODY MASS INDEX: 24.65 KG/M2 | TEMPERATURE: 98.9 F | HEIGHT: 73 IN | WEIGHT: 186 LBS

## 2019-08-27 DIAGNOSIS — K59.03 DRUG-INDUCED CONSTIPATION: ICD-10-CM

## 2019-08-27 DIAGNOSIS — K22.4 HYPERCONTRACTILE ESOPHAGUS: ICD-10-CM

## 2019-08-27 DIAGNOSIS — R13.19 ESOPHAGEAL DYSPHAGIA: Primary | ICD-10-CM

## 2019-08-27 PROCEDURE — 99213 OFFICE O/P EST LOW 20 MIN: CPT | Performed by: PHYSICIAN ASSISTANT

## 2019-08-27 RX ORDER — AMITRIPTYLINE HYDROCHLORIDE 25 MG/1
25 TABLET, FILM COATED ORAL
Qty: 30 TABLET | Refills: 2 | Status: SHIPPED | OUTPATIENT
Start: 2019-08-27 | End: 2019-11-28 | Stop reason: SDUPTHER

## 2019-08-27 RX ORDER — OXYCODONE AND ACETAMINOPHEN 10; 325 MG/1; MG/1
1 TABLET ORAL 4 TIMES DAILY
COMMUNITY
Start: 2016-06-20 | End: 2019-08-29 | Stop reason: CLARIF

## 2019-08-27 RX ORDER — ALBUTEROL SULFATE 90 UG/1
AEROSOL, METERED RESPIRATORY (INHALATION)
COMMUNITY
Start: 2014-06-12 | End: 2020-03-05

## 2019-08-27 NOTE — LETTER
August 27, 2019     Rosaline Higuera, [address] [facility]    Patient: Rubia Morrison   YOB: 1970   Date of Visit: 8/27/2019       Dear Dr Radha Vargas:    Thank you for referring Claudeen Lyle to me for evaluation  Below are my notes for this consultation  If you have questions, please do not hesitate to call me  I look forward to following your patient along with you  Sincerely,        Abilio Collazo PA-C        CC: No Recipients  Abliio Collazo PA-C  8/27/2019  9:19 AM  Attested  Assessment and Plan    #1  Hypercontractile esophagus with esophageal dysphagia: reports trying PPI, pepcid, peppermint oil, and diltiazem for 3 weeks with no benefit  Also had been dilated back in April with no benefit  Manometry in November 2018 showed hypercontractile esophagus in 2/10 swallows with normal LES  -Discussed silent GERD and use of PPI or pepcid regularly which patient is refusing as he states he doesn't have acid reflux    -Did peppermint oil for 3 weeks with no benefit so he stopped  -he reports that he try diltiazem 60 mg twice daily for a few weeks without any benefit  I did discuss that this is not necessarily the goal dose and that typically we recommend 60 to 90 mg 4 times daily  However he states that his blood pressure runs on the lower side  It does not appear patient would be a candidate for increasing the blood pressure medication   -I discussed a trial of amitriptyline  At this time patient is amendable to this  We will start 25 mg amitriptyline prior to bedtime  I did advise the patient that this could take a good 4 weeks to show and affect    I encouraged him to continue this for at least over a month   -I discussed with him that his narcotic use could be contributing to his symptoms  -I again discussed with the patient in regards to avoiding really hot and really cold foods as this can trigger contractions   -at this time patient has failed multiple treatments and is having significant symptoms  Would recommend referral to [facility]  I will have our nurse reach out to them and refer him there  #2  Constipation from narcotic pain medications: has history of colon polyps  -Needs colonoscopy once upper esophageal issues are resolved  -Continue colace for constipation  -Working on decreasing his narcotic pain medication use  --------------------------------------------------------------------------------------------------------------------    Chief Complaint:  Follow-up esophageal dysphagia    HPI: Kenna Gilmore is a 44 y o  male who presents today for follow up for esophageal dysphagia  Patient has a longstanding history of multiple spinal surgeries requiring narcotics  He reports taking oxycodone 4 times daily  He reports he is going to try to cut down to 3 times daily next month and has also been using medical marijuana  He has ongoing issues with difficulty with swallowing  This has been an ongoing issue for a few years  He reports that regardless of what he eats, he feels that it gets stuck in his substernal area  He reports a bulge in that area  He will sometimes regurgitate the food and other times will bear the pain until it passes  He had a manometry study done in November 2018 which showed 2/10 swallows with distal contractile esophagus and 1 9 cm hiatal hernia  His LES was completely normal   He then subsequently had an upper endoscopy performed in April of 2019 which showed a slightly narrowed distal esophagus without stricture and a small hiatal hernia  He was empirically dilated  He also had some gastritis  He reports that since his dilation he has had worsening symptoms  He had been on Nexium for a period of time in the past   He reports that this did not give him any benefit  He reports more recently he tried about 3 weeks of Pepcid without any benefit  He also tried 3 weeks of peppermint oil without any benefit    He was then given diltiazem 60 mg twice daily to try about a month ago  He was given a smaller dose to see if he could tolerate with his blood pressure  He reports that he did take this for a few weeks and had no benefit with this either  He is not interested in taking anymore and blood pressure medications  He denies any significant nausea or vomiting  He denies any abdominal pain  He reports that his bowel movements typically are a bit harder because of the narcotic pain medication but he takes a stool softener which keep some regular  He denies any blood in the stool or black tarry stool  He had a history of colon polyps in the past and is due for colonoscopy but has wanted to defer this secondary to his swallowing issues  He was noted to be 200 lb back in January 2019  He lost about 14 lb between January and May but has maintained his weight since that time        Review of Systems:   General: negative for fatigue, fever, night sweats, + unexpected weight loss  Psychological: negative for depression; +anxiety  Ophthalmic: negative for blurry vision or scleral icterus  ENT: negative for headaches, oral lesions, sore throat, vocal changes, +dysphagia  Hematological and Lymphatic: negative for pallor or swollen lymph nodes  Respiratory: negative for cough, shortness of breath or wheezing  Cardiovascular: negative for edema or murmur; +chest pain with dysphagia issues  Gastrointestinal: as mentioned in HPI  Genito-Urinary: negative for dysuria or incontinence  Musculoskeletal: negative for joint pain, joint stiffness or joint swelling; +chronic back pain  Dermatological: negative for pruritus, rash, or jaundice    Current Medications  Current Outpatient Medications   Medication Sig Dispense Refill    albuterol (PROAIR HFA) 90 mcg/act inhaler Inhale      diazepam (VALIUM) 5 mg tablet Take 1 tablet (5 mg total) by mouth every 12 (twelve) hours as needed for anxiety or muscle spasms 60 tablet 0    fluticasone (FLONASE) 50 mcg/act nasal spray USE 2 SPRAYS IN EACH NOSTRIL DAILY AS NEEDED FOR RHINITIS OR ALLERGIES 1 Bottle 4    fluticasone-vilanterol (BREO ELLIPTA) 200-25 MCG/INH inhaler Inhale 1 puff daily Rinse mouth after use   3 Inhaler 3    ibuprofen (MOTRIN) 800 mg tablet TAKE 1 TABLET BY MOUTH THREE TIMES DAILY AS NEEDED 90 tablet 0    oxyCODONE (ROXICODONE) 10 MG TABS Take 1 tablet (10 mg total) by mouth every 4 (four) hours as needed for severe pain 120 tablet 0    amitriptyline (ELAVIL) 25 mg tablet Take 1 tablet (25 mg total) by mouth daily at bedtime 30 tablet 2    cyclobenzaprine (FLEXERIL) 10 mg tablet Take 1 tablet (10 mg total) by mouth daily at bedtime for 5 days 10 tablet 0    Elastic Bandages & Supports (SHOULDER BRACE MEDIUM) MISC by Does not apply route daily Apply daily (Patient not taking: Reported on 8/27/2019) 1 each 0    Elastic Bandages & Supports (SHOULDER SUPPORT/NEOPRENE MED) MISC by Does not apply route daily Apply daily (Patient not taking: Reported on 8/27/2019) 1 each 0    famotidine (PEPCID) 20 mg tablet Take 1 tablet (20 mg total) by mouth daily (Patient not taking: Reported on 7/30/2019) 30 tablet 5    fluticasone-salmeterol (ADVAIR DISKUS, WIXELA INHUB) 250-50 mcg/dose inhaler INHALE 1 PUFF BY MOUTH DAILY (Patient not taking: Reported on 8/27/2019) 1 Inhaler 12    lidocaine (LIDODERM) 5 % Apply 1 patch topically daily for 14 days Remove & Discard patch within 12 hours or as directed by MD 14 patch 0    methocarbamol (ROBAXIN) 500 mg tablet Take 1 tablet (500 mg total) by mouth 3 (three) times a day (Patient not taking: Reported on 7/30/2019) 30 tablet 0    naproxen (NAPROSYN) 500 mg tablet Take 1 tablet (500 mg total) by mouth 2 (two) times a day with meals for 7 days 14 tablet 0    oxyCODONE-acetaminophen (PERCOCET)  mg per tablet Take 1 tablet by mouth 4 (four) times a day      VENTOLIN  (90 Base) MCG/ACT inhaler USE 2 PUFFS EVERY 6 HOURS AS NEEDED FOR WHEEZING (Patient not taking: Reported on 8/27/2019) 18 g 12     No current facility-administered medications for this visit  Past Medical History  Past Medical History:   Diagnosis Date    Allergic rhinitis     Anxiety     Arthritis     Back pain     Chronic obstructive asthma (HCC)     Chronic pain syndrome     Cluster headaches     Depression     Insomnia     Kidney stones     Laryngospasm     Leukocytosis     Migraine     My[facility] ([facility] [address]     Nephrolithiasis     Organic impotence     Pneumonia due to infectious organism 1/16/2019    Seasonal allergies     Sleep apnea     does not use CPAP    Stroke ([facility] [address] 2015    TMJ (temporomandibular joint syndrome)     Wheezing     last assessed 05/19/17       Past Surgical History  Past Surgical History:   Procedure Laterality Date    BACK SURGERY      *9, [id number], nervectomy 2006, total of 10    BUCCAL MASS EXCISION N/A 3/26/2018    Procedure: BIOPSY LINGUAL TONSIL (FLOW/ STANDARD PATH)  EVAL UNDER ANESTHESIA;  Surgeon: Layla Barragan MD;  [facility];  Service: ENT    COLONOSCOPY      HERNIA REPAIR      KIDNEY SURGERY      KNEE ARTHROSCOPY      LITHOTRIPSY      multiple    [address]UMBAR [address]  [address]    MANDIBLE FRACTURE SURGERY      titanium plate    PELVIC SYMPHYSIS FUSION      [place] ESOPHAGOGASTRODUODENOSCOPY TRANSORAL DIAGNOSTIC N/A 2/22/2018    Procedure: ESOPHAGOGASTRODUODENOSCOPY (EGD); Surgeon: Laura Zamora MD;  [facility]: [facility]; Service: Gastroenterology    [place] ESOPHAGOGASTRODUODENOSCOPY TRANSORAL DIAGNOSTIC N/A 4/1/2019    Procedure: EGD W/ DILATION;  Surgeon: Laura Zamora MD;  [facility];   Service: Gastroenterology    [place] REVISE/REMOVE SPINAL NEUROSTIM/ Left 6/9/2017    Procedure: REMOVAL OF A THORACIC SCS PLACED VIA LAMINECTOMY AND REMOVAL LEFT BUTTOCK GENERATOR; IMPULSE MONITORING;  Surgeon: Nilsa Jackson MD;  Location:  MAIN OR;  Service: Neurosurgery    [place] [facility] ARTHROSCOP,SURG,W/ROTAT CUFF REPR Right 4/4/2019    Procedure: RIGHT SHOULDER ARTHROSCOPIC SUBSCAPULARIS TENDON REPAIR;  Surgeon: Lele Whaley MD;  Location: [facility]  MAIN OR;  Service: Orthopedics    [place] SURG IMPLNT [address] N/A 9/8/2016    Procedure: DORSAL COLUMN STIMULATOR PLACEMENT (IMPULSE MONITORING);   Surgeon: María Em MD;  Location: [facility];  Service: Orthopedics    SACROILIAC JOINT FUSION  2015    SHOULDER SURGERY Left     2       Past Social History   Social History     Socioeconomic History    Marital status:      Spouse name: None    Number of children: None    Years of education: None    Highest education level: None   Occupational History    Occupation: Disability   Social Needs    Financial resource strain: None    Food insecurity:     Worry: None     Inability: None    Transportation needs:     Medical: None     Non-medical: None   Tobacco Use    Smoking status: Current Some Day Smoker     Packs/day: 0 50     Years: 25 00     Pack years: 12 50    Smokeless tobacco: Former User   Substance and Sexual Activity    Alcohol use: Yes     Frequency: Monthly or less     Drinks per session: 3 or 4     Comment: rarely     Drug use: Yes     Frequency: 7 0 times per week     Types: Marijuana     Comment: medical marijuana daily for chronic pain    Sexual activity: None   Lifestyle    Physical activity:     Days per week: None     Minutes per session: None    Stress: None   Relationships    Social connections:     Talks on phone: None     Gets together: None     Attends [employer] service: None     Active member of club or organization: None     Attends meetings of clubs or organizations: None     Relationship status: None    Intimate partner violence:     Fear of current or ex partner: None     Emotionally abused: None     Physically abused: None     Forced sexual activity: None   Other Topics Concern    None   Social History Narrative    As per Allscripts: Denied: History of Current smoker        As per allscripts: Smoking       The following portions of the patient's history were reviewed and updated as appropriate: allergies, current medications, past family history, past medical history, past social history, past surgical history and problem list     Vital Signs  Vitals:    08/27/19 0831   BP: 130/80   BP Location: Left arm   Patient Position: Sitting   Cuff Size: Standard   Pulse: 78   Resp: 18   Temp: 98 9 °F (37 2 °C)   TempSrc: Tympanic   Weight: 84 4 kg (186 lb)   Height: 6' 1" (1 854 m)       Physical Exam:  General appearance: alert, cooperative, no distress  HEENT: normocephalic, anicteric, no eye erythema or discharge, no oropharyngeal thrush  Neck: supple, trachea midline, no adenopathy  Lungs: CTA b/l, no rales, rhonchi, or wheezing, unlabored respirations  Heart: RRR, no murmur, rubs, or gallops  Abdomen: soft, non-tender, non-distended, normal bowel sounds, no masses or organomegaly  Rectal: deferred  Extremities: no cyanosis, clubbing, or edema  Musculoskeletal: normal gait  Skin: color and texture normal, no jaundice, no rashes or lesions  Psychiatric: alert and oriented, normal affect and behavior                                                                    Attestation signed by Laura Zamora MD at 8/27/2019  9:30 AM:  Patient was seen independently by the PA however I agree with the plan  We will be referring the patient to Dr Victorino Al at [facility] for 2nd opinion

## 2019-08-27 NOTE — PROGRESS NOTES
Assessment and Plan    #1  Hypercontractile esophagus with esophageal dysphagia: reports trying PPI, pepcid, peppermint oil, and diltiazem for 3 weeks with no benefit  Also had been dilated back in April with no benefit  Manometry in November 2018 showed hypercontractile esophagus in 2/10 swallows with normal LES  -Discussed silent GERD and use of PPI or pepcid regularly which patient is refusing as he states he doesn't have acid reflux    -Did peppermint oil for 3 weeks with no benefit so he stopped  -he reports that he try diltiazem 60 mg twice daily for a few weeks without any benefit  I did discuss that this is not necessarily the goal dose and that typically we recommend 60 to 90 mg 4 times daily  However he states that his blood pressure runs on the lower side  It does not appear patient would be a candidate for increasing the blood pressure medication   -I discussed a trial of amitriptyline  At this time patient is amendable to this  We will start 25 mg amitriptyline prior to bedtime  I did advise the patient that this could take a good 4 weeks to show and affect  I encouraged him to continue this for at least over a month   -I discussed with him that his narcotic use could be contributing to his symptoms  -I again discussed with the patient in regards to avoiding really hot and really cold foods as this can trigger contractions   -at this time patient has failed multiple treatments and is having significant symptoms  Would recommend referral to [facility]  I will have our nurse reach out to them and refer him there      #2  Constipation from narcotic pain medications: has history of colon polyps  -Needs colonoscopy once upper esophageal issues are resolved  -Continue colace for constipation  -Working on decreasing his narcotic pain medication use  --------------------------------------------------------------------------------------------------------------------    Chief Complaint: Follow-up esophageal dysphagia    HPI: Rubia Morrison is a 44 y o  male who presents today for follow up for esophageal dysphagia  Patient has a longstanding history of multiple spinal surgeries requiring narcotics  He reports taking oxycodone 4 times daily  He reports he is going to try to cut down to 3 times daily next month and has also been using medical marijuana  He has ongoing issues with difficulty with swallowing  This has been an ongoing issue for a few years  He reports that regardless of what he eats, he feels that it gets stuck in his substernal area  He reports a bulge in that area  He will sometimes regurgitate the food and other times will bear the pain until it passes  He had a manometry study done in November 2018 which showed 2/10 swallows with distal contractile esophagus and 1 9 cm hiatal hernia  His LES was completely normal   He then subsequently had an upper endoscopy performed in April of 2019 which showed a slightly narrowed distal esophagus without stricture and a small hiatal hernia  He was empirically dilated  He also had some gastritis  He reports that since his dilation he has had worsening symptoms  He had been on Nexium for a period of time in the past   He reports that this did not give him any benefit  He reports more recently he tried about 3 weeks of Pepcid without any benefit  He also tried 3 weeks of peppermint oil without any benefit  He was then given diltiazem 60 mg twice daily to try about a month ago  He was given a smaller dose to see if he could tolerate with his blood pressure  He reports that he did take this for a few weeks and had no benefit with this either  He is not interested in taking anymore and blood pressure medications  He denies any significant nausea or vomiting  He denies any abdominal pain    He reports that his bowel movements typically are a bit harder because of the narcotic pain medication but he takes a stool softener which keep some regular  He denies any blood in the stool or black tarry stool  He had a history of colon polyps in the past and is due for colonoscopy but has wanted to defer this secondary to his swallowing issues  He was noted to be 200 lb back in January 2019  He lost about 14 lb between January and May but has maintained his weight since that time  Review of Systems:   General: negative for fatigue, fever, night sweats, + unexpected weight loss  Psychological: negative for depression; +anxiety  Ophthalmic: negative for blurry vision or scleral icterus  ENT: negative for headaches, oral lesions, sore throat, vocal changes, +dysphagia  Hematological and Lymphatic: negative for pallor or swollen lymph nodes  Respiratory: negative for cough, shortness of breath or wheezing  Cardiovascular: negative for edema or murmur; +chest pain with dysphagia issues  Gastrointestinal: as mentioned in HPI  Genito-Urinary: negative for dysuria or incontinence  Musculoskeletal: negative for joint pain, joint stiffness or joint swelling; +chronic back pain  Dermatological: negative for pruritus, rash, or jaundice    Current Medications  Current Outpatient Medications   Medication Sig Dispense Refill    albuterol (PROAIR HFA) 90 mcg/act inhaler Inhale      diazepam (VALIUM) 5 mg tablet Take 1 tablet (5 mg total) by mouth every 12 (twelve) hours as needed for anxiety or muscle spasms 60 tablet 0    fluticasone (FLONASE) 50 mcg/act nasal spray USE 2 SPRAYS IN EACH NOSTRIL DAILY AS NEEDED FOR RHINITIS OR ALLERGIES 1 Bottle 4    fluticasone-vilanterol (BREO ELLIPTA) 200-25 MCG/INH inhaler Inhale 1 puff daily Rinse mouth after use   3 Inhaler 3    ibuprofen (MOTRIN) 800 mg tablet TAKE 1 TABLET BY MOUTH THREE TIMES DAILY AS NEEDED 90 tablet 0    oxyCODONE (ROXICODONE) 10 MG TABS Take 1 tablet (10 mg total) by mouth every 4 (four) hours as needed for severe pain 120 tablet 0    amitriptyline (ELAVIL) 25 mg tablet Take 1 tablet (25 mg total) by mouth daily at bedtime 30 tablet 2    cyclobenzaprine (FLEXERIL) 10 mg tablet Take 1 tablet (10 mg total) by mouth daily at bedtime for 5 days 10 tablet 0    Elastic Bandages & Supports (SHOULDER BRACE MEDIUM) MISC by Does not apply route daily Apply daily (Patient not taking: Reported on 8/27/2019) 1 each 0    Elastic Bandages & Supports (SHOULDER SUPPORT/NEOPRENE MED) MISC by Does not apply route daily Apply daily (Patient not taking: Reported on 8/27/2019) 1 each 0    famotidine (PEPCID) 20 mg tablet Take 1 tablet (20 mg total) by mouth daily (Patient not taking: Reported on 7/30/2019) 30 tablet 5    fluticasone-salmeterol (ADVAIR DISKUS, WIXELA INHUB) 250-50 mcg/dose inhaler INHALE 1 PUFF BY MOUTH DAILY (Patient not taking: Reported on 8/27/2019) 1 Inhaler 12    lidocaine (LIDODERM) 5 % Apply 1 patch topically daily for 14 days Remove & Discard patch within 12 hours or as directed by MD 14 patch 0    methocarbamol (ROBAXIN) 500 mg tablet Take 1 tablet (500 mg total) by mouth 3 (three) times a day (Patient not taking: Reported on 7/30/2019) 30 tablet 0    naproxen (NAPROSYN) 500 mg tablet Take 1 tablet (500 mg total) by mouth 2 (two) times a day with meals for 7 days 14 tablet 0    oxyCODONE-acetaminophen (PERCOCET)  mg per tablet Take 1 tablet by mouth 4 (four) times a day      VENTOLIN  (90 Base) MCG/ACT inhaler USE 2 PUFFS EVERY 6 HOURS AS NEEDED FOR WHEEZING (Patient not taking: Reported on 8/27/2019) 18 g 12     No current facility-administered medications for this visit          Past Medical History  Past Medical History:   Diagnosis Date    Allergic rhinitis     Anxiety     Arthritis     Back pain     Chronic obstructive asthma (HCC)     Chronic pain syndrome     Cluster headaches     Depression     Insomnia     Kidney stones     Laryngospasm     Leukocytosis     Migraine     Myocardial infarction (HCC)     Nephrolithiasis     Organic impotence     Pneumonia due to infectious organism 1/16/2019    Seasonal allergies     Sleep apnea     does not use CPAP    Stroke [facility]) 2015    TMJ (temporomandibular joint syndrome)     Wheezing     last assessed 05/19/17       Past Surgical History  Past Surgical History:   Procedure Laterality Date    BACK SURGERY      *9, [id number], nervectomy 2006, total of 10    BUCCAL MASS EXCISION N/A 3/26/2018    Procedure: BIOPSY LINGUAL TONSIL (FLOW/ STANDARD PATH)  EVAL UNDER ANESTHESIA;  Surgeon: Billy Gipson MD;  [facility];  Service: ENT    COLONOSCOPY      HERNIA REPAIR      KIDNEY SURGERY      KNEE ARTHROSCOPY      LITHOTRIPSY      multiple    LUMBAR [address] SURGERY  2015    MANDIBLE FRACTURE SURGERY      titanium plate    PELVIC SYMPHYSIS FUSION      AK ESOPHAGOGASTRODUODENOSCOPY TRANSORAL DIAGNOSTIC N/A 2/22/2018    Procedure: ESOPHAGOGASTRODUODENOSCOPY (EGD); Surgeon: Carole Patel MD;  Location: [facility]; Service: Gastroenterology    AK ESOPHAGOGASTRODUODENOSCOPY TRANSORAL DIAGNOSTIC N/A 4/1/2019    Procedure: EGD W/ DILATION;  Surgeon: Carole Patel MD;  [facility]; Service: Gastroenterology    AK REVISE/REMOVE SPINAL NEUROSTIM/ Left 6/9/2017    Procedure: REMOVAL OF A THORACIC SCS PLACED VIA LAMINECTOMY AND REMOVAL LEFT BUTTOCK GENERATOR; IMPULSE MONITORING;  Surgeon: Colin Ovalles MD;  Location: [facility];  Service: Neurosurgery    AK [facility] ARTHROSCOP,SURG,W/ROTAT CUFF REPR Right 4/4/2019    Procedure: RIGHT SHOULDER ARTHROSCOPIC SUBSCAPULARIS TENDON REPAIR;  Surgeon: Franchesca Rockwell MD;  [facility];  Service: Orthopedics    AK SURG IMPLNT [address] N/A 9/8/2016    Procedure: DORSAL COLUMN STIMULATOR PLACEMENT (IMPULSE MONITORING);   Surgeon: Tisa Galeazzi, MD;  Location[facility];  Service: Orthopedics    SACROILIAC JOINT FUSION  2015    SHOULDER SURGERY Left     2       Past Social History   Social History     Socioeconomic History  Marital status:      Spouse name: None    Number of children: None    Years of education: None    Highest education level: None   Occupational History    Occupation: Disability   Social Needs    Financial resource strain: None    Food insecurity:     Worry: None     Inability: None    Transportation needs:     Medical: None     Non-medical: None   Tobacco Use    Smoking status: Current Some Day Smoker     Packs/day: 0 50     Years: 25 00     Pack years: 12 50    Smokeless tobacco: Former User   Substance and Sexual Activity    Alcohol use: Yes     Frequency: Monthly or less     Drinks per session: 3 or 4     Comment: rarely     Drug use: Yes     Frequency: 7 0 times per week     Types: Marijuana     Comment: medical marijuana daily for chronic pain    Sexual activity: None   Lifestyle    Physical activity:     Days per week: None     Minutes per session: None    Stress: None   Relationships    Social connections:     Talks on phone: None     Gets together: None     Attends Anabaptism service: None     Active member of club or organization: None     Attends meetings of clubs or organizations: None     Relationship status: None    Intimate partner violence:     Fear of current or ex partner: None     Emotionally abused: None     Physically abused: None     Forced sexual activity: None   Other Topics Concern    None   Social History Narrative    As per Allscripts: Denied: History of Current smoker        As per allscripts: Smoking       The following portions of the patient's history were reviewed and updated as appropriate: allergies, current medications, past family history, past medical history, past social history, past surgical history and problem list     Vital Signs  Vitals:    08/27/19 0831   BP: 130/80   BP Location: Left arm   Patient Position: Sitting   Cuff Size: Standard   Pulse: 78   Resp: 18   Temp: 98 9 °F (37 2 °C)   TempSrc: Tympanic   Weight: 84 4 kg (186 lb)   Height: 6' 1" (1 854 m)       Physical Exam:  General appearance: alert, cooperative, no distress  HEENT: normocephalic, anicteric, no eye erythema or discharge, no oropharyngeal thrush  Neck: supple, trachea midline, no adenopathy  Lungs: CTA b/l, no rales, rhonchi, or wheezing, unlabored respirations  Heart: RRR, no murmur, rubs, or gallops  Abdomen: soft, non-tender, non-distended, normal bowel sounds, no masses or organomegaly  Rectal: deferred  Extremities: no cyanosis, clubbing, or edema  Musculoskeletal: normal gait  Skin: color and texture normal, no jaundice, no rashes or lesions  Psychiatric: alert and oriented, normal affect and behavior

## 2019-08-28 DIAGNOSIS — K22.4 HYPERCONTRACTILE ESOPHAGUS: Primary | ICD-10-CM

## 2019-08-28 NOTE — TELEPHONE ENCOUNTER
[facility] also does not participate with patient's insurance  Patient is aware  He will call his insurance to see who participates and advise

## 2019-08-28 NOTE — TELEPHONE ENCOUNTER
Dr Carolee Vogt office at [facility] does not participate with his insurance    Do you want me to try [facility] motility program?

## 2019-08-29 ENCOUNTER — OFFICE VISIT (OUTPATIENT)
Dept: FAMILY MEDICINE CLINIC | Facility: CLINIC | Age: 49
End: 2019-08-29

## 2019-08-29 VITALS
WEIGHT: 186.6 LBS | BODY MASS INDEX: 24.73 KG/M2 | SYSTOLIC BLOOD PRESSURE: 132 MMHG | HEART RATE: 74 BPM | RESPIRATION RATE: 16 BRPM | HEIGHT: 73 IN | DIASTOLIC BLOOD PRESSURE: 80 MMHG | TEMPERATURE: 97.7 F

## 2019-08-29 DIAGNOSIS — K59.03 THERAPEUTIC OPIOID INDUCED CONSTIPATION: ICD-10-CM

## 2019-08-29 DIAGNOSIS — F11.90 CHRONIC, CONTINUOUS USE OF OPIOIDS: ICD-10-CM

## 2019-08-29 DIAGNOSIS — K22.4 HYPERCONTRACTILE ESOPHAGUS: ICD-10-CM

## 2019-08-29 DIAGNOSIS — Z20.2 EXPOSURE TO SEXUALLY TRANSMITTED DISEASE (STD): ICD-10-CM

## 2019-08-29 DIAGNOSIS — Z79.899 CHRONIC PRESCRIPTION BENZODIAZEPINE USE: Primary | ICD-10-CM

## 2019-08-29 DIAGNOSIS — T40.2X5A THERAPEUTIC OPIOID INDUCED CONSTIPATION: ICD-10-CM

## 2019-08-29 PROCEDURE — 80307 DRUG TEST PRSMV CHEM ANLYZR: CPT | Performed by: FAMILY MEDICINE

## 2019-08-29 PROCEDURE — 80365 DRUG SCREENING OXYCODONE: CPT | Performed by: FAMILY MEDICINE

## 2019-08-29 PROCEDURE — 87491 CHLMYD TRACH DNA AMP PROBE: CPT | Performed by: FAMILY MEDICINE

## 2019-08-29 PROCEDURE — 99213 OFFICE O/P EST LOW 20 MIN: CPT | Performed by: FAMILY MEDICINE

## 2019-08-29 PROCEDURE — 87591 N.GONORRHOEAE DNA AMP PROB: CPT | Performed by: FAMILY MEDICINE

## 2019-08-29 RX ORDER — LACTULOSE 20 G/30ML
20 SOLUTION ORAL DAILY PRN
Qty: 236 ML | Refills: 1 | Status: SHIPPED | OUTPATIENT
Start: 2019-08-29 | End: 2019-10-25

## 2019-08-29 NOTE — ASSESSMENT & PLAN NOTE
-Recently discovered girlfriend of 11 months has been cheating, concerned about STI exposure  -Patient currently asymptomatic, no fevers, genital sores, or abnormal discharge  -Urine sent out today for GC/Chlamydia  -Lab slips/STI work-up (HbsAg, HCV, RPR, HIV)  -Offered referral for counseling due to recent relationship stress, but he declines at this time  -Reports good support system with his friends  -Will call patient with STI lab results

## 2019-08-29 NOTE — PROGRESS NOTES
Assessment/Plan:       Problem List Items Addressed This Visit        Digestive    Therapeutic opioid induced constipation     -Chronic, stable but uncontrolled   -Likely opioid induced 2/2 oxycodone use   -No recent changes, chronic problem for patient -Currently on Colace but constipation still present  -Patient amenable to trial of lactulose started today  -Encouraged fiber intake in diet, adequate hydration         Relevant Medications    lactulose 20 g/30 mL       Other    Chronic, continuous use of opioids     -Urine oxycodone sent out today  -Not due for Rx at this time (done 8/13/19)  -No red flags on PDMP  -Controlled substances contract UTD  -Patient appropriate for refill after 9/13/19         Relevant Orders    Oxycodone/Oxymorphone urine    Chronic prescription benzodiazepine use - Primary     -Urine valium sent out today  -Not due for Rx at this time (done 8/13/19)  -No red flags on PDMP  -Controlled substances contract UTD  -Patient appropriate for refill after 9/13/19         Relevant Orders    Benzodiazepines Clinical Screen, with Confirmation, Urine    Hypercontractile esophagus     -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 -Difficulty swallowing both solids and liquids and reports hasn't been able to keep down much food -Weight loss (197 lb 3/2019, today 186 lbs) -Followed by GI; started on 25mg Amitriptyline 8/28/19 after unable to tolerate diltiazem per records  -Patient trying to schedule appt at [facility] for further evaluation per GI  -Follow-up in 3 months to discuss care plan after seeing specialists  -Encouraged intake of soups, soft foods, gatorade as tolerated to prevent further weight loss         Exposure to sexually transmitted disease (STD)     -Recently discovered girlfriend of 11 months has been cheating, concerned about STI exposure  -Patient currently asymptomatic, no fevers, genital sores, or abnormal discharge  -Urine sent out today for GC/Chlamydia  -Lab slips/STI work-up (HbsAg, HCV, RPR, HIV)  -Offered referral for counseling due to recent relationship stress, but he declines at this time  -Reports good support system with his friends  -Will call patient with STI lab results         Relevant Orders    Chlamydia/GC amplified DNA by PCR (Completed)    Hepatitis B surface antigen    Hepatitis C antibody    RPR    HIV 1/2 AG-AB combo            Subjective:      Patient ID: Joel Benson is a 44 y o  male  43 yo M presents for follow-up  Patient would like a full work-up for STIs today, has been asymptomatic, but recently found out that his girlfriend of 11 months has been cheating  Patient has been choking since April when he had his surgery, went to [facility] 2 days ago  States that symptoms have been continuing to worsen and he has difficulty swallowing both solids and liquids  Was recommended to go to a specialist at [facility] the [facility] but they do not take insurance, so patient states that he is going to call around to other hospitals  Gastroenterologist started patient on trial of amitryptiline 25 mg at bedtime to try to help sx  Patient took first dose last night and denies any side effects from the medication  Patient still experiencing chronic constipation and not well controlled with the colace  He states that it doesn't bother him often but would be willing to try another medication to manage it today  The following portions of the patient's history were reviewed and updated as appropriate: allergies, current medications, past family history, past medical history, past social history, past surgical history and problem list     Review of Systems   Constitutional: Negative for activity change, appetite change and fever  HENT: Positive for trouble swallowing  Negative for sore throat  Respiratory: Positive for choking  Negative for cough, chest tightness, shortness of breath, wheezing and stridor      Cardiovascular: Negative for chest pain    Gastrointestinal: Negative for abdominal pain, anal bleeding, blood in stool, diarrhea, nausea and vomiting  Genitourinary: Negative for difficulty urinating, discharge, dysuria, frequency, genital sores, hematuria, penile pain, penile swelling and scrotal swelling  Neurological: Negative for dizziness and headaches  Objective:  /80 (BP Location: Left arm, Patient Position: Sitting, Cuff Size: Large)   Pulse 74   Temp 97 7 °F (36 5 °C) (Tympanic)   Resp 16   Ht 6' 1" (1 854 m)   Wt 84 6 kg (186 lb 9 6 oz)   BMI 24 62 kg/m²      Physical Exam   Constitutional: He is oriented to person, place, and time  He appears well-developed  HENT:   Head: Normocephalic and atraumatic  Eyes: Conjunctivae are normal    Neck: Normal range of motion  Cardiovascular: Normal rate, regular rhythm and normal heart sounds  Pulmonary/Chest: Effort normal and breath sounds normal  No stridor  No respiratory distress  He has no wheezes  He has no rales  Musculoskeletal:   Significant tenderness to palpation over entire thoracic and lumbar spine with hypertonicity of B/L paravertebral musculature at those levels   Neurological: He is alert and oriented to person, place, and time  Skin: Skin is warm  Capillary refill takes less than 2 seconds  Psychiatric: He has a normal mood and affect  His behavior is normal  Thought content normal    Vitals reviewed        KHALIDA Delacruz  PGY-3  [place] [id number]

## 2019-08-29 NOTE — PATIENT INSTRUCTIONS
Yefri, I will call you when I have your test results, and you will be able to  the urine results for your opiates and valium  Call me if you need anything!

## 2019-08-29 NOTE — ASSESSMENT & PLAN NOTE
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

## 2019-08-29 NOTE — ASSESSMENT & PLAN NOTE
-Chronic, stable but uncontrolled   -Likely opioid induced 2/2 oxycodone use   -No recent changes, chronic problem for patient -Currently on Colace but constipation still present  -Patient amenable to trial of lactulose started today  -Encouraged fiber intake in diet, adequate hydration

## 2019-08-29 NOTE — ASSESSMENT & PLAN NOTE
Wt Readings from Last 3 Encounters:   08/29/19 84 6 kg (186 lb 9 6 oz)   08/27/19 84 4 kg (186 lb)   08/21/19 83 kg (183 lb)

## 2019-08-30 LAB
C TRACH DNA SPEC QL NAA+PROBE: NEGATIVE
N GONORRHOEA DNA SPEC QL NAA+PROBE: NEGATIVE

## 2019-08-30 NOTE — ASSESSMENT & PLAN NOTE
-Urine oxycodone sent out today  -Not due for Rx at this time (done 8/13/19)  -No red flags on PDMP  -Controlled substances contract UTD  -Patient appropriate for refill after 9/13/19

## 2019-08-30 NOTE — ASSESSMENT & PLAN NOTE
-Urine valium sent out today  -Not due for Rx at this time (done 8/13/19)  -No red flags on PDMP  -Controlled substances contract UTD  -Patient appropriate for refill after 9/13/19

## 2019-09-03 NOTE — TELEPHONE ENCOUNTER
[address]  participates with [place]  Appointment scheduled with Dr Gloria Jiménez, Motility specialist for Friday, 12/20 at 2:40  [facility] [place], [address], [address]  Phone # is [phone], fax is [phone]  I called patient to advise, reached vm, left message to call back to advise

## 2019-09-05 LAB
MISCELLANEOUS LAB TEST RESULT: NORMAL
OXYCODONE UR QL CFM: 3883 NG/ML
OXYCODONE+OXYMORPHONE SERPLBLD QL SCN: POSITIVE
OXYCODONE+OXYMORPHONE UR QL SCN: NORMAL NG/ML
OXYCODONE+OXYMORPHONE UR QL SCN: POSITIVE
OXYMORPHONE UR CFM-MCNC: 2367 NG/ML
OXYMORPHONE UR QL CFM: POSITIVE

## 2019-09-05 NOTE — TELEPHONE ENCOUNTER
I called patient to advise appointment scheduled at [facility]  Reached vm, left message to call back

## 2019-09-10 ENCOUNTER — TELEPHONE (OUTPATIENT)
Dept: FAMILY MEDICINE CLINIC | Facility: CLINIC | Age: 49
End: 2019-09-10

## 2019-09-13 ENCOUNTER — TELEPHONE (OUTPATIENT)
Dept: OTHER | Facility: OTHER | Age: 49
End: 2019-09-13

## 2019-09-13 DIAGNOSIS — F41.8 DEPRESSION WITH ANXIETY: ICD-10-CM

## 2019-09-13 DIAGNOSIS — G89.4 CHRONIC PAIN SYNDROME: ICD-10-CM

## 2019-09-13 DIAGNOSIS — M96.1 POST LAMINECTOMY SYNDROME: ICD-10-CM

## 2019-09-13 DIAGNOSIS — G89.29 CHRONIC RIGHT SI JOINT PAIN: ICD-10-CM

## 2019-09-13 DIAGNOSIS — M53.3 CHRONIC RIGHT SI JOINT PAIN: ICD-10-CM

## 2019-09-13 RX ORDER — OXYCODONE HYDROCHLORIDE 10 MG/1
10 TABLET ORAL EVERY 4 HOURS PRN
Qty: 120 TABLET | Refills: 0 | Status: SHIPPED | OUTPATIENT
Start: 2019-09-13 | End: 2019-10-10 | Stop reason: SDUPTHER

## 2019-09-13 RX ORDER — DIAZEPAM 5 MG/1
5 TABLET ORAL EVERY 12 HOURS PRN
Qty: 60 TABLET | Refills: 0 | Status: SHIPPED | OUTPATIENT
Start: 2019-09-13 | End: 2019-10-10 | Stop reason: SDUPTHER

## 2019-09-13 NOTE — TELEPHONE ENCOUNTER
Health Call  Standard Call Report  Health Call  Patient Name: Carly Stafford  Gender: Male  : 1970  Age: 44 Y 25 D  Return Phone  Number: [phone] (Current)  Address: [address] [place] [address]/[facility]/Zip: [address] [place] [id number]  Practice Name: [address] [facility] [address]  Practice Charged:  Physician:  [facility] Name:  Relationship To  Patient:  Return Phone Number: [phone] (Current)  Presenting Problem: "My doctor's office did not call prior  authorization with my medication  refill  I can't get my meds "  Service Type: Triage  Charged Service 1: N/A  Pharmacy Name and  Number:  Nurse Assessment  Protocols  Protocol Title Nurse Date/Time  Disp  Time Disposition Final User  2019 6:02:23 PM RN Triaged Yes Timer, RN, Wilmer Love  Comments  User: Ambreen Moncada RN Date/Time: 2019 6:02:15 PM  Pt calling very frustrated, requested refills of medication on Tuesday of this week  He spoke with Bertha,office staff today (Friday) at approx 1630, who informed him refill for oxycodone and diazepam approved  Pt requested that prior authorization be  submitted  Office is now closed, unable to reach staff on back line  Spoke with resident on call, brianna Espinal to call in  prior authorization  Prior authorization phone number [phone]     RN unable to complete prior authorization via phone  Call to patient to inform him of above information  Pt very thankful that attempt was made  He will be seen in ED for pain control, as  he fell two days ago  Declines triage services

## 2019-09-13 NOTE — TELEPHONE ENCOUNTER
Patient's provider has not responded, patient calling again  Checked PDMP, last refill Oxycodone 8/13/19 #120 and Diazepam 5mg #60  Are you able to review and refill?

## 2019-09-14 NOTE — TELEPHONE ENCOUNTER
Patient appropriate for refills on:     · Valium 5 mg Q12H PRN (Disp #60)   PDMP showed last refill written & filled 8/13/19    · Oxycodone 10 mg Q4H PRN (Disp #120)   PDMP showed last refill written & filled 8/13/19       Patient is adherent to follow-ups chronically with me, PDMP w/o red flags, contract signed 10/18/18    Urine oxycodone and valium obtained 8/29/19, positive for both

## 2019-09-18 ENCOUNTER — TELEPHONE (OUTPATIENT)
Dept: FAMILY MEDICINE CLINIC | Facility: CLINIC | Age: 49
End: 2019-09-18

## 2019-09-30 ENCOUNTER — TELEPHONE (OUTPATIENT)
Dept: FAMILY MEDICINE CLINIC | Facility: CLINIC | Age: 49
End: 2019-09-30

## 2019-09-30 NOTE — TELEPHONE ENCOUNTER
Received call from Tyesha with [facility] provider rep  She received a call from patient that he has been unable to receive his oxycodone medication for the last 14 months  She said he states he always needs authorization  I pulled up PDMP and review with Tyesha that he has received his oxycodone on a monthly basis  I explained I believe he probably does not like to have to request the medication on a monthly basis, but his is our protocol  Patient has a signed pain contract  I also reviewed refill notice time policy  She was grateful I was able to provide this information and will follow up with patient  JEZ

## 2019-10-08 DIAGNOSIS — M96.1 POST LAMINECTOMY SYNDROME: ICD-10-CM

## 2019-10-08 DIAGNOSIS — G89.29 CHRONIC RIGHT SI JOINT PAIN: ICD-10-CM

## 2019-10-08 DIAGNOSIS — G89.4 CHRONIC PAIN SYNDROME: ICD-10-CM

## 2019-10-08 DIAGNOSIS — F41.8 DEPRESSION WITH ANXIETY: ICD-10-CM

## 2019-10-08 DIAGNOSIS — M53.3 CHRONIC RIGHT SI JOINT PAIN: ICD-10-CM

## 2019-10-08 NOTE — TELEPHONE ENCOUNTER
Patient left message on requesting refill on Oxycodone 5mg tabs  Last refilled: 09-  Last seen: 08-  Upcoming Appt: None    Routing message to Clinical Staff to assist with checking PDMP  Thank you

## 2019-10-10 NOTE — TELEPHONE ENCOUNTER
Patient called for refills  He has a hx of chronic pain syndrome, post laminectomy syndrome, chronic SI joint pain  He is appropriate for refills on:     · Valium 5 mg Q12H PRN for anxiety or muscle spasms (Disp #60)   PDMP showed last refill written & filled 9/13/19    · Oxycodone 10 mg Q4H PRN for moderate-severe pain (Disp #120)   PDMP showed last refill written & filled 9/16/19       Patient is adherent to follow-ups chronically with me, PDMP w/o red flags  Will sign new contract at last visit (previous signed 10/28/18)  Urine oxycodone 8/29/19 (+) and urine valium 8/29/19 (+ metabolite)      Orders pended with start date of valium 10/12/19 and start date of oxycodone 10/15/19  I feel this patient is suitable for refills to be placed on diazepam, if possible, so I placed 2 refills on the valium if attending is in agreement  Thanks!

## 2019-10-11 RX ORDER — DIAZEPAM 5 MG/1
5 TABLET ORAL EVERY 12 HOURS PRN
Qty: 60 TABLET | Refills: 2 | Status: SHIPPED | OUTPATIENT
Start: 2019-10-12 | End: 2020-01-17

## 2019-10-11 RX ORDER — OXYCODONE HYDROCHLORIDE 10 MG/1
10 TABLET ORAL EVERY 4 HOURS PRN
Qty: 120 TABLET | Refills: 0 | Status: SHIPPED | OUTPATIENT
Start: 2019-10-15 | End: 2019-11-14 | Stop reason: SDUPTHER

## 2019-10-25 ENCOUNTER — OFFICE VISIT (OUTPATIENT)
Dept: FAMILY MEDICINE CLINIC | Facility: CLINIC | Age: 49
End: 2019-10-25

## 2019-10-25 VITALS
RESPIRATION RATE: 16 BRPM | SYSTOLIC BLOOD PRESSURE: 140 MMHG | TEMPERATURE: 95.7 F | DIASTOLIC BLOOD PRESSURE: 80 MMHG | HEART RATE: 78 BPM | HEIGHT: 73 IN | BODY MASS INDEX: 24.92 KG/M2 | WEIGHT: 188 LBS

## 2019-10-25 DIAGNOSIS — R05.9 COUGH: Primary | ICD-10-CM

## 2019-10-25 PROCEDURE — 99213 OFFICE O/P EST LOW 20 MIN: CPT | Performed by: FAMILY MEDICINE

## 2019-10-25 PROCEDURE — 3008F BODY MASS INDEX DOCD: CPT | Performed by: FAMILY MEDICINE

## 2019-10-25 RX ORDER — BENZONATATE 100 MG/1
100 CAPSULE ORAL 3 TIMES DAILY PRN
Qty: 60 CAPSULE | Refills: 0 | Status: SHIPPED | OUTPATIENT
Start: 2019-10-25 | End: 2020-05-14 | Stop reason: ALTCHOICE

## 2019-10-25 RX ORDER — AZITHROMYCIN 250 MG/1
TABLET, FILM COATED ORAL
Qty: 6 TABLET | Refills: 0 | Status: SHIPPED | OUTPATIENT
Start: 2019-10-25 | End: 2019-10-30

## 2019-10-25 NOTE — PATIENT INSTRUCTIONS
Acute Bronchitis   WHAT YOU NEED TO KNOW:   Acute bronchitis is swelling and irritation in the air passages of your lungs  This irritation may cause you to cough or have other breathing problems  Acute bronchitis often starts because of another illness, such as a cold or the flu  The illness spreads from your nose and throat to your windpipe and airways  Bronchitis is often called a chest cold  Acute bronchitis lasts about 3 to 6 weeks and is usually not a serious illness  Your cough can last for several weeks  DISCHARGE INSTRUCTIONS:   Return to the emergency department if:   · You cough up blood  · Your lips or fingernails turn blue  · You feel like you are not getting enough air when you breathe  Contact your healthcare provider if:   · You have a fever  · Your breathing problems do not go away or get worse  · Your cough does not get better within 4 weeks  · You have questions or concerns about your condition or care  Self-care:   · Get more rest   Rest helps your body to heal  Slowly start to do more each day  Rest when you feel it is needed  · Avoid irritants in the air  Avoid chemicals, fumes, and dust  Wear a face mask if you must work around dust or fumes  Stay inside on days when air pollution levels are high  If you have allergies, stay inside when pollen counts are high  Do not use aerosol products, such as spray-on deodorant, bug spray, and hair spray  · Do not smoke or be around others who smoke  Nicotine and other chemicals in cigarettes and cigars damages the cilia that move mucus out of your lungs  Ask your healthcare provider for information if you currently smoke and need help to quit  E-cigarettes or smokeless tobacco still contain nicotine  Talk to your healthcare provider before you use these products  · Drink liquids as directed  Liquids help keep your air passages moist and help you cough up mucus   You may need to drink more liquids when you have acute bronchitis  Ask how much liquid to drink each day and which liquids are best for you  · Use a humidifier or vaporizer  Use a cool mist humidifier or a vaporizer to increase air moisture in your home  This may make it easier for you to breathe and help decrease your cough  Decrease risk for acute bronchitis:   · Get the vaccinations you need  Ask your healthcare provider if you should get vaccinated against the flu or pneumonia  · Prevent the spread of germs  You can decrease your risk of acute bronchitis and other illnesses by doing the following:     [facility] [place] [facility] your hands often with soap and water  Carry germ-killing hand lotion or gel with you  You can use the lotion or gel to clean your hands when soap and water are not available  ¨ Do not touch your eyes, nose, or mouth unless you have washed your hands first     ¨ Always cover your mouth when you cough to prevent the spread of germs  It is best to cough into a tissue or your shirt sleeve instead of into your hand  Ask those around you cover their mouths when they cough  ¨ Try to avoid people who have a cold or the flu  If you are sick, stay away from others as much as possible  Medicines: Your healthcare provider may  give you any of the following:  · Ibuprofen or acetaminophen  are medicines that help lower your fever  They are available without a doctor's order  Ask your healthcare provider which medicine is right for you  Ask how much to take and how often to take it  Follow directions  These medicines can cause stomach bleeding if not taken correctly  Ibuprofen can cause kidney damage  Do not take ibuprofen if you have kidney disease, an ulcer, or allergies to aspirin  Acetaminophen can cause liver damage  Do not take more than 4,000 milligrams in 24 hours  · Decongestants  help loosen mucus in your lungs and make it easier to cough up  This can help you breathe easier  · Cough suppressants  decrease your urge to cough   If your cough produces mucus, do not take a cough suppressant unless your healthcare provider tells you to  Your healthcare provider may suggest that you take a cough suppressant at night so you can rest     · Inhalers  may be given  Your healthcare provider may give you one or more inhalers to help you breathe easier and cough less  An inhaler gives your medicine to open your airways  Ask your healthcare provider to show you how to use your inhaler correctly  · Take your medicine as directed  Contact your healthcare provider if you think your medicine is not helping or if you have side effects  Tell him of her if you are allergic to any medicine  Keep a list of the medicines, vitamins, and herbs you take  Include the amounts, and when and why you take them  Bring the list or the pill bottles to follow-up visits  Carry your medicine list with you in case of an emergency  Follow up with your healthcare provider as directed:  Write down questions you have so you will remember to ask them during your follow-up visits  © 2017 [employer] Information is for End User's use only and may not be sold, redistributed or otherwise used for commercial purposes  All illustrations and images included in CareNotes® are the copyrighted property of [employer]  or [facility]  The above information is an  only  It is not intended as medical advice for individual conditions or treatments  Talk to your doctor, nurse or pharmacist before following any medical regimen to see if it is safe and effective for you

## 2019-10-25 NOTE — PROGRESS NOTES
Assessment/Plan:    Cough  Acute  · Acute bronchitis vs URI vs CAP  · Several new episodes of hemoptysis  · Chronic smoker  · Follow up CXR  · Azithromycin given and told to hold off until CXR results  · Counseled patient on fluids, rest, tylenol for fevers/myalgias  · Return precautions given  · Follow up PRN         Problem List Items Addressed This Visit        Other    Cough - Primary     Acute  · Acute bronchitis vs URI vs CAP  · Several new episodes of hemoptysis  · Chronic smoker  · Follow up CXR  · Azithromycin given and told to hold off until CXR results  · Counseled patient on fluids, rest, tylenol for fevers/myalgias  · Return precautions given  · Follow up PRN         Relevant Medications    benzonatate (TESSALON PERLES) 100 mg capsule    azithromycin (ZITHROMAX) 250 mg tablet    Other Relevant Orders    XR chest pa & lateral            Subjective:      Patient ID: Bevelyn Siemens is a 44 y o  male  HPI  44year old male presents for acute URI with cough for the past 5 days with fevers (max of 102F)  Initially presented with a dry cough and progressed to post nasal drip, sinus pressure, headache, and muffled hearing  Has not had a flu shot this year  Multiple people around him are sick  He has tried robatussin, mucinex, tylenol migraine  He feels like its getting worse  Smokes a 1/2 ppd for the past 30 years  Had low dose CT which was normal     The following portions of the patient's history were reviewed and updated as appropriate: allergies, current medications, past family history, past medical history, past social history, past surgical history and problem list     Review of Systems   Constitutional: Positive for chills, diaphoresis, fatigue and fever  Negative for appetite change  HENT: Positive for postnasal drip, rhinorrhea, sinus pressure, sinus pain, sore throat, tinnitus and trouble swallowing (chonic)  Negative for ear pain  Respiratory: Positive for cough and shortness of breath  Negative for chest tightness and wheezing  Cardiovascular: Negative for chest pain, palpitations and leg swelling  Gastrointestinal: Negative for abdominal pain, blood in stool, constipation, diarrhea, nausea and vomiting  Musculoskeletal: Positive for myalgias  Neurological: Positive for headaches  Negative for dizziness, seizures, syncope, light-headedness and numbness  Objective:      /80   Pulse 78   Temp (!) 95 7 °F (35 4 °C)   Resp 16   Ht 6' 1" (1 854 m)   Wt 85 3 kg (188 lb)   BMI 24 80 kg/m²          Physical Exam   Constitutional: He is oriented to person, place, and time  He appears well-developed and well-nourished  No distress  HENT:   Head: Normocephalic and atraumatic  Right Ear: External ear normal    Left Ear: External ear normal    Nose: Mucosal edema and rhinorrhea present  Right sinus exhibits no maxillary sinus tenderness and no frontal sinus tenderness  Left sinus exhibits no maxillary sinus tenderness and no frontal sinus tenderness  Mouth/Throat: Oropharyngeal exudate present  No posterior oropharyngeal edema or posterior oropharyngeal erythema  Eyes: Pupils are equal, round, and reactive to light  EOM are normal  No scleral icterus  Neck: Normal range of motion  Neck supple  No JVD present  No tracheal deviation present  No thyromegaly present  Cardiovascular: Normal rate, regular rhythm, normal heart sounds and intact distal pulses  Exam reveals no friction rub  No murmur heard  Pulmonary/Chest: Effort normal and breath sounds normal  No respiratory distress  He has no wheezes  He has no rales  He exhibits no tenderness  Abdominal: Soft  Bowel sounds are normal  He exhibits no distension  There is no tenderness  There is no rebound and no guarding  Musculoskeletal: Normal range of motion  He exhibits no edema or tenderness  Lymphadenopathy:     He has cervical adenopathy  Neurological: He is alert and oriented to person, place, and time  He has normal reflexes  No cranial nerve deficit  Coordination normal    Skin: Skin is warm and dry  Capillary refill takes less than 2 seconds  No rash noted  He is not diaphoretic  No erythema  No pallor  Psychiatric: He has a normal mood and affect  Vitals reviewed

## 2019-10-25 NOTE — ASSESSMENT & PLAN NOTE
Acute  · Acute bronchitis vs URI vs CAP  · Several new episodes of hemoptysis  · Vitals stable at appointment  · Chronic smoker  · Follow up CXR  · Azithromycin given and told to hold off until CXR results  · Counseled patient on fluids, rest, tylenol for fevers/myalgias  · Return precautions given  · Follow up PRN

## 2019-11-11 DIAGNOSIS — G89.29 CHRONIC RIGHT SI JOINT PAIN: ICD-10-CM

## 2019-11-11 DIAGNOSIS — M53.3 CHRONIC RIGHT SI JOINT PAIN: ICD-10-CM

## 2019-11-11 DIAGNOSIS — G89.4 CHRONIC PAIN SYNDROME: ICD-10-CM

## 2019-11-11 DIAGNOSIS — M96.1 POST LAMINECTOMY SYNDROME: ICD-10-CM

## 2019-11-14 RX ORDER — OXYCODONE HYDROCHLORIDE 10 MG/1
10 TABLET ORAL EVERY 4 HOURS PRN
Qty: 120 TABLET | Refills: 0 | Status: SHIPPED | OUTPATIENT
Start: 2019-11-14 | End: 2019-12-11 | Stop reason: SDUPTHER

## 2019-11-14 NOTE — TELEPHONE ENCOUNTER
Patient called for refill  He has a hx of chronic pain syndrome, post laminectomy syndrome, chronic SI joint pain  He is appropriate for refill on:     · Oxycodone 10 mg Q4H PRN for moderate-severe pain (Disp #120)   PDMP showed last refill written & filled 10/11/19       Patient is adherent to follow-ups chronically with me, PDMP w/o red flags  Will sign new contract at next visit (previous signed 10/28/18)  Urine oxycodone 8/29/19 (+) and urine valium 8/29/19 (+ metabolite)  Refill not needed on Valium until December, as his Rx written on 10/11/19 had 2 refills put on it      Order pended for oxycodone  Thanks!

## 2019-11-16 ENCOUNTER — TELEPHONE (OUTPATIENT)
Dept: OTHER | Facility: OTHER | Age: 49
End: 2019-11-16

## 2019-11-17 NOTE — TELEPHONE ENCOUNTER
Juan Francisco Bennett 1970  CONFIDENTIALTY NOTICE: This fax transmission is intended only for the addressee  It contains information that is legally privileged,  confidential or otherwise protected from use or disclosure  If you are not the intended recipient, you are strictly prohibited from reviewing,  disclosing, copying using or disseminating any of this information or taking any action in reliance on or regarding this information  If you have  received this fax in error, please notify us immediately by telephone so that we can arrange for its return to us  Page: 1  2  Call Id: [id number]  Health Call  Standard Call Report  Health Call  Patient Name: Mesfin Patiño  Gender: Male  : 1970  Age: 44 Y 2 M 32 D  Return Phone  Number: [phone] (Current)  Address: [address] [place] [address]/[facility]/Zip: [facility] [address]  Practice Name: [facility]  Practice Charged: [address]  [facility]  Merry Camacho  Physician:  [address] [place] [address] Name:  Relationship To  Patient: Self  Return Phone Number: [phone] (Current)  Presenting Problem: " I have a bad cough and cold "  Service Type: Triage  Charged Service 1: Anna Cabrera U  38  Name and  Number:  Nurse Assessment  Nurse: Charisse Decker RN Date/Time: 2019 9:17:30 PM  Type of assessment required:  ---General (Adult or Child)  Duration of Current S/S  ---For over 2 weeks  Location/Radiation  ---Respiratory  Temperature (F) and route:  No thermometer / feels very warm  ---Unkown  Symptom Specific Meds (Dose/Time):  ---None  Other S/S  ---Has thick brown mucus  The cough sounds "gurgily"  "My chest feels very tight "  Pain Scale on scale of 1-10, 10 being the worst:  ---7 / 10  Symptom progression:  ---worse  Intake and Output  ---Has fluids increased / Voiding with out problems  Mesfin Patiño 1970  CONFIDENTIALTY NOTICE: This fax transmission is intended only for the addressee   It contains information that is legally privileged,  confidential or otherwise protected from use or disclosure  If you are not the intended recipient, you are strictly prohibited from reviewing,  disclosing, copying using or disseminating any of this information or taking any action in reliance on or regarding this information  If you have  received this fax in error, please notify us immediately by telephone so that we can arrange for its return to us  Page: 2 of 2  Call Id: [id number]  Nurse Assessment  Last Exam/Treatment:  ---About 2-3 weeks ago / Antibiotics  Protocols  Protocol Title Nurse Date/Time  Cough - Acute Zack Pérez, KRYSTAL, [facility] Mon 11/16/2019 9:21:33 PM  Question Caller Affirmed  Disp  Time Disposition Final User  11/16/2019 9:25:06 PM See Physician within 24 Hours Yes Jhoana Venessa  11/16/2019 9:25:14 PM RN Triaged Danae Zuleta, [place], [place] [phone] Advice Given Per Protocol  SEE PHYSICIAN WITHIN 24 HOURS: * IF OFFICE WILL BE OPEN: You need to be seen within the next 24 hours  Call your doctor  when the office opens, and make an appointment  COUGH DROPS FOR COUGH: * Cough drops can help a lot, especially for mild  coughs  They reduce coughing by soothing your irritated throat and removing that tickle sensation in the back of the throat  * Cough  drops also have the advantage of portability - you can carry them with you  * Cough drops are available over-the-counter (OTC)  HOME  REMEDY - HARD CANDY: Hard candy works just as well as a medicine-flavored OTC cough drops  COUGHING SPELLS: * Drink  warm fluids  Inhale warm mist  (Reason: both relax the airway and loosen up the phlegm) * Suck on cough drops or hard candy to coat  the irritated throat  HUMIDIFIER: If the air is dry, use a humidifier in the bedroom  (Reason: dry air makes coughs worse) AVOID  TOBACCO SMOKE: Smoking or being exposed to smoke makes coughs much worse  CALL BACK IF: * Difficulty breathing occurs *  You become worse  CARE ADVICE given per Cough - Acute Productive (Adult) guideline    Caller Understands: Yes  Caller Disagree/Comply: Comply  PreDisposition: Unsure

## 2019-11-28 DIAGNOSIS — K22.4 HYPERCONTRACTILE ESOPHAGUS: ICD-10-CM

## 2019-11-28 DIAGNOSIS — J45.40 MODERATE PERSISTENT ASTHMA WITHOUT COMPLICATION: ICD-10-CM

## 2019-11-28 DIAGNOSIS — R13.19 ESOPHAGEAL DYSPHAGIA: ICD-10-CM

## 2019-11-28 RX ORDER — FLUTICASONE PROPIONATE 50 MCG
SPRAY, SUSPENSION (ML) NASAL
Qty: 16 ML | Refills: 0 | Status: SHIPPED | OUTPATIENT
Start: 2019-11-28 | End: 2019-12-27 | Stop reason: SDUPTHER

## 2019-11-29 RX ORDER — AMITRIPTYLINE HYDROCHLORIDE 25 MG/1
TABLET, FILM COATED ORAL
Qty: 30 TABLET | Refills: 5 | Status: SHIPPED | OUTPATIENT
Start: 2019-11-29 | End: 2021-11-09 | Stop reason: ALTCHOICE

## 2019-12-10 ENCOUNTER — TELEPHONE (OUTPATIENT)
Dept: GASTROENTEROLOGY | Facility: CLINIC | Age: 49
End: 2019-12-10

## 2019-12-10 DIAGNOSIS — M53.3 CHRONIC RIGHT SI JOINT PAIN: ICD-10-CM

## 2019-12-10 DIAGNOSIS — G89.29 CHRONIC RIGHT SI JOINT PAIN: ICD-10-CM

## 2019-12-10 DIAGNOSIS — G89.4 CHRONIC PAIN SYNDROME: ICD-10-CM

## 2019-12-10 DIAGNOSIS — M96.1 POST LAMINECTOMY SYNDROME: ICD-10-CM

## 2019-12-10 NOTE — TELEPHONE ENCOUNTER
Patients GI provider:  Dr Simon Curiel     Number to return call: (n/a    Reason for call: Please fax the pts manometry exam from 2018 to [facility] at [phone]    Scheduled procedure/appointment date if applicable: Apt/procedure

## 2019-12-10 NOTE — TELEPHONE ENCOUNTER
Voice message requesting refill of Oxycodone 10mg  Checked PDMP, last refill 11/14/19 #120  Please review

## 2019-12-11 NOTE — TELEPHONE ENCOUNTER
Patient called for refill  He has a hx of chronic pain syndrome, post laminectomy syndrome, chronic SI joint pain    He is appropriate for refill on:     · Oxycodone 10 mg Q4H PRN for moderate-severe pain (Disp #120)   PDMP showed last refill written & filled 11/14/19       Patient is adherent to follow-ups chronically with me, PDMP w/o red flags  Will sign new contract at next visit  Urine oxycodone 8/29/19 (+)  Order pended for oxycodone

## 2019-12-12 RX ORDER — OXYCODONE HYDROCHLORIDE 10 MG/1
10 TABLET ORAL EVERY 4 HOURS PRN
Qty: 120 TABLET | Refills: 0 | Status: SHIPPED | OUTPATIENT
Start: 2019-12-13 | End: 2020-01-13 | Stop reason: SDUPTHER

## 2019-12-27 DIAGNOSIS — J45.40 MODERATE PERSISTENT ASTHMA WITHOUT COMPLICATION: ICD-10-CM

## 2019-12-29 RX ORDER — FLUTICASONE PROPIONATE 50 MCG
SPRAY, SUSPENSION (ML) NASAL
Qty: 16 G | Refills: 0 | Status: SHIPPED | OUTPATIENT
Start: 2019-12-29 | End: 2020-02-04

## 2020-01-09 ENCOUNTER — TELEPHONE (OUTPATIENT)
Dept: FAMILY MEDICINE CLINIC | Facility: CLINIC | Age: 50
End: 2020-01-09

## 2020-01-09 DIAGNOSIS — G89.29 CHRONIC RIGHT SI JOINT PAIN: ICD-10-CM

## 2020-01-09 DIAGNOSIS — M96.1 POST LAMINECTOMY SYNDROME: ICD-10-CM

## 2020-01-09 DIAGNOSIS — M53.3 CHRONIC RIGHT SI JOINT PAIN: ICD-10-CM

## 2020-01-09 DIAGNOSIS — G89.4 CHRONIC PAIN SYNDROME: ICD-10-CM

## 2020-01-13 RX ORDER — OXYCODONE HYDROCHLORIDE 10 MG/1
10 TABLET ORAL EVERY 4 HOURS PRN
Qty: 120 TABLET | Refills: 0 | Status: SHIPPED | OUTPATIENT
Start: 2020-01-13 | End: 2020-02-13 | Stop reason: SDUPTHER

## 2020-01-14 ENCOUNTER — TELEPHONE (OUTPATIENT)
Dept: FAMILY MEDICINE CLINIC | Facility: CLINIC | Age: 50
End: 2020-01-14

## 2020-01-14 NOTE — TELEPHONE ENCOUNTER
Called pharmacy, they did receive rx but ins requiring prior auth  Urgent auth submitted to [facility], will f/u with insurance in a few hours

## 2020-01-14 NOTE — TELEPHONE ENCOUNTER
Patient called for update on his refill Oxycodone 10 mg  Patient indicates he did not get refill and  that pharmacy needs to be called by doctors office to get refill  Patient can be reached at [phone]

## 2020-01-14 NOTE — TELEPHONE ENCOUNTER
Patient calling again about medication refill  He stated, "its been 2 hours since the earlier call and would like medication taken care of after waiting since last week"

## 2020-01-15 ENCOUNTER — TELEPHONE (OUTPATIENT)
Dept: FAMILY MEDICINE CLINIC | Facility: CLINIC | Age: 50
End: 2020-01-15

## 2020-01-15 NOTE — TELEPHONE ENCOUNTER
Ins has approved #120 per month, there is no interruption of amount that has been previously dispensed  Patient aware

## 2020-01-15 NOTE — TELEPHONE ENCOUNTER
Patient is calling again demanding that someone calls the insurance to get prior authorization asap  He stated,  "I have not been able to eat for 2 days and will not wait until 6 pm for someone to tell me again that the medication has not yet been approved, I called last week long enough for this to be taken care of  This happens every month and I'm tired of it"

## 2020-01-15 NOTE — TELEPHONE ENCOUNTER
Ins has approved #120 per month thru 4/15/20  Patient contacted, explained to him that there is a process with insurance, they required additional information  Also advised him he will need to schedule f/u and have repeat drug urine completed  He understood

## 2020-01-17 DIAGNOSIS — F41.8 DEPRESSION WITH ANXIETY: ICD-10-CM

## 2020-01-17 DIAGNOSIS — F11.90 CHRONIC, CONTINUOUS USE OF OPIOIDS: Primary | ICD-10-CM

## 2020-01-17 RX ORDER — NALOXONE HYDROCHLORIDE 4 MG/.1ML
SPRAY NASAL
Qty: 1 EACH | Refills: 0 | Status: SHIPPED | OUTPATIENT
Start: 2020-01-17 | End: 2021-03-09 | Stop reason: SDUPTHER

## 2020-01-17 RX ORDER — DIAZEPAM 5 MG/1
TABLET ORAL
Qty: 60 TABLET | Refills: 0 | Status: SHIPPED | OUTPATIENT
Start: 2020-01-17 | End: 2020-03-26

## 2020-01-17 NOTE — TELEPHONE ENCOUNTER
Valium Rx refilled  Added Narcan as well given his long term use of both oxycodone and valium  Needs appointment with PCP for review of his chronic controlled substances  Please schedule

## 2020-01-23 DIAGNOSIS — K22.4 HYPERCONTRACTILE ESOPHAGUS: ICD-10-CM

## 2020-01-23 DIAGNOSIS — R13.19 ESOPHAGEAL DYSPHAGIA: ICD-10-CM

## 2020-01-23 DIAGNOSIS — R07.89 OTHER CHEST PAIN: ICD-10-CM

## 2020-01-23 RX ORDER — DILTIAZEM HYDROCHLORIDE 60 MG/1
TABLET, FILM COATED ORAL
Qty: 60 TABLET | Refills: 2 | Status: SHIPPED | OUTPATIENT
Start: 2020-01-23 | End: 2020-03-24 | Stop reason: SDUPTHER

## 2020-01-31 DIAGNOSIS — J45.40 MODERATE PERSISTENT ASTHMA WITHOUT COMPLICATION: ICD-10-CM

## 2020-02-04 RX ORDER — FLUTICASONE PROPIONATE 50 MCG
SPRAY, SUSPENSION (ML) NASAL
Qty: 16 G | Refills: 0 | Status: SHIPPED | OUTPATIENT
Start: 2020-02-04 | End: 2020-06-23 | Stop reason: ALTCHOICE

## 2020-02-10 ENCOUNTER — TELEPHONE (OUTPATIENT)
Dept: FAMILY MEDICINE CLINIC | Facility: CLINIC | Age: 50
End: 2020-02-10

## 2020-02-13 DIAGNOSIS — M96.1 POST LAMINECTOMY SYNDROME: ICD-10-CM

## 2020-02-13 DIAGNOSIS — M53.3 CHRONIC RIGHT SI JOINT PAIN: ICD-10-CM

## 2020-02-13 DIAGNOSIS — G89.29 CHRONIC RIGHT SI JOINT PAIN: ICD-10-CM

## 2020-02-13 DIAGNOSIS — G89.4 CHRONIC PAIN SYNDROME: ICD-10-CM

## 2020-02-13 RX ORDER — OXYCODONE HYDROCHLORIDE 10 MG/1
10 TABLET ORAL EVERY 4 HOURS PRN
Qty: 120 TABLET | Refills: 0 | Status: SHIPPED | OUTPATIENT
Start: 2020-02-13 | End: 2020-03-12 | Stop reason: SDUPTHER

## 2020-02-13 NOTE — TELEPHONE ENCOUNTER
Voice message requesting refill of Oxycodone 10mg  Checked PDMP, last refill, 1/15/20 #120  Please review

## 2020-02-13 NOTE — TELEPHONE ENCOUNTER
Patient called for refill on Oxycodone 10 mg Q4H PRN (Disp #120)   PDMP shows last refill written 1/13/20 & filled 1/15/20  Patient is adherent to follow-ups chronically with me, PDMP w/o red flags, contract signed  Urine oxycodone obtained 8/29/19 (positive)  Order pended, thanks!

## 2020-03-05 DIAGNOSIS — J45.40 MODERATE PERSISTENT ASTHMA WITHOUT COMPLICATION: Primary | ICD-10-CM

## 2020-03-05 RX ORDER — ALBUTEROL SULFATE 90 UG/1
AEROSOL, METERED RESPIRATORY (INHALATION)
Qty: 18 G | Refills: 5 | Status: SHIPPED | OUTPATIENT
Start: 2020-03-05 | End: 2020-08-02

## 2020-03-11 ENCOUNTER — TELEPHONE (OUTPATIENT)
Dept: OTHER | Facility: OTHER | Age: 50
End: 2020-03-11

## 2020-03-11 DIAGNOSIS — M96.1 POST LAMINECTOMY SYNDROME: ICD-10-CM

## 2020-03-11 DIAGNOSIS — M53.3 CHRONIC RIGHT SI JOINT PAIN: ICD-10-CM

## 2020-03-11 DIAGNOSIS — G89.4 CHRONIC PAIN SYNDROME: ICD-10-CM

## 2020-03-11 DIAGNOSIS — G89.29 CHRONIC RIGHT SI JOINT PAIN: ICD-10-CM

## 2020-03-11 NOTE — TELEPHONE ENCOUNTER
Voice message requesting refill of Oxycodone 10mg  Checked PDMP, last refill 2/13/20 #120  Please review

## 2020-03-11 NOTE — TELEPHONE ENCOUNTER
Patient called again and states the Oxycodone script has been wrong stating 120 tabs q 4 hrs  He states it needs to be 120 tabs q 6hrs  For insurance purposes  Thanks!

## 2020-03-12 RX ORDER — OXYCODONE HYDROCHLORIDE 10 MG/1
10 TABLET ORAL EVERY 6 HOURS PRN
Qty: 120 TABLET | Refills: 0 | Status: SHIPPED | OUTPATIENT
Start: 2020-03-12 | End: 2020-04-07 | Stop reason: SDUPTHER

## 2020-03-12 NOTE — TELEPHONE ENCOUNTER
03/12/20 4:14 PM     Please route to appropriate pool  This has come thru the clinical pool for Patient Attribution/ PCP change       Thank you  Aga Cedillo

## 2020-03-12 NOTE — TELEPHONE ENCOUNTER
I am covering preceptor requests and have signed oxycodone RX as requested by PCP  As I recommended several months ago, Mr Noble Goel is at increased risk to harm as is receiving opioid daily with benzo daily    For safety please have active plan to stop one of these medications on a daily basis

## 2020-03-12 NOTE — TELEPHONE ENCOUNTER
Patient needs refill on oxycodone, UTD on urine testing, has contract signed with me, adherent to visits, no concerns or red flags on PDMP  Pended Rx for Q6H PRN as stated below, refill sent to provider pool

## 2020-03-16 NOTE — TELEPHONE ENCOUNTER
03/16/20 11:52 AM     This is an FYI  It's possible you meant to send this to the office's clerical or clinical pool? It got sent to the Care Gap pool where either health maintenance or patient attribution items are routed/ requested      Thank you  William Kovacs

## 2020-03-23 ENCOUNTER — TELEPHONE (OUTPATIENT)
Dept: GASTROENTEROLOGY | Facility: AMBULARY SURGERY CENTER | Age: 50
End: 2020-03-23

## 2020-03-24 ENCOUNTER — OFFICE VISIT (OUTPATIENT)
Dept: GASTROENTEROLOGY | Facility: AMBULARY SURGERY CENTER | Age: 50
End: 2020-03-24
Payer: COMMERCIAL

## 2020-03-24 VITALS
BODY MASS INDEX: 25.5 KG/M2 | RESPIRATION RATE: 18 BRPM | WEIGHT: 192.4 LBS | HEIGHT: 73 IN | HEART RATE: 64 BPM | TEMPERATURE: 97.6 F

## 2020-03-24 DIAGNOSIS — R13.19 ESOPHAGEAL DYSPHAGIA: Primary | ICD-10-CM

## 2020-03-24 DIAGNOSIS — Z11.59 SPECIAL SCREENING EXAMINATION FOR VIRAL DISEASE: ICD-10-CM

## 2020-03-24 DIAGNOSIS — R07.89 OTHER CHEST PAIN: ICD-10-CM

## 2020-03-24 DIAGNOSIS — K22.4 HYPERCONTRACTILE ESOPHAGUS: ICD-10-CM

## 2020-03-24 PROCEDURE — 3077F SYST BP >= 140 MM HG: CPT | Performed by: PHYSICIAN ASSISTANT

## 2020-03-24 PROCEDURE — 99214 OFFICE O/P EST MOD 30 MIN: CPT | Performed by: PHYSICIAN ASSISTANT

## 2020-03-24 PROCEDURE — 4004F PT TOBACCO SCREEN RCVD TLK: CPT | Performed by: PHYSICIAN ASSISTANT

## 2020-03-24 PROCEDURE — 3008F BODY MASS INDEX DOCD: CPT | Performed by: PHYSICIAN ASSISTANT

## 2020-03-24 PROCEDURE — 3079F DIAST BP 80-89 MM HG: CPT | Performed by: PHYSICIAN ASSISTANT

## 2020-03-24 RX ORDER — DILTIAZEM HYDROCHLORIDE 60 MG/1
60 TABLET, FILM COATED ORAL 4 TIMES DAILY
Qty: 120 TABLET | Refills: 2 | Status: SHIPPED | OUTPATIENT
Start: 2020-03-24 | End: 2021-11-09 | Stop reason: ALTCHOICE

## 2020-03-24 NOTE — PROGRESS NOTES
Marj Purvis [facility] - Outpatient Follow-up Note  Lico Foss 44 y o  male MRN: [id number]  Encounter: [id number]    ASSESSMENT AND PLAN:      Hypercontractile esophagus with esophageal dysphagia  -Patient was last seen about 7 months ago and was referred to a tertiary care facility to see a motility specialist  He did go to [facility] once but then did not follow up for further testing  -in the past he has trialed PPI, pepcid, peppermine oil, low dose diltizem, amitriptyline  He stopped all of these medications as they did not work  -I strongly recommend he return to [facility] for follow up, he is presently refusing  -We can trial diltiazem 60mg QID he will closely monitor his blood pressure on this he does monitor it at home  -He had some symptom improvement with dilation completed 4/1/19 will look into whether it would be beneficial to repeat this    ______________________________________________________________________    SUBJECTIVE:      Patient present for follow up of esophageal dysphagia/hypercontractile esophagus (manomtry in 2018 showed 2/10 swallow with distal contractile esophagus and 1 9 cm hiatal hernia, normal LES)  He reports food going caught in his chest with every meal and is very uncomfortable, rarely he will vomit  Fortunately his weight has been stable, he has actually gained weight since the last office visit  He is not presently taking any medications for this  He has chronic pain on narcotcs  REVIEW OF SYSTEMS IS OTHERWISE NEGATIVE        Historical Information   Past Medical History:   Diagnosis Date    Allergic rhinitis     Anxiety     Arthritis     Back pain     Chronic obstructive asthma (HCC)     Chronic pain syndrome     Cluster headaches     Depression     Insomnia     Kidney stones     Laryngospasm     Leukocytosis     Migraine     Myocardial infarction (HCC)     Nephrolithiasis     Organic impotence     Pneumonia due to infectious organism 1/16/2019    Seasonal allergies     Sleep apnea     does not use CPAP    Stroke [facility]) 2015    TMJ (temporomandibular joint syndrome)     Wheezing     last assessed 05/19/17     Past Surgical History:   Procedure Laterality Date    BACK SURGERY      *9, 9660-8012, nervectomy 2006, total of 10    BUCCAL MASS EXCISION N/A 3/26/2018    Procedure: BIOPSY LINGUAL TONSIL (FLOW/ STANDARD PATH)  EVAL UNDER ANESTHESIA;  Surgeon: Bridger Ba MD;  [facility];  Service: ENT    COLONOSCOPY      HERNIA REPAIR      KIDNEY SURGERY      KNEE ARTHROSCOPY      LITHOTRIPSY      multiple    LUMBAR [address]  2015    MANDIBLE FRACTURE SURGERY      titanium plate    PELVIC SYMPHYSIS FUSION      ID ESOPHAGOGASTRODUODENOSCOPY TRANSORAL DIAGNOSTIC N/A 2/22/2018    Procedure: ESOPHAGOGASTRODUODENOSCOPY (EGD); Surgeon: Redd Vargas MD;  Location[facility]; Service: Gastroenterology    ID ESOPHAGOGASTRODUODENOSCOPY TRANSORAL DIAGNOSTIC N/A 4/1/2019    Procedure: EGD W/ DILATION;  Surgeon: Redd Vargas MD;  [facility]; Service: Gastroenterology    ID REVISE/REMOVE SPINAL NEUROSTIM/ Left 6/9/2017    Procedure: REMOVAL OF A THORACIC SCS PLACED VIA LAMINECTOMY AND REMOVAL LEFT BUTTOCK GENERATOR; IMPULSE MONITORING;  Surgeon: Christelle Tejeda MD;  Location[facility];  Service: Neurosurgery    ID [address] ARTHROSCOP,SURG,W/ROTAT CUFF REPR Right 4/4/2019    Procedure: RIGHT SHOULDER ARTHROSCOPIC SUBSCAPULARIS TENDON REPAIR;  Surgeon: Crispin Bran MD;  [facility];  Service: Orthopedics    ID SURG IMPLNT [address] N/A 9/8/2016    Procedure: DORSAL COLUMN STIMULATOR PLACEMENT (IMPULSE MONITORING);   Surgeon: Henrietta Arias MD;  [facility];  Service: Orthopedics    SACROILIAC JOINT FUSION  2015    SHOULDER SURGERY Left     2     Social History   Social History     Substance and Sexual Activity   Alcohol Use Yes    Frequency: Monthly or less    Drinks per session: 3 or 4    Comment: rarely      Social History     Substance and Sexual Activity   Drug Use Yes    Frequency: 7 0 times per week    Types: Marijuana    Comment: medical marijuana daily for chronic pain     Social History     Tobacco Use   Smoking Status Current Some Day Smoker    Packs/day: 0 50    Years: 25 00    Pack years: 12 50   Smokeless Tobacco Former User     Family History   Problem Relation Age of Onset    Diabetes Father     Obesity Father     Liver cancer Father     Heart disease Father     Diabetes Brother     Hypertension Brother     Leukemia Mother     Hypertension Mother     Cancer Family        Meds/Allergies       Current Outpatient Medications:     albuterol (PROVENTIL HFA,VENTOLIN HFA) 90 mcg/act inhaler    amitriptyline (ELAVIL) 25 mg tablet    benzonatate (TESSALON PERLES) 100 mg capsule    diazepam (VALIUM) 5 mg tablet    diltiazem (CARDIZEM) 60 mg tablet    fluticasone (FLONASE) 50 mcg/act nasal spray    fluticasone-vilanterol (BREO ELLIPTA) 200-25 MCG/INH inhaler    ibuprofen (MOTRIN) 800 mg tablet    Naloxone HCl (NARCAN) 4 MG/0 1ML LIQD    oxyCODONE (ROXICODONE) 10 MG TABS    Allergies   Allergen Reactions    Other Rash     Adhesive tape           Objective     Pulse 64, temperature 97 6 °F (36 4 °C), temperature source Tympanic, resp  rate 18, height 6' 1" (1 854 m), weight 87 3 kg (192 lb 6 4 oz)  Body mass index is 25 38 kg/m²        PHYSICAL EXAM:      Limited physical exam given COVID19 pandemic    General Appearance:   Alert, cooperative, no distress   HEENT:   Normocephalic, atraumatic, anicteric      Neck:  symmetrical, trachea midline   Lungs:   ,respirations unlabored    Heart[de-identified]   Regular rate    Abdomen:    non-distended    Genitalia:   Deferred    Rectal:   Deferred    Extremities:  No edema        Skin:  No jaundice, rashes, or lesions    Psych: Appropriate affect  Neuro: A&Ox3     Lab Results:   No visits with results within 1 Day(s) from this visit  Latest known visit with results is:   Office Visit on 08/29/2019   Component Date Value    Miscellaneous Lab Test R* 08/29/2019 SEE WRITTEN REPORT     OXYCODONE/OXYMORPHONE 08/29/2019 See Final Results     N gonorrhoeae, DNA Probe 08/29/2019 Negative     Chlamydia trachomatis, D* 08/29/2019 Negative     OXYCODONE/OXYMORPHONE 08/29/2019 Positive*    Oxycodone 08/29/2019 Positive*    Oxycodone (GC/MS) 08/29/2019 3883     OXYMORPHONE 08/29/2019 Positive*    OXYMORPHONE GC/MS 08/29/2019 2367          Radiology Results:   No results found

## 2020-03-25 DIAGNOSIS — F41.8 DEPRESSION WITH ANXIETY: ICD-10-CM

## 2020-03-26 RX ORDER — DIAZEPAM 5 MG/1
TABLET ORAL
Qty: 60 TABLET | Refills: 0 | Status: SHIPPED | OUTPATIENT
Start: 2020-03-26 | End: 2020-05-05 | Stop reason: SDUPTHER

## 2020-03-26 NOTE — TELEPHONE ENCOUNTER
Received refill request for Valium  Patient of Dr Garrison Gorman who is currently on Night Float  PDMP reviewed  Medication refilled    Please schedule virtual visit with Dr Garrison Gorman as last office visit with her was August      Thank you

## 2020-04-07 DIAGNOSIS — M96.1 POST LAMINECTOMY SYNDROME: ICD-10-CM

## 2020-04-07 DIAGNOSIS — G89.4 CHRONIC PAIN SYNDROME: ICD-10-CM

## 2020-04-07 DIAGNOSIS — M53.3 CHRONIC RIGHT SI JOINT PAIN: ICD-10-CM

## 2020-04-07 DIAGNOSIS — G89.29 CHRONIC RIGHT SI JOINT PAIN: ICD-10-CM

## 2020-04-07 RX ORDER — OXYCODONE HYDROCHLORIDE 10 MG/1
10 TABLET ORAL EVERY 6 HOURS PRN
Qty: 120 TABLET | Refills: 0 | Status: SHIPPED | OUTPATIENT
Start: 2020-04-07 | End: 2020-05-05 | Stop reason: SDUPTHER

## 2020-05-04 DIAGNOSIS — G89.29 CHRONIC RIGHT SI JOINT PAIN: ICD-10-CM

## 2020-05-04 DIAGNOSIS — M53.3 CHRONIC RIGHT SI JOINT PAIN: ICD-10-CM

## 2020-05-04 DIAGNOSIS — M96.1 POST LAMINECTOMY SYNDROME: ICD-10-CM

## 2020-05-04 DIAGNOSIS — G89.4 CHRONIC PAIN SYNDROME: ICD-10-CM

## 2020-05-04 DIAGNOSIS — F41.8 DEPRESSION WITH ANXIETY: ICD-10-CM

## 2020-05-05 ENCOUNTER — TELEPHONE (OUTPATIENT)
Dept: GASTROENTEROLOGY | Facility: AMBULARY SURGERY CENTER | Age: 50
End: 2020-05-05

## 2020-05-05 RX ORDER — DIAZEPAM 5 MG/1
5 TABLET ORAL EVERY 12 HOURS PRN
Qty: 60 TABLET | Refills: 2 | Status: SHIPPED | OUTPATIENT
Start: 2020-05-05 | End: 2020-07-06 | Stop reason: SDUPTHER

## 2020-05-05 RX ORDER — OXYCODONE HYDROCHLORIDE 10 MG/1
10 TABLET ORAL EVERY 6 HOURS PRN
Qty: 120 TABLET | Refills: 0 | Status: SHIPPED | OUTPATIENT
Start: 2020-05-07 | End: 2020-06-08 | Stop reason: SDUPTHER

## 2020-05-07 DIAGNOSIS — Z11.59 SPECIAL SCREENING EXAMINATION FOR VIRAL DISEASE: ICD-10-CM

## 2020-05-07 PROCEDURE — U0003 INFECTIOUS AGENT DETECTION BY NUCLEIC ACID (DNA OR RNA); SEVERE ACUTE RESPIRATORY SYNDROME CORONAVIRUS 2 (SARS-COV-2) (CORONAVIRUS DISEASE [COVID-19]), AMPLIFIED PROBE TECHNIQUE, MAKING USE OF HIGH THROUGHPUT TECHNOLOGIES AS DESCRIBED BY CMS-2020-01-R: HCPCS

## 2020-05-08 ENCOUNTER — TELEPHONE (OUTPATIENT)
Dept: FAMILY MEDICINE CLINIC | Facility: CLINIC | Age: 50
End: 2020-05-08

## 2020-05-08 DIAGNOSIS — M53.3 CHRONIC RIGHT SI JOINT PAIN: ICD-10-CM

## 2020-05-08 DIAGNOSIS — M96.1 POST LAMINECTOMY SYNDROME: ICD-10-CM

## 2020-05-08 DIAGNOSIS — G89.4 CHRONIC PAIN SYNDROME: ICD-10-CM

## 2020-05-08 DIAGNOSIS — G89.29 CHRONIC RIGHT SI JOINT PAIN: ICD-10-CM

## 2020-05-09 LAB — SARS-COV-2 RNA SPEC QL NAA+PROBE: NOT DETECTED

## 2020-05-13 ENCOUNTER — ANESTHESIA EVENT (OUTPATIENT)
Dept: GASTROENTEROLOGY | Facility: HOSPITAL | Age: 50
End: 2020-05-13

## 2020-05-14 ENCOUNTER — HOSPITAL ENCOUNTER (OUTPATIENT)
Dept: GASTROENTEROLOGY | Facility: HOSPITAL | Age: 50
Setting detail: OUTPATIENT SURGERY
Discharge: HOME | End: 2020-05-14
Attending: INTERNAL MEDICINE | Admitting: INTERNAL MEDICINE
Payer: COMMERCIAL

## 2020-05-14 ENCOUNTER — ANESTHESIA (OUTPATIENT)
Dept: GASTROENTEROLOGY | Facility: HOSPITAL | Age: 50
End: 2020-05-14

## 2020-05-14 VITALS
WEIGHT: 182 LBS | BODY MASS INDEX: 24.12 KG/M2 | TEMPERATURE: 97.2 F | HEIGHT: 73 IN | SYSTOLIC BLOOD PRESSURE: 126 MMHG | HEART RATE: 82 BPM | RESPIRATION RATE: 16 BRPM | OXYGEN SATURATION: 98 % | DIASTOLIC BLOOD PRESSURE: 93 MMHG

## 2020-05-14 DIAGNOSIS — K25.3 ACUTE GASTRIC ULCER WITHOUT HEMORRHAGE OR PERFORATION: Primary | ICD-10-CM

## 2020-05-14 DIAGNOSIS — R07.89 OTHER CHEST PAIN: ICD-10-CM

## 2020-05-14 DIAGNOSIS — R13.19 ESOPHAGEAL DYSPHAGIA: ICD-10-CM

## 2020-05-14 DIAGNOSIS — K22.4 HYPERCONTRACTILE ESOPHAGUS: ICD-10-CM

## 2020-05-14 PROCEDURE — 43248 EGD GUIDE WIRE INSERTION: CPT | Performed by: INTERNAL MEDICINE

## 2020-05-14 PROCEDURE — 88305 TISSUE EXAM BY PATHOLOGIST: CPT | Performed by: PATHOLOGY

## 2020-05-14 PROCEDURE — 43239 EGD BIOPSY SINGLE/MULTIPLE: CPT | Performed by: INTERNAL MEDICINE

## 2020-05-14 PROCEDURE — C1769 GUIDE WIRE: HCPCS

## 2020-05-14 RX ORDER — GLYCOPYRROLATE 0.2 MG/ML
INJECTION INTRAMUSCULAR; INTRAVENOUS AS NEEDED
Status: DISCONTINUED | OUTPATIENT
Start: 2020-05-14 | End: 2020-05-14 | Stop reason: SURG

## 2020-05-14 RX ORDER — PROPOFOL 10 MG/ML
INJECTION, EMULSION INTRAVENOUS AS NEEDED
Status: DISCONTINUED | OUTPATIENT
Start: 2020-05-14 | End: 2020-05-14 | Stop reason: SURG

## 2020-05-14 RX ORDER — LIDOCAINE HYDROCHLORIDE 10 MG/ML
0.5 INJECTION, SOLUTION EPIDURAL; INFILTRATION; INTRACAUDAL; PERINEURAL ONCE AS NEEDED
Status: DISCONTINUED | OUTPATIENT
Start: 2020-05-14 | End: 2020-05-18 | Stop reason: HOSPADM

## 2020-05-14 RX ORDER — SODIUM CHLORIDE, SODIUM LACTATE, POTASSIUM CHLORIDE, CALCIUM CHLORIDE 600; 310; 30; 20 MG/100ML; MG/100ML; MG/100ML; MG/100ML
75 INJECTION, SOLUTION INTRAVENOUS CONTINUOUS
Status: DISCONTINUED | OUTPATIENT
Start: 2020-05-14 | End: 2020-05-18 | Stop reason: HOSPADM

## 2020-05-14 RX ORDER — OMEPRAZOLE 40 MG/1
40 CAPSULE, DELAYED RELEASE ORAL DAILY
Qty: 30 CAPSULE | Refills: 3 | Status: SHIPPED | OUTPATIENT
Start: 2020-05-14 | End: 2021-05-04 | Stop reason: ALTCHOICE

## 2020-05-14 RX ORDER — LIDOCAINE HYDROCHLORIDE 10 MG/ML
INJECTION, SOLUTION EPIDURAL; INFILTRATION; INTRACAUDAL; PERINEURAL AS NEEDED
Status: DISCONTINUED | OUTPATIENT
Start: 2020-05-14 | End: 2020-05-14 | Stop reason: SURG

## 2020-05-14 RX ADMIN — GLYCOPYRROLATE 0.2 MG: 0.2 INJECTION, SOLUTION INTRAMUSCULAR; INTRAVENOUS at 09:00

## 2020-05-14 RX ADMIN — PROPOFOL 160 MG: 10 INJECTION, EMULSION INTRAVENOUS at 09:04

## 2020-05-14 RX ADMIN — PROPOFOL 50 MG: 10 INJECTION, EMULSION INTRAVENOUS at 09:13

## 2020-05-14 RX ADMIN — LIDOCAINE HYDROCHLORIDE 50 MG: 10 INJECTION, SOLUTION EPIDURAL; INFILTRATION; INTRACAUDAL; PERINEURAL at 09:04

## 2020-05-14 RX ADMIN — PROPOFOL 50 MG: 10 INJECTION, EMULSION INTRAVENOUS at 09:08

## 2020-05-14 RX ADMIN — SODIUM CHLORIDE, SODIUM LACTATE, POTASSIUM CHLORIDE, AND CALCIUM CHLORIDE: .6; .31; .03; .02 INJECTION, SOLUTION INTRAVENOUS at 08:23

## 2020-05-22 ENCOUNTER — TELEPHONE (OUTPATIENT)
Dept: GASTROENTEROLOGY | Facility: AMBULARY SURGERY CENTER | Age: 50
End: 2020-05-22

## 2020-05-23 DIAGNOSIS — J45.40 MODERATE PERSISTENT ASTHMA WITHOUT COMPLICATION: ICD-10-CM

## 2020-06-02 ENCOUNTER — TELEPHONE (OUTPATIENT)
Dept: GASTROENTEROLOGY | Facility: CLINIC | Age: 50
End: 2020-06-02

## 2020-06-03 ENCOUNTER — TELEPHONE (OUTPATIENT)
Dept: FAMILY MEDICINE CLINIC | Facility: CLINIC | Age: 50
End: 2020-06-03

## 2020-06-03 ENCOUNTER — TELEMEDICINE (OUTPATIENT)
Dept: GASTROENTEROLOGY | Facility: AMBULARY SURGERY CENTER | Age: 50
End: 2020-06-03
Payer: COMMERCIAL

## 2020-06-03 DIAGNOSIS — R13.19 ESOPHAGEAL DYSPHAGIA: Primary | ICD-10-CM

## 2020-06-03 DIAGNOSIS — K29.70 GASTRITIS WITHOUT BLEEDING, UNSPECIFIED CHRONICITY, UNSPECIFIED GASTRITIS TYPE: ICD-10-CM

## 2020-06-03 PROCEDURE — 99214 OFFICE O/P EST MOD 30 MIN: CPT | Performed by: INTERNAL MEDICINE

## 2020-06-08 ENCOUNTER — TELEPHONE (OUTPATIENT)
Dept: FAMILY MEDICINE CLINIC | Facility: CLINIC | Age: 50
End: 2020-06-08

## 2020-06-08 DIAGNOSIS — M53.3 CHRONIC RIGHT SI JOINT PAIN: ICD-10-CM

## 2020-06-08 DIAGNOSIS — G89.29 CHRONIC RIGHT SI JOINT PAIN: ICD-10-CM

## 2020-06-08 DIAGNOSIS — M96.1 POST LAMINECTOMY SYNDROME: ICD-10-CM

## 2020-06-08 DIAGNOSIS — G89.4 CHRONIC PAIN SYNDROME: ICD-10-CM

## 2020-06-08 DIAGNOSIS — K25.3 ACUTE GASTRIC ULCER WITHOUT HEMORRHAGE OR PERFORATION: ICD-10-CM

## 2020-06-08 RX ORDER — OXYCODONE HYDROCHLORIDE 10 MG/1
10 TABLET ORAL EVERY 6 HOURS PRN
Qty: 120 TABLET | Refills: 0 | Status: SHIPPED | OUTPATIENT
Start: 2020-06-09 | End: 2020-08-03 | Stop reason: SDUPTHER

## 2020-06-08 RX ORDER — OXYCODONE HYDROCHLORIDE 10 MG/1
10 TABLET ORAL EVERY 6 HOURS PRN
Qty: 120 TABLET | Refills: 0 | Status: SHIPPED | OUTPATIENT
Start: 2020-06-10 | End: 2020-06-08 | Stop reason: SDUPTHER

## 2020-06-11 ENCOUNTER — TELEPHONE (OUTPATIENT)
Dept: OTHER | Facility: OTHER | Age: 50
End: 2020-06-11

## 2020-06-15 ENCOUNTER — TELEPHONE (OUTPATIENT)
Dept: GASTROENTEROLOGY | Facility: AMBULARY SURGERY CENTER | Age: 50
End: 2020-06-15

## 2020-06-15 DIAGNOSIS — R13.19 ESOPHAGEAL DYSPHAGIA: Primary | ICD-10-CM

## 2020-06-22 ENCOUNTER — TELEPHONE (OUTPATIENT)
Dept: FAMILY MEDICINE CLINIC | Facility: CLINIC | Age: 50
End: 2020-06-22

## 2020-06-22 ENCOUNTER — TELEPHONE (OUTPATIENT)
Dept: GASTROENTEROLOGY | Facility: CLINIC | Age: 50
End: 2020-06-22

## 2020-06-22 DIAGNOSIS — R13.19 ESOPHAGEAL DYSPHAGIA: Primary | ICD-10-CM

## 2020-06-22 DIAGNOSIS — Z11.59 SPECIAL SCREENING EXAMINATION FOR UNSPECIFIED VIRAL DISEASE: Primary | ICD-10-CM

## 2020-06-22 PROCEDURE — U0003 INFECTIOUS AGENT DETECTION BY NUCLEIC ACID (DNA OR RNA); SEVERE ACUTE RESPIRATORY SYNDROME CORONAVIRUS 2 (SARS-COV-2) (CORONAVIRUS DISEASE [COVID-19]), AMPLIFIED PROBE TECHNIQUE, MAKING USE OF HIGH THROUGHPUT TECHNOLOGIES AS DESCRIBED BY CMS-2020-01-R: HCPCS

## 2020-06-22 NOTE — TELEPHONE ENCOUNTER
Patients GI provider:  Dr Mercedes Casey    Number to return call: NA    Reason for call: Xavi Curiel from Manometry called stating pt is sched for a manometry and Ph testing this Friday and will need an order for covid placed in chart   Please place order     Scheduled procedure/appointment date if applicable: NA

## 2020-06-23 ENCOUNTER — OFFICE VISIT (OUTPATIENT)
Dept: FAMILY MEDICINE CLINIC | Facility: CLINIC | Age: 50
End: 2020-06-23

## 2020-06-23 VITALS
WEIGHT: 198.6 LBS | RESPIRATION RATE: 16 BRPM | BODY MASS INDEX: 26.32 KG/M2 | HEART RATE: 76 BPM | TEMPERATURE: 96.6 F | SYSTOLIC BLOOD PRESSURE: 122 MMHG | HEIGHT: 73 IN | DIASTOLIC BLOOD PRESSURE: 82 MMHG

## 2020-06-23 DIAGNOSIS — L82.1 SEBORRHEIC KERATOSIS: ICD-10-CM

## 2020-06-23 DIAGNOSIS — J30.9 ALLERGIC RHINITIS, UNSPECIFIED SEASONALITY, UNSPECIFIED TRIGGER: ICD-10-CM

## 2020-06-23 DIAGNOSIS — N40.0 BENIGN PROSTATIC HYPERPLASIA WITHOUT LOWER URINARY TRACT SYMPTOMS: ICD-10-CM

## 2020-06-23 DIAGNOSIS — M96.1 POST LAMINECTOMY SYNDROME: ICD-10-CM

## 2020-06-23 DIAGNOSIS — F11.90 CHRONIC, CONTINUOUS USE OF OPIOIDS: Primary | ICD-10-CM

## 2020-06-23 DIAGNOSIS — Z79.899 CHRONIC PRESCRIPTION BENZODIAZEPINE USE: ICD-10-CM

## 2020-06-23 DIAGNOSIS — G62.9 PERIPHERAL POLYNEUROPATHY: ICD-10-CM

## 2020-06-23 DIAGNOSIS — Z79.899 MEDICAL MARIJUANA USE: ICD-10-CM

## 2020-06-23 DIAGNOSIS — E66.3 OVERWEIGHT WITH BODY MASS INDEX (BMI) OF 25 TO 25.9 IN ADULT: ICD-10-CM

## 2020-06-23 DIAGNOSIS — R71.8 ELEVATED HEMATOCRIT: ICD-10-CM

## 2020-06-23 LAB — SARS-COV-2 RNA SPEC QL NAA+PROBE: NOT DETECTED

## 2020-06-23 PROCEDURE — 3008F BODY MASS INDEX DOCD: CPT | Performed by: FAMILY MEDICINE

## 2020-06-23 PROCEDURE — 3008F BODY MASS INDEX DOCD: CPT | Performed by: INTERNAL MEDICINE

## 2020-06-23 PROCEDURE — 4004F PT TOBACCO SCREEN RCVD TLK: CPT | Performed by: FAMILY MEDICINE

## 2020-06-23 PROCEDURE — 3074F SYST BP LT 130 MM HG: CPT | Performed by: FAMILY MEDICINE

## 2020-06-23 PROCEDURE — 99213 OFFICE O/P EST LOW 20 MIN: CPT | Performed by: FAMILY MEDICINE

## 2020-06-23 PROCEDURE — 80346 BENZODIAZEPINES1-12: CPT | Performed by: FAMILY MEDICINE

## 2020-06-23 PROCEDURE — 80307 DRUG TEST PRSMV CHEM ANLYZR: CPT | Performed by: FAMILY MEDICINE

## 2020-06-23 PROCEDURE — 80365 DRUG SCREENING OXYCODONE: CPT | Performed by: FAMILY MEDICINE

## 2020-06-23 PROCEDURE — 3079F DIAST BP 80-89 MM HG: CPT | Performed by: FAMILY MEDICINE

## 2020-06-23 RX ORDER — TRIAMCINOLONE ACETONIDE 55 UG/1
1 SPRAY, METERED NASAL DAILY
Qty: 1 BOTTLE | Refills: 3 | Status: SHIPPED | OUTPATIENT
Start: 2020-06-23 | End: 2021-11-09 | Stop reason: ALTCHOICE

## 2020-06-24 ENCOUNTER — TELEPHONE (OUTPATIENT)
Dept: SURGICAL ONCOLOGY | Facility: CLINIC | Age: 50
End: 2020-06-24

## 2020-06-24 ENCOUNTER — HOSPITAL ENCOUNTER (OUTPATIENT)
Dept: GASTROENTEROLOGY | Facility: HOSPITAL | Age: 50
Discharge: HOME | End: 2020-06-24
Payer: COMMERCIAL

## 2020-06-24 VITALS
RESPIRATION RATE: 18 BRPM | DIASTOLIC BLOOD PRESSURE: 93 MMHG | TEMPERATURE: 98.4 F | HEART RATE: 106 BPM | OXYGEN SATURATION: 96 % | SYSTOLIC BLOOD PRESSURE: 144 MMHG

## 2020-06-24 DIAGNOSIS — R13.19 ESOPHAGEAL DYSPHAGIA: ICD-10-CM

## 2020-06-24 PROCEDURE — 91038 ESOPH IMPED FUNCT TEST > 1HR: CPT

## 2020-06-24 PROCEDURE — 91020 GASTRIC MOTILITY STUDIES: CPT

## 2020-06-25 DIAGNOSIS — R13.19 ESOPHAGEAL DYSPHAGIA: Primary | ICD-10-CM

## 2020-06-29 PROCEDURE — 91010 ESOPHAGUS MOTILITY STUDY: CPT | Performed by: INTERNAL MEDICINE

## 2020-06-29 PROCEDURE — 91038 ESOPH IMPED FUNCT TEST > 1HR: CPT | Performed by: INTERNAL MEDICINE

## 2020-06-30 LAB
AMPHETAMINES UR QL SCN: NEGATIVE NG/ML
BARBITURATES UR QL SCN: NEGATIVE NG/ML
BENZODIAZ UR QL: POSITIVE
BZE UR QL: NEGATIVE NG/ML
CANNABINOIDS UR QL SCN: POSITIVE
METHADONE UR QL SCN: NEGATIVE NG/ML
MISCELLANEOUS LAB TEST RESULT: NORMAL
OPIATES UR QL: NEGATIVE
OXYCODONE UR QL CFM: ABNORMAL NG/ML
OXYCODONE+OXYMORPHONE SERPLBLD QL SCN: POSITIVE
OXYCODONE+OXYMORPHONE UR QL SCN: NORMAL NG/ML
OXYCODONE+OXYMORPHONE UR QL SCN: POSITIVE
OXYMORPHONE UR CFM-MCNC: ABNORMAL NG/ML
OXYMORPHONE UR QL CFM: POSITIVE
PCP UR QL: NEGATIVE NG/ML
PROPOXYPH UR QL SCN: NEGATIVE NG/ML

## 2020-07-06 ENCOUNTER — TELEPHONE (OUTPATIENT)
Dept: FAMILY MEDICINE CLINIC | Facility: CLINIC | Age: 50
End: 2020-07-06

## 2020-07-06 DIAGNOSIS — F41.8 DEPRESSION WITH ANXIETY: ICD-10-CM

## 2020-07-06 RX ORDER — DIAZEPAM 5 MG/1
5 TABLET ORAL EVERY 12 HOURS PRN
Qty: 60 TABLET | Refills: 2 | Status: SHIPPED | OUTPATIENT
Start: 2020-07-06 | End: 2020-09-25 | Stop reason: SDUPTHER

## 2020-07-06 NOTE — TELEPHONE ENCOUNTER
Refilled but he needs to establish with a new PCP in 3 months since Dr Monique Rocha is no longer here

## 2020-07-06 NOTE — TELEPHONE ENCOUNTER
Patient of Dr Herbert [facility]  Voice message requesting refill of Diazepam 5mg  Checked PDMP, last refill 5/5/20 #60

## 2020-07-13 ENCOUNTER — TELEPHONE (OUTPATIENT)
Dept: GASTROENTEROLOGY | Facility: CLINIC | Age: 50
End: 2020-07-13

## 2020-07-13 NOTE — TELEPHONE ENCOUNTER
Patients GI provider:  Dr Ortiz Novel    Number to return call: [phone]    Reason for call: Pt called stating he was returning [facility] for manometry results    Scheduled procedure/appointment date if applicable: NA

## 2020-07-15 NOTE — TELEPHONE ENCOUNTER
Hi Dr David Cheek, wanted to contact this patient about the manometry results, I see from your note there was discussion about possible myotomy, calcium channel blocker, etc  Depending on results  Wanted to review this with you first, thanks!

## 2020-07-17 NOTE — TELEPHONE ENCOUNTER
I spoke to the patient at length over the phone  Patient reports that he still having symptoms of dysphagia, I reviewed the results of the manometry  Patient is not sure of the medications that he is taking at the time  Based on the review, he is supposed to be on a PPI and on amitriptyline  I am not sure if he is taking it  Please schedule him an office visit in the next 2 weeks, patient is currently in [place] and will be coming back in 2 weeks  Please make sure that he brings his medication bottles  as he will need to be changed to a different PPI  Patient was reportedly on omeprazole 40 mg twice daily at the time of manometry test   He will likely need to be switched to Protonix or Dexilant  In addition, if he is not on a tricyclic agent, he will need EKG and subsequently started on a tricyclic agent  I will discuss with Dr Dominguez if there is any role in starting him on sildenafil

## 2020-07-20 NOTE — TELEPHONE ENCOUNTER
Hi, he is very reluctant about nitrites and is a chronic pain patient  I discussed with him avoid stopping pain meds, he is not open to this but will see him in office and see what he thinks     thanks

## 2020-07-20 NOTE — TELEPHONE ENCOUNTER
Carroll Sanches think we were playing phone tag regarding this patient  He can definitely tried nitrates    Could also consider trying to see if taking him narcotics would be beneficial as this pattern is also seen with narcotic use

## 2020-08-02 DIAGNOSIS — J45.40 MODERATE PERSISTENT ASTHMA WITHOUT COMPLICATION: ICD-10-CM

## 2020-08-02 RX ORDER — ALBUTEROL SULFATE 90 UG/1
AEROSOL, METERED RESPIRATORY (INHALATION)
Qty: 18 G | Refills: 5 | Status: SHIPPED | OUTPATIENT
Start: 2020-08-02 | End: 2021-01-01

## 2020-08-03 DIAGNOSIS — G89.29 CHRONIC RIGHT SI JOINT PAIN: ICD-10-CM

## 2020-08-03 DIAGNOSIS — M96.1 POST LAMINECTOMY SYNDROME: ICD-10-CM

## 2020-08-03 DIAGNOSIS — M53.3 CHRONIC RIGHT SI JOINT PAIN: ICD-10-CM

## 2020-08-03 DIAGNOSIS — G89.4 CHRONIC PAIN SYNDROME: ICD-10-CM

## 2020-08-04 RX ORDER — OXYCODONE HYDROCHLORIDE 10 MG/1
10 TABLET ORAL EVERY 6 HOURS PRN
Qty: 120 TABLET | Refills: 0 | Status: SHIPPED | OUTPATIENT
Start: 2020-08-04 | End: 2020-09-02 | Stop reason: SDUPTHER

## 2020-08-21 ENCOUNTER — TELEPHONE (OUTPATIENT)
Dept: HEMATOLOGY ONCOLOGY | Facility: CLINIC | Age: 50
End: 2020-08-21

## 2020-08-24 ENCOUNTER — TELEPHONE (OUTPATIENT)
Dept: UROLOGY | Facility: CLINIC | Age: 50
End: 2020-08-24

## 2020-08-24 DIAGNOSIS — G89.29 OTHER CHRONIC PAIN: ICD-10-CM

## 2020-08-24 NOTE — TELEPHONE ENCOUNTER
I rescheduled pt for 10/14  Left a detailed message with new apt information  Asked pt to c/b to r/s if this does not work for him

## 2020-08-24 NOTE — TELEPHONE ENCOUNTER
Patient's appointment on 9/9/20 needs to be rescheduled due to provider availability  (I have already cancelled appointment )    Please assist in rescheduling, preferably with an AP as less chance of bump

## 2020-08-25 RX ORDER — IBUPROFEN 800 MG/1
800 TABLET ORAL 3 TIMES DAILY PRN
Qty: 90 TABLET | Refills: 1 | Status: ON HOLD | OUTPATIENT
Start: 2020-08-25 | End: 2020-10-21

## 2020-09-01 ENCOUNTER — TELEPHONE (OUTPATIENT)
Dept: FAMILY MEDICINE CLINIC | Facility: CLINIC | Age: 50
End: 2020-09-01

## 2020-09-01 NOTE — TELEPHONE ENCOUNTER
Patient previously followed by Dr Flynn Klinefelter, has initial appointment scheduled with Dr Bridgette Oseguera on 9/2/20  Left voice message requesting refill of Oxycodone 10mg  Checked PDMP, last refill 8/4/20 #120  Please review

## 2020-09-02 ENCOUNTER — TELEPHONE (OUTPATIENT)
Dept: FAMILY MEDICINE CLINIC | Facility: CLINIC | Age: 50
End: 2020-09-02

## 2020-09-02 DIAGNOSIS — G89.4 CHRONIC PAIN SYNDROME: ICD-10-CM

## 2020-09-02 DIAGNOSIS — M96.1 POST LAMINECTOMY SYNDROME: ICD-10-CM

## 2020-09-02 DIAGNOSIS — M53.3 CHRONIC RIGHT SI JOINT PAIN: ICD-10-CM

## 2020-09-02 DIAGNOSIS — G89.29 CHRONIC RIGHT SI JOINT PAIN: ICD-10-CM

## 2020-09-02 RX ORDER — OXYCODONE HYDROCHLORIDE 10 MG/1
10 TABLET ORAL EVERY 6 HOURS PRN
Qty: 56 TABLET | Refills: 0 | Status: SHIPPED | OUTPATIENT
Start: 2020-09-03 | End: 2020-09-16 | Stop reason: SDUPTHER

## 2020-09-02 NOTE — TELEPHONE ENCOUNTER
I have discussed with Dr Umair Patel and Dr Rebeka Graham, will provide refill for Oxycodone for 14 days until next appointment with me (9/16/20)  Will do urine screen on next appointment  Please let the patient know

## 2020-09-02 NOTE — TELEPHONE ENCOUNTER
Called patient as he did not come to appointment, he states he is in [place], did call here to cancel  He has rescheduled for 9/16/20, he will need refill of pain Rx as this has been on going and managed by Dr Karrin Kawasaki   Please review request

## 2020-09-02 NOTE — TELEPHONE ENCOUNTER
Discussed case with staff at [address] [place] [address]; patient will remain under their care at this time  Refill sent for 2 weeks, to cover patient until his appt with Dr Graciela Crabtree 9/16/20  Personally called patient and informed him that medications had been sent, and reinforced importance of him keeping that appointment 9/16/20

## 2020-09-02 NOTE — TELEPHONE ENCOUNTER
Patient called requesting to speak with Dr Arabella Mendoza  Patient states he needs his refill on his oxycodone and cannot come in the office since he is on lockdown in [place]  Please advise  Thank you  Patient can be reached at [phone]

## 2020-09-02 NOTE — TELEPHONE ENCOUNTER
Called patient, he currently is in [place] staying with a friend, will be back for his appointment on 9/16/20  He states he did call Dr Sangita Main office as she told him several months ago there would be no problem with continuity of his prescription for pain, she has not called him back  He states he is not transferring there  He did not have a pharmacy close to where he is staying, he states that his mother will  Rx and overnight mail to him   States she has done this in the past  Please review

## 2020-09-15 ENCOUNTER — TELEPHONE (OUTPATIENT)
Dept: FAMILY MEDICINE CLINIC | Facility: CLINIC | Age: 50
End: 2020-09-15

## 2020-09-15 NOTE — PROGRESS NOTES
BMI Counseling: There is no height or weight on file to calculate BMI  The BMI {VB BMI Counselin}Depression Screening Follow-up Plan: Patient's depression screening was positive with a PHQ-2 score of   Their PHQ-9 score was   {Depression screen follow-up plan:9112416000}Assessment/Plan:    No problem-specific Assessment & Plan notes found for this encounter  {Assess/PlanSmartLinks:86192}      Subjective:      Patient ID: Юлия Sweet is a 44 y o  male  HPI    {Common ambulatory SmartLinks:07575}    Review of Systems      Objective: There were no vitals taken for this visit           Physical Exam

## 2020-09-16 ENCOUNTER — OFFICE VISIT (OUTPATIENT)
Dept: FAMILY MEDICINE CLINIC | Facility: CLINIC | Age: 50
End: 2020-09-16

## 2020-09-16 VITALS
HEART RATE: 82 BPM | WEIGHT: 199.8 LBS | RESPIRATION RATE: 18 BRPM | BODY MASS INDEX: 26.48 KG/M2 | DIASTOLIC BLOOD PRESSURE: 90 MMHG | HEIGHT: 73 IN | SYSTOLIC BLOOD PRESSURE: 140 MMHG | TEMPERATURE: 97 F

## 2020-09-16 DIAGNOSIS — G89.4 CHRONIC PAIN SYNDROME: ICD-10-CM

## 2020-09-16 DIAGNOSIS — G89.29 CHRONIC NECK PAIN: Primary | ICD-10-CM

## 2020-09-16 DIAGNOSIS — Z12.12 SCREENING FOR COLORECTAL CANCER: ICD-10-CM

## 2020-09-16 DIAGNOSIS — M96.1 POST LAMINECTOMY SYNDROME: ICD-10-CM

## 2020-09-16 DIAGNOSIS — Z28.21 INFLUENZA VACCINE REFUSED: ICD-10-CM

## 2020-09-16 DIAGNOSIS — M53.3 CHRONIC RIGHT SI JOINT PAIN: ICD-10-CM

## 2020-09-16 DIAGNOSIS — Z12.11 SCREENING FOR COLORECTAL CANCER: ICD-10-CM

## 2020-09-16 DIAGNOSIS — G89.29 CHRONIC RIGHT SI JOINT PAIN: ICD-10-CM

## 2020-09-16 DIAGNOSIS — M54.2 CHRONIC NECK PAIN: Primary | ICD-10-CM

## 2020-09-16 PROCEDURE — 99214 OFFICE O/P EST MOD 30 MIN: CPT | Performed by: FAMILY MEDICINE

## 2020-09-16 PROCEDURE — 4004F PT TOBACCO SCREEN RCVD TLK: CPT | Performed by: FAMILY MEDICINE

## 2020-09-16 RX ORDER — OXYCODONE HYDROCHLORIDE 10 MG/1
10 TABLET ORAL EVERY 6 HOURS PRN
Qty: 120 TABLET | Refills: 0 | Status: SHIPPED | OUTPATIENT
Start: 2020-09-16 | End: 2020-10-12 | Stop reason: SDUPTHER

## 2020-09-16 RX ORDER — OXYCODONE HYDROCHLORIDE 10 MG/1
10 TABLET ORAL EVERY 6 HOURS PRN
Qty: 120 TABLET | Refills: 0 | Status: SHIPPED | OUTPATIENT
Start: 2020-09-16 | End: 2020-09-16

## 2020-09-16 NOTE — PROGRESS NOTES
Assessment/Plan:       Problem List Items Addressed This Visit        Other    Chronic pain syndrome    Relevant Medications    oxyCODONE (ROXICODONE) 10 MG TABS    Other Relevant Orders    Ambulatory referral to Spine Surgery    Chronic right SI joint pain    Relevant Medications    oxyCODONE (ROXICODONE) 10 MG TABS    Post laminectomy syndrome    Relevant Medications    oxyCODONE (ROXICODONE) 10 MG TABS      Other Visit Diagnoses     Chronic neck pain    -  Primary    Relevant Orders    Ambulatory referral to Spine Surgery    Screening for colorectal cancer        Relevant Orders    Ambulatory referral to Gastroenterology    Influenza vaccine refused                Subjective:      Patient ID: Yahaira Tyson is a 44 y o  male  46-YO male patient presents for medication refill  Patient with PMHX/o chronic pain syndrome, back pain, spinal cord stimulator, chronic right SI joint pain, pose laminectomy syndrome, right shoulder pain, and medical marijuana use present to the clinic for refill of his oxycodone  Per DPMP, his last refill was on 09/02/20 for 56 tablets (2 weeks)  Last UDS on 06/23/2020 is (+)claritza for OXYCODONE/OXYMORPHONE, cannabinoid, and benzodiazepine  Patient also complain of acute exacerbation of his chronic back pain, and would like referral to spine surgery  Patient denies any recent trauma, or known cause for acute exacerbation of his neck pain  Patient states that he has previously seen by spine surgery in the distant past, and was advised against surgery  However he would like to see them again  Otherwise patient states that he is at baseline and does not have any other complaints or systemic symptoms        The following portions of the patient's history were reviewed and updated as appropriate: allergies, current medications, past family history, past medical history, past social history, past surgical history and problem list     Review of Systems   Constitutional: Negative for activity change, appetite change and fever  HENT: Negative for ear discharge, ear pain, facial swelling, hearing loss, postnasal drip, rhinorrhea, sore throat, tinnitus and voice change  Eyes: Negative for pain, discharge, redness and visual disturbance  Respiratory: Negative for apnea, cough, choking, chest tightness, shortness of breath, wheezing and stridor  Cardiovascular: Negative for chest pain and palpitations  Gastrointestinal: Negative for abdominal distention, abdominal pain, constipation, diarrhea, nausea and vomiting  Genitourinary: Negative for dysuria  Musculoskeletal: Positive for neck pain and neck stiffness (Decreased range of motion due to pain  )  Negative for arthralgias  Neurological: Negative for tremors, seizures and weakness  Psychiatric/Behavioral: Negative for agitation, behavioral problems and confusion  Objective:      /90 (BP Location: Left arm, Patient Position: Sitting, Cuff Size: Standard)   Pulse 82   Temp (!) 97 °F (36 1 °C) (Tympanic)   Resp 18   Ht 6' 1" (1 854 m)   Wt 90 6 kg (199 lb 12 8 oz)   BMI 26 36 kg/m²          Physical Exam  Constitutional:       General: He is not in acute distress  Appearance: Normal appearance  He is well-developed  He is not ill-appearing, toxic-appearing or diaphoretic  HENT:      Head: Normocephalic and atraumatic  Right Ear: Tympanic membrane, ear canal and external ear normal  There is no impacted cerumen  Left Ear: Tympanic membrane, ear canal and external ear normal  There is no impacted cerumen  Nose: Nose normal  No congestion or rhinorrhea  Mouth/Throat:      Mouth: Mucous membranes are moist       Pharynx: Oropharynx is clear  No oropharyngeal exudate or posterior oropharyngeal erythema  Eyes:      General: No scleral icterus  Right eye: No discharge  Left eye: No discharge        Conjunctiva/sclera: Conjunctivae normal       Pupils: Pupils are equal, round, and reactive to light  Neck:      Musculoskeletal: Normal range of motion and neck supple  No neck rigidity  Cardiovascular:      Rate and Rhythm: Normal rate and regular rhythm  Pulses: Normal pulses  Heart sounds: Normal heart sounds  No murmur  No gallop  Pulmonary:      Effort: Pulmonary effort is normal  No respiratory distress  Breath sounds: Normal breath sounds  No stridor  No wheezing or rales  Abdominal:      General: Bowel sounds are normal  There is no distension  Palpations: Abdomen is soft  There is no mass  Tenderness: There is no abdominal tenderness  There is no guarding or rebound  Hernia: No hernia is present  Genitourinary:     Penis: Normal     Musculoskeletal: Normal range of motion  General: No swelling, deformity or signs of injury  Right lower leg: No edema  Left lower leg: No edema  Skin:     General: Skin is warm  Coloration: Skin is not jaundiced  Neurological:      General: No focal deficit present  Mental Status: He is alert and oriented to person, place, and time  Psychiatric:         Behavior: Behavior normal          Thought Content: Thought content normal          Judgment: Judgment normal          KHALIDA Perry   PGY-1, Family Medicine  09/16/20  2:03 PM    Dear reader, please be aware that portions of my note contain dictated text  I have done my best to proof-read this note prior to signing  However, there may be occasional unnoticed errors pertaining to "sound-alike" words and/or grammar during my dictation process  If there is any words or information that is unclear or appears erroneous, please kindly let me know and I will clarify and/or addend my notes accordingly  Thank you for your understanding

## 2020-09-25 ENCOUNTER — TELEPHONE (OUTPATIENT)
Dept: FAMILY MEDICINE CLINIC | Facility: CLINIC | Age: 50
End: 2020-09-25

## 2020-09-25 DIAGNOSIS — F41.8 DEPRESSION WITH ANXIETY: ICD-10-CM

## 2020-09-25 RX ORDER — DIAZEPAM 5 MG/1
5 TABLET ORAL EVERY 12 HOURS PRN
Qty: 60 TABLET | Refills: 2 | Status: SHIPPED | OUTPATIENT
Start: 2020-09-25 | End: 2021-01-07 | Stop reason: SDUPTHER

## 2020-09-25 NOTE — TELEPHONE ENCOUNTER
Medication refilled  Dr Ania Tyler - in the future, the workflow is to send to the  Provider Pool as the preceptor covering will sign off, this way there isn't a lapse in care if one of us specifically is out of the office   Just an Matthew Dee

## 2020-10-07 ENCOUNTER — TELEPHONE (OUTPATIENT)
Dept: FAMILY MEDICINE CLINIC | Facility: CLINIC | Age: 50
End: 2020-10-07

## 2020-10-12 ENCOUNTER — TELEPHONE (OUTPATIENT)
Dept: FAMILY MEDICINE CLINIC | Facility: CLINIC | Age: 50
End: 2020-10-12

## 2020-10-12 DIAGNOSIS — Z79.899 CHRONIC PRESCRIPTION BENZODIAZEPINE USE: ICD-10-CM

## 2020-10-12 DIAGNOSIS — M53.3 CHRONIC RIGHT SI JOINT PAIN: ICD-10-CM

## 2020-10-12 DIAGNOSIS — F11.90 CHRONIC, CONTINUOUS USE OF OPIOIDS: Primary | ICD-10-CM

## 2020-10-12 DIAGNOSIS — M96.1 POST LAMINECTOMY SYNDROME: ICD-10-CM

## 2020-10-12 DIAGNOSIS — G89.4 CHRONIC PAIN SYNDROME: ICD-10-CM

## 2020-10-12 DIAGNOSIS — G89.29 CHRONIC RIGHT SI JOINT PAIN: ICD-10-CM

## 2020-10-12 RX ORDER — OXYCODONE HYDROCHLORIDE 10 MG/1
10 TABLET ORAL EVERY 6 HOURS PRN
Qty: 120 TABLET | Refills: 0 | Status: SHIPPED | OUTPATIENT
Start: 2020-10-15 | End: 2020-11-10 | Stop reason: SDUPTHER

## 2020-10-14 ENCOUNTER — OFFICE VISIT (OUTPATIENT)
Dept: UROLOGY | Facility: CLINIC | Age: 50
End: 2020-10-14
Payer: COMMERCIAL

## 2020-10-14 ENCOUNTER — TELEPHONE (OUTPATIENT)
Dept: UROLOGY | Facility: CLINIC | Age: 50
End: 2020-10-14

## 2020-10-14 VITALS
HEART RATE: 94 BPM | DIASTOLIC BLOOD PRESSURE: 90 MMHG | HEIGHT: 73 IN | BODY MASS INDEX: 27.04 KG/M2 | SYSTOLIC BLOOD PRESSURE: 138 MMHG | WEIGHT: 204 LBS | RESPIRATION RATE: 18 BRPM | TEMPERATURE: 96.6 F

## 2020-10-14 DIAGNOSIS — N40.0 BENIGN PROSTATIC HYPERPLASIA WITHOUT LOWER URINARY TRACT SYMPTOMS: ICD-10-CM

## 2020-10-14 DIAGNOSIS — N52.8 OTHER MALE ERECTILE DYSFUNCTION: Primary | ICD-10-CM

## 2020-10-14 LAB — POST-VOID RESIDUAL VOLUME, ML POC: 26 ML

## 2020-10-14 PROCEDURE — 3008F BODY MASS INDEX DOCD: CPT | Performed by: FAMILY MEDICINE

## 2020-10-14 PROCEDURE — 51798 US URINE CAPACITY MEASURE: CPT | Performed by: PHYSICIAN ASSISTANT

## 2020-10-14 PROCEDURE — 99204 OFFICE O/P NEW MOD 45 MIN: CPT | Performed by: PHYSICIAN ASSISTANT

## 2020-10-14 RX ORDER — TADALAFIL 5 MG/1
5 TABLET ORAL DAILY PRN
Qty: 30 TABLET | Refills: 6 | Status: SHIPPED | OUTPATIENT
Start: 2020-10-14 | End: 2022-05-13 | Stop reason: SDUPTHER

## 2020-10-15 ENCOUNTER — PREP FOR PROCEDURE (OUTPATIENT)
Dept: GASTROENTEROLOGY | Facility: MEDICAL CENTER | Age: 50
End: 2020-10-15

## 2020-10-15 DIAGNOSIS — Z12.11 COLON CANCER SCREENING: Primary | ICD-10-CM

## 2020-10-20 ENCOUNTER — HOSPITAL ENCOUNTER (INPATIENT)
Facility: HOSPITAL | Age: 50
LOS: 1 days | Discharge: HOME | DRG: 446 | End: 2020-10-23
Attending: EMERGENCY MEDICINE | Admitting: INTERNAL MEDICINE
Payer: COMMERCIAL

## 2020-10-20 ENCOUNTER — APPOINTMENT (EMERGENCY)
Dept: CT IMAGING | Facility: HOSPITAL | Age: 50
DRG: 446 | End: 2020-10-20
Payer: COMMERCIAL

## 2020-10-20 DIAGNOSIS — N20.0 NEPHROLITHIASIS: Primary | ICD-10-CM

## 2020-10-20 DIAGNOSIS — G89.4 CHRONIC PAIN SYNDROME: ICD-10-CM

## 2020-10-20 DIAGNOSIS — N20.1 RIGHT URETERAL STONE: ICD-10-CM

## 2020-10-20 PROBLEM — R65.10 SIRS (SYSTEMIC INFLAMMATORY RESPONSE SYNDROME) (HCC): Status: ACTIVE | Noted: 2020-10-20

## 2020-10-20 PROBLEM — D72.829 LEUKOCYTOSIS: Status: ACTIVE | Noted: 2020-10-20

## 2020-10-20 PROBLEM — R82.90 ABNORMAL URINALYSIS: Status: ACTIVE | Noted: 2020-10-20

## 2020-10-20 LAB
ALBUMIN SERPL BCP-MCNC: 4.1 G/DL (ref 3.5–5)
ALP SERPL-CCNC: 76 U/L (ref 46–116)
ALT SERPL W P-5'-P-CCNC: 20 U/L (ref 12–78)
ANION GAP SERPL CALCULATED.3IONS-SCNC: 10 MMOL/L (ref 4–13)
AST SERPL W P-5'-P-CCNC: 14 U/L (ref 5–45)
BACTERIA UR QL AUTO: ABNORMAL /HPF
BASOPHILS # BLD AUTO: 0.05 THOUSANDS/ΜL (ref 0–0.1)
BASOPHILS NFR BLD AUTO: 1 % (ref 0–1)
BILIRUB SERPL-MCNC: 0.77 MG/DL (ref 0.2–1)
BILIRUB UR QL STRIP: NEGATIVE
BUN SERPL-MCNC: 16 MG/DL (ref 5–25)
CALCIUM SERPL-MCNC: 8.8 MG/DL (ref 8.3–10.1)
CAOX CRY URNS QL MICRO: ABNORMAL /HPF
CHLORIDE SERPL-SCNC: 109 MMOL/L (ref 100–108)
CLARITY UR: ABNORMAL
CO2 SERPL-SCNC: 24 MMOL/L (ref 21–32)
COLOR UR: YELLOW
CREAT SERPL-MCNC: 1.21 MG/DL (ref 0.6–1.3)
EOSINOPHIL # BLD AUTO: 0.52 THOUSAND/ΜL (ref 0–0.61)
EOSINOPHIL NFR BLD AUTO: 5 % (ref 0–6)
ERYTHROCYTE [DISTWIDTH] IN BLOOD BY AUTOMATED COUNT: 15.5 % (ref 11.6–15.1)
GFR SERPL CREATININE-BSD FRML MDRD: 68 ML/MIN/1.73SQ M
GLUCOSE SERPL-MCNC: 99 MG/DL (ref 65–140)
GLUCOSE UR STRIP-MCNC: NEGATIVE MG/DL
HCT VFR BLD AUTO: 48.3 % (ref 36.5–49.3)
HGB BLD-MCNC: 15.6 G/DL (ref 12–17)
HGB UR QL STRIP.AUTO: ABNORMAL
HYALINE CASTS #/AREA URNS LPF: ABNORMAL /LPF
IMM GRANULOCYTES # BLD AUTO: 0.04 THOUSAND/UL (ref 0–0.2)
IMM GRANULOCYTES NFR BLD AUTO: 0 % (ref 0–2)
KETONES UR STRIP-MCNC: NEGATIVE MG/DL
LEUKOCYTE ESTERASE UR QL STRIP: NEGATIVE
LYMPHOCYTES # BLD AUTO: 2.23 THOUSANDS/ΜL (ref 0.6–4.47)
LYMPHOCYTES NFR BLD AUTO: 21 % (ref 14–44)
MCH RBC QN AUTO: 29.3 PG (ref 26.8–34.3)
MCHC RBC AUTO-ENTMCNC: 32.3 G/DL (ref 31.4–37.4)
MCV RBC AUTO: 91 FL (ref 82–98)
MONOCYTES # BLD AUTO: 0.78 THOUSAND/ΜL (ref 0.17–1.22)
MONOCYTES NFR BLD AUTO: 7 % (ref 4–12)
MUCOUS THREADS UR QL AUTO: ABNORMAL
NEUTROPHILS # BLD AUTO: 6.86 THOUSANDS/ΜL (ref 1.85–7.62)
NEUTS SEG NFR BLD AUTO: 66 % (ref 43–75)
NITRITE UR QL STRIP: NEGATIVE
NON-SQ EPI CELLS URNS QL MICRO: ABNORMAL /HPF
NRBC BLD AUTO-RTO: 0 /100 WBCS
PH UR STRIP.AUTO: 6 [PH]
PLATELET # BLD AUTO: 380 THOUSANDS/UL (ref 149–390)
PMV BLD AUTO: 10.1 FL (ref 8.9–12.7)
POTASSIUM SERPL-SCNC: 4.1 MMOL/L (ref 3.5–5.3)
PROT SERPL-MCNC: 7.5 G/DL (ref 6.4–8.2)
PROT UR STRIP-MCNC: ABNORMAL MG/DL
RBC # BLD AUTO: 5.32 MILLION/UL (ref 3.88–5.62)
RBC #/AREA URNS AUTO: ABNORMAL /HPF
SODIUM SERPL-SCNC: 143 MMOL/L (ref 136–145)
SP GR UR STRIP.AUTO: >=1.03 (ref 1–1.03)
UROBILINOGEN UR QL STRIP.AUTO: 1 E.U./DL
WBC # BLD AUTO: 10.48 THOUSAND/UL (ref 4.31–10.16)
WBC #/AREA URNS AUTO: ABNORMAL /HPF

## 2020-10-20 PROCEDURE — 99285 EMERGENCY DEPT VISIT HI MDM: CPT

## 2020-10-20 PROCEDURE — 96365 THER/PROPH/DIAG IV INF INIT: CPT

## 2020-10-20 PROCEDURE — 80053 COMPREHEN METABOLIC PANEL: CPT | Performed by: EMERGENCY MEDICINE

## 2020-10-20 PROCEDURE — 96376 TX/PRO/DX INJ SAME DRUG ADON: CPT

## 2020-10-20 PROCEDURE — 74176 CT ABD & PELVIS W/O CONTRAST: CPT

## 2020-10-20 PROCEDURE — G1004 CDSM NDSC: HCPCS

## 2020-10-20 PROCEDURE — 85025 COMPLETE CBC W/AUTO DIFF WBC: CPT | Performed by: EMERGENCY MEDICINE

## 2020-10-20 PROCEDURE — 96361 HYDRATE IV INFUSION ADD-ON: CPT

## 2020-10-20 PROCEDURE — 81001 URINALYSIS AUTO W/SCOPE: CPT | Performed by: EMERGENCY MEDICINE

## 2020-10-20 PROCEDURE — 36415 COLL VENOUS BLD VENIPUNCTURE: CPT | Performed by: EMERGENCY MEDICINE

## 2020-10-20 PROCEDURE — 96375 TX/PRO/DX INJ NEW DRUG ADDON: CPT

## 2020-10-20 PROCEDURE — 96372 THER/PROPH/DIAG INJ SC/IM: CPT

## 2020-10-20 PROCEDURE — 99285 EMERGENCY DEPT VISIT HI MDM: CPT | Performed by: EMERGENCY MEDICINE

## 2020-10-20 PROCEDURE — 87635 SARS-COV-2 COVID-19 AMP PRB: CPT | Performed by: PHYSICIAN ASSISTANT

## 2020-10-20 RX ORDER — HYDROMORPHONE HCL/PF 1 MG/ML
1 SYRINGE (ML) INJECTION ONCE
Status: COMPLETED | OUTPATIENT
Start: 2020-10-20 | End: 2020-10-20

## 2020-10-20 RX ORDER — KETOROLAC TROMETHAMINE 30 MG/ML
15 INJECTION, SOLUTION INTRAMUSCULAR; INTRAVENOUS EVERY 6 HOURS PRN
Status: DISCONTINUED | OUTPATIENT
Start: 2020-10-20 | End: 2020-10-21

## 2020-10-20 RX ORDER — ALBUTEROL SULFATE 90 UG/1
2 AEROSOL, METERED RESPIRATORY (INHALATION) EVERY 6 HOURS PRN
Status: DISCONTINUED | OUTPATIENT
Start: 2020-10-20 | End: 2020-10-23 | Stop reason: HOSPADM

## 2020-10-20 RX ORDER — SODIUM CHLORIDE 9 MG/ML
125 INJECTION, SOLUTION INTRAVENOUS CONTINUOUS
Status: DISCONTINUED | OUTPATIENT
Start: 2020-10-20 | End: 2020-10-21

## 2020-10-20 RX ORDER — KETOROLAC TROMETHAMINE 30 MG/ML
15 INJECTION, SOLUTION INTRAMUSCULAR; INTRAVENOUS ONCE
Status: COMPLETED | OUTPATIENT
Start: 2020-10-20 | End: 2020-10-20

## 2020-10-20 RX ORDER — NICOTINE 21 MG/24HR
1 PATCH, TRANSDERMAL 24 HOURS TRANSDERMAL DAILY
Status: DISCONTINUED | OUTPATIENT
Start: 2020-10-21 | End: 2020-10-23 | Stop reason: HOSPADM

## 2020-10-20 RX ORDER — HYDROMORPHONE HCL/PF 1 MG/ML
0.5 SYRINGE (ML) INJECTION
Status: DISCONTINUED | OUTPATIENT
Start: 2020-10-20 | End: 2020-10-20

## 2020-10-20 RX ORDER — OXYCODONE HYDROCHLORIDE 5 MG/1
5 TABLET ORAL EVERY 4 HOURS PRN
Status: DISCONTINUED | OUTPATIENT
Start: 2020-10-20 | End: 2020-10-22

## 2020-10-20 RX ORDER — ONDANSETRON 2 MG/ML
4 INJECTION INTRAMUSCULAR; INTRAVENOUS EVERY 4 HOURS PRN
Status: DISCONTINUED | OUTPATIENT
Start: 2020-10-20 | End: 2020-10-23 | Stop reason: HOSPADM

## 2020-10-20 RX ORDER — HYDROMORPHONE HCL/PF 1 MG/ML
0.5 SYRINGE (ML) INJECTION EVERY 4 HOURS PRN
Status: DISCONTINUED | OUTPATIENT
Start: 2020-10-20 | End: 2020-10-23 | Stop reason: HOSPADM

## 2020-10-20 RX ORDER — HYDROMORPHONE HCL 110MG/55ML
2 PATIENT CONTROLLED ANALGESIA SYRINGE INTRAVENOUS ONCE
Status: COMPLETED | OUTPATIENT
Start: 2020-10-20 | End: 2020-10-20

## 2020-10-20 RX ORDER — HEPARIN SODIUM 5000 [USP'U]/ML
5000 INJECTION, SOLUTION INTRAVENOUS; SUBCUTANEOUS EVERY 8 HOURS SCHEDULED
Status: DISCONTINUED | OUTPATIENT
Start: 2020-10-20 | End: 2020-10-23 | Stop reason: HOSPADM

## 2020-10-20 RX ORDER — TAMSULOSIN HYDROCHLORIDE 0.4 MG/1
0.4 CAPSULE ORAL
Status: DISCONTINUED | OUTPATIENT
Start: 2020-10-21 | End: 2020-10-23 | Stop reason: HOSPADM

## 2020-10-20 RX ORDER — DIAZEPAM 5 MG/1
5 TABLET ORAL EVERY 12 HOURS PRN
Status: DISCONTINUED | OUTPATIENT
Start: 2020-10-20 | End: 2020-10-23 | Stop reason: HOSPADM

## 2020-10-20 RX ORDER — FLUTICASONE FUROATE AND VILANTEROL 200; 25 UG/1; UG/1
1 POWDER RESPIRATORY (INHALATION) DAILY
Status: DISCONTINUED | OUTPATIENT
Start: 2020-10-21 | End: 2020-10-23 | Stop reason: HOSPADM

## 2020-10-20 RX ORDER — TAMSULOSIN HYDROCHLORIDE 0.4 MG/1
0.4 CAPSULE ORAL ONCE
Status: COMPLETED | OUTPATIENT
Start: 2020-10-20 | End: 2020-10-20

## 2020-10-20 RX ADMIN — TAMSULOSIN HYDROCHLORIDE 0.4 MG: 0.4 CAPSULE ORAL at 19:33

## 2020-10-20 RX ADMIN — SODIUM CHLORIDE 1000 ML: 0.9 INJECTION, SOLUTION INTRAVENOUS at 16:36

## 2020-10-20 RX ADMIN — HEPARIN SODIUM 5000 UNITS: 5000 INJECTION INTRAVENOUS; SUBCUTANEOUS at 23:41

## 2020-10-20 RX ADMIN — KETOROLAC TROMETHAMINE 15 MG: 30 INJECTION, SOLUTION INTRAMUSCULAR at 19:32

## 2020-10-20 RX ADMIN — KETOROLAC TROMETHAMINE 15 MG: 30 INJECTION, SOLUTION INTRAMUSCULAR at 23:40

## 2020-10-20 RX ADMIN — SODIUM CHLORIDE 125 ML/HR: 0.9 INJECTION, SOLUTION INTRAVENOUS at 23:40

## 2020-10-20 RX ADMIN — HYDROMORPHONE HYDROCHLORIDE 1 MG: 1 INJECTION, SOLUTION INTRAMUSCULAR; INTRAVENOUS; SUBCUTANEOUS at 17:06

## 2020-10-20 RX ADMIN — HYDROMORPHONE HYDROCHLORIDE 1 MG: 1 INJECTION, SOLUTION INTRAMUSCULAR; INTRAVENOUS; SUBCUTANEOUS at 17:43

## 2020-10-20 RX ADMIN — LIDOCAINE HYDROCHLORIDE 136 MG: 10 INJECTION, SOLUTION EPIDURAL; INFILTRATION; INTRACAUDAL; PERINEURAL at 19:51

## 2020-10-20 RX ADMIN — HYDROMORPHONE HYDROCHLORIDE 1 MG: 1 INJECTION, SOLUTION INTRAMUSCULAR; INTRAVENOUS; SUBCUTANEOUS at 16:37

## 2020-10-20 RX ADMIN — KETOROLAC TROMETHAMINE 15 MG: 30 INJECTION, SOLUTION INTRAMUSCULAR at 16:36

## 2020-10-20 RX ADMIN — HYDROMORPHONE HYDROCHLORIDE 2 MG: 2 INJECTION, SOLUTION INTRAMUSCULAR; INTRAVENOUS; SUBCUTANEOUS at 19:32

## 2020-10-21 ENCOUNTER — APPOINTMENT (OUTPATIENT)
Dept: RADIOLOGY | Facility: HOSPITAL | Age: 50
DRG: 446 | End: 2020-10-21
Payer: COMMERCIAL

## 2020-10-21 ENCOUNTER — TELEPHONE (OUTPATIENT)
Dept: FAMILY MEDICINE CLINIC | Facility: CLINIC | Age: 50
End: 2020-10-21

## 2020-10-21 ENCOUNTER — ANESTHESIA EVENT (OUTPATIENT)
Dept: PERIOP | Facility: HOSPITAL | Age: 50
DRG: 446 | End: 2020-10-21
Payer: COMMERCIAL

## 2020-10-21 ENCOUNTER — ANESTHESIA (OUTPATIENT)
Dept: PERIOP | Facility: HOSPITAL | Age: 50
DRG: 446 | End: 2020-10-21
Payer: COMMERCIAL

## 2020-10-21 ENCOUNTER — TELEPHONE (OUTPATIENT)
Dept: UROLOGY | Facility: CLINIC | Age: 50
End: 2020-10-21

## 2020-10-21 VITALS — HEART RATE: 65 BPM

## 2020-10-21 DIAGNOSIS — N20.0 KIDNEY STONE: Primary | ICD-10-CM

## 2020-10-21 DIAGNOSIS — G89.29 OTHER CHRONIC PAIN: ICD-10-CM

## 2020-10-21 PROBLEM — E87.6 HYPOKALEMIA: Status: ACTIVE | Noted: 2020-10-21

## 2020-10-21 PROBLEM — R65.10 SIRS (SYSTEMIC INFLAMMATORY RESPONSE SYNDROME) (HCC): Status: RESOLVED | Noted: 2020-10-20 | Resolved: 2020-10-21

## 2020-10-21 PROBLEM — E87.0 HYPERNATREMIA: Status: ACTIVE | Noted: 2020-10-21

## 2020-10-21 PROBLEM — D72.829 LEUKOCYTOSIS: Status: RESOLVED | Noted: 2020-10-20 | Resolved: 2020-10-21

## 2020-10-21 LAB
ANION GAP SERPL CALCULATED.3IONS-SCNC: 6 MMOL/L (ref 4–13)
ANION GAP SERPL CALCULATED.3IONS-SCNC: 9 MMOL/L (ref 4–13)
BASOPHILS # BLD AUTO: 0.05 THOUSANDS/ΜL (ref 0–0.1)
BASOPHILS NFR BLD AUTO: 1 % (ref 0–1)
BUN SERPL-MCNC: 16 MG/DL (ref 5–25)
BUN SERPL-MCNC: 17 MG/DL (ref 5–25)
CALCIUM SERPL-MCNC: 6.6 MG/DL (ref 8.3–10.1)
CALCIUM SERPL-MCNC: 8.2 MG/DL (ref 8.3–10.1)
CHLORIDE SERPL-SCNC: 110 MMOL/L (ref 100–108)
CHLORIDE SERPL-SCNC: 116 MMOL/L (ref 100–108)
CO2 SERPL-SCNC: 23 MMOL/L (ref 21–32)
CO2 SERPL-SCNC: 24 MMOL/L (ref 21–32)
CREAT SERPL-MCNC: 0.9 MG/DL (ref 0.6–1.3)
CREAT SERPL-MCNC: 1.01 MG/DL (ref 0.6–1.3)
EOSINOPHIL # BLD AUTO: 0.55 THOUSAND/ΜL (ref 0–0.61)
EOSINOPHIL NFR BLD AUTO: 6 % (ref 0–6)
ERYTHROCYTE [DISTWIDTH] IN BLOOD BY AUTOMATED COUNT: 15.6 % (ref 11.6–15.1)
GFR SERPL CREATININE-BSD FRML MDRD: 85 ML/MIN/1.73SQ M
GFR SERPL CREATININE-BSD FRML MDRD: 98 ML/MIN/1.73SQ M
GLUCOSE P FAST SERPL-MCNC: 86 MG/DL (ref 65–99)
GLUCOSE SERPL-MCNC: 122 MG/DL (ref 65–140)
GLUCOSE SERPL-MCNC: 86 MG/DL (ref 65–140)
HCT VFR BLD AUTO: 39.1 % (ref 36.5–49.3)
HGB BLD-MCNC: 12.5 G/DL (ref 12–17)
IMM GRANULOCYTES # BLD AUTO: 0.02 THOUSAND/UL (ref 0–0.2)
IMM GRANULOCYTES NFR BLD AUTO: 0 % (ref 0–2)
LYMPHOCYTES # BLD AUTO: 3.28 THOUSANDS/ΜL (ref 0.6–4.47)
LYMPHOCYTES NFR BLD AUTO: 36 % (ref 14–44)
MCH RBC QN AUTO: 29.1 PG (ref 26.8–34.3)
MCHC RBC AUTO-ENTMCNC: 32 G/DL (ref 31.4–37.4)
MCV RBC AUTO: 91 FL (ref 82–98)
MONOCYTES # BLD AUTO: 0.76 THOUSAND/ΜL (ref 0.17–1.22)
MONOCYTES NFR BLD AUTO: 8 % (ref 4–12)
NEUTROPHILS # BLD AUTO: 4.38 THOUSANDS/ΜL (ref 1.85–7.62)
NEUTS SEG NFR BLD AUTO: 49 % (ref 43–75)
NRBC BLD AUTO-RTO: 0 /100 WBCS
PLATELET # BLD AUTO: 300 THOUSANDS/UL (ref 149–390)
PMV BLD AUTO: 10.3 FL (ref 8.9–12.7)
POTASSIUM SERPL-SCNC: 3 MMOL/L (ref 3.5–5.3)
POTASSIUM SERPL-SCNC: 4.4 MMOL/L (ref 3.5–5.3)
RBC # BLD AUTO: 4.3 MILLION/UL (ref 3.88–5.62)
SARS-COV-2 RNA RESP QL NAA+PROBE: NEGATIVE
SODIUM SERPL-SCNC: 140 MMOL/L (ref 136–145)
SODIUM SERPL-SCNC: 148 MMOL/L (ref 136–145)
WBC # BLD AUTO: 9.04 THOUSAND/UL (ref 4.31–10.16)

## 2020-10-21 PROCEDURE — 80048 BASIC METABOLIC PNL TOTAL CA: CPT | Performed by: INTERNAL MEDICINE

## 2020-10-21 PROCEDURE — 74420 UROGRAPHY RTRGR +-KUB: CPT

## 2020-10-21 PROCEDURE — C1769 GUIDE WIRE: HCPCS | Performed by: UROLOGY

## 2020-10-21 PROCEDURE — 99242 OFF/OP CONSLTJ NEW/EST SF 20: CPT | Performed by: UROLOGY

## 2020-10-21 PROCEDURE — 82360 CALCULUS ASSAY QUANT: CPT | Performed by: UROLOGY

## 2020-10-21 PROCEDURE — 0TC68ZZ EXTIRPATION OF MATTER FROM RIGHT URETER, VIA NATURAL OR ARTIFICIAL OPENING ENDOSCOPIC: ICD-10-PCS | Performed by: UROLOGY

## 2020-10-21 PROCEDURE — 52356 CYSTO/URETERO W/LITHOTRIPSY: CPT | Performed by: UROLOGY

## 2020-10-21 PROCEDURE — 0T768DZ DILATION OF RIGHT URETER WITH INTRALUMINAL DEVICE, VIA NATURAL OR ARTIFICIAL OPENING ENDOSCOPIC: ICD-10-PCS | Performed by: UROLOGY

## 2020-10-21 PROCEDURE — 99225 PR SBSQ OBSERVATION CARE/DAY 25 MINUTES: CPT | Performed by: HOSPITALIST

## 2020-10-21 PROCEDURE — BT1D1ZZ FLUOROSCOPY OF RIGHT KIDNEY, URETER AND BLADDER USING LOW OSMOLAR CONTRAST: ICD-10-PCS | Performed by: UROLOGY

## 2020-10-21 PROCEDURE — 87086 URINE CULTURE/COLONY COUNT: CPT | Performed by: STUDENT IN AN ORGANIZED HEALTH CARE EDUCATION/TRAINING PROGRAM

## 2020-10-21 PROCEDURE — 88300 SURGICAL PATH GROSS: CPT | Performed by: PATHOLOGY

## 2020-10-21 PROCEDURE — 80048 BASIC METABOLIC PNL TOTAL CA: CPT | Performed by: STUDENT IN AN ORGANIZED HEALTH CARE EDUCATION/TRAINING PROGRAM

## 2020-10-21 PROCEDURE — C2617 STENT, NON-COR, TEM W/O DEL: HCPCS | Performed by: UROLOGY

## 2020-10-21 PROCEDURE — 85025 COMPLETE CBC W/AUTO DIFF WBC: CPT | Performed by: STUDENT IN AN ORGANIZED HEALTH CARE EDUCATION/TRAINING PROGRAM

## 2020-10-21 DEVICE — STENT URETERAL 6 FR 26CM INLAY OPTIMA: Type: IMPLANTABLE DEVICE | Site: URETER | Status: FUNCTIONAL

## 2020-10-21 RX ORDER — GABAPENTIN 300 MG/1
300 CAPSULE ORAL 2 TIMES DAILY
Status: DISCONTINUED | OUTPATIENT
Start: 2020-10-21 | End: 2020-10-23 | Stop reason: HOSPADM

## 2020-10-21 RX ORDER — OXYBUTYNIN CHLORIDE 5 MG/1
15 TABLET ORAL ONCE
Status: COMPLETED | OUTPATIENT
Start: 2020-10-21 | End: 2020-10-21

## 2020-10-21 RX ORDER — ONDANSETRON 2 MG/ML
4 INJECTION INTRAMUSCULAR; INTRAVENOUS ONCE AS NEEDED
Status: DISCONTINUED | OUTPATIENT
Start: 2020-10-21 | End: 2020-10-21 | Stop reason: HOSPADM

## 2020-10-21 RX ORDER — HYDROMORPHONE HCL/PF 1 MG/ML
1 SYRINGE (ML) INJECTION ONCE
Status: COMPLETED | OUTPATIENT
Start: 2020-10-21 | End: 2020-10-21

## 2020-10-21 RX ORDER — CEPHALEXIN 500 MG/1
500 CAPSULE ORAL EVERY 6 HOURS SCHEDULED
Qty: 3 CAPSULE | Refills: 0 | Status: SHIPPED | OUTPATIENT
Start: 2020-10-21 | End: 2020-10-23 | Stop reason: HOSPADM

## 2020-10-21 RX ORDER — IBUPROFEN 800 MG/1
TABLET ORAL
Qty: 90 TABLET | Refills: 1 | Status: SHIPPED | OUTPATIENT
Start: 2020-10-21 | End: 2020-12-25

## 2020-10-21 RX ORDER — KETOROLAC TROMETHAMINE 30 MG/ML
15 INJECTION, SOLUTION INTRAMUSCULAR; INTRAVENOUS EVERY 6 HOURS PRN
Status: DISCONTINUED | OUTPATIENT
Start: 2020-10-21 | End: 2020-10-22

## 2020-10-21 RX ORDER — GLYCOPYRROLATE 0.2 MG/ML
INJECTION INTRAMUSCULAR; INTRAVENOUS AS NEEDED
Status: DISCONTINUED | OUTPATIENT
Start: 2020-10-21 | End: 2020-10-21

## 2020-10-21 RX ORDER — FENTANYL CITRATE/PF 50 MCG/ML
25 SYRINGE (ML) INJECTION
Status: COMPLETED | OUTPATIENT
Start: 2020-10-21 | End: 2020-10-21

## 2020-10-21 RX ORDER — SODIUM CHLORIDE, SODIUM LACTATE, POTASSIUM CHLORIDE, CALCIUM CHLORIDE 600; 310; 30; 20 MG/100ML; MG/100ML; MG/100ML; MG/100ML
INJECTION, SOLUTION INTRAVENOUS CONTINUOUS PRN
Status: DISCONTINUED | OUTPATIENT
Start: 2020-10-21 | End: 2020-10-21

## 2020-10-21 RX ORDER — OXYBUTYNIN CHLORIDE 5 MG/1
5 TABLET ORAL 3 TIMES DAILY
Status: DISCONTINUED | OUTPATIENT
Start: 2020-10-21 | End: 2020-10-23 | Stop reason: HOSPADM

## 2020-10-21 RX ORDER — SODIUM CHLORIDE, SODIUM LACTATE, POTASSIUM CHLORIDE, CALCIUM CHLORIDE 600; 310; 30; 20 MG/100ML; MG/100ML; MG/100ML; MG/100ML
125 INJECTION, SOLUTION INTRAVENOUS CONTINUOUS
Status: DISCONTINUED | OUTPATIENT
Start: 2020-10-21 | End: 2020-10-22

## 2020-10-21 RX ORDER — HYDROMORPHONE HCL/PF 1 MG/ML
0.5 SYRINGE (ML) INJECTION
Status: DISCONTINUED | OUTPATIENT
Start: 2020-10-21 | End: 2020-10-21 | Stop reason: HOSPADM

## 2020-10-21 RX ORDER — MIDAZOLAM HYDROCHLORIDE 2 MG/2ML
INJECTION, SOLUTION INTRAMUSCULAR; INTRAVENOUS AS NEEDED
Status: DISCONTINUED | OUTPATIENT
Start: 2020-10-21 | End: 2020-10-21

## 2020-10-21 RX ORDER — PHENAZOPYRIDINE HYDROCHLORIDE 100 MG/1
100 TABLET, FILM COATED ORAL
Status: DISCONTINUED | OUTPATIENT
Start: 2020-10-21 | End: 2020-10-23 | Stop reason: HOSPADM

## 2020-10-21 RX ORDER — TAMSULOSIN HYDROCHLORIDE 0.4 MG/1
0.4 CAPSULE ORAL
Qty: 15 CAPSULE | Refills: 0 | Status: SHIPPED | OUTPATIENT
Start: 2020-10-21 | End: 2020-10-23 | Stop reason: HOSPADM

## 2020-10-21 RX ORDER — ALBUTEROL SULFATE 2.5 MG/3ML
2.5 SOLUTION RESPIRATORY (INHALATION) ONCE AS NEEDED
Status: DISCONTINUED | OUTPATIENT
Start: 2020-10-21 | End: 2020-10-21 | Stop reason: HOSPADM

## 2020-10-21 RX ORDER — HYDROMORPHONE HCL/PF 1 MG/ML
0.5 SYRINGE (ML) INJECTION
Status: COMPLETED | OUTPATIENT
Start: 2020-10-21 | End: 2020-10-21

## 2020-10-21 RX ORDER — PHENAZOPYRIDINE HYDROCHLORIDE 200 MG/1
200 TABLET, FILM COATED ORAL 3 TIMES DAILY PRN
Qty: 10 TABLET | Refills: 0 | Status: SHIPPED | OUTPATIENT
Start: 2020-10-21 | End: 2020-10-23 | Stop reason: HOSPADM

## 2020-10-21 RX ORDER — ONDANSETRON 2 MG/ML
INJECTION INTRAMUSCULAR; INTRAVENOUS AS NEEDED
Status: DISCONTINUED | OUTPATIENT
Start: 2020-10-21 | End: 2020-10-21

## 2020-10-21 RX ORDER — OXYBUTYNIN CHLORIDE 5 MG/1
5 TABLET ORAL EVERY 6 HOURS PRN
Qty: 30 TABLET | Refills: 0 | Status: SHIPPED | OUTPATIENT
Start: 2020-10-21 | End: 2020-10-23 | Stop reason: HOSPADM

## 2020-10-21 RX ORDER — EPHEDRINE SULFATE 50 MG/ML
INJECTION INTRAVENOUS AS NEEDED
Status: DISCONTINUED | OUTPATIENT
Start: 2020-10-21 | End: 2020-10-21

## 2020-10-21 RX ORDER — POTASSIUM CHLORIDE 20 MEQ/1
40 TABLET, EXTENDED RELEASE ORAL ONCE
Status: COMPLETED | OUTPATIENT
Start: 2020-10-21 | End: 2020-10-21

## 2020-10-21 RX ORDER — ATROPA BELLADONNA AND OPIUM 16.2; 3 MG/1; MG/1
30 SUPPOSITORY RECTAL EVERY 8 HOURS PRN
Status: DISCONTINUED | OUTPATIENT
Start: 2020-10-21 | End: 2020-10-23 | Stop reason: HOSPADM

## 2020-10-21 RX ORDER — DEXAMETHASONE SODIUM PHOSPHATE 10 MG/ML
INJECTION, SOLUTION INTRAMUSCULAR; INTRAVENOUS AS NEEDED
Status: DISCONTINUED | OUTPATIENT
Start: 2020-10-21 | End: 2020-10-21

## 2020-10-21 RX ORDER — OXYCODONE HYDROCHLORIDE 10 MG/1
10 TABLET ORAL EVERY 6 HOURS PRN
Status: DISCONTINUED | OUTPATIENT
Start: 2020-10-21 | End: 2020-10-22

## 2020-10-21 RX ORDER — FENTANYL CITRATE 50 UG/ML
INJECTION, SOLUTION INTRAMUSCULAR; INTRAVENOUS AS NEEDED
Status: DISCONTINUED | OUTPATIENT
Start: 2020-10-21 | End: 2020-10-21

## 2020-10-21 RX ORDER — KETOROLAC TROMETHAMINE 30 MG/ML
INJECTION, SOLUTION INTRAMUSCULAR; INTRAVENOUS AS NEEDED
Status: DISCONTINUED | OUTPATIENT
Start: 2020-10-21 | End: 2020-10-21

## 2020-10-21 RX ORDER — KETOROLAC TROMETHAMINE 30 MG/ML
30 INJECTION, SOLUTION INTRAMUSCULAR; INTRAVENOUS ONCE
Status: DISCONTINUED | OUTPATIENT
Start: 2020-10-21 | End: 2020-10-21 | Stop reason: HOSPADM

## 2020-10-21 RX ORDER — ATROPA BELLADONNA AND OPIUM 16.2; 3 MG/1; MG/1
30 SUPPOSITORY RECTAL EVERY 8 HOURS PRN
Status: DISCONTINUED | OUTPATIENT
Start: 2020-10-21 | End: 2020-10-21

## 2020-10-21 RX ORDER — LIDOCAINE HYDROCHLORIDE 10 MG/ML
INJECTION, SOLUTION EPIDURAL; INFILTRATION; INTRACAUDAL; PERINEURAL AS NEEDED
Status: DISCONTINUED | OUTPATIENT
Start: 2020-10-21 | End: 2020-10-21

## 2020-10-21 RX ORDER — SODIUM CHLORIDE, SODIUM GLUCONATE, SODIUM ACETATE, POTASSIUM CHLORIDE, MAGNESIUM CHLORIDE, SODIUM PHOSPHATE, DIBASIC, AND POTASSIUM PHOSPHATE .53; .5; .37; .037; .03; .012; .00082 G/100ML; G/100ML; G/100ML; G/100ML; G/100ML; G/100ML; G/100ML
125 INJECTION, SOLUTION INTRAVENOUS CONTINUOUS
Status: DISCONTINUED | OUTPATIENT
Start: 2020-10-21 | End: 2020-10-21

## 2020-10-21 RX ORDER — MAGNESIUM HYDROXIDE 1200 MG/15ML
LIQUID ORAL AS NEEDED
Status: DISCONTINUED | OUTPATIENT
Start: 2020-10-21 | End: 2020-10-21 | Stop reason: HOSPADM

## 2020-10-21 RX ORDER — PROPOFOL 10 MG/ML
INJECTION, EMULSION INTRAVENOUS AS NEEDED
Status: DISCONTINUED | OUTPATIENT
Start: 2020-10-21 | End: 2020-10-21

## 2020-10-21 RX ORDER — METOCLOPRAMIDE HYDROCHLORIDE 5 MG/ML
10 INJECTION INTRAMUSCULAR; INTRAVENOUS ONCE AS NEEDED
Status: DISCONTINUED | OUTPATIENT
Start: 2020-10-21 | End: 2020-10-21 | Stop reason: HOSPADM

## 2020-10-21 RX ADMIN — POTASSIUM CHLORIDE 40 MEQ: 1500 TABLET, EXTENDED RELEASE ORAL at 08:11

## 2020-10-21 RX ADMIN — LIDOCAINE HYDROCHLORIDE 50 MG: 10 INJECTION, SOLUTION EPIDURAL; INFILTRATION; INTRACAUDAL at 12:13

## 2020-10-21 RX ADMIN — MIDAZOLAM HYDROCHLORIDE 2 MG: 1 INJECTION, SOLUTION INTRAMUSCULAR; INTRAVENOUS at 12:08

## 2020-10-21 RX ADMIN — SODIUM CHLORIDE 125 ML/HR: 0.9 INJECTION, SOLUTION INTRAVENOUS at 05:19

## 2020-10-21 RX ADMIN — HYDROMORPHONE HYDROCHLORIDE 0.5 MG: 1 INJECTION, SOLUTION INTRAMUSCULAR; INTRAVENOUS; SUBCUTANEOUS at 14:05

## 2020-10-21 RX ADMIN — EPHEDRINE SULFATE 10 MG: 50 INJECTION, SOLUTION INTRAVENOUS at 12:34

## 2020-10-21 RX ADMIN — EPHEDRINE SULFATE 5 MG: 50 INJECTION, SOLUTION INTRAVENOUS at 12:28

## 2020-10-21 RX ADMIN — HYDROMORPHONE HYDROCHLORIDE 0.5 MG: 1 INJECTION, SOLUTION INTRAMUSCULAR; INTRAVENOUS; SUBCUTANEOUS at 13:55

## 2020-10-21 RX ADMIN — HYDROMORPHONE HYDROCHLORIDE 0.5 MG: 1 INJECTION, SOLUTION INTRAMUSCULAR; INTRAVENOUS; SUBCUTANEOUS at 04:47

## 2020-10-21 RX ADMIN — KETOROLAC TROMETHAMINE 15 MG: 30 INJECTION, SOLUTION INTRAMUSCULAR at 12:38

## 2020-10-21 RX ADMIN — FENTANYL CITRATE 25 MCG: 50 INJECTION INTRAMUSCULAR; INTRAVENOUS at 13:09

## 2020-10-21 RX ADMIN — PROPOFOL 200 MG: 10 INJECTION, EMULSION INTRAVENOUS at 12:13

## 2020-10-21 RX ADMIN — ONDANSETRON 4 MG: 2 INJECTION INTRAMUSCULAR; INTRAVENOUS at 12:13

## 2020-10-21 RX ADMIN — HYDROMORPHONE HYDROCHLORIDE 0.5 MG: 1 INJECTION, SOLUTION INTRAMUSCULAR; INTRAVENOUS; SUBCUTANEOUS at 13:45

## 2020-10-21 RX ADMIN — SODIUM CHLORIDE, SODIUM LACTATE, POTASSIUM CHLORIDE, AND CALCIUM CHLORIDE: .6; .31; .03; .02 INJECTION, SOLUTION INTRAVENOUS at 12:08

## 2020-10-21 RX ADMIN — HEPARIN SODIUM 5000 UNITS: 5000 INJECTION INTRAVENOUS; SUBCUTANEOUS at 21:10

## 2020-10-21 RX ADMIN — OXYCODONE HYDROCHLORIDE 5 MG: 5 TABLET ORAL at 21:10

## 2020-10-21 RX ADMIN — HYDROMORPHONE HYDROCHLORIDE 1 MG: 1 INJECTION, SOLUTION INTRAMUSCULAR; INTRAVENOUS; SUBCUTANEOUS at 22:25

## 2020-10-21 RX ADMIN — ATROPA BELLADONNA AND OPIUM 1 SUPPOSITORY: 16.2; 3 SUPPOSITORY RECTAL at 18:20

## 2020-10-21 RX ADMIN — SODIUM CHLORIDE, SODIUM LACTATE, POTASSIUM CHLORIDE, AND CALCIUM CHLORIDE 125 ML/HR: .6; .31; .03; .02 INJECTION, SOLUTION INTRAVENOUS at 14:43

## 2020-10-21 RX ADMIN — FENTANYL CITRATE 25 MCG: 50 INJECTION INTRAMUSCULAR; INTRAVENOUS at 13:04

## 2020-10-21 RX ADMIN — OXYCODONE HYDROCHLORIDE 10 MG: 10 TABLET ORAL at 19:07

## 2020-10-21 RX ADMIN — FENTANYL CITRATE 50 MCG: 50 INJECTION INTRAMUSCULAR; INTRAVENOUS at 12:13

## 2020-10-21 RX ADMIN — FENTANYL CITRATE 25 MCG: 50 INJECTION INTRAMUSCULAR; INTRAVENOUS at 12:36

## 2020-10-21 RX ADMIN — NICOTINE 1 PATCH: 14 PATCH TRANSDERMAL at 08:11

## 2020-10-21 RX ADMIN — HEPARIN SODIUM 5000 UNITS: 5000 INJECTION INTRAVENOUS; SUBCUTANEOUS at 05:14

## 2020-10-21 RX ADMIN — FENTANYL CITRATE 25 MCG: 50 INJECTION INTRAMUSCULAR; INTRAVENOUS at 13:19

## 2020-10-21 RX ADMIN — DEXAMETHASONE SODIUM PHOSPHATE 4 MG: 10 INJECTION, SOLUTION INTRAMUSCULAR; INTRAVENOUS at 12:13

## 2020-10-21 RX ADMIN — SODIUM CHLORIDE, SODIUM GLUCONATE, SODIUM ACETATE, POTASSIUM CHLORIDE AND MAGNESIUM CHLORIDE 125 ML/HR: 526; 502; 368; 37; 30 INJECTION, SOLUTION INTRAVENOUS at 08:24

## 2020-10-21 RX ADMIN — GABAPENTIN 300 MG: 300 CAPSULE ORAL at 15:08

## 2020-10-21 RX ADMIN — OXYBUTYNIN CHLORIDE 5 MG: 5 TABLET ORAL at 16:07

## 2020-10-21 RX ADMIN — KETOROLAC TROMETHAMINE 15 MG: 30 INJECTION, SOLUTION INTRAMUSCULAR at 19:06

## 2020-10-21 RX ADMIN — HYDROMORPHONE HYDROCHLORIDE 0.5 MG: 1 INJECTION, SOLUTION INTRAMUSCULAR; INTRAVENOUS; SUBCUTANEOUS at 16:06

## 2020-10-21 RX ADMIN — GLYCOPYRROLATE 0.2 MG: 0.2 INJECTION, SOLUTION INTRAMUSCULAR; INTRAVENOUS at 12:27

## 2020-10-21 RX ADMIN — OXYBUTYNIN CHLORIDE 5 MG: 5 TABLET ORAL at 21:10

## 2020-10-21 RX ADMIN — OXYBUTYNIN CHLORIDE 15 MG: 5 TABLET ORAL at 13:39

## 2020-10-21 RX ADMIN — Medication 2000 MG: at 12:18

## 2020-10-21 RX ADMIN — PHENAZOPYRIDINE 100 MG: 100 TABLET ORAL at 16:07

## 2020-10-21 RX ADMIN — HYDROMORPHONE HYDROCHLORIDE 0.5 MG: 1 INJECTION, SOLUTION INTRAMUSCULAR; INTRAVENOUS; SUBCUTANEOUS at 13:24

## 2020-10-21 RX ADMIN — HYDROMORPHONE HYDROCHLORIDE 0.5 MG: 1 INJECTION, SOLUTION INTRAMUSCULAR; INTRAVENOUS; SUBCUTANEOUS at 21:10

## 2020-10-21 RX ADMIN — ATROPA BELLADONNA AND OPIUM 1 SUPPOSITORY: 16.2; 3 SUPPOSITORY RECTAL at 22:42

## 2020-10-21 RX ADMIN — KETOROLAC TROMETHAMINE 15 MG: 30 INJECTION, SOLUTION INTRAMUSCULAR at 08:12

## 2020-10-21 RX ADMIN — DIAZEPAM 5 MG: 5 TABLET ORAL at 16:06

## 2020-10-21 RX ADMIN — FENTANYL CITRATE 25 MCG: 50 INJECTION INTRAMUSCULAR; INTRAVENOUS at 13:14

## 2020-10-21 RX ADMIN — SODIUM CHLORIDE, SODIUM LACTATE, POTASSIUM CHLORIDE, AND CALCIUM CHLORIDE 500 ML: .6; .31; .03; .02 INJECTION, SOLUTION INTRAVENOUS at 14:44

## 2020-10-21 RX ADMIN — SODIUM CHLORIDE 2000 ML: 0.9 INJECTION, SOLUTION INTRAVENOUS at 22:30

## 2020-10-21 RX ADMIN — TAMSULOSIN HYDROCHLORIDE 0.4 MG: 0.4 CAPSULE ORAL at 16:07

## 2020-10-21 RX ADMIN — HYDROMORPHONE HYDROCHLORIDE 0.5 MG: 1 INJECTION, SOLUTION INTRAMUSCULAR; INTRAVENOUS; SUBCUTANEOUS at 13:35

## 2020-10-22 ENCOUNTER — TELEPHONE (OUTPATIENT)
Dept: NEUROSURGERY | Facility: CLINIC | Age: 50
End: 2020-10-22

## 2020-10-22 PROBLEM — Z96.0 STATUS POST CYSTOSCOPY WITH URETERAL STENT PLACEMENT: Status: ACTIVE | Noted: 2020-10-22

## 2020-10-22 PROBLEM — E87.0 HYPERNATREMIA: Status: RESOLVED | Noted: 2020-10-21 | Resolved: 2020-10-22

## 2020-10-22 PROBLEM — E87.6 HYPOKALEMIA: Status: RESOLVED | Noted: 2020-10-21 | Resolved: 2020-10-22

## 2020-10-22 LAB
ANION GAP SERPL CALCULATED.3IONS-SCNC: 9 MMOL/L (ref 4–13)
BACTERIA UR QL AUTO: ABNORMAL /HPF
BILIRUB UR QL STRIP: NEGATIVE
BUN SERPL-MCNC: 14 MG/DL (ref 5–25)
CALCIUM SERPL-MCNC: 8.4 MG/DL (ref 8.3–10.1)
CHLORIDE SERPL-SCNC: 111 MMOL/L (ref 100–108)
CLARITY UR: ABNORMAL
CO2 SERPL-SCNC: 25 MMOL/L (ref 21–32)
COLOR UR: YELLOW
CREAT SERPL-MCNC: 1.02 MG/DL (ref 0.6–1.3)
ERYTHROCYTE [DISTWIDTH] IN BLOOD BY AUTOMATED COUNT: 15.4 % (ref 11.6–15.1)
GFR SERPL CREATININE-BSD FRML MDRD: 84 ML/MIN/1.73SQ M
GLUCOSE P FAST SERPL-MCNC: 118 MG/DL (ref 65–99)
GLUCOSE SERPL-MCNC: 118 MG/DL (ref 65–140)
GLUCOSE UR STRIP-MCNC: NEGATIVE MG/DL
HCT VFR BLD AUTO: 36.7 % (ref 36.5–49.3)
HGB BLD-MCNC: 11.8 G/DL (ref 12–17)
HGB UR QL STRIP.AUTO: ABNORMAL
KETONES UR STRIP-MCNC: NEGATIVE MG/DL
LEUKOCYTE ESTERASE UR QL STRIP: ABNORMAL
MCH RBC QN AUTO: 29.4 PG (ref 26.8–34.3)
MCHC RBC AUTO-ENTMCNC: 32.2 G/DL (ref 31.4–37.4)
MCV RBC AUTO: 92 FL (ref 82–98)
NITRITE UR QL STRIP: NEGATIVE
NON-SQ EPI CELLS URNS QL MICRO: ABNORMAL /HPF
PH UR STRIP.AUTO: 6 [PH]
PLATELET # BLD AUTO: 294 THOUSANDS/UL (ref 149–390)
PMV BLD AUTO: 10.1 FL (ref 8.9–12.7)
POTASSIUM SERPL-SCNC: 4 MMOL/L (ref 3.5–5.3)
PROT UR STRIP-MCNC: ABNORMAL MG/DL
RBC # BLD AUTO: 4.01 MILLION/UL (ref 3.88–5.62)
RBC #/AREA URNS AUTO: ABNORMAL /HPF
SODIUM SERPL-SCNC: 145 MMOL/L (ref 136–145)
SP GR UR STRIP.AUTO: 1.01 (ref 1–1.03)
UROBILINOGEN UR QL STRIP.AUTO: 0.2 E.U./DL
WBC # BLD AUTO: 11.92 THOUSAND/UL (ref 4.31–10.16)
WBC #/AREA URNS AUTO: ABNORMAL /HPF

## 2020-10-22 PROCEDURE — 85027 COMPLETE CBC AUTOMATED: CPT | Performed by: INTERNAL MEDICINE

## 2020-10-22 PROCEDURE — 81001 URINALYSIS AUTO W/SCOPE: CPT | Performed by: INTERNAL MEDICINE

## 2020-10-22 PROCEDURE — 80048 BASIC METABOLIC PNL TOTAL CA: CPT | Performed by: INTERNAL MEDICINE

## 2020-10-22 PROCEDURE — 99232 SBSQ HOSP IP/OBS MODERATE 35: CPT | Performed by: HOSPITALIST

## 2020-10-22 PROCEDURE — 99232 SBSQ HOSP IP/OBS MODERATE 35: CPT | Performed by: UROLOGY

## 2020-10-22 RX ORDER — DOCUSATE SODIUM 100 MG/1
100 CAPSULE, LIQUID FILLED ORAL 2 TIMES DAILY
Status: DISCONTINUED | OUTPATIENT
Start: 2020-10-22 | End: 2020-10-23 | Stop reason: HOSPADM

## 2020-10-22 RX ORDER — KETOROLAC TROMETHAMINE 30 MG/ML
15 INJECTION, SOLUTION INTRAMUSCULAR; INTRAVENOUS EVERY 6 HOURS SCHEDULED
Status: COMPLETED | OUTPATIENT
Start: 2020-10-22 | End: 2020-10-23

## 2020-10-22 RX ORDER — HYDROMORPHONE HCL/PF 1 MG/ML
1 SYRINGE (ML) INJECTION ONCE
Status: COMPLETED | OUTPATIENT
Start: 2020-10-22 | End: 2020-10-22

## 2020-10-22 RX ORDER — SODIUM CHLORIDE, SODIUM LACTATE, POTASSIUM CHLORIDE, CALCIUM CHLORIDE 600; 310; 30; 20 MG/100ML; MG/100ML; MG/100ML; MG/100ML
100 INJECTION, SOLUTION INTRAVENOUS CONTINUOUS
Status: DISCONTINUED | OUTPATIENT
Start: 2020-10-22 | End: 2020-10-23 | Stop reason: HOSPADM

## 2020-10-22 RX ORDER — OXYCODONE HYDROCHLORIDE 10 MG/1
10 TABLET ORAL EVERY 6 HOURS PRN
Status: DISCONTINUED | OUTPATIENT
Start: 2020-10-22 | End: 2020-10-23 | Stop reason: HOSPADM

## 2020-10-22 RX ADMIN — HEPARIN SODIUM 5000 UNITS: 5000 INJECTION INTRAVENOUS; SUBCUTANEOUS at 21:13

## 2020-10-22 RX ADMIN — KETOROLAC TROMETHAMINE 15 MG: 30 INJECTION, SOLUTION INTRAMUSCULAR at 12:53

## 2020-10-22 RX ADMIN — OXYBUTYNIN CHLORIDE 5 MG: 5 TABLET ORAL at 09:53

## 2020-10-22 RX ADMIN — OXYBUTYNIN CHLORIDE 5 MG: 5 TABLET ORAL at 21:13

## 2020-10-22 RX ADMIN — OXYCODONE HYDROCHLORIDE 5 MG: 5 TABLET ORAL at 13:47

## 2020-10-22 RX ADMIN — HEPARIN SODIUM 5000 UNITS: 5000 INJECTION INTRAVENOUS; SUBCUTANEOUS at 04:15

## 2020-10-22 RX ADMIN — PHENAZOPYRIDINE 100 MG: 100 TABLET ORAL at 07:40

## 2020-10-22 RX ADMIN — KETOROLAC TROMETHAMINE 15 MG: 30 INJECTION, SOLUTION INTRAMUSCULAR at 07:41

## 2020-10-22 RX ADMIN — OXYCODONE HYDROCHLORIDE 10 MG: 10 TABLET ORAL at 01:28

## 2020-10-22 RX ADMIN — ONDANSETRON 4 MG: 2 INJECTION INTRAMUSCULAR; INTRAVENOUS at 17:26

## 2020-10-22 RX ADMIN — KETOROLAC TROMETHAMINE 15 MG: 30 INJECTION, SOLUTION INTRAMUSCULAR at 23:23

## 2020-10-22 RX ADMIN — KETOROLAC TROMETHAMINE 15 MG: 30 INJECTION, SOLUTION INTRAMUSCULAR at 01:28

## 2020-10-22 RX ADMIN — HYDROMORPHONE HYDROCHLORIDE 0.5 MG: 1 INJECTION, SOLUTION INTRAMUSCULAR; INTRAVENOUS; SUBCUTANEOUS at 04:15

## 2020-10-22 RX ADMIN — SODIUM CHLORIDE, SODIUM LACTATE, POTASSIUM CHLORIDE, AND CALCIUM CHLORIDE 500 ML: .6; .31; .03; .02 INJECTION, SOLUTION INTRAVENOUS at 11:09

## 2020-10-22 RX ADMIN — DIAZEPAM 5 MG: 5 TABLET ORAL at 04:15

## 2020-10-22 RX ADMIN — HYDROMORPHONE HYDROCHLORIDE 0.5 MG: 1 INJECTION, SOLUTION INTRAMUSCULAR; INTRAVENOUS; SUBCUTANEOUS at 21:13

## 2020-10-22 RX ADMIN — HYDROMORPHONE HYDROCHLORIDE 1 MG: 1 INJECTION, SOLUTION INTRAMUSCULAR; INTRAVENOUS; SUBCUTANEOUS at 15:44

## 2020-10-22 RX ADMIN — OXYBUTYNIN CHLORIDE 5 MG: 5 TABLET ORAL at 17:31

## 2020-10-22 RX ADMIN — GABAPENTIN 300 MG: 300 CAPSULE ORAL at 09:53

## 2020-10-22 RX ADMIN — DOCUSATE SODIUM 100 MG: 100 CAPSULE, LIQUID FILLED ORAL at 17:31

## 2020-10-22 RX ADMIN — OXYCODONE HYDROCHLORIDE 10 MG: 10 TABLET ORAL at 23:24

## 2020-10-22 RX ADMIN — GABAPENTIN 300 MG: 300 CAPSULE ORAL at 17:31

## 2020-10-22 RX ADMIN — SODIUM CHLORIDE, SODIUM LACTATE, POTASSIUM CHLORIDE, AND CALCIUM CHLORIDE 100 ML/HR: .6; .31; .03; .02 INJECTION, SOLUTION INTRAVENOUS at 17:24

## 2020-10-22 RX ADMIN — PHENAZOPYRIDINE 100 MG: 100 TABLET ORAL at 17:31

## 2020-10-22 RX ADMIN — NICOTINE 1 PATCH: 14 PATCH TRANSDERMAL at 09:53

## 2020-10-22 RX ADMIN — PHENAZOPYRIDINE 100 MG: 100 TABLET ORAL at 13:08

## 2020-10-22 RX ADMIN — KETOROLAC TROMETHAMINE 15 MG: 30 INJECTION, SOLUTION INTRAMUSCULAR at 17:31

## 2020-10-22 RX ADMIN — HYDROMORPHONE HYDROCHLORIDE 0.5 MG: 1 INJECTION, SOLUTION INTRAMUSCULAR; INTRAVENOUS; SUBCUTANEOUS at 11:58

## 2020-10-22 RX ADMIN — OXYCODONE HYDROCHLORIDE 5 MG: 5 TABLET ORAL at 04:16

## 2020-10-22 RX ADMIN — OXYCODONE HYDROCHLORIDE 10 MG: 10 TABLET ORAL at 09:53

## 2020-10-22 RX ADMIN — TAMSULOSIN HYDROCHLORIDE 0.4 MG: 0.4 CAPSULE ORAL at 17:31

## 2020-10-22 RX ADMIN — DOCUSATE SODIUM 100 MG: 100 CAPSULE, LIQUID FILLED ORAL at 09:53

## 2020-10-22 RX ADMIN — SODIUM CHLORIDE, SODIUM LACTATE, POTASSIUM CHLORIDE, AND CALCIUM CHLORIDE 100 ML/HR: .6; .31; .03; .02 INJECTION, SOLUTION INTRAVENOUS at 13:06

## 2020-10-23 ENCOUNTER — APPOINTMENT (INPATIENT)
Dept: CT IMAGING | Facility: HOSPITAL | Age: 50
DRG: 446 | End: 2020-10-23
Payer: COMMERCIAL

## 2020-10-23 ENCOUNTER — LAB (OUTPATIENT)
Dept: LAB | Facility: CLINIC | Age: 50
DRG: 446 | End: 2020-10-23
Payer: COMMERCIAL

## 2020-10-23 VITALS
SYSTOLIC BLOOD PRESSURE: 137 MMHG | WEIGHT: 196.87 LBS | HEART RATE: 51 BPM | HEIGHT: 73 IN | TEMPERATURE: 97.8 F | DIASTOLIC BLOOD PRESSURE: 77 MMHG | OXYGEN SATURATION: 96 % | BODY MASS INDEX: 26.09 KG/M2 | RESPIRATION RATE: 18 BRPM

## 2020-10-23 LAB — BACTERIA UR CULT: ABNORMAL

## 2020-10-23 PROCEDURE — 80346 BENZODIAZEPINES1-12: CPT | Performed by: FAMILY MEDICINE

## 2020-10-23 PROCEDURE — 99239 HOSP IP/OBS DSCHRG MGMT >30: CPT | Performed by: HOSPITALIST

## 2020-10-23 RX ORDER — GABAPENTIN 300 MG/1
300 CAPSULE ORAL 2 TIMES DAILY
Qty: 20 CAPSULE | Refills: 0 | Status: SHIPPED | OUTPATIENT
Start: 2020-10-23 | End: 2020-10-23 | Stop reason: HOSPADM

## 2020-10-23 RX ORDER — PHENAZOPYRIDINE HYDROCHLORIDE 100 MG/1
100 TABLET, FILM COATED ORAL
Qty: 6 TABLET | Refills: 0 | Status: SHIPPED | OUTPATIENT
Start: 2020-10-23 | End: 2020-10-25

## 2020-10-23 RX ADMIN — HYDROMORPHONE HYDROCHLORIDE 0.5 MG: 1 INJECTION, SOLUTION INTRAMUSCULAR; INTRAVENOUS; SUBCUTANEOUS at 03:35

## 2020-10-23 RX ADMIN — HEPARIN SODIUM 5000 UNITS: 5000 INJECTION INTRAVENOUS; SUBCUTANEOUS at 05:26

## 2020-10-23 RX ADMIN — OXYBUTYNIN CHLORIDE 5 MG: 5 TABLET ORAL at 08:21

## 2020-10-23 RX ADMIN — SODIUM CHLORIDE, SODIUM LACTATE, POTASSIUM CHLORIDE, AND CALCIUM CHLORIDE 100 ML/HR: .6; .31; .03; .02 INJECTION, SOLUTION INTRAVENOUS at 03:35

## 2020-10-23 RX ADMIN — PHENAZOPYRIDINE 100 MG: 100 TABLET ORAL at 08:20

## 2020-10-23 RX ADMIN — KETOROLAC TROMETHAMINE 15 MG: 30 INJECTION, SOLUTION INTRAMUSCULAR at 11:27

## 2020-10-23 RX ADMIN — GABAPENTIN 300 MG: 300 CAPSULE ORAL at 08:20

## 2020-10-23 RX ADMIN — NICOTINE 1 PATCH: 14 PATCH TRANSDERMAL at 08:21

## 2020-10-23 RX ADMIN — OXYCODONE HYDROCHLORIDE 15 MG: 5 TABLET ORAL at 09:11

## 2020-10-23 RX ADMIN — FLUTICASONE FUROATE AND VILANTEROL TRIFENATATE 1 PUFF: 200; 25 POWDER RESPIRATORY (INHALATION) at 08:23

## 2020-10-23 RX ADMIN — PHENAZOPYRIDINE 100 MG: 100 TABLET ORAL at 11:27

## 2020-10-23 RX ADMIN — KETOROLAC TROMETHAMINE 15 MG: 30 INJECTION, SOLUTION INTRAMUSCULAR at 05:25

## 2020-10-23 RX ADMIN — DOCUSATE SODIUM 100 MG: 100 CAPSULE, LIQUID FILLED ORAL at 08:20

## 2020-10-26 ENCOUNTER — TRANSITIONAL CARE MANAGEMENT (OUTPATIENT)
Dept: FAMILY MEDICINE CLINIC | Facility: CLINIC | Age: 50
End: 2020-10-26

## 2020-10-30 LAB
CALCIUM OXALATE DIHYDRATE MFR STONE IR: 70 %
COLOR STONE: NORMAL
COM MFR STONE: 30 %
COMMENT-STONE3: NORMAL
COMPOSITION: NORMAL
LABORATORY COMMENT REPORT: NORMAL
PHOTO: NORMAL
SIZE STONE: NORMAL MM
SPEC SOURCE SUBJ: NORMAL
STONE ANALYSIS-IMP: NORMAL
WT STONE: 15 MG

## 2020-11-09 ENCOUNTER — TELEPHONE (OUTPATIENT)
Dept: FAMILY MEDICINE CLINIC | Facility: CLINIC | Age: 50
End: 2020-11-09

## 2020-11-09 DIAGNOSIS — M96.1 POST LAMINECTOMY SYNDROME: ICD-10-CM

## 2020-11-09 DIAGNOSIS — G89.4 CHRONIC PAIN SYNDROME: ICD-10-CM

## 2020-11-09 DIAGNOSIS — G89.29 CHRONIC RIGHT SI JOINT PAIN: ICD-10-CM

## 2020-11-09 DIAGNOSIS — M53.3 CHRONIC RIGHT SI JOINT PAIN: ICD-10-CM

## 2020-11-10 RX ORDER — OXYCODONE HYDROCHLORIDE 10 MG/1
10 TABLET ORAL EVERY 6 HOURS PRN
Qty: 120 TABLET | Refills: 0 | Status: SHIPPED | OUTPATIENT
Start: 2020-11-15 | End: 2020-11-11 | Stop reason: SDUPTHER

## 2020-11-11 RX ORDER — OXYCODONE HYDROCHLORIDE 10 MG/1
10 TABLET ORAL EVERY 6 HOURS PRN
Qty: 120 TABLET | Refills: 0 | Status: SHIPPED | OUTPATIENT
Start: 2020-11-11 | End: 2020-12-10 | Stop reason: SDUPTHER

## 2020-11-27 ENCOUNTER — TELEPHONE (OUTPATIENT)
Dept: UROLOGY | Facility: MEDICAL CENTER | Age: 50
End: 2020-11-27

## 2020-11-27 LAB — MISCELLANEOUS LAB TEST RESULT: NORMAL

## 2020-12-07 ENCOUNTER — TELEPHONE (OUTPATIENT)
Dept: FAMILY MEDICINE CLINIC | Facility: CLINIC | Age: 50
End: 2020-12-07

## 2020-12-07 DIAGNOSIS — M96.1 POST LAMINECTOMY SYNDROME: ICD-10-CM

## 2020-12-07 DIAGNOSIS — G89.4 CHRONIC PAIN SYNDROME: ICD-10-CM

## 2020-12-07 DIAGNOSIS — M53.3 CHRONIC RIGHT SI JOINT PAIN: ICD-10-CM

## 2020-12-07 DIAGNOSIS — G89.29 CHRONIC RIGHT SI JOINT PAIN: ICD-10-CM

## 2020-12-10 RX ORDER — OXYCODONE HYDROCHLORIDE 10 MG/1
10 TABLET ORAL EVERY 6 HOURS PRN
Qty: 120 TABLET | Refills: 0 | Status: SHIPPED | OUTPATIENT
Start: 2020-12-10 | End: 2021-01-07 | Stop reason: SDUPTHER

## 2020-12-14 ENCOUNTER — TELEPHONE (OUTPATIENT)
Dept: OTHER | Facility: OTHER | Age: 50
End: 2020-12-14

## 2020-12-14 ENCOUNTER — TELEPHONE (OUTPATIENT)
Dept: OTHER | Facility: HOSPITAL | Age: 50
End: 2020-12-14

## 2020-12-14 ENCOUNTER — NURSE TRIAGE (OUTPATIENT)
Dept: OTHER | Facility: OTHER | Age: 50
End: 2020-12-14

## 2020-12-25 DIAGNOSIS — G89.29 OTHER CHRONIC PAIN: ICD-10-CM

## 2020-12-25 RX ORDER — IBUPROFEN 800 MG/1
TABLET ORAL
Qty: 90 TABLET | Refills: 1 | Status: SHIPPED | OUTPATIENT
Start: 2020-12-25 | End: 2021-02-26

## 2021-01-01 DIAGNOSIS — J45.40 MODERATE PERSISTENT ASTHMA WITHOUT COMPLICATION: ICD-10-CM

## 2021-01-01 RX ORDER — ALBUTEROL SULFATE 90 UG/1
AEROSOL, METERED RESPIRATORY (INHALATION)
Qty: 18 G | Refills: 5 | Status: SHIPPED | OUTPATIENT
Start: 2021-01-01 | End: 2021-06-13

## 2021-01-06 ENCOUNTER — TELEPHONE (OUTPATIENT)
Dept: FAMILY MEDICINE CLINIC | Facility: CLINIC | Age: 51
End: 2021-01-06

## 2021-01-06 DIAGNOSIS — F41.8 DEPRESSION WITH ANXIETY: ICD-10-CM

## 2021-01-06 DIAGNOSIS — M96.1 POST LAMINECTOMY SYNDROME: ICD-10-CM

## 2021-01-06 DIAGNOSIS — G89.4 CHRONIC PAIN SYNDROME: ICD-10-CM

## 2021-01-06 DIAGNOSIS — M53.3 CHRONIC RIGHT SI JOINT PAIN: ICD-10-CM

## 2021-01-06 DIAGNOSIS — G89.29 CHRONIC RIGHT SI JOINT PAIN: ICD-10-CM

## 2021-01-07 RX ORDER — OXYCODONE HYDROCHLORIDE 10 MG/1
10 TABLET ORAL EVERY 6 HOURS PRN
Qty: 120 TABLET | Refills: 0 | Status: SHIPPED | OUTPATIENT
Start: 2021-01-07 | End: 2021-02-05 | Stop reason: SDUPTHER

## 2021-01-07 RX ORDER — DIAZEPAM 5 MG/1
5 TABLET ORAL EVERY 12 HOURS PRN
Qty: 60 TABLET | Refills: 2 | Status: SHIPPED | OUTPATIENT
Start: 2021-01-07 | End: 2021-02-05 | Stop reason: SDUPTHER

## 2021-01-07 NOTE — TELEPHONE ENCOUNTER
Chart and PDMP reviewed  Medications refilled  Has pending appt with Dr Issac Boyer on Jan 20th that he needs to keep

## 2021-01-26 RX ORDER — AMOXICILLIN 500 MG/1
TABLET, FILM COATED ORAL
COMMUNITY
Start: 2020-12-29 | End: 2021-11-09 | Stop reason: ALTCHOICE

## 2021-01-27 ENCOUNTER — OFFICE VISIT (OUTPATIENT)
Dept: FAMILY MEDICINE CLINIC | Facility: CLINIC | Age: 51
End: 2021-01-27

## 2021-01-27 VITALS
DIASTOLIC BLOOD PRESSURE: 80 MMHG | BODY MASS INDEX: 26.53 KG/M2 | TEMPERATURE: 98.6 F | WEIGHT: 200.2 LBS | HEIGHT: 73 IN | SYSTOLIC BLOOD PRESSURE: 130 MMHG | HEART RATE: 74 BPM | RESPIRATION RATE: 18 BRPM | OXYGEN SATURATION: 99 %

## 2021-01-27 DIAGNOSIS — F11.90 CHRONIC, CONTINUOUS USE OF OPIOIDS: Primary | ICD-10-CM

## 2021-01-27 DIAGNOSIS — Z51.81 MEDICATION MONITORING ENCOUNTER: ICD-10-CM

## 2021-01-27 PROCEDURE — 80365 DRUG SCREENING OXYCODONE: CPT | Performed by: FAMILY MEDICINE

## 2021-01-27 PROCEDURE — 80307 DRUG TEST PRSMV CHEM ANLYZR: CPT | Performed by: FAMILY MEDICINE

## 2021-01-27 PROCEDURE — 99213 OFFICE O/P EST LOW 20 MIN: CPT | Performed by: FAMILY MEDICINE

## 2021-01-27 NOTE — ASSESSMENT & PLAN NOTE
Last refill Oxycodone 10mg x120 pills (30 days supple) on 1/11/21 & Diazepam 10mg x60 pills (30 days supple) on 1/7/21    As benzodiazepine UDS (+) on 10/23/20 & oxycodone UDS (+) on 6/23/20   - rpt benzodiazepine UDS today

## 2021-01-27 NOTE — PROGRESS NOTES
CLINIC VISIT NOTE - [facility] [place]    Robert Lazaro 44 y o  male MRN: [id number]  Encounter: [id number],  Primary Care Provider: Vianey Jones MD      Assessments & Plans:     Problem List Items Addressed This Visit        Other    Chronic, continuous use of opioids - Primary     Last refill Oxycodone 10mg x120 pills (30 days supple) on 1/11/21 & Diazepam 10mg x60 pills (30 days supple) on 1/7/21    As benzodiazepine UDS (+) on 10/23/20 & oxycodone UDS (+) on 6/23/20   - rpt benzodiazepine UDS today         Relevant Orders    Oxycodone/Oxymorphone urine        Patient Active Problem List   Diagnosis    Back pain    Nephrolithiasis    Chronic pain syndrome    Status post insertion of spinal cord stimulator    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    Dysphagia    Intractable episodic cluster headache    Allergic rhinitis    Abnormal head CT    Blood pressure elevated without history of HTN    Chronic right SI joint pain    Depression with anxiety    Dyshidrotic dermatitis    Erectile dysfunction of non-organic origin    Moderate persistent asthma without complication    Post laminectomy syndrome    TMJ syndrome    Dental abscess    Screening for STD (sexually transmitted disease)    Lingual tonsil hypertrophy    Sleep apnea    Other chest pain    Chronic, continuous use of opioids    Chronic prescription benzodiazepine use    Medical marijuana use    Hoarseness    Tobacco abuse    Elevated hematocrit    Elevated platelet count    Hypercontractile esophagus    Right shoulder pain    Cervical radiculopathy    Abnormal CBC    Pneumonia due to infectious organism    Esophageal dysphagia    Incomplete tear of right rotator cuff    Viral upper respiratory tract infection    Partial tear of right subscapularis tendon    Therapeutic opioid induced constipation    Exposure to sexually transmitted disease (STD)    Cough    Gastritis without bleeding    Peripheral polyneuropathy    Seborrheic keratosis    Abnormal urinalysis    Chronic obstructive asthma (HCC)    Status post cystoscopy with ureteral stent placement         Recent Visits:     Recent Visits  No visits were found meeting these conditions  Showing recent visits within past 7 days and meeting all other requirements     Today's Visits  Date Type Provider Dept   01/27/21 Office Visit MD Nick Leach today's visits and meeting all other requirements     Future Appointments  No visits were found meeting these conditions  Showing future appointments within next 150 days and meeting all other requirements       Subjective:     HPI:  50-YOM present for pain meds refill  Last refill Oxycodone 10mg x120 pills (30 days supple) on 1/11/21 & Diazepam 10mg x60 pills (30 days supple) on 1/7/21  As benzodiazepine UDS (+) on 10/23/20 & oxycodone UDS (+) on 6/23/20  Otherwise pt w/o any other concern or complaint  Review of System:     Review of Systems   Constitutional: Negative for activity change, appetite change and fever  HENT: Negative for ear discharge, ear pain, facial swelling, hearing loss, postnasal drip, rhinorrhea, sore throat, tinnitus and voice change  Eyes: Negative for pain, discharge, redness and visual disturbance  Respiratory: Negative for apnea, cough, choking, chest tightness, shortness of breath, wheezing and stridor  Cardiovascular: Negative for chest pain and palpitations  Gastrointestinal: Negative for abdominal distention, abdominal pain, constipation, diarrhea, nausea and vomiting  Genitourinary: Negative for dysuria  Musculoskeletal: Positive for back pain  Negative for arthralgias, neck pain and neck stiffness  Neurological: Positive for headaches  Negative for tremors, seizures and weakness  Psychiatric/Behavioral: Negative for agitation, behavioral problems, confusion, self-injury, sleep disturbance and suicidal ideas  History:     Past Medical History:   Diagnosis Date    Allergic     seasonal     Allergic rhinitis     Anxiety     Arthritis     Back pain     Chronic obstructive asthma (HCC)     Chronic pain syndrome     Cluster headaches     Depression     Insomnia     Kidney stones     Laryngospasm     Leukocytosis     Migraine     Myocardial infarction (HCC)     Nephrolithiasis     Organic impotence     Pneumonia due to infectious organism 1/16/2019    Seasonal allergies     Sleep apnea     does not use CPAP    Stroke ([facility] [address] ) 2015    TMJ (temporomandibular joint syndrome)     Wheezing     last assessed 05/19/17     Past Surgical History:   Procedure Laterality Date    BACK SURGERY      *9, 5515-1513, nervectomy 2006, total of 10    BUCCAL MASS EXCISION N/A 3/26/2018    Procedure: BIOPSY LINGUAL TONSIL (FLOW/ STANDARD PATH)  EVAL UNDER ANESTHESIA;  Surgeon: Eric Navarro MD;  [facility];  Service: ENT    COLONOSCOPY      FL RETROGRADE PYELOGRAM  10/21/2020    HERNIA REPAIR      KIDNEY SURGERY      KNEE ARTHROSCOPY      LITHOTRIPSY      multiple    LUMBAR One [address] SURGERY  2015    MANDIBLE FRACTURE SURGERY      titanium plate    PELVIC SYMPHYSIS FUSION      IA CYSTO/URETERO W/LITHOTRIPSY &INDWELL STENT INSRT Right 10/21/2020    Procedure: CYSTOSCOPY URETEROSCOPY WITH LITHOTRIPSY HOLMIUM LASER, RETROGRADE PYELOGRAM AND INSERTION STENT URETERAL;  Surgeon: Julien Griggs MD;  [facility];  Service: Urology    IA ESOPHAGOGASTRODUODENOSCOPY TRANSORAL DIAGNOSTIC N/A 2/22/2018    Procedure: ESOPHAGOGASTRODUODENOSCOPY (EGD); Surgeon: Wilda Gleason MD;  [facility]; Service: Gastroenterology    IA ESOPHAGOGASTRODUODENOSCOPY TRANSORAL DIAGNOSTIC N/A 4/1/2019    Procedure: EGD W/ DILATION;  Surgeon: Wilda Gleason MD;  Location[facility];   Service: Gastroenterology    IA REVISE/REMOVE SPINAL NEUROSTIM/ Left 6/9/2017    Procedure: REMOVAL OF A THORACIC SCS PLACED VIA LAMINECTOMY AND REMOVAL LEFT BUTTOCK GENERATOR; IMPULSE MONITORING;  Surgeon: Lee Claire MD;  [facility];  Service: Neurosurgery    [address] ARTHROSCOP,SURG,W/ROTAT CUFF REPR Right 4/4/2019    Procedure: RIGHT SHOULDER ARTHROSCOPIC SUBSCAPULARIS TENDON REPAIR;  Surgeon: Linda Smith MD;  [facility];  Service: Orthopedics    NC SURG IMPLNT [address] N/A 9/8/2016    Procedure: DORSAL COLUMN STIMULATOR PLACEMENT (IMPULSE MONITORING); Surgeon: Meghna Lopes MD;  [facility];  Service: Orthopedics    SACROILIAC JOINT FUSION  2015    SHOULDER SURGERY Left     2     Social History   Social History     Substance and Sexual Activity   Alcohol Use Yes    Frequency: Monthly or less    Drinks per session: 3 or 4    Comment: rarely      Social History     Substance and Sexual Activity   Drug Use Yes    Frequency: 7 0 times per week    Types: Marijuana    Comment: medical marijuana daily for chronic pain 1/2 ounce     Social History     Tobacco Use   Smoking Status Current Every Day Smoker    Packs/day: 0 50    Years: 25 00    Pack years: 12 50   Smokeless Tobacco Former User       Medications & Allergies:   all medications and allergies reviewed  1  [unfilled]    PTA Medications:  Current Outpatient Medications on File Prior to Visit   Medication Sig Dispense Refill    albuterol (PROVENTIL HFA,VENTOLIN HFA) 90 mcg/act inhaler INHALE 2 PUFFS BY MOUTH EVERY 6 HOURS AS NEEDED FOR WHEEZING 18 g 5    amitriptyline (ELAVIL) 25 mg tablet TAKE 1 TABLET(25 MG) BY MOUTH DAILY AT BEDTIME 30 tablet 5    diazepam (VALIUM) 5 mg tablet Take 1 tablet (5 mg total) by mouth every 12 (twelve) hours as needed for anxiety or muscle spasms 60 tablet 2    diltiazem (CARDIZEM) 60 mg tablet Take 1 tablet (60 mg total) by mouth 4 (four) times a day 120 tablet 2    fluticasone-vilanterol (BREO ELLIPTA) 200-25 MCG/INH inhaler Inhale 1 puff daily Rinse mouth after use   3 Inhaler 3    ibuprofen (MOTRIN) 800 mg tablet TAKE 1 TABLET(800 MG) BY MOUTH THREE TIMES DAILY AS NEEDED FOR MILD PAIN 90 tablet 1    Naloxone HCl (NARCAN) 4 MG/0 1ML LIQD In case of overdose: 1 spray in one nostril x 1, may repeat in 2 min if remains unresponsive  1 each 0    omeprazole (PriLOSEC) 40 MG capsule Take 1 capsule (40 mg total) by mouth daily 30 capsule 3    oxyCODONE (ROXICODONE) 10 MG TABS Take 1 tablet (10 mg total) by mouth every 6 (six) hours as needed for severe pain Take 1 tablet (10mg) by mouth every 6 hours as needed for severe pain  Max daily amount : 4 tablets (40mg)Max Daily Amount: 40 mg 120 tablet 0    tadalafil (CIALIS) 5 MG tablet Take 1 tablet (5 mg total) by mouth daily as needed for erectile dysfunction 30 tablet 6    Triamcinolone Acetonide 55 MCG/ACT AERO 1 Act (55 mcg total) into each nostril daily 1 Bottle 3    amoxicillin (AMOXIL) 500 MG tablet        No current facility-administered medications on file prior to visit  Objective & Vitals:   Vitals: /80 (BP Location: Left arm, Patient Position: Sitting, Cuff Size: Standard)   Pulse 74   Temp 98 6 °F (37 °C) (Tympanic)   Resp 18   Ht 6' 1" (1 854 m)   Wt 90 8 kg (200 lb 3 2 oz)   SpO2 99%   BMI 26 41 kg/m²       Labs & Imagings:   I have personally reviewed pertinent reports  No results found for this or any previous visit (from the past 24 hour(s))  I have personally reviewed pertinent reports  Fl Retrograde Pyelogram 10/22/2020  Impression: Fluoroscopic guidance provided for right retrograde pyelogram and ureteral stent placement  Please see procedure report for further details  Workstation performed: [id number]     Ct Renal Stone Study Abdomen Pelvis Without Contrast 10/20/2020  Impression: Nonobstructing 4 mm calculus in the distal right ureter/UVJ  Top normal sized right ureter  No hydronephrosis  Fatty infiltration of liver  Colonic diverticulosis  Normal appendix   Workstation performed: [id number]       Physical Exam:     Physical Exam  Constitutional:       General: He is not in acute distress  Appearance: Normal appearance  He is well-developed and normal weight  He is not ill-appearing, toxic-appearing or diaphoretic  HENT:      Head: Normocephalic and atraumatic  Right Ear: External ear normal       Left Ear: External ear normal       Nose: Nose normal  No congestion or rhinorrhea  Mouth/Throat:      Mouth: Mucous membranes are moist       Pharynx: Oropharynx is clear  No oropharyngeal exudate or posterior oropharyngeal erythema  Eyes:      General: No scleral icterus  Right eye: No discharge  Left eye: No discharge  Extraocular Movements: Extraocular movements intact  Conjunctiva/sclera: Conjunctivae normal       Pupils: Pupils are equal, round, and reactive to light  Neck:      Musculoskeletal: Normal range of motion and neck supple  Cardiovascular:      Rate and Rhythm: Normal rate and regular rhythm  Pulses: Normal pulses  Heart sounds: Normal heart sounds  No murmur  No friction rub  No gallop  Pulmonary:      Effort: Pulmonary effort is normal  No respiratory distress  Breath sounds: Normal breath sounds  No stridor  No wheezing, rhonchi or rales  Chest:      Chest wall: No tenderness  Abdominal:      General: Bowel sounds are normal  There is no distension  Palpations: Abdomen is soft  There is no mass  Tenderness: There is no abdominal tenderness  There is no guarding or rebound  Hernia: No hernia is present  Musculoskeletal: Normal range of motion  General: No swelling, deformity or signs of injury  Right lower leg: No edema  Left lower leg: No edema  Skin:     General: Skin is warm  Capillary Refill: Capillary refill takes less than 2 seconds  Coloration: Skin is not jaundiced  Neurological:      General: No focal deficit present        Mental Status: He is alert and oriented to person, place, and time  Mental status is at baseline  Psychiatric:         Mood and Affect: Mood normal          Behavior: Behavior normal          Thought Content: Thought content normal          Judgment: Judgment normal            Future Labs & Appointments:     FUTURE LABS:  Lab Frequency Next Occurrence   XR chest pa & lateral Once 10/25/2019   CT chest abdomen pelvis w contrast Once 06/03/2020   CT chest w contrast Once 06/25/2020       FUTURE CONFIRMED APPOINTMENTS:  Future Appointments   Date Time Provider Drew Knight   2/23/2021  2:45 PM SERA Ramirez M D   PGY-1, Family Medicine  01/27/21  2:12 PM    Dear reader, please be aware that portions of my note contain dictated text  I have done my best to proof-read this note prior to signing  However, there may be occasional unnoticed errors pertaining to "sound-alike" words and/or grammar during my dictation process  If there is any words or information that is unclear or appears erroneous, please kindly let me know and I will clarify and/or addend my notes accordingly  Thank you for your understanding

## 2021-01-30 LAB
OXYCODONE UR QL CFM: 7794 NG/ML
OXYCODONE+OXYMORPHONE SERPLBLD QL SCN: POSITIVE
OXYCODONE+OXYMORPHONE UR QL SCN: NORMAL NG/ML
OXYCODONE+OXYMORPHONE UR QL SCN: POSITIVE
OXYMORPHONE UR CFM-MCNC: 4777 NG/ML
OXYMORPHONE UR QL CFM: POSITIVE

## 2021-02-04 ENCOUNTER — TELEPHONE (OUTPATIENT)
Dept: FAMILY MEDICINE CLINIC | Facility: CLINIC | Age: 51
End: 2021-02-04

## 2021-02-04 DIAGNOSIS — G89.4 CHRONIC PAIN SYNDROME: ICD-10-CM

## 2021-02-04 DIAGNOSIS — G89.29 CHRONIC RIGHT SI JOINT PAIN: ICD-10-CM

## 2021-02-04 DIAGNOSIS — M53.3 CHRONIC RIGHT SI JOINT PAIN: ICD-10-CM

## 2021-02-04 DIAGNOSIS — F41.8 DEPRESSION WITH ANXIETY: ICD-10-CM

## 2021-02-04 DIAGNOSIS — M96.1 POST LAMINECTOMY SYNDROME: ICD-10-CM

## 2021-02-05 RX ORDER — OXYCODONE HYDROCHLORIDE 10 MG/1
10 TABLET ORAL EVERY 6 HOURS PRN
Qty: 120 TABLET | Refills: 0 | Status: SHIPPED | OUTPATIENT
Start: 2021-02-05 | End: 2021-03-09 | Stop reason: SDUPTHER

## 2021-02-05 RX ORDER — DIAZEPAM 5 MG/1
5 TABLET ORAL EVERY 12 HOURS PRN
Qty: 60 TABLET | Refills: 0 | Status: SHIPPED | OUTPATIENT
Start: 2021-02-05 | End: 2021-03-09 | Stop reason: SDUPTHER

## 2021-02-08 ENCOUNTER — TELEMEDICINE (OUTPATIENT)
Dept: FAMILY MEDICINE CLINIC | Facility: CLINIC | Age: 51
End: 2021-02-08

## 2021-02-08 ENCOUNTER — TELEPHONE (OUTPATIENT)
Dept: FAMILY MEDICINE CLINIC | Facility: CLINIC | Age: 51
End: 2021-02-08

## 2021-02-08 VITALS — BODY MASS INDEX: 26.51 KG/M2 | WEIGHT: 200 LBS | HEIGHT: 73 IN

## 2021-02-08 DIAGNOSIS — B34.9 VIRAL INFECTION, UNSPECIFIED: Primary | ICD-10-CM

## 2021-02-08 DIAGNOSIS — B34.9 VIRAL INFECTION, UNSPECIFIED: ICD-10-CM

## 2021-02-08 PROCEDURE — U0005 INFEC AGEN DETEC AMPLI PROBE: HCPCS | Performed by: FAMILY MEDICINE

## 2021-02-08 PROCEDURE — 4004F PT TOBACCO SCREEN RCVD TLK: CPT | Performed by: FAMILY MEDICINE

## 2021-02-08 PROCEDURE — U0003 INFECTIOUS AGENT DETECTION BY NUCLEIC ACID (DNA OR RNA); SEVERE ACUTE RESPIRATORY SYNDROME CORONAVIRUS 2 (SARS-COV-2) (CORONAVIRUS DISEASE [COVID-19]), AMPLIFIED PROBE TECHNIQUE, MAKING USE OF HIGH THROUGHPUT TECHNOLOGIES AS DESCRIBED BY CMS-2020-01-R: HCPCS | Performed by: FAMILY MEDICINE

## 2021-02-08 PROCEDURE — 3008F BODY MASS INDEX DOCD: CPT | Performed by: FAMILY MEDICINE

## 2021-02-08 PROCEDURE — 99213 OFFICE O/P EST LOW 20 MIN: CPT | Performed by: FAMILY MEDICINE

## 2021-02-08 NOTE — PROGRESS NOTES
COVID-19 Virtual Visit     Assessment/Plan:    Problem List Items Addressed This Visit        Other    Viral infection, unspecified - Primary     Symptom onset 2/4/21  Unchanged, no known COVID exposure  Due to symptoms, have ordered COVID testing today, will follow-up with results  Recommend symptomatic treatment at this time  Encouraged hydration  Advised to continue precautions including hand washing, mask wearing, and social distancing         Relevant Orders    Novel Coronavirus (COVID-19), PCR [facility] Collected at [facility] or [facility]         Disposition:     I recommended self-quarantine for 10 days and to watch for symptoms until 14 days after exposure  If patient were to develop symptoms, they should self isolate and call our office for further guidance  I have spent 10 minutes directly with the patient  Encounter provider Lilliam Iglesias MD    Provider located at [address] [address] [facility]   [phone]    Recent Visits  Date Type Provider Dept   02/04/21 Telephone JAKI Gu 98 recent visits within past 7 days and meeting all other requirements     Today's Visits  Date Type Provider Dept   02/08/21 Telephone Verner Cure, MD [facility]   02/08/21 Telemedicine Lilliam Iglesias MD [facility] today's visits and meeting all other requirements     Future Appointments  No visits were found meeting these conditions  Showing future appointments within next 150 days and meeting all other requirements        Patient agrees to participate in a virtual check in via telephone or video visit instead of presenting to the office to address urgent/immediate medical needs  Patient is aware this is a billable service  After connecting through Telephone, the patient was identified by name and date of birth   Odalis Bennett was informed that this was a telemedicine visit and that the exam was being conducted confidentially over secure lines  My office door was closed  No one else was in the room  Harriet Bennett acknowledged consent and understanding of privacy and security of the telemedicine visit  I informed the patient that I have reviewed his record in Epic and presented the opportunity for him to ask any questions regarding the visit today  The patient agreed to participate  It was my intent to perform this visit via video technology but the patient was not able to do a video connection so the visit was completed via audio telephone only  Subjective:   Jena Dan is a 44 y o  male who is concerned about COVID-19  Patient's symptoms include fever, chills, fatigue, malaise, sore throat, shortness of breath, myalgias and headache  Patient denies cough and abdominal pain       Date of symptom onset: 2/4/2021    Exposure:   Contact with a person who is under investigation (PUI) for or who is positive for COVID-19 within the last 14 days?: No    Hospitalized recently for fever and/or lower respiratory symptoms?: No      Currently a healthcare worker that is involved in direct patient care?: No      Works in a special setting where the risk of COVID-19 transmission may be high? (this may include long-term care, correctional and shelter facilities; homeless shelters; assisted-living facilities and group homes ): No      Resident in a special setting where the risk of COVID-19 transmission may be high? (this may include long-term care, correctional and shelter facilities; homeless shelters; assisted-living facilities and group homes ): No      Lab Results   Component Value Date    SARSCOV2 Negative 10/20/2020    Winton Brittle Not Detected 06/22/2020     Past Medical History:   Diagnosis Date    Allergic     seasonal     Allergic rhinitis     Anxiety     Arthritis     Back pain     Chronic obstructive asthma (HCC)     Chronic pain syndrome     Cluster headaches     Depression     Insomnia  Kidney stones     Laryngospasm     Leukocytosis     Migraine     Myocardial infarction (HCC)     Nephrolithiasis     Organic impotence     Pneumonia due to infectious organism 1/16/2019    Seasonal allergies     Sleep apnea     does not use CPAP    Stroke [facility]) 2015    TMJ (temporomandibular joint syndrome)     Wheezing     last assessed 05/19/17     Past Surgical History:   Procedure Laterality Date    BACK SURGERY      *9, 5470-3461, nervectomy 2006, total of 10    BUCCAL MASS EXCISION N/A 3/26/2018    Procedure: BIOPSY LINGUAL TONSIL (FLOW/ STANDARD PATH)  EVAL UNDER ANESTHESIA;  Surgeon: Nydia Powers MD;  [facility];  Service: ENT    COLONOSCOPY      FL RETROGRADE PYELOGRAM  10/21/2020    HERNIA REPAIR      KIDNEY SURGERY      KNEE ARTHROSCOPY      LITHOTRIPSY      multiple    LUMBAR [address] SURGERY  2015    MANDIBLE FRACTURE SURGERY      titanium plate    PELVIC SYMPHYSIS FUSION      MS CYSTO/URETERO W/LITHOTRIPSY &INDWELL STENT INSRT Right 10/21/2020    Procedure: CYSTOSCOPY URETEROSCOPY WITH LITHOTRIPSY HOLMIUM LASER, RETROGRADE PYELOGRAM AND INSERTION STENT URETERAL;  Surgeon: Kj Mercado MD;  [facility];  Service: Urology    MS ESOPHAGOGASTRODUODENOSCOPY TRANSORAL DIAGNOSTIC N/A 2/22/2018    Procedure: ESOPHAGOGASTRODUODENOSCOPY (EGD); Surgeon: Maurice Kenyon MD;  [facility]; Service: Gastroenterology    MS ESOPHAGOGASTRODUODENOSCOPY TRANSORAL DIAGNOSTIC N/A 4/1/2019    Procedure: EGD W/ DILATION;  Surgeon: Maurice Kenyon MD;  [facility];   Service: Gastroenterology    MS REVISE/REMOVE SPINAL NEUROSTIM/ Left 6/9/2017    Procedure: REMOVAL OF A THORACIC SCS PLACED VIA LAMINECTOMY AND REMOVAL LEFT BUTTOCK GENERATOR; IMPULSE MONITORING;  Surgeon: Sara Henry MD;  [facility];  Service: Neurosurgery    MS SHLDR ARTHROSCOP,SURG,W/ROTAT CUFF REPR Right 4/4/2019    Procedure: RIGHT SHOULDER ARTHROSCOPIC SUBSCAPULARIS TENDON REPAIR;  Surgeon: Joy Stewart MD;  [facility];  Service: Orthopedics    SC SURG IMPLNT [address] N/A 9/8/2016    Procedure: DORSAL COLUMN STIMULATOR PLACEMENT (IMPULSE MONITORING); Surgeon: Eden Santana MD;  [facility];  Service: Orthopedics    SACROILIAC JOINT FUSION  2015    SHOULDER SURGERY Left     2     Current Outpatient Medications   Medication Sig Dispense Refill    albuterol (PROVENTIL HFA,VENTOLIN HFA) 90 mcg/act inhaler INHALE 2 PUFFS BY MOUTH EVERY 6 HOURS AS NEEDED FOR WHEEZING 18 g 5    amitriptyline (ELAVIL) 25 mg tablet TAKE 1 TABLET(25 MG) BY MOUTH DAILY AT BEDTIME 30 tablet 5    amoxicillin (AMOXIL) 500 MG tablet       diazepam (VALIUM) 5 mg tablet Take 1 tablet (5 mg total) by mouth every 12 (twelve) hours as needed for anxiety or muscle spasms 60 tablet 0    diltiazem (CARDIZEM) 60 mg tablet Take 1 tablet (60 mg total) by mouth 4 (four) times a day 120 tablet 2    fluticasone-vilanterol (BREO ELLIPTA) 200-25 MCG/INH inhaler Inhale 1 puff daily Rinse mouth after use  3 Inhaler 3    ibuprofen (MOTRIN) 800 mg tablet TAKE 1 TABLET(800 MG) BY MOUTH THREE TIMES DAILY AS NEEDED FOR MILD PAIN 90 tablet 1    Naloxone HCl (NARCAN) 4 MG/0 1ML LIQD In case of overdose: 1 spray in one nostril x 1, may repeat in 2 min if remains unresponsive  1 each 0    omeprazole (PriLOSEC) 40 MG capsule Take 1 capsule (40 mg total) by mouth daily 30 capsule 3    oxyCODONE (ROXICODONE) 10 MG TABS Take 1 tablet (10 mg total) by mouth every 6 (six) hours as needed for severe pain Take 1 tablet (10mg) by mouth every 6 hours as needed for severe pain   Max daily amount : 4 tablets (40mg)Max Daily Amount: 40 mg 120 tablet 0    tadalafil (CIALIS) 5 MG tablet Take 1 tablet (5 mg total) by mouth daily as needed for erectile dysfunction 30 tablet 6    Triamcinolone Acetonide 55 MCG/ACT AERO 1 Act (55 mcg total) into each nostril daily 1 Bottle 3     No current facility-administered medications for this visit  Allergies   Allergen Reactions    Other Rash     Adhesive tape       Review of Systems   Constitutional: Positive for chills, fatigue and fever  HENT: Positive for sore throat  Respiratory: Positive for shortness of breath  Negative for cough  Gastrointestinal: Negative for abdominal pain  Musculoskeletal: Positive for myalgias  Neurological: Positive for headaches  Objective:    Vitals:    02/08/21 1235   Weight: 90 7 kg (200 lb)   Height: 6' 1" (1 854 m)       VIRTUAL VISIT DISCLAIMER    Yefri Bennett acknowledges that he has consented to an online visit or consultation  He understands that the online visit is based solely on information provided by him, and that, in the absence of a face-to-face physical evaluation by the physician, the diagnosis he receives is both limited and provisional in terms of accuracy and completeness  This is not intended to replace a full medical face-to-face evaluation by the physician  Clesarita Toscano understands and accepts these terms

## 2021-02-08 NOTE — ASSESSMENT & PLAN NOTE
Symptom onset 2/4/21  Unchanged, no known COVID exposure  Due to symptoms, have ordered COVID testing today, will follow-up with results  Recommend symptomatic treatment at this time  Encouraged hydration  Advised to continue precautions including hand washing, mask wearing, and social distancing

## 2021-02-09 LAB — SARS-COV-2 RNA RESP QL NAA+PROBE: NEGATIVE

## 2021-02-11 ENCOUNTER — TELEPHONE (OUTPATIENT)
Dept: FAMILY MEDICINE CLINIC | Facility: CLINIC | Age: 51
End: 2021-02-11

## 2021-02-15 ENCOUNTER — TELEPHONE (OUTPATIENT)
Dept: FAMILY MEDICINE CLINIC | Facility: CLINIC | Age: 51
End: 2021-02-15

## 2021-02-20 PROBLEM — Z51.81 MEDICATION MONITORING ENCOUNTER: Status: ACTIVE | Noted: 2021-02-20

## 2021-02-26 DIAGNOSIS — G89.29 OTHER CHRONIC PAIN: ICD-10-CM

## 2021-02-26 RX ORDER — IBUPROFEN 800 MG/1
TABLET ORAL
Qty: 90 TABLET | Refills: 1 | Status: SHIPPED | OUTPATIENT
Start: 2021-02-26 | End: 2021-04-24

## 2021-03-08 ENCOUNTER — TELEPHONE (OUTPATIENT)
Dept: FAMILY MEDICINE CLINIC | Facility: CLINIC | Age: 51
End: 2021-03-08

## 2021-03-08 NOTE — TELEPHONE ENCOUNTER
Voice Message requesting refills:   1  Oxycodone 10mg- PDMP, last refill 2/9/21 #120   2  Diazepam 5mg- PDMP, last refill 2/8/21 #60

## 2021-03-09 DIAGNOSIS — G89.4 CHRONIC PAIN SYNDROME: ICD-10-CM

## 2021-03-09 DIAGNOSIS — F41.8 DEPRESSION WITH ANXIETY: ICD-10-CM

## 2021-03-09 DIAGNOSIS — M53.3 CHRONIC RIGHT SI JOINT PAIN: ICD-10-CM

## 2021-03-09 DIAGNOSIS — G89.29 CHRONIC RIGHT SI JOINT PAIN: ICD-10-CM

## 2021-03-09 DIAGNOSIS — F11.90 CHRONIC, CONTINUOUS USE OF OPIOIDS: ICD-10-CM

## 2021-03-09 DIAGNOSIS — M96.1 POST LAMINECTOMY SYNDROME: ICD-10-CM

## 2021-03-09 RX ORDER — NALOXONE HYDROCHLORIDE 4 MG/.1ML
SPRAY NASAL
Qty: 1 EACH | Refills: 0 | Status: SHIPPED | OUTPATIENT
Start: 2021-03-09

## 2021-03-09 RX ORDER — DIAZEPAM 5 MG/1
5 TABLET ORAL EVERY 12 HOURS PRN
Qty: 60 TABLET | Refills: 0 | Status: SHIPPED | OUTPATIENT
Start: 2021-03-09 | End: 2021-04-06 | Stop reason: SDUPTHER

## 2021-03-09 RX ORDER — OXYCODONE HYDROCHLORIDE 10 MG/1
10 TABLET ORAL EVERY 6 HOURS PRN
Qty: 120 TABLET | Refills: 0 | Status: SHIPPED | OUTPATIENT
Start: 2021-03-09 | End: 2021-04-06 | Stop reason: SDUPTHER

## 2021-03-09 NOTE — TELEPHONE ENCOUNTER
Refill sent  It seems he does not have active pain agreement   Please schedule visit with Dr Vásquez Sport  He was also given referral to pain management in last visit  He will need follow up on that as well   Thanks

## 2021-03-12 NOTE — TELEPHONE ENCOUNTER
Yefri called back he refuses to make appt   He swears he already has new   contract because he had a urine test

## 2021-04-05 ENCOUNTER — TELEPHONE (OUTPATIENT)
Dept: FAMILY MEDICINE CLINIC | Facility: CLINIC | Age: 51
End: 2021-04-05

## 2021-04-05 DIAGNOSIS — M53.3 CHRONIC RIGHT SI JOINT PAIN: ICD-10-CM

## 2021-04-05 DIAGNOSIS — G89.4 CHRONIC PAIN SYNDROME: ICD-10-CM

## 2021-04-05 DIAGNOSIS — G89.29 CHRONIC RIGHT SI JOINT PAIN: ICD-10-CM

## 2021-04-05 DIAGNOSIS — F41.8 DEPRESSION WITH ANXIETY: ICD-10-CM

## 2021-04-05 DIAGNOSIS — M96.1 POST LAMINECTOMY SYNDROME: ICD-10-CM

## 2021-04-06 RX ORDER — OXYCODONE HYDROCHLORIDE 10 MG/1
10 TABLET ORAL EVERY 6 HOURS PRN
Qty: 120 TABLET | Refills: 0 | Status: SHIPPED | OUTPATIENT
Start: 2021-04-06 | End: 2021-05-06 | Stop reason: SDUPTHER

## 2021-04-06 RX ORDER — DIAZEPAM 5 MG/1
5 TABLET ORAL EVERY 12 HOURS PRN
Qty: 60 TABLET | Refills: 0 | Status: SHIPPED | OUTPATIENT
Start: 2021-04-06 | End: 2021-05-06 | Stop reason: SDUPTHER

## 2021-04-06 NOTE — TELEPHONE ENCOUNTER
Please schedule April visit for follow up on pain   Last seen Jan 27 2021 and we require q3mo appointments for these types of medications  Pain contract in Media tab, dated 5/2020  Recommend update at his April visit    PDMP checked  Has Narcan on med list    Medications refilled

## 2021-04-20 ENCOUNTER — TELEPHONE (OUTPATIENT)
Dept: FAMILY MEDICINE CLINIC | Facility: CLINIC | Age: 51
End: 2021-04-20

## 2021-04-20 NOTE — TELEPHONE ENCOUNTER
Patient called he is in too much pain to get out of bed to come in to appt  @ 2pm tomorrow with Dr Jose Grigsby  He wants to know if he can do virtual vs? He thinks he might need urine test can he get an order to take to lab?

## 2021-04-21 ENCOUNTER — TELEPHONE (OUTPATIENT)
Dept: PHYSICAL THERAPY | Facility: OTHER | Age: 51
End: 2021-04-21

## 2021-04-21 ENCOUNTER — OFFICE VISIT (OUTPATIENT)
Dept: FAMILY MEDICINE CLINIC | Facility: CLINIC | Age: 51
End: 2021-04-21

## 2021-04-21 VITALS
BODY MASS INDEX: 26.98 KG/M2 | HEART RATE: 74 BPM | WEIGHT: 203.6 LBS | DIASTOLIC BLOOD PRESSURE: 80 MMHG | HEIGHT: 73 IN | RESPIRATION RATE: 16 BRPM | SYSTOLIC BLOOD PRESSURE: 130 MMHG | TEMPERATURE: 96.6 F

## 2021-04-21 DIAGNOSIS — M54.41 CHRONIC MIDLINE LOW BACK PAIN WITH BILATERAL SCIATICA: ICD-10-CM

## 2021-04-21 DIAGNOSIS — R13.19 ESOPHAGEAL DYSPHAGIA: ICD-10-CM

## 2021-04-21 DIAGNOSIS — M96.1 POST LAMINECTOMY SYNDROME: ICD-10-CM

## 2021-04-21 DIAGNOSIS — M54.42 CHRONIC MIDLINE LOW BACK PAIN WITH BILATERAL SCIATICA: ICD-10-CM

## 2021-04-21 DIAGNOSIS — G89.29 CHRONIC MIDLINE LOW BACK PAIN WITH BILATERAL SCIATICA: ICD-10-CM

## 2021-04-21 DIAGNOSIS — Z51.81 MEDICATION MONITORING ENCOUNTER: Primary | ICD-10-CM

## 2021-04-21 LAB
AMPHETAMINES SERPL QL SCN: POSITIVE
BARBITURATES UR QL: NEGATIVE
BENZODIAZ UR QL: POSITIVE
COCAINE UR QL: NEGATIVE
METHADONE UR QL: NEGATIVE
OPIATES UR QL SCN: POSITIVE
OXYCODONE+OXYMORPHONE UR QL SCN: POSITIVE
PCP UR QL: NEGATIVE
THC UR QL: POSITIVE

## 2021-04-21 PROCEDURE — 3008F BODY MASS INDEX DOCD: CPT | Performed by: FAMILY MEDICINE

## 2021-04-21 PROCEDURE — 99214 OFFICE O/P EST MOD 30 MIN: CPT | Performed by: FAMILY MEDICINE

## 2021-04-21 PROCEDURE — 80337 TRICYCLIC & CYCLICALS 6/MORE: CPT | Performed by: FAMILY MEDICINE

## 2021-04-21 PROCEDURE — 4004F PT TOBACCO SCREEN RCVD TLK: CPT | Performed by: FAMILY MEDICINE

## 2021-04-21 PROCEDURE — 80307 DRUG TEST PRSMV CHEM ANLYZR: CPT | Performed by: FAMILY MEDICINE

## 2021-04-21 NOTE — ASSESSMENT & PLAN NOTE
Worsened  Acute exacerbation of chronic back pain s/p laminectomy  Pt attributed pain to lifting heavy luggage 5 days ago   Otherwise denies any saddle paraesthesia, difficulty voiding, LE weakness, or any neurological symptoms   - Referral to comprehensive spine program

## 2021-04-21 NOTE — PROGRESS NOTES
CLINIC VISIT NOTE - [facility] Family Medicine    Elaine Castro 44 y o  male MRN: [id number]  Encounter: [id number],  Primary Care Provider: Carol Callahan MD      Assessments & Plans:     Problem List Items Addressed This Visit        Digestive    Esophageal dysphagia     Worsened  H/o dysphagia 2/2 esophageal hypercontractility  Last seen by GI (Dr Aminata Patton) for EGD on 5/14/20  Initially symptomatically improved s/p esophageal dilation but gradually worsened with more choking spells  - Referal to GI for possible repeat EGD         Relevant Orders    Ambulatory referral to Gastroenterology       Other    Medication monitoring encounter - Primary     Pt currently on long-term Oxycodone and Diazepam for back pain  - Pain contract updated 4/21/21  - UDS ordered  - Urine Benzodiazepines ordered  - Urine Oxycodone/Oxymorphone ordered         Relevant Orders    Rapid drug screen, urine    Drug Monitoring, Benzodiazepines, with Confirmation, Urine    DRUG MONITOR, TRICYCLIC ANTIDEPRESS, QN, W/ medMATCH, URINE    Oxycodone/Oxymorphone urine    Post laminectomy syndrome     Worsened  Acute exacerbation of chronic back pain s/p laminectomy  Pt attributed pain to lifting heavy luggage 5 days ago   Otherwise denies any saddle paraesthesia, difficulty voiding, LE weakness, or any neurological symptoms   - Referral to comprehensive spine program         Relevant Orders    Ambulatory Referral to Comprehensive Spine Program    Back pain    Relevant Orders    Ambulatory Referral to Comprehensive Spine Program            Patient Active Problem List   Diagnosis    Back pain    Nephrolithiasis    Chronic pain syndrome    Status post insertion of spinal cord stimulator    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    Dysphagia    Intractable episodic cluster headache    Allergic rhinitis    Abnormal head CT    Blood pressure elevated without history of HTN    Chronic right SI joint pain    Depression with anxiety    Dyshidrotic dermatitis    Erectile dysfunction of non-organic origin    Moderate persistent asthma without complication    Post laminectomy syndrome    TMJ syndrome    Dental abscess    Screening for STD (sexually transmitted disease)    Lingual tonsil hypertrophy    Sleep apnea    Other chest pain    Chronic, continuous use of opioids    Chronic prescription benzodiazepine use    Medical marijuana use    Hoarseness    Tobacco abuse    Elevated hematocrit    Elevated platelet count    Hypercontractile esophagus    Right shoulder pain    Cervical radiculopathy    Abnormal CBC    Pneumonia due to infectious organism    Esophageal dysphagia    Incomplete tear of right rotator cuff    Viral upper respiratory tract infection    Partial tear of right subscapularis tendon    Therapeutic opioid induced constipation    Exposure to sexually transmitted disease (STD)    Cough    Gastritis without bleeding    Peripheral polyneuropathy    Seborrheic keratosis    Abnormal urinalysis    Chronic obstructive asthma (HCC)    Status post cystoscopy with ureteral stent placement    Viral infection, unspecified    Medication monitoring encounter         Recent Visits:     Recent Visits  Date Type Provider Dept   04/20/21 Telephone MD Nick Petit   04/20/21 Telephone [address] [facility] recent visits within past 7 days and meeting all other requirements     Today's Visits  Date Type Provider Dept   04/21/21 Office Visit MD Nick Petit   Showing today's visits and meeting all other requirements     Future Appointments  No visits were found meeting these conditions  Showing future appointments within next 150 days and meeting all other requirements       Subjective:     Chief Complaint   Patient presents with    Physical Exam       HPI:  50-YOM presents for medication monitoring   Extensive pmhx of chronic back pain s/p laminectomy of lumbar spine, esophageal dysphagia, and opioid/benzo dependence  Pt states 5 days ago after lifting heavy luggage started to experience acute exacerbation of chronic lumbar back pain but denies any saddle paraesthesia, difficulty voiding, LE weakness, or any neurological symptoms  Also c/o of progressively worsening dysphagia since last EGD with esophageal dilation   Otherwise denies any fever, chill, excessive fatigue, HA, dizziness, N/V, CP, SOB, abdominal pain, dysuria, C/D, or extremity weakness/paraesthesia    Review of System:     Review of Systems    History:     Past Medical History:   Diagnosis Date    Allergic     seasonal     Allergic rhinitis     Anxiety     Arthritis     Back pain     Chronic obstructive asthma (HCC)     Chronic pain syndrome     Cluster headaches     Depression     Insomnia     Kidney stones     Laryngospasm     Leukocytosis     Migraine     Myocardial infarction ([facility] [address] )     Nephrolithiasis     Organic impotence     Pneumonia due to infectious organism 1/16/2019    Seasonal allergies     Sleep apnea     does not use CPAP    Stroke ([facility] [address] ) 2015    TMJ (temporomandibular joint syndrome)     Wheezing     last assessed 05/19/17     Past Surgical History:   Procedure Laterality Date    BACK SURGERY      *9, [id number], nervectomy 2006, total of 10    BUCCAL MASS EXCISION N/A 3/26/2018    Procedure: BIOPSY LINGUAL TONSIL (FLOW/ STANDARD PATH)  EVAL UNDER ANESTHESIA;  Surgeon: Donna Garvin MD;  [facility];  Service: ENT    COLONOSCOPY      FL RETROGRADE PYELOGRAM  10/21/2020    HERNIA REPAIR      KIDNEY SURGERY      KNEE ARTHROSCOPY      LITHOTRIPSY      multiple    LUMBAR [address] SURGERY  2015    MANDIBLE FRACTURE SURGERY      titanium plate    PELVIC SYMPHYSIS FUSION      NV CYSTO/URETERO W/LITHOTRIPSY &INDWELL STENT INSRT Right 10/21/2020    Procedure: CYSTOSCOPY URETEROSCOPY WITH LITHOTRIPSY HOLMIUM LASER, RETROGRADE PYELOGRAM AND INSERTION STENT URETERAL;  Surgeon: Amber Higgins MD;  [facility];  Service: Urology    UT ESOPHAGOGASTRODUODENOSCOPY TRANSORAL DIAGNOSTIC N/A 2/22/2018    Procedure: ESOPHAGOGASTRODUODENOSCOPY (EGD); Surgeon: Sushil Rojas MD;  [facility]; Service: Gastroenterology    UT ESOPHAGOGASTRODUODENOSCOPY TRANSORAL DIAGNOSTIC N/A 4/1/2019    Procedure: EGD W/ DILATION;  Surgeon: Sushil Rojas MD;  [facility]; Service: Gastroenterology    UT REVISE/REMOVE SPINAL NEUROSTIM/ Left 6/9/2017    Procedure: REMOVAL OF A THORACIC SCS PLACED VIA LAMINECTOMY AND REMOVAL LEFT BUTTOCK GENERATOR; IMPULSE MONITORING;  Surgeon: Maranda Isabel MD;  [facility];  Service: Neurosurgery    UT [address] ARTHROSCOP,SURG,W/ROTAT CUFF REPR Right 4/4/2019    Procedure: RIGHT SHOULDER ARTHROSCOPIC SUBSCAPULARIS TENDON REPAIR;  Surgeon: Reji Flores MD;  [facility];  Service: Orthopedics    UT SURG IMPLNT Izella Green N/A 9/8/2016    Procedure: DORSAL COLUMN STIMULATOR PLACEMENT (IMPULSE MONITORING); Surgeon: Jd Brandt MD;  [facility];  Service: Orthopedics    SACROILIAC JOINT FUSION  2015    SHOULDER SURGERY Left     2     Social History   Social History     Substance and Sexual Activity   Alcohol Use Yes    Frequency: Monthly or less    Drinks per session: 3 or 4    Comment: rarely      Social History     Substance and Sexual Activity   Drug Use Yes    Frequency: 7 0 times per week    Types: Marijuana    Comment: medical marijuana daily for chronic pain 1/2 ounce     Social History     Tobacco Use   Smoking Status Current Every Day Smoker    Packs/day: 0 50    Years: 25 00    Pack years: 12 50   Smokeless Tobacco Former User       Medications & Allergies:      Allergies   Allergen Reactions    Other Rash     Adhesive tape       PTA Medications:  Current Outpatient Medications on File Prior to Visit   Medication Sig Dispense Refill  albuterol (PROVENTIL HFA,VENTOLIN HFA) 90 mcg/act inhaler INHALE 2 PUFFS BY MOUTH EVERY 6 HOURS AS NEEDED FOR WHEEZING 18 g 5    amitriptyline (ELAVIL) 25 mg tablet TAKE 1 TABLET(25 MG) BY MOUTH DAILY AT BEDTIME 30 tablet 5    diazepam (VALIUM) 5 mg tablet Take 1 tablet (5 mg total) by mouth every 12 (twelve) hours as needed for anxiety or muscle spasms 60 tablet 0    diltiazem (CARDIZEM) 60 mg tablet Take 1 tablet (60 mg total) by mouth 4 (four) times a day 120 tablet 2    fluticasone-vilanterol (BREO ELLIPTA) 200-25 MCG/INH inhaler Inhale 1 puff daily Rinse mouth after use  3 Inhaler 3    omeprazole (PriLOSEC) 40 MG capsule Take 1 capsule (40 mg total) by mouth daily 30 capsule 3    oxyCODONE (ROXICODONE) 10 MG TABS Take 1 tablet (10 mg total) by mouth every 6 (six) hours as needed for severe pain Take 1 tablet (10mg) by mouth every 6 hours as needed for severe pain  Max daily amount : 4 tablets (40mg)Max Daily Amount: 40 mg 120 tablet 0    tadalafil (CIALIS) 5 MG tablet Take 1 tablet (5 mg total) by mouth daily as needed for erectile dysfunction 30 tablet 6    Triamcinolone Acetonide 55 MCG/ACT AERO 1 Act (55 mcg total) into each nostril daily 1 Bottle 3    amoxicillin (AMOXIL) 500 MG tablet       ibuprofen (MOTRIN) 800 mg tablet TAKE 1 TABLET(800 MG) BY MOUTH THREE TIMES DAILY AS NEEDED FOR MILD PAIN (Patient not taking: Reported on 4/21/2021) 90 tablet 1    naloxone (Narcan) 4 mg/0 1 mL nasal spray In case of overdose: 1 spray in one nostril x 1, may repeat in 2 min if remains unresponsive  (Patient not taking: Reported on 4/21/2021) 1 each 0     No current facility-administered medications on file prior to visit          Objective & Vitals:   Vitals: /80 (BP Location: Left arm, Patient Position: Sitting, Cuff Size: Standard)   Pulse 74   Temp (!) 96 6 °F (35 9 °C) (Tympanic)   Resp 16   Ht 6' 1" (1 854 m)   Wt 92 4 kg (203 lb 9 6 oz)   BMI 26 86 kg/m²       Labs & Imagings:   I have personally reviewed pertinent reports  No results found for this or any previous visit (from the past 24 hour(s))  I have personally reviewed pertinent reports  Fl Retrograde Pyelogram    Result Date: 10/22/2020  Impression: Fluoroscopic guidance provided for right retrograde pyelogram and ureteral stent placement  Please see procedure report for further details  Workstation performed: [id number]     Ct Renal Stone Study Abdomen Pelvis Without Contrast    Result Date: 10/20/2020  Impression: Nonobstructing 4 mm calculus in the distal right ureter/UVJ  Top normal sized right ureter  No hydronephrosis  Fatty infiltration of liver  Colonic diverticulosis  Normal appendix  Workstation performed: [id number]         Physical Exam:     Physical Exam:    General: Pt observed lying comfortably in bed, NAD  Not toxic/ill-appearing  No cachectic or diaphoresis  No obvious sign of trauma or bleeding  Psych: AAOx4, able to converse appropriately  Denies SH/SI  Neuro: no gross neurological deficits, CN 2-12 grossly intact  Head: atraumatic, normocephalic  Eyes: open spontaneously, EOM intact, RUTH, conjunctiva non-injected, no scleral icterus, no discharge  Ear: normal external ear, no visible drainage at external auditory orifice  Nose: clear, no epistaxis, no rhinorrhea  Throat: clear, no hoarse voice, no cough  Neck: supple, normal ROM  Heart: RRR, no murmur/distant heart sound appreciated  Lungs: LCTABL, nml respiratory effort, no agonal/labored breathing, no accessory muscle use  Abdomen: soft, nontender, nondistended, normal bowel sound  Back: midline tenderness over lumbar spine at prior surgery site, no saddle paraesthesia, decreased ROM/power due to pain  Extremities: +4 strengths b/l  Strong radial/pedal pulses  Normal DTR  No weakness/paresthesia/edema            Future Labs & Appointments:     FUTURE LABS:  Lab Frequency Next Occurrence   XR chest pa & lateral Once 10/25/2019   CT chest abdomen pelvis w contrast Once 06/03/2020   CT chest w contrast Once 06/25/2020       FUTURE CONFIRMED APPOINTMENTS:  No future appointments  KHALIDA Brantley   PGY-1, Family Medicine  04/21/21  3:17 PM    Dear reader, please be aware that portions of my note contain dictated text  I have done my best to proof-read this note prior to signing  However, there may be occasional unnoticed errors pertaining to "sound-alike" words and/or grammar during my dictation process  If there is any words or information that is unclear or appears erroneous, please kindly let me know and I will clarify and/or addend my notes accordingly  Thank you for your understanding

## 2021-04-21 NOTE — ASSESSMENT & PLAN NOTE
Pt currently on long-term Oxycodone and Diazepam for back pain  - Pain contract updated 4/21/21  - UDS ordered  - Urine Benzodiazepines ordered  - Urine Oxycodone/Oxymorphone ordered

## 2021-04-21 NOTE — TELEPHONE ENCOUNTER
Voice mail/message left requesting patient to return call to [employer] program including our hours of business and phone number  Kindly asked to LM with Full Name,  and Reminded CB will come from a non- number as the nurses are working remotely/off-site      Referral deferred for f/u attempt per protocol

## 2021-04-21 NOTE — ASSESSMENT & PLAN NOTE
Worsened  H/o dysphagia 2/2 esophageal hypercontractility  Last seen by GI (Dr Janeen Barroso) for EGD on 5/14/20  Initially symptomatically improved s/p esophageal dilation but gradually worsened with more choking spells    - Referal to GI for possible repeat EGD

## 2021-04-23 ENCOUNTER — TELEPHONE (OUTPATIENT)
Dept: PHYSICAL THERAPY | Facility: OTHER | Age: 51
End: 2021-04-23

## 2021-04-23 DIAGNOSIS — G89.29 OTHER CHRONIC PAIN: ICD-10-CM

## 2021-04-23 NOTE — TELEPHONE ENCOUNTER
Voice mail/message left requesting patient to return call to [employer] program including our hours of business and phone number  Kindly asked to LM with Full Name,  and Reminded CB will come from a non- number as the nurses are working remotely/off-site      Referral deferred for f/u attempt per protocol

## 2021-04-24 RX ORDER — IBUPROFEN 800 MG/1
TABLET ORAL
Qty: 90 TABLET | Refills: 1 | Status: SHIPPED | OUTPATIENT
Start: 2021-04-24 | End: 2021-06-27

## 2021-04-29 LAB
MISCELLANEOUS LAB TEST RESULT: NORMAL
MISCELLANEOUS LAB TEST RESULT: NORMAL

## 2021-05-03 ENCOUNTER — IMMUNIZATIONS (OUTPATIENT)
Dept: FAMILY MEDICINE CLINIC | Facility: HOSPITAL | Age: 51
End: 2021-05-03

## 2021-05-03 ENCOUNTER — TELEPHONE (OUTPATIENT)
Dept: PHYSICAL THERAPY | Facility: OTHER | Age: 51
End: 2021-05-03

## 2021-05-03 ENCOUNTER — TELEPHONE (OUTPATIENT)
Dept: FAMILY MEDICINE CLINIC | Facility: CLINIC | Age: 51
End: 2021-05-03

## 2021-05-03 DIAGNOSIS — G89.4 CHRONIC PAIN SYNDROME: ICD-10-CM

## 2021-05-03 DIAGNOSIS — G89.29 CHRONIC RIGHT SI JOINT PAIN: ICD-10-CM

## 2021-05-03 DIAGNOSIS — F41.8 DEPRESSION WITH ANXIETY: ICD-10-CM

## 2021-05-03 DIAGNOSIS — M53.3 CHRONIC RIGHT SI JOINT PAIN: ICD-10-CM

## 2021-05-03 DIAGNOSIS — M96.1 POST LAMINECTOMY SYNDROME: ICD-10-CM

## 2021-05-03 DIAGNOSIS — Z23 ENCOUNTER FOR IMMUNIZATION: Primary | ICD-10-CM

## 2021-05-03 PROCEDURE — 91300 SARS-COV-2 / COVID-19 MRNA VACCINE (PFIZER-BIONTECH) 30 MCG: CPT

## 2021-05-03 PROCEDURE — 0001A SARS-COV-2 / COVID-19 MRNA VACCINE (PFIZER-BIONTECH) 30 MCG: CPT

## 2021-05-04 ENCOUNTER — OFFICE VISIT (OUTPATIENT)
Dept: GASTROENTEROLOGY | Facility: AMBULARY SURGERY CENTER | Age: 51
End: 2021-05-04
Payer: COMMERCIAL

## 2021-05-04 VITALS
WEIGHT: 196 LBS | DIASTOLIC BLOOD PRESSURE: 78 MMHG | BODY MASS INDEX: 25.98 KG/M2 | HEIGHT: 73 IN | SYSTOLIC BLOOD PRESSURE: 138 MMHG

## 2021-05-04 DIAGNOSIS — R13.19 ESOPHAGEAL DYSPHAGIA: ICD-10-CM

## 2021-05-04 DIAGNOSIS — Z86.010 HISTORY OF COLON POLYPS: ICD-10-CM

## 2021-05-04 DIAGNOSIS — K22.4 HYPERCONTRACTILE ESOPHAGUS: Primary | ICD-10-CM

## 2021-05-04 PROBLEM — Z86.0100 HISTORY OF COLON POLYPS: Status: ACTIVE | Noted: 2021-05-04

## 2021-05-04 PROCEDURE — 4004F PT TOBACCO SCREEN RCVD TLK: CPT | Performed by: PHYSICIAN ASSISTANT

## 2021-05-04 PROCEDURE — 3008F BODY MASS INDEX DOCD: CPT | Performed by: FAMILY MEDICINE

## 2021-05-04 PROCEDURE — 3008F BODY MASS INDEX DOCD: CPT | Performed by: PHYSICIAN ASSISTANT

## 2021-05-04 PROCEDURE — 99214 OFFICE O/P EST MOD 30 MIN: CPT | Performed by: PHYSICIAN ASSISTANT

## 2021-05-04 RX ORDER — SODIUM, POTASSIUM,MAG SULFATES 17.5-3.13G
2 SOLUTION, RECONSTITUTED, ORAL ORAL SEE ADMIN INSTRUCTIONS
Qty: 354 ML | Refills: 0 | Status: SHIPPED | OUTPATIENT
Start: 2021-05-04 | End: 2021-07-21 | Stop reason: ALTCHOICE

## 2021-05-04 RX ORDER — PANTOPRAZOLE SODIUM 40 MG/1
40 TABLET, DELAYED RELEASE ORAL DAILY
Qty: 90 TABLET | Refills: 0 | Status: SHIPPED | OUTPATIENT
Start: 2021-05-04 | End: 2021-07-21 | Stop reason: ALTCHOICE

## 2021-05-04 RX ORDER — IBUPROFEN 600 MG/1
TABLET ORAL
COMMUNITY
Start: 2021-04-26 | End: 2021-11-09 | Stop reason: ALTCHOICE

## 2021-05-04 NOTE — PROGRESS NOTES
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  Patient says he has had a few EGDs with dilations with only transient relief of symptoms  Manometry was repeated later in June 2020 to exclude achalasia which could potentially be amenable to POEM or similar procedure, of which no evidence was noted) there did appear to be hypercontractility of the esophagus however)  Patient had expressed hesitancy as starting a calcium channel blocker or nitrate (patient said he had tried nitrates before with adverse effect)  At this time the patient says he admittedly a not been following up as closely as he had like to given the events of the pandemic, but he has been having ongoing issues with swallowing, generally worse over the last year  He says over the last month he has also been experiencing heartburn episodes  He does not take any acid reducers at this time  He was suggested about trying amitriptyline in the past, patient says he tried this for a couple of months and it did not work very well  He has tried using peppermint oil before meals also with not much effect  He says that almost every time he eats he will have pain in the substernal chest region, he says that this is relatively new as the discomfort he used to be mostly in the throat  He denies any vomiting episodes  He admits to some unintended weight loss over the last year although has been gaining some of that back    He reports his last colonoscopy was years ago, he had history of colon polyps in his 25s and is not exactly sure when his last colonoscopy was  Denies any rectal bleeding or melena  REVIEW OF SYSTEMS:      CONSTITUTIONAL: Denies any fever, chills, or rigors  Good appetite, and no recent weight loss  HEENT: No earache or tinnitus  Denies hearing loss or visual disturbances  CARDIOVASCULAR: No chest pain or palpitations  RESPIRATORY: Denies any cough, hemoptysis, shortness of breath or dyspnea on exertion  GASTROINTESTINAL: As noted in the History of Present Illness  GENITOURINARY: No problems with urination  Denies any hematuria or dysuria  NEUROLOGIC: No dizziness or vertigo, denies headaches  MUSCULOSKELETAL: Denies any muscle or joint pain  SKIN: Denies skin rashes or itching  ENDOCRINE: Denies excessive thirst  Denies intolerance to heat or cold  PSYCHOSOCIAL: Denies depression or anxiety  Denies any recent memory loss       Past Medical History:   Diagnosis Date    Allergic     seasonal     Allergic rhinitis     Anxiety     Arthritis     Back pain     Chronic obstructive asthma (HCC)     Chronic pain syndrome     Cluster headaches     Depression     Insomnia     Kidney stones     Laryngospasm     Leukocytosis     Migraine     Myocardial infarction ([facility] [address] )     Nephrolithiasis     Organic impotence     Pneumonia due to infectious organism 1/16/2019    Seasonal allergies     Sleep apnea     does not use CPAP    [facility]    TMJ (temporomandibular joint syndrome)     Wheezing     last assessed 05/19/17      Past Surgical History:   Procedure Laterality Date    BACK SURGERY      *9, [id number], nervectomy 2006, total of 10    BUCCAL MASS EXCISION N/A 3/26/2018    Procedure: BIOPSY LINGUAL TONSIL (FLOW/ STANDARD PATH)  EVAL UNDER ANESTHESIA;  Surgeon: Mary Jo Lewis MD;  [facility];  Service: ENT    COLONOSCOPY      FL RETROGRADE PYELOGRAM  10/21/2020    HERNIA REPAIR      KIDNEY SURGERY      KNEE ARTHROSCOPY      LITHOTRIPSY      multiple   [address] SURGERY  2015    MANDIBLE FRACTURE SURGERY      titanium plate    PELVIC SYMPHYSIS FUSION      MN CYSTO/URETERO W/LITHOTRIPSY &INDWELL STENT INSRT Right 10/21/2020    Procedure: CYSTOSCOPY URETEROSCOPY WITH LITHOTRIPSY HOLMIUM LASER, RETROGRADE PYELOGRAM AND INSERTION STENT URETERAL;  Surgeon: Mona Mahan MD;  Location[facility];  Service: Urology    MN ESOPHAGOGASTRODUODENOSCOPY TRANSORAL DIAGNOSTIC N/A 2/22/2018    Procedure: ESOPHAGOGASTRODUODENOSCOPY (EGD); Surgeon: Alissa Alexander MD;  Location[facility]; Service: Gastroenterology    MN ESOPHAGOGASTRODUODENOSCOPY TRANSORAL DIAGNOSTIC N/A 4/1/2019    Procedure: EGD W/ DILATION;  Surgeon: Alissa Alexander MD;  [facility]; Service: Gastroenterology    MN REVISE/REMOVE SPINAL NEUROSTIM/ Left 6/9/2017    Procedure: REMOVAL OF A THORACIC SCS PLACED VIA LAMINECTOMY AND REMOVAL LEFT BUTTOCK GENERATOR; IMPULSE MONITORING;  Surgeon: Aleisha Posadas MD;  [facility];  Service: Neurosurgery    MN [facility] ARTHROSCOP,SURG,W/ROTAT CUFF REPR Right 4/4/2019    Procedure: RIGHT SHOULDER ARTHROSCOPIC SUBSCAPULARIS TENDON REPAIR;  Surgeon: Donna Vargas MD;  [facility];  Service: Orthopedics    MN SURG IMPLNT [address] N/A 9/8/2016    Procedure: DORSAL COLUMN STIMULATOR PLACEMENT (IMPULSE MONITORING);   Surgeon: Erendira Palomo MD;  [facility];  Service: Orthopedics    SACROILIAC JOINT FUSION  2015    SHOULDER SURGERY Left     2    UPPER GASTROINTESTINAL ENDOSCOPY       Social History     Socioeconomic History    Marital status:      Spouse name: Not on file    Number of children: Not on file    Years of education: Not on file    Highest education level: Not on file   Occupational History    Occupation: Disability   Social Needs    Financial resource strain: Very hard    Food insecurity     Worry: Often true     Inability: Often true    Transportation needs     Medical: No Non-medical: No   Tobacco Use    Smoking status: Current Every Day Smoker     Packs/day: 0 50     Years: 25 00     Pack years: 12 50    Smokeless tobacco: Former User   Substance and Sexual Activity    Alcohol use: Yes     Frequency: Monthly or less     Drinks per session: 3 or 4     Comment: rarely     Drug use: Yes     Frequency: 7 0 times per week     Types: Marijuana     Comment: medical marijuana daily for chronic pain 1/2 ounce    Sexual activity: Yes     Partners: Female   Lifestyle    Physical activity     Days per week: Not on file     Minutes per session: Not on file    Stress: Not on file   Relationships    Social connections     Talks on phone: Not on file     Gets together: Not on file     Attends Muslim service: Not on file     Active member of club or organization: Not on file     Attends meetings of clubs or organizations: Not on file     Relationship status: Not on file    Intimate partner violence     Fear of current or ex partner: Not on file     Emotionally abused: Not on file     Physically abused: Not on file     Forced sexual activity: Not on file   Other Topics Concern    Not on file   Social History Narrative    As per [employer]: Denied: History of Current smoker        As per allscripts: Smoking     Family History   Problem Relation Age of Onset    Diabetes Father     Obesity Father     Liver cancer Father     Heart disease Father     Diabetes Brother     Hypertension Brother     Leukemia Mother     Hypertension Mother     Cancer Family      Other  Current Outpatient Medications   Medication Sig Dispense Refill    albuterol (PROVENTIL HFA,VENTOLIN HFA) 90 mcg/act inhaler INHALE 2 PUFFS BY MOUTH EVERY 6 HOURS AS NEEDED FOR WHEEZING 18 g 5    diazepam (VALIUM) 5 mg tablet Take 1 tablet (5 mg total) by mouth every 12 (twelve) hours as needed for anxiety or muscle spasms 60 tablet 0    fluticasone-vilanterol (BREO ELLIPTA) 200-25 MCG/INH inhaler Inhale 1 puff daily Rinse mouth after use  3 Inhaler 3    naloxone (Narcan) 4 mg/0 1 mL nasal spray In case of overdose: 1 spray in one nostril x 1, may repeat in 2 min if remains unresponsive  1 each 0    Nutritional Supplements (Ensure Active) LIQD Take by mouth      oxyCODONE (ROXICODONE) 10 MG TABS Take 1 tablet (10 mg total) by mouth every 6 (six) hours as needed for severe pain Take 1 tablet (10mg) by mouth every 6 hours as needed for severe pain  Max daily amount : 4 tablets (40mg)Max Daily Amount: 40 mg 120 tablet 0    tadalafil (CIALIS) 5 MG tablet Take 1 tablet (5 mg total) by mouth daily as needed for erectile dysfunction 30 tablet 6    Triamcinolone Acetonide 55 MCG/ACT AERO 1 Act (55 mcg total) into each nostril daily 1 Bottle 3    amitriptyline (ELAVIL) 25 mg tablet TAKE 1 TABLET(25 MG) BY MOUTH DAILY AT BEDTIME (Patient not taking: Reported on 5/4/2021) 30 tablet 5    amoxicillin (AMOXIL) 500 MG tablet       diltiazem (CARDIZEM) 60 mg tablet Take 1 tablet (60 mg total) by mouth 4 (four) times a day (Patient not taking: Reported on 5/4/2021) 120 tablet 2    ibuprofen (MOTRIN) 600 mg tablet       ibuprofen (MOTRIN) 800 mg tablet TAKE 1 TABLET(800 MG) BY MOUTH THREE TIMES DAILY AS NEEDED FOR MILD PAIN (Patient not taking: Reported on 5/4/2021) 90 tablet 1    Na Sulfate-K Sulfate-Mg Sulf (Suprep Bowel Prep Kit) 17 5-3 13-1 6 GM/177ML SOLN Take 2 Bottles by mouth see administration instructions Suprep bowel prep  Please follow the instructions from the office 354 mL 0    pantoprazole (PROTONIX) 40 mg tablet Take 1 tablet (40 mg total) by mouth daily 90 tablet 0     No current facility-administered medications for this visit  Blood pressure 138/78, height 6' 1" (1 854 m), weight 88 9 kg (196 lb)      PHYSICAL EXAM:      General Appearance:   Alert, cooperative, no distress, appears stated age    HEENT:   Normocephalic, atraumatic, anicteric      Neck:  Supple, symmetrical, trachea midline, no adenopathy;    thyroid: no enlargement/tenderness/nodules; no carotid  bruit or JVD    Lungs:   Clear to auscultation bilaterally; no rales, rhonchi or wheezing; respirations unlabored    Heart[de-identified]   S1 and S2 normal; regular rate and rhythm; no murmur, rub, or gallop  Abdomen:   Soft, non-tender, non-distended; normal bowel sounds; no masses, no organomegaly    Extremities: No edema, erythema, wounds, rashes   Rectal:   Deferred                      Lab Results   Component Value Date    WBC 11 92 (H) 10/22/2020    HGB 11 8 (L) 10/22/2020    HCT 36 7 10/22/2020    MCV 92 10/22/2020     10/22/2020     Lab Results   Component Value Date    GLUCOSE 110 10/30/2015    CALCIUM 8 4 10/22/2020     (H) 10/30/2015    K 4 0 10/22/2020    CO2 25 10/22/2020     (H) 10/22/2020    BUN 14 10/22/2020    CREATININE 1 02 10/22/2020     Lab Results   Component Value Date    ALT 20 10/20/2020    AST 14 10/20/2020    ALKPHOS 76 10/20/2020    BILITOT 0 49 10/30/2015     Lab Results   Component Value Date    INR 0 93 06/24/2018    INR 0 87 05/16/2017    INR 0 89 08/24/2016    PROTIME 12 2 06/24/2018    PROTIME 12 1 05/16/2017    PROTIME 12 2 08/24/2016       Fl Retrograde Pyelogram    Result Date: 10/22/2020  Impression: Fluoroscopic guidance provided for right retrograde pyelogram and ureteral stent placement  Please see procedure report for further details  Workstation performed: [id number]     Ct Renal Stone Study Abdomen Pelvis Without Contrast    Result Date: 10/20/2020  Impression: Nonobstructing 4 mm calculus in the distal right ureter/UVJ  Top normal sized right ureter  No hydronephrosis  Fatty infiltration of liver  Colonic diverticulosis  Normal appendix  Workstation performed: [id number]       ASSESSMENT & PLAN:    Hypercontractile esophagus  Patient does report increased dysphagia over the last year, his last EGD was done in June 2020 with empiric dilation done at that time    He does express somewhat different character to his dysphagia more recently, expressing more discomfort in the substernal region instead of the throat region, and also increased GERD symptomatology for the last month    Symptomatology still appears most likely related to his known hypercontractile esophagus, but would be reasonable to exclude interim development of esophageal ulcerations, peptic stricture    -will plan for EGD for further evaluation, along with colonoscopy     -patient was advised regarding risks and benefits of the procedure     -will check CBC and BMP 1 week prior to procedures    -I advised patient to take his liquids at room temperature, she was food thoroughly and take small bites    -will also start pantoprazole once daily, as he reports omeprazole did not seem to help much in the past    -if EGD is unremarkable, can consider Cardizem, which despite being on his medications list, the patient does not seem to remember ever having taken    History of colon polyps  Patient is unclear when his last colonoscopy was but does remember being told about colon polyps in his 25s, he also endorses some unintended weight loss over the last year which he attributes to swallowing difficulty, but should rule out underlying adenomatous polyps or malignancy     -will plan for colonoscopy at the same time as EGD, he is agreeable at this time     -patient was advised regarding risks and benefits of the procedures, risks including but not limited to infection, perforation and bleeding     -again will check lab work prior to procedure     -prescription and instructions were provided for colonoscopy prep

## 2021-05-04 NOTE — ASSESSMENT & PLAN NOTE
Patient is unclear when his last colonoscopy was but does remember being told about colon polyps in his 25s, he also endorses some unintended weight loss over the last year which he attributes to swallowing difficulty, but should rule out underlying adenomatous polyps or malignancy     -will plan for colonoscopy at the same time as EGD, he is agreeable at this time     -patient was advised regarding risks and benefits of the procedures, risks including but not limited to infection, perforation and bleeding     -again will check lab work prior to procedure     -prescription and instructions were provided for colonoscopy prep

## 2021-05-04 NOTE — ASSESSMENT & PLAN NOTE
Patient does report increased dysphagia over the last year, his last EGD was done in June 2020 with empiric dilation done at that time  He does express somewhat different character to his dysphagia more recently, expressing more discomfort in the substernal region instead of the throat region, and also increased GERD symptomatology for the last month    Symptomatology still appears most likely related to his known hypercontractile esophagus, but would be reasonable to exclude interim development of esophageal ulcerations, peptic stricture    -will plan for EGD for further evaluation, along with colonoscopy     -patient was advised regarding risks and benefits of the procedure     -will check CBC and BMP 1 week prior to procedures    -I advised patient to take his liquids at room temperature, she was food thoroughly and take small bites    -will also start pantoprazole once daily, as he reports omeprazole did not seem to help much in the past    -if EGD is unremarkable, can consider Cardizem, which despite being on his medications list, the patient does not seem to remember ever having taken

## 2021-05-05 ENCOUNTER — TELEPHONE (OUTPATIENT)
Dept: FAMILY MEDICINE CLINIC | Facility: CLINIC | Age: 51
End: 2021-05-05

## 2021-05-06 ENCOUNTER — TELEPHONE (OUTPATIENT)
Dept: LAB | Facility: HOSPITAL | Age: 51
End: 2021-05-06

## 2021-05-06 RX ORDER — OXYCODONE HYDROCHLORIDE 10 MG/1
10 TABLET ORAL EVERY 6 HOURS PRN
Qty: 120 TABLET | Refills: 0 | Status: SHIPPED | OUTPATIENT
Start: 2021-05-06 | End: 2021-06-02 | Stop reason: SDUPTHER

## 2021-05-06 RX ORDER — DIAZEPAM 5 MG/1
5 TABLET ORAL EVERY 12 HOURS PRN
Qty: 60 TABLET | Refills: 0 | Status: SHIPPED | OUTPATIENT
Start: 2021-05-06 | End: 2021-06-02 | Stop reason: SDUPTHER

## 2021-05-06 NOTE — TELEPHONE ENCOUNTER
Chart reviewed and PDMP checked  Medication refilled at the pharmacy on [address] as requested        Spoke with Dr Matilda Jensen regarding recently UDS results - Dr Matilda Jensen to speak with lab regarding potential for false positive of amphetamines given his other medications  All other prior testing has been ok

## 2021-05-06 NOTE — TELEPHONE ENCOUNTER
Patient calling again upset because he called in on Monday for the medication and took the urine test 2 weeks ago why it took until last night at 7pm for the call to be made to [facility]

## 2021-05-06 NOTE — TELEPHONE ENCOUNTER
Patient called and updated  Patient concerned that every month he has to wait for the prior auths to go through because "someone here is not doing them on time or waiting until the last minute"  I assured the patient that this is not the case  Patient said he has his urine test done two weeks ago and the prior auth should have been sent then  I am not 100% sure if that is true, but it was sent last night  He told me that he needs the medication sent to a different pharmacy as well, because his original pharmacy does not have his medications  Patient is leaving for vacation in two days  Told him that I would let him know as soon as I hear any updates

## 2021-05-06 NOTE — TELEPHONE ENCOUNTER
Pt calling asking about refill for oxycodone, told him prior auth was submitted yesterday  Also stated the [facility] by his house does not have the medication so he asked for it to be sent to [facility] [email or web address] [facility], [address] [place] [address] phone # [phone]    He's asking for a call back asap to know what's going on because he's going on vacation in 2 days

## 2021-05-13 ENCOUNTER — TELEPHONE (OUTPATIENT)
Dept: FAMILY MEDICINE CLINIC | Facility: CLINIC | Age: 51
End: 2021-05-13

## 2021-05-13 NOTE — TELEPHONE ENCOUNTER
Patient called requesting Dr Marcial Sandhu call him with his lab results sooner than later   Patient is out of town and would rather a call back at [phone]

## 2021-05-13 NOTE — TELEPHONE ENCOUNTER
----- Message from González Shipman MD sent at 5/13/2021  1:16 PM EDT -----  Regarding: Schedule follow-up appointment  Please help me schedule follow-up appointment to go over his lab result and for medication refill before 6/6/21  Short visit is fine  Thank you!

## 2021-05-13 NOTE — TELEPHONE ENCOUNTER
Pt returned call, states he's in [place] and will call when he's back in a week and a half to schedule because he doesn't know when he can come in

## 2021-05-19 NOTE — TELEPHONE ENCOUNTER
Patient calling this am for the second time and stated, " I am out of the [place] and can not schedule an appointment to get my lab results  I would appreciate if Dr Tierney Schafer could please call me about them    My # is [phone] "

## 2021-05-27 ENCOUNTER — IMMUNIZATIONS (OUTPATIENT)
Dept: FAMILY MEDICINE CLINIC | Facility: HOSPITAL | Age: 51
End: 2021-05-27

## 2021-05-27 DIAGNOSIS — Z23 ENCOUNTER FOR IMMUNIZATION: Primary | ICD-10-CM

## 2021-05-27 PROCEDURE — 91300 SARS-COV-2 / COVID-19 MRNA VACCINE (PFIZER-BIONTECH) 30 MCG: CPT

## 2021-05-27 PROCEDURE — 0002A SARS-COV-2 / COVID-19 MRNA VACCINE (PFIZER-BIONTECH) 30 MCG: CPT

## 2021-06-02 DIAGNOSIS — F41.8 DEPRESSION WITH ANXIETY: ICD-10-CM

## 2021-06-02 DIAGNOSIS — M53.3 CHRONIC RIGHT SI JOINT PAIN: ICD-10-CM

## 2021-06-02 DIAGNOSIS — M96.1 POST LAMINECTOMY SYNDROME: ICD-10-CM

## 2021-06-02 DIAGNOSIS — G89.4 CHRONIC PAIN SYNDROME: ICD-10-CM

## 2021-06-02 DIAGNOSIS — G89.29 CHRONIC RIGHT SI JOINT PAIN: ICD-10-CM

## 2021-06-04 RX ORDER — DIAZEPAM 5 MG/1
5 TABLET ORAL EVERY 12 HOURS PRN
Qty: 60 TABLET | Refills: 0 | Status: SHIPPED | OUTPATIENT
Start: 2021-06-04 | End: 2021-07-01 | Stop reason: SDUPTHER

## 2021-06-04 RX ORDER — OXYCODONE HYDROCHLORIDE 10 MG/1
10 TABLET ORAL EVERY 6 HOURS PRN
Qty: 120 TABLET | Refills: 0 | Status: SHIPPED | OUTPATIENT
Start: 2021-06-04 | End: 2021-06-28 | Stop reason: SDUPTHER

## 2021-06-12 DIAGNOSIS — J45.40 MODERATE PERSISTENT ASTHMA WITHOUT COMPLICATION: ICD-10-CM

## 2021-06-13 RX ORDER — ALBUTEROL SULFATE 90 UG/1
AEROSOL, METERED RESPIRATORY (INHALATION)
Qty: 18 G | Refills: 5 | Status: SHIPPED | OUTPATIENT
Start: 2021-06-13 | End: 2022-05-13 | Stop reason: SDUPTHER

## 2021-06-17 NOTE — TELEPHONE ENCOUNTER
Pt called pretty upset about script for his oxycodone  He states it was written wrong and now it appears as if he is trying to get more pills and everyone is giving him the runaround, [id number] of the call, she is on hold with the insurance company now and will call patient back  Received call from pt and wife requesting a referral to Dr. Rg be backdated. Notified pt that referrals can not be back dated without an order written for dates being requested. There was no orders in chart for pt to see ortho from PCP. Pt was advised to schedule an appt with PCP to possibly obtain a referral for pt's need.     Detail conversation was had with pt regarding several hospital admission to [facility] facilities. Pt and wife acknowledged they are aware they may be responsible for possible charges as he was [facility] and per notes refused to be dc'd from [facility] facility due to pain.     Pt was advised to contact insurance to discuss further as this was out of scope with [facility]. Lastly, informed pt that referral for ortho to Dr. Rg was completed and faxed to office.     Pt and wife requested a later date for an in office visit with PCP as he is currently scheduled to see specialist and receive an inj on 6/20/21 and unsure if he would be able to make OV on 6/21/21. Date was changed to 6/24/21 of OV with PCP per pt's request. Pt verbalized understanding of call.

## 2021-06-26 DIAGNOSIS — G89.29 OTHER CHRONIC PAIN: ICD-10-CM

## 2021-06-27 RX ORDER — IBUPROFEN 800 MG/1
TABLET ORAL
Qty: 90 TABLET | Refills: 1 | Status: SHIPPED | OUTPATIENT
Start: 2021-06-27 | End: 2021-09-17 | Stop reason: SDUPTHER

## 2021-06-28 DIAGNOSIS — G89.29 CHRONIC RIGHT SI JOINT PAIN: ICD-10-CM

## 2021-06-28 DIAGNOSIS — M53.3 CHRONIC RIGHT SI JOINT PAIN: ICD-10-CM

## 2021-06-28 DIAGNOSIS — G89.4 CHRONIC PAIN SYNDROME: ICD-10-CM

## 2021-06-28 DIAGNOSIS — M96.1 POST LAMINECTOMY SYNDROME: ICD-10-CM

## 2021-06-28 RX ORDER — OXYCODONE HYDROCHLORIDE 10 MG/1
10 TABLET ORAL EVERY 6 HOURS PRN
Qty: 120 TABLET | Refills: 0 | Status: SHIPPED | OUTPATIENT
Start: 2021-06-28 | End: 2021-08-02 | Stop reason: SDUPTHER

## 2021-06-30 ENCOUNTER — TELEPHONE (OUTPATIENT)
Dept: FAMILY MEDICINE CLINIC | Facility: CLINIC | Age: 51
End: 2021-06-30

## 2021-07-01 DIAGNOSIS — F41.8 DEPRESSION WITH ANXIETY: ICD-10-CM

## 2021-07-01 RX ORDER — DIAZEPAM 5 MG/1
5 TABLET ORAL EVERY 12 HOURS PRN
Qty: 60 TABLET | Refills: 0 | Status: SHIPPED | OUTPATIENT
Start: 2021-07-04 | End: 2021-08-09 | Stop reason: SDUPTHER

## 2021-07-14 DIAGNOSIS — Z86.010 HISTORY OF COLON POLYPS: Primary | ICD-10-CM

## 2021-07-14 RX ORDER — POLYETHYLENE GLYCOL 3350 17 G/17G
238 POWDER, FOR SOLUTION ORAL ONCE
Qty: 238 G | Refills: 0 | Status: SHIPPED | OUTPATIENT
Start: 2021-07-14 | End: 2021-11-09 | Stop reason: ALTCHOICE

## 2021-07-14 NOTE — TELEPHONE ENCOUNTER
Called pt and made aware of miralax/dulcolax prep instead of suprep  Pt would like scripts sent to [facility]  Pt agreeable to plan, no further questions at this time

## 2021-07-14 NOTE — TELEPHONE ENCOUNTER
Patients GI provider:  Dr Ruperto Coon    Number to return call: [phone]    Reason for call: Pt calling stating Verlan Necessary is not covered by his insurance  Please send alternative prep      Scheduled procedure/appointment date if applicable: Procedure 8/27/96

## 2021-07-20 ENCOUNTER — TELEPHONE (OUTPATIENT)
Dept: GASTROENTEROLOGY | Facility: AMBULARY SURGERY CENTER | Age: 51
End: 2021-07-20

## 2021-07-21 ENCOUNTER — HOSPITAL ENCOUNTER (OUTPATIENT)
Dept: GASTROENTEROLOGY | Facility: AMBULARY SURGERY CENTER | Age: 51
Setting detail: OUTPATIENT SURGERY
Discharge: HOME | End: 2021-07-21
Attending: INTERNAL MEDICINE | Admitting: INTERNAL MEDICINE
Payer: COMMERCIAL

## 2021-07-21 ENCOUNTER — ANESTHESIA EVENT (OUTPATIENT)
Dept: GASTROENTEROLOGY | Facility: AMBULARY SURGERY CENTER | Age: 51
End: 2021-07-21

## 2021-07-21 ENCOUNTER — ANESTHESIA (OUTPATIENT)
Dept: GASTROENTEROLOGY | Facility: AMBULARY SURGERY CENTER | Age: 51
End: 2021-07-21

## 2021-07-21 VITALS
RESPIRATION RATE: 18 BRPM | TEMPERATURE: 96.9 F | SYSTOLIC BLOOD PRESSURE: 155 MMHG | OXYGEN SATURATION: 97 % | HEART RATE: 78 BPM | WEIGHT: 196 LBS | DIASTOLIC BLOOD PRESSURE: 85 MMHG | BODY MASS INDEX: 25.98 KG/M2 | HEIGHT: 73 IN

## 2021-07-21 DIAGNOSIS — K29.70 GASTRITIS WITHOUT BLEEDING, UNSPECIFIED CHRONICITY, UNSPECIFIED GASTRITIS TYPE: ICD-10-CM

## 2021-07-21 DIAGNOSIS — R49.0 HOARSENESS OF VOICE: ICD-10-CM

## 2021-07-21 DIAGNOSIS — K25.3 ACUTE GASTRIC ULCER WITHOUT HEMORRHAGE OR PERFORATION: ICD-10-CM

## 2021-07-21 DIAGNOSIS — R13.19 ESOPHAGEAL DYSPHAGIA: ICD-10-CM

## 2021-07-21 DIAGNOSIS — K22.4 HYPERCONTRACTILE ESOPHAGUS: ICD-10-CM

## 2021-07-21 DIAGNOSIS — Z86.010 HISTORY OF COLON POLYPS: ICD-10-CM

## 2021-07-21 DIAGNOSIS — R07.89 OTHER CHEST PAIN: ICD-10-CM

## 2021-07-21 PROCEDURE — G0121 COLON CA SCRN NOT HI RSK IND: HCPCS | Performed by: INTERNAL MEDICINE

## 2021-07-21 PROCEDURE — 88313 SPECIAL STAINS GROUP 2: CPT | Performed by: PATHOLOGY

## 2021-07-21 PROCEDURE — 88305 TISSUE EXAM BY PATHOLOGIST: CPT | Performed by: PATHOLOGY

## 2021-07-21 PROCEDURE — 43239 EGD BIOPSY SINGLE/MULTIPLE: CPT | Performed by: INTERNAL MEDICINE

## 2021-07-21 RX ORDER — PANTOPRAZOLE SODIUM 40 MG/1
40 TABLET, DELAYED RELEASE ORAL DAILY
Qty: 30 TABLET | Refills: 11 | Status: SHIPPED | OUTPATIENT
Start: 2021-07-21 | End: 2021-11-09 | Stop reason: ALTCHOICE

## 2021-07-21 RX ORDER — SODIUM CHLORIDE, SODIUM LACTATE, POTASSIUM CHLORIDE, CALCIUM CHLORIDE 600; 310; 30; 20 MG/100ML; MG/100ML; MG/100ML; MG/100ML
INJECTION, SOLUTION INTRAVENOUS CONTINUOUS PRN
Status: DISCONTINUED | OUTPATIENT
Start: 2021-07-21 | End: 2021-07-21

## 2021-07-21 RX ORDER — LIDOCAINE HYDROCHLORIDE 10 MG/ML
INJECTION, SOLUTION EPIDURAL; INFILTRATION; INTRACAUDAL; PERINEURAL AS NEEDED
Status: DISCONTINUED | OUTPATIENT
Start: 2021-07-21 | End: 2021-07-21

## 2021-07-21 RX ORDER — PROPOFOL 10 MG/ML
INJECTION, EMULSION INTRAVENOUS AS NEEDED
Status: DISCONTINUED | OUTPATIENT
Start: 2021-07-21 | End: 2021-07-21

## 2021-07-21 RX ADMIN — PROPOFOL 50 MG: 10 INJECTION, EMULSION INTRAVENOUS at 12:33

## 2021-07-21 RX ADMIN — PROPOFOL 50 MG: 10 INJECTION, EMULSION INTRAVENOUS at 12:31

## 2021-07-21 RX ADMIN — PROPOFOL 40 MG: 10 INJECTION, EMULSION INTRAVENOUS at 12:38

## 2021-07-21 RX ADMIN — PROPOFOL 50 MG: 10 INJECTION, EMULSION INTRAVENOUS at 12:35

## 2021-07-21 RX ADMIN — LIDOCAINE HYDROCHLORIDE 50 MG: 10 INJECTION, SOLUTION EPIDURAL; INFILTRATION; INTRACAUDAL at 12:29

## 2021-07-21 RX ADMIN — PROPOFOL 130 MG: 10 INJECTION, EMULSION INTRAVENOUS at 12:29

## 2021-07-21 RX ADMIN — PROPOFOL 30 MG: 10 INJECTION, EMULSION INTRAVENOUS at 12:41

## 2021-07-21 RX ADMIN — SODIUM CHLORIDE, SODIUM LACTATE, POTASSIUM CHLORIDE, AND CALCIUM CHLORIDE: .6; .31; .03; .02 INJECTION, SOLUTION INTRAVENOUS at 12:23

## 2021-07-21 RX ADMIN — PROPOFOL 50 MG: 10 INJECTION, EMULSION INTRAVENOUS at 12:37

## 2021-07-21 RX ADMIN — PROPOFOL 50 MG: 10 INJECTION, EMULSION INTRAVENOUS at 12:30

## 2021-07-21 NOTE — ANESTHESIA PREPROCEDURE EVALUATION
Procedure:  COLONOSCOPY  EGD    Relevant Problems   CARDIO   (+) Other chest pain      GI/HEPATIC   (+) Dysphagia   (+) Esophageal dysphagia      /RENAL   (+) Nephrolithiasis      MUSCULOSKELETAL   (+) Back pain      NEURO/PSYCH   (+) Chronic pain syndrome   (+) Chronic right SI joint pain   (+) Chronic, continuous use of opioids   (+) Depression with anxiety   (+) History of colon polyps   (+) Intractable episodic cluster headache      PULMONARY   (+) Chronic obstructive asthma (HCC)   (+) Moderate persistent asthma without complication   (+) Pneumonia due to infectious organism   (+) Sleep apnea   (+) Viral upper respiratory tract infection      Other   (+) Lingual tonsil hypertrophy        Physical Exam    Airway    Mallampati score: II  TM Distance: >3 FB  Neck ROM: full     Dental   No notable dental hx     Cardiovascular  Cardiovascular exam normal    Pulmonary  Pulmonary exam normal     Other Findings        Anesthesia Plan  ASA Score- 2     Anesthesia Type- IV sedation with anesthesia with ASA Monitors  Additional Monitors:   Airway Plan:           Plan Factors-Exercise tolerance (METS): >4 METS  Chart reviewed  EKG reviewed  Imaging results reviewed  Existing labs reviewed  Patient summary reviewed  Patient is a current smoker (marijuana daily)  Patient smoked on day of surgery  Induction-     Postoperative Plan-     Informed Consent- Anesthetic plan and risks discussed with patient  I personally reviewed this patient with the CRNA  Discussed and agreed on the Anesthesia Plan with the CRNA  Neo Bhagat

## 2021-07-21 NOTE — ANESTHESIA POSTPROCEDURE EVALUATION
Post-Op Assessment Note    CV Status:  Stable  Pain Score: 0    Pain management: adequate     Mental Status:  Alert and awake   Hydration Status:  Euvolemic   PONV Controlled:  Controlled   Airway Patency:  Patent      Post Op Vitals Reviewed: Yes      Staff: Anesthesiologist, CRNA         No complications documented      BP   106/64   Temp     Pulse  78   Resp   14   SpO2   97

## 2021-07-21 NOTE — H&P
History and Physical - [facility]  Rubia Morrison 44 y o  male MRN: [id number]        HPI:  59-year-old male with history of coronary artery disease, colon polyps reports having difficulty swallowing  He had EGD with dilations in the past     Historical Information   Past Medical History:   Diagnosis Date    Allergic     seasonal     Allergic rhinitis     Anxiety     Arthritis     Back pain     Chronic obstructive asthma (HCC)     Chronic pain syndrome     Cluster headaches     Colon polyp     Depression     Insomnia     Kidney stones     Laryngospasm     Leukocytosis     Migraine     [facility] [address] [facility]     Nephrolithiasis     Organic impotence     Pneumonia due to infectious organism 1/16/2019    Seasonal allergies     Sleep apnea     does not use CPAP    Stroke ([facility] [address] ) 2015    TMJ (temporomandibular joint syndrome)     Wheezing     last assessed 05/19/17     Past Surgical History:   Procedure Laterality Date    BACK SURGERY      *9, 4502-4102, nervectomy 2006, total of 10    BUCCAL MASS EXCISION N/A 3/26/2018    Procedure: BIOPSY LINGUAL TONSIL (FLOW/ STANDARD PATH)  EVAL UNDER ANESTHESIA;  Surgeon: Layla Barragan MD;  [facility];  Service: ENT    COLONOSCOPY      FL RETROGRADE PYELOGRAM  10/21/2020    HERNIA REPAIR      KIDNEY SURGERY      KNEE ARTHROSCOPY      LITHOTRIPSY      multiple    LUMBAR [address] SURGERY  2015    MANDIBLE FRACTURE SURGERY      titanium plate    PELVIC SYMPHYSIS FUSION      VA CYSTO/URETERO W/LITHOTRIPSY &INDWELL STENT INSRT Right 10/21/2020    Procedure: CYSTOSCOPY URETEROSCOPY WITH LITHOTRIPSY HOLMIUM LASER, RETROGRADE PYELOGRAM AND INSERTION STENT URETERAL;  Surgeon: Panchito Calix MD;  [facility];  Service: Urology    VA ESOPHAGOGASTRODUODENOSCOPY TRANSORAL DIAGNOSTIC N/A 2/22/2018    Procedure: ESOPHAGOGASTRODUODENOSCOPY (EGD); Surgeon: Laura Zamora MD;  [facility];   Service: Gastroenterology    NE ESOPHAGOGASTRODUODENOSCOPY TRANSORAL DIAGNOSTIC N/A 4/1/2019    Procedure: EGD W/ DILATION;  Surgeon: Davina Santiago MD;  [facility]; Service: Gastroenterology    NE REVISE/REMOVE SPINAL NEUROSTIM/ Left 6/9/2017    Procedure: REMOVAL OF A THORACIC SCS PLACED VIA LAMINECTOMY AND REMOVAL LEFT BUTTOCK GENERATOR; IMPULSE MONITORING;  Surgeon: Dharmesh Hyatt MD;  [facility];  Service: Neurosurgery    NE [address] ARTHROSCOP,SURG,W/ROTAT CUFF REPR Right 4/4/2019    Procedure: RIGHT SHOULDER ARTHROSCOPIC SUBSCAPULARIS TENDON REPAIR;  Surgeon: Lele Whaley MD;  [facility];  Service: Orthopedics    NE SURG IMPLNT [address] N/A 9/8/2016    Procedure: DORSAL COLUMN STIMULATOR PLACEMENT (IMPULSE MONITORING); Surgeon: María Em MD;  [facility];  Service: Orthopedics    SACROILIAC JOINT FUSION  2015    SHOULDER SURGERY Left     2    UPPER GASTROINTESTINAL ENDOSCOPY       Social History   Social History     Substance and Sexual Activity   Alcohol Use Yes    Comment: rarely      Social History     Substance and Sexual Activity   Drug Use Yes    Frequency: 7 0 times per week    Types: Marijuana    Comment: medical marijuana daily for chronic pain 1/2 ounce     Social History     Tobacco Use   Smoking Status Current Every Day Smoker    Packs/day: 0 50    Years: 25 00    Pack years: 12 50   Smokeless Tobacco Former User     Family History   Problem Relation Age of Onset    Diabetes Father     Obesity Father     Liver cancer Father     Heart disease Father     Diabetes Brother     Hypertension Brother     Leukemia Mother     Hypertension Mother     Cancer Family        Meds/Allergies     (Not in a hospital admission)      Allergies   Allergen Reactions    Other Rash     Adhesive tape       Objective     Blood pressure 121/91, pulse 95, temperature (!) 97 1 °F (36 2 °C), temperature source Temporal, resp   rate 19, height 6' 1" (1 854 m), weight 88 9 kg (196 lb), SpO2 98 %      PHYSICAL EXAM:    Gen: NAD  CV: S1 & S2 normal, RRR  CHEST: Clear to auscultate  ABD: soft, NT/ND, good bowel sounds  EXT: no edema    ASSESSMENT:     Dysphagia, history of colon polyps    PLAN:    EGD and colonoscopy

## 2021-07-30 ENCOUNTER — TELEPHONE (OUTPATIENT)
Dept: FAMILY MEDICINE CLINIC | Facility: CLINIC | Age: 51
End: 2021-07-30

## 2021-07-30 NOTE — TELEPHONE ENCOUNTER
Voice message requesting refill of Oxycodone 10mg Checked PDMP, last refill 7/2/21 #120  Last seen April 2021  Please review

## 2021-08-02 DIAGNOSIS — G89.4 CHRONIC PAIN SYNDROME: ICD-10-CM

## 2021-08-02 DIAGNOSIS — M53.3 CHRONIC RIGHT SI JOINT PAIN: ICD-10-CM

## 2021-08-02 DIAGNOSIS — M96.1 POST LAMINECTOMY SYNDROME: ICD-10-CM

## 2021-08-02 DIAGNOSIS — G89.29 CHRONIC RIGHT SI JOINT PAIN: ICD-10-CM

## 2021-08-02 RX ORDER — OXYCODONE HYDROCHLORIDE 10 MG/1
10 TABLET ORAL EVERY 6 HOURS PRN
Qty: 120 TABLET | Refills: 0 | Status: SHIPPED | OUTPATIENT
Start: 2021-08-02 | End: 2021-08-02 | Stop reason: SDUPTHER

## 2021-08-09 DIAGNOSIS — F41.8 DEPRESSION WITH ANXIETY: ICD-10-CM

## 2021-08-09 RX ORDER — DIAZEPAM 5 MG/1
5 TABLET ORAL EVERY 12 HOURS PRN
Qty: 60 TABLET | Refills: 0 | Status: SHIPPED | OUTPATIENT
Start: 2021-08-09 | End: 2021-09-08 | Stop reason: SDUPTHER

## 2021-08-27 ENCOUNTER — TELEPHONE (OUTPATIENT)
Dept: FAMILY MEDICINE CLINIC | Facility: CLINIC | Age: 51
End: 2021-08-27

## 2021-08-27 DIAGNOSIS — M96.1 POST LAMINECTOMY SYNDROME: ICD-10-CM

## 2021-08-27 DIAGNOSIS — M53.3 CHRONIC RIGHT SI JOINT PAIN: ICD-10-CM

## 2021-08-27 DIAGNOSIS — G89.29 CHRONIC RIGHT SI JOINT PAIN: ICD-10-CM

## 2021-08-27 DIAGNOSIS — G89.4 CHRONIC PAIN SYNDROME: ICD-10-CM

## 2021-08-27 NOTE — TELEPHONE ENCOUNTER
Voice message requesting refill of Oxycodone 10mg  Checked PDMP, last refill 8/2/21 #120  Please review

## 2021-08-31 RX ORDER — OXYCODONE HYDROCHLORIDE 10 MG/1
10 TABLET ORAL EVERY 6 HOURS PRN
Qty: 120 TABLET | Refills: 0 | Status: SHIPPED | OUTPATIENT
Start: 2021-08-31 | End: 2021-09-27 | Stop reason: SDUPTHER

## 2021-08-31 NOTE — TELEPHONE ENCOUNTER
Chart and PDMP reviewed  Please schedule follow up with Dr Rizwan Aggarwal  In accordance with our controlled substance policy he needs office visits every 3 months  Medication has been refilled but needs to be seen in September  Patient had marked Narcan for removal from medication list, please review with him importance of having this life saving medication on hand, especially given his use of pain medications and valium

## 2021-09-08 DIAGNOSIS — F41.8 DEPRESSION WITH ANXIETY: ICD-10-CM

## 2021-09-08 RX ORDER — DIAZEPAM 5 MG/1
5 TABLET ORAL EVERY 12 HOURS PRN
Qty: 60 TABLET | Refills: 0 | Status: SHIPPED | OUTPATIENT
Start: 2021-09-08 | End: 2021-09-24 | Stop reason: SDUPTHER

## 2021-09-08 NOTE — TELEPHONE ENCOUNTER
Diazepam refilled, but patient needs a follow up appointment with PCP every 3 months in order to continue refills on controlled substances   Last time seen in the office was 4/21/21

## 2021-09-17 ENCOUNTER — TELEMEDICINE (OUTPATIENT)
Dept: FAMILY MEDICINE CLINIC | Facility: CLINIC | Age: 51
End: 2021-09-17

## 2021-09-17 ENCOUNTER — OFFICE VISIT (OUTPATIENT)
Dept: URGENT CARE | Age: 51
End: 2021-09-17
Payer: COMMERCIAL

## 2021-09-17 VITALS — HEART RATE: 74 BPM

## 2021-09-17 DIAGNOSIS — G89.29 OTHER CHRONIC PAIN: ICD-10-CM

## 2021-09-17 DIAGNOSIS — B34.9 ACUTE VIRAL SYNDROME: Primary | ICD-10-CM

## 2021-09-17 DIAGNOSIS — Z20.822 ENCOUNTER BY TELEHEALTH FOR SUSPECTED COVID-19: ICD-10-CM

## 2021-09-17 DIAGNOSIS — B34.9 ACUTE VIRAL SYNDROME: ICD-10-CM

## 2021-09-17 PROCEDURE — 87635 SARS-COV-2 COVID-19 AMP PRB: CPT

## 2021-09-17 PROCEDURE — 99213 OFFICE O/P EST LOW 20 MIN: CPT | Performed by: FAMILY MEDICINE

## 2021-09-17 NOTE — PROGRESS NOTES
Virtual Regular Visit    Verification of patient location:    Patient is located in the following state in which I hold an active license [place]      Assessment/Plan:    Problem List Items Addressed This Visit        Other    Acute viral syndrome - Primary     Vaccinated patient with symptoms typical of an acute viral syndrome  Unlikely to be COVID as no fever, cough or definite exposure in a vaccinated patient  However, patient is requesting to be tested  Will order a covid test and follow up with results  Encouraged to practice hand hygiene and symptomatic tx with otc tylenol and flonase  He has an appointment on Monday with us         Relevant Orders    Novel Coronavirus (COVID-19), PCR [facility] Collected at [place] [address] or [facility]      Other Visit Diagnoses     Encounter by telehealth for suspected COVID-19        Relevant Orders    Novel Coronavirus (COVID-19), PCR [facility] Collected at [place] [address] or [facility]           Reason for visit is   Chief Complaint   Patient presents with    Virtual Regular Visit        Encounter provider Dorene Hewitt MD    Provider located at [address] [place] [id number] [phone]      Recent Visits  No visits were found meeting these conditions  Showing recent visits within past 7 days and meeting all other requirements  Today's Visits  Date Type Provider Dept   09/17/21 Telemedicine Dorene Hewitt MD [facility] today's visits and meeting all other requirements  Future Appointments  No visits were found meeting these conditions  Showing future appointments within next 150 days and meeting all other requirements       The patient was identified by name and date of birth  Beth Bennett was informed that this is a telemedicine visit and that the visit is being conducted through [address] [place] [address] Now and patient was informed that this is a secure, HIPAA-compliant platform  He agrees to proceed  My office door was closed  No one else was in the room  He acknowledged consent and understanding of privacy and security of the video platform  The patient has agreed to participate and understands they can discontinue the visit at any time  Patient is aware this is a billable service  Subjective  Dylon Curiel is a 46 y o  male presenting via telemedicine today for with myalgias, fatigue, reduced PO intake due to loss of appetite, Headache, sore throat, congestion, diarrhea, rhinorrhea, night sweats, myalgias and chills that started a week ago  He denies fever, vomiting, cough, chest pain and SOB  He is concerned that his symptoms might be due to recent brand name change of his prescribed oxycodone for chronic back pain which occurred 3 weeks ago  He Lives with roberta who works in a healthcare setting and meets with several people all day long and is also concerned that he might have been exposed to covid without knowing  He is vaccinated x 2 doses of the pfizer back in May 2021  He requests to be tested for coronavirus        Past Medical History:   Diagnosis Date    Allergic     seasonal     Allergic rhinitis     Anxiety     Arthritis     Back pain     Chronic obstructive asthma (HCC)     Chronic pain syndrome     Cluster headaches     Colon polyp     Depression     Insomnia     Kidney stones     Laryngospasm     Leukocytosis     Migraine     [facility] [address] )     Nephrolithiasis     Organic impotence     Pneumonia due to infectious organism 1/16/2019    Seasonal allergies     Sleep apnea     does not use CPAP    Stroke [facility]) 2015    TMJ (temporomandibular joint syndrome)     Wheezing     last assessed 05/19/17       Past Surgical History:   Procedure Laterality Date    BACK SURGERY      *9, 7121-6225, nervectomy 2006, total of 10    BUCCAL MASS EXCISION N/A 3/26/2018    Procedure: BIOPSY LINGUAL TONSIL (FLOW/ STANDARD PATH)  EVAL UNDER ANESTHESIA;  Surgeon: Marquis Mathias Shreyas Blum MD;  [facility];  Service: ENT    COLONOSCOPY      FL RETROGRADE PYELOGRAM  10/21/2020    HERNIA REPAIR      KIDNEY SURGERY      KNEE ARTHROSCOPY      LITHOTRIPSY      multiple    LUMBAR [address] SURGERY  2015    MANDIBLE FRACTURE SURGERY      titanium plate    PELVIC SYMPHYSIS FUSION      MN CYSTO/URETERO W/LITHOTRIPSY &INDWELL STENT INSRT Right 10/21/2020    Procedure: CYSTOSCOPY URETEROSCOPY WITH LITHOTRIPSY HOLMIUM LASER, RETROGRADE PYELOGRAM AND INSERTION STENT URETERAL;  Surgeon: Efra Jeff MD;  [facility];  Service: Urology    MN ESOPHAGOGASTRODUODENOSCOPY TRANSORAL DIAGNOSTIC N/A 2/22/2018    Procedure: ESOPHAGOGASTRODUODENOSCOPY (EGD); Surgeon: Jessica Medrano MD;  [facility]; Service: Gastroenterology    MN ESOPHAGOGASTRODUODENOSCOPY TRANSORAL DIAGNOSTIC N/A 4/1/2019    Procedure: EGD W/ DILATION;  Surgeon: Jessica Medrano MD;  [facility]; Service: Gastroenterology    MN REVISE/REMOVE SPINAL NEUROSTIM/ Left 6/9/2017    Procedure: REMOVAL OF A THORACIC SCS PLACED VIA LAMINECTOMY AND REMOVAL LEFT BUTTOCK GENERATOR; IMPULSE MONITORING;  Surgeon: Carina Toledo MD;  [facility];  Service: Neurosurgery    MN [address] ARTHROSCOP,SURG,W/ROTAT CUFF REPR Right 4/4/2019    Procedure: RIGHT SHOULDER ARTHROSCOPIC SUBSCAPULARIS TENDON REPAIR;  Surgeon: Susan Wu MD;  [facility];  Service: Orthopedics    MN SURG IMPLNT [address] N/A 9/8/2016    Procedure: DORSAL COLUMN STIMULATOR PLACEMENT (IMPULSE MONITORING);   Surgeon: Parvin Duggan MD;  [facility];  Service: Orthopedics    SACROILIAC JOINT FUSION  2015    SHOULDER SURGERY Left     2    UPPER GASTROINTESTINAL ENDOSCOPY         Current Outpatient Medications   Medication Sig Dispense Refill    albuterol (PROVENTIL HFA,VENTOLIN HFA) 90 mcg/act inhaler INHALE 2 PUFFS BY MOUTH EVERY 6 HOURS AS NEEDED FOR WHEEZING 18 g 5    amitriptyline (ELAVIL) 25 mg tablet TAKE 1 TABLET(25 MG) BY MOUTH DAILY AT BEDTIME (Patient not taking: Reported on 5/4/2021) 30 tablet 5    amoxicillin (AMOXIL) 500 MG tablet       bisacodyl (DULCOLAX) 5 mg EC tablet Take 2 tablets (10 mg total) by mouth once for 1 dose 2 tablet 0    diazepam (VALIUM) 5 mg tablet Take 1 tablet (5 mg total) by mouth every 12 (twelve) hours as needed for anxiety or muscle spasms 60 tablet 0    diltiazem (CARDIZEM) 60 mg tablet Take 1 tablet (60 mg total) by mouth 4 (four) times a day (Patient not taking: Reported on 5/4/2021) 120 tablet 2    fluticasone-vilanterol (BREO ELLIPTA) 200-25 MCG/INH inhaler Inhale 1 puff daily Rinse mouth after use  3 Inhaler 3    ibuprofen (MOTRIN) 600 mg tablet       ibuprofen (MOTRIN) 800 mg tablet TAKE 1 TABLET(800 MG) BY MOUTH THREE TIMES DAILY AS NEEDED FOR MILD PAIN 90 tablet 1    naloxone (Narcan) 4 mg/0 1 mL nasal spray In case of overdose: 1 spray in one nostril x 1, may repeat in 2 min if remains unresponsive  1 each 0    Nutritional Supplements (Ensure Active) LIQD Take by mouth      oxyCODONE (ROXICODONE) 10 MG TABS Take 1 tablet (10 mg total) by mouth every 6 (six) hours as needed for severe pain Take 1 tablet (10mg) by mouth every 6 hours as needed for severe pain  Max daily amount : 4 tablets (40mg)Max Daily Amount: 40 mg 120 tablet 0    pantoprazole (PROTONIX) 40 mg tablet Take 1 tablet (40 mg total) by mouth daily 30 tablet 11    polyethylene glycol (GLYCOLAX) 17 GM/SCOOP powder Take 238 g by mouth once for 1 dose 238 g 0    tadalafil (CIALIS) 5 MG tablet Take 1 tablet (5 mg total) by mouth daily as needed for erectile dysfunction 30 tablet 6    Triamcinolone Acetonide 55 MCG/ACT AERO 1 Act (55 mcg total) into each nostril daily 1 Bottle 3     No current facility-administered medications for this visit          Allergies   Allergen Reactions    Other Rash     Adhesive tape       Review of Systems   Constitutional: Positive for chills and fatigue  Negative for fever  HENT: Positive for congestion, rhinorrhea and sore throat  Negative for ear pain  Eyes: Negative for pain and visual disturbance  Respiratory: Negative for cough and shortness of breath  Cardiovascular: Negative for chest pain and palpitations  Gastrointestinal: Positive for nausea  Negative for abdominal pain, blood in stool and vomiting  Genitourinary: Negative for dysuria and hematuria  Musculoskeletal: Positive for myalgias  Negative for arthralgias and back pain  Skin: Negative for color change and rash  Neurological: Negative for seizures and syncope  All other systems reviewed and are negative  Video Exam    Vitals:    09/17/21 0859   Pulse: 74       Physical Exam  Constitutional:       General: He is not in acute distress  Appearance: He is not ill-appearing or diaphoretic  HENT:      Right Ear: External ear normal       Left Ear: External ear normal       Nose: No congestion or rhinorrhea  Mouth/Throat:      Mouth: Mucous membranes are moist    Eyes:      General: No scleral icterus  Conjunctiva/sclera: Conjunctivae normal    Pulmonary:      Effort: No respiratory distress  Neurological:      Mental Status: He is alert  I spent 20 minutes directly with the patient during this visit    VIRTUAL VISIT DISCLAIMER      Anusha Bennett verbally agrees to participate in [employer]  Pt is aware that [employer] could be limited without vital signs or the ability to perform a full hands-on physical exam  Yefri Bennett understands he or the provider may request at any time to terminate the video visit and request the patient to seek care or treatment in person        Eufemia Dugan MD  PGY-2 Family Medicine  09/17/21

## 2021-09-17 NOTE — ASSESSMENT & PLAN NOTE
Vaccinated patient with symptoms typical of an acute viral syndrome  Unlikely to be COVID as no fever, cough or definite exposure in a vaccinated patient  However, patient is requesting to be tested  Will order a covid test and follow up with results  Encouraged to practice hand hygiene and symptomatic tx with otc tylenol and flonase    He has an appointment on Monday with us

## 2021-09-19 LAB — SARS-COV-2 RNA RESP QL NAA+PROBE: NEGATIVE

## 2021-09-19 RX ORDER — IBUPROFEN 800 MG/1
800 TABLET ORAL 3 TIMES DAILY PRN
Qty: 90 TABLET | Refills: 1 | Status: SHIPPED | OUTPATIENT
Start: 2021-09-19 | End: 2021-11-22

## 2021-09-20 ENCOUNTER — OFFICE VISIT (OUTPATIENT)
Dept: FAMILY MEDICINE CLINIC | Facility: CLINIC | Age: 51
End: 2021-09-20

## 2021-09-20 VITALS
SYSTOLIC BLOOD PRESSURE: 128 MMHG | HEIGHT: 73 IN | OXYGEN SATURATION: 98 % | DIASTOLIC BLOOD PRESSURE: 80 MMHG | BODY MASS INDEX: 28.1 KG/M2 | WEIGHT: 212 LBS | HEART RATE: 76 BPM | RESPIRATION RATE: 18 BRPM | TEMPERATURE: 97.5 F

## 2021-09-20 DIAGNOSIS — Z79.899 CHRONIC PRESCRIPTION BENZODIAZEPINE USE: ICD-10-CM

## 2021-09-20 DIAGNOSIS — Z51.81 MEDICATION MONITORING ENCOUNTER: Primary | ICD-10-CM

## 2021-09-20 DIAGNOSIS — Z11.59 ENCOUNTER FOR HEPATITIS C SCREENING TEST FOR LOW RISK PATIENT: ICD-10-CM

## 2021-09-20 PROCEDURE — 99213 OFFICE O/P EST LOW 20 MIN: CPT | Performed by: FAMILY MEDICINE

## 2021-09-20 PROCEDURE — 80365 DRUG SCREENING OXYCODONE: CPT | Performed by: FAMILY MEDICINE

## 2021-09-20 PROCEDURE — 80307 DRUG TEST PRSMV CHEM ANLYZR: CPT | Performed by: FAMILY MEDICINE

## 2021-09-20 NOTE — PROGRESS NOTES
Assessment/Plan:    Medication monitoring encounter  Long-term oxycodone and diazepam 5 mg daily for back pain  Opioid agreement updated on 04/21/2021  Diazepam was last refilled on 09/08/2021  Oxycodone was last refilled on 08/31/21  PDMP reviewed  UDS, urine benzodiazepine, urine oxycodone/oxymorphone ordered today  Will follow-up with the results       Diagnoses and all orders for this visit:    Medication monitoring encounter  -     Rapid drug screen, urine  -     Drug Monitoring, Benzodiazepines, with Confirmation, Urine  -     Oxycodone/Oxymorphone urine    Chronic prescription benzodiazepine use  -     Rapid drug screen, urine  -     Drug Monitoring, Benzodiazepines, with Confirmation, Urine  -     Oxycodone/Oxymorphone urine    Encounter for hepatitis C screening test for low risk patient  -     Hepatitis C antibody; Future        Subjective:      Patient ID: Nicky Marino is a 46 y o  male  57-year-old male on chronic opioid use is here for follow-up  Patient states he did not  diazepam from the pharmacy  Last refilled on 09/08/2021  PDMP reviewed  Reports he has follow-up appointments scheduled for back pain  Patient does not have any active concerns today  The following portions of the patient's history were reviewed and updated as appropriate:   He  has a past medical history of Allergic, Allergic rhinitis, Anxiety, Arthritis, Back pain, Chronic obstructive asthma ([facility]), Chronic pain syndrome, Cluster headaches, Colon polyp, Depression, Insomnia, Kidney stones, Laryngospasm, Leukocytosis, Migraine, Myocardial infarction ([facility]), Nephrolithiasis, Organic impotence, Pneumonia due to infectious organism (1/16/2019), Seasonal allergies, Sleep apnea, Stroke ([facility]) (2015), TMJ (temporomandibular joint syndrome), and Wheezing    He   Patient Active Problem List    Diagnosis Date Noted    History of colon polyps 05/04/2021    Medication monitoring encounter 02/20/2021    Acute viral syndrome 02/08/2021    Status post cystoscopy with ureteral stent placement 10/22/2020    Chronic obstructive asthma (HCC)     Abnormal urinalysis 10/20/2020    Peripheral polyneuropathy 06/23/2020    Seborrheic keratosis 06/23/2020    Gastritis without bleeding 06/03/2020    Cough 10/25/2019    Therapeutic opioid induced constipation 08/29/2019    Exposure to sexually transmitted disease (STD) 08/29/2019    Partial tear of right subscapularis tendon 03/27/2019    Incomplete tear of right rotator cuff 03/18/2019    Viral upper respiratory tract infection 03/18/2019    Esophageal dysphagia 03/01/2019    Abnormal CBC 01/16/2019    Pneumonia due to infectious organism 01/16/2019    Hypercontractile esophagus 12/21/2018    Right shoulder pain 12/21/2018    Cervical radiculopathy 12/21/2018    Elevated hematocrit 12/17/2018    Elevated platelet count 55/86/9097    Hoarseness 10/30/2018    Tobacco abuse 10/30/2018    Medical marijuana use 07/23/2018    Chronic, continuous use of opioids 07/09/2018    Chronic prescription benzodiazepine use 07/09/2018    Other chest pain 06/24/2018    Sleep apnea     Lingual tonsil hypertrophy 03/02/2018    Dental abscess 02/28/2018    Screening for STD (sexually transmitted disease) 02/28/2018    Intractable episodic cluster headache 02/13/2018    Dysphagia 02/12/2018    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    Abnormal head CT 08/03/2017    Post laminectomy syndrome 03/22/2017    Blood pressure elevated without history of HTN 01/09/2017    Status post insertion of spinal cord stimulator 09/08/2016    Chronic pain syndrome     Back pain 05/27/2016    Nephrolithiasis 05/27/2016    TMJ syndrome 02/01/2016    Moderate persistent asthma without complication 77/33/3651    Chronic right SI joint pain 09/23/2015    Allergic rhinitis 05/22/2015    Dyshidrotic dermatitis 06/12/2014    Depression with anxiety 09/05/2013    Erectile dysfunction of non-organic origin 09/05/2013     He  has a past surgical history that includes Mandible fracture surgery; Lumbar disc surgery (2015); SACROILIAC JOINT FUSION (2015); Hernia repair; Lithotripsy; Shoulder surgery (Left); Back surgery; Pelvic symphysis fusion; Knee arthroscopy; pr surg implnt neuroelect,epidural (N/A, 9/8/2016); pr revise/remove spinal neurostim/ (Left, 6/9/2017); pr esophagogastroduodenoscopy transoral diagnostic (N/A, 2/22/2018); Buccal mass excision (N/A, 3/26/2018); Colonoscopy; Kidney surgery; pr esophagogastroduodenoscopy transoral diagnostic (N/A, 4/1/2019); pr shldr arthroscop,surg,w/rotat cuff repr (Right, 4/4/2019); pr cysto/uretero w/lithotripsy &indwell stent insrt (Right, 10/21/2020); [place] retrograde pyelogram (10/21/2020); and Upper gastrointestinal endoscopy  His family history includes Cancer in his family; Diabetes in his brother and father; Heart disease in his father; Hypertension in his brother and mother; Leukemia in his mother; Liver cancer in his father; Obesity in his father  He  reports that he has been smoking  He has a 12 50 pack-year smoking history  He has quit using smokeless tobacco  He reports current alcohol use  He reports current drug use  Frequency: 7 00 times per week  Drug: Marijuana  Current Outpatient Medications   Medication Sig Dispense Refill    albuterol (PROVENTIL HFA,VENTOLIN HFA) 90 mcg/act inhaler INHALE 2 PUFFS BY MOUTH EVERY 6 HOURS AS NEEDED FOR WHEEZING 18 g 5    diazepam (VALIUM) 5 mg tablet Take 1 tablet (5 mg total) by mouth every 12 (twelve) hours as needed for anxiety or muscle spasms 60 tablet 0    fluticasone-vilanterol (BREO ELLIPTA) 200-25 MCG/INH inhaler Inhale 1 puff daily Rinse mouth after use   3 Inhaler 3    ibuprofen (MOTRIN) 800 mg tablet Take 1 tablet (800 mg total) by mouth 3 (three) times a day as needed for mild pain 90 tablet 1    naloxone (Narcan) 4 mg/0 1 mL nasal spray In case of overdose: 1 spray in one nostril x 1, may repeat in 2 min if remains unresponsive  1 each 0    oxyCODONE (ROXICODONE) 10 MG TABS Take 1 tablet (10 mg total) by mouth every 6 (six) hours as needed for severe pain Take 1 tablet (10mg) by mouth every 6 hours as needed for severe pain  Max daily amount : 4 tablets (40mg)Max Daily Amount: 40 mg 120 tablet 0    pantoprazole (PROTONIX) 40 mg tablet Take 1 tablet (40 mg total) by mouth daily 30 tablet 11    tadalafil (CIALIS) 5 MG tablet Take 1 tablet (5 mg total) by mouth daily as needed for erectile dysfunction 30 tablet 6    Triamcinolone Acetonide 55 MCG/ACT AERO 1 Act (55 mcg total) into each nostril daily 1 Bottle 3    amitriptyline (ELAVIL) 25 mg tablet TAKE 1 TABLET(25 MG) BY MOUTH DAILY AT BEDTIME (Patient not taking: Reported on 5/4/2021) 30 tablet 5    amoxicillin (AMOXIL) 500 MG tablet       bisacodyl (DULCOLAX) 5 mg EC tablet Take 2 tablets (10 mg total) by mouth once for 1 dose 2 tablet 0    diltiazem (CARDIZEM) 60 mg tablet Take 1 tablet (60 mg total) by mouth 4 (four) times a day (Patient not taking: Reported on 5/4/2021) 120 tablet 2    ibuprofen (MOTRIN) 600 mg tablet       Nutritional Supplements (Ensure Active) LIQD Take by mouth      polyethylene glycol (GLYCOLAX) 17 GM/SCOOP powder Take 238 g by mouth once for 1 dose 238 g 0     No current facility-administered medications for this visit  Current Outpatient Medications on File Prior to Visit   Medication Sig    albuterol (PROVENTIL HFA,VENTOLIN HFA) 90 mcg/act inhaler INHALE 2 PUFFS BY MOUTH EVERY 6 HOURS AS NEEDED FOR WHEEZING    diazepam (VALIUM) 5 mg tablet Take 1 tablet (5 mg total) by mouth every 12 (twelve) hours as needed for anxiety or muscle spasms    fluticasone-vilanterol (BREO ELLIPTA) 200-25 MCG/INH inhaler Inhale 1 puff daily Rinse mouth after use      ibuprofen (MOTRIN) 800 mg tablet Take 1 tablet (800 mg total) by mouth 3 (three) times a day as needed for mild pain    naloxone (Narcan) 4 mg/0 1 mL nasal spray In case of overdose: 1 spray in one nostril x 1, may repeat in 2 min if remains unresponsive   oxyCODONE (ROXICODONE) 10 MG TABS Take 1 tablet (10 mg total) by mouth every 6 (six) hours as needed for severe pain Take 1 tablet (10mg) by mouth every 6 hours as needed for severe pain  Max daily amount : 4 tablets (40mg)Max Daily Amount: 40 mg    pantoprazole (PROTONIX) 40 mg tablet Take 1 tablet (40 mg total) by mouth daily    tadalafil (CIALIS) 5 MG tablet Take 1 tablet (5 mg total) by mouth daily as needed for erectile dysfunction    Triamcinolone Acetonide 55 MCG/ACT AERO 1 Act (55 mcg total) into each nostril daily    amitriptyline (ELAVIL) 25 mg tablet TAKE 1 TABLET(25 MG) BY MOUTH DAILY AT BEDTIME (Patient not taking: Reported on 5/4/2021)    amoxicillin (AMOXIL) 500 MG tablet     bisacodyl (DULCOLAX) 5 mg EC tablet Take 2 tablets (10 mg total) by mouth once for 1 dose    diltiazem (CARDIZEM) 60 mg tablet Take 1 tablet (60 mg total) by mouth 4 (four) times a day (Patient not taking: Reported on 5/4/2021)    ibuprofen (MOTRIN) 600 mg tablet     Nutritional Supplements (Ensure Active) LIQD Take by mouth    polyethylene glycol (GLYCOLAX) 17 GM/SCOOP powder Take 238 g by mouth once for 1 dose     No current facility-administered medications on file prior to visit  He is allergic to other       Review of Systems   Constitutional: Negative for chills and fever  HENT: Negative for congestion and sore throat  Respiratory: Negative for cough and shortness of breath  Cardiovascular: Negative for chest pain  Gastrointestinal: Negative for abdominal pain and constipation  Musculoskeletal: Positive for back pain  Skin: Negative for rash  Neurological: Negative for headaches  Psychiatric/Behavioral: Negative for behavioral problems           Objective:      /80 (BP Location: Left arm, Patient Position: Sitting, Cuff Size: Standard)   Pulse 76   Temp 97 5 °F (36 4 °C) (Temporal)   Resp 18   Ht 6' 1" (1 854 m)   Wt 96 2 kg (212 lb)   SpO2 98%   BMI 27 97 kg/m²          Physical Exam  Vitals reviewed  Constitutional:       Appearance: Normal appearance  HENT:      Head: Normocephalic and atraumatic  Mouth/Throat:      Mouth: Mucous membranes are moist    Eyes:      Conjunctiva/sclera: Conjunctivae normal    Cardiovascular:      Rate and Rhythm: Normal rate  Pulses: Normal pulses  Pulmonary:      Effort: Pulmonary effort is normal    Abdominal:      General: There is no distension  Musculoskeletal:         General: Normal range of motion  Cervical back: Normal range of motion  Skin:     General: Skin is warm and dry  Capillary Refill: Capillary refill takes less than 2 seconds  Neurological:      Mental Status: He is alert and oriented to person, place, and time  Cranial Nerves: No cranial nerve deficit  Motor: No weakness     Psychiatric:         Mood and Affect: Mood normal          Behavior: Behavior normal

## 2021-09-20 NOTE — RESULT ENCOUNTER NOTE
Pls call Yefri and let him know that his COVID-19 swab was negative  I advised him to continue social distancing procedures

## 2021-09-20 NOTE — ASSESSMENT & PLAN NOTE
Long-term oxycodone and diazepam 5 mg daily for back pain  Opioid agreement updated on 04/21/2021  Diazepam was last refilled on 09/08/2021  Oxycodone was last refilled on 08/31/21  PDMP reviewed  UDS, urine benzodiazepine, urine oxycodone/oxymorphone ordered today  Will follow-up with the results

## 2021-09-21 LAB
AMPHETAMINES SERPL QL SCN: NEGATIVE
BARBITURATES UR QL: NEGATIVE
BENZODIAZ UR QL: POSITIVE
COCAINE UR QL: NEGATIVE
METHADONE UR QL: NEGATIVE
OPIATES UR QL SCN: POSITIVE
OXYCODONE+OXYMORPHONE UR QL SCN: POSITIVE
PCP UR QL: NEGATIVE
THC UR QL: POSITIVE

## 2021-09-23 ENCOUNTER — TELEPHONE (OUTPATIENT)
Dept: FAMILY MEDICINE CLINIC | Facility: CLINIC | Age: 51
End: 2021-09-23

## 2021-09-23 DIAGNOSIS — F41.8 DEPRESSION WITH ANXIETY: ICD-10-CM

## 2021-09-23 NOTE — TELEPHONE ENCOUNTER
Pt's diazepam was sent to [facility] on 9/8 but they state they never received it as their systems were down at that time   He is asking if this can be resent pt is in pain

## 2021-09-24 RX ORDER — DIAZEPAM 5 MG/1
5 TABLET ORAL EVERY 12 HOURS PRN
Qty: 60 TABLET | Refills: 0 | Status: SHIPPED | OUTPATIENT
Start: 2021-09-24 | End: 2021-11-05 | Stop reason: SDUPTHER

## 2021-09-25 LAB
OXYCODONE UR QL CFM: 1942 NG/ML
OXYCODONE+OXYMORPHONE SERPLBLD QL SCN: POSITIVE
OXYCODONE+OXYMORPHONE UR QL SCN: NORMAL NG/ML
OXYCODONE+OXYMORPHONE UR QL SCN: POSITIVE
OXYMORPHONE UR CFM-MCNC: 3260 NG/ML
OXYMORPHONE UR QL CFM: POSITIVE

## 2021-09-27 DIAGNOSIS — G89.29 CHRONIC RIGHT SI JOINT PAIN: ICD-10-CM

## 2021-09-27 DIAGNOSIS — G89.4 CHRONIC PAIN SYNDROME: ICD-10-CM

## 2021-09-27 DIAGNOSIS — M53.3 CHRONIC RIGHT SI JOINT PAIN: ICD-10-CM

## 2021-09-27 DIAGNOSIS — M96.1 POST LAMINECTOMY SYNDROME: ICD-10-CM

## 2021-09-27 RX ORDER — OXYCODONE HYDROCHLORIDE 10 MG/1
10 TABLET ORAL EVERY 6 HOURS PRN
Qty: 120 TABLET | Refills: 0 | Status: SHIPPED | OUTPATIENT
Start: 2021-09-30 | End: 2021-11-26 | Stop reason: SDUPTHER

## 2021-10-06 ENCOUNTER — TELEPHONE (OUTPATIENT)
Dept: LAB | Facility: HOSPITAL | Age: 51
End: 2021-10-06

## 2021-10-22 ENCOUNTER — OFFICE VISIT (OUTPATIENT)
Dept: GASTROENTEROLOGY | Facility: AMBULARY SURGERY CENTER | Age: 51
End: 2021-10-22
Payer: COMMERCIAL

## 2021-10-22 ENCOUNTER — TELEPHONE (OUTPATIENT)
Dept: GASTROENTEROLOGY | Facility: AMBULARY SURGERY CENTER | Age: 51
End: 2021-10-22

## 2021-10-22 ENCOUNTER — APPOINTMENT (OUTPATIENT)
Dept: LAB | Facility: AMBULARY SURGERY CENTER | Age: 51
End: 2021-10-22
Attending: INTERNAL MEDICINE
Payer: COMMERCIAL

## 2021-10-22 VITALS
BODY MASS INDEX: 28.04 KG/M2 | DIASTOLIC BLOOD PRESSURE: 82 MMHG | SYSTOLIC BLOOD PRESSURE: 132 MMHG | HEIGHT: 73 IN | WEIGHT: 211.6 LBS

## 2021-10-22 DIAGNOSIS — R13.19 ESOPHAGEAL DYSPHAGIA: ICD-10-CM

## 2021-10-22 DIAGNOSIS — R13.19 ESOPHAGEAL DYSPHAGIA: Primary | ICD-10-CM

## 2021-10-22 LAB
ALBUMIN SERPL BCP-MCNC: 4 G/DL (ref 3.5–5)
ALP SERPL-CCNC: 85 U/L (ref 46–116)
ALT SERPL W P-5'-P-CCNC: 25 U/L (ref 12–78)
ANION GAP SERPL CALCULATED.3IONS-SCNC: 0 MMOL/L (ref 4–13)
AST SERPL W P-5'-P-CCNC: 16 U/L (ref 5–45)
BILIRUB SERPL-MCNC: 0.38 MG/DL (ref 0.2–1)
BUN SERPL-MCNC: 18 MG/DL (ref 5–25)
CALCIUM SERPL-MCNC: 10.1 MG/DL (ref 8.3–10.1)
CHLORIDE SERPL-SCNC: 111 MMOL/L (ref 100–108)
CO2 SERPL-SCNC: 30 MMOL/L (ref 21–32)
CREAT SERPL-MCNC: 1.25 MG/DL (ref 0.6–1.3)
ERYTHROCYTE [DISTWIDTH] IN BLOOD BY AUTOMATED COUNT: 15.1 % (ref 11.6–15.1)
GFR SERPL CREATININE-BSD FRML MDRD: 65 ML/MIN/1.73SQ M
GLUCOSE P FAST SERPL-MCNC: 115 MG/DL (ref 65–99)
HCT VFR BLD AUTO: 49.1 % (ref 36.5–49.3)
HGB BLD-MCNC: 15.8 G/DL (ref 12–17)
MAGNESIUM SERPL-MCNC: 2.1 MG/DL (ref 1.6–2.6)
MCH RBC QN AUTO: 29.2 PG (ref 26.8–34.3)
MCHC RBC AUTO-ENTMCNC: 32.2 G/DL (ref 31.4–37.4)
MCV RBC AUTO: 91 FL (ref 82–98)
PLATELET # BLD AUTO: 356 THOUSANDS/UL (ref 149–390)
PMV BLD AUTO: 10.7 FL (ref 8.9–12.7)
POTASSIUM SERPL-SCNC: 5.3 MMOL/L (ref 3.5–5.3)
PROT SERPL-MCNC: 7.7 G/DL (ref 6.4–8.2)
RBC # BLD AUTO: 5.41 MILLION/UL (ref 3.88–5.62)
SODIUM SERPL-SCNC: 141 MMOL/L (ref 136–145)
TSH SERPL DL<=0.05 MIU/L-ACNC: 3.09 UIU/ML (ref 0.36–3.74)
WBC # BLD AUTO: 11.83 THOUSAND/UL (ref 4.31–10.16)

## 2021-10-22 PROCEDURE — 80053 COMPREHEN METABOLIC PANEL: CPT

## 2021-10-22 PROCEDURE — 99214 OFFICE O/P EST MOD 30 MIN: CPT | Performed by: INTERNAL MEDICINE

## 2021-10-22 PROCEDURE — 85027 COMPLETE CBC AUTOMATED: CPT

## 2021-10-22 PROCEDURE — 83735 ASSAY OF MAGNESIUM: CPT

## 2021-10-22 PROCEDURE — 86255 FLUORESCENT ANTIBODY SCREEN: CPT

## 2021-10-22 PROCEDURE — 3008F BODY MASS INDEX DOCD: CPT | Performed by: INTERNAL MEDICINE

## 2021-10-22 PROCEDURE — 82784 ASSAY IGA/IGD/IGG/IGM EACH: CPT

## 2021-10-22 PROCEDURE — 36415 COLL VENOUS BLD VENIPUNCTURE: CPT

## 2021-10-22 PROCEDURE — 83516 IMMUNOASSAY NONANTIBODY: CPT

## 2021-10-22 PROCEDURE — 4004F PT TOBACCO SCREEN RCVD TLK: CPT | Performed by: INTERNAL MEDICINE

## 2021-10-22 PROCEDURE — 84443 ASSAY THYROID STIM HORMONE: CPT

## 2021-10-23 LAB
ENDOMYSIUM IGA SER QL: NEGATIVE
GLIADIN PEPTIDE IGA SER-ACNC: 92 UNITS (ref 0–19)
GLIADIN PEPTIDE IGG SER-ACNC: 2 UNITS (ref 0–19)
IGA SERPL-MCNC: 172 MG/DL (ref 90–386)
TTG IGA SER-ACNC: 9 U/ML (ref 0–3)
TTG IGG SER-ACNC: 6 U/ML (ref 0–5)

## 2021-10-25 ENCOUNTER — ANESTHESIA EVENT (OUTPATIENT)
Dept: ANESTHESIOLOGY | Facility: HOSPITAL | Age: 51
End: 2021-10-25

## 2021-10-25 ENCOUNTER — TELEPHONE (OUTPATIENT)
Dept: FAMILY MEDICINE CLINIC | Facility: CLINIC | Age: 51
End: 2021-10-25

## 2021-10-25 ENCOUNTER — ANESTHESIA (OUTPATIENT)
Dept: ANESTHESIOLOGY | Facility: HOSPITAL | Age: 51
End: 2021-10-25

## 2021-10-27 ENCOUNTER — TELEPHONE (OUTPATIENT)
Dept: GASTROENTEROLOGY | Facility: AMBULARY SURGERY CENTER | Age: 51
End: 2021-10-27

## 2021-10-27 NOTE — TELEPHONE ENCOUNTER
Pt aware of results, verbalized understanding  Pt will follow up with PCP  Advised to call office with any questions or concerns

## 2021-10-27 NOTE — TELEPHONE ENCOUNTER
----- Message from Lesly Carbajal MD sent at 10/27/2021  8:55 AM EDT -----  Please inform the patient that the celiac serologies are inconclusive, will proceed with small bowel biopsies at the same time as endoscopy  His glucose is also mildly elevated, would recommend addressing this with PCP  Also has mild elevation of the white count, would also recommend following up with PCP in regards to this

## 2021-10-28 ENCOUNTER — TELEPHONE (OUTPATIENT)
Dept: FAMILY MEDICINE CLINIC | Facility: CLINIC | Age: 51
End: 2021-10-28

## 2021-10-28 DIAGNOSIS — F41.8 DEPRESSION WITH ANXIETY: ICD-10-CM

## 2021-11-03 ENCOUNTER — TELEPHONE (OUTPATIENT)
Dept: GASTROENTEROLOGY | Facility: CLINIC | Age: 51
End: 2021-11-03

## 2021-11-04 DIAGNOSIS — Z72.0 TOBACCO ABUSE: ICD-10-CM

## 2021-11-04 DIAGNOSIS — R49.0 HOARSENESS OF VOICE: ICD-10-CM

## 2021-11-04 DIAGNOSIS — R13.10 DYSPHAGIA, UNSPECIFIED TYPE: Primary | ICD-10-CM

## 2021-11-05 ENCOUNTER — TELEPHONE (OUTPATIENT)
Dept: FAMILY MEDICINE CLINIC | Facility: CLINIC | Age: 51
End: 2021-11-05

## 2021-11-05 RX ORDER — DIAZEPAM 5 MG/1
5 TABLET ORAL EVERY 12 HOURS PRN
Qty: 60 TABLET | Refills: 0 | Status: SHIPPED | OUTPATIENT
Start: 2021-11-05 | End: 2022-01-06 | Stop reason: SDUPTHER

## 2021-11-08 ENCOUNTER — TELEPHONE (OUTPATIENT)
Dept: GASTROENTEROLOGY | Facility: AMBULARY SURGERY CENTER | Age: 51
End: 2021-11-08

## 2021-11-09 ENCOUNTER — ANESTHESIA (OUTPATIENT)
Dept: GASTROENTEROLOGY | Facility: AMBULARY SURGERY CENTER | Age: 51
End: 2021-11-09

## 2021-11-09 ENCOUNTER — ANESTHESIA EVENT (OUTPATIENT)
Dept: GASTROENTEROLOGY | Facility: AMBULARY SURGERY CENTER | Age: 51
End: 2021-11-09

## 2021-11-09 ENCOUNTER — HOSPITAL ENCOUNTER (OUTPATIENT)
Dept: GASTROENTEROLOGY | Facility: AMBULARY SURGERY CENTER | Age: 51
Setting detail: OUTPATIENT SURGERY
Discharge: HOME | End: 2021-11-09
Attending: INTERNAL MEDICINE
Payer: COMMERCIAL

## 2021-11-09 VITALS
SYSTOLIC BLOOD PRESSURE: 123 MMHG | BODY MASS INDEX: 27.83 KG/M2 | HEART RATE: 85 BPM | RESPIRATION RATE: 14 BRPM | OXYGEN SATURATION: 98 % | HEIGHT: 73 IN | DIASTOLIC BLOOD PRESSURE: 74 MMHG | TEMPERATURE: 96.9 F | WEIGHT: 210 LBS

## 2021-11-09 DIAGNOSIS — R13.19 ESOPHAGEAL DYSPHAGIA: ICD-10-CM

## 2021-11-09 PROCEDURE — 43248 EGD GUIDE WIRE INSERTION: CPT | Performed by: INTERNAL MEDICINE

## 2021-11-09 RX ORDER — GLYCOPYRROLATE 0.2 MG/ML
INJECTION INTRAMUSCULAR; INTRAVENOUS AS NEEDED
Status: DISCONTINUED | OUTPATIENT
Start: 2021-11-09 | End: 2021-11-09

## 2021-11-09 RX ORDER — FENTANYL CITRATE 50 UG/ML
INJECTION, SOLUTION INTRAMUSCULAR; INTRAVENOUS AS NEEDED
Status: DISCONTINUED | OUTPATIENT
Start: 2021-11-09 | End: 2021-11-09

## 2021-11-09 RX ORDER — PROPOFOL 10 MG/ML
INJECTION, EMULSION INTRAVENOUS AS NEEDED
Status: DISCONTINUED | OUTPATIENT
Start: 2021-11-09 | End: 2021-11-09

## 2021-11-09 RX ORDER — LIDOCAINE HYDROCHLORIDE 10 MG/ML
INJECTION, SOLUTION EPIDURAL; INFILTRATION; INTRACAUDAL; PERINEURAL AS NEEDED
Status: DISCONTINUED | OUTPATIENT
Start: 2021-11-09 | End: 2021-11-09

## 2021-11-09 RX ORDER — SODIUM CHLORIDE, SODIUM LACTATE, POTASSIUM CHLORIDE, CALCIUM CHLORIDE 600; 310; 30; 20 MG/100ML; MG/100ML; MG/100ML; MG/100ML
INJECTION, SOLUTION INTRAVENOUS CONTINUOUS PRN
Status: DISCONTINUED | OUTPATIENT
Start: 2021-11-09 | End: 2021-11-09

## 2021-11-09 RX ADMIN — PROPOFOL 30 MG: 10 INJECTION, EMULSION INTRAVENOUS at 15:55

## 2021-11-09 RX ADMIN — PROPOFOL 50 MG: 10 INJECTION, EMULSION INTRAVENOUS at 15:51

## 2021-11-09 RX ADMIN — FENTANYL CITRATE 50 MCG: 50 INJECTION, SOLUTION INTRAMUSCULAR; INTRAVENOUS at 15:48

## 2021-11-09 RX ADMIN — PROPOFOL 50 MG: 10 INJECTION, EMULSION INTRAVENOUS at 15:53

## 2021-11-09 RX ADMIN — GLYCOPYRROLATE 0.2 MG: 0.2 INJECTION, SOLUTION INTRAMUSCULAR; INTRAVENOUS at 15:48

## 2021-11-09 RX ADMIN — FENTANYL CITRATE 50 MCG: 50 INJECTION, SOLUTION INTRAMUSCULAR; INTRAVENOUS at 15:50

## 2021-11-09 RX ADMIN — LIDOCAINE HYDROCHLORIDE 50 MG: 10 INJECTION, SOLUTION EPIDURAL; INFILTRATION; INTRACAUDAL at 15:50

## 2021-11-09 RX ADMIN — PROPOFOL 100 MG: 10 INJECTION, EMULSION INTRAVENOUS at 15:50

## 2021-11-09 RX ADMIN — PROPOFOL 20 MG: 10 INJECTION, EMULSION INTRAVENOUS at 15:57

## 2021-11-09 RX ADMIN — SODIUM CHLORIDE, SODIUM LACTATE, POTASSIUM CHLORIDE, AND CALCIUM CHLORIDE: .6; .31; .03; .02 INJECTION, SOLUTION INTRAVENOUS at 15:20

## 2021-11-21 DIAGNOSIS — G89.29 OTHER CHRONIC PAIN: ICD-10-CM

## 2021-11-22 RX ORDER — IBUPROFEN 800 MG/1
TABLET ORAL
Qty: 90 TABLET | Refills: 1 | Status: SHIPPED | OUTPATIENT
Start: 2021-11-22

## 2021-11-23 ENCOUNTER — TELEPHONE (OUTPATIENT)
Dept: FAMILY MEDICINE CLINIC | Facility: CLINIC | Age: 51
End: 2021-11-23

## 2021-11-26 ENCOUNTER — TELEPHONE (OUTPATIENT)
Dept: FAMILY MEDICINE CLINIC | Facility: CLINIC | Age: 51
End: 2021-11-26

## 2021-11-26 DIAGNOSIS — G89.29 CHRONIC RIGHT SI JOINT PAIN: ICD-10-CM

## 2021-11-26 DIAGNOSIS — G89.4 CHRONIC PAIN SYNDROME: ICD-10-CM

## 2021-11-26 DIAGNOSIS — M53.3 CHRONIC RIGHT SI JOINT PAIN: ICD-10-CM

## 2021-11-26 DIAGNOSIS — M96.1 POST LAMINECTOMY SYNDROME: ICD-10-CM

## 2021-11-26 RX ORDER — OXYCODONE HYDROCHLORIDE 10 MG/1
10 TABLET ORAL EVERY 6 HOURS PRN
Qty: 120 TABLET | Refills: 0 | Status: SHIPPED | OUTPATIENT
Start: 2021-11-26 | End: 2021-12-20 | Stop reason: SDUPTHER

## 2021-12-03 ENCOUNTER — HOSPITAL ENCOUNTER (EMERGENCY)
Facility: HOSPITAL | Age: 51
Discharge: HOME | End: 2021-12-03
Attending: EMERGENCY MEDICINE | Admitting: EMERGENCY MEDICINE
Payer: COMMERCIAL

## 2021-12-03 ENCOUNTER — APPOINTMENT (EMERGENCY)
Dept: CT IMAGING | Facility: HOSPITAL | Age: 51
End: 2021-12-03
Payer: COMMERCIAL

## 2021-12-03 VITALS
DIASTOLIC BLOOD PRESSURE: 72 MMHG | TEMPERATURE: 98.1 F | OXYGEN SATURATION: 97 % | HEART RATE: 95 BPM | BODY MASS INDEX: 26.51 KG/M2 | SYSTOLIC BLOOD PRESSURE: 148 MMHG | HEIGHT: 73 IN | WEIGHT: 200 LBS | RESPIRATION RATE: 20 BRPM

## 2021-12-03 DIAGNOSIS — N20.0 NEPHROLITHIASIS: Primary | ICD-10-CM

## 2021-12-03 LAB
2HR DELTA HS TROPONIN: 0 NG/L
ALBUMIN SERPL BCP-MCNC: 4.2 G/DL (ref 3.5–5)
ALP SERPL-CCNC: 90 U/L (ref 46–116)
ALT SERPL W P-5'-P-CCNC: 30 U/L (ref 12–78)
ANION GAP SERPL CALCULATED.3IONS-SCNC: 12 MMOL/L (ref 4–13)
APTT PPP: 27 SECONDS (ref 23–37)
AST SERPL W P-5'-P-CCNC: 25 U/L (ref 5–45)
ATRIAL RATE: 101 BPM
BACTERIA UR QL AUTO: NORMAL /HPF
BASOPHILS # BLD AUTO: 0.08 THOUSANDS/ΜL (ref 0–0.1)
BASOPHILS NFR BLD AUTO: 1 % (ref 0–1)
BILIRUB SERPL-MCNC: 0.29 MG/DL (ref 0.2–1)
BILIRUB UR QL STRIP: NEGATIVE
BUN SERPL-MCNC: 22 MG/DL (ref 5–25)
CALCIUM SERPL-MCNC: 9.1 MG/DL (ref 8.3–10.1)
CARDIAC TROPONIN I PNL SERPL HS: 3 NG/L
CARDIAC TROPONIN I PNL SERPL HS: 3 NG/L
CHLORIDE SERPL-SCNC: 106 MMOL/L (ref 100–108)
CK MB SERPL-MCNC: 1 % (ref 0–2.5)
CK MB SERPL-MCNC: 2 NG/ML (ref 0–5)
CK SERPL-CCNC: 201 U/L (ref 39–308)
CLARITY UR: CLEAR
CO2 SERPL-SCNC: 25 MMOL/L (ref 21–32)
COLOR UR: ABNORMAL
CREAT SERPL-MCNC: 1.4 MG/DL (ref 0.6–1.3)
EOSINOPHIL # BLD AUTO: 0.87 THOUSAND/ΜL (ref 0–0.61)
EOSINOPHIL NFR BLD AUTO: 7 % (ref 0–6)
ERYTHROCYTE [DISTWIDTH] IN BLOOD BY AUTOMATED COUNT: 14.8 % (ref 11.6–15.1)
GFR SERPL CREATININE-BSD FRML MDRD: 57 ML/MIN/1.73SQ M
GLUCOSE SERPL-MCNC: 119 MG/DL (ref 65–140)
GLUCOSE UR STRIP-MCNC: NEGATIVE MG/DL
HCT VFR BLD AUTO: 47 % (ref 36.5–49.3)
HGB BLD-MCNC: 15 G/DL (ref 12–17)
HGB UR QL STRIP.AUTO: ABNORMAL
IMM GRANULOCYTES # BLD AUTO: 0.06 THOUSAND/UL (ref 0–0.2)
IMM GRANULOCYTES NFR BLD AUTO: 1 % (ref 0–2)
INR PPP: 0.9 (ref 0.84–1.19)
KETONES UR STRIP-MCNC: NEGATIVE MG/DL
LACTATE SERPL-SCNC: 0.7 MMOL/L (ref 0.5–2)
LACTATE SERPL-SCNC: 2.2 MMOL/L (ref 0.5–2)
LEUKOCYTE ESTERASE UR QL STRIP: NEGATIVE
LIPASE SERPL-CCNC: 139 U/L (ref 73–393)
LYMPHOCYTES # BLD AUTO: 3.57 THOUSANDS/ΜL (ref 0.6–4.47)
LYMPHOCYTES NFR BLD AUTO: 29 % (ref 14–44)
MCH RBC QN AUTO: 28.4 PG (ref 26.8–34.3)
MCHC RBC AUTO-ENTMCNC: 31.9 G/DL (ref 31.4–37.4)
MCV RBC AUTO: 89 FL (ref 82–98)
MONOCYTES # BLD AUTO: 0.85 THOUSAND/ΜL (ref 0.17–1.22)
MONOCYTES NFR BLD AUTO: 7 % (ref 4–12)
NEUTROPHILS # BLD AUTO: 7.05 THOUSANDS/ΜL (ref 1.85–7.62)
NEUTS SEG NFR BLD AUTO: 55 % (ref 43–75)
NITRITE UR QL STRIP: NEGATIVE
NON-SQ EPI CELLS URNS QL MICRO: NORMAL /HPF
NRBC BLD AUTO-RTO: 0 /100 WBCS
P AXIS: 82 DEGREES
PH UR STRIP.AUTO: 5.5 [PH]
PLATELET # BLD AUTO: 438 THOUSANDS/UL (ref 149–390)
PMV BLD AUTO: 10.6 FL (ref 8.9–12.7)
POTASSIUM SERPL-SCNC: 4.4 MMOL/L (ref 3.5–5.3)
PR INTERVAL: 146 MS
PROT SERPL-MCNC: 7.6 G/DL (ref 6.4–8.2)
PROT UR STRIP-MCNC: NEGATIVE MG/DL
PROTHROMBIN TIME: 12.1 SECONDS (ref 11.6–14.5)
QRS AXIS: 77 DEGREES
QRSD INTERVAL: 86 MS
QT INTERVAL: 324 MS
QTC INTERVAL: 420 MS
RBC # BLD AUTO: 5.28 MILLION/UL (ref 3.88–5.62)
RBC #/AREA URNS AUTO: NORMAL /HPF
SODIUM SERPL-SCNC: 143 MMOL/L (ref 136–145)
SP GR UR STRIP.AUTO: 1.01 (ref 1–1.03)
T WAVE AXIS: 66 DEGREES
UROBILINOGEN UR QL STRIP.AUTO: 0.2 E.U./DL
VENTRICULAR RATE: 101 BPM
WBC # BLD AUTO: 12.48 THOUSAND/UL (ref 4.31–10.16)
WBC #/AREA URNS AUTO: NORMAL /HPF

## 2021-12-03 PROCEDURE — 96376 TX/PRO/DX INJ SAME DRUG ADON: CPT

## 2021-12-03 PROCEDURE — 83605 ASSAY OF LACTIC ACID: CPT

## 2021-12-03 PROCEDURE — 99285 EMERGENCY DEPT VISIT HI MDM: CPT | Performed by: EMERGENCY MEDICINE

## 2021-12-03 PROCEDURE — 93005 ELECTROCARDIOGRAM TRACING: CPT

## 2021-12-03 PROCEDURE — 74177 CT ABD & PELVIS W/CONTRAST: CPT

## 2021-12-03 PROCEDURE — 96366 THER/PROPH/DIAG IV INF ADDON: CPT

## 2021-12-03 PROCEDURE — 36415 COLL VENOUS BLD VENIPUNCTURE: CPT

## 2021-12-03 PROCEDURE — 85610 PROTHROMBIN TIME: CPT

## 2021-12-03 PROCEDURE — 99285 EMERGENCY DEPT VISIT HI MDM: CPT

## 2021-12-03 PROCEDURE — 81001 URINALYSIS AUTO W/SCOPE: CPT

## 2021-12-03 PROCEDURE — 87040 BLOOD CULTURE FOR BACTERIA: CPT

## 2021-12-03 PROCEDURE — 96365 THER/PROPH/DIAG IV INF INIT: CPT

## 2021-12-03 PROCEDURE — 84484 ASSAY OF TROPONIN QUANT: CPT

## 2021-12-03 PROCEDURE — 82553 CREATINE MB FRACTION: CPT

## 2021-12-03 PROCEDURE — 80053 COMPREHEN METABOLIC PANEL: CPT

## 2021-12-03 PROCEDURE — G1004 CDSM NDSC: HCPCS

## 2021-12-03 PROCEDURE — 85025 COMPLETE CBC W/AUTO DIFF WBC: CPT

## 2021-12-03 PROCEDURE — 93010 ELECTROCARDIOGRAM REPORT: CPT | Performed by: INTERNAL MEDICINE

## 2021-12-03 PROCEDURE — 83690 ASSAY OF LIPASE: CPT

## 2021-12-03 PROCEDURE — 85730 THROMBOPLASTIN TIME PARTIAL: CPT

## 2021-12-03 PROCEDURE — 96375 TX/PRO/DX INJ NEW DRUG ADDON: CPT

## 2021-12-03 PROCEDURE — 82550 ASSAY OF CK (CPK): CPT

## 2021-12-03 RX ORDER — HYDROMORPHONE HCL/PF 1 MG/ML
0.5 SYRINGE (ML) INJECTION ONCE
Status: COMPLETED | OUTPATIENT
Start: 2021-12-03 | End: 2021-12-03

## 2021-12-03 RX ORDER — HYDROMORPHONE HCL/PF 1 MG/ML
1 SYRINGE (ML) INJECTION ONCE
Status: COMPLETED | OUTPATIENT
Start: 2021-12-03 | End: 2021-12-03

## 2021-12-03 RX ORDER — PHENAZOPYRIDINE HYDROCHLORIDE 200 MG/1
200 TABLET, FILM COATED ORAL 3 TIMES DAILY PRN
Qty: 6 TABLET | Refills: 0 | Status: SHIPPED | OUTPATIENT
Start: 2021-12-03 | End: 2021-12-05

## 2021-12-03 RX ORDER — ONDANSETRON 2 MG/ML
4 INJECTION INTRAMUSCULAR; INTRAVENOUS ONCE
Status: COMPLETED | OUTPATIENT
Start: 2021-12-03 | End: 2021-12-03

## 2021-12-03 RX ADMIN — ONDANSETRON 4 MG: 2 INJECTION INTRAMUSCULAR; INTRAVENOUS at 03:42

## 2021-12-03 RX ADMIN — SODIUM CHLORIDE, SODIUM LACTATE, POTASSIUM CHLORIDE, AND CALCIUM CHLORIDE 1000 ML: .6; .31; .03; .02 INJECTION, SOLUTION INTRAVENOUS at 03:42

## 2021-12-03 RX ADMIN — HYDROMORPHONE HYDROCHLORIDE 1 MG: 1 INJECTION, SOLUTION INTRAMUSCULAR; INTRAVENOUS; SUBCUTANEOUS at 04:35

## 2021-12-03 RX ADMIN — HYDROMORPHONE HYDROCHLORIDE 0.5 MG: 1 INJECTION, SOLUTION INTRAMUSCULAR; INTRAVENOUS; SUBCUTANEOUS at 07:25

## 2021-12-03 RX ADMIN — HYDROMORPHONE HYDROCHLORIDE 1 MG: 1 INJECTION, SOLUTION INTRAMUSCULAR; INTRAVENOUS; SUBCUTANEOUS at 03:42

## 2021-12-03 RX ADMIN — IOHEXOL 100 ML: 350 INJECTION, SOLUTION INTRAVENOUS at 05:30

## 2021-12-03 RX ADMIN — SODIUM CHLORIDE, SODIUM LACTATE, POTASSIUM CHLORIDE, AND CALCIUM CHLORIDE 1000 ML: .6; .31; .03; .02 INJECTION, SOLUTION INTRAVENOUS at 08:48

## 2021-12-08 LAB
BACTERIA BLD CULT: NORMAL
BACTERIA BLD CULT: NORMAL

## 2021-12-20 ENCOUNTER — TELEPHONE (OUTPATIENT)
Dept: FAMILY MEDICINE CLINIC | Facility: CLINIC | Age: 51
End: 2021-12-20

## 2021-12-20 DIAGNOSIS — G89.4 CHRONIC PAIN SYNDROME: ICD-10-CM

## 2021-12-20 DIAGNOSIS — M96.1 POST LAMINECTOMY SYNDROME: ICD-10-CM

## 2021-12-20 DIAGNOSIS — G89.29 CHRONIC RIGHT SI JOINT PAIN: ICD-10-CM

## 2021-12-20 DIAGNOSIS — M53.3 CHRONIC RIGHT SI JOINT PAIN: ICD-10-CM

## 2021-12-20 RX ORDER — OXYCODONE HYDROCHLORIDE 10 MG/1
10 TABLET ORAL EVERY 6 HOURS PRN
Qty: 120 TABLET | Refills: 0 | Status: SHIPPED | OUTPATIENT
Start: 2021-12-26 | End: 2022-01-20 | Stop reason: SDUPTHER

## 2021-12-24 ENCOUNTER — APPOINTMENT (EMERGENCY)
Dept: CT IMAGING | Facility: HOSPITAL | Age: 51
End: 2021-12-24
Payer: COMMERCIAL

## 2021-12-24 ENCOUNTER — APPOINTMENT (EMERGENCY)
Dept: RADIOLOGY | Facility: HOSPITAL | Age: 51
End: 2021-12-24
Payer: COMMERCIAL

## 2021-12-24 ENCOUNTER — HOSPITAL ENCOUNTER (EMERGENCY)
Facility: HOSPITAL | Age: 51
Discharge: HOME | End: 2021-12-24
Admitting: EMERGENCY MEDICINE
Payer: COMMERCIAL

## 2021-12-24 VITALS
DIASTOLIC BLOOD PRESSURE: 89 MMHG | SYSTOLIC BLOOD PRESSURE: 136 MMHG | BODY MASS INDEX: 28.71 KG/M2 | RESPIRATION RATE: 15 BRPM | OXYGEN SATURATION: 99 % | WEIGHT: 217.59 LBS | TEMPERATURE: 98.9 F | HEART RATE: 74 BPM
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
## 2021-12-24 DIAGNOSIS — R13.19 ESOPHAGEAL DYSPHAGIA: ICD-10-CM

## 2021-12-24 PROBLEM — M25.512 LEFT SHOULDER PAIN: Status: ACTIVE | Noted: 2018-12-21

## 2021-12-24 LAB
ANION GAP SERPL CALCULATED.3IONS-SCNC: 11 MMOL/L (ref 4–13)
ATRIAL RATE: 75 BPM
ATRIAL RATE: 78 BPM
BASE EXCESS BLDA CALC-SCNC: -2 MMOL/L (ref -2–3)
BASOPHILS # BLD AUTO: 0.07 THOUSANDS/ΜL (ref 0–0.1)
BASOPHILS NFR BLD AUTO: 1 % (ref 0–1)
BUN SERPL-MCNC: 24 MG/DL (ref 5–25)
CALCIUM SERPL-MCNC: 9.2 MG/DL (ref 8.3–10.1)
CARDIAC TROPONIN I PNL SERPL HS: 4 NG/L
CHLORIDE SERPL-SCNC: 105 MMOL/L (ref 100–108)
CO2 SERPL-SCNC: 25 MMOL/L (ref 21–32)
CREAT SERPL-MCNC: 1.47 MG/DL (ref 0.6–1.3)
EOSINOPHIL # BLD AUTO: 0.51 THOUSAND/ΜL (ref 0–0.61)
EOSINOPHIL NFR BLD AUTO: 4 % (ref 0–6)
ERYTHROCYTE [DISTWIDTH] IN BLOOD BY AUTOMATED COUNT: 14.7 % (ref 11.6–15.1)
ETHANOL SERPL-MCNC: <3 MG/DL (ref 0–3)
GFR SERPL CREATININE-BSD FRML MDRD: 53 ML/MIN/1.73SQ M
GLUCOSE SERPL-MCNC: 89 MG/DL (ref 65–140)
GLUCOSE SERPL-MCNC: 90 MG/DL (ref 65–140)
HCO3 BLDA-SCNC: 23.2 MMOL/L (ref 24–30)
HCT VFR BLD AUTO: 43.8 % (ref 36.5–49.3)
HCT VFR BLD CALC: 44 % (ref 36.5–49.3)
HGB BLD-MCNC: 14.2 G/DL (ref 12–17)
HGB BLDA-MCNC: 15 G/DL (ref 12–17)
IMM GRANULOCYTES # BLD AUTO: 0.06 THOUSAND/UL (ref 0–0.2)
IMM GRANULOCYTES NFR BLD AUTO: 0 % (ref 0–2)
LYMPHOCYTES # BLD AUTO: 3.43 THOUSANDS/ΜL (ref 0.6–4.47)
LYMPHOCYTES NFR BLD AUTO: 25 % (ref 14–44)
MCH RBC QN AUTO: 28.3 PG (ref 26.8–34.3)
MCHC RBC AUTO-ENTMCNC: 32.4 G/DL (ref 31.4–37.4)
MCV RBC AUTO: 87 FL (ref 82–98)
MONOCYTES # BLD AUTO: 1.13 THOUSAND/ΜL (ref 0.17–1.22)
MONOCYTES NFR BLD AUTO: 8 % (ref 4–12)
NEUTROPHILS # BLD AUTO: 8.77 THOUSANDS/ΜL (ref 1.85–7.62)
NEUTS SEG NFR BLD AUTO: 62 % (ref 43–75)
NRBC BLD AUTO-RTO: 0 /100 WBCS
P AXIS: 76 DEGREES
P AXIS: 76 DEGREES
PCO2 BLD: 24 MMOL/L (ref 21–32)
PCO2 BLD: 41.2 MM HG (ref 42–50)
PH BLD: 7.36 [PH] (ref 7.3–7.4)
PLATELET # BLD AUTO: 356 THOUSANDS/UL (ref 149–390)
PMV BLD AUTO: 10.1 FL (ref 8.9–12.7)
PO2 BLD: 23 MM HG (ref 35–45)
POTASSIUM BLD-SCNC: 4 MMOL/L (ref 3.5–5.3)
POTASSIUM SERPL-SCNC: 3.8 MMOL/L (ref 3.5–5.3)
PR INTERVAL: 150 MS
PR INTERVAL: 158 MS
QRS AXIS: 74 DEGREES
QRS AXIS: 76 DEGREES
QRSD INTERVAL: 88 MS
QRSD INTERVAL: 96 MS
QT INTERVAL: 346 MS
QT INTERVAL: 362 MS
QTC INTERVAL: 404 MS
QTC INTERVAL: 450 MS
RBC # BLD AUTO: 5.02 MILLION/UL (ref 3.88–5.62)
SAO2 % BLD FROM PO2: 37 % (ref 60–85)
SODIUM BLD-SCNC: 143 MMOL/L (ref 136–145)
SODIUM SERPL-SCNC: 141 MMOL/L (ref 136–145)
SPECIMEN SOURCE: ABNORMAL
T WAVE AXIS: 57 DEGREES
T WAVE AXIS: 65 DEGREES
VENTRICULAR RATE: 102 BPM
VENTRICULAR RATE: 75 BPM
WBC # BLD AUTO: 13.97 THOUSAND/UL (ref 4.31–10.16)

## 2021-12-24 PROCEDURE — 80048 BASIC METABOLIC PNL TOTAL CA: CPT | Performed by: STUDENT IN AN ORGANIZED HEALTH CARE EDUCATION/TRAINING PROGRAM

## 2021-12-24 PROCEDURE — 84484 ASSAY OF TROPONIN QUANT: CPT | Performed by: STUDENT IN AN ORGANIZED HEALTH CARE EDUCATION/TRAINING PROGRAM

## 2021-12-24 PROCEDURE — 72125 CT NECK SPINE W/O DYE: CPT

## 2021-12-24 PROCEDURE — 99285 EMERGENCY DEPT VISIT HI MDM: CPT

## 2021-12-24 PROCEDURE — 70450 CT HEAD/BRAIN W/O DYE: CPT

## 2021-12-24 PROCEDURE — NC001 PR NO CHARGE: Performed by: EMERGENCY MEDICINE

## 2021-12-24 PROCEDURE — 73030 X-RAY EXAM OF SHOULDER: CPT

## 2021-12-24 PROCEDURE — 93005 ELECTROCARDIOGRAM TRACING: CPT

## 2021-12-24 PROCEDURE — 76604 US EXAM CHEST: CPT | Performed by: STUDENT IN AN ORGANIZED HEALTH CARE EDUCATION/TRAINING PROGRAM

## 2021-12-24 PROCEDURE — 71260 CT THORAX DX C+: CPT

## 2021-12-24 PROCEDURE — 99284 EMERGENCY DEPT VISIT MOD MDM: CPT | Performed by: STUDENT IN AN ORGANIZED HEALTH CARE EDUCATION/TRAINING PROGRAM

## 2021-12-24 PROCEDURE — 84295 ASSAY OF SERUM SODIUM: CPT

## 2021-12-24 PROCEDURE — 96366 THER/PROPH/DIAG IV INF ADDON: CPT

## 2021-12-24 PROCEDURE — 85025 COMPLETE CBC W/AUTO DIFF WBC: CPT | Performed by: STUDENT IN AN ORGANIZED HEALTH CARE EDUCATION/TRAINING PROGRAM

## 2021-12-24 PROCEDURE — 82077 ASSAY SPEC XCP UR&BREATH IA: CPT | Performed by: PHYSICIAN ASSISTANT

## 2021-12-24 PROCEDURE — 70486 CT MAXILLOFACIAL W/O DYE: CPT

## 2021-12-24 PROCEDURE — 76705 ECHO EXAM OF ABDOMEN: CPT | Performed by: STUDENT IN AN ORGANIZED HEALTH CARE EDUCATION/TRAINING PROGRAM

## 2021-12-24 PROCEDURE — 84132 ASSAY OF SERUM POTASSIUM: CPT

## 2021-12-24 PROCEDURE — 74177 CT ABD & PELVIS W/CONTRAST: CPT

## 2021-12-24 PROCEDURE — 82803 BLOOD GASES ANY COMBINATION: CPT

## 2021-12-24 PROCEDURE — 93308 TTE F-UP OR LMTD: CPT | Performed by: STUDENT IN AN ORGANIZED HEALTH CARE EDUCATION/TRAINING PROGRAM

## 2021-12-24 PROCEDURE — 96365 THER/PROPH/DIAG IV INF INIT: CPT

## 2021-12-24 PROCEDURE — 82947 ASSAY GLUCOSE BLOOD QUANT: CPT

## 2021-12-24 PROCEDURE — 71045 X-RAY EXAM CHEST 1 VIEW: CPT

## 2021-12-24 PROCEDURE — 96375 TX/PRO/DX INJ NEW DRUG ADDON: CPT

## 2021-12-24 PROCEDURE — 93010 ELECTROCARDIOGRAM REPORT: CPT | Performed by: INTERNAL MEDICINE

## 2021-12-24 PROCEDURE — 36415 COLL VENOUS BLD VENIPUNCTURE: CPT | Performed by: STUDENT IN AN ORGANIZED HEALTH CARE EDUCATION/TRAINING PROGRAM

## 2021-12-24 PROCEDURE — 85014 HEMATOCRIT: CPT

## 2021-12-24 RX ORDER — SODIUM CHLORIDE, SODIUM GLUCONATE, SODIUM ACETATE, POTASSIUM CHLORIDE, MAGNESIUM CHLORIDE, SODIUM PHOSPHATE, DIBASIC, AND POTASSIUM PHOSPHATE .53; .5; .37; .037; .03; .012; .00082 G/100ML; G/100ML; G/100ML; G/100ML; G/100ML; G/100ML; G/100ML
1000 INJECTION, SOLUTION INTRAVENOUS ONCE
Status: COMPLETED | OUTPATIENT
Start: 2021-12-24 | End: 2021-12-24

## 2021-12-24 RX ORDER — ACETAMINOPHEN 325 MG/1
975 TABLET ORAL EVERY 8 HOURS PRN
Refills: 0
Start: 2021-12-24

## 2021-12-24 RX ORDER — FENTANYL CITRATE 50 UG/ML
INJECTION, SOLUTION INTRAMUSCULAR; INTRAVENOUS CODE/TRAUMA/SEDATION MEDICATION
Status: COMPLETED | OUTPATIENT
Start: 2021-12-24 | End: 2021-12-24

## 2021-12-24 RX ADMIN — SODIUM CHLORIDE, SODIUM GLUCONATE, SODIUM ACETATE, POTASSIUM CHLORIDE, MAGNESIUM CHLORIDE, SODIUM PHOSPHATE, DIBASIC, AND POTASSIUM PHOSPHATE 1000 ML: .53; .5; .37; .037; .03; .012; .00082 INJECTION, SOLUTION INTRAVENOUS at 04:59

## 2021-12-24 RX ADMIN — IOHEXOL 100 ML: 350 INJECTION, SOLUTION INTRAVENOUS at 04:34

## 2021-12-24 RX ADMIN — FENTANYL CITRATE 50 MCG: 50 INJECTION, SOLUTION INTRAMUSCULAR; INTRAVENOUS at 04:10

## 2022-01-05 ENCOUNTER — TELEPHONE (OUTPATIENT)
Dept: FAMILY MEDICINE CLINIC | Facility: CLINIC | Age: 52
End: 2022-01-05

## 2022-01-05 DIAGNOSIS — F41.8 DEPRESSION WITH ANXIETY: ICD-10-CM

## 2022-01-06 RX ORDER — DIAZEPAM 5 MG/1
5 TABLET ORAL EVERY 12 HOURS PRN
Qty: 60 TABLET | Refills: 0 | Status: SHIPPED | OUTPATIENT
Start: 2022-01-10 | End: 2022-02-21 | Stop reason: SDUPTHER

## 2022-01-17 ENCOUNTER — TELEPHONE (OUTPATIENT)
Dept: FAMILY MEDICINE CLINIC | Facility: CLINIC | Age: 52
End: 2022-01-17
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
## 2022-01-20 ENCOUNTER — DOCUMENTATION (OUTPATIENT)
Dept: FAMILY MEDICINE CLINIC | Facility: CLINIC | Age: 52
End: 2022-01-20

## 2022-01-20 DIAGNOSIS — G89.29 CHRONIC RIGHT SI JOINT PAIN: ICD-10-CM

## 2022-01-20 DIAGNOSIS — M96.1 POST LAMINECTOMY SYNDROME: ICD-10-CM

## 2022-01-20 DIAGNOSIS — G89.4 CHRONIC PAIN SYNDROME: ICD-10-CM

## 2022-01-20 DIAGNOSIS — M53.3 CHRONIC RIGHT SI JOINT PAIN: ICD-10-CM

## 2022-01-20 RX ORDER — OXYCODONE HYDROCHLORIDE 10 MG/1
10 TABLET ORAL EVERY 6 HOURS PRN
Qty: 120 TABLET | Refills: 0 | Status: SHIPPED | OUTPATIENT
Start: 2022-01-23 | End: 2022-01-20 | Stop reason: SDUPTHER

## 2022-01-20 RX ORDER — OXYCODONE HYDROCHLORIDE 10 MG/1
10 TABLET ORAL EVERY 6 HOURS PRN
Qty: 3 TABLET | Refills: 0 | Status: SHIPPED | OUTPATIENT
Start: 2022-01-20 | End: 2022-01-21 | Stop reason: SDUPTHER

## 2022-01-20 NOTE — PROGRESS NOTES
Patient requesting early refill  He needed an early refill last month as well, since it is the second time that he needs early refill, will have him seen in person in office to review contract  Also reviewed his last two UDS which have inconsistencies  Since he has run out of oxycodone yesterday, will send 3 tablets so he has medication until tomorrow

## 2022-01-21 ENCOUNTER — OFFICE VISIT (OUTPATIENT)
Dept: FAMILY MEDICINE CLINIC | Facility: CLINIC | Age: 52
End: 2022-01-21

## 2022-01-21 VITALS
RESPIRATION RATE: 18 BRPM | HEIGHT: 73 IN | HEART RATE: 95 BPM | BODY MASS INDEX: 28.68 KG/M2 | TEMPERATURE: 96.3 F | OXYGEN SATURATION: 98 % | WEIGHT: 216.4 LBS | SYSTOLIC BLOOD PRESSURE: 150 MMHG | DIASTOLIC BLOOD PRESSURE: 106 MMHG

## 2022-01-21 DIAGNOSIS — G89.29 CHRONIC RIGHT SI JOINT PAIN: ICD-10-CM

## 2022-01-21 DIAGNOSIS — M53.3 CHRONIC RIGHT SI JOINT PAIN: ICD-10-CM

## 2022-01-21 DIAGNOSIS — M96.1 POST LAMINECTOMY SYNDROME: ICD-10-CM

## 2022-01-21 DIAGNOSIS — F11.20 UNCOMPLICATED OPIOID DEPENDENCE (HCC): Primary | ICD-10-CM

## 2022-01-21 DIAGNOSIS — Z51.81 ENCOUNTER FOR MEDICATION MONITORING: ICD-10-CM

## 2022-01-21 DIAGNOSIS — G89.4 CHRONIC PAIN SYNDROME: ICD-10-CM

## 2022-01-21 PROCEDURE — 80361 OPIATES 1 OR MORE: CPT | Performed by: FAMILY MEDICINE

## 2022-01-21 PROCEDURE — 99213 OFFICE O/P EST LOW 20 MIN: CPT | Performed by: FAMILY MEDICINE

## 2022-01-21 RX ORDER — OXYCODONE HYDROCHLORIDE 10 MG/1
10 TABLET ORAL EVERY 6 HOURS PRN
Qty: 120 TABLET | Refills: 0 | Status: SHIPPED | OUTPATIENT
Start: 2022-01-21 | End: 2022-02-21 | Stop reason: SDUPTHER

## 2022-01-21 NOTE — TELEPHONE ENCOUNTER
Patient said, he already picked up meds from pharmacy yesterday  He was also reminded of appt for today at 3 PM  He stated, according to approval let from insurance and our office he does not need to be seen in the office until Feb 22nd for a follow up appt  Patient was addiment about a return call from nurse asap
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
## 2022-02-02 LAB
HYDROCODONE UR QL: NEGATIVE NG/ML
HYDROMORPHONE UR QL: NEGATIVE NG/ML

## 2022-02-03 ENCOUNTER — TELEPHONE (OUTPATIENT)
Dept: FAMILY MEDICINE CLINIC | Facility: CLINIC | Age: 52
End: 2022-02-03

## 2022-02-03 NOTE — TELEPHONE ENCOUNTER
Lisa Davis from [place] [address] is calling regarding urine testing done 1/21   Quantify was insufficient   Just to inform the PCP if wanted the tests can be done again by patient going to get urine test  Lisa Davis can be reached at [phone] option 1

## 2022-02-04 NOTE — TELEPHONE ENCOUNTER
Patent is schedule for 2/10 with Dr Richard Loza but patient wants PCP to call him  He does not know why he has to come in  He asked a follow up for what?   I did not want to say anything so just said follow up  Patient can be reached at [phone]

## 2022-02-04 NOTE — TELEPHONE ENCOUNTER
Pt needs to come to office for next refill  Please schedule appointment  Please discuss need for repeat urine screen only at the time of appointment   Thanks
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
## 2022-02-09 ENCOUNTER — TELEPHONE (OUTPATIENT)
Dept: FAMILY MEDICINE CLINIC | Facility: CLINIC | Age: 52
End: 2022-02-09

## 2022-02-09 NOTE — TELEPHONE ENCOUNTER
Called patient regarding the appointment tomorrow  Patient stated that he still has enough pills until Feb 20, so I told him that we would cancel the appointment tomorrow 2/10, and he can call to schedule when he needs to refill oxycodone  He asked why he needs to come to the office for a refill  He said he called to request refill in the past  I explained that he requested early refills twice which has violated the contract of pain management he signed in 4/2021  Moreover, we detected some chemicals in his urine other than oxycodone, benzodiazepines and metabolites  He got upset, yelled at me and used f words multiple times  He called me "such an idiot"  I repeated that he should come to the office for med refill and further discussion

## 2022-02-15 ENCOUNTER — TELEPHONE (OUTPATIENT)
Dept: FAMILY MEDICINE CLINIC | Facility: CLINIC | Age: 52
End: 2022-02-15

## 2022-02-15 NOTE — TELEPHONE ENCOUNTER
Called patient, was very pleasant on the phone   Did not ask any questions, scheduled him Monday 2/21 with Dr Sofia Zimmerman at 4:50pm for medication refill

## 2022-02-17 ENCOUNTER — TELEPHONE (OUTPATIENT)
Dept: FAMILY MEDICINE CLINIC | Facility: CLINIC | Age: 52
End: 2022-02-17

## 2022-02-21 ENCOUNTER — OFFICE VISIT (OUTPATIENT)
Dept: FAMILY MEDICINE CLINIC | Facility: CLINIC | Age: 52
End: 2022-02-21

## 2022-02-21 VITALS
HEART RATE: 108 BPM | WEIGHT: 223.4 LBS | OXYGEN SATURATION: 98 % | BODY MASS INDEX: 29.61 KG/M2 | DIASTOLIC BLOOD PRESSURE: 110 MMHG | RESPIRATION RATE: 18 BRPM | HEIGHT: 73 IN | TEMPERATURE: 97.1 F | SYSTOLIC BLOOD PRESSURE: 154 MMHG

## 2022-02-21 DIAGNOSIS — F41.8 DEPRESSION WITH ANXIETY: ICD-10-CM

## 2022-02-21 DIAGNOSIS — M96.1 POST LAMINECTOMY SYNDROME: ICD-10-CM

## 2022-02-21 DIAGNOSIS — G89.29 CHRONIC RIGHT SI JOINT PAIN: ICD-10-CM

## 2022-02-21 DIAGNOSIS — M53.3 CHRONIC RIGHT SI JOINT PAIN: ICD-10-CM

## 2022-02-21 DIAGNOSIS — Z51.81 ENCOUNTER FOR MEDICATION MONITORING: ICD-10-CM

## 2022-02-21 DIAGNOSIS — G89.4 CHRONIC PAIN SYNDROME: ICD-10-CM

## 2022-02-21 DIAGNOSIS — F11.90 CHRONIC, CONTINUOUS USE OF OPIOIDS: Primary | ICD-10-CM

## 2022-02-21 PROCEDURE — 99213 OFFICE O/P EST LOW 20 MIN: CPT | Performed by: FAMILY MEDICINE

## 2022-02-21 PROCEDURE — 80307 DRUG TEST PRSMV CHEM ANLYZR: CPT | Performed by: FAMILY MEDICINE

## 2022-02-21 PROCEDURE — 80365 DRUG SCREENING OXYCODONE: CPT | Performed by: FAMILY MEDICINE

## 2022-02-21 RX ORDER — DIAZEPAM 5 MG/1
5 TABLET ORAL EVERY 12 HOURS PRN
Qty: 60 TABLET | Refills: 0 | Status: SHIPPED | OUTPATIENT
Start: 2022-02-21 | End: 2022-03-21 | Stop reason: SDUPTHER

## 2022-02-21 RX ORDER — OXYCODONE HYDROCHLORIDE 10 MG/1
10 TABLET ORAL EVERY 6 HOURS PRN
Qty: 120 TABLET | Refills: 0 | Status: SHIPPED | OUTPATIENT
Start: 2022-02-21 | End: 2022-03-21 | Stop reason: SDUPTHER
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
## 2022-03-01 LAB
OXYCODONE UR QL CFM: 1146 NG/ML
OXYCODONE+OXYMORPHONE SERPLBLD QL SCN: POSITIVE
OXYCODONE+OXYMORPHONE UR QL SCN: NORMAL NG/ML
OXYCODONE+OXYMORPHONE UR QL SCN: POSITIVE
OXYMORPHONE UR CFM-MCNC: 2733 NG/ML
OXYMORPHONE UR QL CFM: POSITIVE

## 2022-03-02 LAB
AMPHETAMINES UR QL SCN: NEGATIVE NG/ML
BARBITURATES UR QL SCN: NEGATIVE NG/ML
BENZODIAZ UR QL: POSITIVE
BZE UR QL: NEGATIVE NG/ML
CANNABINOIDS UR QL SCN: POSITIVE
METHADONE UR QL SCN: NEGATIVE NG/ML
OPIATES UR QL: NEGATIVE
PCP UR QL: NEGATIVE NG/ML
PROPOXYPH UR QL SCN: NEGATIVE NG/ML

## 2022-03-21 ENCOUNTER — TELEPHONE (OUTPATIENT)
Dept: FAMILY MEDICINE CLINIC | Facility: CLINIC | Age: 52
End: 2022-03-21

## 2022-03-21 DIAGNOSIS — F41.8 DEPRESSION WITH ANXIETY: ICD-10-CM

## 2022-03-21 DIAGNOSIS — M96.1 POST LAMINECTOMY SYNDROME: ICD-10-CM

## 2022-03-21 DIAGNOSIS — G89.4 CHRONIC PAIN SYNDROME: ICD-10-CM

## 2022-03-21 DIAGNOSIS — G89.29 CHRONIC RIGHT SI JOINT PAIN: ICD-10-CM

## 2022-03-21 DIAGNOSIS — M53.3 CHRONIC RIGHT SI JOINT PAIN: ICD-10-CM

## 2022-03-21 RX ORDER — OXYCODONE HYDROCHLORIDE 10 MG/1
10 TABLET ORAL EVERY 6 HOURS PRN
Qty: 120 TABLET | Refills: 0 | Status: SHIPPED | OUTPATIENT
Start: 2022-03-21 | End: 2022-04-21 | Stop reason: SDUPTHER

## 2022-03-21 RX ORDER — DIAZEPAM 5 MG/1
5 TABLET ORAL EVERY 12 HOURS PRN
Qty: 60 TABLET | Refills: 0 | Status: SHIPPED | OUTPATIENT
Start: 2022-03-21 | End: 2022-04-21 | Stop reason: SDUPTHER

## 2022-04-21 DIAGNOSIS — F41.8 DEPRESSION WITH ANXIETY: ICD-10-CM

## 2022-04-21 DIAGNOSIS — M53.3 CHRONIC RIGHT SI JOINT PAIN: ICD-10-CM

## 2022-04-21 DIAGNOSIS — G89.4 CHRONIC PAIN SYNDROME: ICD-10-CM

## 2022-04-21 DIAGNOSIS — M96.1 POST LAMINECTOMY SYNDROME: ICD-10-CM

## 2022-04-21 DIAGNOSIS — G89.29 CHRONIC RIGHT SI JOINT PAIN: ICD-10-CM

## 2022-04-21 RX ORDER — DIAZEPAM 5 MG/1
5 TABLET ORAL EVERY 12 HOURS PRN
Qty: 60 TABLET | Refills: 0 | Status: SHIPPED | OUTPATIENT
Start: 2022-04-21 | End: 2022-05-19 | Stop reason: SDUPTHER

## 2022-04-21 RX ORDER — OXYCODONE HYDROCHLORIDE 10 MG/1
10 TABLET ORAL EVERY 6 HOURS PRN
Qty: 120 TABLET | Refills: 0 | Status: SHIPPED | OUTPATIENT
Start: 2022-04-21 | End: 2022-05-19 | Stop reason: SDUPTHER

## 2022-04-21 NOTE — TELEPHONE ENCOUNTER
Patient called for medication refill on   Oxycodone 10 mg tab  -Diazepam 5 mg tab             Thanks
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
## 2022-05-13 ENCOUNTER — OFFICE VISIT (OUTPATIENT)
Dept: FAMILY MEDICINE CLINIC | Facility: CLINIC | Age: 52
End: 2022-05-13

## 2022-05-13 VITALS
HEIGHT: 73 IN | RESPIRATION RATE: 18 BRPM | TEMPERATURE: 97.7 F | OXYGEN SATURATION: 92 % | DIASTOLIC BLOOD PRESSURE: 102 MMHG | SYSTOLIC BLOOD PRESSURE: 153 MMHG | WEIGHT: 223.2 LBS | BODY MASS INDEX: 29.58 KG/M2 | HEART RATE: 85 BPM

## 2022-05-13 DIAGNOSIS — J45.40 MODERATE PERSISTENT ASTHMA WITHOUT COMPLICATION: ICD-10-CM

## 2022-05-13 DIAGNOSIS — N52.8 OTHER MALE ERECTILE DYSFUNCTION: ICD-10-CM

## 2022-05-13 DIAGNOSIS — F41.8 DEPRESSION WITH ANXIETY: ICD-10-CM

## 2022-05-13 DIAGNOSIS — G89.4 CHRONIC PAIN SYNDROME: Primary | ICD-10-CM

## 2022-05-13 DIAGNOSIS — F11.90 CHRONIC, CONTINUOUS USE OF OPIOIDS: ICD-10-CM

## 2022-05-13 PROCEDURE — 80365 DRUG SCREENING OXYCODONE: CPT | Performed by: FAMILY MEDICINE

## 2022-05-13 PROCEDURE — 80307 DRUG TEST PRSMV CHEM ANLYZR: CPT | Performed by: FAMILY MEDICINE

## 2022-05-13 PROCEDURE — 99213 OFFICE O/P EST LOW 20 MIN: CPT | Performed by: FAMILY MEDICINE

## 2022-05-13 RX ORDER — TADALAFIL 5 MG/1
5 TABLET ORAL DAILY PRN
Qty: 30 TABLET | Refills: 6 | Status: SHIPPED | OUTPATIENT
Start: 2022-05-13

## 2022-05-13 RX ORDER — ALBUTEROL SULFATE 90 UG/1
2 AEROSOL, METERED RESPIRATORY (INHALATION) EVERY 6 HOURS PRN
Qty: 18 G | Refills: 5 | Status: SHIPPED | OUTPATIENT
Start: 2022-05-13

## 2022-05-13 NOTE — ASSESSMENT & PLAN NOTE
On chronic Oxycodone 10mg q6hr PRN and Valium 5mg q12hr PRN  - Opioid contract updated 5/13/2022  - Last UDS 2/21/2022
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
## 2022-05-18 ENCOUNTER — TELEPHONE (OUTPATIENT)
Dept: GASTROENTEROLOGY | Facility: AMBULARY SURGERY CENTER | Age: 52
End: 2022-05-18

## 2022-05-18 NOTE — TELEPHONE ENCOUNTER
----- Message from Ephraim Sage PA-C sent at 5/18/2022  4:58 PM EDT -----  Regarding: FW: Question regarding EGD  Can we please offer patient office visit? We haven't seen him since November  Of course, if patient is unable to tolerate any PO intake then ER referral would be warranted  Thank you     ----- Message -----  From: Shelly Falk  Sent: 5/18/2022   4:45 PM EDT  To: Gastroenterology Sofya Sears, #  Subject: FW: Question regarding EGD                         ----- Message -----  From: Edison Booker  Sent: 5/18/2022   4:37 PM EDT  To: Gastroenterology [facility]  Subject: Question regarding EGD                           Could someone from your office or yourself please give me a call  I've been trying for a week now to get a appointment and can't get anyone to respond  Having severe problems with choking every time I eat and have even passed out from it   [phone] thank you

## 2022-05-18 NOTE — TELEPHONE ENCOUNTER
Called and spoke with pt  Recommended if cannot tolerate anything PO he needs to go to ER  Pt verbalized understanding  PT would like apt asap  Please call pt tomorrow before noon to set up apt     Thank you

## 2022-05-19 DIAGNOSIS — M53.3 CHRONIC RIGHT SI JOINT PAIN: ICD-10-CM

## 2022-05-19 DIAGNOSIS — M96.1 POST LAMINECTOMY SYNDROME: ICD-10-CM

## 2022-05-19 DIAGNOSIS — G89.29 CHRONIC RIGHT SI JOINT PAIN: ICD-10-CM

## 2022-05-19 DIAGNOSIS — F41.8 DEPRESSION WITH ANXIETY: ICD-10-CM

## 2022-05-19 DIAGNOSIS — G89.4 CHRONIC PAIN SYNDROME: ICD-10-CM

## 2022-05-19 RX ORDER — DIAZEPAM 5 MG/1
5 TABLET ORAL EVERY 12 HOURS PRN
Qty: 60 TABLET | Refills: 0 | Status: SHIPPED | OUTPATIENT
Start: 2022-05-19

## 2022-05-19 RX ORDER — OXYCODONE HYDROCHLORIDE 10 MG/1
10 TABLET ORAL EVERY 6 HOURS PRN
Qty: 120 TABLET | Refills: 0 | Status: SHIPPED | OUTPATIENT
Start: 2022-05-19 | End: 2022-06-14 | Stop reason: SDUPTHER

## 2022-05-19 NOTE — TELEPHONE ENCOUNTER
Left message w/pt that there is an appt today in [place] w/ [facility]  I gave him my back line to call to make appt

## 2022-05-19 NOTE — TELEPHONE ENCOUNTER
Patient called for medication refill for   -Oxycodone 10 mg QTY:120  -Diazepam 5 mg tab QTY:60                                   Thanks

## 2022-05-23 ENCOUNTER — TELEPHONE (OUTPATIENT)
Dept: GASTROENTEROLOGY | Facility: AMBULARY SURGERY CENTER | Age: 52
End: 2022-05-23

## 2022-05-23 ENCOUNTER — CONSULT (OUTPATIENT)
Dept: GASTROENTEROLOGY | Facility: AMBULARY SURGERY CENTER | Age: 52
End: 2022-05-23
Payer: MEDICARE

## 2022-05-23 VITALS
HEIGHT: 73 IN | HEART RATE: 89 BPM | BODY MASS INDEX: 28.76 KG/M2 | OXYGEN SATURATION: 100 % | WEIGHT: 217 LBS | SYSTOLIC BLOOD PRESSURE: 152 MMHG | DIASTOLIC BLOOD PRESSURE: 88 MMHG

## 2022-05-23 DIAGNOSIS — K22.4 HYPERCONTRACTILE ESOPHAGUS: ICD-10-CM

## 2022-05-23 DIAGNOSIS — R13.19 ESOPHAGEAL DYSPHAGIA: Primary | ICD-10-CM

## 2022-05-23 DIAGNOSIS — Z86.010 HISTORY OF COLON POLYPS: ICD-10-CM

## 2022-05-23 LAB
OXYCODONE UR QL CFM: 4440 NG/ML
OXYCODONE+OXYMORPHONE SERPLBLD QL SCN: POSITIVE
OXYCODONE+OXYMORPHONE UR QL SCN: NORMAL NG/ML
OXYCODONE+OXYMORPHONE UR QL SCN: POSITIVE
OXYMORPHONE UR CFM-MCNC: 3372 NG/ML
OXYMORPHONE UR QL CFM: POSITIVE

## 2022-05-23 PROCEDURE — 99214 OFFICE O/P EST MOD 30 MIN: CPT | Performed by: PHYSICIAN ASSISTANT

## 2022-05-23 NOTE — TELEPHONE ENCOUNTER
Patient is scheduled for EGD on June 9 , 2022 at [facility]  with Ruy Alva MD  Patient is aware of pre-procedure prep of Nothing to eat of drink after midnight and they will be called the day prior between 2 and 6 pm for time to report for procedure  Pre-procedure prep has been given to the patient  in person  on May 23, 2022

## 2022-05-23 NOTE — PATIENT INSTRUCTIONS
Scheduled date of EGD(as of today): 6/9/2022  Physician performing EGD: Dr Veronica Cox  Location of EGD: [facility]  Instructions reviewed with patient by: Chino SARAVIA  Clearances:  None

## 2022-05-23 NOTE — PROGRESS NOTES
Assessment and Plan       #1  Esophageal dysphagia secondary to dysmotility- patient has known hypercontractile esophagus on manometry study, has failed PPI (has no reflux symptoms),  calcium channel blockers and nitrites  Patient has also tried amitriptyline without any relief  He has had transient relief with dilations in the past, most recently being November 2021 which provided a few months symptom free  Had normal CT chest in Dec 2021  -Would recommend referral to Dr Jc David for 2nd opinion, was seen in [place] and was asked to repeat testing at their facility which was done here at [facility] [facility] [place] so due to the drive patient did not do this or follow up     -continues to use narcotics 4x daily for chronic pain which can be contributing to his symptoms  -patient continues to smoke 1/2 pack daily, has been educated in the past to stop smoking   -will plan for EGD at this time with dilation but explained to patient and his fiance that this is not a long term solution and there may be a time when the dilation no longer benefits patient     #2  History of colon polyps: most recent colonoscopy in July of 2021 was unremarkable  -repeat colonoscopy in 2026  --------------------------------------------------------------------------------------------------------------------    Chief Complaint: f/u dysphagia    HPI: Radha Perez is a 46 y o  male with history of hypercontractile esophagus, esophageal dysphagia, colon polyps, chronic pain on daily narcotics, tobacco use, medical marijuana use who presents today for follow up for dysphagia  Patient had a dilation in November and reported doing well for a few months but has had worsening symptoms recently  Food will get stuck in the lower esophagus and cause discomfort, the sensation of choking, and patient reportedly has passed out from it  He also admits to regurgitation of food as well  His symptoms happen with liquids also but not as frequently   His symptoms are daily at this time  Worst with carbohydrate type foods  Denies vomiting, denies abdominal pain  No N/V  No black stool or blood in stool  Has occasional constipation from his narcotic use  No diarrhea  He reports losing weight but his weight is 6 pounds more than it was in October during his last office visit with us  Last colonoscopy was last year, repeat in 5 years  He has had manometry testing in 2020 which showed hypercontractility of lower esophagus  He has failed PPI, nitrites, CCBs, and amitriptyline in the past    Patient denies any nausea, vomiting, abdominal pain, dysphagia, unexpected weight loss, diarrhea, constipation, blood in stool, or black tarry stools  He currently continues to smoke 1/2 pack a day, use medical marijuanat, and takes ibuprofen regularly        Review of Systems:   General: negative for fatigue, fever, night sweats or unexpected weight loss  Psychological: negative for anxiety or depression  Ophthalmic: negative for blurry vision or scleral icterus  ENT: negative for headaches, oral lesions, sore throat, vocal changes, +dysphagia  Hematological and Lymphatic: negative for pallor or swollen lymph nodes  Respiratory: negative for cough, shortness of breath or wheezing  Cardiovascular: negative for chest pain, edema or murmur  Gastrointestinal: as mentioned in HPI  Genito-Urinary: negative for dysuria or incontinence  Musculoskeletal: negative for joint pain, joint stiffness or joint swelling; +chronic back pain  Dermatological: negative for pruritus, rash, or jaundice    Current Medications  Current Outpatient Medications   Medication Sig Dispense Refill    acetaminophen (TYLENOL) 325 mg tablet Take 3 tablets (975 mg total) by mouth every 8 (eight) hours as needed for mild pain  0    albuterol (PROVENTIL HFA,VENTOLIN HFA) 90 mcg/act inhaler Inhale 2 puffs every 6 (six) hours as needed for wheezing 18 g 5    diazepam (VALIUM) 5 mg tablet Take 1 tablet (5 mg total) by mouth every 12 (twelve) hours as needed for anxiety or muscle spasms 60 tablet 0    fluticasone-vilanterol (BREO ELLIPTA) 200-25 MCG/INH inhaler Inhale 1 puff daily Rinse mouth after use  3 Inhaler 3    ibuprofen (MOTRIN) 800 mg tablet TAKE 1 TABLET(800 MG) BY MOUTH THREE TIMES DAILY AS NEEDED FOR MILD PAIN 90 tablet 1    oxyCODONE (ROXICODONE) 10 MG TABS Take 1 tablet (10 mg total) by mouth every 6 (six) hours as needed for severe pain Take 1 tablet (10mg) by mouth every 6 hours as needed for severe pain  Max daily amount : 4 tablets (40mg) Max Daily Amount: 40 mg 120 tablet 0    tadalafil (CIALIS) 5 MG tablet Take 1 tablet (5 mg total) by mouth as needed in the morning for erectile dysfunction  30 tablet 6    naloxone (Narcan) 4 mg/0 1 mL nasal spray In case of overdose: 1 spray in one nostril x 1, may repeat in 2 min if remains unresponsive  1 each 0     No current facility-administered medications for this visit         Past Medical History  Past Medical History:   Diagnosis Date    Allergic     seasonal     Allergic rhinitis     Anxiety     Arthritis     Back pain     Chronic obstructive asthma (HCC)     Chronic pain syndrome     Cluster headaches     Colon polyp     Depression     Insomnia     Kidney stones     Laryngospasm     Leukocytosis     Migraine     Myocardial infarction (HCC)     Nephrolithiasis     Organic impotence     Pneumonia due to infectious organism 1/16/2019    Seasonal allergies     Sleep apnea     does not use CPAP    Stroke ([facility] [address] ) 2015    TMJ (temporomandibular joint syndrome)     Wheezing     last assessed 05/19/17       Past Surgical History  Past Surgical History:   Procedure Laterality Date    BACK SURGERY      *9, [id number], nervectomy 2006, total of 10    BUCCAL MASS EXCISION N/A 3/26/2018    Procedure: BIOPSY LINGUAL TONSIL (FLOW/ STANDARD PATH)  EVAL UNDER ANESTHESIA;  Surgeon: Fay Weaver MD;  [facility];  Service: ENT    COLONOSCOPY      FL RETROGRADE PYELOGRAM  10/21/2020    HERNIA REPAIR      KIDNEY SURGERY      KNEE ARTHROSCOPY      LITHOTRIPSY      multiple    LITHOTRIPSY      LUMBAR [address] SURGERY  2015    MANDIBLE FRACTURE SURGERY      titanium plate    PELVIC SYMPHYSIS FUSION      MA CYSTO/URETERO W/LITHOTRIPSY &INDWELL STENT INSRT Right 10/21/2020    Procedure: CYSTOSCOPY URETEROSCOPY WITH LITHOTRIPSY HOLMIUM LASER, RETROGRADE PYELOGRAM AND INSERTION STENT URETERAL;  Surgeon: Cathy Jacobson MD;  [facility];  Service: Urology    MA ESOPHAGOGASTRODUODENOSCOPY TRANSORAL DIAGNOSTIC N/A 2/22/2018    Procedure: ESOPHAGOGASTRODUODENOSCOPY (EGD); Surgeon: Pascual Jaramillo MD;  [facility]; Service: Gastroenterology    MA ESOPHAGOGASTRODUODENOSCOPY TRANSORAL DIAGNOSTIC N/A 4/1/2019    Procedure: EGD W/ DILATION;  Surgeon: Pascual Jaramillo MD;  [facility]; Service: Gastroenterology    MA REVISE/REMOVE SPINAL NEUROSTIM/ Left 6/9/2017    Procedure: REMOVAL OF A THORACIC SCS PLACED VIA LAMINECTOMY AND REMOVAL LEFT BUTTOCK GENERATOR; IMPULSE MONITORING;  Surgeon: Alicia Herring MD;  [facility];  Service: Neurosurgery    MA [address] ARTHROSCOP,SURG,W/ROTAT CUFF REPR Right 4/4/2019    Procedure: RIGHT SHOULDER ARTHROSCOPIC SUBSCAPULARIS TENDON REPAIR;  Surgeon: Julien Panda MD;  [facility];  Service: Orthopedics    MA SURG IMPLNT [address] N/A 9/8/2016    Procedure: DORSAL COLUMN STIMULATOR PLACEMENT (IMPULSE MONITORING);   Surgeon: Nae Loya MD;  [facility];  Service: Orthopedics    SACROILIAC JOINT FUSION  2015    SHOULDER SURGERY Left     2    UPPER GASTROINTESTINAL ENDOSCOPY         Past Social History   Social History     Socioeconomic History    Marital status:      Spouse name: Not on file    Number of children: Not on file    Years of education: Not on file    Highest education level: Not on file   Occupational History    Occupation: Disability   Tobacco Use    Smoking status: Current Every Day Smoker     Packs/day: 0 50     Years: 25 00     Pack years: 12 50    Smokeless tobacco: Former User   Vaping Use    Vaping Use: Former   Substance and Sexual Activity    Alcohol use: Yes     Comment: rarely     Drug use: Yes     Frequency: 7 0 times per week     Types: Marijuana     Comment: medical marijuana daily for chronic pain 1/2 ounce    Sexual activity: Yes     Partners: Female   Other Topics Concern    Not on file   Social History Narrative    As per Allscripts: Denied: History of Current smoker        As per allscripts: Smoking     Social Determinants of Health     Financial Resource Strain: Not on file   Food Insecurity: Not on file   Transportation Needs: No Transportation Needs    Lack of Transportation (Medical): No    Lack of Transportation (Non-Medical):  No   Physical Activity: Not on file   Stress: Stress Concern Present    Feeling of Stress : Very much   Social Connections: Not on file   Intimate Partner Violence: Not on file   Housing Stability: Low Risk     Unable to Pay for Housing in the Last Year: No    Number of Places Lived in the Last Year: 1    Unstable Housing in the Last Year: No       The following portions of the patient's history were reviewed and updated as appropriate: allergies, current medications, past family history, past medical history, past social history, past surgical history and problem list     Vital Signs  Vitals:    05/23/22 0930   BP: 152/88   BP Location: Left arm   Patient Position: Sitting   Cuff Size: Standard   Pulse: 89   SpO2: 100%   Weight: 98 4 kg (217 lb)   Height: 6' 1" (1 854 m)       Physical Exam:  General appearance: alert, cooperative, no distress  HEENT: normocephalic, anicteric, no eye erythema or discharge, no oropharyngeal thrush  Neck: supple, trachea midline, no adenopathy  Lungs: CTA b/l, no rales, rhonchi, or wheezing, unlabored respirations  Heart: RRR, no murmur, rubs, or gallops  Abdomen: soft, non-tender, non-distended, normal bowel sounds, no masses or organomegaly  Rectal: deferred  Extremities: no cyanosis, clubbing, or edema  Musculoskeletal: normal gait  Skin: color and texture normal, no jaundice, no rashes or lesions  Psychiatric: alert and oriented, normal affect and behavior

## 2022-06-09 ENCOUNTER — HOSPITAL ENCOUNTER (OUTPATIENT)
Dept: GASTROENTEROLOGY | Facility: AMBULARY SURGERY CENTER | Age: 52
Setting detail: OUTPATIENT SURGERY
Discharge: HOME | End: 2022-06-09
Attending: INTERNAL MEDICINE | Admitting: INTERNAL MEDICINE
Payer: MEDICARE

## 2022-06-09 ENCOUNTER — ANESTHESIA (OUTPATIENT)
Dept: GASTROENTEROLOGY | Facility: AMBULARY SURGERY CENTER | Age: 52
End: 2022-06-09

## 2022-06-09 ENCOUNTER — ANESTHESIA EVENT (OUTPATIENT)
Dept: GASTROENTEROLOGY | Facility: AMBULARY SURGERY CENTER | Age: 52
End: 2022-06-09

## 2022-06-09 VITALS
BODY MASS INDEX: 27.83 KG/M2 | WEIGHT: 210 LBS | DIASTOLIC BLOOD PRESSURE: 104 MMHG | TEMPERATURE: 97.9 F | SYSTOLIC BLOOD PRESSURE: 151 MMHG | OXYGEN SATURATION: 97 % | HEIGHT: 73 IN | RESPIRATION RATE: 18 BRPM | HEART RATE: 75 BPM

## 2022-06-09 DIAGNOSIS — R13.19 ESOPHAGEAL DYSPHAGIA: ICD-10-CM

## 2022-06-09 PROCEDURE — C1769 GUIDE WIRE: HCPCS

## 2022-06-09 PROCEDURE — 43248 EGD GUIDE WIRE INSERTION: CPT | Performed by: INTERNAL MEDICINE

## 2022-06-09 PROCEDURE — 88305 TISSUE EXAM BY PATHOLOGIST: CPT | Performed by: PATHOLOGY

## 2022-06-09 PROCEDURE — 43239 EGD BIOPSY SINGLE/MULTIPLE: CPT | Performed by: INTERNAL MEDICINE

## 2022-06-09 RX ORDER — LIDOCAINE HYDROCHLORIDE 10 MG/ML
INJECTION, SOLUTION EPIDURAL; INFILTRATION; INTRACAUDAL; PERINEURAL AS NEEDED
Status: DISCONTINUED | OUTPATIENT
Start: 2022-06-09 | End: 2022-06-09

## 2022-06-09 RX ORDER — SODIUM CHLORIDE, SODIUM LACTATE, POTASSIUM CHLORIDE, CALCIUM CHLORIDE 600; 310; 30; 20 MG/100ML; MG/100ML; MG/100ML; MG/100ML
INJECTION, SOLUTION INTRAVENOUS CONTINUOUS PRN
Status: DISCONTINUED | OUTPATIENT
Start: 2022-06-09 | End: 2022-06-09

## 2022-06-09 RX ORDER — PROPOFOL 10 MG/ML
INJECTION, EMULSION INTRAVENOUS AS NEEDED
Status: DISCONTINUED | OUTPATIENT
Start: 2022-06-09 | End: 2022-06-09

## 2022-06-09 RX ADMIN — LIDOCAINE HYDROCHLORIDE 50 MG: 10 INJECTION, SOLUTION EPIDURAL; INFILTRATION; INTRACAUDAL; PERINEURAL at 13:28

## 2022-06-09 RX ADMIN — PROPOFOL 50 MG: 10 INJECTION, EMULSION INTRAVENOUS at 13:29

## 2022-06-09 RX ADMIN — SODIUM CHLORIDE, SODIUM LACTATE, POTASSIUM CHLORIDE, AND CALCIUM CHLORIDE: .6; .31; .03; .02 INJECTION, SOLUTION INTRAVENOUS at 13:21

## 2022-06-09 RX ADMIN — PROPOFOL 50 MG: 10 INJECTION, EMULSION INTRAVENOUS at 13:35

## 2022-06-09 RX ADMIN — PROPOFOL 50 MG: 10 INJECTION, EMULSION INTRAVENOUS at 13:33

## 2022-06-09 RX ADMIN — PROPOFOL 20 MG: 10 INJECTION, EMULSION INTRAVENOUS at 13:31

## 2022-06-09 RX ADMIN — PROPOFOL 100 MG: 10 INJECTION, EMULSION INTRAVENOUS at 13:28

## 2022-06-09 NOTE — ANESTHESIA PREPROCEDURE EVALUATION
Procedure:  EGD    Relevant Problems   CARDIO   (+) Other chest pain      GI/HEPATIC   (+) Dysphagia   (+) Esophageal dysphagia      /RENAL   (+) Nephrolithiasis      MUSCULOSKELETAL   (+) Back pain      NEURO/PSYCH   (+) Chronic pain syndrome   (+) Chronic right SI joint pain   (+) Chronic, continuous use of opioids   (+) Depression with anxiety   (+) History of colon polyps   (+) Intractable episodic cluster headache      PULMONARY   (+) Chronic obstructive asthma (HCC)   (+) Moderate persistent asthma without complication   (+) Pneumonia due to infectious organism   (+) Sleep apnea   (+) Viral upper respiratory tract infection      Other   (+) Lingual tonsil hypertrophy        Physical Exam    Airway    Mallampati score: II  TM Distance: >3 FB  Neck ROM: full     Dental   No notable dental hx     Cardiovascular  Rate: normal,     Pulmonary  Breath sounds clear to auscultation,     Other Findings        Anesthesia Plan  ASA Score- 3     Anesthesia Type- IV sedation with anesthesia with ASA Monitors  Additional Monitors:   Airway Plan:           Plan Factors-    Chart reviewed  Patient summary reviewed  Induction- intravenous  Postoperative Plan-     Informed Consent- Anesthetic plan and risks discussed with patient  I personally reviewed this patient with the CRNA  Discussed and agreed on the Anesthesia Plan with the CRNA  Nadine Rosas

## 2022-06-09 NOTE — ANESTHESIA POSTPROCEDURE EVALUATION
Post-Op Assessment Note    CV Status:  Stable  Pain Score: 0    Pain management: adequate     Mental Status:  Sleepy   Hydration Status:  Euvolemic   PONV Controlled:  None   Airway Patency:  Patent      Post Op Vitals Reviewed: Yes      Staff: CRNA         No complications documented      /92 (06/09/22 1346)    Temp 97 9 °F (36 6 °C) (06/09/22 1346)    Pulse 72 (06/09/22 1346)   Resp 18 (06/09/22 1346)    SpO2 96 % (06/09/22 1346)

## 2022-06-09 NOTE — H&P
History and Physical - [facility]  Bay Torres 46 y o  male MRN: [id number]    HPI: Bay Torres is a 46y o  year old male who presents for evaluation of dysphagia, has history of hypercontractile esophagus  Has previously responded to dilation with [address] Savary dilation        Review of Systems    Historical Information   Past Medical History:   Diagnosis Date    Allergic     seasonal     Allergic rhinitis     Anxiety     Arthritis     Back pain     Chronic obstructive asthma (HCC)     Chronic pain syndrome     Cluster headaches     Colon polyp     Depression     Insomnia     Kidney stones     Laryngospasm     Leukocytosis     Migraine     Myocardial infarction (HCC)     Nephrolithiasis     Organic impotence     Pneumonia due to infectious organism 1/16/2019    Seasonal allergies     Sleep apnea     does not use CPAP    Stroke ([address] ) 2015    TMJ (temporomandibular joint syndrome)     Wheezing     last assessed 05/19/17     Past Surgical History:   Procedure Laterality Date    BACK SURGERY      *9, 4985-4929, nervectomy 2006, total of 10    BUCCAL MASS EXCISION N/A 3/26/2018    Procedure: BIOPSY LINGUAL TONSIL (FLOW/ STANDARD PATH)  EVAL UNDER ANESTHESIA;  Surgeon: Rhona Bautista MD;  [facility];  Service: ENT    COLONOSCOPY      FL RETROGRADE PYELOGRAM  10/21/2020    HERNIA REPAIR      KIDNEY SURGERY      KNEE ARTHROSCOPY      LITHOTRIPSY      multiple    LITHOTRIPSY      LUMBAR [address] SURGERY  2015    MANDIBLE FRACTURE SURGERY      titanium plate    PELVIC SYMPHYSIS FUSION      [place] CYSTO/URETERO W/LITHOTRIPSY &INDWELL STENT INSRT Right 10/21/2020    Procedure: CYSTOSCOPY URETEROSCOPY WITH LITHOTRIPSY HOLMIUM LASER, RETROGRADE PYELOGRAM AND INSERTION STENT URETERAL;  Surgeon: Prudencio Krishna MD;  [facility];  Service: Urology    [place] ESOPHAGOGASTRODUODENOSCOPY TRANSORAL DIAGNOSTIC N/A 2/22/2018    Procedure: ESOPHAGOGASTRODUODENOSCOPY (EGD); Surgeon: Ashanti Ma MD;  [facility]; Service: Gastroenterology    PA ESOPHAGOGASTRODUODENOSCOPY TRANSORAL DIAGNOSTIC N/A 4/1/2019    Procedure: EGD W/ DILATION;  Surgeon: Ashanti Ma MD;  [facility]; Service: Gastroenterology    PA REVISE/REMOVE SPINAL NEUROSTIM/ Left 6/9/2017    Procedure: REMOVAL OF A THORACIC SCS PLACED VIA LAMINECTOMY AND REMOVAL LEFT BUTTOCK GENERATOR; IMPULSE MONITORING;  Surgeon: Deanne Puckett MD;  [facility];  Service: Neurosurgery    PA [address] ARTHROSCOP,SURG,W/ROTAT CUFF REPR Right 4/4/2019    Procedure: RIGHT SHOULDER ARTHROSCOPIC SUBSCAPULARIS TENDON REPAIR;  Surgeon: Cristina Solomon MD;  [facility];  Service: Orthopedics    PA SURG IMPLNT [address] N/A 9/8/2016    Procedure: DORSAL COLUMN STIMULATOR PLACEMENT (IMPULSE MONITORING);   Surgeon: Dasia Sen MD;  [facility];  Service: Orthopedics    SACROILIAC JOINT FUSION  2015    SHOULDER SURGERY Left     2    UPPER GASTROINTESTINAL ENDOSCOPY       Social History   Social History     Substance and Sexual Activity   Alcohol Use Yes    Comment: rarely      Social History     Substance and Sexual Activity   Drug Use Yes    Frequency: 7 0 times per week    Types: Marijuana    Comment: medical marijuana daily for chronic pain 1/2 ounce     Social History     Tobacco Use   Smoking Status Current Every Day Smoker    Packs/day: 0 50    Years: 25 00    Pack years: 12 50   Smokeless Tobacco Former User     Family History   Problem Relation Age of Onset    Diabetes Father     Obesity Father     Liver cancer Father     Heart disease Father     Diabetes Brother     Hypertension Brother     Leukemia Mother     Hypertension Mother     Cancer Family        Meds/Allergies     (Not in a hospital admission)      Allergies   Allergen Reactions    Other Rash     Adhesive tape       Objective     /86   Pulse 70   Temp (!) 96 5 °F (35 8 °C) (Temporal)   Resp 16   Ht 6' 1" (1 854 m)   Wt 95 3 kg (210 lb)   SpO2 97%   BMI 27 71 kg/m²       PHYSICAL EXAM    Gen: NAD  CV: RRR  CHEST: Clear  ABD: soft, NT/ND  EXT: no edema  Neuro: AAO      ASSESSMENT/PLAN:  This is a 46y o  year old male here for evaluation of dysphagia  PLAN:   Procedure:  EGD with possible dilation

## 2022-06-11 ENCOUNTER — HOSPITAL ENCOUNTER (EMERGENCY)
Facility: HOSPITAL | Age: 52
Discharge: HOME | End: 2022-06-11
Attending: EMERGENCY MEDICINE
Payer: MEDICARE

## 2022-06-11 VITALS
DIASTOLIC BLOOD PRESSURE: 105 MMHG | RESPIRATION RATE: 18 BRPM | TEMPERATURE: 98.1 F | HEART RATE: 83 BPM | SYSTOLIC BLOOD PRESSURE: 172 MMHG | OXYGEN SATURATION: 97 %

## 2022-06-11 DIAGNOSIS — H10.31 ACUTE CONJUNCTIVITIS OF RIGHT EYE, UNSPECIFIED ACUTE CONJUNCTIVITIS TYPE: Primary | ICD-10-CM

## 2022-06-11 PROCEDURE — 99283 EMERGENCY DEPT VISIT LOW MDM: CPT

## 2022-06-11 PROCEDURE — 99284 EMERGENCY DEPT VISIT MOD MDM: CPT | Performed by: PHYSICIAN ASSISTANT

## 2022-06-11 RX ORDER — ERYTHROMYCIN 5 MG/G
OINTMENT OPHTHALMIC
Qty: 3.5 G | Refills: 0 | Status: SHIPPED | OUTPATIENT
Start: 2022-06-11

## 2022-06-11 RX ORDER — ERYTHROMYCIN 5 MG/G
0.5 OINTMENT OPHTHALMIC ONCE
Status: COMPLETED | OUTPATIENT
Start: 2022-06-11 | End: 2022-06-11

## 2022-06-11 RX ORDER — TETRACAINE HYDROCHLORIDE 5 MG/ML
2 SOLUTION OPHTHALMIC ONCE
Status: COMPLETED | OUTPATIENT
Start: 2022-06-11 | End: 2022-06-11

## 2022-06-11 RX ADMIN — ERYTHROMYCIN 0.5 INCH: 5 OINTMENT OPHTHALMIC at 07:04

## 2022-06-11 RX ADMIN — FLUORESCEIN SODIUM 1 STRIP: 1 STRIP OPHTHALMIC at 07:04

## 2022-06-11 RX ADMIN — TETRACAINE HYDROCHLORIDE 2 DROP: 5 SOLUTION OPHTHALMIC at 06:45

## 2022-06-11 NOTE — ED PROVIDER NOTES
History  Chief Complaint   Patient presents with    Eye Pain     Pt reports feeling like something is in R eye since yesterday after dinner  Eye is swollen and painful  Patient is a 61-year-old male with a past medical history significant for COPD, chronic pain syndrome, coronary artery disease presenting to the emergency department for evaluation of a foreign body sensation to his right eye  He states that the symptoms began last night  He feels like there is something stuck in his right eye  Eyes very painful  He is keeping his eye shot because it hurts more when his eye is open  He is denying any visual disturbance  No fevers, chills, chest pain, difficulty breathing  No other complaints at this time  Prior to Admission Medications   Prescriptions Last Dose Informant Patient Reported? Taking?   acetaminophen (TYLENOL) 325 mg tablet  Self No No   Sig: Take 3 tablets (975 mg total) by mouth every 8 (eight) hours as needed for mild pain   albuterol (PROVENTIL HFA,VENTOLIN HFA) 90 mcg/act inhaler  Self No No   Sig: Inhale 2 puffs every 6 (six) hours as needed for wheezing   diazepam (VALIUM) 5 mg tablet  Self No No   Sig: Take 1 tablet (5 mg total) by mouth every 12 (twelve) hours as needed for anxiety or muscle spasms   fluticasone-vilanterol (BREO ELLIPTA) 200-25 MCG/INH inhaler  Self No No   Sig: Inhale 1 puff daily Rinse mouth after use  ibuprofen (MOTRIN) 800 mg tablet  Self No No   Sig: TAKE 1 TABLET(800 MG) BY MOUTH THREE TIMES DAILY AS NEEDED FOR MILD PAIN   naloxone (Narcan) 4 mg/0 1 mL nasal spray  Self No No   Sig: In case of overdose: 1 spray in one nostril x 1, may repeat in 2 min if remains unresponsive  oxyCODONE (ROXICODONE) 10 MG TABS  Self No No   Sig: Take 1 tablet (10 mg total) by mouth every 6 (six) hours as needed for severe pain Take 1 tablet (10mg) by mouth every 6 hours as needed for severe pain   Max daily amount : 4 tablets (40mg) Max Daily Amount: 40 mg   tadalafil (CIALIS) 5 MG tablet  Self No No   Sig: Take 1 tablet (5 mg total) by mouth as needed in the morning for erectile dysfunction  Facility-Administered Medications: None       Past Medical History:   Diagnosis Date    Allergic     seasonal     Allergic rhinitis     Anxiety     Arthritis     Back pain     Chronic obstructive asthma (HCC)     Chronic pain syndrome     Cluster headaches     Colon polyp     Depression     Insomnia     Kidney stones     Laryngospasm     Leukocytosis     Migraine     Myocardial infarction (HCC)     Nephrolithiasis     Organic impotence     Pneumonia due to infectious organism 1/16/2019    Seasonal allergies     Sleep apnea     does not use CPAP    Stroke [address] [address] ) 2015    TMJ (temporomandibular joint syndrome)     Wheezing     last assessed 05/19/17       Past Surgical History:   Procedure Laterality Date    BACK SURGERY      *9, 0059-5740, nervectomy 2006, total of 10    BUCCAL MASS EXCISION N/A 3/26/2018    Procedure: BIOPSY LINGUAL TONSIL (FLOW/ STANDARD PATH)  EVAL UNDER ANESTHESIA;  Surgeon: Salome Vargas MD;  [facility];  Service: ENT    COLONOSCOPY      FL RETROGRADE PYELOGRAM  10/21/2020    HERNIA REPAIR      KIDNEY SURGERY      KNEE ARTHROSCOPY      LITHOTRIPSY      multiple    LITHOTRIPSY      LUMBAR [address] SURGERY  2015    MANDIBLE FRACTURE SURGERY      titanium plate    PELVIC SYMPHYSIS FUSION      [place] CYSTO/URETERO W/LITHOTRIPSY &INDWELL STENT INSRT Right 10/21/2020    Procedure: CYSTOSCOPY URETEROSCOPY WITH LITHOTRIPSY HOLMIUM LASER, RETROGRADE PYELOGRAM AND INSERTION STENT URETERAL;  Surgeon: Isa Calderon MD;  [facility];  Service: Urology    [place] ESOPHAGOGASTRODUODENOSCOPY TRANSORAL DIAGNOSTIC N/A 2/22/2018    Procedure: ESOPHAGOGASTRODUODENOSCOPY (EGD); Surgeon: Michelle Ruiz MD;  [facility];   Service: Gastroenterology    [place] ESOPHAGOGASTRODUODENOSCOPY TRANSORAL DIAGNOSTIC N/A 4/1/2019    Procedure: EGD W/ DILATION;  Surgeon: Chivo Rodriguez MD;  [facility]; Service: Gastroenterology    [place] REVISE/REMOVE SPINAL NEUROSTIM/ Left 6/9/2017    Procedure: REMOVAL OF A THORACIC SCS PLACED VIA LAMINECTOMY AND REMOVAL LEFT BUTTOCK GENERATOR; IMPULSE MONITORING;  Surgeon: Leticia Kaye MD;  [facility];  Service: Neurosurgery    [place] [address] ARTHROSCOP,SURG,W/ROTAT CUFF REPR Right 4/4/2019    Procedure: RIGHT SHOULDER ARTHROSCOPIC SUBSCAPULARIS TENDON REPAIR;  Surgeon: Juliana Maldonado MD;  [facility];  Service: Orthopedics    [place] SURG IMPLNT [address] N/A 9/8/2016    Procedure: DORSAL COLUMN STIMULATOR PLACEMENT (IMPULSE MONITORING); Surgeon: Sally Lockhart MD;  [facility];  Service: Orthopedics    SACROILIAC JOINT FUSION  2015    SHOULDER SURGERY Left     2    UPPER GASTROINTESTINAL ENDOSCOPY         Family History   Problem Relation Age of Onset    Diabetes Father     Obesity Father     Liver cancer Father     Heart disease Father     Diabetes Brother     Hypertension Brother     Leukemia Mother     Hypertension Mother     Cancer Family      I have reviewed and agree with the history as documented  E-Cigarette/Vaping    E-Cigarette Use Former User     Cartridges/Day less than one a day      E-Cigarette/Vaping Substances    Nicotine No     THC No     CBD No     Flavoring No     Other No     Unknown No      Social History     Tobacco Use    Smoking status: Current Every Day Smoker     Packs/day: 0 50     Years: 25 00     Pack years: 12 50    Smokeless tobacco: Former User   Vaping Use    Vaping Use: Former   Substance Use Topics    Alcohol use: Yes     Comment: rarely     Drug use: Yes     Frequency: 7 0 times per week     Types: Marijuana     Comment: medical marijuana daily for chronic pain 1/2 ounce       Review of Systems   Constitutional: Negative for chills, diaphoresis and fever     HENT: Negative for congestion, drooling, facial swelling, nosebleeds, sore throat and voice change  Eyes: Positive for photophobia, pain and redness  Negative for discharge and visual disturbance  Respiratory: Negative for cough, choking, chest tightness, shortness of breath and stridor  Cardiovascular: Negative for chest pain and palpitations  Gastrointestinal: Negative for abdominal pain, diarrhea, nausea and vomiting  Musculoskeletal: Negative for arthralgias, back pain, neck pain and neck stiffness  Skin: Negative for color change, rash and wound  Neurological: Negative for dizziness, syncope, facial asymmetry, weakness, light-headedness, numbness and headaches  Psychiatric/Behavioral: Negative for confusion and suicidal ideas  The patient is not nervous/anxious  All other systems reviewed and are negative  Physical Exam  Physical Exam  Vitals reviewed  Constitutional:       General: He is not in acute distress  Appearance: Normal appearance  He is normal weight  He is not ill-appearing, toxic-appearing or diaphoretic  HENT:      Head: Normocephalic and atraumatic  Right Ear: External ear normal       Left Ear: External ear normal       Mouth/Throat:      Mouth: Mucous membranes are moist       Pharynx: Oropharynx is clear  No oropharyngeal exudate or posterior oropharyngeal erythema  Eyes:      General: Lids are everted, no foreign bodies appreciated  No scleral icterus  Right eye: Discharge present  No foreign body  Left eye: No discharge  Extraocular Movements: Extraocular movements intact  Right eye: Normal extraocular motion  Left eye: Normal extraocular motion  Conjunctiva/sclera:      Right eye: Right conjunctiva is injected  Pupils: Pupils are equal, round, and reactive to light  Right eye: No corneal abrasion or fluorescein uptake  Howie exam negative  Slit lamp exam:     Right eye: No corneal ulcer     Cardiovascular:      Rate and Rhythm: Normal rate and regular rhythm  Pulses: Normal pulses  Heart sounds: Normal heart sounds  No murmur heard  No friction rub  No gallop  Pulmonary:      Effort: Pulmonary effort is normal  No respiratory distress  Breath sounds: Normal breath sounds  No stridor  No wheezing, rhonchi or rales  Musculoskeletal:         General: Normal range of motion  Cervical back: Normal range of motion and neck supple  Right lower leg: No edema  Left lower leg: No edema  Skin:     General: Skin is warm and dry  Capillary Refill: Capillary refill takes less than 2 seconds  Neurological:      General: No focal deficit present  Mental Status: He is alert and oriented to person, place, and time     Psychiatric:         Mood and Affect: Mood normal          Behavior: Behavior normal          Vital Signs  ED Triage Vitals [06/11/22 0628]   Temperature Pulse Respirations Blood Pressure SpO2   98 1 °F (36 7 °C) 83 18 (!) 172/105 97 %      Temp Source Heart Rate Source Patient Position - Orthostatic VS BP Location FiO2 (%)   Oral Monitor Sitting Right arm --      Pain Score       --           Vitals:    06/11/22 0628   BP: (!) 172/105   Pulse: 83   Patient Position - Orthostatic VS: Sitting         Visual Acuity      ED Medications  Medications   tetracaine 0 5 % ophthalmic solution 2 drop (2 drops Right Eye Given by Other 6/11/22 0645)   fluorescein sodium sterile ophthalmic strip 1 strip (1 strip Right Eye Given 6/11/22 0704)   erythromycin (ILOTYCIN) 0 5 % ophthalmic ointment 0 5 inch (0 5 inches Right Eye Given 6/11/22 0704)       Diagnostic Studies  Results Reviewed     None                 No orders to display              Procedures  Procedures         ED Course                                             MDM  Number of Diagnoses or Management Options  Acute conjunctivitis of right eye, unspecified acute conjunctivitis type  Diagnosis management comments: Patient presenting for evaluation of left eye pain, irritation, redness  He has a foreign body sensation to the left eye  Upon arrival, he appears uncomfortable secondary to pain  Vital signs remarkable for hypertension  Remainder of vital signs  Fluorescein exam by myself, no foreign bodies or corneal abrasions noted, however patient did feel immediate relief after tetracaine administration  He does have a moderate amount of discharged to the right eye  Symptoms likely secondary to conjunctivitis  Will treat with erythromycin  Will give instructions to follow-up with ophthalmology  Strict return precautions were discussed  He is in stable condition at time of discharge  Patient Progress  Patient progress: stable      Disposition  Final diagnoses:   Acute conjunctivitis of right eye, unspecified acute conjunctivitis type     Time reflects when diagnosis was documented in both MDM as applicable and the Disposition within this note     Time User Action Codes Description Comment    6/11/2022  6:51 AM Stu Dasilva Add [H10 31] Acute conjunctivitis of right eye, unspecified acute conjunctivitis type       ED Disposition     ED Disposition   Discharge    Condition   Stable    Date/Time   Sat Jun 11, 2022  6:51 AM    Comment   Aarti Bennett discharge to home/self care                 Follow-up Information     Follow up With Specialties Details Why Contact Info Additional [address] Emergency Department Emergency Medicine Go to  If symptoms worsen [address] Emergency Department, [address], [place], [address]    Gianfranco Pennington MD Ophthalmology Schedule an appointment as soon as possible for a visit  ASAP for follow up Kary Anderson [address] [place] [address] 105  [phone]             Discharge Medication List as of 6/11/2022  6:53 AM      START taking these medications    Details   erythromycin (ILOTYCIN) ophthalmic ointment Place a 1/2 inch ribbon of ointment into the lower eyelid 4x/day for 7 days, Print         CONTINUE these medications which have NOT CHANGED    Details   acetaminophen (TYLENOL) 325 mg tablet Take 3 tablets (975 mg total) by mouth every 8 (eight) hours as needed for mild pain, Starting Fri 12/24/2021, No Print      albuterol (PROVENTIL HFA,VENTOLIN HFA) 90 mcg/act inhaler Inhale 2 puffs every 6 (six) hours as needed for wheezing, Starting Fri 5/13/2022, Normal      diazepam (VALIUM) 5 mg tablet Take 1 tablet (5 mg total) by mouth every 12 (twelve) hours as needed for anxiety or muscle spasms, Starting Thu 5/19/2022, Normal      fluticasone-vilanterol (BREO ELLIPTA) 200-25 MCG/INH inhaler Inhale 1 puff daily Rinse mouth after use , Starting Mon 3/18/2019, Normal      ibuprofen (MOTRIN) 800 mg tablet TAKE 1 TABLET(800 MG) BY MOUTH THREE TIMES DAILY AS NEEDED FOR MILD PAIN, Normal      naloxone (Narcan) 4 mg/0 1 mL nasal spray In case of overdose: 1 spray in one nostril x 1, may repeat in 2 min if remains unresponsive , Normal      oxyCODONE (ROXICODONE) 10 MG TABS Take 1 tablet (10 mg total) by mouth every 6 (six) hours as needed for severe pain Take 1 tablet (10mg) by mouth every 6 hours as needed for severe pain  Max daily amount : 4 tablets (40mg) Max Daily Amount: 40 mg, Starting u 5/19/2022, Until Sat 6/18/ 2022 at 2359, Normal      tadalafil (CIALIS) 5 MG tablet Take 1 tablet (5 mg total) by mouth as needed in the morning for erectile dysfunction  , Starting Fri 5/13/2022, Normal             No discharge procedures on file      PDMP Review       Value Time User    PDMP Reviewed  Yes 6/11/2022  6:30 AM Ayleen Garner MD          ED Provider  Electronically Signed by           Narendra Jack PA-C  06/11/22 8241

## 2022-06-14 DIAGNOSIS — G89.29 CHRONIC RIGHT SI JOINT PAIN: ICD-10-CM

## 2022-06-14 DIAGNOSIS — M96.1 POST LAMINECTOMY SYNDROME: ICD-10-CM

## 2022-06-14 DIAGNOSIS — G89.4 CHRONIC PAIN SYNDROME: ICD-10-CM

## 2022-06-14 DIAGNOSIS — M53.3 CHRONIC RIGHT SI JOINT PAIN: ICD-10-CM

## 2022-06-14 RX ORDER — OXYCODONE HYDROCHLORIDE 10 MG/1
10 TABLET ORAL EVERY 6 HOURS PRN
Qty: 120 TABLET | Refills: 0 | Status: SHIPPED | OUTPATIENT
Start: 2022-06-14 | End: 2022-07-13 | Stop reason: SDUPTHER

## 2022-06-14 NOTE — TELEPHONE ENCOUNTER
Patient left message on medline requesting if this medication can be refilled two days prior due date because he have to travel to [place] for an medical emergency and don't want to go with out the medication  Please review  Thank You

## 2022-06-20 ENCOUNTER — TELEPHONE (OUTPATIENT)
Dept: FAMILY MEDICINE CLINIC | Facility: CLINIC | Age: 52
End: 2022-06-20

## 2022-06-20 NOTE — TELEPHONE ENCOUNTER
Received fax from patient itself requesting an prior authorization for the medication Oxycodone(Roxicodone) 10 mg Tablets  Prior authorization has been started today 06/20/22, medical support information attached and faxed over to [facility] at [phone] and [phone]  Confirmation has been received and placed in the prior authorization folder at the nurse station  Will follow up in 2 business day

## 2022-06-21 ENCOUNTER — TELEPHONE (OUTPATIENT)
Dept: FAMILY MEDICINE CLINIC | Facility: CLINIC | Age: 52
End: 2022-06-21

## 2022-06-21 NOTE — TELEPHONE ENCOUNTER
Form reviewed and signed by Dr Lalit Arias( Claudia Ortiz is out of the Office)  Form placed in the Yellow Folder in the [address] [place] [address]  Please fax to [phone]

## 2022-06-21 NOTE — TELEPHONE ENCOUNTER
[facility] called  For the prior auth, more information needs to be submitted      Need to know if patient a PDMP check    Please call to verify  [phone]

## 2022-06-22 NOTE — TELEPHONE ENCOUNTER
Updating chart, Approval received from Insurance-Oxycodone 10 mg tabs-  06/21/2022-07/21/2022  Patient and pharmacy contacted    Clerical Team  PA placed in the [facility] in the [address] [place] [address] for scan   Thanks

## 2022-07-13 ENCOUNTER — TELEPHONE (OUTPATIENT)
Dept: FAMILY MEDICINE CLINIC | Facility: CLINIC | Age: 52
End: 2022-07-13

## 2022-07-13 DIAGNOSIS — G89.4 CHRONIC PAIN SYNDROME: ICD-10-CM

## 2022-07-13 DIAGNOSIS — G89.29 CHRONIC RIGHT SI JOINT PAIN: ICD-10-CM

## 2022-07-13 DIAGNOSIS — M53.3 CHRONIC RIGHT SI JOINT PAIN: ICD-10-CM

## 2022-07-13 DIAGNOSIS — M96.1 POST LAMINECTOMY SYNDROME: ICD-10-CM

## 2022-07-13 RX ORDER — OXYCODONE HYDROCHLORIDE 10 MG/1
10 TABLET ORAL EVERY 6 HOURS PRN
Qty: 120 TABLET | Refills: 0 | Status: SHIPPED | OUTPATIENT
Start: 2022-07-16 | End: 2022-08-11 | Stop reason: SDUPTHER

## 2022-08-01 ENCOUNTER — APPOINTMENT (EMERGENCY)
Dept: RADIOLOGY | Facility: HOSPITAL | Age: 52
End: 2022-08-01
Payer: MEDICARE

## 2022-08-01 ENCOUNTER — HOSPITAL ENCOUNTER (EMERGENCY)
Facility: HOSPITAL | Age: 52
Discharge: HOME | End: 2022-08-01
Attending: EMERGENCY MEDICINE | Admitting: EMERGENCY MEDICINE
Payer: MEDICARE

## 2022-08-01 VITALS
RESPIRATION RATE: 16 BRPM | SYSTOLIC BLOOD PRESSURE: 152 MMHG | WEIGHT: 215 LBS | OXYGEN SATURATION: 97 % | HEART RATE: 76 BPM | BODY MASS INDEX: 28.37 KG/M2 | DIASTOLIC BLOOD PRESSURE: 91 MMHG | TEMPERATURE: 98.3 F

## 2022-08-01 DIAGNOSIS — L29.9 PRURITUS: ICD-10-CM

## 2022-08-01 DIAGNOSIS — J40 BRONCHITIS: Primary | ICD-10-CM

## 2022-08-01 DIAGNOSIS — R21 RASH: ICD-10-CM

## 2022-08-01 LAB
ALBUMIN SERPL BCP-MCNC: 4.4 G/DL (ref 3.5–5)
ALP SERPL-CCNC: 75 U/L (ref 34–104)
ALT SERPL W P-5'-P-CCNC: 37 U/L (ref 7–52)
ANION GAP SERPL CALCULATED.3IONS-SCNC: 6 MMOL/L (ref 4–13)
APTT PPP: 26 SECONDS (ref 23–37)
AST SERPL W P-5'-P-CCNC: 25 U/L (ref 13–39)
B BURGDOR IGG+IGM SER-ACNC: <0.2 AI
BASOPHILS # BLD AUTO: 0.1 THOUSANDS/ΜL (ref 0–0.1)
BASOPHILS NFR BLD AUTO: 1 % (ref 0–1)
BILIRUB SERPL-MCNC: 0.29 MG/DL (ref 0.2–1)
BUN SERPL-MCNC: 17 MG/DL (ref 5–25)
CALCIUM SERPL-MCNC: 9.6 MG/DL (ref 8.4–10.2)
CHLORIDE SERPL-SCNC: 106 MMOL/L (ref 96–108)
CO2 SERPL-SCNC: 31 MMOL/L (ref 21–32)
CREAT SERPL-MCNC: 1.14 MG/DL (ref 0.6–1.3)
EOSINOPHIL # BLD AUTO: 0.55 THOUSAND/ΜL (ref 0–0.61)
EOSINOPHIL NFR BLD AUTO: 3 % (ref 0–6)
ERYTHROCYTE [DISTWIDTH] IN BLOOD BY AUTOMATED COUNT: 15.2 % (ref 11.6–15.1)
FLUAV RNA RESP QL NAA+PROBE: NEGATIVE
FLUBV RNA RESP QL NAA+PROBE: NEGATIVE
GFR SERPL CREATININE-BSD FRML MDRD: 73 ML/MIN/1.73SQ M
GLUCOSE SERPL-MCNC: 104 MG/DL (ref 65–140)
HCT VFR BLD AUTO: 46.4 % (ref 36.5–49.3)
HGB BLD-MCNC: 15 G/DL (ref 12–17)
IMM GRANULOCYTES # BLD AUTO: 0.11 THOUSAND/UL (ref 0–0.2)
IMM GRANULOCYTES NFR BLD AUTO: 1 % (ref 0–2)
INR PPP: 0.82 (ref 0.84–1.19)
LACTATE SERPL-SCNC: 1.4 MMOL/L (ref 0.5–2)
LYMPHOCYTES # BLD AUTO: 3.57 THOUSANDS/ΜL (ref 0.6–4.47)
LYMPHOCYTES NFR BLD AUTO: 22 % (ref 14–44)
MCH RBC QN AUTO: 28.9 PG (ref 26.8–34.3)
MCHC RBC AUTO-ENTMCNC: 32.3 G/DL (ref 31.4–37.4)
MCV RBC AUTO: 89 FL (ref 82–98)
MONOCYTES # BLD AUTO: 1.3 THOUSAND/ΜL (ref 0.17–1.22)
MONOCYTES NFR BLD AUTO: 8 % (ref 4–12)
NEUTROPHILS # BLD AUTO: 10.95 THOUSANDS/ΜL (ref 1.85–7.62)
NEUTS SEG NFR BLD AUTO: 65 % (ref 43–75)
NRBC BLD AUTO-RTO: 0 /100 WBCS
PLATELET # BLD AUTO: 369 THOUSANDS/UL (ref 149–390)
PMV BLD AUTO: 9.6 FL (ref 8.9–12.7)
POTASSIUM SERPL-SCNC: 3.8 MMOL/L (ref 3.5–5.3)
PROT SERPL-MCNC: 7 G/DL (ref 6.4–8.4)
PROTHROMBIN TIME: 11.4 SECONDS (ref 11.6–14.5)
RBC # BLD AUTO: 5.19 MILLION/UL (ref 3.88–5.62)
RSV RNA RESP QL NAA+PROBE: NEGATIVE
SARS-COV-2 RNA RESP QL NAA+PROBE: NEGATIVE
SODIUM SERPL-SCNC: 143 MMOL/L (ref 135–147)
WBC # BLD AUTO: 16.58 THOUSAND/UL (ref 4.31–10.16)

## 2022-08-01 PROCEDURE — 96374 THER/PROPH/DIAG INJ IV PUSH: CPT

## 2022-08-01 PROCEDURE — 99284 EMERGENCY DEPT VISIT MOD MDM: CPT | Performed by: EMERGENCY MEDICINE

## 2022-08-01 PROCEDURE — 85730 THROMBOPLASTIN TIME PARTIAL: CPT | Performed by: EMERGENCY MEDICINE

## 2022-08-01 PROCEDURE — 80053 COMPREHEN METABOLIC PANEL: CPT | Performed by: EMERGENCY MEDICINE

## 2022-08-01 PROCEDURE — 99283 EMERGENCY DEPT VISIT LOW MDM: CPT

## 2022-08-01 PROCEDURE — 71045 X-RAY EXAM CHEST 1 VIEW: CPT

## 2022-08-01 PROCEDURE — 85025 COMPLETE CBC W/AUTO DIFF WBC: CPT | Performed by: EMERGENCY MEDICINE

## 2022-08-01 PROCEDURE — 87040 BLOOD CULTURE FOR BACTERIA: CPT | Performed by: EMERGENCY MEDICINE

## 2022-08-01 PROCEDURE — 83605 ASSAY OF LACTIC ACID: CPT | Performed by: EMERGENCY MEDICINE

## 2022-08-01 PROCEDURE — 86618 LYME DISEASE ANTIBODY: CPT | Performed by: EMERGENCY MEDICINE

## 2022-08-01 PROCEDURE — 0241U HB NFCT DS VIR RESP RNA 4 TRGT: CPT | Performed by: EMERGENCY MEDICINE

## 2022-08-01 PROCEDURE — 85610 PROTHROMBIN TIME: CPT | Performed by: EMERGENCY MEDICINE

## 2022-08-01 PROCEDURE — 36415 COLL VENOUS BLD VENIPUNCTURE: CPT | Performed by: EMERGENCY MEDICINE

## 2022-08-01 RX ORDER — DOXYCYCLINE HYCLATE 100 MG/1
100 CAPSULE ORAL ONCE
Status: COMPLETED | OUTPATIENT
Start: 2022-08-01 | End: 2022-08-01

## 2022-08-01 RX ORDER — DOXYCYCLINE HYCLATE 100 MG
100 TABLET ORAL 2 TIMES DAILY
Qty: 20 TABLET | Refills: 0 | Status: SHIPPED | OUTPATIENT
Start: 2022-08-01 | End: 2022-08-11

## 2022-08-01 RX ORDER — ALBUTEROL SULFATE 90 UG/1
2 AEROSOL, METERED RESPIRATORY (INHALATION) ONCE
Status: COMPLETED | OUTPATIENT
Start: 2022-08-01 | End: 2022-08-01

## 2022-08-01 RX ORDER — HYDROXYZINE HYDROCHLORIDE 25 MG/1
25 TABLET, FILM COATED ORAL EVERY 6 HOURS PRN
Qty: 25 TABLET | Refills: 0 | Status: SHIPPED | OUTPATIENT
Start: 2022-08-01 | End: 2022-08-08

## 2022-08-01 RX ORDER — DIPHENHYDRAMINE HYDROCHLORIDE 50 MG/ML
50 INJECTION INTRAMUSCULAR; INTRAVENOUS ONCE
Status: COMPLETED | OUTPATIENT
Start: 2022-08-01 | End: 2022-08-01

## 2022-08-01 RX ADMIN — DIPHENHYDRAMINE HYDROCHLORIDE 50 MG: 50 INJECTION, SOLUTION INTRAMUSCULAR; INTRAVENOUS at 06:47

## 2022-08-01 RX ADMIN — ALBUTEROL SULFATE 2 PUFF: 90 AEROSOL, METERED RESPIRATORY (INHALATION) at 07:56

## 2022-08-01 RX ADMIN — DOXYCYCLINE 100 MG: 100 CAPSULE ORAL at 07:55

## 2022-08-01 NOTE — SEPSIS NOTE
Sepsis Note   Zandra Key 46 y o  male MRN: [id number]  Unit/Bed#: ED 17 Encounter: [id number]       qSOFA     Row Name 08/01/22 8999 08/01/22 0627             Altered mental status GCS < 15 -- --       Respiratory Rate > / =64 0 0       Systolic BP < / =730 -- 0       Q Sofa Score 0 0                  Initial Sepsis Screening     Row Name 08/01/22 0720                Is the patient's history suggestive of a new or worsening infection? Yes (Proceed)  -AO        Suspected source of infection pneumonia  -AO        Are two or more of the following signs & symptoms of infection both present and new to the patient? No  -AO        Indicate SIRS criteria Leukocytosis (WBC > 99579 IJL)  -AO        If the answer is yes to both questions, suspicion of sepsis is present --        If severe sepsis is present AND tissue hypoperfusion perists in the hour after fluid resuscitation or lactate > 4, the patient meets criteria for SEPTIC SHOCK --        Are any of the following organ dysfunction criteria present within 6 hours of suspected infection and SIRS criteria that are NOT considered to be chronic conditions?  --        Organ dysfunction --        Date of presentation of severe sepsis --        Time of presentation of severe sepsis --        Tissue hypoperfusion persists in the hour after crystalloid fluid administration, evidenced, by either: --        Was hypotension present within one hour of the conclusion of crystalloid fluid administration? --        Date of presentation of septic shock --        Time of presentation of septic shock --              User Key  (r) = Recorded By, (t) = Taken By, (c) = Cosigned By    [address] Name Provider Antonio Kee MD Physician

## 2022-08-01 NOTE — ED PROVIDER NOTES
History  Chief Complaint   Patient presents with   [facility]     Patient reports poison ivy on face, neck, arms and abd  Patient states he was camping last week in [place] but is unsure if that is where he acquired the poison  Patient reports discomfort and itching around eyes and mouth  States he is also feeling SOB  Patient is a 46year old male with about 1 week of diffuse rash and itching and believes he may have poison ivy  No fever but has had sweating  (+) cough  (+) difficulty breathing from nasal congestion  States his fiance can drive home  Was in [place] recently  Was last seen in this ED on 6/11/22 for right eye conjunctivitis  [facility] website checked on this patient and last Rx filled was on 7/16/22 for oxycodone for 30 day supply  Last Tdap was in 2016 as per Epic  No known ill contacts  History provided by:  Patient (fiance)   used: No    Poison Ivy  Associated symptoms: congestion, cough, rash (itching) and shortness of breath    Associated symptoms: no fever, no nausea and no vomiting        Prior to Admission Medications   Prescriptions Last Dose Informant Patient Reported? Taking?   acetaminophen (TYLENOL) 325 mg tablet  Self No No   Sig: Take 3 tablets (975 mg total) by mouth every 8 (eight) hours as needed for mild pain   albuterol (PROVENTIL HFA,VENTOLIN HFA) 90 mcg/act inhaler  Self No No   Sig: Inhale 2 puffs every 6 (six) hours as needed for wheezing   diazepam (VALIUM) 5 mg tablet  Self No No   Sig: Take 1 tablet (5 mg total) by mouth every 12 (twelve) hours as needed for anxiety or muscle spasms   erythromycin (ILOTYCIN) ophthalmic ointment   No No   Sig: Place a 1/2 inch ribbon of ointment into the lower eyelid 4x/day for 7 days   fluticasone-vilanterol (BREO ELLIPTA) 200-25 MCG/INH inhaler  Self No No   Sig: Inhale 1 puff daily Rinse mouth after use     ibuprofen (MOTRIN) 800 mg tablet  Self No No   Sig: TAKE 1 TABLET(800 MG) BY MOUTH THREE TIMES DAILY AS NEEDED FOR MILD PAIN   naloxone (Narcan) 4 mg/0 1 mL nasal spray  Self No No   Sig: In case of overdose: 1 spray in one nostril x 1, may repeat in 2 min if remains unresponsive  oxyCODONE (ROXICODONE) 10 MG TABS   No No   Sig: Take 1 tablet (10 mg total) by mouth every 6 (six) hours as needed for severe pain Take 1 tablet (10mg) by mouth every 6 hours as needed for severe pain  Max daily amount : 4 tablets (40mg) Max Daily Amount: 40 mg   tadalafil (CIALIS) 5 MG tablet  Self No No   Sig: Take 1 tablet (5 mg total) by mouth as needed in the morning for erectile dysfunction        Facility-Administered Medications: None       Past Medical History:   Diagnosis Date    Allergic     seasonal     Allergic rhinitis     Anxiety     Arthritis     Back pain     Chronic obstructive asthma (HCC)     Chronic pain syndrome     Cluster headaches     Colon polyp     Depression     Insomnia     Kidney stones     Laryngospasm     Leukocytosis     Migraine     Myocardial infarction (HCC)     Nephrolithiasis     Organic impotence     Pneumonia due to infectious organism 1/16/2019    Seasonal allergies     Sleep apnea     does not use CPAP    Stroke ([facility] [address] ) 2015    TMJ (temporomandibular joint syndrome)     Wheezing     last assessed 05/19/17       Past Surgical History:   Procedure Laterality Date    BACK SURGERY      *9, [id number], nervectomy 2006, total of 10    BUCCAL MASS EXCISION N/A 3/26/2018    Procedure: BIOPSY LINGUAL TONSIL (FLOW/ STANDARD PATH)  EVAL UNDER ANESTHESIA;  Surgeon: Brandyn Jones MD;  [facility];  Service: ENT    COLONOSCOPY      FL RETROGRADE PYELOGRAM  10/21/2020    HERNIA REPAIR      KIDNEY SURGERY      KNEE ARTHROSCOPY      LITHOTRIPSY      multiple    LITHOTRIPSY      LUMBAR [address] SURGERY  2015    MANDIBLE FRACTURE SURGERY      titanium plate    PELVIC SYMPHYSIS FUSION      OH CYSTO/URETERO W/LITHOTRIPSY &INDWELL STENT INSRT Right 10/21/2020    Procedure: Kulwinder Heller URETEROSCOPY WITH LITHOTRIPSY HOLMIUM LASER, RETROGRADE PYELOGRAM AND INSERTION STENT URETERAL;  Surgeon: Phi Bravo MD;  [facility];  Service: Urology    MN ESOPHAGOGASTRODUODENOSCOPY TRANSORAL DIAGNOSTIC N/A 2/22/2018    Procedure: ESOPHAGOGASTRODUODENOSCOPY (EGD); Surgeon: Ac Rogers MD;  Location: [facility]; Service: Gastroenterology    MN ESOPHAGOGASTRODUODENOSCOPY TRANSORAL DIAGNOSTIC N/A 4/1/2019    Procedure: EGD W/ DILATION;  Surgeon: Ac Rogers MD;  [facility]; Service: Gastroenterology    MN REVISE/REMOVE SPINAL NEUROSTIM/ Left 6/9/2017    Procedure: REMOVAL OF A THORACIC SCS PLACED VIA LAMINECTOMY AND REMOVAL LEFT BUTTOCK GENERATOR; IMPULSE MONITORING;  Surgeon: Sofi Rod MD;  [facility];  Service: Neurosurgery    MN [address] ARTHROSCOP,SURG,W/ROTAT CUFF REPR Right 4/4/2019    Procedure: RIGHT SHOULDER ARTHROSCOPIC SUBSCAPULARIS TENDON REPAIR;  Surgeon: Ava Lynne MD;  [facility];  Service: Orthopedics    MN SURG IMPLNT Cathlene Grams N/A 9/8/2016    Procedure: DORSAL COLUMN STIMULATOR PLACEMENT (IMPULSE MONITORING); Surgeon: Ce Stroud MD;  [facility];  Service: Orthopedics    SACROILIAC JOINT FUSION  2015    SHOULDER SURGERY Left     2    UPPER GASTROINTESTINAL ENDOSCOPY         Family History   Problem Relation Age of Onset    Diabetes Father     Obesity Father     Liver cancer Father     Heart disease Father     Diabetes Brother     Hypertension Brother     Leukemia Mother     Hypertension Mother     Cancer Family      I have reviewed and agree with the history as documented      E-Cigarette/Vaping    E-Cigarette Use Former User     Cartridges/Day less than one a day      E-Cigarette/Vaping Substances    Nicotine No     THC No     CBD No     Flavoring No     Other No     Unknown No      Social History     Tobacco Use    Smoking status: Current Every Day Smoker Packs/day: 0 50     Years: 25 00     Pack years: 12 50    Smokeless tobacco: Former User   Vaping Use    Vaping Use: Former   Substance Use Topics    Alcohol use: Yes     Comment: rarely     Drug use: Yes     Frequency: 7 0 times per week     Types: Marijuana     Comment: medical marijuana daily for chronic pain 1/2 ounce       Review of Systems   Constitutional: Positive for diaphoresis  Negative for fever  HENT: Positive for congestion  Respiratory: Positive for cough and shortness of breath  Gastrointestinal: Negative for nausea and vomiting  Skin: Positive for rash (itching)  All other systems reviewed and are negative  Physical Exam  Physical Exam  Vitals and nursing note reviewed  Constitutional:       General: He is in acute distress (moderate)  Appearance: He is diaphoretic  He is not toxic-appearing  HENT:      Head: Normocephalic and atraumatic  Right Ear: Tympanic membrane, ear canal and external ear normal       Left Ear: Tympanic membrane, ear canal and external ear normal       Mouth/Throat:      Mouth: Mucous membranes are moist       Pharynx: Oropharynx is clear  No oropharyngeal exudate or posterior oropharyngeal erythema  Eyes:      General: No scleral icterus  Cardiovascular:      Rate and Rhythm: Normal rate and regular rhythm  Heart sounds: Normal heart sounds  No murmur heard  Pulmonary:      Effort: Pulmonary effort is normal  No respiratory distress  Breath sounds: Normal breath sounds  No stridor  No wheezing, rhonchi or rales  Chest:      Chest wall: No tenderness  Abdominal:      General: Bowel sounds are normal       Palpations: Abdomen is soft  Tenderness: There is no abdominal tenderness  Musculoskeletal:         General: No deformity  Cervical back: Normal range of motion and neck supple  No rigidity  Right lower leg: No edema  Left lower leg: No edema  Skin:     General: Skin is warm        Findings: Rash (diffuse erythematous maculopapular rash noted of body  ) present  No erythema  Neurological:      General: No focal deficit present  Mental Status: He is alert and oriented to person, place, and time  Psychiatric:         Mood and Affect: Mood normal          Vital Signs  ED Triage Vitals [08/01/22 0627]   Temperature Pulse Respirations Blood Pressure SpO2   98 3 °F (36 8 °C) 85 18 (!) 169/105 97 %      Temp Source Heart Rate Source Patient Position - Orthostatic VS BP Location FiO2 (%)   Oral Monitor Sitting Right arm --      Pain Score       8           Vitals:    08/01/22 0627 08/01/22 0709 08/01/22 0730   BP: (!) 169/105  152/91   Pulse: 85 80 76   Patient Position - Orthostatic VS: Sitting  Lying         Visual Acuity      ED Medications  Medications   doxycycline hyclate (VIBRAMYCIN) capsule 100 mg (has no administration in time range)   albuterol (PROVENTIL HFA,VENTOLIN HFA) inhaler 2 puff (has no administration in time range)   diphenhydrAMINE (BENADRYL) injection 50 mg (50 mg Intravenous Given 8/1/22 0647)       Diagnostic Studies  Results Reviewed     Procedure Component Value Units Date/Time    FLU/RSV/COVID - if FLU/RSV clinically relevant [[id number]]  (Normal) Collected: 08/01/22 0644    Lab Status: Final result Specimen: Nares from Nose Updated: 08/01/22 0732     SARS-CoV-2 Negative     INFLUENZA A PCR Negative     INFLUENZA B PCR Negative     RSV PCR Negative    Narrative:      FOR PEDIATRIC PATIENTS - copy/paste COVID Guidelines URL to browser: [email or web address]  [email or web address]    SARS-CoV-2 assay is a Nucleic Acid Amplification assay intended for the  qualitative detection of nucleic acid from SARS-CoV-2 in nasopharyngeal  swabs  Results are for the presumptive identification of SARS-CoV-2 RNA      Positive results are indicative of infection with SARS-CoV-2, the virus  causing COVID-19, but do not rule out bacterial infection or co-infection  with other viruses  Laboratories within the [place] and its  territories are required to report all positive results to the appropriate  public health authorities  Negative results do not preclude SARS-CoV-2  infection and should not be used as the sole basis for treatment or other  patient management decisions  Negative results must be combined with  clinical observations, patient history, and epidemiological information  This test has not been FDA cleared or approved  This test has been authorized by FDA under an Emergency Use Authorization  (EUA)  This test is only authorized for the duration of time the  declaration that circumstances exist justifying the authorization of the  emergency use of an in vitro diagnostic tests for detection of SARS-CoV-2  virus and/or diagnosis of COVID-19 infection under section 564(b)(1) of  the Act, 21 U  S C  346QQB-0(Z)(5), unless the authorization is terminated  or revoked sooner  The test has been validated but independent review by FDA  and CLIA is pending  Test performed using [employer] GeneXpert: This RT-PCR assay targets N2,  a region unique to SARS-CoV-2  A conserved region in the E-gene was chosen  for pan-Sarbecovirus detection which includes SARS-CoV-2      Comprehensive metabolic panel [[id number]] Collected: 08/01/22 0644    Lab Status: Final result Specimen: Blood from Arm, Left Updated: 08/01/22 0723     Sodium 143 mmol/L      Potassium 3 8 mmol/L      Chloride 106 mmol/L      CO2 31 mmol/L      ANION GAP 6 mmol/L      BUN 17 mg/dL      Creatinine 1 14 mg/dL      Glucose 104 mg/dL      Calcium 9 6 mg/dL      AST 25 U/L      ALT 37 U/L      Alkaline Phosphatase 75 U/L      Total Protein 7 0 g/dL      Albumin 4 4 g/dL      Total Bilirubin 0 29 mg/dL      eGFR 73 ml/min/1 73sq m     Narrative:      [place] guidelines for Chronic Kidney Disease (CKD):     Stage 1 with normal or high GFR (GFR > 90 mL/min/1 73 square meters)    Stage 2 Mild CKD (GFR = 60-89 mL/min/1 73 square meters)    Stage 3A Moderate CKD (GFR = 45-59 mL/min/1 73 square meters)    Stage 3B Moderate CKD (GFR = 30-44 mL/min/1 73 square meters)    Stage 4 Severe CKD (GFR = 15-29 mL/min/1 73 square meters)    Stage 5 End Stage CKD (GFR <15 mL/min/1 73 square meters)  Note: GFR calculation is accurate only with a steady state creatinine    Lactic acid [[id number]]  (Normal) Collected: 08/01/22 0644    Lab Status: Final result Specimen: Blood from Arm, Left Updated: 08/01/22 0718     LACTIC ACID 1 4 mmol/L     Narrative:      Result may be elevated if tourniquet was used during collection  Protime-INR [[id number]]  (Abnormal) Collected: 08/01/22 0644    Lab Status: Final result Specimen: Blood from Arm, Left Updated: 08/01/22 0715     Protime 11 4 seconds      INR 0 82    APTT [[id number]]  (Normal) Collected: 08/01/22 0644    Lab Status: Final result Specimen: Blood from Arm, Left Updated: 08/01/22 0715     PTT 26 seconds     CBC and differential [[id number]]  (Abnormal) Collected: 08/01/22 0644    Lab Status: Final result Specimen: Blood from Arm, Left Updated: 08/01/22 0656     WBC 16 58 Thousand/uL      RBC 5 19 Million/uL      Hemoglobin 15 0 g/dL      Hematocrit 46 4 %      MCV 89 fL      MCH 28 9 pg      MCHC 32 3 g/dL      RDW 15 2 %      MPV 9 6 fL      Platelets 949 Thousands/uL      nRBC 0 /100 WBCs      Neutrophils Relative 65 %      Immat GRANS % 1 %      Lymphocytes Relative 22 %      Monocytes Relative 8 %      Eosinophils Relative 3 %      Basophils Relative 1 %      Neutrophils Absolute 10 95 Thousands/µL      Immature Grans Absolute 0 11 Thousand/uL      Lymphocytes Absolute 3 57 Thousands/µL      Monocytes Absolute 1 30 Thousand/µL      Eosinophils Absolute 0 55 Thousand/µL      Basophils Absolute 0 10 Thousands/µL     Lyme Antibody Profile with reflex to [facility] [[id number]] Collected: 08/01/22 0644    Lab Status:  In process Specimen: Blood from Arm, Left Updated: 08/01/22 0651    Blood culture #1 [[id number]] Collected: 08/01/22 0644    Lab Status: In process Specimen: Blood from Arm, Left Updated: 08/01/22 0651    Blood culture #2 [[id number]] Collected: 08/01/22 0644    Lab Status: In process Specimen: Blood from Arm, Left Updated: 08/01/22 0651                 XR chest 1 view portable   ED Interpretation by Nicho Ludwig MD (08/01 4504)   No acute disease read by me  Procedures  Procedures         ED Course  ED Course as of 08/01/22 0750   Mon Aug 01, 2022   3801 Labs d/w patient and fiance with patient's permission  0745 FLU/RSV/COVID - if FLU/RSV clinically relevant  D/w patient and fiance  0745 WBC(!): 16 58  Doxycycline po ordered  Will not start oral steroids for possible poison ivy due to bronchitis infection  D/w patient and fiance about zanfel OTC  Initial Sepsis Screening     Row Name 08/01/22 0720                Is the patient's history suggestive of a new or worsening infection? Yes (Proceed)  -AO        Suspected source of infection pneumonia  -AO        Are two or more of the following signs & symptoms of infection both present and new to the patient? No  -AO        Indicate SIRS criteria Leukocytosis (WBC > 12041 IJL)  -AO        If the answer is yes to both questions, suspicion of sepsis is present --        If severe sepsis is present AND tissue hypoperfusion perists in the hour after fluid resuscitation or lactate > 4, the patient meets criteria for SEPTIC SHOCK --        Are any of the following organ dysfunction criteria present within 6 hours of suspected infection and SIRS criteria that are NOT considered to be chronic conditions?  --        Organ dysfunction --        Date of presentation of severe sepsis --        Time of presentation of severe sepsis --        Tissue hypoperfusion persists in the hour after crystalloid fluid administration, evidenced, by either: --        Was hypotension present within one hour of the conclusion of crystalloid fluid administration? --        Date of presentation of septic shock --        Time of presentation of septic shock --              User Key  (r) = Recorded By, (t) = Taken By, (c) = Cosigned By    [address] Name Provider Bobbi Díaz MD Physician                              MDM  Number of Diagnoses or Management Options  Diagnosis management comments: DDX including but not limited to: allergic reaction, urticaria, contact dermatitis, allergic dermatitis, poison ivy/oak/sumac, vasculitis, herpes zoster, infectious etiology, monkeypox, bullous pemphigus, scabies; doubt staph scalded skin syndrome or Miryam Munch syndrome  DDx including but not limited to: viral illness, pneumonia, URI, OM, pharyngitis, cellulitis; doubt UTI, meningitis, meningococcemia, sinusitis, Lyme disease, West Nile virus, COVID-19  Amount and/or Complexity of Data Reviewed  Clinical lab tests: ordered and reviewed  Tests in the radiology section of CPT®: reviewed and ordered  Decide to obtain previous medical records or to obtain history from someone other than the patient: yes  Obtain history from someone other than the patient: yes  Review and summarize past medical records: yes  Independent visualization of images, tracings, or specimens: yes        Disposition  Final diagnoses:   Bronchitis   Rash   Pruritus     Time reflects when diagnosis was documented in both MDM as applicable and the Disposition within this note     Time User Action Codes Description Comment    8/1/2022  7:46 AM Trace Herrlich Add [J40] Bronchitis     8/1/2022  7:46 AM Trace Herrlich Add [R21] Rash     8/1/2022  7:47 AM Trace Herrlich Add [L29 9] Pruritus       ED Disposition     ED Disposition   Discharge    Condition   Stable    Date/Time   Mon Aug 1, 2022  7:46 AM    Comment   Cyndi Bennett discharge to home/self care                 Follow-up Information     Follow up With Specialties Details Why Contact Info    own primary doctor  Call in 1 day Use zanfel over the counter for rash  Return sooner if increased rash, difficulty breathing, fever, vomiting, dizziness, diarrhea  Use inhaler 1-2 puffs every 4 hours as needed for difficulty breathing  Patient's Medications   Discharge Prescriptions    DOXYCYCLINE HYCLATE (VIBRA-TABS) 100 MG TABLET    Take 1 tablet (100 mg total) by mouth 2 (two) times a day for 10 days       Start Date: 8/1/2022  End Date: 8/11/2022       Order Dose: 100 mg       Quantity: 20 tablet    Refills: 0    HYDROXYZINE HCL (ATARAX) 25 MG TABLET    Take 1 tablet (25 mg total) by mouth every 6 (six) hours as needed for itching for up to 7 days       Start Date: 8/1/2022  End Date: 8/8/2022       Order Dose: 25 mg       Quantity: 25 tablet    Refills: 0       No discharge procedures on file      PDMP Review       Value Time User    PDMP Reviewed  Yes 8/1/2022  6:18 AM Shan Carlos MD          ED Provider  Electronically Signed by           Shan Carlos MD  08/01/22 8105

## 2022-08-06 LAB
BACTERIA BLD CULT: NORMAL
BACTERIA BLD CULT: NORMAL

## 2022-08-11 DIAGNOSIS — M53.3 CHRONIC RIGHT SI JOINT PAIN: ICD-10-CM

## 2022-08-11 DIAGNOSIS — M96.1 POST LAMINECTOMY SYNDROME: ICD-10-CM

## 2022-08-11 DIAGNOSIS — G89.29 CHRONIC RIGHT SI JOINT PAIN: ICD-10-CM

## 2022-08-11 DIAGNOSIS — G89.4 CHRONIC PAIN SYNDROME: ICD-10-CM

## 2022-08-11 DIAGNOSIS — F41.8 DEPRESSION WITH ANXIETY: ICD-10-CM

## 2022-08-11 RX ORDER — OXYCODONE HYDROCHLORIDE 10 MG/1
10 TABLET ORAL EVERY 6 HOURS PRN
Qty: 120 TABLET | Refills: 0 | Status: SHIPPED | OUTPATIENT
Start: 2022-08-11 | End: 2022-09-08 | Stop reason: SDUPTHER

## 2022-08-11 RX ORDER — DIAZEPAM 5 MG/1
5 TABLET ORAL EVERY 12 HOURS PRN
Qty: 60 TABLET | Refills: 0 | Status: SHIPPED | OUTPATIENT
Start: 2022-08-11 | End: 2022-09-08 | Stop reason: SDUPTHER

## 2022-08-11 NOTE — TELEPHONE ENCOUNTER
Received vociemail from [facility]kan requesting medication refill of Oxycodone and Diazepam      Please review PDMP protocol for Oxycodone  Thanks!

## 2022-08-16 ENCOUNTER — TELEPHONE (OUTPATIENT)
Dept: FAMILY MEDICINE CLINIC | Facility: CLINIC | Age: 52
End: 2022-08-16

## 2022-09-08 DIAGNOSIS — M53.3 CHRONIC RIGHT SI JOINT PAIN: ICD-10-CM

## 2022-09-08 DIAGNOSIS — G89.4 CHRONIC PAIN SYNDROME: ICD-10-CM

## 2022-09-08 DIAGNOSIS — F41.8 DEPRESSION WITH ANXIETY: ICD-10-CM

## 2022-09-08 DIAGNOSIS — M96.1 POST LAMINECTOMY SYNDROME: ICD-10-CM

## 2022-09-08 DIAGNOSIS — G89.29 CHRONIC RIGHT SI JOINT PAIN: ICD-10-CM

## 2022-09-08 RX ORDER — DIAZEPAM 5 MG/1
5 TABLET ORAL EVERY 12 HOURS PRN
Qty: 60 TABLET | Refills: 0 | Status: SHIPPED | OUTPATIENT
Start: 2022-09-12 | End: 2022-10-12 | Stop reason: SDUPTHER

## 2022-09-08 RX ORDER — OXYCODONE HYDROCHLORIDE 10 MG/1
10 TABLET ORAL EVERY 6 HOURS PRN
Qty: 120 TABLET | Refills: 0 | Status: SHIPPED | OUTPATIENT
Start: 2022-09-13 | End: 2022-09-12

## 2022-09-08 NOTE — TELEPHONE ENCOUNTER
Patient called for medication refill for   -Diazepam  -Oxycodone   We received the approval from his Insurance the approval for Oxycodone from 08/12/2022-11/12/2022, its under media tab dated 08/17/2022  Please review and refill as appropriate   Thanks

## 2022-09-12 RX ORDER — OXYCODONE HYDROCHLORIDE 10 MG/1
10 TABLET ORAL EVERY 6 HOURS PRN
Qty: 120 TABLET | Refills: 0 | Status: SHIPPED | OUTPATIENT
Start: 2022-09-12 | End: 2022-10-12 | Stop reason: SDUPTHER

## 2022-09-12 NOTE — TELEPHONE ENCOUNTER
Patient called today regarding her refill for   -Oxycodone  Patient states if PCP can change the day to be refilled  for today instead of tomorrow he is out of the medication  Please review   Thanks

## 2022-09-14 ENCOUNTER — OFFICE VISIT (OUTPATIENT)
Dept: GASTROENTEROLOGY | Facility: CLINIC | Age: 52
End: 2022-09-14
Payer: MEDICARE

## 2022-09-14 VITALS
TEMPERATURE: 97.6 F | WEIGHT: 215 LBS | DIASTOLIC BLOOD PRESSURE: 92 MMHG | SYSTOLIC BLOOD PRESSURE: 140 MMHG | BODY MASS INDEX: 28.37 KG/M2

## 2022-09-14 DIAGNOSIS — R07.89 OTHER CHEST PAIN: ICD-10-CM

## 2022-09-14 DIAGNOSIS — Z86.010 HISTORY OF COLON POLYPS: ICD-10-CM

## 2022-09-14 DIAGNOSIS — K22.4 HYPERCONTRACTILE ESOPHAGUS: Primary | ICD-10-CM

## 2022-09-14 DIAGNOSIS — R13.19 ESOPHAGEAL DYSPHAGIA: ICD-10-CM

## 2022-09-14 PROCEDURE — 99215 OFFICE O/P EST HI 40 MIN: CPT | Performed by: INTERNAL MEDICINE

## 2022-09-14 NOTE — PROGRESS NOTES
[facility] - Outpatient Consultation  Luis Whitman 45 y o  male MRN: [id number]  Encounter: [id number]          ASSESSMENT AND PLAN:      1  Esophageal dysphagia  2  History of colon polyps  3  Hypercontractile esophagus  4  Other chest pain    Presents here for 2nd opinion    Discuss options for hypercontractile esophageal disorder management which included POEM versus stopping opioids  Other options may be Heller myotomy  After extensive discussion he would like to wait till this is locally available  We discuss he may not have an optimal response if he is not able to stop narcotic but due to chronic pain he has had this would be very difficult to stop  I will discuss the case with our advanced endoscopist and plan for POEM here  If we are not able to get all the equipment locally due to budget/COVID related supply chain issues referring to [facility]      He does have history of colon polyp with last colonoscopy in 2021 repeat colonoscopy is planned in 5 years with history of colon polyps  Extensive discuss the plan with patient and his wife and discussed and reviewed all previous reports including esophageal manometry 24 hour pH study per    ______________________________________________________________________    HPI:      He is a 66-year-old male with longstanding history of dysphagia to solids than liquids  The symptoms have been progressively worsening over the last 5 years  He has under gone manometry times two and 24 hour pH study for further evaluation  He has also failed conservative management with oral medications  Hypercontractile esophageal dysmotility is likely cause by long-term narcotic use  He states he is unable to discontinue the narcotics  Symptoms are getting worse at times associated with food impaction  EGD performed with dilation on 06/09/2022 did not help his symptoms improved  Previous dilations have also not help  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 of 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 of 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  REVIEW OF SYSTEMS:    CONSTITUTIONAL: Denies any fever, chills, rigors, and weight loss  HEENT: No earache or tinnitus  Denies hearing loss or visual disturbances  CARDIOVASCULAR: No chest pain or palpitations  RESPIRATORY: Denies any cough, hemoptysis, shortness of breath or dyspnea on exertion  GASTROINTESTINAL: As noted in the History of Present Illness  GENITOURINARY: No problems with urination  Denies any hematuria or dysuria  NEUROLOGIC: No dizziness or vertigo, denies headaches  MUSCULOSKELETAL: Denies any muscle or joint pain  SKIN: Denies skin rashes or itching  ENDOCRINE: Denies excessive thirst  Denies intolerance to heat or cold  PSYCHOSOCIAL: Denies depression or anxiety  Denies any recent memory loss         Historical Information   Past Medical History:   Diagnosis Date    Allergic     seasonal     Allergic rhinitis     Anxiety     Arthritis     Back pain     Chronic obstructive asthma (HCC)     Chronic pain syndrome     Cluster headaches     Colon polyp     Depression     Insomnia     Kidney stones     Laryngospasm     Leukocytosis     Migraine     [facility]ocardial infarction [facility] )     Nephrolithiasis     Organic impotence     Pneumonia due to infectious organism 1/16/2019    Seasonal allergies     Sleep apnea     does not use CPAP    [facility]    TMJ (temporomandibular joint syndrome)     Wheezing     last assessed 05/19/17     Past Surgical History:   Procedure Laterality Date    BACK SURGERY      *9, [id number], nervectomy 2006, total of 10    BUCCAL MASS EXCISION N/A 3/26/2018    Procedure: BIOPSY LINGUAL TONSIL (FLOW/ STANDARD PATH)  EVAL UNDER ANESTHESIA;  Surgeon: Russell Cabello MD;  [facility];  Service: ENT    COLONOSCOPY      FL RETROGRADE PYELOGRAM  10/21/2020    HERNIA REPAIR      KIDNEY SURGERY      KNEE ARTHROSCOPY      LITHOTRIPSY      multiple    LITHOTRIPSY      LUMBAR [address] SURGERY  2015    MANDIBLE FRACTURE SURGERY      titanium plate    PELVIC SYMPHYSIS FUSION      AK CYSTO/URETERO W/LITHOTRIPSY &INDWELL STENT INSRT Right 10/21/2020    Procedure: CYSTOSCOPY URETEROSCOPY WITH LITHOTRIPSY HOLMIUM LASER, RETROGRADE PYELOGRAM AND INSERTION STENT URETERAL;  Surgeon: Elis Vargas MD;  [facility];  Service: Urology    AK ESOPHAGOGASTRODUODENOSCOPY TRANSORAL DIAGNOSTIC N/A 2/22/2018    Procedure: ESOPHAGOGASTRODUODENOSCOPY (EGD); Surgeon: Keri White MD;  [facility]; Service: Gastroenterology    AK ESOPHAGOGASTRODUODENOSCOPY TRANSORAL DIAGNOSTIC N/A 4/1/2019    Procedure: EGD W/ DILATION;  Surgeon: Keri White MD;  [facility]; Service: Gastroenterology    AK REVISE/REMOVE SPINAL NEUROSTIM/ Left 6/9/2017    Procedure: REMOVAL OF A THORACIC SCS PLACED VIA LAMINECTOMY AND REMOVAL LEFT BUTTOCK GENERATOR; IMPULSE MONITORING;  Surgeon: Sara Mireles MD;  [facility];  Service: Neurosurgery    [address] ARTHROSCOP,SURG,W/ROTAT CUFF REPR Right 4/4/2019    Procedure: RIGHT SHOULDER ARTHROSCOPIC SUBSCAPULARIS TENDON REPAIR;  Surgeon: Mortimer Mathieu, MD;  [facility];  Service: Orthopedics    AK SURG IMPLNT [address] N/A 9/8/2016    Procedure: DORSAL COLUMN STIMULATOR PLACEMENT (IMPULSE MONITORING);   Surgeon: Carlota Castrejon MD;  [facility];  Service: Orthopedics    SACROILIAC JOINT FUSION  2015    SHOULDER SURGERY Left     2    UPPER GASTROINTESTINAL ENDOSCOPY       Social History   Social History     Substance and Sexual Activity   Alcohol Use Yes    Comment: rarely      Social History     Substance and Sexual Activity   Drug Use Yes    Frequency: 7 0 times per week    Types: Marijuana    Comment: medical marijuana daily for chronic pain 1/2 ounce     Social History     Tobacco Use   Smoking Status Current Every Day Smoker    Packs/day: 0 50    Years: 25 00    Pack years: 12 50   Smokeless Tobacco Former User     Family History   Problem Relation Age of Onset    Diabetes Father     Obesity Father     Liver cancer Father     Heart disease Father     Diabetes Brother     Hypertension Brother     Leukemia Mother     Hypertension Mother     Cancer Family        Meds/Allergies       Current Outpatient Medications:     acetaminophen (TYLENOL) 325 mg tablet    albuterol (PROVENTIL HFA,VENTOLIN HFA) 90 mcg/act inhaler    diazepam (VALIUM) 5 mg tablet    erythromycin (ILOTYCIN) ophthalmic ointment    fluticasone-vilanterol (BREO ELLIPTA) 200-25 MCG/INH inhaler    ibuprofen (MOTRIN) 800 mg tablet    oxyCODONE (ROXICODONE) 10 MG TABS    tadalafil (CIALIS) 5 MG tablet    hydrOXYzine HCL (ATARAX) 25 mg tablet    naloxone (Narcan) 4 mg/0 1 mL nasal spray    Allergies   Allergen Reactions    Other Rash     Adhesive tape           Objective     Blood pressure 140/92, temperature 97 6 °F (36 4 °C), temperature source Tympanic, weight 97 5 kg (215 lb)  Body mass index is 28 37 kg/m²  PHYSICAL EXAM:      General Appearance:   Alert, cooperative, no distress   HEENT:   Normocephalic, atraumatic, anicteric      Neck:  Supple, symmetrical, trachea midline   Lungs:   Clear to auscultation bilaterally; no rales, rhonchi or wheezing; respirations unlabored    Heart[de-identified]   Regular rate and rhythm; no murmur, rub, or gallop  Abdomen:   Soft, non-tender, non-distended; normal bowel sounds; no masses, no organomegaly    Genitalia:   Deferred    Rectal:   Deferred    Extremities:  No cyanosis, clubbing or edema    Pulses:  2+ and symmetric    Skin:  No jaundice, rashes, or lesions    Lymph nodes:  No palpable cervical lymphadenopathy        Lab Results:   No visits with results within 1 Day(s) from this visit     Latest known visit with results is:   Admission on 08/01/2022, Discharged on 08/01/2022   Component Date Value    WBC 08/01/2022 16 58 (A)    RBC 08/01/2022 5 19     Hemoglobin 08/01/2022 15 0     Hematocrit 08/01/2022 46 4     MCV 08/01/2022 89     MCH 08/01/2022 28 9     MCHC 08/01/2022 32 3     RDW 08/01/2022 15 2 (A)    MPV 08/01/2022 9 6     Platelets 26/40/9933 369     nRBC 08/01/2022 0     Neutrophils Relative 08/01/2022 65     Immat GRANS % 08/01/2022 1     Lymphocytes Relative 08/01/2022 22     Monocytes Relative 08/01/2022 8     Eosinophils Relative 08/01/2022 3     Basophils Relative 08/01/2022 1     Neutrophils Absolute 08/01/2022 10 95 (A)    Immature Grans Absolute 08/01/2022 0 11     Lymphocytes Absolute 08/01/2022 3 57     Monocytes Absolute 08/01/2022 1 30 (A)    Eosinophils Absolute 08/01/2022 0 55     Basophils Absolute 08/01/2022 0 10     Protime 08/01/2022 11 4 (A)    INR 08/01/2022 0 82 (A)    PTT 08/01/2022 26     Sodium 08/01/2022 143     Potassium 08/01/2022 3 8     Chloride 08/01/2022 106     CO2 08/01/2022 31     ANION GAP 08/01/2022 6     BUN 08/01/2022 17     Creatinine 08/01/2022 1 14     Glucose 08/01/2022 104     Calcium 08/01/2022 9 6     AST 08/01/2022 25     ALT 08/01/2022 37     Alkaline Phosphatase 08/01/2022 75     Total Protein 08/01/2022 7 0     Albumin 08/01/2022 4 4     Total Bilirubin 08/01/2022 0 29     eGFR 08/01/2022 73     SARS-CoV-2 08/01/2022 Negative     INFLUENZA A PCR 08/01/2022 Negative     INFLUENZA B PCR 08/01/2022 Negative     RSV PCR 08/01/2022 Negative     Blood Culture 08/01/2022 No Growth After 5 Days   Blood Culture 08/01/2022 No Growth After 5 Days   LACTIC ACID 08/01/2022 1 4     Lyme Total Antibodies 08/01/2022 <0 2          Radiology Results:   No results found

## 2022-09-30 DIAGNOSIS — K22.4 ESOPHAGEAL DYSMOTILITIES: ICD-10-CM

## 2022-09-30 DIAGNOSIS — R13.19 ESOPHAGEAL DYSPHAGIA: Primary | ICD-10-CM

## 2022-10-11 DIAGNOSIS — F41.8 DEPRESSION WITH ANXIETY: ICD-10-CM

## 2022-10-11 DIAGNOSIS — M53.3 CHRONIC RIGHT SI JOINT PAIN: ICD-10-CM

## 2022-10-11 DIAGNOSIS — G89.29 CHRONIC RIGHT SI JOINT PAIN: ICD-10-CM

## 2022-10-11 DIAGNOSIS — M96.1 POST LAMINECTOMY SYNDROME: ICD-10-CM

## 2022-10-11 DIAGNOSIS — G89.4 CHRONIC PAIN SYNDROME: ICD-10-CM

## 2022-10-11 NOTE — TELEPHONE ENCOUNTER
Voice message requesting refill of Oxycodone and Diazepam  Last visit, May 2022, please call to schedule, needs to be seen every 3 months for pain management
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
## 2022-10-12 RX ORDER — OXYCODONE HYDROCHLORIDE 10 MG/1
10 TABLET ORAL EVERY 6 HOURS PRN
Qty: 120 TABLET | Refills: 0 | Status: SHIPPED | OUTPATIENT
Start: 2022-10-12 | End: 2022-11-11

## 2022-10-12 RX ORDER — DIAZEPAM 5 MG/1
5 TABLET ORAL EVERY 12 HOURS PRN
Qty: 60 TABLET | Refills: 0 | Status: SHIPPED | OUTPATIENT
Start: 2022-10-12

## 2022-10-12 NOTE — TELEPHONE ENCOUNTER
Patient called again for medication refill for   -Ativan  -Oxycodone   Both prescriptions are due for refill today  Patient has an appointment with Dr Soco Melara on 10/18/2022 and patient stated in his message he will come for his appointment but hi needs his prescriptions refilled     Thanks

## 2022-10-18 ENCOUNTER — HOSPITAL ENCOUNTER (OUTPATIENT)
Facility: HOSPITAL | Age: 52
Setting detail: OBSERVATION
Discharge: HOME | End: 2022-10-20
Attending: EMERGENCY MEDICINE | Admitting: INTERNAL MEDICINE
Payer: MEDICARE

## 2022-10-18 ENCOUNTER — APPOINTMENT (EMERGENCY)
Dept: CT IMAGING | Facility: HOSPITAL | Age: 52
End: 2022-10-18
Payer: MEDICARE

## 2022-10-18 ENCOUNTER — APPOINTMENT (EMERGENCY)
Dept: MRI IMAGING | Facility: HOSPITAL | Age: 52
End: 2022-10-18
Payer: MEDICARE

## 2022-10-18 DIAGNOSIS — M54.9 BACK PAIN: Primary | ICD-10-CM

## 2022-10-18 DIAGNOSIS — R20.0 NUMBNESS: ICD-10-CM

## 2022-10-18 DIAGNOSIS — R26.2 AMBULATORY DYSFUNCTION: ICD-10-CM

## 2022-10-18 DIAGNOSIS — R26.2 IMPAIRED AMBULATION: ICD-10-CM

## 2022-10-18 LAB
ALBUMIN SERPL BCP-MCNC: 4.4 G/DL (ref 3.5–5)
ALP SERPL-CCNC: 78 U/L (ref 34–104)
ALT SERPL W P-5'-P-CCNC: 27 U/L (ref 7–52)
ANION GAP SERPL CALCULATED.3IONS-SCNC: 6 MMOL/L (ref 4–13)
APTT PPP: 27 SECONDS (ref 23–37)
AST SERPL W P-5'-P-CCNC: 21 U/L (ref 13–39)
BASOPHILS # BLD AUTO: 0.07 THOUSANDS/ÂΜL (ref 0–0.1)
BASOPHILS NFR BLD AUTO: 0 % (ref 0–1)
BILIRUB DIRECT SERPL-MCNC: 0.03 MG/DL (ref 0–0.2)
BILIRUB SERPL-MCNC: 0.4 MG/DL (ref 0.2–1)
BUN SERPL-MCNC: 20 MG/DL (ref 5–25)
CALCIUM SERPL-MCNC: 9.5 MG/DL (ref 8.4–10.2)
CHLORIDE SERPL-SCNC: 105 MMOL/L (ref 96–108)
CO2 SERPL-SCNC: 26 MMOL/L (ref 21–32)
CREAT SERPL-MCNC: 1.23 MG/DL (ref 0.6–1.3)
EOSINOPHIL # BLD AUTO: 0.24 THOUSAND/ÂΜL (ref 0–0.61)
EOSINOPHIL NFR BLD AUTO: 1 % (ref 0–6)
ERYTHROCYTE [DISTWIDTH] IN BLOOD BY AUTOMATED COUNT: 15 % (ref 11.6–15.1)
ERYTHROCYTE [SEDIMENTATION RATE] IN BLOOD: 1 MM/HOUR (ref 0–20)
GFR SERPL CREATININE-BSD FRML MDRD: 66 ML/MIN/1.73SQ M
GLUCOSE SERPL-MCNC: 137 MG/DL (ref 65–140)
HCT VFR BLD AUTO: 49.4 % (ref 36.5–49.3)
HGB BLD-MCNC: 16.7 G/DL (ref 12–17)
IMM GRANULOCYTES # BLD AUTO: 0.06 THOUSAND/UL (ref 0–0.2)
IMM GRANULOCYTES NFR BLD AUTO: 0 % (ref 0–2)
INR PPP: 0.88 (ref 0.84–1.19)
LIPASE SERPL-CCNC: 37 U/L (ref 11–82)
LYMPHOCYTES # BLD AUTO: 2.37 THOUSANDS/ÂΜL (ref 0.6–4.47)
LYMPHOCYTES NFR BLD AUTO: 14 % (ref 14–44)
MCH RBC QN AUTO: 29.5 PG (ref 26.8–34.3)
MCHC RBC AUTO-ENTMCNC: 33.8 G/DL (ref 31.4–37.4)
MCV RBC AUTO: 87 FL (ref 82–98)
MONOCYTES # BLD AUTO: 0.71 THOUSAND/ÂΜL (ref 0.17–1.22)
MONOCYTES NFR BLD AUTO: 4 % (ref 4–12)
NEUTROPHILS # BLD AUTO: 13.85 THOUSANDS/ÂΜL (ref 1.85–7.62)
NEUTS SEG NFR BLD AUTO: 81 % (ref 43–75)
NRBC BLD AUTO-RTO: 0 /100 WBCS
PLATELET # BLD AUTO: 413 THOUSANDS/UL (ref 149–390)
PMV BLD AUTO: 9.8 FL (ref 8.9–12.7)
POTASSIUM SERPL-SCNC: 4.2 MMOL/L (ref 3.5–5.3)
PROT SERPL-MCNC: 7.2 G/DL (ref 6.4–8.4)
PROTHROMBIN TIME: 12.2 SECONDS (ref 11.6–14.5)
RBC # BLD AUTO: 5.67 MILLION/UL (ref 3.88–5.62)
SODIUM SERPL-SCNC: 137 MMOL/L (ref 135–147)
WBC # BLD AUTO: 17.3 THOUSAND/UL (ref 4.31–10.16)

## 2022-10-18 PROCEDURE — 80048 BASIC METABOLIC PNL TOTAL CA: CPT | Performed by: EMERGENCY MEDICINE

## 2022-10-18 PROCEDURE — 96374 THER/PROPH/DIAG INJ IV PUSH: CPT

## 2022-10-18 PROCEDURE — 80076 HEPATIC FUNCTION PANEL: CPT | Performed by: EMERGENCY MEDICINE

## 2022-10-18 PROCEDURE — 99220 PR INITIAL OBSERVATION CARE/DAY 70 MINUTES: CPT | Performed by: STUDENT IN AN ORGANIZED HEALTH CARE EDUCATION/TRAINING PROGRAM

## 2022-10-18 PROCEDURE — 99285 EMERGENCY DEPT VISIT HI MDM: CPT | Performed by: EMERGENCY MEDICINE

## 2022-10-18 PROCEDURE — 85025 COMPLETE CBC W/AUTO DIFF WBC: CPT | Performed by: EMERGENCY MEDICINE

## 2022-10-18 PROCEDURE — 99285 EMERGENCY DEPT VISIT HI MDM: CPT

## 2022-10-18 PROCEDURE — 96375 TX/PRO/DX INJ NEW DRUG ADDON: CPT

## 2022-10-18 PROCEDURE — 85610 PROTHROMBIN TIME: CPT | Performed by: EMERGENCY MEDICINE

## 2022-10-18 PROCEDURE — 36415 COLL VENOUS BLD VENIPUNCTURE: CPT | Performed by: EMERGENCY MEDICINE

## 2022-10-18 PROCEDURE — 85730 THROMBOPLASTIN TIME PARTIAL: CPT | Performed by: EMERGENCY MEDICINE

## 2022-10-18 PROCEDURE — 93005 ELECTROCARDIOGRAM TRACING: CPT

## 2022-10-18 PROCEDURE — 96376 TX/PRO/DX INJ SAME DRUG ADON: CPT

## 2022-10-18 PROCEDURE — 83690 ASSAY OF LIPASE: CPT | Performed by: EMERGENCY MEDICINE

## 2022-10-18 PROCEDURE — 85652 RBC SED RATE AUTOMATED: CPT | Performed by: EMERGENCY MEDICINE

## 2022-10-18 PROCEDURE — 72131 CT LUMBAR SPINE W/O DYE: CPT

## 2022-10-18 PROCEDURE — 72148 MRI LUMBAR SPINE W/O DYE: CPT

## 2022-10-18 RX ORDER — OXYCODONE HYDROCHLORIDE 10 MG/1
10 TABLET ORAL EVERY 6 HOURS PRN
Status: DISCONTINUED | OUTPATIENT
Start: 2022-10-18 | End: 2022-10-20 | Stop reason: HOSPADM

## 2022-10-18 RX ORDER — ALBUTEROL SULFATE 90 UG/1
2 AEROSOL, METERED RESPIRATORY (INHALATION) EVERY 6 HOURS PRN
Status: DISCONTINUED | OUTPATIENT
Start: 2022-10-18 | End: 2022-10-20 | Stop reason: HOSPADM

## 2022-10-18 RX ORDER — DIPHENHYDRAMINE HYDROCHLORIDE 50 MG/ML
25 INJECTION INTRAMUSCULAR; INTRAVENOUS ONCE
Status: COMPLETED | OUTPATIENT
Start: 2022-10-18 | End: 2022-10-18

## 2022-10-18 RX ORDER — HYDROMORPHONE HCL/PF 1 MG/ML
0.5 SYRINGE (ML) INJECTION
Status: DISCONTINUED | OUTPATIENT
Start: 2022-10-18 | End: 2022-10-20 | Stop reason: HOSPADM

## 2022-10-18 RX ORDER — POLYETHYLENE GLYCOL 3350 17 G/17G
17 POWDER, FOR SOLUTION ORAL DAILY
Status: DISCONTINUED | OUTPATIENT
Start: 2022-10-19 | End: 2022-10-20 | Stop reason: HOSPADM

## 2022-10-18 RX ORDER — LIDOCAINE 50 MG/G
1 PATCH TOPICAL DAILY
Status: DISCONTINUED | OUTPATIENT
Start: 2022-10-18 | End: 2022-10-20 | Stop reason: HOSPADM

## 2022-10-18 RX ORDER — DOCUSATE SODIUM 100 MG/1
100 CAPSULE, LIQUID FILLED ORAL 2 TIMES DAILY
Status: DISCONTINUED | OUTPATIENT
Start: 2022-10-19 | End: 2022-10-20 | Stop reason: HOSPADM

## 2022-10-18 RX ORDER — ACETAMINOPHEN 325 MG/1
650 TABLET ORAL EVERY 6 HOURS PRN
Status: DISCONTINUED | OUTPATIENT
Start: 2022-10-18 | End: 2022-10-18

## 2022-10-18 RX ORDER — ENOXAPARIN SODIUM 100 MG/ML
40 INJECTION SUBCUTANEOUS DAILY
Status: DISCONTINUED | OUTPATIENT
Start: 2022-10-19 | End: 2022-10-20 | Stop reason: HOSPADM

## 2022-10-18 RX ORDER — MAGNESIUM HYDROXIDE/ALUMINUM HYDROXICE/SIMETHICONE 120; 1200; 1200 MG/30ML; MG/30ML; MG/30ML
30 SUSPENSION ORAL EVERY 6 HOURS PRN
Status: DISCONTINUED | OUTPATIENT
Start: 2022-10-18 | End: 2022-10-20 | Stop reason: HOSPADM

## 2022-10-18 RX ORDER — METHOCARBAMOL 500 MG/1
500 TABLET, FILM COATED ORAL EVERY 6 HOURS SCHEDULED
Status: DISCONTINUED | OUTPATIENT
Start: 2022-10-19 | End: 2022-10-20 | Stop reason: HOSPADM

## 2022-10-18 RX ORDER — DIAZEPAM 5 MG/1
5 TABLET ORAL EVERY 12 HOURS PRN
Status: DISCONTINUED | OUTPATIENT
Start: 2022-10-18 | End: 2022-10-20 | Stop reason: HOSPADM

## 2022-10-18 RX ORDER — HYDROMORPHONE HCL/PF 1 MG/ML
0.5 SYRINGE (ML) INJECTION ONCE
Status: COMPLETED | OUTPATIENT
Start: 2022-10-18 | End: 2022-10-18

## 2022-10-18 RX ORDER — ONDANSETRON 2 MG/ML
4 INJECTION INTRAMUSCULAR; INTRAVENOUS EVERY 4 HOURS PRN
Status: DISCONTINUED | OUTPATIENT
Start: 2022-10-18 | End: 2022-10-20 | Stop reason: HOSPADM

## 2022-10-18 RX ORDER — HYDROXYZINE HYDROCHLORIDE 25 MG/1
25 TABLET, FILM COATED ORAL EVERY 6 HOURS PRN
Status: DISCONTINUED | OUTPATIENT
Start: 2022-10-18 | End: 2022-10-20 | Stop reason: HOSPADM

## 2022-10-18 RX ORDER — HYDROMORPHONE HCL/PF 1 MG/ML
1 SYRINGE (ML) INJECTION ONCE
Status: COMPLETED | OUTPATIENT
Start: 2022-10-18 | End: 2022-10-18

## 2022-10-18 RX ORDER — AMOXICILLIN 250 MG
2 CAPSULE ORAL DAILY
Status: DISCONTINUED | OUTPATIENT
Start: 2022-10-19 | End: 2022-10-20 | Stop reason: HOSPADM

## 2022-10-18 RX ORDER — ACETAMINOPHEN 325 MG/1
975 TABLET ORAL EVERY 8 HOURS
Status: DISCONTINUED | OUTPATIENT
Start: 2022-10-18 | End: 2022-10-20 | Stop reason: HOSPADM

## 2022-10-18 RX ADMIN — HYDROMORPHONE HYDROCHLORIDE 1 MG: 1 INJECTION, SOLUTION INTRAMUSCULAR; INTRAVENOUS; SUBCUTANEOUS at 17:27

## 2022-10-18 RX ADMIN — DIPHENHYDRAMINE HYDROCHLORIDE 25 MG: 50 INJECTION, SOLUTION INTRAMUSCULAR; INTRAVENOUS at 19:44

## 2022-10-18 RX ADMIN — LIDOCAINE 5% 1 PATCH: 700 PATCH TOPICAL at 23:18

## 2022-10-18 RX ADMIN — ACETAMINOPHEN 975 MG: 325 TABLET ORAL at 23:18

## 2022-10-18 RX ADMIN — HYDROMORPHONE HYDROCHLORIDE 0.5 MG: 1 INJECTION, SOLUTION INTRAMUSCULAR; INTRAVENOUS; SUBCUTANEOUS at 19:45

## 2022-10-18 RX ADMIN — HYDROMORPHONE HYDROCHLORIDE 0.5 MG: 1 INJECTION, SOLUTION INTRAMUSCULAR; INTRAVENOUS; SUBCUTANEOUS at 18:30

## 2022-10-18 NOTE — ED NOTES
[facility] called to cancel transfer BLS transport to [facility] for MRI  MRI tech(Gely) from [facility] coming over to ED department to perform MRI  Upstairs  Primary nurse will still need to escort patient upstairs and stay duration of imaging    Patient to be transported upstairs at 1835pm      Kimball Gaucher, RN  10/18/22 Flavia Rizzo, RN  10/18/22 [id number]

## 2022-10-18 NOTE — Clinical Note
Niesha Dejesus Donald should be transferred out to [address] [place] [address] emergency department

## 2022-10-18 NOTE — ED PROVIDER NOTES
History  Chief Complaint   Patient presents with   • Back Pain     Patient here with c/o lower back pain x 4 days, unable to ambulate or get out of bed  Denies any recent injury, trauma/fall  Past history of back surgeries and pain  Pain level 8/10  Tried Valium and Oxycodone 10mg about 2 1/2 hours ago  44 y/o male hx of anterior and posterior L4-L5 fusion, prior spinal cord stimulator in place that was completely removed, 12 prior back surgeries presenting with back pain and numbness  Patient notes numbness that runs from his left back down his left buttock around his perianal region and to his testicular cyst sac  Has very slight sensation throughout these areas but significantly told compared to normal     Patient notes decreased strength that he believes secondary to pain  Patient is taking Valium and oxycodone as prescribed without relief of symptoms  Denies fevers, IV drug use, urinary incontinence, abd distension  Notes urinating more frequently than normal       Back Pain  Location:  Lumbar spine  Quality:  Aching  Radiates to:  L posterior upper leg and L thigh  Pain severity:  Severe  Pain is:  Unable to specify  Onset quality:  Gradual  Timing:  Constant  Progression:  Worsening  Chronicity:  Recurrent  Relieved by:  Nothing  Worsened by:  Bending, ambulation and palpation  Ineffective treatments: Oxycodone and benzodiazepines  Associated symptoms: numbness        Prior to Admission Medications   Prescriptions Last Dose Informant Patient Reported?  Taking?   acetaminophen (TYLENOL) 325 mg tablet Not Taking at Unknown time Self No No   Sig: Take 3 tablets (975 mg total) by mouth every 8 (eight) hours as needed for mild pain   Patient not taking: Reported on 10/18/2022   albuterol (PROVENTIL HFA,VENTOLIN HFA) 90 mcg/act inhaler Past Week at Unknown time Self No Yes   Sig: Inhale 2 puffs every 6 (six) hours as needed for wheezing   diazepam (VALIUM) 5 mg tablet 10/18/2022 at Unknown time  No Yes   Sig: Take 1 tablet (5 mg total) by mouth every 12 (twelve) hours as needed for anxiety or muscle spasms   erythromycin (ILOTYCIN) ophthalmic ointment Not Taking at Unknown time Self No No   Sig: Place a 1/2 inch ribbon of ointment into the lower eyelid 4x/day for 7 days   Patient not taking: Reported on 10/18/2022   fluticasone-vilanterol (BREO ELLIPTA) 200-25 MCG/INH inhaler 10/18/2022 at Unknown time Self No Yes   Sig: Inhale 1 puff daily Rinse mouth after use    hydrOXYzine HCL (ATARAX) 25 mg tablet   No No   Sig: Take 1 tablet (25 mg total) by mouth every 6 (six) hours as needed for itching for up to 7 days   ibuprofen (MOTRIN) 800 mg tablet Not Taking at Unknown time Self No No   Sig: TAKE 1 TABLET(800 MG) BY MOUTH THREE TIMES DAILY AS NEEDED FOR MILD PAIN   Patient not taking: Reported on 10/18/2022   naloxone (Narcan) 4 mg/0 1 mL nasal spray  Self No No   Sig: In case of overdose: 1 spray in one nostril x 1, may repeat in 2 min if remains unresponsive  oxyCODONE (ROXICODONE) 10 MG TABS 10/18/2022 at Unknown time  No Yes   Sig: Take 1 tablet (10 mg total) by mouth every 6 (six) hours as needed for severe pain Take 1 tablet (10mg) by mouth every 6 hours as needed for severe pain  Max daily amount : 4 tablets (40mg) Max Daily Amount: 40 mg   tadalafil (CIALIS) 5 MG tablet Not Taking at Unknown time Self No No   Sig: Take 1 tablet (5 mg total) by mouth as needed in the morning for erectile dysfunction     Patient not taking: Reported on 10/18/2022      Facility-Administered Medications: None       Past Medical History:   Diagnosis Date   • Allergic     seasonal    • Allergic rhinitis    • Anxiety    • Arthritis    • Back pain    • Chronic obstructive asthma (HCC)    • Chronic pain syndrome    • Cluster headaches    • Colon polyp    • Depression    • Insomnia    • Kidney stones    • Laryngospasm    • Leukocytosis    • Migraine    • Myocardial infarction [facility])    • Nephrolithiasis    • Organic impotence    • Pneumonia due to infectious organism 1/16/2019   • Seasonal allergies    • Sleep apnea     does not use CPAP   • Stroke [facility]) 2015   • TMJ (temporomandibular joint syndrome)    • Wheezing     last assessed 05/19/17       Past Surgical History:   Procedure Laterality Date   • BACK SURGERY      *9, [id number], nervectomy 2006, total of 10   • BUCCAL MASS EXCISION N/A 3/26/2018    Procedure: BIOPSY LINGUAL TONSIL (FLOW/ STANDARD PATH)  EVAL UNDER ANESTHESIA;  Surgeon: King Rodriguez MD;  [facility];  Service: ENT   • COLONOSCOPY     • FL RETROGRADE PYELOGRAM  10/21/2020   • HERNIA REPAIR     • KIDNEY SURGERY     • KNEE ARTHROSCOPY     • LITHOTRIPSY      multiple   • LITHOTRIPSY     • LUMBAR DISC SURGERY  2015   • MANDIBLE FRACTURE SURGERY      titanium plate   • PELVIC SYMPHYSIS FUSION     • KS CYSTO/URETERO W/LITHOTRIPSY &INDWELL STENT INSRT Right 10/21/2020    Procedure: CYSTOSCOPY URETEROSCOPY WITH LITHOTRIPSY HOLMIUM LASER, RETROGRADE PYELOGRAM AND INSERTION STENT URETERAL;  Surgeon: Lyndon Georges MD;  Location[facility];  Service: Urology   • KS ESOPHAGOGASTRODUODENOSCOPY TRANSORAL DIAGNOSTIC N/A 2/22/2018    Procedure: ESOPHAGOGASTRODUODENOSCOPY (EGD); Surgeon: Marcella Lewis MD;  Location: [facility]; Service: Gastroenterology   • KS ESOPHAGOGASTRODUODENOSCOPY TRANSORAL DIAGNOSTIC N/A 4/1/2019    Procedure: EGD W/ DILATION;  Surgeon: Marcella Lewis MD;  Location: [facility];   Service: Gastroenterology   • KS REVISE/REMOVE SPINAL NEUROSTIM/ Left 6/9/2017    Procedure: REMOVAL OF A THORACIC SCS PLACED VIA LAMINECTOMY AND REMOVAL LEFT BUTTOCK GENERATOR; IMPULSE MONITORING;  Surgeon: Homer Barron MD;  Location: [facility];  Service: Neurosurgery   • KS SHLDR ARTHROSCOP,SURG,W/ROTAT CUFF REPR Right 4/4/2019    Procedure: RIGHT SHOULDER ARTHROSCOPIC SUBSCAPULARIS TENDON REPAIR;  Surgeon: Stephanie Brand MD;  [facility];  Service: Orthopedics   • KS SURG IMPLNT NEUROELECT,EPIDURAL N/A 9/8/2016    Procedure: DORSAL COLUMN STIMULATOR PLACEMENT (IMPULSE MONITORING); Surgeon: Shiv Baltazar MD;  Location: [facility];  Service: Orthopedics   • SACROILIAC JOINT FUSION  2015   • SHOULDER SURGERY Left     2   • UPPER GASTROINTESTINAL ENDOSCOPY         Family History   Problem Relation Age of Onset   • Diabetes Father    • Obesity Father    • Liver cancer Father    • Heart disease Father    • Diabetes Brother    • Hypertension Brother    • Leukemia Mother    • Hypertension Mother    • Cancer Family      I have reviewed and agree with the history as documented  E-Cigarette/Vaping   • E-Cigarette Use Former User    • Cartridges/Day less than one a day      E-Cigarette/Vaping Substances   • Nicotine No    • THC No    • CBD No    • Flavoring No    • Other No    • Unknown No      Social History     Tobacco Use   • Smoking status: Current Every Day Smoker     Packs/day: 0 50     Years: 25 00     Pack years: 12 50   • Smokeless tobacco: Former User   Vaping Use   • Vaping Use: Former   Substance Use Topics   • Alcohol use: Yes     Comment: rarely    • Drug use: Yes     Frequency: 7 0 times per week     Types: Marijuana     Comment: medical marijuana daily for chronic pain 1/2 ounce       Review of Systems   Musculoskeletal: Positive for back pain  Neurological: Positive for numbness  All other systems reviewed and are negative  Physical Exam  Physical Exam  Vitals and nursing note reviewed  Constitutional:       General: He is in acute distress  Appearance: He is well-developed  He is not diaphoretic  HENT:      Head: Normocephalic and atraumatic  Right Ear: External ear normal       Left Ear: External ear normal    Eyes:      Conjunctiva/sclera: Conjunctivae normal    Neck:      Trachea: No tracheal deviation  Cardiovascular:      Rate and Rhythm: Normal rate and regular rhythm  Heart sounds: Normal heart sounds  No murmur heard    Pulmonary: Effort: No respiratory distress  Breath sounds: Normal breath sounds  No stridor  No wheezing or rales  Abdominal:      General: Bowel sounds are normal  There is no distension  Palpations: Abdomen is soft  There is no mass  Tenderness: There is no abdominal tenderness  There is no guarding or rebound  Musculoskeletal:         General: Tenderness present  No deformity  Skin:     General: Skin is dry  Findings: No rash  Neurological:      Motor: No abnormal muscle tone  Coordination: Coordination normal       Comments: Patient has ability to flex and extend his lower extremities  Is under significant amount of pain while doing this but appears to have normal muscular strength that is limited by pain  2/4 patellar reflexes  Decreased sensation to light touch throughout the perianal region, testicular sac, left buttock, left lateral thigh  Psychiatric:         Behavior: Behavior normal          Thought Content:  Thought content normal          Judgment: Judgment normal          Vital Signs  ED Triage Vitals [10/18/22 1653]   Temperature Pulse Respirations Blood Pressure SpO2   98 7 °F (37 1 °C) 102 19 (!) 158/133 98 %      Temp Source Heart Rate Source Patient Position - Orthostatic VS BP Location FiO2 (%)   Oral Monitor Lying Left arm --      Pain Score       8           Vitals:    10/18/22 1653 10/18/22 1942 10/18/22 2144   BP: (!) 158/133 145/98 128/72   Pulse: 102 70 67   Patient Position - Orthostatic VS: Lying Lying Lying         Visual Acuity      ED Medications  Medications   HYDROmorphone (DILAUDID) injection 1 mg (1 mg Intravenous Given 10/18/22 1727)   HYDROmorphone (DILAUDID) injection 0 5 mg (0 5 mg Intravenous Given 10/18/22 1830)   HYDROmorphone (DILAUDID) injection 0 5 mg (0 5 mg Intravenous Given 10/18/22 1945)   diphenhydrAMINE (BENADRYL) injection 25 mg (25 mg Intravenous Given 10/18/22 1944)       Diagnostic Studies  Results Reviewed     Procedure Component Value Units Date/Time    Sedimentation rate, automated [[id number]]  (Normal) Collected: 10/18/22 1725    Lab Status: Final result Specimen: Blood from Arm, Right Updated: 10/18/22 1816     Sed Rate 1 mm/hour     Basic metabolic panel [[id number]] Collected: 10/18/22 1725    Lab Status: Final result Specimen: Blood from Arm, Right Updated: 10/18/22 1745     Sodium 137 mmol/L      Potassium 4 2 mmol/L      Chloride 105 mmol/L      CO2 26 mmol/L      ANION GAP 6 mmol/L      BUN 20 mg/dL      Creatinine 1 23 mg/dL      Glucose 137 mg/dL      Calcium 9 5 mg/dL      eGFR 66 ml/min/1 73sq m     Narrative:      [place] guidelines for Chronic Kidney Disease (CKD):   •  Stage 1 with normal or high GFR (GFR > 90 mL/min/1 73 square meters)  •  Stage 2 Mild CKD (GFR = 60-89 mL/min/1 73 square meters)  •  Stage 3A Moderate CKD (GFR = 45-59 mL/min/1 73 square meters)  •  Stage 3B Moderate CKD (GFR = 30-44 mL/min/1 73 square meters)  •  Stage 4 Severe CKD (GFR = 15-29 mL/min/1 73 square meters)  •  Stage 5 End Stage CKD (GFR <15 mL/min/1 73 square meters)  Note: GFR calculation is accurate only with a steady state creatinine    Hepatic function panel [[id number]]  (Normal) Collected: 10/18/22 1725    Lab Status: Final result Specimen: Blood from Arm, Right Updated: 10/18/22 1745     Total Bilirubin 0 40 mg/dL      Bilirubin, Direct 0 03 mg/dL      Alkaline Phosphatase 78 U/L      AST 21 U/L      ALT 27 U/L      Total Protein 7 2 g/dL      Albumin 4 4 g/dL     Lipase [[id number]]  (Normal) Collected: 10/18/22 1725    Lab Status: Final result Specimen: Blood from Arm, Right Updated: 10/18/22 1745     Lipase 37 u/L     Protime-INR [[id number]]  (Normal) Collected: 10/18/22 1725    Lab Status: Final result Specimen: Blood from Arm, Right Updated: 10/18/22 1738     Protime 12 2 seconds      INR 0 88    APTT [[id number]]  (Normal) Collected: 10/18/22 1725    Lab Status: Final result Specimen: Blood from Arm, Right Updated: 10/18/22 1738     PTT 27 seconds     CBC and differential [[id number]]  (Abnormal) Collected: 10/18/22 1725    Lab Status: Final result Specimen: Blood from Arm, Right Updated: 10/18/22 1729     WBC 17 30 Thousand/uL      RBC 5 67 Million/uL      Hemoglobin 16 7 g/dL      Hematocrit 49 4 %      MCV 87 fL      MCH 29 5 pg      MCHC 33 8 g/dL      RDW 15 0 %      MPV 9 8 fL      Platelets 121 Thousands/uL      nRBC 0 /100 WBCs      Neutrophils Relative 81 %      Immat GRANS % 0 %      Lymphocytes Relative 14 %      Monocytes Relative 4 %      Eosinophils Relative 1 %      Basophils Relative 0 %      Neutrophils Absolute 13 85 Thousands/µL      Immature Grans Absolute 0 06 Thousand/uL      Lymphocytes Absolute 2 37 Thousands/µL      Monocytes Absolute 0 71 Thousand/µL      Eosinophils Absolute 0 24 Thousand/µL      Basophils Absolute 0 07 Thousands/µL                  MRI lumbar spine wo contrast   Final Result by Katalina Adamson MD (10/18 2020)         1  Postsurgical change from L4-5 fusion and decompression of the central canal   No evidence of discitis or osteomyelitis  No epidural or spinal fluid collection  2   Mildly progressive chronic disc and facet degenerative change  Workstation performed: [id number]         CT spine lumbar without contrast   Final Result by Katalina Adamson MD (10/18 1909)         1  Postsurgical change from L4-5 fusion and laminectomies  Decompression of the central canal    2   Chronic disc and facet degenerative change in the upper lumbar spine           Workstation performed: [id number]                    Procedures  Procedures         ED Course  ED Course as of 10/18/22 2212   Tue Oct 18, 2022   2118 Procedure Note: EKG  Date/Time: 10/18/22 9:18 PM   Interpreted by: Jerome Ruiz  Indications / Diagnosis: back pain  ECG reviewed by me, the ED Provider: yes   The EKG demonstrates:  Rhythm: normal sinus  Intervals: normal intervals  Axis: normal axis  QRS/Blocks: normal QRS  ST Changes: No acute ST Changes, no STD/WESLEY                                                MDM  Number of Diagnoses or Management Options  Ambulatory dysfunction  Back pain: new and requires workup  Numbness: new and requires workup  Diagnosis management comments: Patient with back pain, numbness  No recent surgery  No fevers  No significant suspicion of infectious process but will get ESR  ESR is normal       Significant suspicion for cauda equina  CT scan without fracture  Obtain emergent MRI  MRI without cauda equina  Mild worsening of degenerative changes  Not surgical emergency  Spoke to slim about admission for ambulatory dysfunction    Asked to run case by NSG  TT to neurosurgery  Not cauda equina  Does not need neurosurgery  Patient admitted to Medicine         Amount and/or Complexity of Data Reviewed  Clinical lab tests: ordered and reviewed  Tests in the radiology section of CPT®: ordered and reviewed  Review and summarize past medical records: yes  Discuss the patient with other providers: yes  Independent visualization of images, tracings, or specimens: yes        Disposition  Final diagnoses:   Back pain   Numbness   Ambulatory dysfunction     Time reflects when diagnosis was documented in both MDM as applicable and the Disposition within this note     Time User Action Codes Description Comment    10/18/2022  5:23 PM Jyoti Aranda Add [M54 9] Back pain     10/18/2022  5:23 PM Jyoti Aranda Add [R20 0] Numbness     10/18/2022  9:41 PM Gerhard Lewis Add [R26 2] Impaired ambulation     10/18/2022 10:11 PM Jyoti Aranda Add [R26 2] Ambulatory dysfunction       ED Disposition     ED Disposition   Admit    Condition   Stable    Date/Time   Tue Oct 18, 2022  9:14 PM    Comment   Observation to Dr Arreola Form    None         Patient's Medications   Discharge Prescriptions    No medications on file       No discharge procedures on file      PDMP Review       Value Time User    PDMP Reviewed  Yes 10/18/2022  9:33 PM Shawn Nguyen, 10 Adilene Dunn          ED Provider  Electronically Signed by           Elizabeth Siddiqui DO  10/18/22 3218

## 2022-10-19 ENCOUNTER — TELEPHONE (OUTPATIENT)
Dept: FAMILY MEDICINE CLINIC | Facility: CLINIC | Age: 52
End: 2022-10-19

## 2022-10-19 ENCOUNTER — APPOINTMENT (OUTPATIENT)
Dept: CT IMAGING | Facility: HOSPITAL | Age: 52
End: 2022-10-19
Payer: MEDICARE

## 2022-10-19 PROBLEM — D72.829 LEUKOCYTOSIS: Status: RESOLVED | Noted: 2020-10-20 | Resolved: 2022-10-19

## 2022-10-19 LAB
AMPHETAMINES SERPL QL SCN: POSITIVE
ANION GAP SERPL CALCULATED.3IONS-SCNC: 6 MMOL/L (ref 4–13)
BACTERIA UR QL AUTO: ABNORMAL /HPF
BARBITURATES UR QL: NEGATIVE
BASOPHILS # BLD AUTO: 0.07 THOUSANDS/ÂΜL (ref 0–0.1)
BASOPHILS NFR BLD AUTO: 1 % (ref 0–1)
BENZODIAZ UR QL: POSITIVE
BILIRUB UR QL STRIP: NEGATIVE
BILIRUB UR QL STRIP: NEGATIVE
BUN SERPL-MCNC: 22 MG/DL (ref 5–25)
CALCIUM SERPL-MCNC: 9.3 MG/DL (ref 8.4–10.2)
CHLORIDE SERPL-SCNC: 104 MMOL/L (ref 96–108)
CLARITY UR: CLEAR
CLARITY UR: CLEAR
CO2 SERPL-SCNC: 32 MMOL/L (ref 21–32)
COCAINE UR QL: NEGATIVE
COLOR UR: YELLOW
COLOR UR: YELLOW
CREAT SERPL-MCNC: 1.24 MG/DL (ref 0.6–1.3)
EOSINOPHIL # BLD AUTO: 0.53 THOUSAND/ÂΜL (ref 0–0.61)
EOSINOPHIL NFR BLD AUTO: 4 % (ref 0–6)
ERYTHROCYTE [DISTWIDTH] IN BLOOD BY AUTOMATED COUNT: 14.9 % (ref 11.6–15.1)
GFR SERPL CREATININE-BSD FRML MDRD: 65 ML/MIN/1.73SQ M
GLUCOSE SERPL-MCNC: 110 MG/DL (ref 65–140)
GLUCOSE UR STRIP-MCNC: NEGATIVE MG/DL
GLUCOSE UR STRIP-MCNC: NEGATIVE MG/DL
HCT VFR BLD AUTO: 47.8 % (ref 36.5–49.3)
HGB BLD-MCNC: 15.4 G/DL (ref 12–17)
HGB UR QL STRIP.AUTO: NEGATIVE
HGB UR QL STRIP.AUTO: NEGATIVE
HYALINE CASTS #/AREA URNS LPF: ABNORMAL /LPF
IMM GRANULOCYTES # BLD AUTO: 0.07 THOUSAND/UL (ref 0–0.2)
IMM GRANULOCYTES NFR BLD AUTO: 1 % (ref 0–2)
KETONES UR STRIP-MCNC: NEGATIVE MG/DL
KETONES UR STRIP-MCNC: NEGATIVE MG/DL
LEUKOCYTE ESTERASE UR QL STRIP: NEGATIVE
LEUKOCYTE ESTERASE UR QL STRIP: NEGATIVE
LYMPHOCYTES # BLD AUTO: 3.92 THOUSANDS/ÂΜL (ref 0.6–4.47)
LYMPHOCYTES NFR BLD AUTO: 33 % (ref 14–44)
MAGNESIUM SERPL-MCNC: 1.9 MG/DL (ref 1.9–2.7)
MCH RBC QN AUTO: 28.7 PG (ref 26.8–34.3)
MCHC RBC AUTO-ENTMCNC: 32.2 G/DL (ref 31.4–37.4)
MCV RBC AUTO: 89 FL (ref 82–98)
METHADONE UR QL: NEGATIVE
MONOCYTES # BLD AUTO: 0.9 THOUSAND/ÂΜL (ref 0.17–1.22)
MONOCYTES NFR BLD AUTO: 8 % (ref 4–12)
NEUTROPHILS # BLD AUTO: 6.55 THOUSANDS/ÂΜL (ref 1.85–7.62)
NEUTS SEG NFR BLD AUTO: 53 % (ref 43–75)
NITRITE UR QL STRIP: NEGATIVE
NITRITE UR QL STRIP: NEGATIVE
NON-SQ EPI CELLS URNS QL MICRO: ABNORMAL /HPF
NRBC BLD AUTO-RTO: 0 /100 WBCS
OPIATES UR QL SCN: POSITIVE
OXYCODONE+OXYMORPHONE UR QL SCN: POSITIVE
PCP UR QL: NEGATIVE
PH UR STRIP.AUTO: 5.5 [PH]
PH UR STRIP.AUTO: 6 [PH]
PLATELET # BLD AUTO: 380 THOUSANDS/UL (ref 149–390)
PMV BLD AUTO: 9.8 FL (ref 8.9–12.7)
POTASSIUM SERPL-SCNC: 4.3 MMOL/L (ref 3.5–5.3)
PROCALCITONIN SERPL-MCNC: <0.05 NG/ML
PROT UR STRIP-MCNC: NEGATIVE MG/DL
PROT UR STRIP-MCNC: NEGATIVE MG/DL
RBC # BLD AUTO: 5.36 MILLION/UL (ref 3.88–5.62)
RBC #/AREA URNS AUTO: ABNORMAL /HPF
SODIUM SERPL-SCNC: 142 MMOL/L (ref 135–147)
SP GR UR STRIP.AUTO: >=1.03 (ref 1–1.03)
SP GR UR STRIP.AUTO: >=1.03 (ref 1–1.03)
THC UR QL: POSITIVE
UROBILINOGEN UR QL STRIP.AUTO: 0.2 E.U./DL
UROBILINOGEN UR QL STRIP.AUTO: 0.2 E.U./DL
WBC # BLD AUTO: 12.04 THOUSAND/UL (ref 4.31–10.16)
WBC #/AREA URNS AUTO: ABNORMAL /HPF

## 2022-10-19 PROCEDURE — 97167 OT EVAL HIGH COMPLEX 60 MIN: CPT

## 2022-10-19 PROCEDURE — 81001 URINALYSIS AUTO W/SCOPE: CPT | Performed by: STUDENT IN AN ORGANIZED HEALTH CARE EDUCATION/TRAINING PROGRAM

## 2022-10-19 PROCEDURE — 80048 BASIC METABOLIC PNL TOTAL CA: CPT

## 2022-10-19 PROCEDURE — 74176 CT ABD & PELVIS W/O CONTRAST: CPT

## 2022-10-19 PROCEDURE — 83735 ASSAY OF MAGNESIUM: CPT

## 2022-10-19 PROCEDURE — 81003 URINALYSIS AUTO W/O SCOPE: CPT

## 2022-10-19 PROCEDURE — 94760 N-INVAS EAR/PLS OXIMETRY 1: CPT

## 2022-10-19 PROCEDURE — G1004 CDSM NDSC: HCPCS

## 2022-10-19 PROCEDURE — 86618 LYME DISEASE ANTIBODY: CPT

## 2022-10-19 PROCEDURE — 99225 PR SBSQ OBSERVATION CARE/DAY 25 MINUTES: CPT | Performed by: STUDENT IN AN ORGANIZED HEALTH CARE EDUCATION/TRAINING PROGRAM

## 2022-10-19 PROCEDURE — 80307 DRUG TEST PRSMV CHEM ANLYZR: CPT | Performed by: STUDENT IN AN ORGANIZED HEALTH CARE EDUCATION/TRAINING PROGRAM

## 2022-10-19 PROCEDURE — 94640 AIRWAY INHALATION TREATMENT: CPT

## 2022-10-19 PROCEDURE — 84145 PROCALCITONIN (PCT): CPT | Performed by: STUDENT IN AN ORGANIZED HEALTH CARE EDUCATION/TRAINING PROGRAM

## 2022-10-19 PROCEDURE — 92610 EVALUATE SWALLOWING FUNCTION: CPT

## 2022-10-19 PROCEDURE — 85025 COMPLETE CBC W/AUTO DIFF WBC: CPT

## 2022-10-19 PROCEDURE — 97163 PT EVAL HIGH COMPLEX 45 MIN: CPT

## 2022-10-19 RX ORDER — DULOXETIN HYDROCHLORIDE 60 MG/1
60 CAPSULE, DELAYED RELEASE ORAL DAILY
Status: DISCONTINUED | OUTPATIENT
Start: 2022-10-26 | End: 2022-10-20 | Stop reason: HOSPADM

## 2022-10-19 RX ORDER — DULOXETIN HYDROCHLORIDE 30 MG/1
30 CAPSULE, DELAYED RELEASE ORAL DAILY
Status: DISCONTINUED | OUTPATIENT
Start: 2022-10-19 | End: 2022-10-20 | Stop reason: HOSPADM

## 2022-10-19 RX ORDER — PREDNISONE 20 MG/1
60 TABLET ORAL DAILY
Status: DISCONTINUED | OUTPATIENT
Start: 2022-10-19 | End: 2022-10-20 | Stop reason: HOSPADM

## 2022-10-19 RX ORDER — DULOXETIN HYDROCHLORIDE 60 MG/1
60 CAPSULE, DELAYED RELEASE ORAL DAILY
Status: DISCONTINUED | OUTPATIENT
Start: 2022-10-26 | End: 2022-10-19

## 2022-10-19 RX ORDER — DULOXETIN HYDROCHLORIDE 30 MG/1
30 CAPSULE, DELAYED RELEASE ORAL DAILY
Status: DISCONTINUED | OUTPATIENT
Start: 2022-10-19 | End: 2022-10-19

## 2022-10-19 RX ADMIN — DULOXETINE HYDROCHLORIDE 30 MG: 30 CAPSULE, DELAYED RELEASE ORAL at 10:25

## 2022-10-19 RX ADMIN — HYDROMORPHONE HYDROCHLORIDE 0.5 MG: 1 INJECTION, SOLUTION INTRAMUSCULAR; INTRAVENOUS; SUBCUTANEOUS at 11:26

## 2022-10-19 RX ADMIN — ACETAMINOPHEN 975 MG: 325 TABLET ORAL at 22:37

## 2022-10-19 RX ADMIN — HYDROMORPHONE HYDROCHLORIDE 0.5 MG: 1 INJECTION, SOLUTION INTRAMUSCULAR; INTRAVENOUS; SUBCUTANEOUS at 17:00

## 2022-10-19 RX ADMIN — METHOCARBAMOL 500 MG: 500 TABLET ORAL at 05:59

## 2022-10-19 RX ADMIN — OXYCODONE HYDROCHLORIDE 10 MG: 10 TABLET ORAL at 16:32

## 2022-10-19 RX ADMIN — OXYCODONE HYDROCHLORIDE 10 MG: 10 TABLET ORAL at 01:28

## 2022-10-19 RX ADMIN — NICOTINE 1 PATCH: 7 PATCH, EXTENDED RELEASE TRANSDERMAL at 08:30

## 2022-10-19 RX ADMIN — HYDROMORPHONE HYDROCHLORIDE 0.5 MG: 1 INJECTION, SOLUTION INTRAMUSCULAR; INTRAVENOUS; SUBCUTANEOUS at 03:35

## 2022-10-19 RX ADMIN — HYDROMORPHONE HYDROCHLORIDE 0.5 MG: 1 INJECTION, SOLUTION INTRAMUSCULAR; INTRAVENOUS; SUBCUTANEOUS at 10:25

## 2022-10-19 RX ADMIN — DOCUSATE SODIUM 100 MG: 100 CAPSULE, LIQUID FILLED ORAL at 08:27

## 2022-10-19 RX ADMIN — HYDROMORPHONE HYDROCHLORIDE 0.5 MG: 1 INJECTION, SOLUTION INTRAMUSCULAR; INTRAVENOUS; SUBCUTANEOUS at 18:19

## 2022-10-19 RX ADMIN — HYDROMORPHONE HYDROCHLORIDE 0.5 MG: 1 INJECTION, SOLUTION INTRAMUSCULAR; INTRAVENOUS; SUBCUTANEOUS at 13:34

## 2022-10-19 RX ADMIN — PREDNISONE 60 MG: 20 TABLET ORAL at 10:25

## 2022-10-19 RX ADMIN — METHOCARBAMOL 500 MG: 500 TABLET ORAL at 17:00

## 2022-10-19 RX ADMIN — ACETAMINOPHEN 975 MG: 325 TABLET ORAL at 13:45

## 2022-10-19 RX ADMIN — ACETAMINOPHEN 975 MG: 325 TABLET ORAL at 05:59

## 2022-10-19 RX ADMIN — METHOCARBAMOL 500 MG: 500 TABLET ORAL at 11:15

## 2022-10-19 RX ADMIN — DOCUSATE SODIUM 100 MG: 100 CAPSULE, LIQUID FILLED ORAL at 17:00

## 2022-10-19 RX ADMIN — ENOXAPARIN SODIUM 40 MG: 40 INJECTION SUBCUTANEOUS at 08:30

## 2022-10-19 RX ADMIN — HYDROMORPHONE HYDROCHLORIDE 0.5 MG: 1 INJECTION, SOLUTION INTRAMUSCULAR; INTRAVENOUS; SUBCUTANEOUS at 20:03

## 2022-10-19 RX ADMIN — FLUTICASONE FUROATE AND VILANTEROL TRIFENATATE 1 PUFF: 200; 25 POWDER RESPIRATORY (INHALATION) at 08:57

## 2022-10-19 RX ADMIN — METHOCARBAMOL 500 MG: 500 TABLET ORAL at 00:39

## 2022-10-19 RX ADMIN — OXYCODONE HYDROCHLORIDE 10 MG: 10 TABLET ORAL at 08:27

## 2022-10-19 RX ADMIN — LIDOCAINE 5% 1 PATCH: 700 PATCH TOPICAL at 08:29

## 2022-10-19 NOTE — ASSESSMENT & PLAN NOTE
· Patient reports "choking episodes" while consuming foods and drinks  · Actively following with GI outpatient  · Consult SLP for assistance in hospital

## 2022-10-19 NOTE — ASSESSMENT & PLAN NOTE
· Presented to ED with acutely worsening severe back pain that began Friday morning  · Patient reports everything was "normal" until he attempted to ambulate Friday morning  · Denies sleeping unusually, trauma, twisting to stand, unusual movements  · Patient reports worse left sided numbness from buttocks to perianal region to left thigh  · Hx of anterior and posterior L4-L5 fusion, prior spinal cord stimulator since removed, 12 prior back surgeries, chronic numbness  · CT lumbar spine - chronic disc and facet degenerative changes  Postsurgical change from fusion and laminectomies, decompression of the central canal  · MRI lumbar spine - mildly progressive chronic disc and facet degenerative change  No evidence of discitis, osteomyelitis, epidural or spinal fluid collection    · No evidence of cauda equina on MRI and ESR normal  · Start scheduled Robaxin and Tylenol  · Rule out infectious cause, lyme  · Continue home pain medications

## 2022-10-19 NOTE — ASSESSMENT & PLAN NOTE
· Patient reports "choking episodes" while consuming foods and drinks  · Actively following with GI outpatient  · Consult SLP for assistance in hospital - recommend regular diet thin liquids

## 2022-10-19 NOTE — UTILIZATION REVIEW
Initial Clinical Review    Admission: Date/Time/Statement:   Admission Orders (From admission, onward)     Ordered        10/18/22 2114  Place in Observation  Once                   Orders Placed This Encounter   Procedures   • Place in Observation     Standing Status:   Standing     Number of Occurrences:   1     Order Specific Question:   Level of Care     Answer:   Med Surg [16]     ED Arrival Information     Expected   -    Arrival   10/18/2022 15:45    Acuity   Urgent            Means of arrival   Walk-In    Escorted by   Family Member    Service   Hospitalist    Admission type   Emergency            Arrival complaint   Back Pain        Chief Complaint   Patient presents with   • Back Pain     Patient here with c/o lower back pain x 4 days, unable to ambulate or get out of bed  Denies any recent injury, trauma/fall  Past history of back surgeries and pain  Pain level 8/10  Tried Valium and Oxycodone 10mg about 2 1/2 hours ago  Initial Presentation:   46 y/o male with PMHx Asthma, Dysphagia, 12 prior back surgeries - including anterior and posterior L4-L5 fusion, prior spinal cord stimulator that was removed, anxiety, chronic pain managed on opioids  - present urgently to [facility] ED on 10/18/22 2nd 4 day history of severe back pain with LLE numbness and inability to ambulate or get OOB despite taking prescribed Valium and Oxycodone  Also reports increase in urinary frequency  In the ED - Temp 98 7    /133    Exam:  In caute distress 2nd severe pain  decreased sensation from left buttock through left groin through left lateral thigh  CT + MRI Lumbar spine negative for cauda equina; show postsurgical change from L4-5 fusion and decompression of the central canal   No evidence of discitis or osteomyelitis  No epidural or spinal fluid collection  Labs:  WBC 17,300    UA Sp Gravity > 1 030  ED Tx: IV Dilaudid x 3, IV Benadryl    Placed in Observation 10/18/22 at 2114 2nd Back pain with ambulatory dysfunction - pain management, PT + OT Williams    ED Triage Vitals [10/18/22 1653]   Temperature Pulse Respirations Blood Pressure SpO2   98 7 °F (37 1 °C) 102 19 (!) 158/133 98 %      Temp Source Heart Rate Source Patient Position - Orthostatic VS BP Location FiO2 (%)   Oral Monitor Lying Left arm --      Pain Score       8          Wt Readings from Last 1 Encounters:   10/18/22 100 kg (220 lb 14 4 oz)     Additional Vital Signs:   Date/Time Temp Pulse Resp BP MAP (mmHg) SpO2 O2 Device Patient Position - Orthostatic VS   10/19/22 1050 -- -- -- 142/101 Abnormal  130 -- -- Sitting   10/19/22 0857 -- -- -- -- -- 98 % None (Room air) --   10/19/22 0805 98 2 °F (36 8 °C) 68 16 150/74 -- 98 % -- Lying   10/18/22 2230 -- -- -- -- -- 97 % None (Room air) --   10/18/22 2227 98 °F (36 7 °C) 63 17 129/90 -- 97 % -- Lying   10/18/22 2144 -- 67 20 128/72 84 99 % None (Room air) Lying   10/18/22 1942 98 4 °F (36 9 °C) 70 20 145/98 108 98 % None (Room air) Lying   10/18/22 1653 98 7 °F (37 1 °C) 102 19 158/133 Abnormal  -- 98 % None (Room air) Lying     NO RECORDED I+O    Pertinent Labs/Diagnostic Test Results:   MRI lumbar spine wo contrast   1  Postsurgical change from L4-5 fusion and decompression of the central canal   No evidence of discitis or osteomyelitis  No epidural or spinal fluid collection  2   Mildly progressive chronic disc and facet degenerative change  CT spine lumbar without contrast   1  Postsurgical change from L4-5 fusion and laminectomies  Decompression of the central canal    2   Chronic disc and facet degenerative change in the upper lumbar spine              Results from last 7 days   Lab Units 10/19/22  0605 10/18/22  1725   WBC Thousand/uL 12 04* 17 30*   HEMOGLOBIN g/dL 15 4 16 7   HEMATOCRIT % 47 8 49 4*   PLATELETS Thousands/uL 380 413*   NEUTROS ABS Thousands/µL 6 55 13 85*     Results from last 7 days   Lab Units 10/19/22  0605 10/18/22  1725   SODIUM mmol/L 142 137 POTASSIUM mmol/L 4 3 4 2   CHLORIDE mmol/L 104 105   CO2 mmol/L 32 26   ANION GAP mmol/L 6 6   BUN mg/dL 22 20   CREATININE mg/dL 1 24 1 23   EGFR ml/min/1 73sq m 65 66   CALCIUM mg/dL 9 3 9 5   MAGNESIUM mg/dL 1 9  --      Results from last 7 days   Lab Units 10/18/22  1725   AST U/L 21   ALT U/L 27   ALK PHOS U/L 78   TOTAL PROTEIN g/dL 7 2   ALBUMIN g/dL 4 4   TOTAL BILIRUBIN mg/dL 0 40   BILIRUBIN DIRECT mg/dL 0 03     Results from last 7 days   Lab Units 10/19/22  0605 10/18/22  1725   GLUCOSE RANDOM mg/dL 110 137     Results from last 7 days   Lab Units 10/18/22  1725   PROTIME seconds 12 2   INR  0 88   PTT seconds 27     Results from last 7 days   Lab Units 10/19/22  0605   PROCALCITONIN ng/ml <0 05     Results from last 7 days   Lab Units 10/18/22  1725   LIPASE u/L 37     Results from last 7 days   Lab Units 10/18/22  1725   SED RATE mm/hour 1     Results from last 7 days   Lab Units 10/19/22  0133   CLARITY UA  Clear   COLOR UA  Yellow   SPEC GRAV UA  >=1 030   PH UA  6 0   GLUCOSE UA mg/dl Negative   KETONES UA mg/dl Negative   BLOOD UA  Negative   PROTEIN UA mg/dl Negative   NITRITE UA  Negative   BILIRUBIN UA  Negative   UROBILINOGEN UA E U /dl 0 2   LEUKOCYTES UA  Negative     ED Treatment:   Medication Administration from 10/18/2022 1545 to 10/18/2022 2221       Date/Time Order Dose Route Action     10/18/2022 1727 HYDROmorphone (DILAUDID) injection 1 mg 1 mg Intravenous Given     10/18/2022 1830 HYDROmorphone (DILAUDID) injection 0 5 mg 0 5 mg Intravenous Given     10/18/2022 1945 HYDROmorphone (DILAUDID) injection 0 5 mg 0 5 mg Intravenous Given     10/18/2022 1944 diphenhydrAMINE (BENADRYL) injection 25 mg 25 mg Intravenous Given     Past Medical History:   Diagnosis Date   • Allergic     seasonal    • Allergic rhinitis    • Anxiety    • Arthritis    • Back pain    • Chronic obstructive asthma (HCC)    • Chronic pain syndrome    • Cluster headaches    • Colon polyp    • Depression    • Insomnia • Kidney stones    • Laryngospasm    • Leukocytosis    • Migraine    • Myocardial infarction [facility])    • Nephrolithiasis    • Organic impotence    • Pneumonia due to infectious organism 1/16/2019   • Seasonal allergies    • Sleep apnea     does not use CPAP   • Stroke [facility]) 2015   • TMJ (temporomandibular joint syndrome)    • Wheezing     last assessed 05/19/17     Present on Admission:  • Chronic, continuous use of opioids  • Back pain  • Esophageal dysphagia  • Chronic pain syndrome  • Chronic obstructive asthma (HCC)  • Ambulatory dysfunction    Admitting Diagnosis: Numbness [R20 0]  Back pain [M54 9]  Ambulatory dysfunction [R26 2]  Impaired ambulation [R26 2]    Age/Sex: 45 y o  male    Admission Orders:  VS, Neuro + Neurovascular checks q4hrs  Continuous Pulse Oximetry  SCD  Up with assistance  Diet Regular: House  I+O q shift  PT + OT Evals    Scheduled Medications:  acetaminophen, 975 mg, Oral, Q8H  docusate sodium, 100 mg, Oral, BID  DULoxetine, 30 mg, Oral, Daily  [START ON 10/26/2022] DULoxetine, 60 mg, Oral, Daily  enoxaparin, 40 mg, Subcutaneous, Daily  fluticasone-vilanterol, 1 puff, Inhalation, Daily  lidocaine, 1 patch, Topical, Daily  methocarbamol, 500 mg, Oral, Q6H [address]  nicotine, 1 patch, Transdermal, Daily  polyethylene glycol, 17 g, Oral, Daily  predniSONE, 60 mg, Oral, Daily  senna-docusate sodium, 2 tablet, Oral, Daily    Continuous IV Infusions:   NONE    PRN Meds:  albuterol, 2 puff, Inhalation, Q6H PRN  aluminum-magnesium hydroxide-simethicone, 30 mL, Oral, Q6H PRN  diazepam, 5 mg, Oral, Q12H PRN  IV HYDROmorphone, 0 5 mg, Intravenous, Q1H PRN - 10/19 X 3  hydrOXYzine HCL, 25 mg, Oral, Q6H PRN  naloxone, 0 04 mg, Intravenous, Q1MIN PRN  ondansetron, 4 mg, Intravenous, Q4H PRN  oxyCODONE, 10 mg, Oral, Q6H PRN - 10/19 X 2    IP CONSULT TO CASE MANAGEMENT    Network Utilization Review Department  ATTENTION: Please call with any questions or concerns to [phone] and carefully listen to the prompts so that you are directed to the right person  All voicemails are confidential   Pieter Williamson all requests for admission clinical reviews, approved or denied determinations and any other requests to dedicated fax number below belonging to the campus where the patient is receiving treatment   List of dedicated fax numbers for the Facilities:  [address] DENIALS (Administrative/Medical Necessity) [phone]   [address] (Maternity/NICU/Pediatrics) [phone]   [address] [phone]   [place] [address] [phone]   [address] 28 [phone]   1553 [facility] 815 [facility] [phone]

## 2022-10-19 NOTE — TELEPHONE ENCOUNTER
Patient called to cancel an appointment with Dr Jian Faye on 10/21/2022 because he is admitted in the hospital  Patient is requesting if Dr Jian Faye can put in an order for patient to have his urine done for his medication

## 2022-10-19 NOTE — SPEECH THERAPY NOTE
Speech Language/Pathology  Speech-Language Pathology Bedside Swallow Evaluation      Patient Name: Jez Wylie    PVSLQ'V Date: 10/19/2022     Problem List  Principal Problem:    Back pain  Active Problems:    Chronic pain syndrome    Chronic, continuous use of opioids    Esophageal dysphagia    Leukocytosis    Chronic obstructive asthma ([facility] [address] )    Ambulatory dysfunction      Past Medical History  Past Medical History:   Diagnosis Date   • Allergic     seasonal    • Allergic rhinitis    • Anxiety    • Arthritis    • Back pain    • Chronic obstructive asthma ([facility] )    • Chronic pain syndrome    • Cluster headaches    • Colon polyp    • Depression    • Insomnia    • Kidney stones    • Laryngospasm    • Leukocytosis    • Migraine    • Myocardial infarction [facility])    • Nephrolithiasis    • Organic impotence    • Pneumonia due to infectious organism 1/16/2019   • Seasonal allergies    • Sleep apnea     does not use CPAP   • Stroke [facility] 2015   • TMJ (temporomandibular joint syndrome)    • Wheezing     last assessed 05/19/17       Past Surgical History  Past Surgical History:   Procedure Laterality Date   • BACK SURGERY      *9, 8801-6214, nervectomy 2006, total of 10   • BUCCAL MASS EXCISION N/A 3/26/2018    Procedure: BIOPSY LINGUAL TONSIL (FLOW/ STANDARD PATH)  EVAL UNDER ANESTHESIA;  Surgeon: Drea Goodrich MD;  Location: [facility];  Service: ENT   • COLONOSCOPY     • FL RETROGRADE PYELOGRAM  10/21/2020   • HERNIA REPAIR     • KIDNEY SURGERY     • KNEE ARTHROSCOPY     • LITHOTRIPSY      multiple   • LITHOTRIPSY     • LUMBAR DISC SURGERY  2015   • MANDIBLE FRACTURE SURGERY      titanium plate   • PELVIC SYMPHYSIS FUSION     • MT CYSTO/URETERO W/LITHOTRIPSY &INDWELL STENT INSRT Right 10/21/2020    Procedure: CYSTOSCOPY URETEROSCOPY WITH LITHOTRIPSY HOLMIUM LASER, RETROGRADE PYELOGRAM AND INSERTION STENT URETERAL;  Surgeon: Som Morrow MD;  Location: [facility];  Service: Urology   • MT ESOPHAGOGASTRODUODENOSCOPY TRANSORAL DIAGNOSTIC N/A 2/22/2018    Procedure: ESOPHAGOGASTRODUODENOSCOPY (EGD); Surgeon: Jeffy Hylton MD;  Location: [facility]; Service: Gastroenterology   • KY ESOPHAGOGASTRODUODENOSCOPY TRANSORAL DIAGNOSTIC N/A 4/1/2019    Procedure: EGD W/ DILATION;  Surgeon: Jeffy Hylton MD;  Location: [facility]; Service: Gastroenterology   • KY REVISE/REMOVE SPINAL NEUROSTIM/ Left 6/9/2017    Procedure: REMOVAL OF A THORACIC SCS PLACED VIA LAMINECTOMY AND REMOVAL LEFT BUTTOCK GENERATOR; IMPULSE MONITORING;  Surgeon: Shan Olea MD;  Location: [facility];  Service: Neurosurgery   • KY SHLDR ARTHROSCOP,SURG,W/ROTAT CUFF REPR Right 4/4/2019    Procedure: RIGHT SHOULDER ARTHROSCOPIC SUBSCAPULARIS TENDON REPAIR;  Surgeon: Nelli Ackerman MD;  Location: [facility];  Service: Orthopedics   • [place] SURG IMPLNT [address] N/A 9/8/2016    Procedure: DORSAL COLUMN STIMULATOR PLACEMENT (IMPULSE MONITORING); Surgeon: Fatou Moon MD;  Location: [facility];  Service: Orthopedics   • SACROILIAC JOINT FUSION  2015   • SHOULDER SURGERY Left     2   • UPPER GASTROINTESTINAL ENDOSCOPY         Summary   Pt presented with functional appearing oral and pharyngeal stage swallowing skills with materials administered today  Long standing esophageal dysphagia as well as esophageal dilatations  The pt has chronic back pain as well and often lies flat while eating or after  Education provided on upright position for all po given his esophageal dysphagia  He reports choking as food/liquids "getting stuck & feeling frozen, I open my mouth and it just comes up & out"  Reminded pt to remain upright for all po  Risk/s for Aspiration: more bottom up concern    Recommended Diet: regular diet and thin liquids   Recommended Form of Meds: whole with liquid   Aspiration precautions and swallowing strategies: upright posture, only feed when fully alert, slow rate of feeding and alternating bites and sips  Other Recommendations: Continue frequent oral care, eval only continue care w/ GI    Current Medical Status  Pt is a 45 y o  male who presented to [facility] with numbness & back pain as well as decreased strength  He reported choking episodes w/ po -per the record he follows w/ GI for a known esophageal dysphagia  His last imaging was in 2018 for a regular barium swallow listed below  There is no recent list pna or fevers  The pt is on a regular diet  No desire at this time to alter  Current Precautions:  Fall  Aspiration     Allergies:  No known food allergies    Past medical history:  Please see H&P for details    Special Studies:  Fl Barium swallow 10/23/2018- reflux noted, Small hiatal hernia    Swallowing mechanism appears normal     Gastric emptying 4/28/19-Normal rate of gastric emptying        Social/Education/Vocational Hx:  Pt lives with family    Swallow Information   Current Risks for Dysphagia & Aspiration: known history of dysphagia  Current Symptoms/Concerns: pt w/ reports of choking  further investigation he reports that it is sticking sternally   Current Diet: regular diet and thin liquids   Baseline Diet: regular diet and thin liquids      Baseline Assessment   Behavior/Cognition: alert  Speech/Language Status: able to participate in conversation  Patient Positioning: upright in bed  Pain Status/Interventions/Response to Interventions:   Chronic back, even w/ movement     Swallow Mechanism Exam  Facial: symmetrical  Labial: WFL  Lingual: WFL  Velum: unable to visualize  Mandible: h/o broken jaw  Dentition: adequate  Vocal quality:clear/adequate   Volitional Cough: strong/productive   Respiratory Status: on RA        Consistencies Assessed and Performance   Consistencies Administered: completed am meal of eggs, banana  Ate the muffin and drank water on the tray  Oral Stage: WFL  Mastication was adequate with the materials administered today    Bolus formation and transfer were functional with no significant oral residue noted  No overt s/s reduced oral control  Pharyngeal Stage: WFL  Swallow Mechanics:  Swallowing initiation appeared prompt  Laryngeal rise was palpated and judged to be within functional limits  No coughing, throat clearing, change in vocal quality or respiratory status noted today  Esophageal Concerns: see history    Strategies and Efficacy: alternate bites/sips, upright for po  Pt endorses he at times lays flat to eat bc of his back pain       Summary and Recommendations (see above)    Results Reviewed with: patient and RN     Treatment Recommended: no

## 2022-10-19 NOTE — ASSESSMENT & PLAN NOTE
· Patient reports decreased ability to ambulate since Friday morning when his pain acutely worsened  · Likely due to pain  · Reports worsening unbalanced feeling while ambulating to ED  · Consult PT/OT/CM for possible STR vs HHS evaluation

## 2022-10-19 NOTE — PLAN OF CARE
Problem: PHYSICAL THERAPY ADULT  Goal: Performs mobility at highest level of function for planned discharge setting  See evaluation for individualized goals  Description: Treatment/Interventions: Functional transfer training, LE strengthening/ROM, Elevations, Therapeutic exercise, Endurance training, Patient/family training, Equipment eval/education, Bed mobility, Gait training          See flowsheet documentation for full assessment, interventions and recommendations  10/19/2022 1258 by Jessica Morales PT  Note: Prognosis: Fair  Problem List: Decreased strength, Decreased endurance, Impaired balance, Decreased mobility, Impaired sensation, Pain  Assessment: Martin Rogers is a 45 y o  Male who presents to [address] [place] [address] on 10/18/2022 w/ c/o back pain and diagnosis of back pain  Orders for PT eval and treat received, w/ activity orders of up w/ assist  Comorbidities affecting pt at time of evaluation include: L4-L5 fusion, chronic pain syndrome, asthma, dysphagia, depression, anxiety, cluster headaches, CVA, cervical radiculopathy, peripheral polyneuropathy, chronic use of opioids  Personal factors affecting DC include: lives in 3 story house and positive fall history  At baseline, pt mobilizes ind w/ no AD, and w/ 2-3 fall(s) in the previous 6 months  Upon evaluation, pt presents w/ the following deficits: weakness, altered sensation, impaired balance, decreased endurance, pain limiting functional mobility and gait deviations  Pt currently requires mos I for bed mobility, supervision for transfers, and supervision w/ no AD for ambulation  Pt's clinical presentation is unstable/unpredictable due to limited tolerance to ambulation, pain impacting overall mobility status, need for input for mobility technique, recent drastic decline in mobility compared to baseline and recent history of falls  Pt is at an increased risk of falls due to impaired sensation, pain limiting mobility   From a PT/mobility standpoint, given the above findings, DC recommendation is: Home w/ OPPT  During current admission, pt will benefit from continued skilled inpatient PT in the acute care setting in order to address the above deficits and to maximize function and mobility prior to DC from acute care  PT Discharge Recommendation: Home with outpatient rehabilitation    See flowsheet documentation for full assessment

## 2022-10-19 NOTE — PHYSICAL THERAPY NOTE
PHYSICAL THERAPY EVALUATION NOTE          Patient Name: Juani Gutierrez  PXAFF'T Date: 10/19/2022      AGE:   45 y o    Mrn:   [id number]  ADMIT DX:  Numbness [R20 0]  Back pain [M54 9]  Ambulatory dysfunction [R26 2]  Impaired ambulation [R26 2]    Past Medical History:  Past Medical History:   Diagnosis Date   • Allergic     seasonal    • Allergic rhinitis    • Anxiety    • Arthritis    • Back pain    • Chronic obstructive asthma (HCC)    • Chronic pain syndrome    • Cluster headaches    • Colon polyp    • Depression    • Insomnia    • Kidney stones    • Laryngospasm    • Leukocytosis    • Migraine    • Myocardial infarction [facility])    • Nephrolithiasis    • Organic impotence    • Pneumonia due to infectious organism 1/16/2019   • Seasonal allergies    • Sleep apnea     does not use CPAP   • Stroke [facility] 2015   • TMJ (temporomandibular joint syndrome)    • Wheezing     last assessed 05/19/17       Past Surgical History:  Past Surgical History:   Procedure Laterality Date   • BACK SURGERY      *9, [id number], nervectomy 2006, total of 10   • BUCCAL MASS EXCISION N/A 3/26/2018    Procedure: BIOPSY LINGUAL TONSIL (FLOW/ STANDARD PATH)  EVAL UNDER ANESTHESIA;  Surgeon: Scooby Almanza MD;  Location: [facility];  Service: ENT   • COLONOSCOPY     • FL RETROGRADE PYELOGRAM  10/21/2020   • HERNIA REPAIR     • KIDNEY SURGERY     • KNEE ARTHROSCOPY     • LITHOTRIPSY      multiple   • LITHOTRIPSY     • LUMBAR DISC SURGERY  2015   • MANDIBLE FRACTURE SURGERY      titanium plate   • PELVIC SYMPHYSIS FUSION     • NE CYSTO/URETERO W/LITHOTRIPSY &INDWELL STENT INSRT Right 10/21/2020    Procedure: CYSTOSCOPY URETEROSCOPY WITH LITHOTRIPSY HOLMIUM LASER, RETROGRADE PYELOGRAM AND INSERTION STENT URETERAL;  Surgeon: Piero Khanna MD;  Location: [facility] Main OR;  Service: Urology   • NE ESOPHAGOGASTRODUODENOSCOPY TRANSORAL DIAGNOSTIC N/A 2/22/2018    Procedure: ESOPHAGOGASTRODUODENOSCOPY (EGD); Surgeon: Diana Garvey MD;  Location: [facility] GI LAB; Service: Gastroenterology   • WV ESOPHAGOGASTRODUODENOSCOPY TRANSORAL DIAGNOSTIC N/A 2019    Procedure: EGD W/ DILATION;  Surgeon: Diana Garvey MD;  Location: [facility] GI [facility]; Service: Gastroenterology   • WV REVISE/REMOVE SPINAL NEUROSTIM/ Left 2017    Procedure: REMOVAL OF A THORACIC SCS PLACED VIA LAMINECTOMY AND REMOVAL LEFT BUTTOCK GENERATOR; IMPULSE MONITORING;  Surgeon: Adal Tapia MD;  Location: [facility];  Service: Neurosurgery   • WV SHLDR ARTHROSCOP,SURG,W/ROTAT CUFF REPR Right 2019    Procedure: RIGHT SHOULDER ARTHROSCOPIC SUBSCAPULARIS TENDON REPAIR;  Surgeon: Rani Li MD;  Location: [facility];  Service: Orthopedics   • W[place] SURG IMPLNT [address] N/A 2016    Procedure: DORSAL COLUMN STIMULATOR PLACEMENT (IMPULSE MONITORING); Surgeon: Magalis Stein MD;  Location: [facility];  Service: Orthopedics   • SACROILIAC JOINT FUSION  2015   • SHOULDER SURGERY Left     2   • UPPER GASTROINTESTINAL ENDOSCOPY       Length Of Stay: 0        PHYSICAL THERAPY EVALUATION:    Patient's identity confirmed via 2 patient identifiers (full name and ) at start of session       10/19/22 1052   PT Last Visit   PT Visit Date 10/19/22   Note Type   Note type Evaluation   Pain Assessment   Pain Assessment Tool 0-10   Pain Score 9   Pain Location/Orientation Orientation: Lower; Location: Back   Pain Radiating Towards raditates distally down L leg and terminates at knee   Pain Onset/Description Onset: Ongoing;Frequency: Constant/Continuous; Descriptor: Karlos Harrison; Descriptor: Stabbing   Effect of Pain on Daily Activities limits pt's ease of mobility and overall activity tolerance   Hospital Pain Intervention(s) Repositioned; Ambulation/increased activity  (RN pre-medicated pt)   Restrictions/Precautions   Weight Bearing Precautions Per Order No   Other Precautions Fall Risk;Pain   Home Living   Type of [address] [place] [address] Multi-level;Bed/bath upstairs  (3 level apt w/ 8-10 WESLEY in front vs 0 WESLEY through back entrance  Bathroom located on 2nd floor, bedrooms on 2nd and 3rd  Pt reports he is able to (and has been) remaining on the 1st floor, sleeping on the couch and using the urinal)   Bathroom Shower/Tub Tub/shower unit   Bathroom Toilet Standard   Bathroom Equipment Grab bars in shower   [address] Walker;Cane   Prior Function   Level of [place] Independent with ADLs; Independent with functional mobility; Independent with IADLS   Lives With Significant other;Family  (wayne, mother)   Seth Galvez Help From Family;Friend(s)   IADLs Independent with driving; Independent with meal prep; Independent with medication management   Falls in the last 6 months 1 to 4  (2-3, mechanical in nature)   Comments At baseline, pt ambulates ind w/o AD, performs all ADLs and IADLs ind  Limited by intermittent back pain at baseline   General   Family/Caregiver Present Yes  (pt's mother)   Cognition   Overall Cognitive Status WFL   Arousal/Participation Alert   Orientation Level Oriented X4   Memory Within functional limits   Following Commands Follows multistep commands without difficulty   Comments Pt iD via name and ; pt agreeable to PT eval and OOB mobility   RLE Assessment   RLE Assessment X   Strength RLE   R Knee Flexion 3+/5   R Knee Extension 3-/5  (limited due to pain)   R Ankle Dorsiflexion 4/5   R Ankle Plantar Flexion 4/5   LLE Assessment   LLE Assessment X   Strength LLE   L Knee Flexion 3+/5   L Knee Extension 3-/5  (limited due to pain)   L Ankle Dorsiflexion 3+/5  (limited due to pain)   L Ankle Plantar Flexion 4/5   Light Touch   RLE Light Touch Impaired   RLE Light Touch Comments chronic impaired sensation (numbness/tingling) to BLE L>R   LLE Light Touch Impaired   LLE Light Touch Comments chronic impaired sensation (numbness/tingling) to BLE L>R   Bed Mobility   Supine to Sit 6  Modified independent   Additional items HOB elevated; Increased time required   Sit to Supine 6  Modified independent   Additional items HOB elevated  (use of RLE to lift LLE onto bed)   Transfers   Sit to Stand 5  Supervision   Additional items Assist x 1; Increased time required   Stand to Sit 5  Supervision   Additional items Assist x 1; Increased time required   Additional Comments 2 sit<>stand transfers w/ supervision   Ambulation/Elevation   Gait pattern Improper Weight shift;Narrow DESTINY; Decreased foot clearance; Short stride; Excessively slow;Decreased hip extension;Decreased heel strike;Decreased toe off  (limited arm swing, overall gaurding quality to gait)   Gait Assistance 5  Supervision   Additional items Assist x 1;Verbal cues   Assistive Device None  (pt declined trial of AD)   Distance 50'x2   Stair Management Assistance Not tested  (pt declined at this time)   Balance   Static Sitting Good   Dynamic Sitting Fair +   Static Standing Fair   Dynamic Standing Fair   Ambulatory Fair -   Endurance Deficit   Endurance Deficit Yes   Endurance Deficit Description pt w/ limited overall tolerance to physical activity   Activity Tolerance   Activity Tolerance Patient limited by pain   Medical Staff Made Aware OT Flor   Nurse Made Aware KRYSTAL Herzog   Assessment   Prognosis Fair   Problem List Decreased strength;Decreased endurance; Impaired balance;Decreased mobility; Impaired sensation;Pain   Assessment Rachel Bragg is a 45 y o  Male who presents to [address] [place] [address] on 10/18/2022 w/ c/o back pain and diagnosis of back pain  Orders for PT eval and treat received, w/ activity orders of up w/ assist  Comorbidities affecting pt at time of evaluation include: L4-L5 fusion, chronic pain syndrome, asthma, dysphagia, depression, anxiety, cluster headaches, CVA, cervical radiculopathy, peripheral polyneuropathy, chronic use of opioids  Personal factors affecting DC include: lives in 3 story house and positive fall history   At baseline, pt mobilizes ind w/ no AD, and w/ 2-3 fall(s) in the previous 6 months  Upon evaluation, pt presents w/ the following deficits: weakness, altered sensation, impaired balance, decreased endurance, pain limiting functional mobility and gait deviations  Pt currently requires mos I for bed mobility, supervision for transfers, and supervision w/ no AD for ambulation  Pt's clinical presentation is unstable/unpredictable due to limited tolerance to ambulation, pain impacting overall mobility status, need for input for mobility technique, recent drastic decline in mobility compared to baseline and recent history of falls  Pt is at an increased risk of falls due to impaired sensation, pain limiting mobility  From a PT/mobility standpoint, given the above findings, DC recommendation is: Home w/ OPPT pending pain management  During current admission, pt will benefit from continued skilled inpatient PT in the acute care setting in order to address the above deficits and to maximize function and mobility prior to DC from acute care  Goals   Patient Goals to have less pain   STG Expiration Date 10/29/22   Short Term Goal #1 Pt will: perform bed mobility independently to decrease pt's burden of care and increase pt's independence w/ repositioning in bed; perform transfers w/ mod I to increase pt's OOB mobility; ambulate at least 250' w/ LRAD and mod I to increase pt's ambulatory endurance/tolerance; negotiate 12 steps (full flight) w/ UE support and mod I to facilitate pt returning to previous living environment; increase all balance ratings by at least 1 grade to decrease pt's risk of falls   PT Treatment Day 0   Plan   Treatment/Interventions Functional transfer training;LE strengthening/ROM; Elevations; Therapeutic exercise; Endurance training;Patient/family training;Equipment eval/education; Bed mobility;Gait training   PT Frequency 2-3x/wk   Recommendation   PT Discharge Recommendation Home with outpatient rehabilitation Additional Comments Option available for pt to enter house w/o negotiation stairs and remaining on 1st floor   AM-PAC Basic Mobility Inpatient   Turning in Bed Without Bedrails 4   Lying on Back to Sitting on Edge of Flat Bed 4   Moving Bed to Chair 3   Standing Up From Chair 3   Walk in Room 3   Climb 3-5 Stairs 2   Basic Mobility Inpatient Raw Score 19   Basic Mobility Standardized Score 42 48   Highest Level Of Mobility   JH-HLM Goal 6: Walk 10 steps or more   JH-HLM Achieved 7: Walk 25 feet or more   End of Consult   Patient Position at End of Consult Supine; All needs within [facility], pt's family remaining present in room)       The patient's AM-PAC Basic Mobility Inpatient Short Form Raw Score is 19  A Raw score of greater than 16 suggests the patient may benefit from discharge to home  Please also refer to the recommendation of the Physical Therapist for safe discharge planning      Pt will benefit from skilled inpatient PT during this admission in order to facilitate progress towards goals and to maximize functional independence prior to [address] rec: OPPT pending pain management        Nathaniel Llamas, PT, DPT  10/19/22

## 2022-10-19 NOTE — ASSESSMENT & PLAN NOTE
· WBC 17K with neutrophils 81%   · Mildly elevated Hct, RBCs, and platelets  · Appears chronic    Has been trending up since 2020 per chart review  · Rule out infectious causes vs reactive

## 2022-10-19 NOTE — ASSESSMENT & PLAN NOTE
· Patient with 12 prior back surgeries and prior spinal cord stimulator  · Pain now controlled with Oxy 10 mg Q6, Valium 5 mg Q12, and medical marijuana   · PDMP reviewed, PCP in charge on pain control  · Now presenting with significantly increased pain  · See back pain plan for more details  · Would benefit from outpatient follow up with pain management, patient agreeable to plan

## 2022-10-19 NOTE — PLAN OF CARE
Problem: PAIN - ADULT  Goal: Verbalizes/displays adequate comfort level or baseline comfort level  Description: Interventions:  - Encourage patient to monitor pain and request assistance  - Assess pain using appropriate pain scale  - Administer analgesics based on type and severity of pain and evaluate response  - Implement non-pharmacological measures as appropriate and evaluate response  - Consider cultural and social influences on pain and pain management  - Notify physician/advanced practitioner if interventions unsuccessful or patient reports new pain  Outcome: Progressing     Problem: INFECTION - ADULT  Goal: Absence or prevention of progression during hospitalization  Description: INTERVENTIONS:  - Assess and monitor for signs and symptoms of infection  - Monitor lab/diagnostic results  - Monitor all insertion sites, i e  indwelling lines, tubes, and drains  - Monitor endotracheal if appropriate and nasal secretions for changes in amount and color  - [place] appropriate cooling/warming therapies per order  - Administer medications as ordered  - Instruct and encourage patient and family to use good hand hygiene technique  - Identify and instruct in appropriate isolation precautions for identified infection/condition  Outcome: Progressing     Problem: SAFETY ADULT  Goal: Patient will remain free of falls  Description: INTERVENTIONS:  - Educate patient/family on patient safety including physical limitations  - Instruct patient to call for assistance with activity   - Consult OT/PT to assist with strengthening/mobility   - Keep Call bell within reach  - Keep bed low and locked with side rails adjusted as appropriate  - Keep care items and personal belongings within reach  - Initiate and maintain comfort rounds  - Make Fall Risk Sign visible to staff  - Offer Toileting every 2 Hours, in advance of need  - Initiate/Maintain bedalarm  - Obtain necessary fall risk management equipment:bed  - Apply yellow socks and bracelet for high fall risk patients  - Consider moving patient to room near nurses station  Outcome: Progressing

## 2022-10-19 NOTE — ASSESSMENT & PLAN NOTE
· WBC 17K with neutrophils 81%   · Mildly elevated Hct, RBCs, and platelets  · Appears chronic  Has been trending up since 2020 per chart review  · Suspect leukocytosis is reactive secondary to pain  Improved to 12 today    Procalcitonin negative

## 2022-10-19 NOTE — PROGRESS NOTES
[facility]  Progress Note - Martin Nail 1970, 45 y o  male MRN: [id number]  Unit/Bed#: -01 Encounter: [id number]  Primary Care Provider: Vikas Tovar MD   Date and time admitted to hospital: 10/18/2022  4:42 PM    * Back pain  Assessment & Plan  · Presented to ED with acutely worsening severe back pain that began Friday morning  · Patient reports everything was "normal" until he attempted to ambulate Friday morning  · Denies sleeping unusually, trauma, twisting to stand, unusual movements  · Patient reports worse left sided numbness from buttocks to perianal region to left thigh  · Hx of anterior and posterior L4-L5 fusion, prior spinal cord stimulator since removed, 12 prior back surgeries, chronic numbness  · CT lumbar spine - chronic disc and facet degenerative changes  Postsurgical change from fusion and laminectomies, decompression of the central canal  · MRI lumbar spine - mildly progressive chronic disc and facet degenerative change  No evidence of discitis, osteomyelitis, epidural or spinal fluid collection  · No evidence of cauda equina on MRI and ESR normal  · Continue scheduled Robaxin and Tylenol  · Continue home pain medications  · Patient agree able to starting duloxetine for pain control  Will start duloxetine 30 mg daily for 7 days then increase to 60 mg daily  · 10/19 also complaining of sciatic pain going down right leg   Will treat with 7 days of prednisone 60 mg       Ambulatory dysfunction  Assessment & Plan  · Patient reports decreased ability to ambulate since Friday morning when his pain acutely worsened  · Likely due to pain  · Reports worsening unbalanced feeling while ambulating to ED  · Consult PT/OT/CM for possible STR vs HHS evaluation    Chronic obstructive asthma ([facility] [address] )  Assessment & Plan  · Does not appear to be in an exacerbation   · Continue home Breo ellipta and PRN Albuterol     Esophageal dysphagia  Assessment & Plan  · Patient reports "choking episodes" while consuming foods and drinks  · Actively following with GI outpatient  · Consult SLP for assistance in hospital - recommend regular diet thin liquids    Chronic, continuous use of opioids  Assessment & Plan  · PDMP reviewed  · Continue home Oxy 10 mg Q6   · Managed by his PCP  · Bowel regimen     Chronic pain syndrome  Assessment & Plan  · Patient with 12 prior back surgeries and prior spinal cord stimulator  · Pain now controlled with Oxy 10 mg Q6, Valium 5 mg Q12, and medical marijuana   · PDMP reviewed, PCP in charge on pain control  · Now presenting with significantly increased pain  · See back pain plan for more details  · Would benefit from outpatient follow up with pain management, patient agreeable to plan     Leukocytosis-resolved as of 10/19/2022  Assessment & Plan  · WBC 17K with neutrophils 81%   · Mildly elevated Hct, RBCs, and platelets  · Appears chronic  Has been trending up since 2020 per chart review  · Suspect leukocytosis is reactive secondary to pain  Improved to 12 today  Procalcitonin negative          VTE Pharmacologic Prophylaxis: VTE Score: 4 Moderate Risk (Score 3-4) - Pharmacological DVT Prophylaxis Ordered: enoxaparin (Lovenox)  Patient Centered Rounds: I performed bedside rounds with nursing staff today  Discussions with Specialists or Other Care Team Provider: case management    Education and Discussions with Family / Patient: Patient declined call to   Time Spent for Care: 30 minutes  More than 50% of total time spent on counseling and coordination of care as described above  Current Length of Stay: 0 day(s)  Current Patient Status: Observation   Certification Statement: The patient will continue to require additional inpatient hospital stay due to uncontrolled back pain  Discharge Plan: Anticipate discharge in 24-48 hrs to home  Code Status: Level 1 - Full Code    Subjective:   Patient seen examined at bedside    No acute events overnight  Admits to having low back pain  Denies any fevers chills bowel or bladder incontinence    Objective:     Vitals:   Temp (24hrs), Av 3 °F (36 8 °C), Min:98 °F (36 7 °C), Max:98 7 °F (37 1 °C)    Temp:  [98 °F (36 7 °C)-98 7 °F (37 1 °C)] 98 2 °F (36 8 °C)  HR:  [] 68  Resp:  [16-20] 16  BP: (128-158)/() 142/101  SpO2:  [97 %-99 %] 98 %  Body mass index is 29 14 kg/m²  Input and Output Summary (last 24 hours):   No intake or output data in the 24 hours ending 10/19/22 1305    Physical Exam:   Physical Exam  Vitals reviewed  HENT:      Head: Normocephalic and atraumatic  Right Ear: External ear normal       Left Ear: External ear normal       Nose: Nose normal       Mouth/Throat:      Mouth: Mucous membranes are moist       Pharynx: Oropharynx is clear  Eyes:      Extraocular Movements: Extraocular movements intact  Cardiovascular:      Rate and Rhythm: Normal rate and regular rhythm  Heart sounds: Normal heart sounds  Pulmonary:      Effort: Pulmonary effort is normal       Breath sounds: Normal breath sounds  Abdominal:      General: Abdomen is flat  Palpations: Abdomen is soft  Tenderness: There is no abdominal tenderness  Musculoskeletal:      Cervical back: Normal range of motion  Right lower leg: No edema  Left lower leg: No edema  Skin:     General: Skin is warm and dry  Neurological:      Mental Status: He is alert  Mental status is at baseline     Psychiatric:         Mood and Affect: Mood normal          Behavior: Behavior normal           Additional Data:     Labs:  Results from last 7 days   Lab Units 10/19/22  0605   WBC Thousand/uL 12 04*   HEMOGLOBIN g/dL 15 4   HEMATOCRIT % 47 8   PLATELETS Thousands/uL 380   NEUTROS PCT % 53   LYMPHS PCT % 33   MONOS PCT % 8   EOS PCT % 4     Results from last 7 days   Lab Units 10/19/22  0605 10/18/22  1725   SODIUM mmol/L 142 137   POTASSIUM mmol/L 4 3 4 2   CHLORIDE mmol/L 104 105   CO2 mmol/L 32 26   BUN mg/dL 22 20   CREATININE mg/dL 1 24 1 23   ANION GAP mmol/L 6 6   CALCIUM mg/dL 9 3 9 5   ALBUMIN g/dL  --  4 4   TOTAL BILIRUBIN mg/dL  --  0 40   ALK PHOS U/L  --  78   ALT U/L  --  27   AST U/L  --  21   GLUCOSE RANDOM mg/dL 110 137     Results from last 7 days   Lab Units 10/18/22  1725   INR  0 88             Results from last 7 days   Lab Units 10/19/22  0605   PROCALCITONIN ng/ml <0 05       Lines/Drains:  Invasive Devices  Report    Peripheral Intravenous Line  Duration           Peripheral IV 10/18/22 Right Forearm <1 day                      Imaging: Reviewed radiology reports from this admission including: MRI spine    Recent Cultures (last 7 days):         Last 24 Hours Medication List:   Current Facility-Administered Medications   Medication Dose Route Frequency Provider Last Rate   • acetaminophen  975 mg Oral 1500 [facility] CHAVA Driver     • albuterol  2 puff Inhalation Q6H PRN CHAVA Fitzgerald     • aluminum-magnesium hydroxide-simethicone  30 mL Oral Q6H PRN CHAVA Fitzgerald     • diazepam  5 mg Oral Q12H PRN CHAVA Fitzgerald     • docusate sodium  100 mg Oral BID CHAVA Fitzgerald     • DULoxetine  30 mg Oral Daily Almaz Lane DO     • [START ON 10/26/2022] DULoxetine  60 mg Oral Daily Almaz Lane DO     • enoxaparin  40 mg Subcutaneous Daily CHAVA Torres     • fluticasone-vilanterol  1 puff Inhalation Daily CHAVA Fitzgerald     • HYDROmorphone  0 5 mg Intravenous Q1H PRN CHAVA Fitzgerald     • hydrOXYzine HCL  25 mg Oral Q6H PRN CHAVA Fitzgerald     • lidocaine  1 patch Topical Daily [facility] Neisha, [place]     • methocarbamol  500 mg Oral Q6H [facility] CHAVA Loera     • naloxone  0 04 mg Intravenous Q1MIN PRN CHAVA Fitzgerald     • nicotine  1 patch Transdermal Daily CHAVA Torres     • ondansetron  4 mg Intravenous Q4H PRN Therese Mcqueen Claudeen Cooper, CRNP     • oxyCODONE  10 mg Oral Q6H PRN CHAVA Galloway     • polyethylene glycol  17 g Oral Daily CHAVA Torres     • predniSONE  60 mg Oral Daily Almaz Lane DO     • senna-docusate sodium  2 tablet Oral Daily CHAVA Galloway          Today, Patient Was Seen By: Braden Delgado    **Please Note: This note may have been constructed using a voice recognition system  **

## 2022-10-19 NOTE — PLAN OF CARE
Problem: OCCUPATIONAL THERAPY ADULT  Goal: Performs self-care activities at highest level of function for planned discharge setting  See evaluation for individualized goals  Description: Treatment Interventions: ADL retraining, Functional transfer training, Endurance training, Patient/family training  Equipment Recommended: Bedside commode (if unable to complete steps to 2nd floor bathroom prior to D/C)       See flowsheet documentation for full assessment, interventions and recommendations  Note: Limitation: Decreased ADL status, Decreased endurance, Decreased sensation, Decreased self-care trans, Decreased high-level ADLs  Prognosis: Good  Assessment: Patient is a 45 y o  male seen for OT evaluation at [place] [address] following admission on 10/18/2022  s/p Back pain  Comorbidities and significant PMHx impacting functional performance include: chronic pain syndrome, continuous use of opioids, esophageal dysphagia, leukocytosis, chronic obstructive, asthma, ambulatory dysfunction, , hx of lumbar L4-5 fusion, cervical radiculopathy, , peripheral neuropathy  Patient presents with active orders for OT eval and treat and Up with assistance   Performed at least 2 patient identifiers during session including name and wristband  PTA, pt reports being (I) c ADL/IADLs, (-) AD   Lives c mother in [facility]  (+) driving Upon initial evaluation, patient is independet for UB ADLs, min A for LB ADLs, and supervisionh for transfers and functional mobility household distance with no AD  Based on functional eval, pt presents with intact  attention, intact  safety awareness, intact  problem solving skills, and intact   memory   Occupational performance is affected by the following deficits: decreased standing tolerance for self care tasks , decreased functional reach , decreased activity tolerance  and (+) pain  Based on these findings, functional performance deficits, and medical complexity pt has been identified as a high complexity evaluation  Personal factors impacting performance include: decreased steps to enter home and FOS to second floor  Patient would benefit from OT services within the acute care setting to maximize level of functional independence in the following occupational areas toileting, dressing , bed mobility , functional mobility, transfer to all surfaces and fall prevention   From OT standpoint, recommendation at time of D/C would be return to previous environment with no rehabilitation needs       OT Discharge Recommendation: No rehabilitation needs        Ivelisse Griffin

## 2022-10-19 NOTE — ASSESSMENT & PLAN NOTE
Endoscopy H&P    Procedure: Colonoscopy    Patient reports no interval change in bowel habits, hematochezia, melena, abdominal pain, or thin stools.     FHX of CRC - denies    Last colonoscopy - 2011 (Pt is unsure of results)      Past Medical History:   Diagnosis Date    Breast cancer     2004       Sedation Problems: No    Family History   Problem Relation Age of Onset    Hypertension Father     Hypertension Mother     Cancer Maternal Aunt         breast cancer       Fam Hx of Sedation Problems: No    Social History     Socioeconomic History    Marital status:      Spouse name: Not on file    Number of children: Not on file    Years of education: Not on file    Highest education level: Not on file   Occupational History    Not on file   Social Needs    Financial resource strain: Not on file    Food insecurity:     Worry: Not on file     Inability: Not on file    Transportation needs:     Medical: Not on file     Non-medical: Not on file   Tobacco Use    Smoking status: Never Smoker    Smokeless tobacco: Never Used   Substance and Sexual Activity    Alcohol use: No     Frequency: Never    Drug use: No    Sexual activity: Not on file   Lifestyle    Physical activity:     Days per week: Not on file     Minutes per session: Not on file    Stress: Not on file   Relationships    Social connections:     Talks on phone: Not on file     Gets together: Not on file     Attends Hoahaoism service: Not on file     Active member of club or organization: Not on file     Attends meetings of clubs or organizations: Not on file     Relationship status: Not on file   Other Topics Concern    Not on file   Social History Narrative    Not on file       Review of patient's allergies indicates:   Allergen Reactions    Sulfamethoxazole-trimethoprim Hives and Itching         Current Facility-Administered Medications:     0.9%  NaCl infusion,  · Patient reports decreased ability to ambulate since Friday morning when his pain acutely worsened  · Likely due to pain  · Reports worsening unbalanced feeling while ambulating to ED  · Consult PT/OT/CM for possible STR vs HHS evaluation , Intravenous, Continuous, Estephanie Pratt MD, Last Rate: 10 mL/hr at 12/12/19 0959    Review of Systems:  Respiratory ROS: no cough, shortness of breath, or wheezing  Cardiovascular ROS: no chest pain or dyspnea on exertion  Gastrointestinal ROS: no abdominal pain, change in bowel habits, or black or bloody stools  Musculoskeletal ROS: negative  Neurological ROS: no TIA or stroke symptoms        Physical Exam:  General: no distress  Head: normocephalic  Airway:  normal oropharynx, airway normal  Neck: supple, symmetrical, trachea midline  Lungs:  clear to auscultation bilaterally and normal respiratory effort  Heart: regular rate and rhythm, S1, S2 normal, no murmur, rub or gallop  Abdomen: soft, non-tender non-distented; bowel sounds normal; no masses,  no organomegaly  Extremities: no cyanosis or edema, or clubbing      Deep Sedation: Mallampati Score per anesthesia     Sedation Plan: Choice     ASA: II    Plan:  - Proceed with screening colonoscopy  - Risks, benefits, alternatives to the procedure discussed with the patient who wishes to proceed  - All questions and concerns addressed  - Consents obtained today      Michael Russell M.D.  General Surgery - PGY1

## 2022-10-19 NOTE — PLAN OF CARE
Problem: PAIN - ADULT  Goal: Verbalizes/displays adequate comfort level or baseline comfort level  Description: Interventions:  - Encourage patient to monitor pain and request assistance  - Assess pain using appropriate pain scale  - Administer analgesics based on type and severity of pain and evaluate response  - Implement non-pharmacological measures as appropriate and evaluate response  - Consider cultural and social influences on pain and pain management  - Notify physician/advanced practitioner if interventions unsuccessful or patient reports new pain  Outcome: Progressing     Problem: INFECTION - ADULT  Goal: Absence or prevention of progression during hospitalization  Description: INTERVENTIONS:  - Assess and monitor for signs and symptoms of infection  - Monitor lab/diagnostic results  - Monitor all insertion sites, i e  indwelling lines, tubes, and drains  - Monitor endotracheal if appropriate and nasal secretions for changes in amount and color  - [place] appropriate cooling/warming therapies per order  - Administer medications as ordered  - Instruct and encourage patient and family to use good hand hygiene technique  - Identify and instruct in appropriate isolation precautions for identified infection/condition  Outcome: Progressing  Goal: Absence of fever/infection during neutropenic period  Description: INTERVENTIONS:  - Monitor WBC    Outcome: Progressing     Problem: SAFETY ADULT  Goal: Patient will remain free of falls  Description: INTERVENTIONS:  - Educate patient/family on patient safety including physical limitations  - Instruct patient to call for assistance with activity   - Consult OT/PT to assist with strengthening/mobility   - Keep Call bell within reach  - Keep bed low and locked with side rails adjusted as appropriate  - Keep care items and personal belongings within reach  - Initiate and maintain comfort rounds  - Make Fall Risk Sign visible to staff  - Offer Toileting every 2 Hours, in advance of need  - Initiate/Maintain alarm  - Obtain necessary fall risk management equipment:   - Apply yellow socks and bracelet for high fall risk patients  - Consider moving patient to room near nurses station  Outcome: Progressing  Goal: Maintain or return to baseline ADL function  Description: INTERVENTIONS:  -  Assess patient's ability to carry out ADLs; assess patient's baseline for ADL function and identify physical deficits which impact ability to perform ADLs (bathing, care of mouth/teeth, toileting, grooming, dressing, etc )  - Assess/evaluate cause of self-care deficits   - Assess range of motion  - Assess patient's mobility; develop plan if impaired  - Assess patient's need for assistive devices and provide as appropriate  - Encourage maximum independence but intervene and supervise when necessary  - Involve family in performance of ADLs  - Assess for home care needs following discharge   - Consider OT consult to assist with ADL evaluation and planning for discharge  - Provide patient education as appropriate  Outcome: Progressing  Goal: Maintains/Returns to pre admission functional level  Description: INTERVENTIONS:  - Perform BMAT or MOVE assessment daily    - Set and communicate daily mobility goal to care team and patient/family/caregiver  - Collaborate with rehabilitation services on mobility goals if consulted  - Perform Range of Motion 2 times a day  - Reposition patient every 2 hours    - Dangle patient 2 times a day  - Stand patient 2 times a day  - Ambulate patient 2 times a day  - Out of bed to chair 2 times a day   - Out of bed for meals 2 times a day  - Out of bed for toileting  - Record patient progress and toleration of activity level   Outcome: Progressing     Problem: DISCHARGE PLANNING  Goal: Discharge to home or other facility with appropriate resources  Description: INTERVENTIONS:  - Identify barriers to discharge w/patient and caregiver  - Arrange for needed discharge resources and transportation as appropriate  - Identify discharge learning needs (meds, wound care, etc )  - Arrange for interpretive services to assist at discharge as needed  - Refer to Case Management Department for coordinating discharge planning if the patient needs post-hospital services based on physician/advanced practitioner order or complex needs related to functional status, cognitive ability, or social support system  Outcome: Progressing     Problem: Knowledge Deficit  Goal: Patient/family/caregiver demonstrates understanding of disease process, treatment plan, medications, and discharge instructions  Description: Complete learning assessment and assess knowledge base    Interventions:  - Provide teaching at level of understanding  - Provide teaching via preferred learning methods  Outcome: Progressing     Problem: Potential for Falls  Goal: Patient will remain free of falls  Description: INTERVENTIONS:  - Educate patient/family on patient safety including physical limitations  - Instruct patient to call for assistance with activity   - Consult OT/PT to assist with strengthening/mobility   - Keep Call bell within reach  - Keep bed low and locked with side rails adjusted as appropriate  - Keep care items and personal belongings within reach  - Initiate and maintain comfort rounds  - Make Fall Risk Sign visible to staff  - Offer Toileting every 2 Hours, in advance of need  - Initiate/Maintain alarm  - Obtain necessary fall risk management equipment:   - Apply yellow socks and bracelet for high fall risk patients  - Consider moving patient to room near nurses station  Outcome: Progressing     Problem: MOBILITY - ADULT  Goal: Maintain or return to baseline ADL function  Description: INTERVENTIONS:  -  Assess patient's ability to carry out ADLs; assess patient's baseline for ADL function and identify physical deficits which impact ability to perform ADLs (bathing, care of mouth/teeth, toileting, grooming, dressing, etc )  - Assess/evaluate cause of self-care deficits   - Assess range of motion  - Assess patient's mobility; develop plan if impaired  - Assess patient's need for assistive devices and provide as appropriate  - Encourage maximum independence but intervene and supervise when necessary  - Involve family in performance of ADLs  - Assess for home care needs following discharge   - Consider OT consult to assist with ADL evaluation and planning for discharge  - Provide patient education as appropriate  Outcome: Progressing  Goal: Maintains/Returns to pre admission functional level  Description: INTERVENTIONS:  - Perform BMAT or MOVE assessment daily    - Set and communicate daily mobility goal to care team and patient/family/caregiver  - Collaborate with rehabilitation services on mobility goals if consulted  - Perform Range of Motion 2 times a day  - Reposition patient every 2 hours    - Dangle patient 2 times a day  - Stand patient 2 times a day  - Ambulate patient 2 times a day  - Out of bed to chair 2 times a day   - Out of bed for meals 2 times a day  - Out of bed for toileting  - Record patient progress and toleration of activity level   Outcome: Progressing

## 2022-10-19 NOTE — OCCUPATIONAL THERAPY NOTE
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    Dysphagia    Intractable episodic cluster headache    Allergic rhinitis    Abnormal head CT    Blood pressure elevated without history of HTN    Chronic right SI joint pain    Depression with anxiety    Dyshidrotic dermatitis    Erectile dysfunction of non-organic origin    Moderate persistent asthma without complication    Post laminectomy syndrome    TMJ syndrome    Dental abscess    Screening for STD (sexually transmitted disease)    Lingual tonsil hypertrophy    Sleep apnea    Other chest pain    Chronic, continuous use of opioids    Chronic prescription benzodiazepine use    Medical marijuana use    Hoarseness    Tobacco abuse    Elevated hematocrit    Elevated platelet count    Hypercontractile esophagus    Left shoulder pain    Cervical radiculopathy    Elevated serum creatinine    Pneumonia due to infectious organism    Esophageal dysphagia    Incomplete tear of right rotator cuff    Viral upper respiratory tract infection    Partial tear of right subscapularis tendon    Therapeutic opioid induced constipation    Exposure to sexually transmitted disease (STD)    Cough    Gastritis without bleeding    Peripheral polyneuropathy    Seborrheic keratosis    Abnormal urinalysis    Leukocytosis    Chronic obstructive asthma ([address] )    Status post cystoscopy with ureteral stent placement    Acute viral syndrome    Encounter for medication monitoring    History of colon polyps    Ambulatory dysfunction       Past Medical History:   Diagnosis Date    Allergic     seasonal     Allergic rhinitis     Anxiety     Arthritis     Back pain     Chronic obstructive asthma (HCC)     Chronic pain syndrome     Cluster headaches     Colon polyp     Depression Insomnia     Kidney stones     Laryngospasm     Leukocytosis     Migraine     Myocardial infarction ([facility] )     Nephrolithiasis     Organic impotence     Pneumonia due to infectious organism 1/16/2019    Seasonal allergies     Sleep apnea     does not use CPAP    Stroke ([facility] [address] ) 2015    TMJ (temporomandibular joint syndrome)     Wheezing     last assessed 05/19/17       Past Surgical History:   Procedure Laterality Date    BACK SURGERY      *9, [id number], nervectomy 2006, total of 10    BUCCAL MASS EXCISION N/A 3/26/2018    Procedure: BIOPSY LINGUAL TONSIL (FLOW/ STANDARD PATH)  EVAL UNDER ANESTHESIA;  Surgeon: Chauncey Johnston MD;  [facility];  Service: ENT    COLONOSCOPY      FL RETROGRADE PYELOGRAM  10/21/2020    HERNIA REPAIR      KIDNEY SURGERY      KNEE ARTHROSCOPY      LITHOTRIPSY      multiple    LITHOTRIPSY      LUMBAR [address]  2015    MANDIBLE FRACTURE SURGERY      titanium plate    PELVIC SYMPHYSIS FUSION      OR CYSTO/URETERO W/LITHOTRIPSY &INDWELL STENT INSRT Right 10/21/2020    Procedure: CYSTOSCOPY URETEROSCOPY WITH LITHOTRIPSY HOLMIUM LASER, RETROGRADE PYELOGRAM AND INSERTION STENT URETERAL;  Surgeon: Bess Cuevas MD;  [facility];  Service: Urology    OR ESOPHAGOGASTRODUODENOSCOPY TRANSORAL DIAGNOSTIC N/A 2/22/2018    Procedure: ESOPHAGOGASTRODUODENOSCOPY (EGD); Surgeon: Lakisha Osorio MD;  [facility]: [facility]; Service: Gastroenterology    OR ESOPHAGOGASTRODUODENOSCOPY TRANSORAL DIAGNOSTIC N/A 4/1/2019    Procedure: EGD W/ DILATION;  Surgeon: Lakisha Osorio MD;  [facility];   Service: Gastroenterology    OR REVISE/REMOVE SPINAL NEUROSTIM/ Left 6/9/2017    Procedure: REMOVAL OF A THORACIC SCS PLACED VIA LAMINECTOMY AND REMOVAL LEFT BUTTOCK GENERATOR; IMPULSE MONITORING;  Surgeon: Jerry Jara MD;  [facility];  Service: Neurosurgery    OR SHLDR ARTHROSCOP,SURG,W/ROTAT CUFF REPR Right 4/4/2019    Procedure: RIGHT SHOULDER ARTHROSCOPIC SUBSCAPULARIS TENDON REPAIR;  Surgeon: Jesse Jules MD;  [facility];  Service: Orthopedics    TN SURG IMPLNT [address] N/A 9/8/2016    Procedure: DORSAL COLUMN STIMULATOR PLACEMENT (IMPULSE MONITORING); Surgeon: Lynda Bailey MD;  [facility];  Service: Orthopedics    SACROILIAC JOINT FUSION  2015    SHOULDER SURGERY Left     2    UPPER GASTROINTESTINAL ENDOSCOPY          10/19/22 1028   OT Last Visit   OT Visit Date 10/19/22   Note Type   Note type Evaluation   Pain Assessment   Pain Assessment Tool 0-10   Pain Score 9   Pain Location/Orientation Orientation: Lower; Location: Back   Pain Radiating Towards radiating down L leg   Pain Onset/Description Onset: Ongoing;Frequency: Constant/Continuous; Descriptor: Merrilyn Carmelo; Descriptor: Stabbing   Effect of Pain on Daily Activities tolerance for repositioning, activity tolerance, functional mobility   Hospital Pain Intervention(s) Medication (See MAR); Repositioned; Ambulation/increased activity; Emotional support   Multiple Pain Sites No   Restrictions/Precautions   Weight Bearing Precautions Per Order No   Other Precautions Pain; Fall Risk   Home Living   Type of Home Apartment   Home Layout Multi-level;Stairs to enter with rails;Bed/bath upstairs; Laundry in basement  (8-10 WESLEY via front of house, 0 WESLEY through back entrance (front preferred)  Bedroom on 3rd floor, bath on 2nd floor  Pt reports able to stay on first floor c futon if needed)   Bathroom Shower/Tub Tub/shower unit   Bathroom Toilet Standard   Bathroom Equipment Grab bars in shower   Bathroom Accessibility Accessible   Home Equipment Walker;Cane  (no AD used at baseline)   Prior Function   Level of Preemption Independent with ADLs; Independent with functional mobility; Independent with IADLS   Lives With Family;Significant other  (mother, fiance)   Receives Help From Family   IADLs Independent with driving; Independent with meal prep; Independent with medication management   Falls in the last 6 months 1 to 4  (2-3 per pt, reports no injuries)   Vocational On disability  (used to work as  in [place] and )   Comments Pt reports being (I) with ADL/IADLs at baseline, ambulates without AD  Intermittently limited by back pain, however typically tolerable  (+) driving   Lifestyle   Autonomy PTA, pt reports being (I) c ADL/IADLs, (-) AD   Lives c mother in [facility]  (+) driving   Reciprocal Relationships mother, roberta   Service to Others on disability; 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    [address] enjoys riding his motorcycle, going camping in [address]   Additional Pertinent History Pt presenting to ED with c/o back pain down L leg x4 days  Hx of 12 back surgeries per EMR   Family/Caregiver Present Yes  (mother)   Subjective   Subjective pt noted to be in high amounts of pain throughout evaluation, tremor and persperation noted  However, pt agreeable for participation in session, stating "Ill do whatever I can to help get better"   ADL   Eating Assistance 7  [address] 5  Supervision/Setup    [address] 5  Supervision/Setup   LB [address] Deficit Don/doff R sock; Don/doff L sock; Increased time to complete  (50% A to don R sock, total A to don L sock)   Toileting Assistance  5  Supervision/Setup   Functional Assistance 5  Supervision/Setup   Additional Comments Pt limited by pain   Bed Mobility   Supine to Sit 6  Modified independent   Additional items Increased time required;Assist x 1  (pt demonstrates use of compensatory movements to minimize trunk rotation)   Sit to Supine 6  Modified independent   Additional items Increased time required  (use of RLE to lift LLE onto bed)   Transfers   Sit to Stand 5  Supervision   Additional items Assist x 1; Increased time required   Stand to Sit 5  Supervision   Additional items Assist x 1; Increased time required;Armrests   Additional Comments sit<>stand transfer x2 completed, no use of AD  Use of armrests for controlled descent without cueing   Functional Mobility   Functional Mobility 5  Supervision   Additional Comments Functional mobility household distance x2 with functional rest break  Pt requires increased time with gaurded movements, compensatory movements d/t pain  Pt reporting high amounts of pain throughout session  Persperating and shaking, /101  KRYSTAL Daniels made aware promptly  Additional items   (no AD)   Balance   Static Sitting Good   Dynamic Sitting Good   Static Standing Fair +   Dynamic Standing Fair +   Activity Tolerance   Activity Tolerance Patient limited by pain   Medical Staff Made Aware Pt benefited from co-session of skilled OT and PT therapists in order to most appropriately address functional deficits d/t regression from functioning level prior to admission , acute medical complexity  and decreased activity tolerance  OT/PT objectives were addressed separately; please see PT note for specific goal areas targeted  Nurse Made Aware KRYSTAL Daniels pre/post session   RUE Assessment   RUE Assessment WFL  (full AROM, MMT 4+/5 based on functional assessment)   LUE Assessment   LUE Assessment WFL  (full AROM, MMT 4+/5 based on functional assessment)   Hand Function   Gross Motor Coordination Functional   Fine Motor Coordination Functional   Sensation   Light Touch Severe deficits in the RLE; Severe deficits in the LLE  (neuropathy from knees down c intermittent tingling per pt)   Cognition   Overall Cognitive Status WFL   Arousal/Participation Alert;Arousable   Attention Within functional limits   Orientation Level Oriented X4   Memory Within functional limits   Following Commands Follows multistep commands without difficulty   Comments Pt agreeable to OT session and OOB mobility     Assessment   Limitation Decreased ADL status; Decreased endurance;Decreased sensation;Decreased self-care trans;Decreased high-level ADLs   Prognosis Good   Assessment Patient is a 45 y o  male seen for OT evaluation at [address] [place] [address] following admission on 10/18/2022  s/p Back pain  Comorbidities and significant PMHx impacting functional performance include: chronic pain syndrome, continuous use of opioids, esophageal dysphagia, leukocytosis, chronic obstructive, asthma, ambulatory dysfunction, , hx of lumbar L4-5 fusion, cervical radiculopathy, , peripheral neuropathy  Patient presents with active orders for OT eval and treat and Up with assistance   Performed at least 2 patient identifiers during session including name and wristband  PTA, pt reports being (I) c ADL/IADLs, (-) AD   Lives c mother in [facility]  (+) driving Upon initial evaluation, patient is independet for UB ADLs, min A for LB ADLs, and supervisionh for transfers and functional mobility household distance with no AD  Based on functional eval, pt presents with intact  attention, intact  safety awareness, intact  problem solving skills, and intact   memory  Occupational performance is affected by the following deficits: decreased standing tolerance for self care tasks , decreased functional reach , decreased activity tolerance  and (+) pain  Based on these findings, functional performance deficits, and medical complexity pt has been identified as a high complexity evaluation  Personal factors impacting performance include: decreased steps to enter home and FOS to second floor  Patient would benefit from OT services within the acute care setting to maximize level of functional independence in the following occupational areas toileting, dressing , bed mobility , functional mobility, transfer to all surfaces and fall prevention   From OT standpoint, recommendation at time of D/C would be return to previous environment with no rehabilitation needs     Goals Patient Goals for comfort, decreased pain  to improve mobility status   Plan   Treatment Interventions ADL retraining;Functional transfer training; Endurance training;Patient/family training   Goal Expiration Date 10/29/22   OT Treatment Day 0   OT Frequency 2-3x/wk   Recommendation   OT Discharge Recommendation No rehabilitation needs   Equipment Recommended Bedside commode  (if unable to complete steps to 2nd floor bathroom prior to D/C)   Commode Type Standard   Additional Comments  The patient's raw score on the AM-PAC Daily Activity inpatient short form is 21, standardized score is 44 27, greater than 39 4  Patients at this level are likely to benefit from discharge to home  Please refer to the recommendation of the Occupational Therapist for safe discharge planning  AM-PAC Daily Activity Inpatient   Lower Body Dressing 3   Bathing 3   Toileting 3   Upper Body Dressing 4   Grooming 4   Eating 4   Daily Activity Raw Score 21   Daily Activity Standardized Score (Calc for Raw Score >=11) 44 27   AM-PAC Applied Cognition Inpatient   Following a Speech/Presentation 4   Understanding Ordinary Conversation 4   Taking Medications 4   Remembering Where Things Are Placed or Put Away 4   Remembering List of 4-5 Errands 4   Taking Care of Complicated Tasks 4   Applied Cognition Raw Score 24   Applied Cognition Standardized Score 62 21   End of Consult   Education Provided Yes;Family or social support of family present for education by provider  (mother)   Patient Position at End of Consult Supine; All needs within reach   Nurse Communication   Connie Greenfield)     Pt will complete LB ADLs Independent   with use of LHAE as needed for increased ADL independence within 10 days  Pt will verbalize and demonstrate understanding in use of compensatory techniques and use of long handled AE for increased safety and independence during LB ADL tasks        Pt will complete toileting Independent   with use of DME for increased ADL independence within 10 days  Pt will demonstrate proper body mechanics to complete self-care transfers and functional mobility with Independent  and use of AD PRN for increased safety and functional independence within 10 days  Pt will demonstrate standing tolerance of 7 min Independent  and use of AD PRN for increased activity tolerance during ADL/IADL tasks within 10 days  Pt will demonstrate proper body mechanics and fall prevention strategies during 100% of tx sessions for increased safety awareness during ADL/IADLs    Pt will demonstrate use of pain management techniques PRN for increased activity tolerance and engagement       Amanda Sheffield

## 2022-10-19 NOTE — H&P
Nyla U  64   H&P- Jesse Ortiz 1970, 45 y o  male MRN: [id number]  Unit/Bed#: -01 Encounter: [id number]  Primary Care Provider: Maida Jordan MD   Date and time admitted to hospital: 10/18/2022  4:42 PM    * Back pain  Assessment & Plan  · Presented to ED with acutely worsening severe back pain that began Friday morning  · Patient reports everything was "normal" until he attempted to ambulate Friday morning  · Denies sleeping unusually, trauma, twisting to stand, unusual movements  · Patient reports worse left sided numbness from buttocks to perianal region to left thigh  · Hx of anterior and posterior L4-L5 fusion, prior spinal cord stimulator since removed, 12 prior back surgeries, chronic numbness  · CT lumbar spine - chronic disc and facet degenerative changes  Postsurgical change from fusion and laminectomies, decompression of the central canal  · MRI lumbar spine - mildly progressive chronic disc and facet degenerative change  No evidence of discitis, osteomyelitis, epidural or spinal fluid collection  · No evidence of cauda equina on MRI and ESR normal  · Start scheduled Robaxin and Tylenol  · Rule out infectious cause, lyme  · Continue home pain medications    Ambulatory dysfunction  Assessment & Plan  · Patient reports decreased ability to ambulate since Friday morning when his pain acutely worsened  · Likely due to pain  · Reports worsening unbalanced feeling while ambulating to ED  · Consult PT/OT/CM for possible STR vs HHS evaluation    Leukocytosis  Assessment & Plan  · WBC 17K with neutrophils 81%   · Mildly elevated Hct, RBCs, and platelets  · Appears chronic    Has been trending up since 2020 per chart review  · Rule out infectious causes vs reactive    Chronic obstructive asthma ([facility] [address] )  Assessment & Plan  · Does not appear to be in an exacerbation   · Continue home Breo ellipta and PRN Albuterol     Esophageal dysphagia  Assessment & Plan  · Patient reports "choking episodes" while consuming foods and drinks  · Actively following with GI outpatient  · Consult SLP for assistance in hospital    Chronic, continuous use of opioids  Assessment & Plan  · PDMP reviewed  · Continue home Oxy 10 mg Q6   · Managed by his PCP  · Bowel regimen     Chronic pain syndrome  Assessment & Plan  · Patient with 12 prior back surgeries and prior spinal cord stimulator  · Pain now controlled with Oxy 10 mg Q6, Valium 5 mg Q12, and medical marijuana   · PDMP reviewed, PCP in charge on pain control  · Now presenting with significantly increased pain  · See back pain plan for more details  · Would benefit from outpatient follow up with pain management, patient agreeable to plan     VTE Pharmacologic Prophylaxis: VTE Score: 4 Moderate Risk (Score 3-4) - Pharmacological DVT Prophylaxis Ordered: enoxaparin (Lovenox)  Code Status: Level 1 - Full Code   Discussion with family: Updated  (significant other and mother) via phone  Anticipated Length of Stay: Patient will be admitted on an observation basis with an anticipated length of stay of less than 2 midnights secondary to PT/OT evaluation for rehab due to ambulatory dysfunction  Total Time for Visit, including Counseling / Coordination of Care: 60 minutes Greater than 50% of this total time spent on direct patient counseling and coordination of care  Chief Complaint: Back pain    History of Present Illness:  Varun Samano is a 45 y o  male with a PMH of multiple spinal surgeries, asthma, dysphagia, anxiety, chronic pain managed on opioids who presents with 4 day history of acutely severe back pain  Per patient, he awoke Friday morning and attempted to ambulate to the bathroom when he noticed severe pain in his back  He also reported an increase in his left lower extremity numbness and ambulation difficulties  Per patient, he has spent the past 4 days in bed due to severe pain and concern about falling with ambulation  Patient has been taking his prescribed Valium and Oxycodone without relief of his symptoms  Patient also reports increase in urinary frequency  He denies any trauma, fevers, headache, chest pain, abdominal pain, urinary or bowel incontinence  In the ED, patient was noted to have decreased sensation from left buttock through left groin through left lateral thigh  Emergent CT lumbar spine and MRI lumbar spine completed in ED  No evidence of cauda equine or acute abnormalities, chronic degenerative changes noted on both scans  ESR normal, WBC 17K on laboratory evaluation  Patient admitted for PT/OT evaluation and pain control  Review of Systems:  Review of Systems   Constitutional: Negative for chills, diaphoresis, fatigue and fever  HENT: Positive for trouble swallowing  Negative for ear pain, postnasal drip and sore throat  Eyes: Negative for photophobia and visual disturbance  Respiratory: Negative for cough and shortness of breath  Cardiovascular: Negative for chest pain, palpitations and leg swelling  Gastrointestinal: Negative for abdominal distention, abdominal pain, constipation, diarrhea and nausea  Genitourinary: Positive for frequency  Negative for dysuria, flank pain and urgency  Musculoskeletal: Positive for back pain, gait problem and myalgias  Skin: Negative for rash  Neurological: Positive for numbness  Negative for tremors, syncope, light-headedness and headaches         Past Medical and Surgical History:   Past Medical History:   Diagnosis Date   • Allergic     seasonal    • Allergic rhinitis    • Anxiety    • Arthritis    • Back pain    • Chronic obstructive asthma (HCC)    • Chronic pain syndrome    • Cluster headaches    • Colon polyp    • Depression    • Insomnia    • Kidney stones    • Laryngospasm    • Leukocytosis    • Migraine    • Myocardial infarction [facility])    • Nephrolithiasis    • Organic impotence    • Pneumonia due to infectious organism 1/16/2019   • Seasonal allergies    • Sleep apnea     does not use CPAP   • Stroke [facility]) 2015   • TMJ (temporomandibular joint syndrome)    • Wheezing     last assessed 05/19/17       Past Surgical History:   Procedure Laterality Date   • BACK SURGERY      *9, [id number], nervectomy 2006, total of 10   • BUCCAL MASS EXCISION N/A 3/26/2018    Procedure: BIOPSY LINGUAL TONSIL (FLOW/ STANDARD PATH)  EVAL UNDER ANESTHESIA;  Surgeon: Evens Lopez MD;  Location: [facility];  Service: ENT   • COLONOSCOPY     • FL RETROGRADE PYELOGRAM  10/21/2020   • HERNIA REPAIR     • KIDNEY SURGERY     • KNEE ARTHROSCOPY     • LITHOTRIPSY      multiple   • LITHOTRIPSY     • LUMBAR DISC SURGERY  2015   • MANDIBLE FRACTURE SURGERY      titanium plate   • PELVIC SYMPHYSIS FUSION     • CT CYSTO/URETERO W/LITHOTRIPSY &INDWELL STENT INSRT Right 10/21/2020    Procedure: CYSTOSCOPY URETEROSCOPY WITH LITHOTRIPSY HOLMIUM LASER, RETROGRADE PYELOGRAM AND INSERTION STENT URETERAL;  Surgeon: Slick Palacios MD;  Location: [facility];  Service: Urology   • CT ESOPHAGOGASTRODUODENOSCOPY TRANSORAL DIAGNOSTIC N/A 2/22/2018    Procedure: ESOPHAGOGASTRODUODENOSCOPY (EGD); Surgeon: Arlene Jauregui MD;  Location: [facility]; Service: Gastroenterology   • CT ESOPHAGOGASTRODUODENOSCOPY TRANSORAL DIAGNOSTIC N/A 4/1/2019    Procedure: EGD W/ DILATION;  Surgeon: Arlene Jauregui MD;  Location: [facility];   Service: Gastroenterology   • CT REVISE/REMOVE SPINAL NEUROSTIM/ Left 6/9/2017    Procedure: REMOVAL OF A THORACIC SCS PLACED VIA LAMINECTOMY AND REMOVAL LEFT BUTTOCK GENERATOR; IMPULSE MONITORING;  Surgeon: Denise Mensah MD;  Location: [facility];  Service: Neurosurgery   • CT SHLDR ARTHROSCOP,SURG,W/ROTAT CUFF REPR Right 4/4/2019    Procedure: RIGHT SHOULDER ARTHROSCOPIC SUBSCAPULARIS TENDON REPAIR;  Surgeon: Lazaro Davila MD;  [facility];  Service: Orthopedics   • CT SURG IMPLNT Jose Ghee N/A 9/8/2016    Procedure: DORSAL COLUMN STIMULATOR PLACEMENT (IMPULSE MONITORING); Surgeon: Lynda Bailey MD;  Location: [facility];  Service: Orthopedics   • SACROILIAC JOINT FUSION  2015   • SHOULDER SURGERY Left     2   • UPPER GASTROINTESTINAL ENDOSCOPY         Meds/Allergies:  Prior to Admission medications    Medication Sig Start Date End Date Taking? Authorizing Provider   albuterol (PROVENTIL HFA,VENTOLIN HFA) 90 mcg/act inhaler Inhale 2 puffs every 6 (six) hours as needed for wheezing 5/13/22  Yes Sharan Carvalho MD   diazepam (VALIUM) 5 mg tablet Take 1 tablet (5 mg total) by mouth every 12 (twelve) hours as needed for anxiety or muscle spasms 10/12/22  Yes Karlie Nielson MD   fluticasone-vilanterol (BREO ELLIPTA) 200-25 MCG/INH inhaler Inhale 1 puff daily Rinse mouth after use  3/18/19  Yes Alexys Sanchez MD   oxyCODONE (ROXICODONE) 10 MG TABS Take 1 tablet (10 mg total) by mouth every 6 (six) hours as needed for severe pain Take 1 tablet (10mg) by mouth every 6 hours as needed for severe pain   Max daily amount : 4 tablets (40mg) Max Daily Amount: 40 mg 10/12/22 11/11/22 Yes Karlie Nielson MD   acetaminophen (TYLENOL) 325 mg tablet Take 3 tablets (975 mg total) by mouth every 8 (eight) hours as needed for mild pain  Patient not taking: Reported on 10/18/2022 12/24/21   Yordy Nino PA-C   erythromycin (ILOTYCIN) ophthalmic ointment Place a 1/2 inch ribbon of ointment into the lower eyelid 4x/day for 7 days  Patient not taking: Reported on 10/18/2022 6/11/22   Carmela Alba PA-C   hydrOXYzine HCL (ATARAX) 25 mg tablet Take 1 tablet (25 mg total) by mouth every 6 (six) hours as needed for itching for up to 7 days 8/1/22 8/8/22  Aniyah Claudio MD   ibuprofen (MOTRIN) 800 mg tablet TAKE 1 TABLET(800 MG) BY MOUTH THREE TIMES DAILY AS NEEDED FOR MILD PAIN  Patient not taking: Reported on 10/18/2022 11/22/21   Sharan Carvalho MD   naloxone (Narcan) 4 mg/0 1 mL nasal spray In case of overdose: 1 spray in one nostril x 1, may repeat in 2 min if remains unresponsive  3/9/21   Rodolfo Matson MD   tadalafil (CIALIS) 5 MG tablet Take 1 tablet (5 mg total) by mouth as needed in the morning for erectile dysfunction  Patient not taking: Reported on 10/18/2022 5/13/22   Herson Koenig MD     I have reviewed home medications with patient personally  Allergies: Allergies   Allergen Reactions   • Other Rash     Adhesive tape       Social History:  Marital Status:    Occupation:   Patient Pre-hospital Living Situation: Home, With spouse  Patient Pre-hospital Level of Mobility: walks with cane  Patient Pre-hospital Diet Restrictions: none  Substance Use History:   Social History     Substance and Sexual Activity   Alcohol Use Yes    Comment: rarely      Social History     Tobacco Use   Smoking Status Current Every Day Smoker   • Packs/day: 0 50   • Years: 25 00   • Pack years: 12 50   Smokeless Tobacco Former User     Social History     Substance and Sexual Activity   Drug Use Yes   • Frequency: 7 0 times per week   • Types: Marijuana    Comment: medical marijuana daily for chronic pain 1/2 ounce       Family History:  Family History   Problem Relation Age of Onset   • Diabetes Father    • Obesity Father    • Liver cancer Father    • Heart disease Father    • Diabetes Brother    • Hypertension Brother    • Leukemia Mother    • Hypertension Mother    • Cancer Family        Physical Exam:     Vitals:   Blood Pressure: 129/90 (10/18/22 2227)  Pulse: 63 (10/18/22 2227)  Temperature: 98 °F (36 7 °C) (10/18/22 2227)  Temp Source: Tympanic (10/18/22 2227)  Respirations: 17 (10/18/22 2227)  Height: 6' 1" (185 4 cm) (10/18/22 2227)  Weight - Scale: 100 kg (220 lb 14 4 oz) (10/18/22 2227)  SpO2: 97 % (10/18/22 2227)    Physical Exam  Vitals reviewed  Constitutional:       General: He is in acute distress (mild due to pain)  Appearance: He is not ill-appearing or diaphoretic  HENT:      Head: Normocephalic and atraumatic        Nose: Nose normal  Mouth/Throat:      Mouth: Mucous membranes are moist       Pharynx: Oropharynx is clear  Eyes:      Extraocular Movements: Extraocular movements intact  Conjunctiva/sclera: Conjunctivae normal       Pupils: Pupils are equal, round, and reactive to light  Cardiovascular:      Rate and Rhythm: Normal rate and regular rhythm  Pulses: Normal pulses  Heart sounds: Normal heart sounds  No murmur heard  Pulmonary:      Effort: Pulmonary effort is normal  No respiratory distress  Breath sounds: Normal breath sounds  No wheezing, rhonchi or rales  Abdominal:      General: Bowel sounds are normal  There is no distension  Palpations: Abdomen is soft  Tenderness: There is no abdominal tenderness  There is no guarding  Musculoskeletal:         General: Tenderness (back) present  No swelling or signs of injury  Normal range of motion  Cervical back: Normal range of motion  Right lower leg: No edema  Left lower leg: No edema  Comments: Strength +5 in all extremities   Skin:     General: Skin is warm and dry  Capillary Refill: Capillary refill takes less than 2 seconds  Coloration: Skin is not pale  Findings: No rash  Neurological:      Mental Status: He is alert and oriented to person, place, and time  Sensory: Sensory deficit (decreased sensation to light tough on left buttock and left lateral thigh) present     Psychiatric:         Mood and Affect: Mood normal          Behavior: Behavior normal           Additional Data:     Lab Results:  Results from last 7 days   Lab Units 10/18/22  1725   WBC Thousand/uL 17 30*   HEMOGLOBIN g/dL 16 7   HEMATOCRIT % 49 4*   PLATELETS Thousands/uL 413*   NEUTROS PCT % 81*   LYMPHS PCT % 14   MONOS PCT % 4   EOS PCT % 1     Results from last 7 days   Lab Units 10/18/22  1725   SODIUM mmol/L 137   POTASSIUM mmol/L 4 2   CHLORIDE mmol/L 105   CO2 mmol/L 26   BUN mg/dL 20   CREATININE mg/dL 1 23   ANION GAP mmol/L 6 CALCIUM mg/dL 9 5   ALBUMIN g/dL 4 4   TOTAL BILIRUBIN mg/dL 0 40   ALK PHOS U/L 78   ALT U/L 27   AST U/L 21   GLUCOSE RANDOM mg/dL 137     Results from last 7 days   Lab Units 10/18/22  1725   INR  0 88                   Imaging: Reviewed radiology reports from this admission including: MRI spine and CT spine  MRI lumbar spine wo contrast   Final Result by Billy Odell MD (10/18 2020)         1  Postsurgical change from L4-5 fusion and decompression of the central canal   No evidence of discitis or osteomyelitis  No epidural or spinal fluid collection  2   Mildly progressive chronic disc and facet degenerative change  Workstation performed: [id number]         CT spine lumbar without contrast   Final Result by Billy Odell MD (10/18 1909)         1  Postsurgical change from L4-5 fusion and laminectomies  Decompression of the central canal    2   Chronic disc and facet degenerative change in the upper lumbar spine  Workstation performed: [id number]             EKG and Other Studies Reviewed on Admission:   · EKG: NSR  HR 64     ** Please Note: This note has been constructed using a voice recognition system   **

## 2022-10-19 NOTE — ASSESSMENT & PLAN NOTE
· Presented to ED with acutely worsening severe back pain that began Friday morning  · Patient reports everything was "normal" until he attempted to ambulate Friday morning  · Denies sleeping unusually, trauma, twisting to stand, unusual movements  · Patient reports worse left sided numbness from buttocks to perianal region to left thigh  · Hx of anterior and posterior L4-L5 fusion, prior spinal cord stimulator since removed, 12 prior back surgeries, chronic numbness  · CT lumbar spine - chronic disc and facet degenerative changes  Postsurgical change from fusion and laminectomies, decompression of the central canal  · MRI lumbar spine - mildly progressive chronic disc and facet degenerative change  No evidence of discitis, osteomyelitis, epidural or spinal fluid collection  · No evidence of cauda equina on MRI and ESR normal  · Continue scheduled Robaxin and Tylenol  · Continue home pain medications  · Patient agree able to starting duloxetine for pain control  Will start duloxetine 30 mg daily for 7 days then increase to 60 mg daily  · 10/19 also complaining of sciatic pain going down right leg   Will treat with 7 days of prednisone 60 mg

## 2022-10-20 VITALS
DIASTOLIC BLOOD PRESSURE: 75 MMHG | OXYGEN SATURATION: 98 % | BODY MASS INDEX: 29.28 KG/M2 | RESPIRATION RATE: 20 BRPM | SYSTOLIC BLOOD PRESSURE: 137 MMHG | HEIGHT: 73 IN | TEMPERATURE: 97.6 F | WEIGHT: 220.9 LBS | HEART RATE: 63 BPM

## 2022-10-20 LAB
ATRIAL RATE: 64 BPM
ATRIAL RATE: 64 BPM
B BURGDOR IGG+IGM SER-ACNC: <0.2 AI
P AXIS: 58 DEGREES
P AXIS: 58 DEGREES
PR INTERVAL: 174 MS
PR INTERVAL: 174 MS
QRS AXIS: 70 DEGREES
QRS AXIS: 70 DEGREES
QRSD INTERVAL: 90 MS
QRSD INTERVAL: 90 MS
QT INTERVAL: 376 MS
QT INTERVAL: 376 MS
QTC INTERVAL: 388 MS
QTC INTERVAL: 388 MS
T WAVE AXIS: 59 DEGREES
T WAVE AXIS: 59 DEGREES
VENTRICULAR RATE: 64 BPM
VENTRICULAR RATE: 64 BPM

## 2022-10-20 PROCEDURE — 93010 ELECTROCARDIOGRAM REPORT: CPT | Performed by: INTERNAL MEDICINE

## 2022-10-20 PROCEDURE — 94640 AIRWAY INHALATION TREATMENT: CPT

## 2022-10-20 PROCEDURE — 94760 N-INVAS EAR/PLS OXIMETRY 1: CPT

## 2022-10-20 PROCEDURE — 99217 PR OBSERVATION CARE DISCHARGE MANAGEMENT: CPT

## 2022-10-20 RX ORDER — DOCUSATE SODIUM 100 MG/1
100 CAPSULE, LIQUID FILLED ORAL 2 TIMES DAILY
Qty: 60 CAPSULE | Refills: 0 | Status: SHIPPED | OUTPATIENT
Start: 2022-10-20

## 2022-10-20 RX ORDER — PREDNISONE 20 MG/1
TABLET ORAL
Qty: 22 TABLET | Refills: 0 | Status: SHIPPED | OUTPATIENT
Start: 2022-10-20 | End: 2022-10-31

## 2022-10-20 RX ORDER — DULOXETIN HYDROCHLORIDE 60 MG/1
60 CAPSULE, DELAYED RELEASE ORAL DAILY
Qty: 30 CAPSULE | Refills: 0 | Status: SHIPPED | OUTPATIENT
Start: 2022-10-26

## 2022-10-20 RX ORDER — DULOXETIN HYDROCHLORIDE 30 MG/1
30 CAPSULE, DELAYED RELEASE ORAL DAILY
Qty: 5 CAPSULE | Refills: 0 | Status: SHIPPED | OUTPATIENT
Start: 2022-10-21 | End: 2022-10-26

## 2022-10-20 RX ADMIN — OXYCODONE HYDROCHLORIDE 10 MG: 10 TABLET ORAL at 01:39

## 2022-10-20 RX ADMIN — OXYCODONE HYDROCHLORIDE 10 MG: 10 TABLET ORAL at 08:46

## 2022-10-20 RX ADMIN — LIDOCAINE 5% 1 PATCH: 700 PATCH TOPICAL at 08:47

## 2022-10-20 RX ADMIN — ACETAMINOPHEN 975 MG: 325 TABLET ORAL at 06:08

## 2022-10-20 RX ADMIN — FLUTICASONE FUROATE AND VILANTEROL TRIFENATATE 1 PUFF: 200; 25 POWDER RESPIRATORY (INHALATION) at 07:49

## 2022-10-20 RX ADMIN — METHOCARBAMOL 500 MG: 500 TABLET ORAL at 00:20

## 2022-10-20 RX ADMIN — METHOCARBAMOL 500 MG: 500 TABLET ORAL at 06:08

## 2022-10-20 RX ADMIN — NICOTINE 1 PATCH: 7 PATCH, EXTENDED RELEASE TRANSDERMAL at 08:47

## 2022-10-20 RX ADMIN — ACETAMINOPHEN 975 MG: 325 TABLET ORAL at 14:00

## 2022-10-20 RX ADMIN — HYDROMORPHONE HYDROCHLORIDE 0.5 MG: 1 INJECTION, SOLUTION INTRAMUSCULAR; INTRAVENOUS; SUBCUTANEOUS at 00:20

## 2022-10-20 RX ADMIN — DULOXETINE HYDROCHLORIDE 30 MG: 30 CAPSULE, DELAYED RELEASE ORAL at 08:46

## 2022-10-20 RX ADMIN — PREDNISONE 60 MG: 20 TABLET ORAL at 08:46

## 2022-10-20 RX ADMIN — METHOCARBAMOL 500 MG: 500 TABLET ORAL at 12:14

## 2022-10-20 RX ADMIN — ENOXAPARIN SODIUM 40 MG: 40 INJECTION SUBCUTANEOUS at 08:47

## 2022-10-20 NOTE — ASSESSMENT & PLAN NOTE
· Patient with 12 prior back surgeries and prior spinal cord stimulator  · Pain now controlled with Oxy 10 mg Q6, Valium 5 mg Q12, and medical marijuana   · PDMP reviewed, PCP in charge on pain control  · Now presenting with significantly increased pain  · See back pain plan for more details  · Would benefit from outpatient follow up with pain management  · Patient stated he has followed up with pain management before and refuses to follow up with them again  · Continue follow up with PCP at this time

## 2022-10-20 NOTE — PLAN OF CARE
Problem: PAIN - ADULT  Goal: Verbalizes/displays adequate comfort level or baseline comfort level  Description: Interventions:  - Encourage patient to monitor pain and request assistance  - Assess pain using appropriate pain scale  - Administer analgesics based on type and severity of pain and evaluate response  - Implement non-pharmacological measures as appropriate and evaluate response  - Consider cultural and social influences on pain and pain management  - Notify physician/advanced practitioner if interventions unsuccessful or patient reports new pain  Outcome: Progressing     Problem: INFECTION - ADULT  Goal: Absence or prevention of progression during hospitalization  Description: INTERVENTIONS:  - Assess and monitor for signs and symptoms of infection  - Monitor lab/diagnostic results  - Monitor all insertion sites, i e  indwelling lines, tubes, and drains  - Monitor endotracheal if appropriate and nasal secretions for changes in amount and color  - [place] appropriate cooling/warming therapies per order  - Administer medications as ordered  - Instruct and encourage patient and family to use good hand hygiene technique  - Identify and instruct in appropriate isolation precautions for identified infection/condition  Outcome: Progressing  Goal: Absence of fever/infection during neutropenic period  Description: INTERVENTIONS:  - Monitor WBC    Outcome: Progressing     Problem: SAFETY ADULT  Goal: Patient will remain free of falls  Description: INTERVENTIONS:  - Educate patient/family on patient safety including physical limitations  - Instruct patient to call for assistance with activity   - Consult OT/PT to assist with strengthening/mobility   - Keep Call bell within reach  - Keep bed low and locked with side rails adjusted as appropriate  - Keep care items and personal belongings within reach  - Initiate and maintain comfort rounds  - Make Fall Risk Sign visible to staff  - Offer Toileting every 2 Hours, in advance of need  - Initiate/Maintain bed alarm  - Obtain necessary fall risk management equipment:   - Apply yellow socks and bracelet for high fall risk patients  - Consider moving patient to room near nurses station  Outcome: Progressing  Goal: Maintain or return to baseline ADL function  Description: INTERVENTIONS:  -  Assess patient's ability to carry out ADLs; assess patient's baseline for ADL function and identify physical deficits which impact ability to perform ADLs (bathing, care of mouth/teeth, toileting, grooming, dressing, etc )  - Assess/evaluate cause of self-care deficits   - Assess range of motion  - Assess patient's mobility; develop plan if impaired  - Assess patient's need for assistive devices and provide as appropriate  - Encourage maximum independence but intervene and supervise when necessary  - Involve family in performance of ADLs  - Assess for home care needs following discharge   - Consider OT consult to assist with ADL evaluation and planning for discharge  - Provide patient education as appropriate  Outcome: Progressing  Goal: Maintains/Returns to pre admission functional level  Description: INTERVENTIONS:  - Perform BMAT or MOVE assessment daily    - Set and communicate daily mobility goal to care team and patient/family/caregiver  - Collaborate with rehabilitation services on mobility goals if consulted  - Perform Range of Motion 3 times a day  - Reposition patient every 2 hours    - Dangle patient 3 times a day  - Stand patient 3 times a day  - Ambulate patient 3 times a day  - Out of bed to chair 3 times a day   - Out of bed for meals 3 times a day  - Out of bed for toileting  - Record patient progress and toleration of activity level   Outcome: Progressing     Problem: DISCHARGE PLANNING  Goal: Discharge to home or other facility with appropriate resources  Description: INTERVENTIONS:  - Identify barriers to discharge w/patient and caregiver  - Arrange for needed discharge resources and transportation as appropriate  - Identify discharge learning needs (meds, wound care, etc )  - Arrange for interpretive services to assist at discharge as needed  - Refer to Case Management Department for coordinating discharge planning if the patient needs post-hospital services based on physician/advanced practitioner order or complex needs related to functional status, cognitive ability, or social support system  Outcome: Progressing     Problem: Knowledge Deficit  Goal: Patient/family/caregiver demonstrates understanding of disease process, treatment plan, medications, and discharge instructions  Description: Complete learning assessment and assess knowledge base    Interventions:  - Provide teaching at level of understanding  - Provide teaching via preferred learning methods  Outcome: Progressing     Problem: Potential for Falls  Goal: Patient will remain free of falls  Description: INTERVENTIONS:  - Educate patient/family on patient safety including physical limitations  - Instruct patient to call for assistance with activity   - Consult OT/PT to assist with strengthening/mobility   - Keep Call bell within reach  - Keep bed low and locked with side rails adjusted as appropriate  - Keep care items and personal belongings within reach  - Initiate and maintain comfort rounds  - Make Fall Risk Sign visible to staff  - Offer Toileting every 2 Hours, in advance of need  - Initiate/Maintain bed alarm  - Obtain necessary fall risk management equipment:   - Apply yellow socks and bracelet for high fall risk patients  - Consider moving patient to room near nurses station  Outcome: Progressing     Problem: MOBILITY - ADULT  Goal: Maintain or return to baseline ADL function  Description: INTERVENTIONS:  -  Assess patient's ability to carry out ADLs; assess patient's baseline for ADL function and identify physical deficits which impact ability to perform ADLs (bathing, care of mouth/teeth, toileting, grooming, dressing, etc )  - Assess/evaluate cause of self-care deficits   - Assess range of motion  - Assess patient's mobility; develop plan if impaired  - Assess patient's need for assistive devices and provide as appropriate  - Encourage maximum independence but intervene and supervise when necessary  - Involve family in performance of ADLs  - Assess for home care needs following discharge   - Consider OT consult to assist with ADL evaluation and planning for discharge  - Provide patient education as appropriate  Outcome: Progressing  Goal: Maintains/Returns to pre admission functional level  Description: INTERVENTIONS:  - Perform BMAT or MOVE assessment daily    - Set and communicate daily mobility goal to care team and patient/family/caregiver  - Collaborate with rehabilitation services on mobility goals if consulted  - Perform Range of Motion 3 times a day  - Reposition patient every 2 hours    - Dangle patient 3 times a day  - Stand patient 3 times a day  - Ambulate patient 3 times a day  - Out of bed to chair 3 times a day   - Out of bed for meals 3 times a day  - Out of bed for toileting  - Record patient progress and toleration of activity level   Outcome: Progressing

## 2022-10-20 NOTE — NURSING NOTE
Reviewed discharge summary with patient and patients mother at bedside, all questions and concerns answered  IV removed  Patient refused wheelchair, wants to ambulate to main entrance  RN walked patient and mother to elevator  Patient ambulated with steady gait  Mother to transport patient to home residence

## 2022-10-20 NOTE — TELEPHONE ENCOUNTER
The patient is still admitted we can make hosp f/u when he is discharged, please advise patient what he is supposed to do in the meantime or can this be forwarded to provider on hospital service

## 2022-10-20 NOTE — PLAN OF CARE
Problem: PAIN - ADULT  Goal: Verbalizes/displays adequate comfort level or baseline comfort level  Description: Interventions:  - Encourage patient to monitor pain and request assistance  - Assess pain using appropriate pain scale  - Administer analgesics based on type and severity of pain and evaluate response  - Implement non-pharmacological measures as appropriate and evaluate response  - Consider cultural and social influences on pain and pain management  - Notify physician/advanced practitioner if interventions unsuccessful or patient reports new pain  Outcome: Progressing     Problem: INFECTION - ADULT  Goal: Absence or prevention of progression during hospitalization  Description: INTERVENTIONS:  - Assess and monitor for signs and symptoms of infection  - Monitor lab/diagnostic results  - Monitor all insertion sites, i e  indwelling lines, tubes, and drains  - Monitor endotracheal if appropriate and nasal secretions for changes in amount and color  - [place] appropriate cooling/warming therapies per order  - Administer medications as ordered  - Instruct and encourage patient and family to use good hand hygiene technique  - Identify and instruct in appropriate isolation precautions for identified infection/condition  Outcome: Progressing  Goal: Absence of fever/infection during neutropenic period  Description: INTERVENTIONS:  - Monitor WBC    Outcome: Progressing     Problem: SAFETY ADULT  Goal: Patient will remain free of falls  Description: INTERVENTIONS:  - Educate patient/family on patient safety including physical limitations  - Instruct patient to call for assistance with activity   - Consult OT/PT to assist with strengthening/mobility   - Keep Call bell within reach  - Keep bed low and locked with side rails adjusted as appropriate  - Keep care items and personal belongings within reach  - Initiate and maintain comfort rounds  - Make Fall Risk Sign visible to staff  - Apply yellow socks and bracelet for high fall risk patients  - Consider moving patient to room near nurses station  Outcome: Progressing  Goal: Maintain or return to baseline ADL function  Description: INTERVENTIONS:  -  Assess patient's ability to carry out ADLs; assess patient's baseline for ADL function and identify physical deficits which impact ability to perform ADLs (bathing, care of mouth/teeth, toileting, grooming, dressing, etc )  - Assess/evaluate cause of self-care deficits   - Assess range of motion  - Assess patient's mobility; develop plan if impaired  - Assess patient's need for assistive devices and provide as appropriate  - Encourage maximum independence but intervene and supervise when necessary  - Involve family in performance of ADLs  - Assess for home care needs following discharge   - Consider OT consult to assist with ADL evaluation and planning for discharge  - Provide patient education as appropriate  Outcome: Progressing  Goal: Maintains/Returns to pre admission functional level  Description: INTERVENTIONS:  - Perform BMAT or MOVE assessment daily    - Set and communicate daily mobility goal to care team and patient/family/caregiver     - Collaborate with rehabilitation services on mobility goals if consulted  - Out of bed for toileting  - Record patient progress and toleration of activity level   Outcome: Progressing     Problem: DISCHARGE PLANNING  Goal: Discharge to home or other facility with appropriate resources  Description: INTERVENTIONS:  - Identify barriers to discharge w/patient and caregiver  - Arrange for needed discharge resources and transportation as appropriate  - Identify discharge learning needs (meds, wound care, etc )  - Arrange for interpretive services to assist at discharge as needed  - Refer to Case Management Department for coordinating discharge planning if the patient needs post-hospital services based on physician/advanced practitioner order or complex needs related to functional status, cognitive ability, or social support system  Outcome: Progressing     Problem: Knowledge Deficit  Goal: Patient/family/caregiver demonstrates understanding of disease process, treatment plan, medications, and discharge instructions  Description: Complete learning assessment and assess knowledge base    Interventions:  - Provide teaching at level of understanding  - Provide teaching via preferred learning methods  Outcome: Progressing     Problem: Potential for Falls  Goal: Patient will remain free of falls  Description: INTERVENTIONS:  - Educate patient/family on patient safety including physical limitations  - Instruct patient to call for assistance with activity   - Consult OT/PT to assist with strengthening/mobility   - Keep Call bell within reach  - Keep bed low and locked with side rails adjusted as appropriate  - Keep care items and personal belongings within reach  - Initiate and maintain comfort rounds  - Make Fall Risk Sign visible to staff  - Offer Toileting every 2 Hours, in advance of need  - Apply yellow socks and bracelet for high fall risk patients  - Consider moving patient to room near nurses station  Outcome: Progressing     Problem: MOBILITY - ADULT  Goal: Maintain or return to baseline ADL function  Description: INTERVENTIONS:  -  Assess patient's ability to carry out ADLs; assess patient's baseline for ADL function and identify physical deficits which impact ability to perform ADLs (bathing, care of mouth/teeth, toileting, grooming, dressing, etc )  - Assess/evaluate cause of self-care deficits   - Assess range of motion  - Assess patient's mobility; develop plan if impaired  - Assess patient's need for assistive devices and provide as appropriate  - Encourage maximum independence but intervene and supervise when necessary  - Involve family in performance of ADLs  - Assess for home care needs following discharge   - Consider OT consult to assist with ADL evaluation and planning for discharge  - Provide patient education as appropriate  Outcome: Progressing  Goal: Maintains/Returns to pre admission functional level  Description: INTERVENTIONS:  - Perform BMAT or MOVE assessment daily    - Set and communicate daily mobility goal to care team and patient/family/caregiver     - Collaborate with rehabilitation services on mobility goals if consulted  - Ambulate patient 3 times a day  - Out of bed to chair 3 times a day   - Out of bed for meals 3times a day  - Out of bed for toileting  - Record patient progress and toleration of activity level   Outcome: Progressing

## 2022-10-20 NOTE — UTILIZATION REVIEW
Continued Stay Review    Date: 8- 20-22                        Current Patient Class: observation  Current Level of Care: med surg     HPI:52 y o  male initially admitted on 10-18-22     Assessment/Plan:     Pain 9-10/10   10-19 to 10-20 patient received iv dilaudid x 8 and oxycodone 10 mg x 2  Start duloxetine for pain control 30 mg x 7 days  Today pain 7/10         Vital Signs:     Date/Time Temp Pulse Resp BP MAP (mmHg) SpO2 O2 Device   10/20/22 0749 -- -- -- -- -- 98 % None (Room air)   10/20/22 0748 97 6 °F (36 4 °C) 63 20 137/75 -- 96 % --   10/20/22 0020 97 9 °F (36 6 °C) 78 18 158/95 120 98 % None (Room air)               Pertinent Labs/Diagnostic Results:       Results from last 7 days   Lab Units 10/19/22  0605 10/18/22  1725   WBC Thousand/uL 12 04* 17 30*   HEMOGLOBIN g/dL 15 4 16 7   HEMATOCRIT % 47 8 49 4*   PLATELETS Thousands/uL 380 413*   NEUTROS ABS Thousands/µL 6 55 13 85*         Results from last 7 days   Lab Units 10/19/22  0605 10/18/22  1725   SODIUM mmol/L 142 137   POTASSIUM mmol/L 4 3 4 2   CHLORIDE mmol/L 104 105   CO2 mmol/L 32 26   ANION GAP mmol/L 6 6   BUN mg/dL 22 20   CREATININE mg/dL 1 24 1 23   EGFR ml/min/1 73sq m 65 66   CALCIUM mg/dL 9 3 9 5   MAGNESIUM mg/dL 1 9  --      Results from last 7 days   Lab Units 10/18/22  1725   AST U/L 21   ALT U/L 27   ALK PHOS U/L 78   TOTAL PROTEIN g/dL 7 2   ALBUMIN g/dL 4 4   TOTAL BILIRUBIN mg/dL 0 40   BILIRUBIN DIRECT mg/dL 0 03         Results from last 7 days   Lab Units 10/19/22  0605 10/18/22  1725   GLUCOSE RANDOM mg/dL 110 137       Results from last 7 days   Lab Units 10/18/22  1725   PROTIME seconds 12 2   INR  0 88   PTT seconds 27         Results from last 7 days   Lab Units 10/19/22  0605   PROCALCITONIN ng/ml <0 05       Results from last 7 days   Lab Units 10/18/22  1725   LIPASE u/L 37     Results from last 7 days   Lab Units 10/18/22  1725   SED RATE mm/hour 1             Results from last 7 days   Lab Units 10/19/22  1647 10/19/22  0133   CLARITY UA  Clear Clear   COLOR UA  Yellow Yellow   SPEC GRAV UA  >=1 030 >=1 030   PH UA  5 5 6 0   GLUCOSE UA mg/dl Negative Negative   KETONES UA mg/dl Negative Negative   BLOOD UA  Negative Negative   PROTEIN UA mg/dl Negative Negative   NITRITE UA  Negative Negative   BILIRUBIN UA  Negative Negative   UROBILINOGEN UA E U /dl 0 2 0 2   LEUKOCYTES UA  Negative Negative   WBC UA /hpf 0-1  --    RBC UA /hpf 1-2  --    BACTERIA UA /hpf Occasional  --    EPITHELIAL CELLS WET PREP /hpf Occasional  --              Results from last 7 days   Lab Units 10/19/22  0133   AMPH/METH  Positive*   BARBITURATE UR  Negative   BENZODIAZEPINE UR  Positive*   COCAINE UR  Negative   METHADONE URINE  Negative   OPIATE UR  Positive*   PCP UR  Negative   THC UR  Positive*         Medications:   Scheduled Medications:  acetaminophen, 975 mg, Oral, Q8H  docusate sodium, 100 mg, Oral, BID  DULoxetine, 30 mg, Oral, Daily  [START ON 10/26/2022] DULoxetine, 60 mg, Oral, Daily  enoxaparin, 40 mg, Subcutaneous, Daily  fluticasone-vilanterol, 1 puff, Inhalation, Daily  lidocaine, 1 patch, Topical, Daily  methocarbamol, 500 mg, Oral, Q6H MERRILL  nicotine, 1 patch, Transdermal, Daily  polyethylene glycol, 17 g, Oral, Daily  predniSONE, 60 mg, Oral, Daily  senna-docusate sodium, 2 tablet, Oral, Daily      Continuous IV Infusions:     PRN Meds:  albuterol, 2 puff, Inhalation, Q6H PRN  aluminum-magnesium hydroxide-simethicone, 30 mL, Oral, Q6H PRN  diazepam, 5 mg, Oral, Q12H PRN  HYDROmorphone, 0 5 mg, Intravenous, Q1H PRN  hydrOXYzine HCL, 25 mg, Oral, Q6H PRN  naloxone, 0 04 mg, Intravenous, Q1MIN PRN  ondansetron, 4 mg, Intravenous, Q4H PRN  oxyCODONE, 10 mg, Oral, Q6H PRN        Discharge Plan: to be determined     Network Utilization Review Department  ATTENTION: Please call with any questions or concerns to [phone] and carefully listen to the prompts so that you are directed to the right person   All voicemails are confidential   Tay Boland all requests for admission clinical reviews, approved or denied determinations and any other requests to dedicated fax number below belonging to the campus where the patient is receiving treatment   List of dedicated fax numbers for the Facilities:  [address] DENIALS (Administrative/Medical Necessity) [phone]   [address] (Maternity/NICU/Pediatrics) [phone]   [address] [phone]   [facility] [address] [phone]   [address] [address] [phone]   [address] [facility] [address] [phone]

## 2022-10-20 NOTE — TELEPHONE ENCOUNTER
Patient currently admitted to [facility]  Do you want to reach out to inpatient provider?

## 2022-10-20 NOTE — DISCHARGE SUMMARY
[address]  Discharge- Alex Rojo 1970, 45 y o  male MRN: [id number]  Unit/Bed#: -01 Encounter: [id number]  Primary Care Provider: Maria C Mccall MD   Date and time admitted to hospital: 10/18/2022  4:42 PM    * Back pain  Assessment & Plan  · Presented to ED with acutely worsening severe back pain that began Friday morning  · Patient reports everything was "normal" until he attempted to ambulate Friday morning  · Denies sleeping unusually, trauma, twisting to stand, unusual movements  · Patient reports worse left sided numbness from buttocks to perianal region to left thigh  · Hx of anterior and posterior L4-L5 fusion, prior spinal cord stimulator since removed, 12 prior back surgeries, chronic numbness  · CT lumbar spine: chronic disc and facet degenerative changes  Postsurgical change from fusion and laminectomies, decompression of the central canal  · MRI lumbar spine: mildly progressive chronic disc and facet degenerative change  No evidence of discitis, osteomyelitis, epidural or spinal fluid collection  · No evidence of cauda equina on MRI and ESR normal  · UDS: (+) Meth/Amph, Benzo, THC,Opiates  · Continue scheduled Robaxin and Tylenol  · Continue home pain regimen  · Patient agree able to starting duloxetine for pain control  · C/w duloxetine 30 mg daily for 7 days then increase to 60 mg daily  · Also complaining of sciatic pain going down right leg     · C/w 7 days of prednisone 60 mg and taper off following  · Recommend continued outpatient follow up with PCP for further management    Ambulatory dysfunction  Assessment & Plan  · Patient reports decreased ability to ambulate since Friday morning when his pain acutely worsened  · Likely due to pain  · Reports worsening unbalanced feeling while ambulating to ED  · PT/OT evaluated: Recommending Outpatient rehab services upon discharge    Chronic pain syndrome  Assessment & Plan  · Patient with 12 prior back surgeries and prior spinal cord stimulator  · Pain now controlled with Oxy 10 mg Q6, Valium 5 mg Q12, and medical marijuana   · PDMP reviewed, PCP in charge on pain control  · Now presenting with significantly increased pain  · See back pain plan for more details  · Would benefit from outpatient follow up with pain management  · Patient stated he has followed up with pain management before and refuses to follow up with them again  · Continue follow up with PCP at this time    Chronic obstructive asthma ([address] )  Assessment & Plan  · Does not appear to be in an exacerbation   · Continue home medication:  · Breo ellipta and PRN Albuterol     Esophageal dysphagia  Assessment & Plan  · Patient reports "choking episodes" while consuming foods and drinks  · Actively following with GI outpatient  · Consulted SLP for assistance in hospital  · Recommend regular diet thin liquids    Chronic, continuous use of opioids  Assessment & Plan  · PDMP reviewed  · Continue home Oxy 10 mg Q6   · Managed by his PCP  · Bowel regimen       Medical Problems             Resolved Problems  Date Reviewed: 10/20/2022          Resolved    Leukocytosis 10/19/2022     Resolved by  Charlei Delacruz DO              Discharging Physician / Practitioner: Shirley Lr  PCP: Roxy Fairchild MD  Admission Date:   Admission Orders (From admission, onward)     Ordered        10/18/22 2114  Place in Observation  Once                      Discharge Date: 10/20/22    Consultations During Hospital Stay:  · Physical/occupational/speech therapy    Procedures Performed:   · None    Significant Findings / Test Results:     · UDS (+): Meth/Amph, THC,Benzo, Opiates    CT renal stone study abdomen pelvis wo contrast   Final Result by Fe Calderón MD (10/19 1955)      No CT evidence of nephrolithiasis or obstructive uropathy  Additional findings as above           Workstation performed: [id number]         MRI lumbar spine wo contrast   Final Result by Carly Pretty MD (10/18 2020)         1  Postsurgical change from L4-5 fusion and decompression of the central canal   No evidence of discitis or osteomyelitis  No epidural or spinal fluid collection  2   Mildly progressive chronic disc and facet degenerative change  Workstation performed: [id number]         CT spine lumbar without contrast   Final Result by Konstantin Victor MD (10/18 1909)         1  Postsurgical change from L4-5 fusion and laminectomies  Decompression of the central canal    2   Chronic disc and facet degenerative change in the upper lumbar spine  Workstation performed: [id number]             Incidental Findings:   · None    Test Results Pending at Discharge (will require follow up): · None     Outpatient Tests Requested:  · Recommend outpatient follow-up with PCP    Complications:  None    Reason for Admission:  Back pain    Hospital Course:   Varun Samano is a 45 y o  male patient with a PMH of multiple spinal surgeries, asthma, dysphagia, anxiety, chronic pain managed on opioids who originally presented to the hospital on 10/18/2022 due to 4 day history of acutely severe back pain  Per patient, he awoke Friday morning and attempted to ambulate to the bathroom when he noticed severe pain in his back  He also reported an increase in his left lower extremity numbness and ambulation difficulties  Per patient, he has spent the past 4 days in bed due to severe pain and concern about falling with ambulation  Patient has been taking his prescribed Valium and Oxycodone without relief of his symptoms  Patient also reports increase in urinary frequency  In the ED, patient was noted to have decreased sensation from left buttock through left groin through left lateral thigh  Emergent CT lumbar spine and MRI lumbar spine completed in ED  No evidence of cauda equine or acute abnormalities, chronic degenerative changes noted on both scans  ESR normal, WBC 17K on laboratory evaluation    UDS positive for methamphetamines, benzos, THC, and opiates  Patient admitted for PT/OT evaluation and pain control  Patient was started on scheduled Robaxin and Tylenol along with duloxetine and oral prednisone  Patient with mild improvement in acute pain  PT/OT evaluated recommend patient for outpatient rehab services  Patient stated he really has to get home to help with his mother and significant other, and can no longer stay in the hospital  Recommend continuing with duloxetine and prednisone upon discharge  Recommend close outpatient follow up with PCP for further pain management  Discussed plan with patient at bedside, all questions were answered  Please see above list of diagnoses and related plan for additional information  Condition at Discharge: fair    Discharge Day Visit / Exam:   Subjective:  Patient with continued chronic back pain, with intermittent worsening pain  Patient states he wants to go home today  Patient states he can no longer stay  Patient denies any active chest pain, shortness a breath, abdominal pain, nausea, or vomiting  Vitals: Blood Pressure: 137/75 (10/20/22 0748)  Pulse: 63 (10/20/22 0748)  Temperature: 97 6 °F (36 4 °C) (10/20/22 0748)  Temp Source: Tympanic (10/20/22 0748)  Respirations: 20 (10/20/22 0748)  Height: 6' 1" (185 4 cm) (10/18/22 2227)  Weight - Scale: 100 kg (220 lb 14 4 oz) (10/18/22 2227)  SpO2: 98 % (10/20/22 0749)  Exam:   Physical Exam  Vitals and nursing note reviewed  Constitutional:       General: He is not in acute distress  Appearance: Normal appearance  HENT:      Head: Normocephalic  Nose: Nose normal  No congestion  Mouth/Throat:      Mouth: Mucous membranes are moist       Pharynx: Oropharynx is clear  Cardiovascular:      Rate and Rhythm: Normal rate and regular rhythm  Pulses: Normal pulses  Heart sounds: No murmur heard  Pulmonary:      Effort: Pulmonary effort is normal  No respiratory distress  Breath sounds: Normal breath sounds  Abdominal:      General: Bowel sounds are normal  There is no distension  Palpations: Abdomen is soft  Tenderness: There is no abdominal tenderness  Musculoskeletal:         General: Normal range of motion  Cervical back: Normal range of motion  Right lower leg: No edema  Left lower leg: No edema  Skin:     General: Skin is warm and dry  Capillary Refill: Capillary refill takes less than 2 seconds  Neurological:      Mental Status: He is alert and oriented to person, place, and time  Mental status is at baseline  Motor: No weakness  Psychiatric:         Mood and Affect: Mood normal          Speech: Speech normal          Behavior: Behavior is cooperative  Judgment: Judgment is impulsive  Discussion with Family: Patient declined call to   Discharge instructions/Information to patient and family:   See after visit summary for information provided to patient and family  Provisions for Follow-Up Care:  See after visit summary for information related to follow-up care and any pertinent home health orders  Disposition:   Home    Planned Readmission: No     Discharge Statement:  I spent 60 minutes discharging the patient  This time was spent on the day of discharge  I had direct contact with the patient on the day of discharge  Greater than 50% of the total time was spent examining patient, answering all patient questions, arranging and discussing plan of care with patient as well as directly providing post-discharge instructions  Additional time then spent on discharge activities  Discharge Medications:  See after visit summary for reconciled discharge medications provided to patient and/or family        **Please Note: This note may have been constructed using a voice recognition system**

## 2022-10-20 NOTE — ASSESSMENT & PLAN NOTE
· Presented to ED with acutely worsening severe back pain that began Friday morning  · Patient reports everything was "normal" until he attempted to ambulate Friday morning  · Denies sleeping unusually, trauma, twisting to stand, unusual movements  · Patient reports worse left sided numbness from buttocks to perianal region to left thigh  · Hx of anterior and posterior L4-L5 fusion, prior spinal cord stimulator since removed, 12 prior back surgeries, chronic numbness  · CT lumbar spine: chronic disc and facet degenerative changes  Postsurgical change from fusion and laminectomies, decompression of the central canal  · MRI lumbar spine: mildly progressive chronic disc and facet degenerative change  No evidence of discitis, osteomyelitis, epidural or spinal fluid collection  · No evidence of cauda equina on MRI and ESR normal  · UDS: (+) Meth/Amph, Benzo, THC,Opiates  · Continue scheduled Robaxin and Tylenol  · Continue home pain regimen  · Patient agree able to starting duloxetine for pain control  · C/w duloxetine 30 mg daily for 7 days then increase to 60 mg daily  · Also complaining of sciatic pain going down right leg     · C/w 7 days of prednisone 60 mg and taper off following  · Recommend continued outpatient follow up with PCP for further management

## 2022-10-20 NOTE — ASSESSMENT & PLAN NOTE
· Patient reports decreased ability to ambulate since Friday morning when his pain acutely worsened  · Likely due to pain  · Reports worsening unbalanced feeling while ambulating to ED  · PT/OT evaluated: Recommending Outpatient rehab services upon discharge

## 2022-10-20 NOTE — ASSESSMENT & PLAN NOTE
· Does not appear to be in an exacerbation   · Continue home medication:  · Breo ellipta and PRN Albuterol

## 2022-10-24 ENCOUNTER — TELEPHONE (OUTPATIENT)
Dept: FAMILY MEDICINE CLINIC | Facility: CLINIC | Age: 52
End: 2022-10-24

## 2022-10-24 ENCOUNTER — TRANSITIONAL CARE MANAGEMENT (OUTPATIENT)
Dept: FAMILY MEDICINE CLINIC | Facility: CLINIC | Age: 52
End: 2022-10-24

## 2022-10-24 NOTE — TELEPHONE ENCOUNTER
Patient called to schedule [facility]    He was scheduled tomorrow Tues 10/25/22 with Dr Ahmet Yang  He said he can't sit up without loosing his breath  Please call him

## 2022-10-24 NOTE — TELEPHONE ENCOUNTER
Spoke with patient, continues to have flank pain, reviewed recent studies with him  He is scheduled for Tuesday however he did state that if pain doesn't subside, he will probably go back to ER

## 2022-10-25 ENCOUNTER — OFFICE VISIT (OUTPATIENT)
Dept: FAMILY MEDICINE CLINIC | Facility: CLINIC | Age: 52
End: 2022-10-25

## 2022-10-25 VITALS
SYSTOLIC BLOOD PRESSURE: 175 MMHG | BODY MASS INDEX: 30.11 KG/M2 | DIASTOLIC BLOOD PRESSURE: 114 MMHG | OXYGEN SATURATION: 98 % | TEMPERATURE: 97.8 F | WEIGHT: 228.2 LBS | HEART RATE: 69 BPM

## 2022-10-25 DIAGNOSIS — T40.2X5A THERAPEUTIC OPIOID INDUCED CONSTIPATION: ICD-10-CM

## 2022-10-25 DIAGNOSIS — G89.4 CHRONIC PAIN SYNDROME: Primary | ICD-10-CM

## 2022-10-25 DIAGNOSIS — R03.0 BLOOD PRESSURE ELEVATED WITHOUT HISTORY OF HTN: ICD-10-CM

## 2022-10-25 DIAGNOSIS — K59.03 THERAPEUTIC OPIOID INDUCED CONSTIPATION: ICD-10-CM

## 2022-10-25 RX ORDER — SENNOSIDES 8.6 MG
8.6 TABLET ORAL 2 TIMES DAILY PRN
Qty: 60 TABLET | Refills: 0 | Status: SHIPPED | OUTPATIENT
Start: 2022-10-25

## 2022-10-25 RX ORDER — POLYETHYLENE GLYCOL 3350 17 G/17G
17 POWDER, FOR SOLUTION ORAL 2 TIMES DAILY PRN
Qty: 72 EACH | Refills: 0 | Status: SHIPPED | OUTPATIENT
Start: 2022-10-25

## 2022-10-25 NOTE — PATIENT INSTRUCTIONS

## 2022-10-25 NOTE — ASSESSMENT & PLAN NOTE
History of intermittently elevated BP associated with elevated pain without diagnosis of HTN  - Home BP consistently <140/90s  - denies chest pain, shortness a breath, headache, dizziness, or other neurological symptoms at this time  - elevated pressure today likely in setting of acute pain    Plan:  - recommend continue to monitor home BP  - bring BP log to next appointment  - reviewed BP at next appointment    BP Readings from Last 5 Encounters:   10/25/22 (!) 175/114   10/20/22 137/75   09/14/22 140/92   08/01/22 152/91   06/11/22 (!) 172/105

## 2022-10-25 NOTE — ASSESSMENT & PLAN NOTE
Recent 2 days hospitalization (10/18-10/20/22) for acutely worsening back pain and left-sided flank pain   - H/O 12 prior back surgeries and prior spinal cord stimulator  - 10/18/22 CT lumbar spine and MRI lumbar spine showed no evidence of cauda equine or acute abnormalities, chronic degenerative changes noted on both scans  - 10/19/22 UDS (+) Meth/Amph, Benzo, THC,Opiates  - Pain now stable with Oxy 10 mg Q6, Valium 5 mg Q12, and medical marijuana  - Patient stated he has followed up with pain management before and refuses to follow up with them again  - Lumbar exam:  No midline tenderness  Significant left-sided lumbar paravertebral spinal tenderness  Significant pain and limited range of motion with flexion, extension, and lateral flexion  Unable to assess strength due to pain      Plan:  - Opioid management visit 10/25/22  - Opioid agreement updated 5/13/22

## 2022-10-25 NOTE — PROGRESS NOTES
Assessment/Plan     1  Chronic pain syndrome  Assessment & Plan:  Recent 2 days hospitalization (10/18-10/20/22) for acutely worsening back pain and left-sided flank pain   - H/O 12 prior back surgeries and prior spinal cord stimulator  - 10/18/22 CT lumbar spine and MRI lumbar spine showed no evidence of cauda equine or acute abnormalities, chronic degenerative changes noted on both scans  - 10/19/22 UDS (+) Meth/Amph, Benzo, THC,Opiates  - Pain now stable with Oxy 10 mg Q6, Valium 5 mg Q12, and medical marijuana  - Patient stated he has followed up with pain management before and refuses to follow up with them again  - Lumbar exam:  No midline tenderness  Significant left-sided lumbar paravertebral spinal tenderness  Significant pain and limited range of motion with flexion, extension, and lateral flexion  Unable to assess strength due to pain  Plan:  - Opioid management visit 10/25/22  - Opioid agreement updated 5/13/22      2  Blood pressure elevated without history of HTN  Assessment & Plan:  History of intermittently elevated BP associated with elevated pain without diagnosis of HTN  - Home BP consistently <140/90s  - denies chest pain, shortness a breath, headache, dizziness, or other neurological symptoms at this time  - elevated pressure today likely in setting of acute pain    Plan:  - recommend continue to monitor home BP  - bring BP log to next appointment  - reviewed BP at next appointment    BP Readings from Last 5 Encounters:   10/25/22 (!) 175/114   10/20/22 137/75   09/14/22 140/92   08/01/22 152/91   06/11/22 (!) 172/105         3  Therapeutic opioid induced constipation  -     polyethylene glycol (MIRALAX) 17 g packet; Take 17 g by mouth 2 (two) times a day as needed (constipation)  -     senna (SENOKOT) 8 6 mg; Take 1 tablet (8 6 mg total) by mouth 2 (two) times a day as needed for constipation        Opioid Management Plan  BMI Counseling: Body mass index is 30 11 kg/m²   The BMI is above normal  Nutrition recommendations include decreasing portion sizes, encouraging healthy choices of fruits and vegetables, decreasing fast food intake, consuming healthier snacks, limiting drinks that contain sugar, moderation in carbohydrate intake, increasing intake of lean protein, reducing intake of saturated and trans fat and reducing intake of cholesterol  Exercise recommendations include moderate physical activity 150 minutes/week, vigorous physical activity 75 minutes/week, exercising 3-5 times per week, obtaining a gym membership and strength training exercises  No pharmacotherapy was ordered  Rationale for BMI follow-up plan is due to patient being overweight or obese  Transition of Care Management:      TCM Call     Date and time call was made  10/24/2022 10:18 AM    Hospital care reviewed  Records reviewed    Patient was hospitialized at  [address]     Date of Admission  10/18/22    Date of discharge  10/20/22    Diagnosis  Back Pain, Chronic Pain, Ambulatory dysfunction, Chronic obstructive asthma    Disposition  Home    Were the patients medications reviewed and updated  Yes    Current Symptoms  Back pain - left side    Lower abdominal pain severity  Moderate    Lower abdominal pain onset  Ongoing    Back pain, left side, severity  Moderate    Back pain, left side, onset  Ongoing    Back pain, right side, severity  Moderate    Back pain, right side, onset  Ongoing      TCM Call     Post hospital issues  Reduced activity; Poor ADL (Activities of Daily Living) performance    Should patient be enrolled in anticoag monitoring? No    Scheduled for follow up?   Yes    Patient refusal reason  Patient states he will call back in a few days    Did you obtain your prescribed medications  Yes    Do you need help managing your prescriptions or medications  No    Is transportation to your appointment needed  No    I have advised the patient to call PCP with any new or worsening symptoms  [facility] [address] [facility]  Family members    Are you recieving any outpatient services  No    Are you recieving home care services  No    Are you using any community resources  No    Current waiver services  No    Have you fallen in the last 12 months  No    Interperter language line needed  No    Counseling  Patient    Counseling topics  Diagnostic results; Activities of daily living; instructions for management; Importance of RX compliance; Risk factor reduction            Hospital Course Summary:   Per discharge summary on 10/20/22    "Cynthia Lara is a 45 y o  male patient with a PMH of multiple spinal surgeries, asthma, dysphagia, anxiety, chronic pain managed on opioids who originally presented to the hospital on 10/18/2022 due to 4 day history of acutely severe back pain   Per patient, he awoke Friday morning and attempted to ambulate to the bathroom when he noticed severe pain in his back  [facility] also reported an increase in his left lower extremity numbness and ambulation difficulties   Per patient, he has spent the past 4 days in bed due to severe pain and concern about falling with ambulation   Patient has been taking his prescribed Valium and Oxycodone without relief of his symptoms   Patient also reports increase in urinary frequency       In the ED, patient was noted to have decreased sensation from left buttock through left groin through left lateral thigh  Emergent CT lumbar spine and MRI lumbar spine completed in ED   No evidence of cauda equine or acute abnormalities, chronic degenerative changes noted on both scans  ESR normal, WBC 17K on laboratory evaluation  UDS positive for methamphetamines, benzos, THC, and opiates    Patient admitted for PT/OT evaluation and pain control  Patient was started on scheduled Robaxin and Tylenol along with duloxetine and oral prednisone  Patient with mild improvement in acute pain  PT/OT evaluated recommend patient for outpatient rehab services    Patient stated he really has to get home to help with his mother and significant other, and can no longer stay in the hospital  Recommend continuing with duloxetine and prednisone upon discharge  Recommend close outpatient follow up with PCP for further pain management  "    Subjective     Opioid Management:   Type of visit: Initial    Pain related diagnoses: Post laminectomy syndrome, Cervical radiculopathy, Chronic pain syndrome with 12 prior back surgeries and prior spinal cord stimulator, Chronic right SI joint pain,     Social Support System  Patient receives support from their: significant other    Screening Tools/Assessments:    PHQ-2/9:  PHQ-9 score: 10    Opioid Risk Tool (ORT):  Current ORT Score: 0 (low risk for opiate abuse)    SOAPP:  Current SOAPP Score: 10 (positive, at risk patient)    Brief Pain Inventory (BPI):  1) Throughout our lives, most of us have had pain from time to time (such as minor headaches, sprains, and toothaches)  Have you had pain other than these everyday kinds of pain today? Yes  2) Where is your pain located? Stomach and back  3) Rate your pain at its worst in the last 24 hours: 8  4) Rate your pain at its least in the last 24 hours: 6  5) Rate your average level of pain:  6) Rate your pain right now: 8  7) What treatments or medications are you receiving for your pain?  Oxycodone and Valium  8) In the past 24 hours, how much relief have pain treatments or medication provided? 70%  9) During the past 24 hours, pain has interfered with your:    A) General activity: 8     B) Mood: 8     C) Walking ability: 8     D) Normal work (work outside the home & housework): 8     E) Relations with other people: 7     F) Sleep: 9     G) Enjoyment of life: 7    Drug Screen:  Date of last drug screen: 5/23/2022    Opioid agreement:  Active Opioid agreement on file?: Yes    Opioid agreement signed date: 5/13/2022  Opioid agreement expiration date: 5/13/2023    Naloxone:  Currently prescribed Naloxone (Narcan): Yes      Other treatments tried/failed:   Naproxen (OTC), physical therapy, OMT, massage, topical patches (OTC), topical creams (OTC), prescription NSAIDs, cold compresses, heat, rest, acetaminophen, ibuprofen (OTC), home exercises, muscle relaxants, prior surgery, TENS unit and epidural steroid injection    Prior referrals:  Pain management, Neurosurgery and Physical therapy       69-year-old male present for transition care management after recent 2 days hospitalization (10/18-10/20/22) for acutely worsening back pain and left-sided flank pain  Extensive hospital work-up including emergent CT lumbar spine and MRI lumbar spine showed no evidence of cauda equine or acute abnormalities, chronic degenerative changes noted on both scans  Patient was discharged home on duloxetine and prednisone with PT/OT recommendation for outpatient rehab services  Patient states since discharge has been experiencing severe constipation with frequent pellet-like bowel movements requiring excessive straining  Otherwise his back pain and left-sided flank pain has been stable  Pain Medications             acetaminophen (TYLENOL) 325 mg tablet Take 3 tablets (975 mg total) by mouth every 8 (eight) hours as needed for mild pain    oxyCODONE (ROXICODONE) 10 MG TABS Take 1 tablet (10 mg total) by mouth every 6 (six) hours as needed for severe pain Take 1 tablet (10mg) by mouth every 6 hours as needed for severe pain  Max daily amount : 4 tablets (40mg) Max Daily Amount: 40 mg    predniSONE 20 mg tablet Take 3 tablets (60 mg total) by mouth daily for 5 days, THEN 2 tablets (40 mg total) daily for 2 days, THEN 1 tablet (20 mg total) daily for 2 days, THEN 0 5 tablets (10 mg total) daily for 2 days  DULoxetine (CYMBALTA) 30 mg delayed release capsule Take 1 capsule (30 mg total) by mouth daily for 5 doses Do not start before October 21, 2022  DULoxetine (CYMBALTA) 60 mg delayed release capsule Starting on 10/26/2022  Take 1 capsule (60 mg total) by mouth daily Do not start before October 26, 2022  Outpatient Morphine Milligram Equivalents Per Day     10/25/22 - 11/11/22 60 MME/Day    Order Name Dose Route Frequency Maximum MME/Day     oxyCODONE (ROXICODONE) 10 MG TABS 10 mg Oral Every 6 hours PRN 60 MME/Day    Total Potential Morphine Milligram Equivalents Per Day 60 MME/Day    Calculation Information        oxyCODONE (ROXICODONE) 10 MG TABS    oxyCODONE 10 MG Tabs: maximum daily dose of 40 mg * morphine equivalence factor of 1 5 = 60 MME/Day                     11/12/22 and after None              PDMP Review       Value Time User    PDMP Reviewed  Yes 10/25/2022  2:38 PM Deb Larios MD         Review of Systems   Constitutional: Negative for chills and fever  HENT: Negative for ear pain, sinus pain, sore throat and trouble swallowing  Eyes: Negative for pain and discharge  Respiratory: Negative for shortness of breath  Cardiovascular: Negative for chest pain, palpitations and leg swelling  Gastrointestinal: Negative for abdominal pain, nausea and vomiting  Endocrine: Negative for polydipsia and polyuria  Genitourinary: Positive for flank pain (Left)  Negative for dysuria and hematuria  Musculoskeletal: Positive for back pain  Negative for arthralgias and myalgias  Skin: Negative for rash and wound  Neurological: Negative for seizures and syncope  Psychiatric/Behavioral: Negative for behavioral problems  Objective     BP (!) 175/114 (BP Location: Right arm, Patient Position: Sitting, Cuff Size: Large)   Pulse 69   Temp 97 8 °F (36 6 °C) (Temporal)   Wt 104 kg (228 lb 3 2 oz)   SpO2 98%   BMI 30 11 kg/m²     Physical Exam  Vitals and nursing note reviewed  Constitutional:       General: He is not in acute distress  Appearance: Normal appearance  He is normal weight  He is not ill-appearing, toxic-appearing or diaphoretic     HENT:      Head: Normocephalic and atraumatic  Right Ear: External ear normal       Left Ear: External ear normal       Nose: Nose normal       Mouth/Throat:      Pharynx: Oropharynx is clear  Eyes:      Extraocular Movements: Extraocular movements intact  Conjunctiva/sclera: Conjunctivae normal    Cardiovascular:      Rate and Rhythm: Normal rate  Pulmonary:      Effort: Pulmonary effort is normal    Abdominal:      General: There is no distension  Tenderness: There is abdominal tenderness in the left upper quadrant and left lower quadrant  There is no right CVA tenderness, left CVA tenderness, guarding or rebound  Musculoskeletal:      Cervical back: Normal range of motion  Lumbar back: Spasms and tenderness present  No bony tenderness  Decreased range of motion  Positive right straight leg raise test and positive left straight leg raise test         Back:    Skin:     General: Skin is warm  Coloration: Skin is not jaundiced or pale  Neurological:      General: No focal deficit present  Mental Status: He is alert  Mental status is at baseline  Psychiatric:         Mood and Affect: Mood is anxious           Behavior: Behavior normal            Alessandra Greer MD

## 2022-11-09 ENCOUNTER — TELEPHONE (OUTPATIENT)
Dept: FAMILY MEDICINE CLINIC | Facility: CLINIC | Age: 52
End: 2022-11-09

## 2022-11-10 ENCOUNTER — TELEPHONE (OUTPATIENT)
Dept: FAMILY MEDICINE CLINIC | Facility: CLINIC | Age: 52
End: 2022-11-10

## 2022-11-10 DIAGNOSIS — M53.3 CHRONIC RIGHT SI JOINT PAIN: ICD-10-CM

## 2022-11-10 DIAGNOSIS — F41.8 DEPRESSION WITH ANXIETY: ICD-10-CM

## 2022-11-10 DIAGNOSIS — M96.1 POST LAMINECTOMY SYNDROME: ICD-10-CM

## 2022-11-10 DIAGNOSIS — G89.4 CHRONIC PAIN SYNDROME: ICD-10-CM

## 2022-11-10 DIAGNOSIS — G89.29 CHRONIC RIGHT SI JOINT PAIN: ICD-10-CM

## 2022-11-10 RX ORDER — OXYCODONE HYDROCHLORIDE 10 MG/1
10 TABLET ORAL EVERY 6 HOURS PRN
Qty: 120 TABLET | Refills: 0 | Status: SHIPPED | OUTPATIENT
Start: 2022-11-10 | End: 2022-12-07 | Stop reason: SDUPTHER

## 2022-11-10 RX ORDER — DIAZEPAM 5 MG/1
5 TABLET ORAL EVERY 12 HOURS PRN
Qty: 60 TABLET | Refills: 0 | Status: SHIPPED | OUTPATIENT
Start: 2022-11-10 | End: 2022-12-07 | Stop reason: SDUPTHER

## 2022-11-10 NOTE — TELEPHONE ENCOUNTER
Called patient, he's been advised that refills were sent to the pharmacy  Patient did not have any further questions

## 2022-11-10 NOTE — TELEPHONE ENCOUNTER
Please schedule as requested by physician  Send message back so that we can have Rx addressed  Thanks!

## 2022-12-07 DIAGNOSIS — G89.4 CHRONIC PAIN SYNDROME: ICD-10-CM

## 2022-12-07 DIAGNOSIS — M53.3 CHRONIC RIGHT SI JOINT PAIN: ICD-10-CM

## 2022-12-07 DIAGNOSIS — G89.29 CHRONIC RIGHT SI JOINT PAIN: ICD-10-CM

## 2022-12-07 DIAGNOSIS — M96.1 POST LAMINECTOMY SYNDROME: ICD-10-CM

## 2022-12-07 DIAGNOSIS — F41.8 DEPRESSION WITH ANXIETY: ICD-10-CM

## 2022-12-09 RX ORDER — OXYCODONE HYDROCHLORIDE 10 MG/1
10 TABLET ORAL EVERY 6 HOURS PRN
Qty: 120 TABLET | Refills: 0 | Status: SHIPPED | OUTPATIENT
Start: 2022-12-09 | End: 2023-01-08

## 2022-12-09 RX ORDER — DIAZEPAM 5 MG/1
5 TABLET ORAL EVERY 12 HOURS PRN
Qty: 60 TABLET | Refills: 0 | Status: SHIPPED | OUTPATIENT
Start: 2022-12-09

## 2022-12-10 ENCOUNTER — TELEPHONE (OUTPATIENT)
Dept: FAMILY MEDICINE CLINIC | Facility: CLINIC | Age: 52
End: 2022-12-10

## 2022-12-10 NOTE — TELEPHONE ENCOUNTER
Patient stated that he needs prior authorization for oxyCODONE (ROXICODONE) 10 MG TABS  Patient is requesting a call back to address

## 2022-12-12 DIAGNOSIS — G89.29 CHRONIC RIGHT SI JOINT PAIN: ICD-10-CM

## 2022-12-12 DIAGNOSIS — M53.3 CHRONIC RIGHT SI JOINT PAIN: ICD-10-CM

## 2022-12-12 DIAGNOSIS — G89.4 CHRONIC PAIN SYNDROME: ICD-10-CM

## 2022-12-12 DIAGNOSIS — M96.1 POST LAMINECTOMY SYNDROME: ICD-10-CM

## 2022-12-12 RX ORDER — OXYCODONE HYDROCHLORIDE 10 MG/1
10 TABLET ORAL EVERY 6 HOURS PRN
Qty: 120 TABLET | Refills: 0 | OUTPATIENT
Start: 2022-12-12 | End: 2023-01-11

## 2022-12-12 NOTE — TELEPHONE ENCOUNTER
Stephen Mabry calling,  from [facility] on behalf of the patient  She stated, patient told her he's been out of medication for a few days  Caller indicated if a call came directly from provider to "Cover My Med's" or "speak directly to Pharmacist" and put through as urgent,  patient would receive medication in 24 hrs

## 2022-12-12 NOTE — TELEPHONE ENCOUNTER
PA submitted through cover my meds  Will follow up on 2-3 days  Patient made aware      KEY: [id number]

## 2022-12-12 NOTE — TELEPHONE ENCOUNTER
PA was submitted and spoke to pharmacy services at [facility] and they stated that there is no "expedited review" but we should have a determination within 24 hours

## 2022-12-12 NOTE — TELEPHONE ENCOUNTER
Patient needs prior auth through insurance for medication    Please start  He has been out since 12/9      Oxycodone 10MG

## 2022-12-12 NOTE — TELEPHONE ENCOUNTER
Patient called 3rd time today  He spoke with Dr Lizy Abreu and he is asking if someone call the insurance company because Dr Suzie Agrawal said that this was going to be done within 4 hours with it being sent as urgent  He wouldn't listen to what I was trying to explain from the messages that were in the system  I told him that the clinical staff were busy with patients this morning that they couldn't do it ASAP like Dr Suzie Agrawal stated  Everyone is on lunch  [facility] is on [facility]  He was very argumentative with everything I was trying to explain to him

## 2022-12-13 NOTE — TELEPHONE ENCOUNTER
Contacted patient after chart review  Patient was upset and stated that the person who took his phone call was rude and was not trying to listen to him  Writer emphasized with patient who expressed all his concerns regarding ongoing issues receiving oxycodone  After an extensive chart review I explained to this the patient previous prior auth  in November and new prior auth was submitted on   Insurance company was contacted to check the status on this date and writer was informed medication has been approved  Cover my meds crossed check to confirm approval  Approval printed and placed in yellow clerical folder for scanning  Pt was informed of this and stated he will contact pharmacy to  his medications  No further concerns at this time

## 2022-12-13 NOTE — TELEPHONE ENCOUNTER
Patient called very upset that his prior auth wasn't addressed until end of day yesterday when he started calling at 7:51AM   He was told it would be helpful in the future if he would find out from his insurance when he is due for Prior auth  He stated it was our job to do that    He said his BP is 170/112 and wants this addressed now  He said he called his insurance and they told him we need to call them to expedite it

## 2023-01-03 NOTE — TELEPHONE ENCOUNTER
Patient calling and stated, " I took the urine test to get my oxycodone 10 mg script refilled but my [facility] pharmacy is telling me that they have not received authorization from your office " Please see attached photos for reference

## 2023-01-05 ENCOUNTER — OFFICE VISIT (OUTPATIENT)
Dept: FAMILY MEDICINE CLINIC | Facility: CLINIC | Age: 53
End: 2023-01-05

## 2023-01-05 VITALS
DIASTOLIC BLOOD PRESSURE: 110 MMHG | HEART RATE: 87 BPM | WEIGHT: 219.8 LBS | OXYGEN SATURATION: 96 % | BODY MASS INDEX: 29 KG/M2 | TEMPERATURE: 98.3 F | SYSTOLIC BLOOD PRESSURE: 162 MMHG

## 2023-01-05 DIAGNOSIS — Z72.0 TOBACCO ABUSE: ICD-10-CM

## 2023-01-05 DIAGNOSIS — Z82.61 FAMILY HISTORY OF RHEUMATOID ARTHRITIS: ICD-10-CM

## 2023-01-05 DIAGNOSIS — M53.3 CHRONIC RIGHT SI JOINT PAIN: ICD-10-CM

## 2023-01-05 DIAGNOSIS — H93.13 TINNITUS OF BOTH EARS: ICD-10-CM

## 2023-01-05 DIAGNOSIS — G89.29 CHRONIC RIGHT SI JOINT PAIN: ICD-10-CM

## 2023-01-05 DIAGNOSIS — F11.90 CHRONIC, CONTINUOUS USE OF OPIOIDS: ICD-10-CM

## 2023-01-05 DIAGNOSIS — M79.2 NEUROPATHIC PAIN: ICD-10-CM

## 2023-01-05 DIAGNOSIS — F41.8 DEPRESSION WITH ANXIETY: ICD-10-CM

## 2023-01-05 DIAGNOSIS — Z79.899 CHRONIC PRESCRIPTION BENZODIAZEPINE USE: ICD-10-CM

## 2023-01-05 DIAGNOSIS — R13.19 ESOPHAGEAL DYSPHAGIA: ICD-10-CM

## 2023-01-05 DIAGNOSIS — M96.1 POST LAMINECTOMY SYNDROME: Primary | ICD-10-CM

## 2023-01-05 DIAGNOSIS — E66.3 OVERWEIGHT (BMI 25.0-29.9): ICD-10-CM

## 2023-01-05 DIAGNOSIS — M25.50 POLYARTHRALGIA: ICD-10-CM

## 2023-01-05 DIAGNOSIS — G89.4 CHRONIC PAIN SYNDROME: ICD-10-CM

## 2023-01-05 DIAGNOSIS — Z11.59 NEED FOR HEPATITIS C SCREENING TEST: ICD-10-CM

## 2023-01-05 DIAGNOSIS — R03.0 ELEVATED BLOOD PRESSURE READING: ICD-10-CM

## 2023-01-05 RX ORDER — OXYCODONE HYDROCHLORIDE 10 MG/1
10 TABLET ORAL EVERY 6 HOURS PRN
Qty: 120 TABLET | Refills: 0 | Status: SHIPPED | OUTPATIENT
Start: 2023-01-12 | End: 2023-02-11

## 2023-01-05 RX ORDER — DIAZEPAM 5 MG/1
5 TABLET ORAL EVERY 12 HOURS PRN
Qty: 60 TABLET | Refills: 0 | Status: SHIPPED | OUTPATIENT
Start: 2023-01-12

## 2023-01-05 RX ORDER — DULOXETIN HYDROCHLORIDE 30 MG/1
30 CAPSULE, DELAYED RELEASE ORAL DAILY
Qty: 7 CAPSULE | Refills: 0 | Status: SHIPPED | OUTPATIENT
Start: 2023-01-05 | End: 2023-01-12

## 2023-01-05 RX ORDER — DULOXETIN HYDROCHLORIDE 60 MG/1
60 CAPSULE, DELAYED RELEASE ORAL DAILY
Qty: 90 CAPSULE | Refills: 0 | Status: SHIPPED | OUTPATIENT
Start: 2023-01-13

## 2023-01-05 NOTE — ASSESSMENT & PLAN NOTE
· Intermittent elevated BP readings during previous visits with other provider, documented as likely 2/2 anxiety and pain  · While pain and anxiety are likely playing a role in elevated BP during office visits, it is necessary to ascertain whether patient has masked hypertension  · Recommend home BP monitoring, he has a cuff already  · Home BP log given and patient advised to measure BP several times per week and record for review at next visit in 4 weeks to determine whether a diagnosis of HTN is appropriate  · Will call patient in 2 weeks to ensure he is not having persistent severe range BP readings at home, in which case a trial of medication would be appropriate sooner than 4 weeks  · ED precautions if develops symptoms of hypertensive urgency/emergency, ACS, CVA  · Limit use of salt, caffeine, reduce stress if possible

## 2023-01-05 NOTE — PROGRESS NOTES
Name: Stephon Altamirano      : 1970      MRN: [id number]  Encounter Provider: Jillian Gallardo MD  Encounter Date: 2023   Encounter department: [address] [place] [address]  Post laminectomy syndrome  Assessment & Plan:  · Extensive history of several spinal surgeries, spinal stimulator (removed), failed treatments with Pain Management, physical therapy previously  · Stable on regimen of oxycodone 10 mg Q6H PRN with valium 5 mg Q12H PRN  · Also using medical marijuana, which has allowed him to reduce TDD of medications over the years and he feels has helped with his pain; advised on risks of concurrent medical marijuana use  · Due for refill next week on oxycodone and valium  · Elaina Burkett 17 report was reviewed and was appropriate   · Meds sent to pharmacy with future fill date (23)  · New opioid and controlled substance agreements were reviewed with the patient, as follows:    · Patient was made aware they are only to receive opioid medication from our office, and must take the medication as prescribed  · If the medication is lost or stolen, it will not be replaced, and advised that sharing medications is a felony  · Reviewed risks associated with opioid medications, including dependence, addiction and tolerance; patient was warned against driving while taking sedation medications   · Reviewed potential side effects including, but not limited to, constipation, drowsiness, addiction, impaired judgment and risk of fatal overdose if not taken as prescribed  · At this point in time, the patient is showing no signs of addiction, abuse, diversion or suicidal ideation  · Patient was agreeable and signed the contract  · Obtain urine at next visit in 4 weeks for drug monitoring  · Recommend continued use of Tylenol, but try to avoid/limit NSAIDs per GI given mild gastritis seen on last EGD    Orders:  -     diazepam (VALIUM) 5 mg tablet;  Take 1 tablet (5 mg total) by mouth every 12 (twelve) hours as needed for anxiety or muscle spasms Do not start before January 12, 2023   -     oxyCODONE (ROXICODONE) 10 MG TABS; Take 1 tablet (10 mg total) by mouth every 6 (six) hours as needed for severe pain Take 1 tablet (10mg) by mouth every 6 hours as needed for severe pain  Max daily amount : 4 tablets (40mg) Max Daily Amount: 40 mg Do not start before January 12, 2023     2  Elevated blood pressure reading  Assessment & Plan:  · Intermittent elevated BP readings during previous visits with other provider, documented as likely 2/2 anxiety and pain  · While pain and anxiety are likely playing a role in elevated BP during office visits, it is necessary to ascertain whether patient has masked hypertension  · Recommend home BP monitoring, he has a cuff already  · Home BP log given and patient advised to measure BP several times per week and record for review at next visit in 4 weeks to determine whether a diagnosis of HTN is appropriate  · Will call patient in 2 weeks to ensure he is not having persistent severe range BP readings at home, in which case a trial of medication would be appropriate sooner than 4 weeks  · ED precautions if develops symptoms of hypertensive urgency/emergency, ACS, CVA  · Limit use of salt, caffeine, reduce stress if possible    Orders:  -     Lipid Panel with Direct LDL reflex; Future  -     Basic metabolic panel; Future  -     Hepatic function panel; Future  -     Protime-INR; Future  -     TSH, 3rd generation with Free T4 reflex; Future    3  Tinnitus of both ears  Assessment & Plan:  -Referral given today for [facility] for tinnitus management    Orders:  -     Ambulatory Referral to Physical Therapy; Future    4   Depression with anxiety  Assessment & Plan:  · Patient uses Valium primarily for muscle spasms, but also does use for acute episodes of anxiety at times; denies SI/HI  · Patient amenable to restarting SNRI, which may help with both neuropathic pain as well as provide baseline treatment of depression/anxiety  · Cymbalta sent to pharmacy    Orders:  -     diazepam (VALIUM) 5 mg tablet; Take 1 tablet (5 mg total) by mouth every 12 (twelve) hours as needed for anxiety or muscle spasms Do not start before January 12, 2023     5  Neuropathic pain  Assessment & Plan:  · Patient reports he did not take Cymbalta following discharge from hospital as previously prescribed, was only taking it as inpatient during admission  · Discussed the importance of providing multimodal therapy for management of chronic pain related to postlaminectomy syndrome and chronic right SI joint pain, and, given he does have intermittent radicular symptoms, he may benefit from adding SNRI back to regimen for management of neuropathic pain  · Restart Cymbalta at 30 mg daily for 1 week then increase to 60 mg daily  · Of note, SNRI may also have some benefit for mood/anxiety as well    Orders:  -     DULoxetine (CYMBALTA) 30 mg delayed release capsule; Take 1 capsule (30 mg total) by mouth daily for 7 doses  -     DULoxetine (CYMBALTA) 60 mg delayed release capsule; Take 1 capsule (60 mg total) by mouth daily Please fill before 1/13/23 as patient is going out of town Do not start before January 13, 2023      6  Polyarthralgia  Assessment & Plan:  · Pain in L hand suggestive of de Quervain's tenosynovitis given positive Finkelstein test, nonspecific tenderness over R hand, no gross joint abnormalities noted   · Patient reports he does have a brace that he wears at times and that it helps, wants to continue for now  · If pain persists at next visit,, will refer to Ortho hand surgery for further evaluation  · Low suspicion for autoimmune etiology, however given chronic back issues with complaint of joint pain in B/L hands in the setting of mother with rheumatoid arthritis, will order screening labs including rheumatoid factor, MACKENZIE, CRP    Orders:  -     RF Screen w/ Reflex to Titer; Future  -     MACKENZIE Screen w/ Reflex to Titer/Pattern; Future  -     C-reactive protein; Future    7  Esophageal dysphagia  Assessment & Plan:  · Esophageal manometry previously showed jackhammer esophagus, but trial of CCB was deferred at that time due to concerns for lowering BP  · If patient found to have Dx of HTN, favor CCB as treatment given potential benefits for esophageal hypercontractility  · s/p endoscopic esophageal dilatation by GI in June 2022  · Following with GI and plans to schedule follow-up appointment  · Continue modified diet to reduce risk of aspiration      8  Chronic, continuous use of opioids  Assessment & Plan:  · Opioid contract signed today, patient counseled as outlined above  · Will obtain urine testing at next visit for drug monitoring  · Narcan prescription active  · Plan to discuss constipation at next visit    Orders:  -     CBC and differential; Future    9  Chronic prescription benzodiazepine use  Assessment & Plan:  · New controlled substance agreement signed today and patient counseled on potential side effects and risks of medication  · Discussed avoidance of alcohol in the setting of BZD use  · Will obtain urine testing in next visit for drug monitoring      10  Tobacco abuse  Assessment & Plan:  · Tobacco Cessation Counseling: Tobacco cessation counseling and education was provided  The patient is sincerely urged to quit consumption of tobacco  He is not ready to quit tobacco  The numerous health risks of tobacco consumption were discussed  If he decides to quit, there are a number of helpful adjunctive aids, and he can see me to discuss nicotine replacement therapy, or bupropion anytime in the future    · Previously followed with Hematology for chronic polycythemia, had therapeutic phlebotomy in the past   · Check CBC to evaluate for recurrent polycythemia in the setting of above-mentioned history and chronic tobacco abuse  · LDCT chest ordered for lung cancer screening    Orders:  -     CT lung screening program; Future; Expected date: 01/05/2023    11  Overweight (BMI 25 0-29  9)  Assessment & Plan:  · BMI Counseling: Body mass index is 29 kg/m²  The BMI is above normal  Nutrition recommendations include reducing portion sizes, decreasing overall calorie intake, reducing fast food intake, consuming healthier snacks and decreasing soda and/or juice intake  Exercise recommendations include exercising 3-5 times per week  · Check lipid panel, FBG and electrolytes, renal and hepatic function, TSH    Orders:  -     Lipid Panel with Direct LDL reflex; Future  -     CBC and differential; Future  -     Basic metabolic panel; Future  -     Hepatic function panel; Future  -     Protime-INR; Future  -     TSH, 3rd generation with Free T4 reflex; Future    12  Family history of rheumatoid arthritis  -     RF Screen w/ Reflex to Titer; Future  -     MACKENZIE Screen w/ Reflex to Titer/Pattern; Future  -     C-reactive protein; Future    13  Chronic right SI joint pain  -     oxyCODONE (ROXICODONE) 10 MG TABS; Take 1 tablet (10 mg total) by mouth every 6 (six) hours as needed for severe pain Take 1 tablet (10mg) by mouth every 6 hours as needed for severe pain  Max daily amount : 4 tablets (40mg) Max Daily Amount: 40 mg Do not start before January 12, 2023  14  Chronic pain syndrome  -     oxyCODONE (ROXICODONE) 10 MG TABS; Take 1 tablet (10 mg total) by mouth every 6 (six) hours as needed for severe pain Take 1 tablet (10mg) by mouth every 6 hours as needed for severe pain  Max daily amount : 4 tablets (40mg) Max Daily Amount: 40 mg Do not start before January 12, 2023  15  Need for hepatitis C screening test  -     Hepatitis C antibody; Future    Follow up in 4 weeks, will also address remaining  needs at that time           Subjective      HPI     Patient is well-known to me as he was previously under my care at [address] [place] [address], and has now transferred [facility] [address] [place] [address] [facility]  History of multiple spinal surgeries (12 per patient) with resultant postlaminectomy syndrome and chronic pain, as well as chronic SI joint dysfunction, right worse than left  He has been stable on oxycodone 10 mg every 6 hours as needed (#120) for several years as well as Valium 5 mg every 12 as needed (#60) for muscle spasms, as well as anxiety, but he rarely uses the Valium for this per his report  Had opioid visit and controlled substance agreement signed with Dr Graciela Crabtree at [facility], last urine testing was in October  Followed with pain management in the past but declines referral back to them as he is not interested in any more injections given his extensive surgical history, which includes a spinal stimulator that was subsequently removed  Declines referral to physical therapy at this time  Patient states he is aware that he will "never live pain-free" but that his current medication regimen allows him to live a more functional life without being debilitated by pain  Also uses medical marijuana, which he buys from [place] due to cost, feels it works well for him and he was able to decrease his total daily dose of medications once he started using medical marijuana  Patient is aware of the risks of medical marijuana use especially when used concurrently with oxycodone and Valium  Reports alcohol use rarely, does not use alcohol with medications  Denies any other illicit drug use at this time, previous UDS did show positive for amphetamine/methamphetamine in addition to the expected opiate/THC/BZD positive results, and patient reports he is unsure why his test was positive for amphetamine/methamphetamine but suspects that he possibly received a batch of marijuana that was "laced"      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 previously and he continues to experiencing significant dysphagia symptoms; denies any episodes of aspiration per his report  Currently having some choking on thin liquids such as water, so he tries to drink mostly "milkshake" textures  Had esophageal dilatation done in June by Dr Herman Florian, during which she also noted mild gastritis  Patient is planning to follow-up with GI and possibly see a provider at [facility] for further evaluation  Colonoscopy done 5/2021  Of note, he was not a candidate for treatment for hypercontractile esophagus with CCB in the past due to concerns for the medication at risk for dropping his blood pressure  However, patient is concerned about his blood pressure as he reports he has noticed it is often high during visits, and he is also noticed some high readings at home when he measures it with his cuff  Denies chest pain/tightness on exertion, SOB, LEYVA, headache, vision changes, lightheadedness, vertigo, syncope, pedal edema  His pain does seem to raise his blood pressure, but he also states it is not always high even when he is in significant pain, and vice versa  Patient is continuing to smoke cigarettes, about 15 cigarettes/day but sometimes up to a pack daily when he is having a lot of pain  Started smoking at age 11  Previously followed with Hematology for chronic polycythemia when he was under my care back in 2020, had therapeutic phlebotomy in the past and needs to follow up with Dr Jesus Agee for a visit  Review of Systems   HENT: Positive for trouble swallowing  Dysphagia as noted in HPI; also reports significant sensation of ringing in bilateral ears, which has become very bothersome to him, left ear seems worse than right ear, some associated reduction in hearing   Respiratory: Negative for chest tightness and shortness of breath  Cardiovascular: Negative for chest pain and leg swelling  Gastrointestinal: Positive for constipation  Negative for abdominal pain and diarrhea          Chronic constipation 2/2 opiate use   Genitourinary: Negative for difficulty urinating  Musculoskeletal: Positive for arthralgias, back pain and myalgias  Chronic back and SI joint pain as noted in HPI, also reports pain in joints of bilateral hands, concerned as his mother has rheumatoid arthritis   Psychiatric/Behavioral: Negative for agitation, self-injury, sleep disturbance and suicidal ideas  Current Outpatient Medications on File Prior to Visit   Medication Sig   • acetaminophen (TYLENOL) 325 mg tablet Take 3 tablets (975 mg total) by mouth every 8 (eight) hours as needed for mild pain   • albuterol (PROVENTIL HFA,VENTOLIN HFA) 90 mcg/act inhaler Inhale 2 puffs every 6 (six) hours as needed for wheezing   • diazepam (VALIUM) 5 mg tablet Take 1 tablet (5 mg total) by mouth every 12 (twelve) hours as needed for anxiety or muscle spasms   • fluticasone-vilanterol (BREO ELLIPTA) 200-25 MCG/INH inhaler Inhale 1 puff daily Rinse mouth after use  • oxyCODONE (ROXICODONE) 10 MG TABS Take 1 tablet (10 mg total) by mouth every 6 (six) hours as needed for severe pain Take 1 tablet (10mg) by mouth every 6 hours as needed for severe pain  Max daily amount : 4 tablets (40mg) Max Daily Amount: 40 mg   • docusate sodium (COLACE) 100 mg capsule Take 1 capsule (100 mg total) by mouth 2 (two) times a day (Patient not taking: Reported on 1/5/2023)   • DULoxetine (CYMBALTA) 30 mg delayed release capsule Take 1 capsule (30 mg total) by mouth daily for 5 doses Do not start before October 21, 2022  (Patient not taking: Reported on 10/25/2022)   • DULoxetine (CYMBALTA) 60 mg delayed release capsule Take 1 capsule (60 mg total) by mouth daily Do not start before October 26, 2022   (Patient not taking: Reported on 10/25/2022 Do not start before October 26, 2022 )   • hydrOXYzine HCL (ATARAX) 25 mg tablet Take 1 tablet (25 mg total) by mouth every 6 (six) hours as needed for itching for up to 7 days (Patient not taking: Reported on 10/25/2022)   • naloxone (Narcan) 4 mg/0 1 mL nasal spray In case of overdose: 1 spray in one nostril x 1, may repeat in 2 min if remains unresponsive  • polyethylene glycol (MIRALAX) 17 g packet Take 17 g by mouth 2 (two) times a day as needed (constipation) (Patient not taking: Reported on 1/5/2023)   • senna (SENOKOT) 8 6 mg Take 1 tablet (8 6 mg total) by mouth 2 (two) times a day as needed for constipation (Patient not taking: Reported on 1/5/2023)       Objective     BP (!) 162/110 (BP Location: Left arm, Patient Position: Sitting, Cuff Size: Large)   Pulse 87   Temp 98 3 °F (36 8 °C) (Tympanic)   Wt 99 7 kg (219 lb 12 8 oz)   SpO2 96%   BMI 29 00 kg/m²     Physical Exam  Vitals reviewed  Constitutional:       General: He is not in acute distress  Appearance: He is well-developed  He is not toxic-appearing  HENT:      Head: Normocephalic and atraumatic  Eyes:      General:         Right eye: No discharge  Left eye: No discharge  Conjunctiva/sclera: Conjunctivae normal    Cardiovascular:      Rate and Rhythm: Normal rate and regular rhythm  Pulmonary:      Effort: Pulmonary effort is normal  No respiratory distress  Breath sounds: Normal breath sounds  Abdominal:      General: Bowel sounds are normal       Palpations: Abdomen is soft  Tenderness: There is no abdominal tenderness  Musculoskeletal:      Cervical back: Normal range of motion and neck supple  Comments: Spinal ROM limited due to pain, significant tenderness and hypertonicity over bilateral paravertebral musculature, mild tenderness over the first dorsal compartment of the left hand but no other point tenderness, no joint swelling grossly in bilateral hands   Skin:     General: Skin is warm and dry  Capillary Refill: Capillary refill takes less than 2 seconds        Comments: SK on mid back, raised verrucous skin colored papule on posterior scalp; multiple well-healed surgical cicatrices along spine; multiple tattoos   Neurological:      Mental Status: He is alert and oriented to person, place, and time  Psychiatric:         Mood and Affect: Mood normal          Behavior: Behavior normal          Thought Content:  Thought content normal          Judgment: Judgment normal        Deni Davis MD

## 2023-01-05 NOTE — ASSESSMENT & PLAN NOTE
· Patient reports he did not take Cymbalta following discharge from hospital as previously prescribed, was only taking it as inpatient during admission  · Discussed the importance of providing multimodal therapy for management of chronic pain related to postlaminectomy syndrome and chronic right SI joint pain, and, given he does have intermittent radicular symptoms, he may benefit from adding SNRI back to regimen for management of neuropathic pain  · Restart Cymbalta at 30 mg daily for 1 week then increase to 60 mg daily  · Of note, SNRI may also have some benefit for mood/anxiety as well

## 2023-01-05 NOTE — PATIENT INSTRUCTIONS
Please call the [facility] for the ringing in the ears, you can pick any of the following locations:    [facility] and [facility]  o    Phone: [phone] / Fax: [phone]  o    Open Monday & Tuesday from 8:00AM-5:00PM, Wednesday through Friday from [facility] and [facility]  o    Phone: [phone] / Fax: [phone]  o    Open Monday - Friday from 8:30am - 5:00pm    [address] [facility] and [facility]  o    Phone: [phone]  / Fax: [phone]  o    Open Tuesdays and Fridays from 8:00am - 4:00pm      Please check blood pressure 2-3 times per week for the next month and record on the paper below:    I will call you in 2 weeks to check on the blood pressure  I will refill your meds today to start 1/12/23, please check with Desirae to make sure you don't need another prior auth for the Rx  We will repeat urine testing at next visit  Please get the labs *fasting* and I will get the results

## 2023-01-06 PROBLEM — M25.50 POLYARTHRALGIA: Status: ACTIVE | Noted: 2023-01-06

## 2023-01-06 PROBLEM — E66.3 OVERWEIGHT (BMI 25.0-29.9): Status: ACTIVE | Noted: 2023-01-06

## 2023-01-06 NOTE — ASSESSMENT & PLAN NOTE
· Esophageal manometry previously showed jackhammer esophagus, but trial of CCB was deferred at that time due to concerns for lowering BP  · If patient found to have Dx of HTN, favor CCB as treatment given potential benefits for esophageal hypercontractility  · s/p endoscopic esophageal dilatation by GI in June 2022  · Following with GI and plans to schedule follow-up appointment  · Continue modified diet to reduce risk of aspiration

## 2023-01-06 NOTE — ASSESSMENT & PLAN NOTE
· Pain in L hand suggestive of de Quervain's tenosynovitis given positive Finkelstein test, nonspecific tenderness over R hand, no gross joint abnormalities noted   · Patient reports he does have a brace that he wears at times and that it helps, wants to continue for now  · If pain persists at next visit,, will refer to Ortho hand surgery for further evaluation  · Low suspicion for autoimmune etiology, however given chronic back issues with complaint of joint pain in B/L hands in the setting of mother with rheumatoid arthritis, will order screening labs including rheumatoid factor, MACKENZIE, CRP

## 2023-01-06 NOTE — ASSESSMENT & PLAN NOTE
· Opioid contract signed today, patient counseled as outlined above  · Will obtain urine testing at next visit for drug monitoring  · Narcan prescription active  · Plan to discuss constipation at next visit

## 2023-01-06 NOTE — ASSESSMENT & PLAN NOTE
· New controlled substance agreement signed today and patient counseled on potential side effects and risks of medication  · Discussed avoidance of alcohol in the setting of BZD use  · Will obtain urine testing in next visit for drug monitoring

## 2023-01-06 NOTE — ASSESSMENT & PLAN NOTE
· Patient uses Valium primarily for muscle spasms, but also does use for acute episodes of anxiety at times; denies SI/HI  · Patient amenable to restarting SNRI, which may help with both neuropathic pain as well as provide baseline treatment of depression/anxiety  · Cymbalta sent to pharmacy

## 2023-01-06 NOTE — ASSESSMENT & PLAN NOTE
· Extensive history of several spinal surgeries, spinal stimulator (removed), failed treatments with Pain Management, physical therapy previously  · Stable on regimen of oxycodone 10 mg Q6H PRN with valium 5 mg Q12H PRN  · Also using medical marijuana, which has allowed him to reduce TDD of medications over the years and he feels has helped with his pain; advised on risks of concurrent medical marijuana use  · Due for refill next week on oxycodone and valium  · Elaina Burkett 17 report was reviewed and was appropriate   · Meds sent to pharmacy with future fill date (1/12/23)  · New opioid and controlled substance agreements were reviewed with the patient, as follows:    · Patient was made aware they are only to receive opioid medication from our office, and must take the medication as prescribed  · If the medication is lost or stolen, it will not be replaced, and advised that sharing medications is a felony  · Reviewed risks associated with opioid medications, including dependence, addiction and tolerance; patient was warned against driving while taking sedation medications   · Reviewed potential side effects including, but not limited to, constipation, drowsiness, addiction, impaired judgment and risk of fatal overdose if not taken as prescribed  · At this point in time, the patient is showing no signs of addiction, abuse, diversion or suicidal ideation  · Patient was agreeable and signed the contract  · Obtain urine at next visit in 4 weeks for drug monitoring  · Recommend continued use of Tylenol, but try to avoid/limit NSAIDs per GI given mild gastritis seen on last EGD

## 2023-01-06 NOTE — ASSESSMENT & PLAN NOTE
· Tobacco Cessation Counseling: Tobacco cessation counseling and education was provided  The patient is sincerely urged to quit consumption of tobacco  He is not ready to quit tobacco  The numerous health risks of tobacco consumption were discussed  If he decides to quit, there are a number of helpful adjunctive aids, and he can see me to discuss nicotine replacement therapy, or bupropion anytime in the future    · Previously followed with Hematology for chronic polycythemia, had therapeutic phlebotomy in the past   · Check CBC to evaluate for recurrent polycythemia in the setting of above-mentioned history and chronic tobacco abuse  · LDCT chest ordered for lung cancer screening

## 2023-01-06 NOTE — ASSESSMENT & PLAN NOTE
· BMI Counseling: Body mass index is 29 kg/m²  The BMI is above normal  Nutrition recommendations include reducing portion sizes, decreasing overall calorie intake, reducing fast food intake, consuming healthier snacks and decreasing soda and/or juice intake  Exercise recommendations include exercising 3-5 times per week     · Check lipid panel, FBG and electrolytes, renal and hepatic function, TSH

## 2023-01-12 ENCOUNTER — TELEPHONE (OUTPATIENT)
Dept: FAMILY MEDICINE CLINIC | Facility: CLINIC | Age: 53
End: 2023-01-12

## 2023-01-12 NOTE — TELEPHONE ENCOUNTER
Patient has transferred to [address] [place] [address] seeing Dr Cherry Navarro    Please remove us as PCP

## 2023-01-13 ENCOUNTER — APPOINTMENT (EMERGENCY)
Dept: RADIOLOGY | Facility: HOSPITAL | Age: 53
End: 2023-01-13

## 2023-01-13 ENCOUNTER — HOSPITAL ENCOUNTER (EMERGENCY)
Facility: HOSPITAL | Age: 53
Discharge: HOME | End: 2023-01-13
Attending: EMERGENCY MEDICINE

## 2023-01-13 ENCOUNTER — APPOINTMENT (EMERGENCY)
Dept: CT IMAGING | Facility: HOSPITAL | Age: 53
End: 2023-01-13

## 2023-01-13 VITALS
TEMPERATURE: 98.1 F | DIASTOLIC BLOOD PRESSURE: 105 MMHG | SYSTOLIC BLOOD PRESSURE: 160 MMHG | RESPIRATION RATE: 20 BRPM | BODY MASS INDEX: 28.85 KG/M2 | WEIGHT: 218.7 LBS | HEART RATE: 68 BPM | OXYGEN SATURATION: 97 %

## 2023-01-13 DIAGNOSIS — S06.0XAA CONCUSSION: ICD-10-CM

## 2023-01-13 DIAGNOSIS — M54.9 BACK PAIN: ICD-10-CM
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
## 2023-01-13 LAB
ATRIAL RATE: 81 BPM
P AXIS: 66 DEGREES
PR INTERVAL: 152 MS
QRS AXIS: 77 DEGREES
QRSD INTERVAL: 86 MS
QT INTERVAL: 352 MS
QTC INTERVAL: 408 MS
T WAVE AXIS: 64 DEGREES
VENTRICULAR RATE: 81 BPM

## 2023-01-13 RX ORDER — MORPHINE SULFATE 10 MG/ML
6 INJECTION, SOLUTION INTRAMUSCULAR; INTRAVENOUS ONCE
Status: COMPLETED | OUTPATIENT
Start: 2023-01-13 | End: 2023-01-13

## 2023-01-13 RX ORDER — ACETAMINOPHEN 325 MG/1
975 TABLET ORAL ONCE
Status: COMPLETED | OUTPATIENT
Start: 2023-01-13 | End: 2023-01-13

## 2023-01-13 RX ADMIN — ACETAMINOPHEN 975 MG: 325 TABLET, FILM COATED ORAL at 12:28

## 2023-01-13 RX ADMIN — MORPHINE SULFATE 6 MG: 10 INJECTION INTRAVENOUS at 12:29

## 2023-01-13 NOTE — ED PROVIDER NOTES
Emergency Department Trauma Note  Wily Graham 45 y o  male MRN: [id number]  Unit/Bed#: ED-33/ED-33 Encounter: [id number]      Trauma Alert: Trauma Acuity: C  Model of Arrival:   via    Trauma Team: Current Providers  Attending Provider: Hayde Gonzalez MD  Attending Provider: Carolina Chua DO  ED Technician: Tavia Santa  ED Technician: Paul Moncada  Resident: Jay Lynne MD  Resident: Arsalan Miranda MD  Registered Nurse: Thee Valencia RN  Consultants:     None      History of Present Illness     Chief Complaint:   Chief Complaint   Patient presents with   • Elda Munson last night down stairs  + head strike, unknown loc, + asa  Patient states that he has been dizzy since fall, also complains of increased chronic back pain      HPI:  Wily Graham is a 45 y o  male who presents with head/back/right shoulder pain  Mechanism:Details of Incident: slipped off step, fell backwards, hit R shoulder, sacral area, and R head, on ASA( last dose yesterday) Injury Date: 01/13/23 Injury Time: 0100      Patient is a 61-year-old male presenting after a fall down stairs where he lost his footing and fell backwards about 5 stairs  Patient states he is having pain in his head, neck, lower back, right shoulder and right hip  Fall happened around 2 AM this morning where he lost his footing, denies any chest pain, shortness of breath, dizziness, headache before the fall  Patient endorses dizziness, shortness of breath and states he has a history of vertigo but denies any chest pain, abdominal pain, bowel or bladder incontinence, saddle anesthesia  Patient has a extensive history of back surgeries as well as surgeries on other broken bones  Review of Systems   Constitutional: Negative for chills and fever  HENT: Negative for congestion, ear pain and sore throat  Eyes: Negative for pain and visual disturbance  Respiratory: Positive for shortness of breath (From pain)  Negative for cough  Cardiovascular: Negative for chest pain and palpitations  Gastrointestinal: Negative for abdominal pain, constipation, diarrhea, nausea and vomiting  Genitourinary: Negative for dysuria and hematuria  Musculoskeletal: Positive for arthralgias (Right hip and thigh), back pain, neck pain and neck stiffness  Skin: Negative for color change and rash  Neurological: Positive for dizziness, light-headedness and headaches  Negative for tremors, seizures, syncope, facial asymmetry, speech difficulty, weakness and numbness  All other systems reviewed and are negative        Historical Information     Immunizations:   Immunization History   Administered Date(s) Administered   • COVID-19 PFIZER VACCINE 0 3 ML IM 05/03/2021, 05/27/2021   • Rabies, Intramuscular 08/13/2016, 08/17/2016, 08/24/2016   • Tdap 08/13/2016       Past Medical History:   Diagnosis Date   • Allergic     seasonal    • Allergic rhinitis    • Anxiety    • Arthritis    • Back pain    • Chronic obstructive asthma (HCC)    • Chronic pain syndrome    • Cluster headaches    • Colon polyp    • Depression    • Insomnia    • Kidney stones    • Laryngospasm    • Leukocytosis    • Migraine    • Myocardial infarction [facility])    • Nephrolithiasis    • Organic impotence    • Pneumonia due to infectious organism 1/16/2019   • Seasonal allergies    • Sleep apnea     does not use CPAP   • Stroke ([facility] [address] ) 2015   • TMJ (temporomandibular joint syndrome)    • Wheezing     last assessed 05/19/17       Family History   Problem Relation Age of Onset   • Diabetes Father    • Obesity Father    • Liver cancer Father    • Heart disease Father    • Diabetes Brother    • Hypertension Brother    • Leukemia Mother    • Hypertension Mother    • Cancer Family      Past Surgical History:   Procedure Laterality Date   • BACK SURGERY      *9, [id number], nervectomy 2006, total of 10   • BUCCAL MASS EXCISION N/A 3/26/2018    Procedure: BIOPSY LINGUAL TONSIL (FLOW/ STANDARD PATH)  EVAL UNDER ANESTHESIA;  Surgeon: Temitope Sumner MD;  [facility];  Service: ENT   • COLONOSCOPY     • FL RETROGRADE PYELOGRAM  10/21/2020   • HERNIA REPAIR     • KIDNEY SURGERY     • KNEE ARTHROSCOPY     • LITHOTRIPSY      multiple   • LITHOTRIPSY     • LUMBAR DISC SURGERY  2015   • MANDIBLE FRACTURE SURGERY      titanium plate   • PELVIC SYMPHYSIS FUSION     • ID CYSTO/URETERO W/LITHOTRIPSY &INDWELL STENT INSRT Right 10/21/2020    Procedure: CYSTOSCOPY URETEROSCOPY WITH LITHOTRIPSY HOLMIUM LASER, RETROGRADE PYELOGRAM AND INSERTION STENT URETERAL;  Surgeon: Shan Kelly MD;  Location[facility];  Service: Urology   • ID ESOPHAGOGASTRODUODENOSCOPY TRANSORAL DIAGNOSTIC N/A 2/22/2018    Procedure: ESOPHAGOGASTRODUODENOSCOPY (EGD); Surgeon: Beryle Homme, MD;  Location: [facility]; Service: Gastroenterology   • ID ESOPHAGOGASTRODUODENOSCOPY TRANSORAL DIAGNOSTIC N/A 4/1/2019    Procedure: EGD W/ DILATION;  Surgeon: Beryle Homme, MD;  Location: [facility]; Service: Gastroenterology   • ID FORREST IMPLTJ NSTIM ELTRDS PLATE/PADDLE EDRL N/A 9/8/2016    Procedure: DORSAL COLUMN STIMULATOR PLACEMENT (IMPULSE MONITORING);   Surgeon: Santana Land MD;  Location[facility];  Service: Orthopedics   • ID REVJ/RMVL IMPLANTED SPINAL NEUROSTIM GENERATOR Left 6/9/2017    Procedure: REMOVAL OF A THORACIC SCS PLACED VIA LAMINECTOMY AND REMOVAL LEFT BUTTOCK GENERATOR; IMPULSE MONITORING;  Surgeon: Karla Chinchilla MD;  Location: [facility];  Service: Neurosurgery   • ID SURGICAL ARTHROSCOPY SHOULDER W/ROTATOR CUFF RPR Right 4/4/2019    Procedure: RIGHT SHOULDER ARTHROSCOPIC SUBSCAPULARIS TENDON REPAIR;  Surgeon: Ranjit De Guzman MD;  Location[facility];  Service: Orthopedics   • SACROILIAC JOINT FUSION  2015   • SHOULDER SURGERY Left     2   • UPPER GASTROINTESTINAL ENDOSCOPY       Social History     Tobacco Use   • Smoking status: Every Day     Packs/day: 1 00     Years: 25 00     Pack years: 25 00     Types: Cigarettes   • Smokeless tobacco: Former   Vaping Use   • Vaping Use: Every day   • Substances: THC   Substance Use Topics   • Alcohol use: Yes     Comment: rarely    • Drug use: Yes     Frequency: 7 0 times per week     Types: Marijuana     Comment: medical marijuana daily for chronic pain 1/2 ounce     E-Cigarette/Vaping   • E-Cigarette Use Current Every Day User    • Cartridges/Day less than one a day      E-Cigarette/Vaping Substances   • Nicotine No    • THC Yes    • CBD No    • Flavoring No    • Other No    • Unknown No        Family History:   Family History   Problem Relation Age of Onset   • Diabetes Father    • Obesity Father    • Liver cancer Father    • Heart disease Father    • Diabetes Brother    • Hypertension Brother    • Leukemia Mother    • Hypertension Mother    • Cancer Family        Meds/Allergies   Prior to Admission Medications   Prescriptions Last Dose Informant Patient Reported? Taking? DULoxetine (CYMBALTA) 30 mg delayed release capsule   No No   Sig: Take 1 capsule (30 mg total) by mouth daily for 7 doses   DULoxetine (CYMBALTA) 60 mg delayed release capsule   No No   Sig: Take 1 capsule (60 mg total) by mouth daily Please fill before 1/13/23 as patient is going out of town Do not start before January 13, 2023  acetaminophen (TYLENOL) 325 mg tablet   No No   Sig: Take 3 tablets (975 mg total) by mouth every 8 (eight) hours as needed for mild pain   albuterol (PROVENTIL HFA,VENTOLIN HFA) 90 mcg/act inhaler   No No   Sig: Inhale 2 puffs every 6 (six) hours as needed for wheezing   diazepam (VALIUM) 5 mg tablet   No No   Sig: Take 1 tablet (5 mg total) by mouth every 12 (twelve) hours as needed for anxiety or muscle spasms Do not start before January 12, 2023     docusate sodium (COLACE) 100 mg capsule   No No   Sig: Take 1 capsule (100 mg total) by mouth 2 (two) times a day   Patient not taking: Reported on 1/5/2023   fluticasone-vilanterol (BREO ELLIPTA) 200-25 MCG/INH inhaler   No No Sig: Inhale 1 puff daily Rinse mouth after use    hydrOXYzine HCL (ATARAX) 25 mg tablet   No No   Sig: Take 1 tablet (25 mg total) by mouth every 6 (six) hours as needed for itching for up to 7 days   Patient not taking: Reported on 10/25/2022   naloxone (Narcan) 4 mg/0 1 mL nasal spray   No No   Sig: In case of overdose: 1 spray in one nostril x 1, may repeat in 2 min if remains unresponsive  oxyCODONE (ROXICODONE) 10 MG TABS   No No   Sig: Take 1 tablet (10 mg total) by mouth every 6 (six) hours as needed for severe pain Take 1 tablet (10mg) by mouth every 6 hours as needed for severe pain  Max daily amount : 4 tablets (40mg) Max Daily Amount: 40 mg Do not start before January 12, 2023  polyethylene glycol (MIRALAX) 17 g packet   No No   Sig: Take 17 g by mouth 2 (two) times a day as needed (constipation)   Patient not taking: Reported on 1/5/2023   senna (SENOKOT) 8 6 mg   No No   Sig: Take 1 tablet (8 6 mg total) by mouth 2 (two) times a day as needed for constipation   Patient not taking: Reported on 1/5/2023      Facility-Administered Medications: None       Allergies   Allergen Reactions   • Other Rash     Adhesive tape       PHYSICAL EXAM    PE limited by: None    Objective   Vitals:   First set: Temperature: 98 °F (36 7 °C) (01/13/23 1144)  Pulse: (!) 110 (01/13/23 1144)  Respirations: 20 (01/13/23 1144)  Blood Pressure: (!) 158/118 (01/13/23 1144)  SpO2: 97 % (01/13/23 1144)    Primary Survey:   (A) Airway: Intact  (B) Breathing: Bilateral breath sounds  (C) Circulation: Pulses:   pedal  4/4 and radial  4/4  (D) Disabliity:  GCS Total:  15  (E) Expose:  Completed    Secondary Survey: (Click on Physical Exam tab above)  Physical Exam  Vitals and nursing note reviewed  Constitutional:       General: He is in acute distress  Appearance: He is well-developed  HENT:      Head: Normocephalic and atraumatic  Nose: Nose normal  No congestion        Mouth/Throat:      Mouth: Mucous membranes are moist       Pharynx: Oropharynx is clear  Eyes:      Extraocular Movements: Extraocular movements intact  Conjunctiva/sclera: Conjunctivae normal       Pupils: Pupils are equal, round, and reactive to light  Cardiovascular:      Rate and Rhythm: Regular rhythm  Tachycardia present  Pulses: Normal pulses  Heart sounds: Normal heart sounds  No murmur heard  Pulmonary:      Effort: Pulmonary effort is normal  No respiratory distress  Breath sounds: Normal breath sounds  Chest:      Chest wall: No tenderness  Abdominal:      General: Abdomen is flat  Bowel sounds are normal  There is no distension  Palpations: Abdomen is soft  Tenderness: There is no abdominal tenderness  There is no right CVA tenderness or left CVA tenderness  Musculoskeletal:         General: Tenderness present  No deformity or signs of injury  Normal range of motion  Cervical back: Normal range of motion and neck supple  No rigidity or tenderness  Comments: Tenderness to midline C-spine and L-spine, previous surgical scars present on most of this in her back  Tenderness to right scapula and posterior shoulder, no signs of external trauma with limited range of motion  Left hip and mid femur tenderness, no external signs of deformity or injury  Skin:     General: Skin is warm and dry  Findings: No bruising, lesion or rash  Neurological:      General: No focal deficit present  Mental Status: He is alert and oriented to person, place, and time  Cranial Nerves: No cranial nerve deficit  Sensory: No sensory deficit  Cervical spine cleared by clinical criteria? No (imaging required)      Invasive Devices     None                 Lab Results:   Results Reviewed     None                 Imaging Studies:   Direct to CT: No  XR shoulder 2+ views RIGHT   Final Result by Teri De Leon MD (01/13 2203)      No acute osseous abnormality        Workstation performed: [id number] XR femur 2 views RIGHT   Final Result by Charley Corley MD (01/13 1341)      No acute osseous abnormality  Workstation performed: [id number]         XR pelvis ap only 1 or 2 vw   Final Result by Charley Corley MD (01/13 1340)      No acute osseous abnormality  Workstation performed: [id number]         CT head without contrast   Final Result by Phuong Donis MD (01/13 1256)      No acute intracranial abnormality  Workstation performed: [id number]         CT cervical spine without contrast   Final Result by Phuong Donis MD (01/13 1256)      No cervical spine fracture or traumatic malalignment  Suggestion of osteopenia  Workstation performed: [id number]         CT spine thoracic & lumbar wo contrast   Final Result by Phuong Donis MD (01/13 5943)      No acute fracture or traumatic malalignment in the thoracic or lumbar spine  Workstation performed: [id number]               Procedures  ECG 12 Lead Documentation Only    Date/Time: 1/15/2023 6:01 AM  Performed by: Arsalan Miranda MD  Authorized by: Arsalan Miranda MD     Patient location:  ED  Interpretation:     Interpretation: normal    Rate:     ECG rate:  81    ECG rate assessment: normal    Rhythm:     Rhythm: sinus rhythm    Ectopy:     Ectopy: none    QRS:     QRS axis:  Normal    QRS intervals:  Normal  Conduction:     Conduction: normal    ST segments:     ST segments:  Normal  T waves:     T waves: normal               ED Course  ED Course as of 01/15/23 0605   Fri Jan 13, 2023   1305 CT spine thoracic & lumbar wo contrast  No acute fracture or traumatic malalignment in the thoracic or lumbar spine  1306 CT cervical spine without contrast  No cervical spine fracture or traumatic malalignment      Suggestion of osteopenia  1306 CT head without contrast  No acute intracranial abnormality  Sun Kai 15, 2023   0555 XR shoulder 2+ views RIGHT  No acute osseous abnormality  0555 XR pelvis ap only 1 or 2 vw  No acute osseous abnormality  0555 XR femur 2 views RIGHT  No acute osseous abnormality  Medical Decision Making  47-year-old male presenting after fall downstairs with headache, neck, back, right hip and thigh pain  Imaging unremarkable    Patient given morphine and Tylenol for pain  On reassessment patient's headache and back pain is feeling better but still present  Patient is not requesting any more pain medications and states he has his medicine at home that he can take for his back pain  Patient likely has concussion due to fall and head strike, will refer to comprehensive concussion program     Patient states he no longer has a doctor he sees for his back pain, patient referred to comprehensive spine program     Return precautions discussed including but not limited to worsening of symptoms, weakness or decreased sensation in limbs, chest pain, shortness of breath, bowel/bladder incontinence, saddle anesthesia, difficulty speaking, facial droop, difficulty waking, confusion  Patient is agreeable and appropriate for discharge  Back pain: acute illness or injury  Concussion: acute illness or injury  Fall, initial encounter: acute illness or injury  Amount and/or Complexity of Data Reviewed  Radiology: ordered  Decision-making details documented in ED Course  ECG/medicine tests: ordered and independent interpretation performed  Decision-making details documented in ED Course  Risk  OTC drugs  Prescription drug management  Disposition  [facility] Transfer: No  Final diagnoses:   Fall, initial encounter   Concussion   Back pain     Time reflects when diagnosis was documented in both MDM as applicable and the Disposition within this note     Time User Action Codes Description Comment    1/13/2023  1:26 PM Annia Howard [R81  AAUD] Fall, initial encounter     1/13/2023  1:27 PM Annia Howard [S06  0XAA] Concussion 1/13/2023  1:27 PM Luz Palumbo Add [M54 9] Back pain       ED Disposition     ED Disposition   Discharge    Condition   Stable    Date/Time   Fri Jan 13, 2023  1:24 PM    Comment   Chivo Bennett discharge to home/self care                 Follow-up Information    None       Discharge Medication List as of 1/13/2023  1:42 PM      CONTINUE these medications which have NOT CHANGED    Details   acetaminophen (TYLENOL) 325 mg tablet Take 3 tablets (975 mg total) by mouth every 8 (eight) hours as needed for mild pain, Starting Fri 12/24/2021, No Print      albuterol (PROVENTIL HFA,VENTOLIN HFA) 90 mcg/act inhaler Inhale 2 puffs every 6 (six) hours as needed for wheezing, Starting Fri 5/13/2022, Normal      diazepam (VALIUM) 5 mg tablet Take 1 tablet (5 mg total) by mouth every 12 (twelve) hours as needed for anxiety or muscle spasms Do not start before January 12, 2023 , Starting Thu 1/12/2023, Normal      docusate sodium (COLACE) 100 mg capsule Take 1 capsule (100 mg total) by mouth 2 (two) times a day, Starting Thu 10/20/2022, Normal      DULoxetine (CYMBALTA) 60 mg delayed release capsule Take 1 capsule (60 mg total) by mouth daily Please fill before 1/13/23 as patient is going out of town Do not start before January 13, 2023 , Starting Fri 1/13/2023, Normal      fluticasone-vilanterol (BREO ELLIPTA) 200-25 MCG/INH inhaler Inhale 1 puff daily Rinse mouth after use , Starting Mon 3/18/2019, Normal      hydrOXYzine HCL (ATARAX) 25 mg tablet Take 1 tablet (25 mg total) by mouth every 6 (six) hours as needed for itching for up to 7 days, Starting Mon 8/1/2022, Until Mon 8/8/2022 at 2359, Normal      naloxone (Narcan) 4 mg/0 1 mL nasal spray In case of overdose: 1 spray in one nostril x 1, may repeat in 2 min if remains unresponsive , Normal      oxyCODONE (ROXICODONE) 10 MG TABS Take 1 tablet (10 mg total) by mouth every 6 (six) hours as needed for severe pain Take 1 tablet (10mg) by mouth every 6 hours as needed for severe pain   Max daily amount : 4 tablets (40mg) Max Daily Amount: 40 mg Do not start before January 12, 2023 , S tarting Thu 1/12/2023, Until Sat 2/11/2023 at 2359, Normal      polyethylene glycol (MIRALAX) 17 g packet Take 17 g by mouth 2 (two) times a day as needed (constipation), Starting Tue 10/25/2022, Normal      senna (SENOKOT) 8 6 mg Take 1 tablet (8 6 mg total) by mouth 2 (two) times a day as needed for constipation, Starting Tue 10/25/2022, Normal               PDMP Review       Value Time User    PDMP Reviewed  Yes 1/5/2023  8:54 AM Rosana Hunt MD          ED Provider  Electronically Signed by         Debbie Andujar MD  01/15/23 2939

## 2023-01-13 NOTE — DISCHARGE INSTRUCTIONS
Please monitor symptoms and return to the emergency department if your symptoms worsen, loss of sensation/weakness in extremities, difficulty speaking, facial droop, chest pain, shortness of breath, loss of bowel/bladder control, numbness to your groin area  Follow-up with comprehensive concussion program for reassessment and management  Thank you for allowing us to take part in your care

## 2023-01-13 NOTE — ED ATTENDING ATTESTATION
1/13/2023  IPromise DO, saw and evaluated the patient  I have discussed the patient with the resident/non-physician practitioner and agree with the resident's/non-physician practitioner's findings, Plan of Care, and MDM as documented in the resident's/non-physician practitioner's note, except where noted  All available labs and Radiology studies were reviewed  I was present for key portions of any procedure(s) performed by the resident/non-physician practitioner and I was immediately available to provide assistance  At this point I agree with the current assessment done in the Emergency Department  I have conducted an independent evaluation of this patient a history and physical is as follows:    Patient is a 20-year-old male with a history of extensive back surgery who presents after a fall  Patient states that he was walking down the steps and missed a step secondary to peripheral neuropathy  He landed on his back and struck his head  He denies loss of consciousness  He is on aspirin daily  He describes intermittent lightheadedness since the fall  He denies any symptoms preceding the fall, including but not limited to dizziness, lightheadedness, palpitations, chest pain, shortness of breath or other complaints  Patient describes chronic back pain, but it is significantly worse since the fall  He describes pain primarily in his thoracic and lumbar spine  He also describes right shoulder pain and right groin pain  On exam, patient is in mild distress secondary to pain  Cervical collar being placed as I entered the room  Tenderness to palpation in the lower cervical spine along the midline  Tenderness to palpation in the mid to lower thoracic region and the entire lumbar region  He has a healed midline scar in his mid to lower thoracic region  No wound dehiscence  Right anterior shoulder tender to palpation, with reduced range of motion secondary to pain    Hips nontender, pelvis is stable  Mild tenderness to palpation in the anterior right thigh  No gross deformity  Distal pulses intact  Decreased range of motion in lower extremity secondary to back pain  Imaging negative for acute traumatic injury  Patient states that he is comfortable with discharge home and will manage his pain with his home medications  He will follow-up with his orthopedic surgeon and PCP  He agrees to return to ED if symptoms worsen  He was able to ambulate out of the emergency department independently and without distress  Portions of the above record have been created with voice recognition software  Occasional wrong word or "sound alike" substitutions may have occurred due to the inherent limitations of voice recognition software  Read the chart carefully and recognize, using context, where substitutions may have occurred        ED Course         Critical Care Time  Procedures

## 2023-01-16 ENCOUNTER — TELEPHONE (OUTPATIENT)
Dept: FAMILY MEDICINE CLINIC | Facility: CLINIC | Age: 53
End: 2023-01-16

## 2023-01-16 ENCOUNTER — TELEPHONE (OUTPATIENT)
Dept: PHYSICAL THERAPY | Facility: OTHER | Age: 53
End: 2023-01-16

## 2023-01-16 DIAGNOSIS — M96.1 POST LAMINECTOMY SYNDROME: Primary | ICD-10-CM

## 2023-01-16 NOTE — TELEPHONE ENCOUNTER
Patient requesting a referral be placed to see a Dr Oswald Mane at [facility]  Patient states the provider he saw at [facility] recommended this provider to him but said he would need the referral from his family medicine doctor  Please advise, thank you

## 2023-01-16 NOTE — TELEPHONE ENCOUNTER
Call placed to the patient per [facility] referral     After explanation of the program the patient is refusing at this time  Patient states he is not looking to do PT again and his pain is managed with his current medications  Patient states he is interested in seeing a new Orthopedic surgeon to see if there are any option for him  Patient was encouraged to reach out to his PCP and try to obtain a referral to Dr Oli Cates for an evaluation  Patient appreciative for the call and assistance  States he will be contacting his PCP  Referral Closed

## 2023-01-17 ENCOUNTER — HOSPITAL ENCOUNTER (OUTPATIENT)
Dept: CT IMAGING | Facility: HOSPITAL | Age: 53
Discharge: HOME | End: 2023-01-17
Attending: FAMILY MEDICINE

## 2023-01-17 DIAGNOSIS — Z72.0 TOBACCO ABUSE: ICD-10-CM

## 2023-01-17 NOTE — TELEPHONE ENCOUNTER
Patient informed referral was placed and provided with specialist phone number  Patient also wanted to inform you that he had his CT scan completed about an hour ago

## 2023-01-19 ENCOUNTER — TELEPHONE (OUTPATIENT)
Dept: FAMILY MEDICINE CLINIC | Facility: CLINIC | Age: 53
End: 2023-01-19

## 2023-01-19 NOTE — TELEPHONE ENCOUNTER
Starting 60 mg tomorrow     1/23    Slipped after bare feet wooden stairs, 4 steps side of head and back  Concussion score 4    1/9 0846 147/94 86   1/9 2:30pm 123/84 84   1/10 12:30pm 154/104 85  1/12 1130 168/110 74 pain  1/15 1610 140/88 65

## 2023-01-25 ENCOUNTER — TELEPHONE (OUTPATIENT)
Dept: FAMILY MEDICINE CLINIC | Facility: CLINIC | Age: 53
End: 2023-01-25

## 2023-01-26 ENCOUNTER — TELEMEDICINE (OUTPATIENT)
Dept: FAMILY MEDICINE CLINIC | Facility: CLINIC | Age: 53
End: 2023-01-26

## 2023-01-26 ENCOUNTER — APPOINTMENT (EMERGENCY)
Dept: RADIOLOGY | Facility: HOSPITAL | Age: 53
End: 2023-01-26

## 2023-01-26 ENCOUNTER — HOSPITAL ENCOUNTER (EMERGENCY)
Facility: HOSPITAL | Age: 53
Discharge: HOME | End: 2023-01-26
Attending: EMERGENCY MEDICINE

## 2023-01-26 VITALS
OXYGEN SATURATION: 95 % | RESPIRATION RATE: 20 BRPM | HEART RATE: 62 BPM | SYSTOLIC BLOOD PRESSURE: 166 MMHG | DIASTOLIC BLOOD PRESSURE: 90 MMHG | TEMPERATURE: 98.1 F

## 2023-01-26 DIAGNOSIS — J06.9 VIRAL URI WITH COUGH: Primary | ICD-10-CM

## 2023-01-26 DIAGNOSIS — R07.9 CHEST PAIN: ICD-10-CM

## 2023-01-26 DIAGNOSIS — J02.9 ACUTE VIRAL PHARYNGITIS: Primary | ICD-10-CM

## 2023-01-26 LAB
2HR DELTA HS TROPONIN: -1 NG/L
ANION GAP SERPL CALCULATED.3IONS-SCNC: 6 MMOL/L (ref 4–13)
ATRIAL RATE: 86 BPM
BASOPHILS # BLD AUTO: 0.07 THOUSANDS/ÂΜL (ref 0–0.1)
BASOPHILS NFR BLD AUTO: 1 % (ref 0–1)
BUN SERPL-MCNC: 19 MG/DL (ref 5–25)
CALCIUM SERPL-MCNC: 9.5 MG/DL (ref 8.4–10.2)
CARDIAC TROPONIN I PNL SERPL HS: 3 NG/L (ref 8–18)
CARDIAC TROPONIN I PNL SERPL HS: 4 NG/L
CARDIAC TROPONIN I PNL SERPL HS: 5 NG/L
CHLORIDE SERPL-SCNC: 110 MMOL/L (ref 96–108)
CO2 SERPL-SCNC: 26 MMOL/L (ref 21–32)
CREAT SERPL-MCNC: 1.17 MG/DL (ref 0.6–1.3)
EOSINOPHIL # BLD AUTO: 0.88 THOUSAND/ÂΜL (ref 0–0.61)
EOSINOPHIL NFR BLD AUTO: 7 % (ref 0–6)
ERYTHROCYTE [DISTWIDTH] IN BLOOD BY AUTOMATED COUNT: 14.9 % (ref 11.6–15.1)
FLUAV RNA RESP QL NAA+PROBE: NEGATIVE
FLUBV RNA RESP QL NAA+PROBE: NEGATIVE
GFR SERPL CREATININE-BSD FRML MDRD: 70 ML/MIN/1.73SQ M
GLUCOSE SERPL-MCNC: 102 MG/DL (ref 65–140)
HCT VFR BLD AUTO: 49.7 % (ref 36.5–49.3)
HGB BLD-MCNC: 16.4 G/DL (ref 12–17)
IMM GRANULOCYTES # BLD AUTO: 0.07 THOUSAND/UL (ref 0–0.2)
IMM GRANULOCYTES NFR BLD AUTO: 1 % (ref 0–2)
LYMPHOCYTES # BLD AUTO: 2.59 THOUSANDS/ÂΜL (ref 0.6–4.47)
LYMPHOCYTES NFR BLD AUTO: 20 % (ref 14–44)
MCH RBC QN AUTO: 29.3 PG (ref 26.8–34.3)
MCHC RBC AUTO-ENTMCNC: 33 G/DL (ref 31.4–37.4)
MCV RBC AUTO: 89 FL (ref 82–98)
MONOCYTES # BLD AUTO: 1.12 THOUSAND/ÂΜL (ref 0.17–1.22)
MONOCYTES NFR BLD AUTO: 9 % (ref 4–12)
NEUTROPHILS # BLD AUTO: 8 THOUSANDS/ÂΜL (ref 1.85–7.62)
NEUTS SEG NFR BLD AUTO: 62 % (ref 43–75)
NRBC BLD AUTO-RTO: 0 /100 WBCS
P AXIS: 61 DEGREES
PLATELET # BLD AUTO: 364 THOUSANDS/UL (ref 149–390)
PMV BLD AUTO: 9.6 FL (ref 8.9–12.7)
POTASSIUM SERPL-SCNC: 4.1 MMOL/L (ref 3.5–5.3)
PR INTERVAL: 144 MS
QRS AXIS: 69 DEGREES
QRSD INTERVAL: 86 MS
QT INTERVAL: 358 MS
QTC INTERVAL: 428 MS
RBC # BLD AUTO: 5.59 MILLION/UL (ref 3.88–5.62)
RSV RNA RESP QL NAA+PROBE: NEGATIVE
S PYO DNA THROAT QL NAA+PROBE: NOT DETECTED
SARS-COV-2 RNA RESP QL NAA+PROBE: NEGATIVE
SODIUM SERPL-SCNC: 142 MMOL/L (ref 135–147)
T WAVE AXIS: 62 DEGREES
VENTRICULAR RATE: 86 BPM
WBC # BLD AUTO: 12.73 THOUSAND/UL (ref 4.31–10.16)

## 2023-01-26 RX ORDER — OXYCODONE HYDROCHLORIDE 10 MG/1
10 TABLET ORAL ONCE
Status: COMPLETED | OUTPATIENT
Start: 2023-01-26 | End: 2023-01-26

## 2023-01-26 RX ADMIN — DEXAMETHASONE SODIUM PHOSPHATE 10 MG: 10 INJECTION, SOLUTION INTRAMUSCULAR; INTRAVENOUS at 15:34

## 2023-01-26 RX ADMIN — OXYCODONE HYDROCHLORIDE 10 MG: 10 TABLET ORAL at 16:15

## 2023-01-26 NOTE — ED PROVIDER NOTES
History  Chief Complaint   Patient presents with   • Oral Swelling     Started Sunday, worsening scratchy throat  HX of throat sx  Diaphoresis,chills, stuffy runny nose  -COVID home test       46 yo M history of oropharyngeal surgery, asthma, cluster headaches, MI who presents with sore throat and throat swelling  Patient states he started with cold-like symptoms 5 days ago  Patient notes associated symptoms include myalgias, chills, cough, congestion  Patient states throat pain and swelling started couple days ago  Patient had a telemedicine appointment with primary care doctor who told him to come to the emergency department due to his history of oropharyngeal surgeries  Pt also notes CP, SOB  Describes CP as heaviness sensation  But does not feel similar to previous MI  Pt also notes decreased appetite but no NVD, bladder changes, BLE edema/pain  History provided by:  Patient  Sore Throat  Location:  Generalized  Quality:  Sore  Severity:  Moderate  Onset quality:  Sudden  Duration:  5 days  Timing:  Constant  Progression:  Worsening  Chronicity:  New  Relieved by:  Nothing  Worsened by:  Nothing  Ineffective treatments:  None tried  Associated symptoms: chest pain, chills, cough, postnasal drip, shortness of breath and sinus congestion    Associated symptoms: no abdominal pain, no drooling, no ear discharge, no ear pain, no epistaxis, no eye discharge, no fever, no headaches, no neck stiffness, no rash and no rhinorrhea    Chest pain:     Quality comment:  Heaviness      Prior to Admission Medications   Prescriptions Last Dose Informant Patient Reported? Taking? DULoxetine (CYMBALTA) 30 mg delayed release capsule   No No   Sig: Take 1 capsule (30 mg total) by mouth daily for 7 doses   DULoxetine (CYMBALTA) 60 mg delayed release capsule   No No   Sig: Take 1 capsule (60 mg total) by mouth daily Please fill before 1/13/23 as patient is going out of town Do not start before January 13, 2023  acetaminophen (TYLENOL) 325 mg tablet   No No   Sig: Take 3 tablets (975 mg total) by mouth every 8 (eight) hours as needed for mild pain   albuterol (PROVENTIL HFA,VENTOLIN HFA) 90 mcg/act inhaler   No No   Sig: Inhale 2 puffs every 6 (six) hours as needed for wheezing   diazepam (VALIUM) 5 mg tablet   No No   Sig: Take 1 tablet (5 mg total) by mouth every 12 (twelve) hours as needed for anxiety or muscle spasms Do not start before January 12, 2023  docusate sodium (COLACE) 100 mg capsule   No No   Sig: Take 1 capsule (100 mg total) by mouth 2 (two) times a day   Patient not taking: Reported on 1/5/2023   fluticasone-vilanterol (BREO ELLIPTA) 200-25 MCG/INH inhaler   No No   Sig: Inhale 1 puff daily Rinse mouth after use    hydrOXYzine HCL (ATARAX) 25 mg tablet   No No   Sig: Take 1 tablet (25 mg total) by mouth every 6 (six) hours as needed for itching for up to 7 days   Patient not taking: Reported on 10/25/2022   naloxone (Narcan) 4 mg/0 1 mL nasal spray   No No   Sig: In case of overdose: 1 spray in one nostril x 1, may repeat in 2 min if remains unresponsive  oxyCODONE (ROXICODONE) 10 MG TABS   No No   Sig: Take 1 tablet (10 mg total) by mouth every 6 (six) hours as needed for severe pain Take 1 tablet (10mg) by mouth every 6 hours as needed for severe pain  Max daily amount : 4 tablets (40mg) Max Daily Amount: 40 mg Do not start before January 12, 2023     polyethylene glycol (MIRALAX) 17 g packet   No No   Sig: Take 17 g by mouth 2 (two) times a day as needed (constipation)   Patient not taking: Reported on 1/5/2023   senna (SENOKOT) 8 6 mg   No No   Sig: Take 1 tablet (8 6 mg total) by mouth 2 (two) times a day as needed for constipation   Patient not taking: Reported on 1/5/2023      Facility-Administered Medications: None       Past Medical History:   Diagnosis Date   • Allergic     seasonal    • Allergic rhinitis    • Anxiety    • Arthritis    • Back pain    • Chronic obstructive asthma (HCC)    • Chronic pain syndrome    • Cluster headaches    • Colon polyp    • Depression    • Insomnia    • Kidney stones    • Laryngospasm    • Leukocytosis    • Migraine    • Myocardial infarction [facility])    • Nephrolithiasis    • Organic impotence    • Pneumonia due to infectious organism 1/16/2019   • Seasonal allergies    • Sleep apnea     does not use CPAP   • Stroke [facility] 2015   • TMJ (temporomandibular joint syndrome)    • Wheezing     last assessed 05/19/17       Past Surgical History:   Procedure Laterality Date   • BACK SURGERY      *9, [id number], nervectomy 2006, total of 10   • BUCCAL MASS EXCISION N/A 3/26/2018    Procedure: BIOPSY LINGUAL TONSIL (FLOW/ STANDARD PATH)  EVAL UNDER ANESTHESIA;  Surgeon: Yung Dwyer MD;  [facility];  Service: ENT   • COLONOSCOPY     • FL RETROGRADE PYELOGRAM  10/21/2020   • HERNIA REPAIR     • KIDNEY SURGERY     • KNEE ARTHROSCOPY     • LITHOTRIPSY      multiple   • LITHOTRIPSY     • LUMBAR DISC SURGERY  2015   • MANDIBLE FRACTURE SURGERY      titanium plate   • PELVIC SYMPHYSIS FUSION     • DC CYSTO/URETERO W/LITHOTRIPSY &INDWELL STENT INSRT Right 10/21/2020    Procedure: CYSTOSCOPY URETEROSCOPY WITH LITHOTRIPSY HOLMIUM LASER, RETROGRADE PYELOGRAM AND INSERTION STENT URETERAL;  Surgeon: Raquel Pace MD;  Location[facility];  Service: Urology   • DC ESOPHAGOGASTRODUODENOSCOPY TRANSORAL DIAGNOSTIC N/A 2/22/2018    Procedure: ESOPHAGOGASTRODUODENOSCOPY (EGD); Surgeon: Cody Herrera MD;  Location: [facility]; Service: Gastroenterology   • DC ESOPHAGOGASTRODUODENOSCOPY TRANSORAL DIAGNOSTIC N/A 4/1/2019    Procedure: EGD W/ DILATION;  Surgeon: Cody Herrera MD;  Location: [facility]; Service: Gastroenterology   • DC FORREST IMPLTJ NSTIM ELTRDS PLATE/PADDLE EDRL N/A 9/8/2016    Procedure: DORSAL COLUMN STIMULATOR PLACEMENT (IMPULSE MONITORING);   Surgeon: Shaheed Mendoza MD;  Location: [facility];  Service: Orthopedics   • DC REVJ/RMVL IMPLANTED SPINAL NEUROSTIM GENERATOR Left 6/9/2017    Procedure: REMOVAL OF A THORACIC SCS PLACED VIA LAMINECTOMY AND REMOVAL LEFT BUTTOCK GENERATOR; IMPULSE MONITORING;  Surgeon: Roman Caballero MD;  Location: [facility];  Service: Neurosurgery   • MT SURGICAL ARTHROSCOPY SHOULDER W/ROTATOR CUFF RPR Right 4/4/2019    Procedure: RIGHT SHOULDER ARTHROSCOPIC SUBSCAPULARIS TENDON REPAIR;  Surgeon: Alfonso Tsang MD;  Location: [facility];  Service: Orthopedics   • SACROILIAC JOINT FUSION  2015   • SHOULDER SURGERY Left     2   • UPPER GASTROINTESTINAL ENDOSCOPY         Family History   Problem Relation Age of Onset   • Diabetes Father    • Obesity Father    • Liver cancer Father    • Heart disease Father    • Diabetes Brother    • Hypertension Brother    • Leukemia Mother    • Hypertension Mother    • Cancer Family      I have reviewed and agree with the history as documented  E-Cigarette/Vaping   • E-Cigarette Use Current Every Day User    • Cartridges/Day less than one a day      E-Cigarette/Vaping Substances   • Nicotine No    • THC Yes    • CBD No    • Flavoring No    • Other No    • Unknown No      Social History     Tobacco Use   • Smoking status: Every Day     Packs/day: 1 00     Years: 25 00     Pack years: 25 00     Types: Cigarettes   • Smokeless tobacco: Former   Vaping Use   • Vaping Use: Every day   • Substances: THC   Substance Use Topics   • Alcohol use: Yes     Comment: rarely    • Drug use: Yes     Frequency: 7 0 times per week     Types: Marijuana     Comment: medical marijuana daily for chronic pain 1/2 ounce        Review of Systems   Constitutional: Positive for appetite change and chills  Negative for fever  HENT: Positive for postnasal drip and sore throat  Negative for drooling, ear discharge, ear pain, nosebleeds and rhinorrhea  Eyes: Negative for pain, discharge and visual disturbance  Respiratory: Positive for cough, shortness of breath and wheezing  Cardiovascular: Positive for chest pain   Negative for palpitations  Gastrointestinal: Negative for abdominal pain, constipation, diarrhea, nausea and vomiting  Genitourinary: Negative for dysuria and hematuria  Musculoskeletal: Negative for arthralgias, back pain and neck stiffness  Skin: Negative for color change and rash  Neurological: Negative for seizures, syncope and headaches  All other systems reviewed and are negative  Physical Exam  ED Triage Vitals [01/26/23 1423]   Temperature Pulse Respirations Blood Pressure SpO2   98 1 °F (36 7 °C) 87 22 (!) 169/146 98 %      Temp Source Heart Rate Source Patient Position - Orthostatic VS BP Location FiO2 (%)   Oral Monitor Sitting Right arm --      Pain Score       --             Orthostatic Vital Signs  Vitals:    01/26/23 1430 01/26/23 1445 01/26/23 1700 01/26/23 1830   BP: (!) 131/104 152/96 134/80 166/90   Pulse:  81 58 62   Patient Position - Orthostatic VS:           Physical Exam  Vitals and nursing note reviewed  Constitutional:       General: He is awake  Appearance: He is well-developed  He is diaphoretic  Comments: Uncomfortable appearing   HENT:      Head: Normocephalic and atraumatic  Mouth/Throat:      Mouth: Mucous membranes are moist       Pharynx: Oropharynx is clear  Uvula midline  Posterior oropharyngeal erythema present  No pharyngeal swelling, oropharyngeal exudate or uvula swelling  Tonsils: No tonsillar exudate or tonsillar abscesses  Eyes:      Conjunctiva/sclera: Conjunctivae normal    Cardiovascular:      Rate and Rhythm: Normal rate and regular rhythm  Heart sounds: No murmur heard  Pulmonary:      Effort: Pulmonary effort is normal  No respiratory distress  Breath sounds: Normal breath sounds  Abdominal:      Palpations: Abdomen is soft  Tenderness: There is no abdominal tenderness  Musculoskeletal:         General: No swelling  Cervical back: Neck supple  Skin:     General: Skin is warm        Capillary Refill: Capillary refill takes less than 2 seconds  Neurological:      Mental Status: He is alert  Psychiatric:         Mood and Affect: Mood normal          Behavior: Behavior is cooperative  ED Medications  Medications   dexamethasone oral liquid 10 mg 1 mL (10 mg Oral Given 1/26/23 1534)   oxyCODONE (ROXICODONE) immediate release tablet 10 mg (10 mg Oral Given 1/26/23 1615)       Diagnostic Studies  Results Reviewed     Procedure Component Value Units Date/Time    HS Troponin I 2hr [[id number]]  (Normal) Collected: 01/26/23 1718    Lab Status: Final result Specimen: Blood from Arm, Right Updated: 01/26/23 1802     hs TnI 2hr 4 ng/L      Delta 2hr hsTnI -1 ng/L     High Sensitivity Troponin I Random [[id number]]  (Abnormal) Collected: 01/26/23 1718    Lab Status: Final result Specimen: Blood from Arm, Right Updated: 01/26/23 1800     HS TnI random 3 ng/L     Strep A PCR [[id number]]  (Normal) Collected: 01/26/23 1534    Lab Status: Final result Specimen: Throat Updated: 01/26/23 1620     STREP A PCR Not Detected    FLU/RSV/COVID - if FLU/RSV clinically relevant [[id number]]  (Normal) Collected: 01/26/23 1509    Lab Status: Final result Specimen: Nares from Nose Updated: 01/26/23 1613     SARS-CoV-2 Negative     INFLUENZA A PCR Negative     INFLUENZA B PCR Negative     RSV PCR Negative    Narrative:      FOR PEDIATRIC PATIENTS - copy/paste COVID Guidelines URL to browser: [email or web address]    SARS-CoV-2 assay is a Nucleic Acid Amplification assay intended for the  qualitative detection of nucleic acid from SARS-CoV-2 in nasopharyngeal  swabs  Results are for the presumptive identification of SARS-CoV-2 RNA  Positive results are indicative of infection with SARS-CoV-2, the virus  causing COVID-19, but do not rule out bacterial infection or co-infection  with other viruses   Laboratories within the [place] and its  territories are required to report all positive results to the appropriate  public health authorities  Negative results do not preclude SARS-CoV-2  infection and should not be used as the sole basis for treatment or other  patient management decisions  Negative results must be combined with  clinical observations, patient history, and epidemiological information  This test has not been FDA cleared or approved  This test has been authorized by FDA under an Emergency Use Authorization  (EUA)  This test is only authorized for the duration of time the  declaration that circumstances exist justifying the authorization of the  emergency use of an in vitro diagnostic tests for detection of SARS-CoV-2  virus and/or diagnosis of COVID-19 infection under section 564(b)(1) of  the Act, 21 U  S C  949DSA-2(J)(8), unless the authorization is terminated  or revoked sooner  The test has been validated but independent review by FDA  and CLIA is pending  Test performed using [employer] GeneXpert: This RT-PCR assay targets N2,  a region unique to SARS-CoV-2  A conserved region in the E-gene was chosen  for pan-Sarbecovirus detection which includes SARS-CoV-2  According to CMS-2020-01-R, this platform meets the definition of high-throughput technology      HS Troponin 0hr (reflex protocol) [[id number]]  (Normal) Collected: 01/26/23 1509    Lab Status: Final result Specimen: Blood from Arm, Right Updated: 01/26/23 1547     hs TnI 0hr 5 ng/L     Basic metabolic panel [[id number]]  (Abnormal) Collected: 01/26/23 1509    Lab Status: Final result Specimen: Blood from Arm, Right Updated: 01/26/23 1545     Sodium 142 mmol/L      Potassium 4 1 mmol/L      Chloride 110 mmol/L      CO2 26 mmol/L      ANION GAP 6 mmol/L      BUN 19 mg/dL      Creatinine 1 17 mg/dL      Glucose 102 mg/dL      Calcium 9 5 mg/dL      eGFR 70 ml/min/1 73sq m     Narrative:      [place] guidelines for Chronic Kidney Disease (CKD):   •  Stage 1 with normal or high GFR (GFR > 90 mL/min/1 73 square meters)  •  Stage 2 Mild CKD (GFR = 60-89 mL/min/1 73 square meters)  •  Stage 3A Moderate CKD (GFR = 45-59 mL/min/1 73 square meters)  •  Stage 3B Moderate CKD (GFR = 30-44 mL/min/1 73 square meters)  •  Stage 4 Severe CKD (GFR = 15-29 mL/min/1 73 square meters)  •  Stage 5 End Stage CKD (GFR <15 mL/min/1 73 square meters)  Note: GFR calculation is accurate only with a steady state creatinine    CBC and differential [[id number]]  (Abnormal) Collected: 01/26/23 1509    Lab Status: Final result Specimen: Blood from Arm, Right Updated: 01/26/23 1515     WBC 12 73 Thousand/uL      RBC 5 59 Million/uL      Hemoglobin 16 4 g/dL      Hematocrit 49 7 %      MCV 89 fL      MCH 29 3 pg      MCHC 33 0 g/dL      RDW 14 9 %      MPV 9 6 fL      Platelets 038 Thousands/uL      nRBC 0 /100 WBCs      Neutrophils Relative 62 %      Immat GRANS % 1 %      Lymphocytes Relative 20 %      Monocytes Relative 9 %      Eosinophils Relative 7 %      Basophils Relative 1 %      Neutrophils Absolute 8 00 Thousands/µL      Immature Grans Absolute 0 07 Thousand/uL      Lymphocytes Absolute 2 59 Thousands/µL      Monocytes Absolute 1 12 Thousand/µL      Eosinophils Absolute 0 88 Thousand/µL      Basophils Absolute 0 07 Thousands/µL                  XR chest 1 view portable   ED Interpretation by Yanira Cedeno DO (01/26 1506)   No acute cardiopulmonary process visualized  No widened mediastinum  No infiltrates  No pulmonary effusion  Final Result by Meño Arreola MD (01/27 8825)      No acute cardiopulmonary disease        Findings are stable            Workstation performed: [id number]               Procedures  ECG 12 Lead Documentation Only    Date/Time: 1/26/2023 4:55 PM  Performed by: Yanira Cedeno DO  Authorized by: Yanira Cedeno DO     Patient location:  ED  Previous ECG:     Previous ECG:  Compared to current    Comparison ECG info:  1/13/23: NSR    Similarity:  No change  Interpretation:     Interpretation: normal    Rate:     ECG rate:  86    ECG rate assessment: normal    Rhythm:     Rhythm: sinus rhythm    Ectopy:     Ectopy: none    QRS:     QRS axis:  Normal    QRS intervals:  Normal  Conduction:     Conduction: normal    ST segments:     ST segments:  Normal  T waves:     T waves: normal            ED Course  ED Course as of 01/29/23 0024   Thu Jan 26, 2023   1427 Blood Pressure(!): 169/146   1427 Temperature: 98 1 °F (36 7 °C)  Oral temp   1427 Pulse: 87   1427 Respirations: 22   1427 SpO2: 98 %   1427 44 yo M history of oropharyngeal surgery, asthma, cluster headaches, MI who presents with sore throat and throat swelling  Patient states he started with cold-like symptoms 5 days ago  Patient notes associated symptoms include myalgias, chills, cough, congestion  Patient states throat pain and swelling started couple days ago  Patient had a telemedicine appointment with primary care doctor who told him to come to the emergency department due to his history of oropharyngeal surgeries  Pt also notes CP, SOB  Describes CP as heaviness sensation  But does not feel similar to previous MI  Pt also notes decreased appetite but no NVD, bladder changes, BLE edema/pain  Physical exam: Diaphoretic, uncomfortable  Heart was regular rate and rhythm, lungs coarse breath sounds throughout with associated wheezing, abdomen soft nontender, no peripheral edema, no calf tenderness    Concern for: ACS versus viral syndrome versus pneumonia   1442 Blood Pressure(!): 131/104   1451 Blood Pressure: 152/96   1506 XR chest 1 view portable  No acute cardiopulmonary process   1533 CBC and differential(!)  Leukocytosis 12 73: infectious vs reactionary  Otherwise unremarkable   4280 Basic metabolic panel(!)  Slightly elevated chloride, 110  Otherwise unremarkable    1553 hs TnI 0hr: 5  EKG does not show any acute ischemic changes   Will obtain 2hr troponin to r/o ACS   1615 SARS-COV-2: Negative   1615 INFLU A PCR: Negative   1615 INFLU B PCR: Negative   1615 RSV PCR: Negative   1636 STREP A PCR: Not Detected   1747 Repeat EKG does not show any acute ischemic changes   1813 hs TnI 2hr: 4  Delta -1  Unlikely ACS  HEART Risk Score    Flowsheet Row Most Recent Value   Heart Score Risk Calculator    History 1 Filed at: 01/29/2023 0023   ECG 0 Filed at: 01/29/2023 0023   Age 1 Filed at: 01/29/2023 0023   Risk Factors 2 Filed at: 01/29/2023 0023   Troponin 0 Filed at: 01/29/2023 0023   HEART Score 4 Filed at: 01/29/2023 0023                                Medical Decision Making  See ED course for more details on MDM    Acute viral pharyngitis: acute illness or injury  Chest pain: acute illness or injury  Amount and/or Complexity of Data Reviewed  Labs: ordered  Decision-making details documented in ED Course  Radiology: ordered and independent interpretation performed  Decision-making details documented in ED Course  Risk  Prescription drug management  Disposition  Final diagnoses:   Acute viral pharyngitis   Chest pain     Time reflects when diagnosis was documented in both MDM as applicable and the Disposition within this note     Time User Action Codes Description Comment    1/26/2023  6:53 PM Randy Russell Add [J02 9] Acute viral pharyngitis     1/26/2023  6:53 PM Randy Russell Add [R07 9] Chest pain       ED Disposition     ED Disposition   Discharge    Condition   Stable    Date/Time   Thu Jan 26, 2023  6:59 PM    Comment   Asha Bennett discharge to home/self care                 Follow-up Information     Follow up With Specialties Details Why Contact Info Additional Information    Lars Terrell MD Family Medicine Call in 1 day to schedule follow-up after being evaluated in ED [address] [facility] Emergency Department Emergency Medicine Go to  As needed, If symptoms worsen [address] [address] [facility] [address] [address] [facility] Emergency Department, [address], [place], [place], [address]          Discharge Medication List as of 1/26/2023  6:59 PM      CONTINUE these medications which have NOT CHANGED    Details   acetaminophen (TYLENOL) 325 mg tablet Take 3 tablets (975 mg total) by mouth every 8 (eight) hours as needed for mild pain, Starting Fri 12/24/2021, No Print      albuterol (PROVENTIL HFA,VENTOLIN HFA) 90 mcg/act inhaler Inhale 2 puffs every 6 (six) hours as needed for wheezing, Starting Fri 5/13/2022, Normal      diazepam (VALIUM) 5 mg tablet Take 1 tablet (5 mg total) by mouth every 12 (twelve) hours as needed for anxiety or muscle spasms Do not start before January 12, 2023 , Starting Thu 1/12/2023, Normal      docusate sodium (COLACE) 100 mg capsule Take 1 capsule (100 mg total) by mouth 2 (two) times a day, Starting Thu 10/20/2022, Normal      DULoxetine (CYMBALTA) 60 mg delayed release capsule Take 1 capsule (60 mg total) by mouth daily Please fill before 1/13/23 as patient is going out of town Do not start before January 13, 2023 , Starting Fri 1/13/2023, Normal      fluticasone-vilanterol (BREO ELLIPTA) 200-25 MCG/INH inhaler Inhale 1 puff daily Rinse mouth after use , Starting Mon 3/18/2019, Normal      hydrOXYzine HCL (ATARAX) 25 mg tablet Take 1 tablet (25 mg total) by mouth every 6 (six) hours as needed for itching for up to 7 days, Starting Mon 8/1/2022, Until Mon 8/8/2022 at 2359, Normal      naloxone (Narcan) 4 mg/0 1 mL nasal spray In case of overdose: 1 spray in one nostril x 1, may repeat in 2 min if remains unresponsive , Normal      oxyCODONE (ROXICODONE) 10 MG TABS Take 1 tablet (10 mg total) by mouth every 6 (six) hours as needed for severe pain Take 1 tablet (10mg) by mouth every 6 hours as needed for severe pain   Max daily amount : 4 tablets (40mg) Max Daily Amount: 40 mg Do not start before January 12, 2023 , S tarting Thu 1/12/2023, Until Sat 2/11/2023 at 2359, Normal      polyethylene glycol (MIRALAX) 17 g packet Take 17 g by mouth 2 (two) times a day as needed (constipation), Starting Tue 10/25/2022, Normal      senna (SENOKOT) 8 6 mg Take 1 tablet (8 6 mg total) by mouth 2 (two) times a day as needed for constipation, Starting Tue 10/25/2022, Normal           No discharge procedures on file  PDMP Review       Value Time User    PDMP Reviewed  Yes 1/5/2023  8:54 AM Roxy Ochoa MD           ED Provider  Attending physically available and evaluated Jewels Recinos  I managed the patient along with the ED Attending      Electronically Signed by         Juanis Cast DO  01/29/23 0940

## 2023-01-26 NOTE — PROGRESS NOTES
Virtual Regular Visit    Verification of patient location:    Patient is located in the following state in which I hold an active license [place]      Assessment/Plan:    Problem List Items Addressed This Visit        Other    Acute sore throat - Primary     Patient with extensive past medical history significant for oropharyngeal pathologies experiencing throat closure in the setting of acute viral upper respiratory tract infection with cough  Due to the patient's history and new onset throat closure occurring since last night, it was recommended to the patient to go to the nearest ED for further workup  Patient acknowledged recommendation and reported he will go to the ED  Plan:  - Follow up ED encounter   - Reassess at next office visit                 Reason for visit is   Chief Complaint   Patient presents with   • Virtual Regular Visit        Encounter provider Bertha Pennington DO    Provider located at [address] [place] [address]  Eric Duron [place] [id number] [phone]      Recent Visits  Date Type Provider Dept   01/27/23 Telephone Jd Bailey MD [address] [place] [address]   01/26/23 [address] [facility] [address], [address] recent visits within past 7 days and meeting all other requirements  Future Appointments  No visits were found meeting these conditions  Showing future appointments within next 150 days and meeting all other requirements       The patient was identified by name and date of birth  Jeannie Amor Donald was informed that this is a telemedicine visit and that the visit is being conducted through the [employer]  He agrees to proceed     My office door was closed  No one else was in the room  He acknowledged consent and understanding of privacy and security of the video platform  The patient has agreed to participate and understands they can discontinue the visit at any time  Patient is aware this is a billable service  Elsa Duran is a 45 y o  male presenting via virtual for a chief complaint of sore throat  45year old male patient presents to the office via virtual encounter for a chief complaint of sore throat  Reports initially experiencing cough, congestion, body aches, irritation/sharp stinging pain for the past 5 days  Reports significant amount of malaise, fatigue, and increased time sleeping  Reports past medical history of prior oropharyngeal surgery and is currently experiencing sensation of throat closure and that the back of his throat is hurting more than usual   Says tested negative for COVID with at-home test   Denies any fevers nor any other associated symptoms          Past Medical History:   Diagnosis Date   • Allergic     seasonal    • Allergic rhinitis    • Anxiety    • Arthritis    • Back pain    • Chronic obstructive asthma (HCC)    • Chronic pain syndrome    • Cluster headaches    • Colon polyp    • Depression    • Insomnia    • Kidney stones    • Laryngospasm    • Leukocytosis    • Migraine    • Myocardial infarction [facility])    • Nephrolithiasis    • Organic impotence    • Pneumonia due to infectious organism 1/16/2019   • Seasonal allergies    • Sleep apnea     does not use CPAP   • Stroke [facility] 2015   • TMJ (temporomandibular joint syndrome)    • Wheezing     last assessed 05/19/17       Past Surgical History:   Procedure Laterality Date   • BACK SURGERY      *9, [id number], nervectomy 2006, total of 10   • BUCCAL MASS EXCISION N/A 3/26/2018    Procedure: BIOPSY LINGUAL TONSIL (FLOW/ STANDARD PATH)  EVAL UNDER ANESTHESIA;  Surgeon: Jean-Pierre Johnson MD;  [facility];  Service: ENT   • COLONOSCOPY     • FL RETROGRADE PYELOGRAM  10/21/2020   • HERNIA REPAIR     • KIDNEY SURGERY     • KNEE ARTHROSCOPY     • LITHOTRIPSY      multiple   • LITHOTRIPSY     • LUMBAR DISC SURGERY  2015   • MANDIBLE FRACTURE SURGERY      titanium plate   • PELVIC SYMPHYSIS FUSION     • IA CYSTO/URETERO W/LITHOTRIPSY &INDWELL STENT INSRT Right 10/21/2020    Procedure: CYSTOSCOPY URETEROSCOPY WITH LITHOTRIPSY HOLMIUM LASER, RETROGRADE PYELOGRAM AND INSERTION STENT URETERAL;  Surgeon: Ravi Patel MD;  Location[facility];  Service: Urology   • MI ESOPHAGOGASTRODUODENOSCOPY TRANSORAL DIAGNOSTIC N/A 2/22/2018    Procedure: ESOPHAGOGASTRODUODENOSCOPY (EGD); Surgeon: Alexandra Murphy MD;  Location: [facility]; Service: Gastroenterology   • MI ESOPHAGOGASTRODUODENOSCOPY TRANSORAL DIAGNOSTIC N/A 4/1/2019    Procedure: EGD W/ DILATION;  Surgeon: Alexandra Murphy MD;  Location[facility]; Service: Gastroenterology   • MI FORREST IMPLTJ NSTIM ELTRDS PLATE/PADDLE EDRL N/A 9/8/2016    Procedure: DORSAL COLUMN STIMULATOR PLACEMENT (IMPULSE MONITORING);   Surgeon: Catracho Pollack MD;  Location[facility];  Service: Orthopedics   • MI REVJ/RMVL IMPLANTED SPINAL NEUROSTIM GENERATOR Left 6/9/2017    Procedure: REMOVAL OF A THORACIC SCS PLACED VIA LAMINECTOMY AND REMOVAL LEFT BUTTOCK GENERATOR; IMPULSE MONITORING;  Surgeon: Etelvina Henry MD;  Location: [facility];  Service: Neurosurgery   • MI SURGICAL ARTHROSCOPY SHOULDER W/ROTATOR CUFF RPR Right 4/4/2019    Procedure: RIGHT SHOULDER ARTHROSCOPIC SUBSCAPULARIS TENDON REPAIR;  Surgeon: Franco Barrow MD;  [facility];  Service: Orthopedics   • SACROILIAC JOINT FUSION  2015   • SHOULDER SURGERY Left     2   • UPPER GASTROINTESTINAL ENDOSCOPY         Current Outpatient Medications   Medication Sig Dispense Refill   • acetaminophen (TYLENOL) 325 mg tablet Take 3 tablets (975 mg total) by mouth every 8 (eight) hours as needed for mild pain  0   • albuterol (PROVENTIL HFA,VENTOLIN HFA) 90 mcg/act inhaler Inhale 2 puffs every 6 (six) hours as needed for wheezing 18 g 5   • diazepam (VALIUM) 5 mg tablet Take 1 tablet (5 mg total) by mouth every 12 (twelve) hours as needed for anxiety or muscle spasms Do not start before January 12, 2023  60 tablet 0   • docusate sodium (COLACE) 100 mg capsule Take 1 capsule (100 mg total) by mouth 2 (two) times a day (Patient not taking: Reported on 1/5/2023) 60 capsule 0   • DULoxetine (CYMBALTA) 30 mg delayed release capsule Take 1 capsule (30 mg total) by mouth daily for 7 doses 7 capsule 0   • DULoxetine (CYMBALTA) 60 mg delayed release capsule Take 1 capsule (60 mg total) by mouth daily Please fill before 1/13/23 as patient is going out of town Do not start before January 13, 2023  90 capsule 0   • fluticasone-vilanterol (BREO ELLIPTA) 200-25 MCG/INH inhaler Inhale 1 puff daily Rinse mouth after use  3 Inhaler 3   • hydrOXYzine HCL (ATARAX) 25 mg tablet Take 1 tablet (25 mg total) by mouth every 6 (six) hours as needed for itching for up to 7 days (Patient not taking: Reported on 10/25/2022) 25 tablet 0   • naloxone (Narcan) 4 mg/0 1 mL nasal spray In case of overdose: 1 spray in one nostril x 1, may repeat in 2 min if remains unresponsive  1 each 0   • oxyCODONE (ROXICODONE) 10 MG TABS Take 1 tablet (10 mg total) by mouth every 6 (six) hours as needed for severe pain Take 1 tablet (10mg) by mouth every 6 hours as needed for severe pain  Max daily amount : 4 tablets (40mg) Max Daily Amount: 40 mg Do not start before January 12, 2023  120 tablet 0   • polyethylene glycol (MIRALAX) 17 g packet Take 17 g by mouth 2 (two) times a day as needed (constipation) (Patient not taking: Reported on 1/5/2023) 72 each 0   • senna (SENOKOT) 8 6 mg Take 1 tablet (8 6 mg total) by mouth 2 (two) times a day as needed for constipation (Patient not taking: Reported on 1/5/2023) 60 tablet 0     No current facility-administered medications for this visit  Allergies   Allergen Reactions   • Other Rash     Adhesive tape       Review of Systems   Constitutional: Positive for activity change and fatigue  Negative for appetite change, chills and fever  HENT: Positive for congestion, postnasal drip, sinus pressure, sinus pain and sore throat   Negative for dental problem, drooling, ear discharge, ear pain, facial swelling, hearing loss, mouth sores, nosebleeds, rhinorrhea, sneezing and trouble swallowing  Eyes: Negative for itching  Respiratory: Positive for cough and shortness of breath  Negative for apnea, choking, chest tightness, wheezing and stridor  Cardiovascular: Negative for chest pain, palpitations and leg swelling  Gastrointestinal: Negative for abdominal pain, constipation, diarrhea, nausea and vomiting  Endocrine: Negative  Genitourinary: Negative for difficulty urinating  Musculoskeletal: Positive for myalgias  Skin: Negative for rash  Neurological: Negative for dizziness, syncope, weakness, light-headedness, numbness and headaches  Hematological: Negative  Psychiatric/Behavioral: Negative  Video Exam    There were no vitals filed for this visit  Physical Exam  HENT:      Head: Normocephalic and atraumatic  Right Ear: External ear normal       Left Ear: External ear normal       Nose: Congestion present  Eyes:      Extraocular Movements: Extraocular movements intact  Conjunctiva/sclera: Conjunctivae normal    Neurological:      Mental Status: He is alert     Psychiatric:         Mood and Affect: Mood normal          Behavior: Behavior normal           I spent 15 minutes directly with the patient during this visit

## 2023-01-26 NOTE — DISCHARGE INSTRUCTIONS
You were seen and evaluated in the emergency department for sore throat/throat swelling  You also had chest pain, shortness of breath, sweating  Your EKG x2 was normal, your lab work did not show any acute issues, chest x-ray did not show any obvious abnormality  Radiology reviewed the images in the morning, you will be called if there are any discrepancies  Please call your primary care doctor to schedule follow-up appointment after being evaluated in the emergency department  Please return the emergency department for any new or worsening symptoms, including but not limited to: Worsening chest pain, worsening shortness of breath, fever, any new or worsening symptoms

## 2023-01-27 ENCOUNTER — TELEPHONE (OUTPATIENT)
Dept: FAMILY MEDICINE CLINIC | Facility: CLINIC | Age: 53
End: 2023-01-27

## 2023-01-27 NOTE — TELEPHONE ENCOUNTER
Patient was discharged from hospital and would like to know if he should be on any antibiotics as the hospital only gave him steroids  He also wanted to review his lab results as he has some concerns and would also like to know if he should have new blood work  Please advise, thank you

## 2023-01-28 LAB
ATRIAL RATE: 60 BPM
P AXIS: 80 DEGREES
PR INTERVAL: 164 MS
QRS AXIS: 79 DEGREES
QRSD INTERVAL: 88 MS
QT INTERVAL: 406 MS
QTC INTERVAL: 406 MS
T WAVE AXIS: 68 DEGREES
VENTRICULAR RATE: 60 BPM

## 2023-01-29 NOTE — ED ATTENDING ATTESTATION
1/26/2023  IValdez MD, saw and evaluated the patient  I have discussed the patient with the resident/non-physician practitioner and agree with the resident's/non-physician practitioner's findings, Plan of Care, and MDM as documented in the resident's/non-physician practitioner's note, except where noted  All available labs and Radiology studies were reviewed  I was present for key portions of any procedure(s) performed by the resident/non-physician practitioner and I was immediately available to provide assistance  At this point I agree with the current assessment done in the Emergency Department    I have conducted an independent evaluation of this patient a history and physical is as follows:see h and p above- agree with er resident tx plan/ dispo     ED Course  ED Course as of 01/29/23 1539   Thu Jan 26, 2023   1504 CXR PORTABLE - REVIEWED AND COMPARED TO PREVIOUS 81/22 BY ER MD-- NO SIGN CHANGES   1552 ER MD NOTE- 12 LEAD ECG REVIEWED BY ER MD - AND COMPARED TO PREVIOUS 1/15/23- NSR  86-- NO ACUTE FINDINGS OR CHANGES   1742 ER MD NOTE-  2ND ER ECG REVIEWED AND COMPARED TO INITIAL ER ECG- NSR 60- NO ACUTE FINDINGS OR CHANGES          Critical Care Time  Procedures

## 2023-01-30 NOTE — ASSESSMENT & PLAN NOTE
Patient with extensive past medical history significant for oropharyngeal pathologies experiencing throat closure in the setting of acute viral upper respiratory tract infection with cough  Due to the patient's history and new onset throat closure occurring since last night, it was recommended to the patient to go to the nearest ED for further workup  Patient acknowledged recommendation and reported he will go to the ED        Plan:  - Follow up ED encounter   - Reassess at next office visit

## 2023-02-02 ENCOUNTER — OFFICE VISIT (OUTPATIENT)
Dept: FAMILY MEDICINE CLINIC | Facility: CLINIC | Age: 53
End: 2023-02-02

## 2023-02-02 VITALS
SYSTOLIC BLOOD PRESSURE: 150 MMHG | WEIGHT: 219 LBS | OXYGEN SATURATION: 99 % | DIASTOLIC BLOOD PRESSURE: 100 MMHG | BODY MASS INDEX: 28.89 KG/M2 | HEART RATE: 110 BPM | TEMPERATURE: 97.1 F

## 2023-02-02 DIAGNOSIS — G44.011 INTRACTABLE EPISODIC CLUSTER HEADACHE: Primary | ICD-10-CM

## 2023-02-02 DIAGNOSIS — J01.00 ACUTE NON-RECURRENT MAXILLARY SINUSITIS: ICD-10-CM

## 2023-02-02 RX ORDER — PREDNISONE 50 MG/1
50 TABLET ORAL DAILY
Qty: 5 TABLET | Refills: 1 | Status: SHIPPED | OUTPATIENT
Start: 2023-02-02

## 2023-02-02 RX ORDER — AMOXICILLIN AND CLAVULANATE POTASSIUM 875; 125 MG/1; MG/1
1 TABLET, FILM COATED ORAL EVERY 12 HOURS SCHEDULED
Qty: 20 TABLET | Refills: 0 | Status: SHIPPED | OUTPATIENT
Start: 2023-02-02 | End: 2023-02-12

## 2023-02-02 RX ORDER — RIZATRIPTAN BENZOATE 10 MG/1
10 TABLET ORAL AS NEEDED
Qty: 18 TABLET | Refills: 1 | Status: SHIPPED | OUTPATIENT
Start: 2023-02-02

## 2023-02-02 NOTE — PROGRESS NOTES
Name: William Bean      : 1970      MRN: [id number]  Encounter Provider: Fe Luis DO  Encounter Date: 2023   Encounter department: [address] [facility]     1  Intractable episodic cluster headache  -     rizatriptan (Maxalt) 10 mg tablet; Take 1 tablet (10 mg total) by mouth as needed for migraine Take at the onset of migraine; if symptoms continue or return, may take another dose at least 2 hours after first dose  Take no more than 2 doses in a day  -     predniSONE 50 mg tablet; Take 1 tablet (50 mg total) by mouth daily    2  Acute non-recurrent maxillary sinusitis  Assessment & Plan:  2 weeks of viral like symptoms and throat swelling  ER visit on  ruled out acute cardiovascular event with negative troponins, normal EKG, negative CXR, leukocytosis    -    Orders:  -     amoxicillin-clavulanate (AUGMENTIN) 875-125 mg per tablet; Take 1 tablet by mouth every 12 (twelve) hours for 10 days         Subjective      Yefri SARAVIA  Corazon Navarro is a 44 yo male here for a sick visit  He was recently seen in the ED on  for throat swelling and viral like illness after being referred by Dr Theodora Umanzor after a telemedicine visit earlier that day  Since discharge, he feels increased fatigue and throat pain  He has also coughed up grey tinged sputum and had nasal mucus that is yellow in color  This morning, he woke up with a cluster headache that felt like a "spike being stabbed into my head behind my right eye"  He tried a sumatriptan he had from an old prescription and that did not help  Mucinex also has not helped  He cannot sleep more than an hour without waking up in pain  He also feels short of breath, congestion, intermittent chest pain, seeing things closer than they actually are, sweating through his clothes during sleep, eye watering bilaterally  Review of Systems   Constitutional: Positive for diaphoresis and fatigue  Negative for chills and fever     HENT: Positive for congestion, ear discharge, rhinorrhea, sinus pressure, sinus pain, sore throat and trouble swallowing  Eyes: Positive for visual disturbance  Respiratory: Positive for cough and shortness of breath  Cardiovascular: Positive for chest pain  Negative for palpitations and leg swelling  Neurological: Positive for dizziness and headaches  Psychiatric/Behavioral: Positive for sleep disturbance  The patient is nervous/anxious  Current Outpatient Medications on File Prior to Visit   Medication Sig   • acetaminophen (TYLENOL) 325 mg tablet Take 3 tablets (975 mg total) by mouth every 8 (eight) hours as needed for mild pain   • albuterol (PROVENTIL HFA,VENTOLIN HFA) 90 mcg/act inhaler Inhale 2 puffs every 6 (six) hours as needed for wheezing   • diazepam (VALIUM) 5 mg tablet Take 1 tablet (5 mg total) by mouth every 12 (twelve) hours as needed for anxiety or muscle spasms Do not start before January 12, 2023  • docusate sodium (COLACE) 100 mg capsule Take 1 capsule (100 mg total) by mouth 2 (two) times a day (Patient not taking: Reported on 1/5/2023)   • DULoxetine (CYMBALTA) 30 mg delayed release capsule Take 1 capsule (30 mg total) by mouth daily for 7 doses   • DULoxetine (CYMBALTA) 60 mg delayed release capsule Take 1 capsule (60 mg total) by mouth daily Please fill before 1/13/23 as patient is going out of town Do not start before January 13, 2023  • fluticasone-vilanterol (BREO ELLIPTA) 200-25 MCG/INH inhaler Inhale 1 puff daily Rinse mouth after use  • hydrOXYzine HCL (ATARAX) 25 mg tablet Take 1 tablet (25 mg total) by mouth every 6 (six) hours as needed for itching for up to 7 days (Patient not taking: Reported on 10/25/2022)   • naloxone (Narcan) 4 mg/0 1 mL nasal spray In case of overdose: 1 spray in one nostril x 1, may repeat in 2 min if remains unresponsive     • oxyCODONE (ROXICODONE) 10 MG TABS Take 1 tablet (10 mg total) by mouth every 6 (six) hours as needed for severe pain Take 1 tablet (10mg) by mouth every 6 hours as needed for severe pain  Max daily amount : 4 tablets (40mg) Max Daily Amount: 40 mg Do not start before January 12, 2023  • polyethylene glycol (MIRALAX) 17 g packet Take 17 g by mouth 2 (two) times a day as needed (constipation) (Patient not taking: Reported on 1/5/2023)   • senna (SENOKOT) 8 6 mg Take 1 tablet (8 6 mg total) by mouth 2 (two) times a day as needed for constipation (Patient not taking: Reported on 1/5/2023)       Objective     /100   Pulse (!) 110   Temp (!) 97 1 °F (36 2 °C)   Wt 99 3 kg (219 lb)   SpO2 99%   BMI 28 89 kg/m²     Physical Exam  Vitals reviewed  Constitutional:       General: He is in acute distress  Appearance: He is ill-appearing and diaphoretic  HENT:      Nose: Congestion and rhinorrhea present  Mouth/Throat:      Mouth: Mucous membranes are moist       Pharynx: Oropharynx is clear  Posterior oropharyngeal erythema present  Eyes:      General:         Right eye: Discharge present  Left eye: Discharge present  Conjunctiva/sclera: Conjunctivae normal    Cardiovascular:      Rate and Rhythm: Regular rhythm  Tachycardia present  Heart sounds: Normal heart sounds  Pulmonary:      Effort: Pulmonary effort is normal       Breath sounds: Normal breath sounds  Skin:     General: Skin is warm  Coloration: Skin is pale         [address] [facility], DO

## 2023-02-02 NOTE — ASSESSMENT & PLAN NOTE
2 weeks of viral like symptoms and throat swelling   ER visit on 01/26 ruled out acute cardiovascular event with negative troponins, normal EKG, negative CXR, leukocytosis    -

## 2023-02-06 ENCOUNTER — TELEPHONE (OUTPATIENT)
Dept: FAMILY MEDICINE CLINIC | Facility: CLINIC | Age: 53
End: 2023-02-06

## 2023-02-06 NOTE — TELEPHONE ENCOUNTER
Patient said he was told to call if he was still not feeling well, he says he is on day 15 and still feels terrible  He is on his last prednisone and is about to finish up the antibiotics and is requesting advice on what to do next  Please advise, thank you

## 2023-02-09 ENCOUNTER — OFFICE VISIT (OUTPATIENT)
Dept: FAMILY MEDICINE CLINIC | Facility: CLINIC | Age: 53
End: 2023-02-09

## 2023-02-09 DIAGNOSIS — R03.0 ELEVATED BLOOD PRESSURE READING: ICD-10-CM

## 2023-02-09 DIAGNOSIS — F41.8 DEPRESSION WITH ANXIETY: ICD-10-CM

## 2023-02-09 DIAGNOSIS — M96.1 POST LAMINECTOMY SYNDROME: ICD-10-CM

## 2023-02-09 DIAGNOSIS — F11.90 CHRONIC, CONTINUOUS USE OF OPIOIDS: ICD-10-CM

## 2023-02-09 DIAGNOSIS — G89.29 CHRONIC RIGHT SI JOINT PAIN: ICD-10-CM

## 2023-02-09 DIAGNOSIS — K22.4 HYPERCONTRACTILE ESOPHAGUS: Primary | ICD-10-CM

## 2023-02-09 DIAGNOSIS — G89.4 CHRONIC PAIN SYNDROME: ICD-10-CM

## 2023-02-09 DIAGNOSIS — M53.3 CHRONIC RIGHT SI JOINT PAIN: ICD-10-CM

## 2023-02-09 NOTE — PROGRESS NOTES
Name: Tung Gage      : 1970      MRN: [id number]  Encounter Provider: Anny Anaya MD  Encounter Date: 2023   Encounter department: [facility]    Assessment & Plan     1  Hypercontractile esophagus  -     Ambulatory Referral to Gastroenterology; Future    2  Chronic, continuous use of opioids  -     [facility] Prescribed Meds and Special Instructions  -     Amphetamines, Methamphetamines  -     Butalbital  -     Phenobarbital  -     Secobarbital  -     Temazepam  -     Alprazolam  -     Clonazepam  -     Diazepam  -     Lorazepam  -     Oxazepam  -     Gabapentin  -     Pregabalin  -     Cocaine  -     Heroin  -     Buprenorphine  -     Levorphanol  -     Meperidine  -     Naltrexone  -     Fentanyl  -     Methadone  -     Oxycodone  -     Oxymorphone  -     Tapentadol  -     THC  -     Tramadol  -     Codeine, Hydrocodone, Hydropmorphone, Morphine  -     Bath Salts  -     Ethyl Glucuronide/Ethyl Sulfate  -     Kratom  -     Spice  -     Methylphenidate  -     Phentermine  -     Validity Oxidant  -     Validity Creatinine  -     Validity pH  -     Validity Specific    3  Elevated blood pressure reading  Assessment & Plan:  · Review of patient's home blood pressure log today reveals he is overall at goal except when he is in significant acute pain, usually related to his chronic back pain  · Blood pressure is normal today in-office, 122/80  · Gave patient an additional blood pressure log via AVS so he may continue to monitor and record home blood pressures, will review at next visit      4  Depression with anxiety  -     diazepam (VALIUM) 5 mg tablet; Take 1 tablet (5 mg total) by mouth every 12 (twelve) hours as needed for anxiety or muscle spasms Do not start before 2023     5  Post laminectomy syndrome  -     diazepam (VALIUM) 5 mg tablet;  Take 1 tablet (5 mg total) by mouth every 12 (twelve) hours as needed for anxiety or muscle spasms Do not start before February 11, 2023   -     oxyCODONE (ROXICODONE) 10 MG TABS; Take 1 tablet (10 mg total) by mouth every 6 (six) hours as needed for severe pain Take 1 tablet (10mg) by mouth every 6 hours as needed for severe pain  Max daily amount : 4 tablets (40mg) Max Daily Amount: 40 mg Do not start before February 11, 2023  6  Chronic right SI joint pain  -     oxyCODONE (ROXICODONE) 10 MG TABS; Take 1 tablet (10 mg total) by mouth every 6 (six) hours as needed for severe pain Take 1 tablet (10mg) by mouth every 6 hours as needed for severe pain  Max daily amount : 4 tablets (40mg) Max Daily Amount: 40 mg Do not start before February 11, 2023     7  Chronic pain syndrome  -     oxyCODONE (ROXICODONE) 10 MG TABS; Take 1 tablet (10 mg total) by mouth every 6 (six) hours as needed for severe pain Take 1 tablet (10mg) by mouth every 6 hours as needed for severe pain  Max daily amount : 4 tablets (40mg) Max Daily Amount: 40 mg Do not start before February 11, 2023  Discussed with patient that everyone responds to medications differently  Given his reported symptoms and his family history, he should not continue Cymbalta  Tremor reported in 2-3% of patients taking Cymbalta, will monitor for resolution at next visit  We discussed that it will take some time for the medication to clear his system  May consider trying gabapentin again in the future for his neuropathic pain related to postlaminectomy syndrome  Patient provided urine specimen for drug testing today per controlled substance agreement signed at previous visit  Reviewed PDMP, no red flags, patient appropriate for refill on Valium and oxycodone after 2/11/23  For the patient's sinusitis, he should finish his course of antibiotics  Discussed with patient that his throat pain may also be related to GERD/dysphagia given his history of jackhammer esophagus  Referral back to GI was made for the patient to re-establish care      Subjective      Patient states that since he started his increased Cymbalta dose (was 30 mg, now 60 mg) he has experienced tremors, sweating, and attitude/mood difficulties  At one point he felt that he could not feed himself due to the shaking of his hands  He says he doesn't feel like himself and has lost the ability to focus and concentrate  He also has found that he is extremely tired and is not getting enough sleep at night  He reports that his fiance left following his behavior during a recent argument  He talked to his mother and found that she had similar symptoms when she was on Cymbalta in the past  He has not taken the Cymbalta in four days now but continues to feel off  He says that his sinus pain has improved on Augmentin  He still has a few days left on his antibiotic course  He continues to have some throat pain even with the antibiotics, although it has improved  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, and was following with GI, but reports that he has not had contact with them in several months  He has been measuring his blood pressure at home as requested by me at previous visit, overall blood pressures have been in 120s to 130s SBP, 70s to 80s DBP but he reports that the blood pressure is recorded on his home log have been more elevated as he was in acute pain when he measured them  He requests another blood pressure log so he may continue to track his blood pressure at home  Asymptomatic, denies chest pain/tightness on exertion, SOB, LEYVA, headache, vision changes, lightheadedness, syncope, pedal edema  Due for refill on his oxycodone and Valium controlled substance agreement was completed at last visit and patient has provided urine specimen today for testing  Review of Systems   Constitutional: Positive for diaphoresis  Negative for chills, fever and unexpected weight change  HENT: Positive for sore throat and voice change   Negative for postnasal drip, rhinorrhea, sinus pressure, sinus pain and trouble swallowing  Respiratory: Negative for chest tightness and shortness of breath  Cardiovascular: Negative for chest pain and palpitations  Gastrointestinal: Negative for diarrhea, nausea and vomiting  Musculoskeletal: Positive for back pain and myalgias  Chronic SI joint and back pain   Neurological: Positive for tremors  Negative for dizziness, weakness and light-headedness  Psychiatric/Behavioral: Negative for agitation and confusion  Current Outpatient Medications on File Prior to Visit   Medication Sig   • acetaminophen (TYLENOL) 325 mg tablet Take 3 tablets (975 mg total) by mouth every 8 (eight) hours as needed for mild pain   • albuterol (PROVENTIL HFA,VENTOLIN HFA) 90 mcg/act inhaler Inhale 2 puffs every 6 (six) hours as needed for wheezing   • amoxicillin-clavulanate (AUGMENTIN) 875-125 mg per tablet Take 1 tablet by mouth every 12 (twelve) hours for 10 days   • diazepam (VALIUM) 5 mg tablet Take 1 tablet (5 mg total) by mouth every 12 (twelve) hours as needed for anxiety or muscle spasms Do not start before January 12, 2023  • docusate sodium (COLACE) 100 mg capsule Take 1 capsule (100 mg total) by mouth 2 (two) times a day (Patient not taking: Reported on 1/5/2023)   • fluticasone-vilanterol (BREO ELLIPTA) 200-25 MCG/INH inhaler Inhale 1 puff daily Rinse mouth after use  • hydrOXYzine HCL (ATARAX) 25 mg tablet Take 1 tablet (25 mg total) by mouth every 6 (six) hours as needed for itching for up to 7 days (Patient not taking: Reported on 10/25/2022)   • naloxone (Narcan) 4 mg/0 1 mL nasal spray In case of overdose: 1 spray in one nostril x 1, may repeat in 2 min if remains unresponsive  • oxyCODONE (ROXICODONE) 10 MG TABS Take 1 tablet (10 mg total) by mouth every 6 (six) hours as needed for severe pain Take 1 tablet (10mg) by mouth every 6 hours as needed for severe pain  Max daily amount : 4 tablets (40mg) Max Daily Amount: 40 mg Do not start before January 12, 2023     • polyethylene glycol (MIRALAX) 17 g packet Take 17 g by mouth 2 (two) times a day as needed (constipation) (Patient not taking: Reported on 1/5/2023)   • predniSONE 50 mg tablet Take 1 tablet (50 mg total) by mouth daily   • rizatriptan (Maxalt) 10 mg tablet Take 1 tablet (10 mg total) by mouth as needed for migraine Take at the onset of migraine; if symptoms continue or return, may take another dose at least 2 hours after first dose  Take no more than 2 doses in a day  • senna (SENOKOT) 8 6 mg Take 1 tablet (8 6 mg total) by mouth 2 (two) times a day as needed for constipation (Patient not taking: Reported on 1/5/2023)       Objective     /80   Pulse 97   Wt 99 8 kg (220 lb)   SpO2 99%   BMI 29 03 kg/m²     Physical Exam  Vitals reviewed  Constitutional:       Appearance: Normal appearance  HENT:      Head: Normocephalic and atraumatic  Nose: Nose normal       Right Sinus: No maxillary sinus tenderness or frontal sinus tenderness  Left Sinus: No maxillary sinus tenderness or frontal sinus tenderness  Mouth/Throat:      Pharynx: Posterior oropharyngeal erythema present  Comments: No visible exudates  Eyes:      Extraocular Movements: Extraocular movements intact  Conjunctiva/sclera: Conjunctivae normal       Pupils: Pupils are equal, round, and reactive to light  Cardiovascular:      Rate and Rhythm: Normal rate and regular rhythm  Pulses: Normal pulses  Pulmonary:      Effort: Pulmonary effort is normal       Breath sounds: Normal breath sounds  Abdominal:      General: Abdomen is flat  Bowel sounds are normal  There is no distension  Palpations: Abdomen is soft  Musculoskeletal:         General: Tenderness present  Normal range of motion  Comments: Tenderness and hypertonicity of the paravertebral musculature from cervical to lumbar spine   Neurological:      General: No focal deficit present  Mental Status: He is alert     Psychiatric: Mood and Affect: Mood normal          Behavior: Behavior normal          Thought Content:  Thought content normal          Judgment: Judgment normal        Mayte Flores MD

## 2023-02-10 VITALS
DIASTOLIC BLOOD PRESSURE: 80 MMHG | OXYGEN SATURATION: 99 % | WEIGHT: 220 LBS | HEART RATE: 97 BPM | BODY MASS INDEX: 29.03 KG/M2 | SYSTOLIC BLOOD PRESSURE: 122 MMHG

## 2023-02-10 RX ORDER — DIAZEPAM 5 MG/1
5 TABLET ORAL EVERY 12 HOURS PRN
Qty: 60 TABLET | Refills: 0 | Status: SHIPPED | OUTPATIENT
Start: 2023-02-11

## 2023-02-10 RX ORDER — OXYCODONE HYDROCHLORIDE 10 MG/1
10 TABLET ORAL EVERY 6 HOURS PRN
Qty: 120 TABLET | Refills: 0 | Status: SHIPPED | OUTPATIENT
Start: 2023-02-11 | End: 2023-02-13 | Stop reason: SDUPTHER

## 2023-02-10 NOTE — ASSESSMENT & PLAN NOTE
· Review of patient's home blood pressure log today reveals he is overall at goal except when he is in significant acute pain, usually related to his chronic back pain  · Blood pressure is normal today in-office, 122/80  · Gave patient an additional blood pressure log via AVS so he may continue to monitor and record home blood pressures, will review at next visit

## 2023-02-11 LAB
6MAM UR QL CFM: NEGATIVE NG/ML
7AMINOCLONAZEPAM UR QL CFM: NEGATIVE NG/ML
A-OH ALPRAZ UR QL CFM: NEGATIVE NG/ML
ACCEPTABLE CREAT UR QL: NORMAL MG/DL
ACCEPTIBLE SP GR UR QL: NORMAL
AMPHET UR QL CFM: NEGATIVE NG/ML
BUPRENORPHINE UR QL CFM: NEGATIVE NG/ML
BUTALBITAL UR QL CFM: NEGATIVE NG/ML
BZE UR QL CFM: NEGATIVE NG/ML
CODEINE UR QL CFM: NEGATIVE NG/ML
EDDP UR QL CFM: NEGATIVE NG/ML
ETHYL GLUCURONIDE UR QL CFM: NEGATIVE NG/ML
ETHYL SULFATE UR QL SCN: NEGATIVE NG/ML
EUTYLONE UR QL: NEGATIVE NG/ML
FENTANYL UR QL CFM: NEGATIVE NG/ML
GLIADIN IGG SER IA-ACNC: NEGATIVE NG/ML
HYDROCODONE UR QL CFM: NEGATIVE NG/ML
HYDROMORPHONE UR QL CFM: NEGATIVE NG/ML
LORAZEPAM UR QL CFM: NEGATIVE NG/ML
ME-PHENIDATE UR QL CFM: NEGATIVE NG/ML
MEPERIDINE UR QL CFM: NEGATIVE NG/ML
METHADONE UR QL CFM: NEGATIVE NG/ML
METHAMPHET UR QL CFM: NEGATIVE NG/ML
MORPHINE UR QL CFM: NEGATIVE NG/ML
NALTREXONE UR QL CFM: NEGATIVE NG/ML
NITRITE UR QL: NORMAL UG/ML
NORBUPRENORPHINE UR QL CFM: NEGATIVE NG/ML
NORDIAZEPAM UR QL CFM: NORMAL NG/ML
NORFENTANYL UR QL CFM: NEGATIVE NG/ML
NORHYDROCODONE UR QL CFM: NEGATIVE NG/ML
NORMEPERIDINE UR QL CFM: NEGATIVE NG/ML
NOROXYCODONE UR QL CFM: NORMAL NG/ML
OXAZEPAM UR QL CFM: NORMAL NG/ML
OXYCODONE UR QL CFM: NORMAL NG/ML
OXYMORPHONE UR QL CFM: NORMAL NG/ML
OXYMORPHONE UR QL CFM: NORMAL NG/ML
PARA-FLUOROFENTANYL QUANTIFICATION: NORMAL NG/ML
PHENOBARB UR QL CFM: NEGATIVE NG/ML
RESULT ALL_PRESCRIBED MEDS AND SPECIAL INSTRUCTIONS: NORMAL
SECOBARBITAL UR QL CFM: NEGATIVE NG/ML
SL AMB 4-ANPP QUANTIFICATION: NORMAL NG/ML
SL AMB 5F-ADB-M7 METABOLITE QUANTIFICATION: NEGATIVE NG/ML
SL AMB 7-OH-MITRAGYNINE (KRATOM ALKALOID) QUANTIFICATION: NEGATIVE NG/ML
SL AMB AB-FUBINACA-M3 METABOLITE QUANTIFICATION: NEGATIVE NG/ML
SL AMB ACETYL FENTANYL QUANTIFICATION: NORMAL NG/ML
SL AMB ACETYL NORFENTANYL QUANTIFICATION: NORMAL NG/ML
SL AMB ACRYL FENTANYL QUANTIFICATION: NORMAL NG/ML
SL AMB CARFENTANIL QUANTIFICATION: NORMAL NG/ML
SL AMB CTHC (MARIJUANA METABOLITE) QUANTIFICATION: ABNORMAL NG/ML
SL AMB DEXTRORPHAN (DEXTROMETHORPHAN METABOLITE) QUANT: ABNORMAL NG/ML
SL AMB GABAPENTIN QUANTIFICATION: NEGATIVE
SL AMB JWH018 METABOLITE QUANTIFICATION: NEGATIVE NG/ML
SL AMB JWH073 METABOLITE QUANTIFICATION: NEGATIVE NG/ML
SL AMB MDMB-FUBINACA-M1 METABOLITE QUANTIFICATION: NEGATIVE NG/ML
SL AMB METHYLONE QUANTIFICATION: NEGATIVE NG/ML
SL AMB N-DESMETHYL-TRAMADOL QUANTIFICATION: NEGATIVE NG/ML
SL AMB PHENTERMINE QUANTIFICATION: NEGATIVE NG/ML
SL AMB PREGABALIN QUANTIFICATION: NEGATIVE
SL AMB RCS4 METABOLITE QUANTIFICATION: NEGATIVE NG/ML
SL AMB RITALINIC ACID QUANTIFICATION: NEGATIVE NG/ML
SMOOTH MUSCLE AB TITR SER IF: NEGATIVE NG/ML
SPECIMEN DRAWN SERPL: NEGATIVE NG/ML
SPECIMEN PH ACCEPTABLE UR: NORMAL
TAPENTADOL UR QL CFM: NEGATIVE NG/ML
TEMAZEPAM UR QL CFM: NORMAL NG/ML
TEMAZEPAM UR QL CFM: NORMAL NG/ML
TRAMADOL UR QL CFM: NEGATIVE NG/ML
URATE/CREAT 24H UR: NEGATIVE NG/ML

## 2023-02-13 ENCOUNTER — TELEPHONE (OUTPATIENT)
Dept: FAMILY MEDICINE CLINIC | Facility: CLINIC | Age: 53
End: 2023-02-13

## 2023-02-13 ENCOUNTER — TELEMEDICINE (OUTPATIENT)
Dept: FAMILY MEDICINE CLINIC | Facility: CLINIC | Age: 53
End: 2023-02-13

## 2023-02-13 ENCOUNTER — HOSPITAL ENCOUNTER (EMERGENCY)
Facility: HOSPITAL | Age: 53
Discharge: HOME | End: 2023-02-13
Attending: EMERGENCY MEDICINE

## 2023-02-13 VITALS
OXYGEN SATURATION: 99 % | HEART RATE: 87 BPM | TEMPERATURE: 98.3 F | HEIGHT: 73 IN | SYSTOLIC BLOOD PRESSURE: 143 MMHG | RESPIRATION RATE: 20 BRPM | DIASTOLIC BLOOD PRESSURE: 83 MMHG | WEIGHT: 220 LBS | BODY MASS INDEX: 29.16 KG/M2

## 2023-02-13 DIAGNOSIS — D72.829 LEUKOCYTOSIS: ICD-10-CM

## 2023-02-13 DIAGNOSIS — M53.3 CHRONIC RIGHT SI JOINT PAIN: ICD-10-CM

## 2023-02-13 DIAGNOSIS — M96.1 POST LAMINECTOMY SYNDROME: ICD-10-CM

## 2023-02-13 DIAGNOSIS — G89.4 CHRONIC PAIN SYNDROME: ICD-10-CM

## 2023-02-13 DIAGNOSIS — G89.29 CHRONIC RIGHT SI JOINT PAIN: ICD-10-CM

## 2023-02-13 DIAGNOSIS — R19.7 DIARRHEA: ICD-10-CM

## 2023-02-13 DIAGNOSIS — E86.0 DEHYDRATION: Primary | ICD-10-CM

## 2023-02-13 DIAGNOSIS — M79.10 MYALGIA: Primary | ICD-10-CM

## 2023-02-13 DIAGNOSIS — R53.1 WEAKNESS: ICD-10-CM

## 2023-02-13 LAB
ALBUMIN SERPL BCP-MCNC: 4.1 G/DL (ref 3.5–5)
ALP SERPL-CCNC: 60 U/L (ref 34–104)
ALT SERPL W P-5'-P-CCNC: 27 U/L (ref 7–52)
ANION GAP SERPL CALCULATED.3IONS-SCNC: 8 MMOL/L (ref 4–13)
AST SERPL W P-5'-P-CCNC: 20 U/L (ref 13–39)
BASOPHILS # BLD AUTO: 0.02 THOUSANDS/ÂΜL (ref 0–0.1)
BASOPHILS NFR BLD AUTO: 0 % (ref 0–1)
BILIRUB SERPL-MCNC: 0.45 MG/DL (ref 0.2–1)
BUN SERPL-MCNC: 25 MG/DL (ref 5–25)
CALCIUM SERPL-MCNC: 9.2 MG/DL (ref 8.4–10.2)
CHLORIDE SERPL-SCNC: 108 MMOL/L (ref 96–108)
CO2 SERPL-SCNC: 25 MMOL/L (ref 21–32)
CREAT SERPL-MCNC: 1.21 MG/DL (ref 0.6–1.3)
EOSINOPHIL # BLD AUTO: 0.01 THOUSAND/ÂΜL (ref 0–0.61)
EOSINOPHIL NFR BLD AUTO: 0 % (ref 0–6)
ERYTHROCYTE [DISTWIDTH] IN BLOOD BY AUTOMATED COUNT: 14.6 % (ref 11.6–15.1)
FLUAV RNA RESP QL NAA+PROBE: NEGATIVE
FLUBV RNA RESP QL NAA+PROBE: NEGATIVE
GFR SERPL CREATININE-BSD FRML MDRD: 67 ML/MIN/1.73SQ M
GLUCOSE SERPL-MCNC: 137 MG/DL (ref 65–140)
HCT VFR BLD AUTO: 37.8 % (ref 36.5–49.3)
HGB BLD-MCNC: 12.8 G/DL (ref 12–17)
IMM GRANULOCYTES # BLD AUTO: 0.1 THOUSAND/UL (ref 0–0.2)
IMM GRANULOCYTES NFR BLD AUTO: 1 % (ref 0–2)
LIPASE SERPL-CCNC: 35 U/L (ref 11–82)
LYMPHOCYTES # BLD AUTO: 1.44 THOUSANDS/ÂΜL (ref 0.6–4.47)
LYMPHOCYTES NFR BLD AUTO: 8 % (ref 14–44)
MAGNESIUM SERPL-MCNC: 2 MG/DL (ref 1.9–2.7)
MCH RBC QN AUTO: 29.2 PG (ref 26.8–34.3)
MCHC RBC AUTO-ENTMCNC: 33.9 G/DL (ref 31.4–37.4)
MCV RBC AUTO: 86 FL (ref 82–98)
MONOCYTES # BLD AUTO: 1.07 THOUSAND/ÂΜL (ref 0.17–1.22)
MONOCYTES NFR BLD AUTO: 6 % (ref 4–12)
NEUTROPHILS # BLD AUTO: 14.59 THOUSANDS/ÂΜL (ref 1.85–7.62)
NEUTS SEG NFR BLD AUTO: 85 % (ref 43–75)
NRBC BLD AUTO-RTO: 0 /100 WBCS
PLATELET # BLD AUTO: 318 THOUSANDS/UL (ref 149–390)
PMV BLD AUTO: 9.8 FL (ref 8.9–12.7)
POTASSIUM SERPL-SCNC: 4 MMOL/L (ref 3.5–5.3)
PROT SERPL-MCNC: 6.6 G/DL (ref 6.4–8.4)
RBC # BLD AUTO: 4.39 MILLION/UL (ref 3.88–5.62)
RSV RNA RESP QL NAA+PROBE: NEGATIVE
SARS-COV-2 RNA RESP QL NAA+PROBE: NEGATIVE
SODIUM SERPL-SCNC: 141 MMOL/L (ref 135–147)
WBC # BLD AUTO: 17.23 THOUSAND/UL (ref 4.31–10.16)

## 2023-02-13 RX ORDER — ONDANSETRON 2 MG/ML
4 INJECTION INTRAMUSCULAR; INTRAVENOUS ONCE
Status: COMPLETED | OUTPATIENT
Start: 2023-02-13 | End: 2023-02-13

## 2023-02-13 RX ORDER — OXYCODONE HYDROCHLORIDE 10 MG/1
10 TABLET ORAL EVERY 6 HOURS PRN
Qty: 47 TABLET | Refills: 0 | Status: SHIPPED | OUTPATIENT
Start: 2023-02-13 | End: 2023-02-24 | Stop reason: SDUPTHER

## 2023-02-13 RX ORDER — OXYCODONE HYDROCHLORIDE AND ACETAMINOPHEN 5; 325 MG/1; MG/1
1 TABLET ORAL ONCE
Status: DISCONTINUED | OUTPATIENT
Start: 2023-02-13 | End: 2023-02-13 | Stop reason: HOSPADM

## 2023-02-13 RX ADMIN — SODIUM CHLORIDE 1000 ML: 0.9 INJECTION, SOLUTION INTRAVENOUS at 19:49

## 2023-02-13 RX ADMIN — ONDANSETRON 4 MG: 2 INJECTION INTRAMUSCULAR; INTRAVENOUS at 19:51

## 2023-02-13 NOTE — PROGRESS NOTES
Virtual Regular Visit    Verification of patient location:    Patient is located in the following state in which I hold an active license [place]      Assessment/Plan:    Problem List Items Addressed This Visit        Other    Dehydration - Primary      The patient underwent a video visit today which was approximately 20 minutes in length, the bulk of which was spent reviewing his current complaints  Of concern, the patient appears to be worsening, visibly appears to be in acute distress  Furthermore patient has profuse diaphoresis and is unable to hydrate adequately p o  by history lastly, with dark stools cannot eliminate an active GI bleed  It was explained to the patient given the severity of his condition he was best served to present to the emergency department where he can have laboratory studies if necessary IVF to best determine acute care in course of action at this stage  Relevant Orders    Transfer to other facility (Completed)            Reason for visit is   Chief Complaint   Patient presents with   • Virtual Regular Visit        Encounter provider Vaughn Schaefer MD    Provider located at [address] [place] [id number] [phone]      Recent Visits  Date Type Provider Dept   02/09/23 Office Visit Bernie Cote MD [address] [place] [address]   02/06/23 Telephone Bernie Cote MD [id number] recent visits within past 7 days and meeting all other requirements  Today's Visits  Date Type Provider Dept   02/13/23 Telephone Bernie Cote MD [address] [place] [address]   02/13/23 [address], [address] today's visits and meeting all other requirements  Future Appointments  No visits were found meeting these conditions  Showing future appointments within next 150 days and meeting all other requirements       The patient was identified by name and date of birth   Joe Bennett was informed that this is a telemedicine visit and that the visit is being conducted through the [employer] platform  He agrees to proceed     My office door was closed  No one else was in the room  He acknowledged consent and understanding of privacy and security of the video platform  The patient has agreed to participate and understands they can discontinue the visit at any time  Patient is aware this is a billable service  Subjective  Jewels Recinos is a 45 y o  male worsening ear pain, profuse diaphoresis sore throat, black colored diarrhea 5-6 episodes per day      Artur Rivers states that since his last office visit he has been unable to" keep adequate fluids in his body"  States he has tried water, electrolyte drinks etc  He also reports extremely dry mouth and sore tongue  He states diaphoresis is virtually constant and he is unable to get it controlled  He reports headache, states he has had diarrhea 5-6 times per day and it has at times been black in color  He also reports severe muscle cramps  He also states still has significant sinus pressure and earache  Patient appears ill and in acute distress          Past Medical History:   Diagnosis Date   • Allergic     seasonal    • Allergic rhinitis    • Anxiety    • Arthritis    • Back pain    • Chronic obstructive asthma (HCC)    • Chronic pain syndrome    • Cluster headaches    • Colon polyp    • Depression    • Insomnia    • Kidney stones    • Laryngospasm    • Leukocytosis    • Migraine    • Myocardial infarction [facility])    • Nephrolithiasis    • Organic impotence    • Pneumonia due to infectious organism 1/16/2019   • Seasonal allergies    • Sleep apnea     does not use CPAP   • Stroke [facility]) 2015   • TMJ (temporomandibular joint syndrome)    • Wheezing     last assessed 05/19/17       Past Surgical History:   Procedure Laterality Date   • BACK SURGERY      *9, [id number], nervectomy 2006, total of 10   • BUCCAL MASS EXCISION N/A 3/26/2018    Procedure: BIOPSY LINGUAL TONSIL (FLOW/ STANDARD PATH) EVAL UNDER ANESTHESIA;  Surgeon: Natalya Sin MD;  [facility];  Service: ENT   • COLONOSCOPY     • FL RETROGRADE PYELOGRAM  10/21/2020   • HERNIA REPAIR     • KIDNEY SURGERY     • KNEE ARTHROSCOPY     • LITHOTRIPSY      multiple   • LITHOTRIPSY     • LUMBAR DISC SURGERY  2015   • MANDIBLE FRACTURE SURGERY      titanium plate   • PELVIC SYMPHYSIS FUSION     • IL CYSTO/URETERO W/LITHOTRIPSY &INDWELL STENT INSRT Right 10/21/2020    Procedure: CYSTOSCOPY URETEROSCOPY WITH LITHOTRIPSY HOLMIUM LASER, RETROGRADE PYELOGRAM AND INSERTION STENT URETERAL;  Surgeon: Juani Mcmahon MD;  [facility];  Service: Urology   • IL ESOPHAGOGASTRODUODENOSCOPY TRANSORAL DIAGNOSTIC N/A 2/22/2018    Procedure: ESOPHAGOGASTRODUODENOSCOPY (EGD); Surgeon: Huma Little MD;  [facility]; Service: Gastroenterology   • IL ESOPHAGOGASTRODUODENOSCOPY TRANSORAL DIAGNOSTIC N/A 4/1/2019    Procedure: EGD W/ DILATION;  Surgeon: Huma Little MD;  [facility]; Service: Gastroenterology   • IL FORREST IMPLTJ NSTIM ELTRDS PLATE/PADDLE EDRL N/A 9/8/2016    Procedure: DORSAL COLUMN STIMULATOR PLACEMENT (IMPULSE MONITORING);   Surgeon: Regina Zimmerman MD;  [facility];  Service: Orthopedics   • IL REVJ/RMVL IMPLANTED SPINAL NEUROSTIM GENERATOR Left 6/9/2017    Procedure: REMOVAL OF A THORACIC SCS PLACED VIA LAMINECTOMY AND REMOVAL LEFT BUTTOCK GENERATOR; IMPULSE MONITORING;  Surgeon: Philomena Fournier MD;  [facility];  Service: Neurosurgery   • IL SURGICAL ARTHROSCOPY SHOULDER W/ROTATOR CUFF RPR Right 4/4/2019    Procedure: RIGHT SHOULDER ARTHROSCOPIC SUBSCAPULARIS TENDON REPAIR;  Surgeon: Regulo Mcguire MD;  [facility];  Service: Orthopedics   • SACROILIAC JOINT FUSION  2015   • SHOULDER SURGERY Left     2   • UPPER GASTROINTESTINAL ENDOSCOPY         Current Outpatient Medications   Medication Sig Dispense Refill   • acetaminophen (TYLENOL) 325 mg tablet Take 3 tablets (975 mg total) by mouth every 8 (eight) hours as needed for mild pain  0   • albuterol (PROVENTIL HFA,VENTOLIN HFA) 90 mcg/act inhaler Inhale 2 puffs every 6 (six) hours as needed for wheezing 18 g 5   • diazepam (VALIUM) 5 mg tablet Take 1 tablet (5 mg total) by mouth every 12 (twelve) hours as needed for anxiety or muscle spasms Do not start before February 11, 2023  60 tablet 0   • docusate sodium (COLACE) 100 mg capsule Take 1 capsule (100 mg total) by mouth 2 (two) times a day (Patient not taking: Reported on 1/5/2023) 60 capsule 0   • fluticasone-vilanterol (BREO ELLIPTA) 200-25 MCG/INH inhaler Inhale 1 puff daily Rinse mouth after use  3 Inhaler 3   • hydrOXYzine HCL (ATARAX) 25 mg tablet Take 1 tablet (25 mg total) by mouth every 6 (six) hours as needed for itching for up to 7 days (Patient not taking: Reported on 10/25/2022) 25 tablet 0   • naloxone (Narcan) 4 mg/0 1 mL nasal spray In case of overdose: 1 spray in one nostril x 1, may repeat in 2 min if remains unresponsive  1 each 0   • oxyCODONE (ROXICODONE) 10 MG TABS Take 1 tablet (10 mg total) by mouth every 6 (six) hours as needed for severe pain Take 1 tablet (10mg) by mouth every 6 hours as needed for severe pain  Max daily amount : 4 tablets (40mg) Max Daily Amount: 40 mg 47 tablet 0   • polyethylene glycol (MIRALAX) 17 g packet Take 17 g by mouth 2 (two) times a day as needed (constipation) (Patient not taking: Reported on 1/5/2023) 72 each 0   • predniSONE 50 mg tablet Take 1 tablet (50 mg total) by mouth daily 5 tablet 1   • rizatriptan (Maxalt) 10 mg tablet Take 1 tablet (10 mg total) by mouth as needed for migraine Take at the onset of migraine; if symptoms continue or return, may take another dose at least 2 hours after first dose  Take no more than 2 doses in a day   18 tablet 1   • senna (SENOKOT) 8 6 mg Take 1 tablet (8 6 mg total) by mouth 2 (two) times a day as needed for constipation (Patient not taking: Reported on 1/5/2023) 60 tablet 0     No current facility-administered medications for this visit  Allergies   Allergen Reactions   • Other Rash     Adhesive tape       Review of Systems   Constitutional: Positive for chills and diaphoresis  Negative for fever  HENT: Positive for ear pain (Left), sinus pain (Right) and sore throat  Coarse voice   Respiratory: Positive for shortness of breath (Worse with exertion and stairs)  Gastrointestinal: Positive for diarrhea (Black)  Genitourinary: Negative for hematuria  Neurological: Positive for dizziness and headaches  Video Exam    There were no vitals filed for this visit  Physical Exam  Constitutional:       Appearance: He is ill-appearing  HENT:      Head: Atraumatic  Nose: No rhinorrhea  Mouth/Throat:      Comments: Coarse voice  Eyes:      Extraocular Movements: Extraocular movements intact  Pulmonary:      Breath sounds: No wheezing     Neurological:      Comments: Patient is extremely anxious reports headache and dizziness          I spent 20 minutes directly with the patient during this visit

## 2023-02-13 NOTE — TELEPHONE ENCOUNTER
Patient called stating he picked up his script for oxycodone but the pharmacy only had enough in stock to fill 73 out of 120 pills  The patient picked up the 73 pills but states he is going to [place] and would like to have his full script for his trip as he will be there for awhile  I confirmed that the patient did  73 pills and they state a new script for the remaining 47 tablets will need to be called in as they cannot do a verbal over the phone for it  Please advise, thank you

## 2023-02-13 NOTE — ASSESSMENT & PLAN NOTE
The patient underwent a video visit today which was approximately 20 minutes in length, the bulk of which was spent reviewing his current complaints  Of concern, the patient appears to be worsening, visibly appears to be in acute distress  Furthermore patient has profuse diaphoresis and is unable to hydrate adequately p o  by history lastly, with dark stools cannot eliminate an active GI bleed  It was explained to the patient given the severity of his condition he was best served to present to the emergency department where he can have laboratory studies if necessary IVF to best determine acute care in course of action at this stage

## 2023-02-14 NOTE — DISCHARGE INSTRUCTIONS
Return to er with fever, chills, if you feel worse at any time or have new sx  See pcp this week for follow up

## 2023-02-14 NOTE — ED NOTES
Discharge instructions reviewed with pt  Pt verbalized understanding  And has no further questions at this time  Pt ambulatory off unit with steady gait        Treva Rivera RN  02/13/23 2017

## 2023-02-14 NOTE — ED PROVIDER NOTES
History  Chief Complaint   Patient presents with   • Flu Symptoms     Pt presents to the ed after being at [facility] for the same thing, reports cough, sore throat, high bp/ heart rate     To er with about a mth of sx, non specific  He reported cough and myalgias  nauseous wo vomiting  Diarrhea, black and tarry, no abd pain  No bld thinners  Denies cp, sob  No fever  His muschle hurts, he feel dehydrated  He reported his tongue hurts, not eating and drinking well  Has seen by GI and has had dilatation in the past            Prior to Admission Medications   Prescriptions Last Dose Informant Patient Reported? Taking?   acetaminophen (TYLENOL) 325 mg tablet   No No   Sig: Take 3 tablets (975 mg total) by mouth every 8 (eight) hours as needed for mild pain   albuterol (PROVENTIL HFA,VENTOLIN HFA) 90 mcg/act inhaler   No No   Sig: Inhale 2 puffs every 6 (six) hours as needed for wheezing   amoxicillin-clavulanate (AUGMENTIN) 875-125 mg per tablet   No No   Sig: Take 1 tablet by mouth every 12 (twelve) hours for 10 days   diazepam (VALIUM) 5 mg tablet   No No   Sig: Take 1 tablet (5 mg total) by mouth every 12 (twelve) hours as needed for anxiety or muscle spasms Do not start before February 11, 2023  docusate sodium (COLACE) 100 mg capsule   No No   Sig: Take 1 capsule (100 mg total) by mouth 2 (two) times a day   Patient not taking: Reported on 1/5/2023   fluticasone-vilanterol (BREO ELLIPTA) 200-25 MCG/INH inhaler   No No   Sig: Inhale 1 puff daily Rinse mouth after use    hydrOXYzine HCL (ATARAX) 25 mg tablet   No No   Sig: Take 1 tablet (25 mg total) by mouth every 6 (six) hours as needed for itching for up to 7 days   Patient not taking: Reported on 10/25/2022   naloxone (Narcan) 4 mg/0 1 mL nasal spray   No No   Sig: In case of overdose: 1 spray in one nostril x 1, may repeat in 2 min if remains unresponsive     oxyCODONE (ROXICODONE) 10 MG TABS   No No   Sig: Take 1 tablet (10 mg total) by mouth every 6 (six) hours as needed for severe pain Take 1 tablet (10mg) by mouth every 6 hours as needed for severe pain  Max daily amount : 4 tablets (40mg) Max Daily Amount: 40 mg   polyethylene glycol (MIRALAX) 17 g packet   No No   Sig: Take 17 g by mouth 2 (two) times a day as needed (constipation)   Patient not taking: Reported on 1/5/2023   predniSONE 50 mg tablet   No No   Sig: Take 1 tablet (50 mg total) by mouth daily   rizatriptan (Maxalt) 10 mg tablet   No No   Sig: Take 1 tablet (10 mg total) by mouth as needed for migraine Take at the onset of migraine; if symptoms continue or return, may take another dose at least 2 hours after first dose  Take no more than 2 doses in a day     senna (SENOKOT) 8 6 mg   No No   Sig: Take 1 tablet (8 6 mg total) by mouth 2 (two) times a day as needed for constipation   Patient not taking: Reported on 1/5/2023      Facility-Administered Medications: None       Past Medical History:   Diagnosis Date   • Allergic     seasonal    • Allergic rhinitis    • Anxiety    • Arthritis    • Back pain    • Chronic obstructive asthma (HCC)    • Chronic pain syndrome    • Cluster headaches    • Colon polyp    • Depression    • Insomnia    • Kidney stones    • Laryngospasm    • Leukocytosis    • Migraine    • Myocardial infarction [facility])    • Nephrolithiasis    • Organic impotence    • Pneumonia due to infectious organism 1/16/2019   • Seasonal allergies    • Sleep apnea     does not use CPAP   • Stroke [facility]) 2015   • TMJ (temporomandibular joint syndrome)    • Wheezing     last assessed 05/19/17       Past Surgical History:   Procedure Laterality Date   • BACK SURGERY      *9, [id number], nervectomy 2006, total of 10   • BUCCAL MASS EXCISION N/A 3/26/2018    Procedure: BIOPSY LINGUAL TONSIL (FLOW/ STANDARD PATH)  EVAL UNDER ANESTHESIA;  Surgeon: Demond Laureano MD;  Location: [facility];  Service: ENT   • COLONOSCOPY     • FL RETROGRADE PYELOGRAM  10/21/2020   • HERNIA REPAIR     • KIDNEY SURGERY     • KNEE ARTHROSCOPY     • LITHOTRIPSY      multiple   • LITHOTRIPSY     • LUMBAR DISC SURGERY  2015   • MANDIBLE FRACTURE SURGERY      titanium plate   • PELVIC SYMPHYSIS FUSION     • PA CYSTO/URETERO W/LITHOTRIPSY &INDWELL STENT INSRT Right 10/21/2020    Procedure: CYSTOSCOPY URETEROSCOPY WITH LITHOTRIPSY HOLMIUM LASER, RETROGRADE PYELOGRAM AND INSERTION STENT URETERAL;  Surgeon: Josie Castro MD;  Location[facility];  Service: Urology   • PA ESOPHAGOGASTRODUODENOSCOPY TRANSORAL DIAGNOSTIC N/A 2/22/2018    Procedure: ESOPHAGOGASTRODUODENOSCOPY (EGD); Surgeon: Carlos Eduardo Suarez MD;  Location: [facility]; Service: Gastroenterology   • PA ESOPHAGOGASTRODUODENOSCOPY TRANSORAL DIAGNOSTIC N/A 4/1/2019    Procedure: EGD W/ DILATION;  Surgeon: Carlos Eduardo Suarez MD;  Location: [facility]; Service: Gastroenterology   • PA FORREST IMPLTJ NSTIM ELTRDS PLATE/PADDLE EDRL N/A 9/8/2016    Procedure: DORSAL COLUMN STIMULATOR PLACEMENT (IMPULSE MONITORING);   Surgeon: Fany Osman MD;  Location[facility];  Service: Orthopedics   • PA REVJ/RMVL IMPLANTED SPINAL NEUROSTIM GENERATOR Left 6/9/2017    Procedure: REMOVAL OF A THORACIC SCS PLACED VIA LAMINECTOMY AND REMOVAL LEFT BUTTOCK GENERATOR; IMPULSE MONITORING;  Surgeon: Meenu Love MD;  Location:  MAIN OR;  Service: Neurosurgery   • PA SURGICAL ARTHROSCOPY SHOULDER W/ROTATOR CUFF RPR Right 4/4/2019    Procedure: RIGHT SHOULDER ARTHROSCOPIC SUBSCAPULARIS TENDON REPAIR;  Surgeon: Rahul Katz MD;  Location[facility];  Service: Orthopedics   • SACROILIAC JOINT FUSION  2015   • SHOULDER SURGERY Left     2   • UPPER GASTROINTESTINAL ENDOSCOPY         Family History   Problem Relation Age of Onset   • Diabetes Father    • Obesity Father    • Liver cancer Father    • Heart disease Father    • Diabetes Brother    • Hypertension Brother    • Leukemia Mother    • Hypertension Mother    • Cancer Family      I have reviewed and agree with the history as documented  E-Cigarette/Vaping   • E-Cigarette Use Current Every Day User    • Cartridges/Day less than one a day      E-Cigarette/Vaping Substances   • Nicotine No    • THC Yes    • CBD No    • Flavoring No    • Other No    • Unknown No      Social History     Tobacco Use   • Smoking status: Every Day     Packs/day: 1 00     Years: 25 00     Pack years: 25 00     Types: Cigarettes   • Smokeless tobacco: Former   Vaping Use   • Vaping Use: Every day   • Substances: THC   Substance Use Topics   • Alcohol use: Yes     Comment: rarely    • Drug use: Yes     Frequency: 7 0 times per week     Types: Marijuana     Comment: medical marijuana daily for chronic pain 1/2 ounce       Review of Systems   All other systems reviewed and are negative  Physical Exam  Physical Exam  Vitals and nursing note reviewed  Constitutional:       General: He is not in acute distress  Appearance: Normal appearance  He is well-developed  He is not ill-appearing, toxic-appearing or diaphoretic  HENT:      Head: Normocephalic and atraumatic  Right Ear: External ear normal       Left Ear: External ear normal       Nose: Nose normal       Mouth/Throat:      Mouth: Mucous membranes are moist       Pharynx: No oropharyngeal exudate  Eyes:      General:         Right eye: No discharge  Left eye: No discharge  Conjunctiva/sclera: Conjunctivae normal       Pupils: Pupils are equal, round, and reactive to light  Neck:      Vascular: No JVD  Trachea: No tracheal deviation  Cardiovascular:      Rate and Rhythm: Normal rate and regular rhythm  Pulses: Normal pulses  Heart sounds: Normal heart sounds  No murmur heard  No friction rub  No gallop  Pulmonary:      Effort: Pulmonary effort is normal  No respiratory distress  Breath sounds: Normal breath sounds  No stridor  No wheezing, rhonchi or rales  Chest:      Chest wall: No tenderness     Abdominal:      General: Bowel sounds are normal  There is no distension  Palpations: Abdomen is soft  There is no mass  Tenderness: There is no abdominal tenderness  There is no right CVA tenderness, left CVA tenderness, guarding or rebound  Hernia: No hernia is present  Musculoskeletal:         General: No swelling, tenderness, deformity or signs of injury  Normal range of motion  Cervical back: Normal range of motion and neck supple  Right lower leg: No edema  Left lower leg: No edema  Skin:     General: Skin is warm and dry  Capillary Refill: Capillary refill takes less than 2 seconds  Coloration: Skin is not jaundiced or pale  Findings: No bruising, erythema, lesion or rash  Neurological:      General: No focal deficit present  Mental Status: He is alert and oriented to person, place, and time  Sensory: No sensory deficit  Motor: No weakness or abnormal muscle tone        Deep Tendon Reflexes: Reflexes normal    Psychiatric:         Mood and Affect: Mood normal          Vital Signs  ED Triage Vitals [02/13/23 1831]   Temperature Pulse Respirations Blood Pressure SpO2   98 3 °F (36 8 °C) 87 20 143/83 99 %      Temp Source Heart Rate Source Patient Position - Orthostatic VS BP Location FiO2 (%)   Oral Monitor Lying Left arm --      Pain Score       --           Vitals:    02/13/23 1831   BP: 143/83   Pulse: 87   Patient Position - Orthostatic VS: Lying         Visual Acuity      ED Medications  Medications   ondansetron (ZOFRAN) injection 4 mg (4 mg Intravenous Given 2/13/23 1951)   sodium chloride 0 9 % bolus 1,000 mL (0 mL Intravenous Stopped 2/13/23 2013)       Diagnostic Studies  Results Reviewed     Procedure Component Value Units Date/Time    FLU/RSV/COVID - if FLU/RSV clinically relevant [[id number]]  (Normal) Collected: 02/13/23 1905    Lab Status: Final result Specimen: Nares from Nasopharyngeal Swab Updated: 02/13/23 1945     SARS-CoV-2 Negative     INFLUENZA A PCR Negative     INFLUENZA B PCR Negative     RSV PCR Negative    Narrative:      FOR PEDIATRIC PATIENTS - copy/paste COVID Guidelines URL to browser: [email or web address]    SARS-CoV-2 assay is a Nucleic Acid Amplification assay intended for the  qualitative detection of nucleic acid from SARS-CoV-2 in nasopharyngeal  swabs  Results are for the presumptive identification of SARS-CoV-2 RNA  Positive results are indicative of infection with SARS-CoV-2, the virus  causing COVID-19, but do not rule out bacterial infection or co-infection  with other viruses  Laboratories within the [place] and its  territories are required to report all positive results to the appropriate  public health authorities  Negative results do not preclude SARS-CoV-2  infection and should not be used as the sole basis for treatment or other  patient management decisions  Negative results must be combined with  clinical observations, patient history, and epidemiological information  This test has not been FDA cleared or approved  This test has been authorized by FDA under an Emergency Use Authorization  (EUA)  This test is only authorized for the duration of time the  declaration that circumstances exist justifying the authorization of the  emergency use of an in vitro diagnostic tests for detection of SARS-CoV-2  virus and/or diagnosis of COVID-19 infection under section 564(b)(1) of  the Act, 21 U  S C  092SSV-7(L)(8), unless the authorization is terminated  or revoked sooner  The test has been validated but independent review by FDA  and CLIA is pending  Test performed using [email or web address] Application GeneXpert: This RT-PCR assay targets N2,  a region unique to SARS-CoV-2  A conserved region in the E-gene was chosen  for pan-Sarbecovirus detection which includes SARS-CoV-2  According to CMS-2020-01-R, this platform meets the definition of high-UUSEE technology      Comprehensive metabolic panel [[id number]] Collected: 02/13/23 1856    Lab Status: Final result Specimen: Blood from Arm, Right Updated: 02/13/23 1919     Sodium 141 mmol/L      Potassium 4 0 mmol/L      Chloride 108 mmol/L      CO2 25 mmol/L      ANION GAP 8 mmol/L      BUN 25 mg/dL      Creatinine 1 21 mg/dL      Glucose 137 mg/dL      Calcium 9 2 mg/dL      AST 20 U/L      ALT 27 U/L      Alkaline Phosphatase 60 U/L      Total Protein 6 6 g/dL      Albumin 4 1 g/dL      Total Bilirubin 0 45 mg/dL      eGFR 67 ml/min/1 73sq m     Narrative:      [facility] guidelines for Chronic Kidney Disease (CKD):   •  Stage 1 with normal or high GFR (GFR > 90 mL/min/1 73 square meters)  •  Stage 2 Mild CKD (GFR = 60-89 mL/min/1 73 square meters)  •  Stage 3A Moderate CKD (GFR = 45-59 mL/min/1 73 square meters)  •  Stage 3B Moderate CKD (GFR = 30-44 mL/min/1 73 square meters)  •  Stage 4 Severe CKD (GFR = 15-29 mL/min/1 73 square meters)  •  Stage 5 End Stage CKD (GFR <15 mL/min/1 73 square meters)  Note: GFR calculation is accurate only with a steady state creatinine    Lipase [[id number]]  (Normal) Collected: 02/13/23 1856    Lab Status: Final result Specimen: Blood from Arm, Right Updated: 02/13/23 1919     Lipase 35 u/L     Magnesium [[id number]]  (Normal) Collected: 02/13/23 1856    Lab Status: Final result Specimen: Blood from Arm, Right Updated: 02/13/23 1919     Magnesium 2 0 mg/dL     CBC and differential [[id number]]  (Abnormal) Collected: 02/13/23 1856    Lab Status: Final result Specimen: Blood from Arm, Right Updated: 02/13/23 1907     WBC 17 23 Thousand/uL      RBC 4 39 Million/uL      Hemoglobin 12 8 g/dL      Hematocrit 37 8 %      MCV 86 fL      MCH 29 2 pg      MCHC 33 9 g/dL      RDW 14 6 %      MPV 9 8 fL      Platelets 449 Thousands/uL      nRBC 0 /100 WBCs      Neutrophils Relative 85 %      Immat GRANS % 1 %      Lymphocytes Relative 8 %      Monocytes Relative 6 %      Eosinophils Relative 0 %      Basophils Relative 0 % Neutrophils Absolute 14 59 Thousands/µL      Immature Grans Absolute 0 10 Thousand/uL      Lymphocytes Absolute 1 44 Thousands/µL      Monocytes Absolute 1 07 Thousand/µL      Eosinophils Absolute 0 01 Thousand/µL      Basophils Absolute 0 02 Thousands/µL                  No orders to display              Procedures  Procedures         ED Course                               SBIRT 22yo+    Flowsheet Row Most Recent Value   SBIRT (23 yo +)    In order to provide better care to our patients, we are screening all of our patients for alcohol and drug use  Would it be okay to ask you these screening questions? No Filed at: 02/13/2023 5605                    Medical Decision Making  To er with several complaints, body aches, dark stool and weakness  Follows with Gi and pcp, sent to er for concern for hg and electrotyles  labs reassuring  wb noted, he states he is on steroids, no fever, chills, likely etiology  Clear lungs, soft nt abd  ent reassuring with patent airway, not swollen tongue  He will fu with his established gi doctor and his pcp  He will return to er with worsening sx  Repeat exam exam remains stable  He is sleeping on repeat exam (premedication and his requested norco thus held as he states no pain and is sleeping)  As he is feeling better, with reassuring vitals, reassuring labs (wbc discussed with pt) will dc home with gi and pcp fu  I have addressed all red flags, need for fua nd when to return to er and he has voiced understanding  Offered rectal to rinku gómez, pt has declined  He is aware of why I wanted to do exam and inherited risk of withholding exam      Amount and/or Complexity of Data Reviewed  Labs: ordered  Risk  Prescription drug management            Disposition  Final diagnoses:   Myalgia   Diarrhea   Weakness   Leukocytosis     Time reflects when diagnosis was documented in both MDM as applicable and the Disposition within this note     Time User Action Codes Description Comment    2/13/2023  8:02 PM Cheyenne Cook Add [M79 10] Myalgia     2/13/2023  8:02 PM Cheyenne Cook Add [R19 7] Diarrhea     2/13/2023  8:02 PM Cheyenne Dollaver Add [R53 1] Weakness     2/13/2023  8:02 PM Cheyenne Cook Add [Y70 557] Leukocytosis       ED Disposition     ED Disposition   Discharge    Condition   Stable    Date/Time   Mon Feb 13, 2023  8:02 PM    Comment   Isamar Howard discharge to home/self care                 Follow-up Information     Follow up With Specialties Details Why Contact Info    Félix Jones MD Family Medicine Schedule an appointment as soon as possible for a visit in 3 days  [facility] [address] [facility] [place]  [phone]            Discharge Medication List as of 2/13/2023  8:03 PM      CONTINUE these medications which have NOT CHANGED    Details   acetaminophen (TYLENOL) 325 mg tablet Take 3 tablets (975 mg total) by mouth every 8 (eight) hours as needed for mild pain, Starting Fri 12/24/2021, No Print      albuterol (PROVENTIL HFA,VENTOLIN HFA) 90 mcg/act inhaler Inhale 2 puffs every 6 (six) hours as needed for wheezing, Starting Fri 5/13/2022, Normal      diazepam (VALIUM) 5 mg tablet Take 1 tablet (5 mg total) by mouth every 12 (twelve) hours as needed for anxiety or muscle spasms Do not start before February 11, 2023 , Starting Sat 2/11/2023, Normal      docusate sodium (COLACE) 100 mg capsule Take 1 capsule (100 mg total) by mouth 2 (two) times a day, Starting Thu 10/20/2022, Normal      fluticasone-vilanterol (BREO ELLIPTA) 200-25 MCG/INH inhaler Inhale 1 puff daily Rinse mouth after use , Starting Mon 3/18/2019, Normal      hydrOXYzine HCL (ATARAX) 25 mg tablet Take 1 tablet (25 mg total) by mouth every 6 (six) hours as needed for itching for up to 7 days, Starting Mon 8/1/2022, Until Mon 8/8/2022 at 2359, Normal      naloxone (Narcan) 4 mg/0 1 mL nasal spray In case of overdose: 1 spray in one nostril x 1, may repeat in 2 min if remains unresponsive , Normal oxyCODONE (ROXICODONE) 10 MG TABS Take 1 tablet (10 mg total) by mouth every 6 (six) hours as needed for severe pain Take 1 tablet (10mg) by mouth every 6 hours as needed for severe pain  Max daily amount : 4 tablets (40mg) Max Daily Amount: 40 mg, Starting Mon 2/13/2023, Until Wed 3/15/ 2023 at 2359, Normal      polyethylene glycol (MIRALAX) 17 g packet Take 17 g by mouth 2 (two) times a day as needed (constipation), Starting Tue 10/25/2022, Normal      predniSONE 50 mg tablet Take 1 tablet (50 mg total) by mouth daily, Starting Thu 2/2/2023, Normal      rizatriptan (Maxalt) 10 mg tablet Take 1 tablet (10 mg total) by mouth as needed for migraine Take at the onset of migraine; if symptoms continue or return, may take another dose at least 2 hours after first dose  Take no more than 2 doses in a day , Starting Thu 2/2/2023, Normal      senna (SENOKOT) 8 6 mg Take 1 tablet (8 6 mg total) by mouth 2 (two) times a day as needed for constipation, Starting Tue 10/25/2022, Normal         STOP taking these medications       amoxicillin-clavulanate (AUGMENTIN) 875-125 mg per tablet Comments:   Reason for Stopping:               No discharge procedures on file      PDMP Review       Value Time User    PDMP Reviewed  Yes 2/13/2023  1:05 PM Radha Joyner MD          ED Provider  Electronically Signed by           Maria Griffin MD  02/16/23 0040

## 2023-02-21 ENCOUNTER — APPOINTMENT (OUTPATIENT)
Dept: LAB | Facility: CLINIC | Age: 53
End: 2023-02-21

## 2023-02-21 DIAGNOSIS — E66.3 OVERWEIGHT (BMI 25.0-29.9): ICD-10-CM

## 2023-02-21 DIAGNOSIS — Z11.59 NEED FOR HEPATITIS C SCREENING TEST: ICD-10-CM

## 2023-02-21 DIAGNOSIS — Z82.61 FAMILY HISTORY OF RHEUMATOID ARTHRITIS: ICD-10-CM

## 2023-02-21 DIAGNOSIS — R03.0 ELEVATED BLOOD PRESSURE READING: ICD-10-CM

## 2023-02-21 DIAGNOSIS — M25.50 POLYARTHRALGIA: ICD-10-CM

## 2023-02-21 DIAGNOSIS — F11.90 CHRONIC, CONTINUOUS USE OF OPIOIDS: ICD-10-CM

## 2023-02-21 LAB
ALBUMIN SERPL BCP-MCNC: 4.3 G/DL (ref 3.5–5)
ALP SERPL-CCNC: 66 U/L (ref 34–104)
ALT SERPL W P-5'-P-CCNC: 17 U/L (ref 7–52)
ANA SER QL IA: NEGATIVE
ANION GAP SERPL CALCULATED.3IONS-SCNC: 8 MMOL/L (ref 4–13)
AST SERPL W P-5'-P-CCNC: 15 U/L (ref 13–39)
BASOPHILS # BLD AUTO: 0.09 THOUSANDS/ÂΜL (ref 0–0.1)
BASOPHILS NFR BLD AUTO: 1 % (ref 0–1)
BILIRUB DIRECT SERPL-MCNC: 0.06 MG/DL (ref 0–0.2)
BILIRUB SERPL-MCNC: 0.37 MG/DL (ref 0.2–1)
BUN SERPL-MCNC: 19 MG/DL (ref 5–25)
CALCIUM SERPL-MCNC: 9.4 MG/DL (ref 8.4–10.2)
CHLORIDE SERPL-SCNC: 107 MMOL/L (ref 96–108)
CHOLEST SERPL-MCNC: 202 MG/DL
CO2 SERPL-SCNC: 26 MMOL/L (ref 21–32)
CREAT SERPL-MCNC: 1.13 MG/DL (ref 0.6–1.3)
CRP SERPL QL: 2.1 MG/L
EOSINOPHIL # BLD AUTO: 0.42 THOUSAND/ÂΜL (ref 0–0.61)
EOSINOPHIL NFR BLD AUTO: 3 % (ref 0–6)
ERYTHROCYTE [DISTWIDTH] IN BLOOD BY AUTOMATED COUNT: 14.7 % (ref 11.6–15.1)
GFR SERPL CREATININE-BSD FRML MDRD: 73 ML/MIN/1.73SQ M
GLUCOSE P FAST SERPL-MCNC: 103 MG/DL (ref 65–99)
HCT VFR BLD AUTO: 47 % (ref 36.5–49.3)
HCV AB SER QL: NORMAL
HDLC SERPL-MCNC: 45 MG/DL
HGB BLD-MCNC: 15.3 G/DL (ref 12–17)
IMM GRANULOCYTES # BLD AUTO: 0.09 THOUSAND/UL (ref 0–0.2)
IMM GRANULOCYTES NFR BLD AUTO: 1 % (ref 0–2)
INR PPP: 0.87 (ref 0.84–1.19)
LDLC SERPL CALC-MCNC: 103 MG/DL (ref 0–100)
LYMPHOCYTES # BLD AUTO: 2.78 THOUSANDS/ÂΜL (ref 0.6–4.47)
LYMPHOCYTES NFR BLD AUTO: 22 % (ref 14–44)
MCH RBC QN AUTO: 29.4 PG (ref 26.8–34.3)
MCHC RBC AUTO-ENTMCNC: 32.6 G/DL (ref 31.4–37.4)
MCV RBC AUTO: 90 FL (ref 82–98)
MONOCYTES # BLD AUTO: 0.83 THOUSAND/ÂΜL (ref 0.17–1.22)
MONOCYTES NFR BLD AUTO: 7 % (ref 4–12)
NEUTROPHILS # BLD AUTO: 8.36 THOUSANDS/ÂΜL (ref 1.85–7.62)
NEUTS SEG NFR BLD AUTO: 66 % (ref 43–75)
NRBC BLD AUTO-RTO: 0 /100 WBCS
PLATELET # BLD AUTO: 438 THOUSANDS/UL (ref 149–390)
PMV BLD AUTO: 10 FL (ref 8.9–12.7)
POTASSIUM SERPL-SCNC: 3.8 MMOL/L (ref 3.5–5.3)
PROT SERPL-MCNC: 6.8 G/DL (ref 6.4–8.4)
PROTHROMBIN TIME: 12 SECONDS (ref 11.6–14.5)
RBC # BLD AUTO: 5.21 MILLION/UL (ref 3.88–5.62)
RHEUMATOID FACT SER QL LA: NEGATIVE
SODIUM SERPL-SCNC: 141 MMOL/L (ref 135–147)
T4 FREE SERPL-MCNC: 0.83 NG/DL (ref 0.76–1.46)
TRIGL SERPL-MCNC: 272 MG/DL
TSH SERPL DL<=0.05 MIU/L-ACNC: 5.04 UIU/ML (ref 0.45–4.5)
WBC # BLD AUTO: 12.57 THOUSAND/UL (ref 4.31–10.16)

## 2023-02-23 ENCOUNTER — OFFICE VISIT (OUTPATIENT)
Dept: AUDIOLOGY | Age: 53
End: 2023-02-23

## 2023-02-23 DIAGNOSIS — H90.3 SENSORY HEARING LOSS, BILATERAL: Primary | ICD-10-CM

## 2023-02-23 NOTE — PROGRESS NOTES
HEARING EVALUATION    Name:  Gaurav Seen  :  1970  Age:  45 y o  Date of Evaluation: 23     History: Tinnitus  Reason for visit: Gaurav Seen is being seen today at the request of Dr Idolina Alpers for an evaluation of hearing  Patient reports that although he had a history of bilateral tinnitus it has become significantly worse in the last month following a fall and concussion  He reports that the constant high pitched tinnitus is worse in the left ear than the right  He also reports that wind noise is very bothersome in the left ear since the fall one month ago  Additionally, patient reports vertigo ("world is shaking") following the fall/concussion  Patient notes difficulty understanding, especially in complex listening environments  Patient has been diagnosed with TMJ     EVALUATION:    Otoscopic Evaluation:   Right Ear: Clear and healthy ear canal and tympanic membrane   Left Ear: Clear and healthy ear canal and tympanic membrane    Tympanometry:   Right: Type A - normal middle ear pressure and compliance   Left: Type A - normal middle ear pressure and compliance    Audiogram Results:  Right: Normal/borderline normal through 2k Hz sloping to mild high frequency sensorineural hearing loss  Left: Normal through 500 Hz sloping to mild/moderate sensorineural hearing loss  Excellent speech discrimination ability in each ear  *see attached audiogram      RECOMMENDATIONS:  Annual hearing eval, Consult ENT, Return to [facility]  for F/U, Hearing Aid Evaluation and Copy to Patient/Caregiver    PATIENT EDUCATION:   Discussed results and recommendations with patient  Questions were addressed and the patient was encouraged to contact our department should concerns arise        Divina Gutierrez   Clinical Audiologist

## 2023-02-24 DIAGNOSIS — G89.4 CHRONIC PAIN SYNDROME: ICD-10-CM

## 2023-02-24 DIAGNOSIS — M96.1 POST LAMINECTOMY SYNDROME: ICD-10-CM

## 2023-02-24 DIAGNOSIS — G89.29 CHRONIC RIGHT SI JOINT PAIN: ICD-10-CM

## 2023-02-24 DIAGNOSIS — M53.3 CHRONIC RIGHT SI JOINT PAIN: ICD-10-CM

## 2023-02-24 RX ORDER — OXYCODONE HYDROCHLORIDE 10 MG/1
10 TABLET ORAL EVERY 6 HOURS PRN
Qty: 47 TABLET | Refills: 0 | Status: SHIPPED | OUTPATIENT
Start: 2023-02-27 | End: 2023-03-29

## 2023-02-24 NOTE — TELEPHONE ENCOUNTER
Patient has only filled 73 tablets of oxycodone with last refill due to pharmacy being in short supply, so sending additional 12 days (47 tablets, starting 2/27/23) to complete his 30-day prescription of 120 tablets  Spoke with patient to verify pharmacy  Reviewed PDMP, no red flags patient has controlled substance contract with me and is up-to-date on urine testing

## 2023-03-09 ENCOUNTER — OFFICE VISIT (OUTPATIENT)
Dept: FAMILY MEDICINE CLINIC | Facility: CLINIC | Age: 53
End: 2023-03-09
Payer: MEDICARE

## 2023-03-09 VITALS
TEMPERATURE: 97.6 F | OXYGEN SATURATION: 99 % | DIASTOLIC BLOOD PRESSURE: 100 MMHG | BODY MASS INDEX: 28.1 KG/M2 | SYSTOLIC BLOOD PRESSURE: 150 MMHG | WEIGHT: 212 LBS | HEIGHT: 73 IN | HEART RATE: 96 BPM

## 2023-03-09 DIAGNOSIS — G89.29 CHRONIC RIGHT SI JOINT PAIN: ICD-10-CM

## 2023-03-09 DIAGNOSIS — M96.1 POST LAMINECTOMY SYNDROME: ICD-10-CM

## 2023-03-09 DIAGNOSIS — M53.3 CHRONIC RIGHT SI JOINT PAIN: ICD-10-CM

## 2023-03-09 DIAGNOSIS — G89.4 CHRONIC PAIN SYNDROME: ICD-10-CM

## 2023-03-09 DIAGNOSIS — R03.0 BLOOD PRESSURE ELEVATED WITHOUT HISTORY OF HTN: ICD-10-CM

## 2023-03-09 DIAGNOSIS — K08.89 PAIN, DENTAL: ICD-10-CM

## 2023-03-09 DIAGNOSIS — D72.9 NEUTROPHILIC LEUKOCYTOSIS: ICD-10-CM

## 2023-03-09 DIAGNOSIS — H90.3 SENSORINEURAL HEARING LOSS (SNHL) OF BOTH EARS: Primary | ICD-10-CM

## 2023-03-09 PROCEDURE — 99214 OFFICE O/P EST MOD 30 MIN: CPT | Performed by: FAMILY MEDICINE

## 2023-03-09 RX ORDER — OXYCODONE HYDROCHLORIDE 10 MG/1
10 TABLET ORAL EVERY 6 HOURS PRN
Qty: 120 TABLET | Refills: 0 | Status: SHIPPED | OUTPATIENT
Start: 2023-03-09 | End: 2023-04-07 | Stop reason: SDUPTHER

## 2023-03-09 RX ORDER — AMLODIPINE BESYLATE 2.5 MG/1
2.5 TABLET ORAL DAILY
Qty: 30 TABLET | Refills: 0 | Status: SHIPPED | OUTPATIENT
Start: 2023-03-09 | End: 2023-03-16

## 2023-03-09 RX ORDER — PENICILLIN V POTASSIUM 500 MG/1
500 TABLET ORAL EVERY 6 HOURS SCHEDULED
Qty: 40 TABLET | Refills: 0 | Status: SHIPPED | OUTPATIENT
Start: 2023-03-09 | End: 2023-03-19

## 2023-03-09 RX ORDER — DIAZEPAM 5 MG/1
5 TABLET ORAL EVERY 12 HOURS PRN
Qty: 60 TABLET | Refills: 0 | Status: SHIPPED | OUTPATIENT
Start: 2023-03-09 | End: 2023-05-04 | Stop reason: SDUPTHER

## 2023-03-09 NOTE — PROGRESS NOTES
Name: Magdi Laguerre      : 1970      MRN: [id number]  Encounter Provider: Konstantin Galindo MD  Encounter Date: 3/9/2023   Encounter department: [facility]    Assessment & Plan     1  Sensorineural hearing loss (SNHL) of both ears  -     Ambulatory Referral to Otolaryngology; Future    2  Neutrophilic leukocytosis  -     Ambulatory Referral to Hematology / Oncology; Future    3  Blood pressure elevated without history of HTN    4  Pain, dental  -     Ambulatory Referral to Dentistry; Future  -     penicillin V potassium (VEETID) 500 mg tablet; Take 1 tablet (500 mg total) by mouth every 6 (six) hours for 10 days    5  Post laminectomy syndrome    6  Chronic right SI joint pain    7  Chronic pain syndrome      Refills sent for Valium and oxycodone, start low-dose amlodipine for elevated blood pressure  PCN and dental referral given for possible odontogenic infection with ED precautions if acutely worsens  Referrals for ENT &  hematology given  Subjective      HPI     Patient due for refills on oxycodone and valium as well, PDMP reviewed, contract active, labs UTD  Here to review lab results  Autoimmune labs negative, subclinical hypothyroid, still with leukocytosis and thrombocytosis but not polycythema, needs to re-establish with Hematology  Otherwise, mildly elevated , stage 2 CKD GFR 73, Cr 1 17    T Chol 202//HDL 45/, LFTs and INR WNL (hepatic steatosis on CT)    Mild paraseptal emphysema but no nodules on CT lung cancer screening 23    GI referral given for esophageal dysmotility but not yet scheduled  Saw Audiology , recommended ENT consultation for SNHL:  Right: Normal/borderline normal through 2k Hz sloping to mild high frequency sensorineural hearing loss  Left: Normal through 500 Hz sloping to mild/moderate sensorineural hearing loss  Review of Systems   Constitutional: Negative for chills and fever     HENT: Positive for dental problem and trouble swallowing  Dental pain and concerned he has infection around broken tooth   Respiratory: Positive for choking  Negative for shortness of breath  Cardiovascular: Negative for chest pain  Gastrointestinal: Negative for nausea and vomiting  Musculoskeletal: Positive for back pain  Psychiatric/Behavioral: Negative for suicidal ideas  Current Outpatient Medications on File Prior to Visit   Medication Sig   • acetaminophen (TYLENOL) 325 mg tablet Take 3 tablets (975 mg total) by mouth every 8 (eight) hours as needed for mild pain   • albuterol (PROVENTIL HFA,VENTOLIN HFA) 90 mcg/act inhaler Inhale 2 puffs every 6 (six) hours as needed for wheezing   • diazepam (VALIUM) 5 mg tablet Take 1 tablet (5 mg total) by mouth every 12 (twelve) hours as needed for anxiety or muscle spasms Do not start before February 11, 2023  • oxyCODONE (ROXICODONE) 10 MG TABS Take 1 tablet (10 mg total) by mouth every 6 (six) hours as needed for severe pain Take 1 tablet (10mg) by mouth every 6 hours as needed for severe pain  Max daily amount : 4 tablets (40mg) Max Daily Amount: 40 mg Do not start before February 27, 2023  • docusate sodium (COLACE) 100 mg capsule Take 1 capsule (100 mg total) by mouth 2 (two) times a day (Patient not taking: Reported on 1/5/2023)   • fluticasone-vilanterol (BREO ELLIPTA) 200-25 MCG/INH inhaler Inhale 1 puff daily Rinse mouth after use  (Patient not taking: Reported on 3/9/2023)   • hydrOXYzine HCL (ATARAX) 25 mg tablet Take 1 tablet (25 mg total) by mouth every 6 (six) hours as needed for itching for up to 7 days (Patient not taking: Reported on 10/25/2022)   • naloxone (Narcan) 4 mg/0 1 mL nasal spray In case of overdose: 1 spray in one nostril x 1, may repeat in 2 min if remains unresponsive   (Patient not taking: Reported on 3/9/2023)   • polyethylene glycol (MIRALAX) 17 g packet Take 17 g by mouth 2 (two) times a day as needed (constipation) (Patient not "taking: Reported on 1/5/2023)   • predniSONE 50 mg tablet Take 1 tablet (50 mg total) by mouth daily   • rizatriptan (Maxalt) 10 mg tablet Take 1 tablet (10 mg total) by mouth as needed for migraine Take at the onset of migraine; if symptoms continue or return, may take another dose at least 2 hours after first dose  Take no more than 2 doses in a day  (Patient not taking: Reported on 3/9/2023)   • senna (SENOKOT) 8 6 mg Take 1 tablet (8 6 mg total) by mouth 2 (two) times a day as needed for constipation (Patient not taking: Reported on 1/5/2023)       Objective     /100 (BP Location: Left arm, Patient Position: Sitting, Cuff Size: Large)   Pulse 96   Temp 97 6 °F (36 4 °C) (Tympanic)   Ht 6' 1\" (1 854 m)   Wt 96 2 kg (212 lb)   SpO2 99%   BMI 27 97 kg/m²     Physical Exam  Vitals reviewed  Constitutional:       General: He is not in acute distress  Appearance: He is not toxic-appearing  HENT:      Head: Normocephalic and atraumatic  Mouth/Throat:      Comments: Broken tooth with dental decay, no obvious abscess formation  Cardiovascular:      Rate and Rhythm: Normal rate and regular rhythm  Pulmonary:      Effort: Pulmonary effort is normal    Abdominal:      General: Abdomen is flat  Bowel sounds are normal       Palpations: Abdomen is soft  Musculoskeletal:      Right lower leg: No edema  Left lower leg: No edema  Skin:     General: Skin is warm and dry  Capillary Refill: Capillary refill takes less than 2 seconds  Neurological:      Mental Status: He is alert     Psychiatric:         Mood and Affect: Mood normal          Behavior: Behavior normal        Robert Lepe MD  "

## 2023-03-10 ENCOUNTER — TELEPHONE (OUTPATIENT)
Dept: HEMATOLOGY ONCOLOGY | Facility: CLINIC | Age: 53
End: 2023-03-10

## 2023-03-10 NOTE — TELEPHONE ENCOUNTER
Patient has a referral on file - Made an attempt to schedule a new patient consultation  A voicemail was left making patient aware of their referral and instructing them to call back at the [facility] phone number in order to schedule their consultation

## 2023-03-13 ENCOUNTER — TELEPHONE (OUTPATIENT)
Dept: HEMATOLOGY ONCOLOGY | Facility: CLINIC | Age: 53
End: 2023-03-13

## 2023-03-13 NOTE — TELEPHONE ENCOUNTER
Patient has a referral on file - Made an attempt to schedule a new patient consultation  A voicemail was left making patient aware of their referral and instructing them to call back at the [facility] phone number in order to schedule their consultation

## 2023-03-14 ENCOUNTER — TELEPHONE (OUTPATIENT)
Dept: HEMATOLOGY ONCOLOGY | Facility: CLINIC | Age: 53
End: 2023-03-14

## 2023-03-14 NOTE — TELEPHONE ENCOUNTER
Patient has a referral on file - Made an attempt to schedule a new patient consultation  A voicemail was left making patient aware of their referral and instructing them to call back at the [facility] phone number in order to schedule their consultation  This is the third attempt to contact patient unsuccessfully  The referral has been closed and a department letter has been mailed to patients address on file

## 2023-03-15 ENCOUNTER — TELEPHONE (OUTPATIENT)
Dept: FAMILY MEDICINE CLINIC | Facility: CLINIC | Age: 53
End: 2023-03-15

## 2023-03-15 NOTE — TELEPHONE ENCOUNTER
Called patient back left a v/m to callus with what he needs High Dose Vitamin A Counseling: Side effects reviewed, pt to contact office should one occur.

## 2023-03-15 NOTE — TELEPHONE ENCOUNTER
Patient called stating he would like advice regarding his blood pressure  He said it has recently been around 170/110 and he would like to know if he should continue with the amlodipine  He is also stating hes been very tired and sleeping a lot   Spoke with Dr Baron Gerber and was advised to have patient come in tomorrow with Dr Amy Cordero at 3pm

## 2023-03-16 ENCOUNTER — OFFICE VISIT (OUTPATIENT)
Dept: FAMILY MEDICINE CLINIC | Facility: CLINIC | Age: 53
End: 2023-03-16

## 2023-03-16 VITALS
SYSTOLIC BLOOD PRESSURE: 138 MMHG | DIASTOLIC BLOOD PRESSURE: 96 MMHG | OXYGEN SATURATION: 97 % | BODY MASS INDEX: 27.83 KG/M2 | HEART RATE: 106 BPM | HEIGHT: 73 IN | WEIGHT: 210 LBS

## 2023-03-16 DIAGNOSIS — I10 HYPERTENSION, UNSPECIFIED TYPE: ICD-10-CM

## 2023-03-16 RX ORDER — AMLODIPINE BESYLATE 5 MG/1
5 TABLET ORAL DAILY
Qty: 90 TABLET | Refills: 1 | Status: SHIPPED | OUTPATIENT
Start: 2023-03-16

## 2023-03-23 NOTE — ASSESSMENT & PLAN NOTE
Blood Pressure: 138/96    -Patient has been taking 2 5 mg of amlodipine as prescribed  -Blood pressure readings at home from the 150s to 160s/100s  -He is asymptomatic, no chest pain, palpitations, shortness of breath, diaphoresis  Plan:  -Increase amlodipine to 5 mg daily  -Continue to keep blood pressure log for the next week to review at follow-up  -Exercise recommendations discussed-would recommend working up to 150 minutes of moderate intensity exercise  -Lifestyle and diet recommendations discussed -try to increase whole food plant biased meals into his week  Recommend increasing fiber   -Follow-up in 1 week for repeat BP management as scheduled

## 2023-03-23 NOTE — PROGRESS NOTES
Name: Rachel Bragg      : 1970      MRN: [id number]  Encounter Provider: Fely Regalado DO  Encounter Date: 3/16/2023   Encounter department: [facility]    Assessment & Plan     1  Hypertension, unspecified type  Assessment & Plan:  Blood Pressure: 138/96    -Patient has been taking 2 5 mg of amlodipine as prescribed  -Blood pressure readings at home from the 150s to 160s/100s  -He is asymptomatic, no chest pain, palpitations, shortness of breath, diaphoresis  Plan:  -Increase amlodipine to 5 mg daily  -Continue to keep blood pressure log for the next week to review at follow-up  -Exercise recommendations discussed-would recommend working up to 150 minutes of moderate intensity exercise  -Lifestyle and diet recommendations discussed -try to increase whole food plant biased meals into his week  Recommend increasing fiber   -Follow-up in 1 week for repeat BP management as scheduled  Orders:  -     amLODIPine (NORVASC) 5 mg tablet; Take 1 tablet (5 mg total) by mouth daily         Subjective      Catrina Kennedy is a 68-year-old male who presents today for follow-up elevated blood pressures  He was recently started on amlodipine 2 5 mg daily  He has been taking this without any side effects  He is also asked to monitor his blood pressure once daily and record  His record shows on the amlodipine he continues to be slightly elevated in the 150s to 160s/100s  He denies any symptoms of hypertension -no headaches, chest pain, shortness of breath, palpitations, diaphoresis  He denies any nausea, vomiting, diarrhea, fevers, chills  He does admit he continues to be under considerable amount of stress with his relationship ending  He denies any SI or HI states he is handling the best that he can  He has no other complaints today  Review of Systems   Constitutional: Negative for chills and fever     HENT: Negative for congestion, ear pain, sinus pressure, sinus pain, sneezing and sore throat  Eyes: Negative for pain and visual disturbance  Respiratory: Negative for cough, shortness of breath and wheezing  Cardiovascular: Negative for chest pain and palpitations  Gastrointestinal: Negative for abdominal pain, constipation, diarrhea, nausea and vomiting  Endocrine: Negative for polydipsia  Genitourinary: Negative for dysuria and hematuria  Musculoskeletal: Negative for arthralgias and back pain  Skin: Negative for color change and rash  Neurological: Negative for dizziness, seizures, syncope, weakness and headaches  Psychiatric/Behavioral: Negative for confusion  The patient is not nervous/anxious  All other systems reviewed and are negative  Current Outpatient Medications on File Prior to Visit   Medication Sig   • acetaminophen (TYLENOL) 325 mg tablet Take 3 tablets (975 mg total) by mouth every 8 (eight) hours as needed for mild pain   • albuterol (PROVENTIL HFA,VENTOLIN HFA) 90 mcg/act inhaler Inhale 2 puffs every 6 (six) hours as needed for wheezing   • diazepam (VALIUM) 5 mg tablet Take 1 tablet (5 mg total) by mouth every 12 (twelve) hours as needed for anxiety or muscle spasms   • oxyCODONE (ROXICODONE) 10 MG TABS Take 1 tablet (10 mg total) by mouth every 6 (six) hours as needed for severe pain Take 1 tablet (10mg) by mouth every 6 hours as needed for severe pain  Max daily amount : 4 tablets (40mg) Max Daily Amount: 40 mg   • fluticasone-vilanterol (BREO ELLIPTA) 200-25 MCG/INH inhaler Inhale 1 puff daily Rinse mouth after use  (Patient not taking: Reported on 3/9/2023)   • hydrOXYzine HCL (ATARAX) 25 mg tablet Take 1 tablet (25 mg total) by mouth every 6 (six) hours as needed for itching for up to 7 days (Patient not taking: Reported on 10/25/2022)   • naloxone (Narcan) 4 mg/0 1 mL nasal spray In case of overdose: 1 spray in one nostril x 1, may repeat in 2 min if remains unresponsive   (Patient not taking: Reported on 3/9/2023)   • predniSONE 50 mg tablet Take 1 tablet (50 mg total) by mouth daily   • rizatriptan (Maxalt) 10 mg tablet Take 1 tablet (10 mg total) by mouth as needed for migraine Take at the onset of migraine; if symptoms continue or return, may take another dose at least 2 hours after first dose  Take no more than 2 doses in a day  (Patient not taking: Reported on 3/9/2023)       Objective     /96   Pulse (!) 106   Ht 6' 1" (1 854 m)   Wt 95 3 kg (210 lb)   SpO2 97%   BMI 27 71 kg/m²     Physical Exam  Vitals and nursing note reviewed  Constitutional:       General: He is not in acute distress  Appearance: Normal appearance  He is not ill-appearing  HENT:      Head: Normocephalic and atraumatic  Right Ear: Tympanic membrane and external ear normal       Left Ear: Tympanic membrane and external ear normal       Nose: Nose normal  No congestion  Mouth/Throat:      Mouth: Mucous membranes are moist       Pharynx: No oropharyngeal exudate  Eyes:      Extraocular Movements: Extraocular movements intact  Conjunctiva/sclera: Conjunctivae normal       Pupils: Pupils are equal, round, and reactive to light  Cardiovascular:      Rate and Rhythm: Normal rate and regular rhythm  Pulses: Normal pulses  Heart sounds: Normal heart sounds  No murmur heard  Pulmonary:      Effort: Pulmonary effort is normal       Breath sounds: Normal breath sounds  No wheezing, rhonchi or rales  Abdominal:      General: Bowel sounds are normal       Palpations: Abdomen is soft  Tenderness: There is no abdominal tenderness  There is no guarding or rebound  Musculoskeletal:         General: Normal range of motion  Cervical back: Normal range of motion  Right lower leg: No edema  Left lower leg: No edema  Lymphadenopathy:      Cervical: No cervical adenopathy  Skin:     General: Skin is warm  Capillary Refill: Capillary refill takes less than 2 seconds  Findings: No erythema     Neurological: General: No focal deficit present  Mental Status: He is alert and oriented to person, place, and time  Cranial Nerves: No cranial nerve deficit  Motor: No weakness     Psychiatric:         Mood and Affect: Mood normal          Behavior: Behavior normal        Hyun Horne DO

## 2023-03-29 ENCOUNTER — TELEPHONE (OUTPATIENT)
Dept: GASTROENTEROLOGY | Facility: CLINIC | Age: 53
End: 2023-03-29

## 2023-03-29 NOTE — TELEPHONE ENCOUNTER
Patient was scheduled for an appt with our SERA Orta Locket on 3/29/23  Abron Goodpasture spoke with Dr Joao Trinidad and they would prefer the patient be seen with him due to him being the Motility specialist  Please schedule patient with Dr Joao Trinidad, thank you!

## 2023-04-03 PROBLEM — J01.00 ACUTE NON-RECURRENT MAXILLARY SINUSITIS: Status: RESOLVED | Noted: 2023-02-02 | Resolved: 2023-04-03

## 2023-04-07 DIAGNOSIS — G89.29 CHRONIC RIGHT SI JOINT PAIN: ICD-10-CM

## 2023-04-07 DIAGNOSIS — M96.1 POST LAMINECTOMY SYNDROME: ICD-10-CM

## 2023-04-07 DIAGNOSIS — G89.4 CHRONIC PAIN SYNDROME: ICD-10-CM

## 2023-04-07 DIAGNOSIS — M53.3 CHRONIC RIGHT SI JOINT PAIN: ICD-10-CM

## 2023-04-07 RX ORDER — OXYCODONE HYDROCHLORIDE 10 MG/1
10 TABLET ORAL EVERY 6 HOURS PRN
Qty: 120 TABLET | Refills: 0 | Status: SHIPPED | OUTPATIENT
Start: 2023-04-07 | End: 2023-05-07

## 2023-04-14 PROBLEM — E86.0 DEHYDRATION: Status: RESOLVED | Noted: 2023-02-13 | Resolved: 2023-04-14

## 2023-05-01 ENCOUNTER — APPOINTMENT (OUTPATIENT)
Dept: LAB | Facility: CLINIC | Age: 53
End: 2023-05-01

## 2023-05-01 ENCOUNTER — HOSPITAL ENCOUNTER (OUTPATIENT)
Dept: RADIOLOGY | Facility: HOSPITAL | Age: 53
Discharge: HOME | End: 2023-05-01
Attending: INTERNAL MEDICINE

## 2023-05-01 DIAGNOSIS — R13.19 ESOPHAGEAL DYSPHAGIA: ICD-10-CM

## 2023-05-01 DIAGNOSIS — D72.9 NEUTROPHILIC LEUKOCYTOSIS: ICD-10-CM

## 2023-05-01 DIAGNOSIS — D75.839 THROMBOCYTOSIS: ICD-10-CM

## 2023-05-01 DIAGNOSIS — K22.4 ESOPHAGEAL DYSMOTILITIES: ICD-10-CM

## 2023-05-01 LAB
ALBUMIN SERPL BCP-MCNC: 4.2 G/DL (ref 3.5–5)
ALP SERPL-CCNC: 81 U/L (ref 46–116)
ALT SERPL W P-5'-P-CCNC: 29 U/L (ref 12–78)
ANION GAP SERPL CALCULATED.3IONS-SCNC: 7 MMOL/L (ref 4–13)
AST SERPL W P-5'-P-CCNC: 28 U/L (ref 5–45)
BASOPHILS # BLD AUTO: 0.05 THOUSANDS/ÂΜL (ref 0–0.1)
BASOPHILS NFR BLD AUTO: 1 % (ref 0–1)
BILIRUB SERPL-MCNC: 0.56 MG/DL (ref 0.2–1)
BUN SERPL-MCNC: 17 MG/DL (ref 5–25)
CALCIUM SERPL-MCNC: 10 MG/DL (ref 8.3–10.1)
CHLORIDE SERPL-SCNC: 111 MMOL/L (ref 96–108)
CO2 SERPL-SCNC: 21 MMOL/L (ref 21–32)
CREAT SERPL-MCNC: 1.27 MG/DL (ref 0.6–1.3)
EOSINOPHIL # BLD AUTO: 0.34 THOUSAND/ÂΜL (ref 0–0.61)
EOSINOPHIL NFR BLD AUTO: 3 % (ref 0–6)
ERYTHROCYTE [DISTWIDTH] IN BLOOD BY AUTOMATED COUNT: 14.7 % (ref 11.6–15.1)
FERRITIN SERPL-MCNC: 49 NG/ML (ref 8–388)
GFR SERPL CREATININE-BSD FRML MDRD: 63 ML/MIN/1.73SQ M
GLUCOSE P FAST SERPL-MCNC: 104 MG/DL (ref 65–99)
HCT VFR BLD AUTO: 48.3 % (ref 36.5–49.3)
HGB BLD-MCNC: 15.8 G/DL (ref 12–17)
IMM GRANULOCYTES # BLD AUTO: 0.03 THOUSAND/UL (ref 0–0.2)
IMM GRANULOCYTES NFR BLD AUTO: 0 % (ref 0–2)
IRON SATN MFR SERPL: 25 % (ref 20–50)
IRON SERPL-MCNC: 113 UG/DL (ref 65–175)
LYMPHOCYTES # BLD AUTO: 2.17 THOUSANDS/ÂΜL (ref 0.6–4.47)
LYMPHOCYTES NFR BLD AUTO: 20 % (ref 14–44)
MCH RBC QN AUTO: 29.2 PG (ref 26.8–34.3)
MCHC RBC AUTO-ENTMCNC: 32.7 G/DL (ref 31.4–37.4)
MCV RBC AUTO: 89 FL (ref 82–98)
MONOCYTES # BLD AUTO: 0.8 THOUSAND/ÂΜL (ref 0.17–1.22)
MONOCYTES NFR BLD AUTO: 7 % (ref 4–12)
NEUTROPHILS # BLD AUTO: 7.36 THOUSANDS/ÂΜL (ref 1.85–7.62)
NEUTS SEG NFR BLD AUTO: 69 % (ref 43–75)
NRBC BLD AUTO-RTO: 0 /100 WBCS
PLATELET # BLD AUTO: 357 THOUSANDS/UL (ref 149–390)
PMV BLD AUTO: 9.9 FL (ref 8.9–12.7)
POTASSIUM SERPL-SCNC: 4.5 MMOL/L (ref 3.5–5.3)
PROT SERPL-MCNC: 7.8 G/DL (ref 6.4–8.4)
RBC # BLD AUTO: 5.41 MILLION/UL (ref 3.88–5.62)
SODIUM SERPL-SCNC: 139 MMOL/L (ref 135–147)
TIBC SERPL-MCNC: 455 UG/DL (ref 250–450)
WBC # BLD AUTO: 10.75 THOUSAND/UL (ref 4.31–10.16)

## 2023-05-02 LAB — SCAN RESULT: NORMAL

## 2023-05-04 ENCOUNTER — DOCUMENTATION (OUTPATIENT)
Dept: GASTROENTEROLOGY | Facility: CLINIC | Age: 53
End: 2023-05-04

## 2023-05-04 DIAGNOSIS — M96.1 POST LAMINECTOMY SYNDROME: ICD-10-CM
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
## 2023-05-04 DIAGNOSIS — G89.29 CHRONIC RIGHT SI JOINT PAIN: ICD-10-CM

## 2023-05-04 DIAGNOSIS — G89.4 CHRONIC PAIN SYNDROME: ICD-10-CM

## 2023-05-04 DIAGNOSIS — M53.3 CHRONIC RIGHT SI JOINT PAIN: ICD-10-CM

## 2023-05-04 DIAGNOSIS — F41.8 DEPRESSION WITH ANXIETY: ICD-10-CM

## 2023-05-04 NOTE — PROGRESS NOTES
Prior auth for Hyoscyamine prior Brock Clink was processes through covermymed with key code given [id number]

## 2023-05-04 NOTE — TELEPHONE ENCOUNTER
Please review medication for refill   Thank you Patient was seen last time in the office was 3/27/2023

## 2023-05-04 NOTE — LETTER
May 5, 2023     Patient: Diana Medina  YOB: 1970      To Whom it May Concern:    Yves Colbert is under my professional care  This letter is in regard to the above-named patient's temporary prior authorization approval issued 4/11/23-5/11/23 for his oxycodone  Per correspondence received in the patient's approval letter (included for your reference), more information from his physician was required to approve continued drug coverage after 5/11/23  Please find the requested information below  This patient has an active controlled substance agreement with me on file for his oxycodone, which was completed 1/5/23 and will be reviewed every 6 months  He underwent extensive urine drug testing on 2/9/23, the results of which were consistent with his prescribed medications, oxycodone and valium, along with his known use of medical marijuana  He will undergo repeat urine drug testing in early August 2023 per our controlled substance agreement  Yefri consistently uses the non-opioid medication, acetaminophen, as adjunctive treatment for his chronic pain secondary to his post-laminectomy syndrome  He is recommended only to use NSAIDs intermittently (not on a daily basis) given his mild renal function impairment  Eugene Coppola is also utilizing non-drug therapy for his pain, including exercise and OMT, or osteopathic manipulation treatment  Please let me know if more information is needed in order to process and approve the prior authorization for this patients oxycodone  If you have any questions or concerns, please don't hesitate to call           Sincerely,          Noah Baltazar MD

## 2023-05-05 ENCOUNTER — TRANSCRIBE ORDERS (OUTPATIENT)
Dept: GASTROENTEROLOGY | Facility: CLINIC | Age: 53
End: 2023-05-05

## 2023-05-05 RX ORDER — OXYCODONE HYDROCHLORIDE 10 MG/1
10 TABLET ORAL EVERY 6 HOURS PRN
Qty: 120 TABLET | Refills: 0 | Status: SHIPPED | OUTPATIENT
Start: 2023-05-08 | End: 2023-06-07

## 2023-05-05 RX ORDER — ACETAMINOPHEN 325 MG/1
975 TABLET ORAL EVERY 8 HOURS PRN
Qty: 270 TABLET | Refills: 0 | Status: SHIPPED | OUTPATIENT
Start: 2023-05-05

## 2023-05-05 RX ORDER — DIAZEPAM 5 MG/1
5 TABLET ORAL EVERY 12 HOURS PRN
Qty: 45 TABLET | Refills: 0 | Status: SHIPPED | OUTPATIENT
Start: 2023-05-05

## 2023-05-05 NOTE — TELEPHONE ENCOUNTER
As I documented my other encounter, I spoke with patient on the phone today regarding his refills  During the conversation, patient did state that he has been unable to receive his prescription for diltiazem that was ordered by Dr Anish Deng for his esophageal dysmotility, as the pharmacy would not permit filling to calcium channel blockers and patient is currently on amlodipine  Patient's blood pressure has responded very nicely to amlodipine, however I did discuss with him that the goal would be to treat both blood pressure and his esophageal dysmotility, so I would favor discontinuation of amlodipine at this time and initiation of Cardizem as prescribed by Dr Anish Deng along with the Levsin that was previously sent to the pharmacy by Dr Anish Deng  I advised patient to monitor his blood pressure, as it is difficult to know if the Cardizem will be sufficient in treating his hypertension, but I discussed with him that I can add a second agent to his Cardizem if needed to control his blood pressure in the future  Patient acknowledged understanding  Personally called the pharmacy and discussed with them that I am discontinuing the amlodipine at this time and I would like patient to receive the Cardizem

## 2023-05-05 NOTE — TELEPHONE ENCOUNTER
Reviewed PDMP, last fill for Valium was March 11, so I called patient to discuss whether we may decrease the dispense amount per month as it does appear that 60 tablets is lasting longer than 30 days  Patient is agreeable to change as he has been able to use Valium less often to help with his pain related to his postlaminectomy syndrome  Will decrease Valium monthly dispense amount to 45 tablets at this time  Refill sent to pharmacy  Patient has active controlled substance agreement with me on file and is up to date with urine drug testing

## 2023-05-08 LAB — SCAN RESULT: NORMAL

## 2023-05-12 LAB
CALR RESULT: NORMAL
CITATION REF LAB TEST: NORMAL
E12-15 RESULT: NORMAL
JAK2 P.V617F BLD/T QL: NORMAL
LAB DIRECTOR NAME PROVIDER: NORMAL
MPL RESULT: NORMAL
REF LAB TEST METHOD: NORMAL
SL AMB REFLEX: NORMAL
TEST PERFORMANCE INFO SPEC: NORMAL

## 2023-05-30 DIAGNOSIS — M96.1 POST LAMINECTOMY SYNDROME: ICD-10-CM

## 2023-05-30 DIAGNOSIS — G89.29 CHRONIC RIGHT SI JOINT PAIN: ICD-10-CM

## 2023-05-30 DIAGNOSIS — F41.8 DEPRESSION WITH ANXIETY: ICD-10-CM

## 2023-05-30 DIAGNOSIS — M53.3 CHRONIC RIGHT SI JOINT PAIN: ICD-10-CM

## 2023-05-30 DIAGNOSIS — G89.4 CHRONIC PAIN SYNDROME: ICD-10-CM

## 2023-05-31 RX ORDER — OXYCODONE HYDROCHLORIDE 10 MG/1
10 TABLET ORAL EVERY 6 HOURS PRN
Qty: 120 TABLET | Refills: 0 | Status: SHIPPED | OUTPATIENT
Start: 2023-06-03 | End: 2023-07-03

## 2023-05-31 RX ORDER — DIAZEPAM 5 MG/1
5 TABLET ORAL EVERY 12 HOURS PRN
Qty: 45 TABLET | Refills: 0 | Status: SHIPPED | OUTPATIENT
Start: 2023-06-03

## 2023-07-01 ENCOUNTER — APPOINTMENT (EMERGENCY)
Dept: CT IMAGING | Facility: HOSPITAL | Age: 53
End: 2023-07-01
Payer: MEDICARE

## 2023-07-01 ENCOUNTER — HOSPITAL ENCOUNTER (EMERGENCY)
Facility: HOSPITAL | Age: 53
Discharge: HOME | End: 2023-07-01
Attending: EMERGENCY MEDICINE
Payer: MEDICARE

## 2023-07-01 VITALS
HEART RATE: 79 BPM | SYSTOLIC BLOOD PRESSURE: 142 MMHG | TEMPERATURE: 97.7 F | RESPIRATION RATE: 22 BRPM | OXYGEN SATURATION: 99 % | DIASTOLIC BLOOD PRESSURE: 77 MMHG

## 2023-07-01 DIAGNOSIS — R10.9 LEFT FLANK PAIN: Primary | ICD-10-CM

## 2023-07-01 LAB
ALBUMIN SERPL BCP-MCNC: 4.2 G/DL (ref 3.5–5)
ALP SERPL-CCNC: 57 U/L (ref 34–104)
ALT SERPL W P-5'-P-CCNC: 14 U/L (ref 7–52)
ANION GAP SERPL CALCULATED.3IONS-SCNC: 5 MMOL/L
AST SERPL W P-5'-P-CCNC: 15 U/L (ref 13–39)
BACTERIA UR QL AUTO: ABNORMAL /HPF
BASOPHILS # BLD AUTO: 0.03 THOUSANDS/ÂΜL (ref 0–0.1)
BASOPHILS NFR BLD AUTO: 0 % (ref 0–1)
BILIRUB SERPL-MCNC: 0.41 MG/DL (ref 0.2–1)
BILIRUB UR QL STRIP: NEGATIVE
BUN SERPL-MCNC: 24 MG/DL (ref 5–25)
CALCIUM SERPL-MCNC: 9 MG/DL (ref 8.4–10.2)
CHLORIDE SERPL-SCNC: 112 MMOL/L (ref 96–108)
CLARITY UR: CLEAR
CO2 SERPL-SCNC: 24 MMOL/L (ref 21–32)
COLOR UR: YELLOW
CREAT SERPL-MCNC: 1.32 MG/DL (ref 0.6–1.3)
EOSINOPHIL # BLD AUTO: 0.17 THOUSAND/ÂΜL (ref 0–0.61)
EOSINOPHIL NFR BLD AUTO: 2 % (ref 0–6)
ERYTHROCYTE [DISTWIDTH] IN BLOOD BY AUTOMATED COUNT: 14.7 % (ref 11.6–15.1)
GFR SERPL CREATININE-BSD FRML MDRD: 60 ML/MIN/1.73SQ M
GLUCOSE SERPL-MCNC: 103 MG/DL (ref 65–140)
GLUCOSE UR STRIP-MCNC: NEGATIVE MG/DL
HCT VFR BLD AUTO: 41.4 % (ref 36.5–49.3)
HGB BLD-MCNC: 13.5 G/DL (ref 12–17)
HGB UR QL STRIP.AUTO: NEGATIVE
IMM GRANULOCYTES # BLD AUTO: 0.03 THOUSAND/UL (ref 0–0.2)
IMM GRANULOCYTES NFR BLD AUTO: 0 % (ref 0–2)
KETONES UR STRIP-MCNC: NEGATIVE MG/DL
LEUKOCYTE ESTERASE UR QL STRIP: NEGATIVE
LIPASE SERPL-CCNC: 48 U/L (ref 11–82)
LYMPHOCYTES # BLD AUTO: 1.68 THOUSANDS/ÂΜL (ref 0.6–4.47)
LYMPHOCYTES NFR BLD AUTO: 14 % (ref 14–44)
MCH RBC QN AUTO: 29.2 PG (ref 26.8–34.3)
MCHC RBC AUTO-ENTMCNC: 32.6 G/DL (ref 31.4–37.4)
MCV RBC AUTO: 89 FL (ref 82–98)
MONOCYTES # BLD AUTO: 0.98 THOUSAND/ÂΜL (ref 0.17–1.22)
MONOCYTES NFR BLD AUTO: 8 % (ref 4–12)
MUCOUS THREADS UR QL AUTO: ABNORMAL
NEUTROPHILS # BLD AUTO: 8.78 THOUSANDS/ÂΜL (ref 1.85–7.62)
NEUTS SEG NFR BLD AUTO: 76 % (ref 43–75)
NITRITE UR QL STRIP: NEGATIVE
NON-SQ EPI CELLS URNS QL MICRO: ABNORMAL /HPF
NRBC BLD AUTO-RTO: 0 /100 WBCS
PH UR STRIP.AUTO: 5.5 [PH]
PLATELET # BLD AUTO: 300 THOUSANDS/UL (ref 149–390)
PMV BLD AUTO: 10 FL (ref 8.9–12.7)
POTASSIUM SERPL-SCNC: 4.6 MMOL/L (ref 3.5–5.3)
PROT SERPL-MCNC: 6.4 G/DL (ref 6.4–8.4)
PROT UR STRIP-MCNC: ABNORMAL MG/DL
RBC # BLD AUTO: 4.63 MILLION/UL (ref 3.88–5.62)
RBC #/AREA URNS AUTO: ABNORMAL /HPF
SODIUM SERPL-SCNC: 141 MMOL/L (ref 135–147)
SP GR UR STRIP.AUTO: 1.03 (ref 1–1.03)
UROBILINOGEN UR STRIP-ACNC: <2 MG/DL
WBC # BLD AUTO: 11.67 THOUSAND/UL (ref 4.31–10.16)
WBC #/AREA URNS AUTO: ABNORMAL /HPF

## 2023-07-01 PROCEDURE — 83690 ASSAY OF LIPASE: CPT | Performed by: EMERGENCY MEDICINE

## 2023-07-01 PROCEDURE — 99284 EMERGENCY DEPT VISIT MOD MDM: CPT | Performed by: EMERGENCY MEDICINE

## 2023-07-01 PROCEDURE — 81001 URINALYSIS AUTO W/SCOPE: CPT | Performed by: EMERGENCY MEDICINE

## 2023-07-01 PROCEDURE — 80053 COMPREHEN METABOLIC PANEL: CPT | Performed by: EMERGENCY MEDICINE

## 2023-07-01 PROCEDURE — 99284 EMERGENCY DEPT VISIT MOD MDM: CPT

## 2023-07-01 PROCEDURE — 96361 HYDRATE IV INFUSION ADD-ON: CPT

## 2023-07-01 PROCEDURE — 96375 TX/PRO/DX INJ NEW DRUG ADDON: CPT

## 2023-07-01 PROCEDURE — 96374 THER/PROPH/DIAG INJ IV PUSH: CPT

## 2023-07-01 PROCEDURE — G1004 CDSM NDSC: HCPCS

## 2023-07-01 PROCEDURE — 36415 COLL VENOUS BLD VENIPUNCTURE: CPT | Performed by: EMERGENCY MEDICINE

## 2023-07-01 PROCEDURE — 74176 CT ABD & PELVIS W/O CONTRAST: CPT

## 2023-07-01 PROCEDURE — 85025 COMPLETE CBC W/AUTO DIFF WBC: CPT | Performed by: EMERGENCY MEDICINE

## 2023-07-01 RX ORDER — HYDROMORPHONE HCL/PF 1 MG/ML
1 SYRINGE (ML) INJECTION ONCE
Status: COMPLETED | OUTPATIENT
Start: 2023-07-01 | End: 2023-07-01

## 2023-07-01 RX ORDER — KETOROLAC TROMETHAMINE 30 MG/ML
15 INJECTION, SOLUTION INTRAMUSCULAR; INTRAVENOUS ONCE
Status: COMPLETED | OUTPATIENT
Start: 2023-07-01 | End: 2023-07-01

## 2023-07-01 RX ORDER — LIDOCAINE 50 MG/G
1 PATCH TOPICAL ONCE
Status: DISCONTINUED | OUTPATIENT
Start: 2023-07-01 | End: 2023-07-01 | Stop reason: HOSPADM

## 2023-07-01 RX ORDER — LIDOCAINE 50 MG/G
1 PATCH TOPICAL DAILY
Qty: 7 PATCH | Refills: 0 | Status: SHIPPED | OUTPATIENT
Start: 2023-07-01 | End: 2023-07-08

## 2023-07-01 RX ADMIN — SODIUM CHLORIDE 1000 ML: 0.9 INJECTION, SOLUTION INTRAVENOUS at 16:37

## 2023-07-01 RX ADMIN — SODIUM CHLORIDE 1000 ML: 0.9 INJECTION, SOLUTION INTRAVENOUS at 18:08

## 2023-07-01 RX ADMIN — HYDROMORPHONE HYDROCHLORIDE 1 MG: 1 INJECTION, SOLUTION INTRAMUSCULAR; INTRAVENOUS; SUBCUTANEOUS at 16:37

## 2023-07-01 RX ADMIN — KETOROLAC TROMETHAMINE 15 MG: 30 INJECTION, SOLUTION INTRAMUSCULAR at 16:37

## 2023-07-01 RX ADMIN — LIDOCAINE 1 PATCH: 50 PATCH TOPICAL at 19:12

## 2023-07-01 NOTE — Clinical Note
Anna Nunez was seen and treated in our emergency department on 7/1/2023. Diagnosis:     Moustapha Solitario  may return to work on return date. He may return on this date: 07/03/2023         If you have any questions or concerns, please don't hesitate to call.       Rosmery Haines MD    ______________________________           _______________          _______________  [facility] [place]                              Date                                Time

## 2023-07-01 NOTE — ED PROVIDER NOTES
History  Chief Complaint   Patient presents with   • Flank Pain     Pt reports left sided flank, groin, back pain, hx kidney stones, n/v     HPI     Patient presents for evaluation of left-sided flank pain, back pain reports history of kidney stones. Also reports nausea and vomiting. Reports his pain feels the same as previous kidney stones. Denies any falls or trauma. Denies any chest pain or shortness of breath. Denies any fevers or chills. Patient states he took Tylenol at home which provided no significant relief to his pain. Prior to Admission Medications   Prescriptions Last Dose Informant Patient Reported? Taking?   acetaminophen (TYLENOL) 325 mg tablet   No No   Sig: Take 3 tablets (975 mg total) by mouth every 8 (eight) hours as needed for mild pain   albuterol (PROVENTIL HFA,VENTOLIN HFA) 90 mcg/act inhaler  Self No No   Sig: Inhale 2 puffs every 6 (six) hours as needed for wheezing   amLODIPine (NORVASC) 2.5 mg tablet  Self No No   Sig: Take 1 tablet (2.5 mg total) by mouth daily   amLODIPine (NORVASC) 5 mg tablet  Self No No   Sig: Take 1 tablet (5 mg total) by mouth daily   diazepam (VALIUM) 5 mg tablet   No No   Sig: Take 1 tablet (5 mg total) by mouth every 12 (twelve) hours as needed for anxiety or muscle spasms Do not start before Nidia 3, 2023. diltiazem (CARDIZEM) 60 mg tablet   No No   Sig: Take 1 tablet (60 mg total) by mouth 2 (two) times a day   fluticasone-vilanterol (BREO ELLIPTA) 200-25 MCG/INH inhaler  Self No No   Sig: Inhale 1 puff daily Rinse mouth after use. Patient taking differently: Inhale 1 puff if needed Rinse mouth after use.   hyoscyamine (LEVSIN/SL) 0.125 mg SL tablet  Self No No   Sig: Take 1 tablet (0.125 mg total) by mouth every 6 (six) hours as needed for cramping   naloxone (Narcan) 4 mg/0.1 mL nasal spray  Self No No   Sig: In case of overdose: 1 spray in one nostril x 1, may repeat in 2 min if remains unresponsive.    Patient not taking: Reported on 3/9/2023 oxyCODONE (ROXICODONE) 10 MG TABS   No No   Sig: Take 1 tablet (10 mg total) by mouth every 6 (six) hours as needed for severe pain Take 1 tablet (10mg) by mouth every 6 hours as needed for severe pain. Max daily amount : 4 tablets (40mg) Max Daily Amount: 40 mg Do not start before Nidia 3, 2023.       Facility-Administered Medications: None       Past Medical History:   Diagnosis Date   • Allergic     seasonal    • Allergic rhinitis    • Anxiety    • Arthritis    • Back pain    • Chronic obstructive asthma (HCC)    • Chronic pain syndrome    • Cluster headaches    • Colon polyp    • Depression    • Insomnia    • Kidney stones    • Laryngospasm    • Leukocytosis    • Migraine    • Myocardial infarction [facility])    • Nephrolithiasis    • Organic impotence    • Pneumonia due to infectious organism 1/16/2019   • Seasonal allergies    • Sleep apnea     does not use CPAP   • Stroke [facility] 2015   • TMJ (temporomandibular joint syndrome)    • Wheezing     last assessed 05/19/17       Past Surgical History:   Procedure Laterality Date   • BACK SURGERY      *9, [id number], nervectomy 2006, total of 10   • BUCCAL MASS EXCISION N/A 3/26/2018    Procedure: BIOPSY LINGUAL TONSIL (FLOW/ STANDARD PATH)  EVAL UNDER ANESTHESIA;  Surgeon: Bill Posey MD;  Location: [facility];  Service: ENT   • COLONOSCOPY     • FL RETROGRADE PYELOGRAM  10/21/2020   • HERNIA REPAIR     • KIDNEY SURGERY     • KNEE ARTHROSCOPY     • LITHOTRIPSY      multiple   • LITHOTRIPSY     • LUMBAR DISC SURGERY  2015   • MANDIBLE FRACTURE SURGERY      titanium plate   • PELVIC SYMPHYSIS FUSION     • [place] CYSTO/URETERO W/LITHOTRIPSY &INDWELL STENT INSRT Right 10/21/2020    Procedure: CYSTOSCOPY URETEROSCOPY WITH LITHOTRIPSY HOLMIUM LASER, RETROGRADE PYELOGRAM AND INSERTION STENT URETERAL;  Surgeon: Rolan Pearson MD;  Location: [facility];  Service: Urology   • [place] ESOPHAGOGASTRODUODENOSCOPY TRANSORAL DIAGNOSTIC N/A 2/22/2018    Procedure: ESOPHAGOGASTRODUODENOSCOPY (EGD); Surgeon: Carole Newton MD;  Location: [facility]; Service: Gastroenterology   • [place] ESOPHAGOGASTRODUODENOSCOPY TRANSORAL DIAGNOSTIC N/A 4/1/2019    Procedure: EGD W/ DILATION;  Surgeon: Carole Newton MD;  Location: [facility]; Service: Gastroenterology   • [place] FORREST IMPLTJ NSTIM ELTRDS PLATE/PADDLE EDRL N/A 9/8/2016    Procedure: DORSAL COLUMN STIMULATOR PLACEMENT (IMPULSE MONITORING); Surgeon: Cyndee Mckinney MD;  Location: [facility];  Service: Orthopedics   • [place] REVJ/RMVL IMPLANTED SPINAL NEUROSTIM GENERATOR Left 6/9/2017    Procedure: REMOVAL OF A THORACIC SCS PLACED VIA LAMINECTOMY AND REMOVAL LEFT BUTTOCK GENERATOR; IMPULSE MONITORING;  Surgeon: Kimberley Amor MD;  Location:  MAIN OR;  Service: Neurosurgery   • [place] SURGICAL ARTHROSCOPY SHOULDER W/ROTATOR CUFF RPR Right 4/4/2019    Procedure: RIGHT SHOULDER ARTHROSCOPIC SUBSCAPULARIS TENDON REPAIR;  Surgeon: Jena Mancini MD;  Location: [facility];  Service: Orthopedics   • SACROILIAC JOINT FUSION  2015   • SHOULDER SURGERY Left     2   • UPPER GASTROINTESTINAL ENDOSCOPY         Family History   Problem Relation Age of Onset   • Leukemia Mother 68   • Hypertension Mother    • Diabetes Father    • Obesity Father    • Liver cancer Father    • Heart disease Father    • Diabetes Brother    • Hypertension Brother    • Skin cancer Maternal Grandfather 80   • Cancer Family      I have reviewed and agree with the history as documented. E-Cigarette/Vaping   • E-Cigarette Use Current Every Day User    • Cartridges/Day less than one a day      E-Cigarette/Vaping Substances   • Nicotine No    • THC Yes    • CBD No    • Flavoring No    • Other No    • Unknown No      Social History     Tobacco Use   • Smoking status: Every Day     Packs/day: 1.00     Years: 25.00     Total pack years: 25.00     Types: Cigarettes   • Smokeless tobacco: Former   Vaping Use   • Vaping Use: Every day   • Substances: THC   Substance Use Topics   • Alcohol use:  Yes Comment: rarely    • Drug use: Yes     Frequency: 7.0 times per week     Types: Marijuana     Comment: medical marijuana daily for chronic pain 1/2 ounce       Review of Systems   Constitutional: Negative for activity change, appetite change, chills, fatigue and fever. HENT: Negative for congestion, dental problem, facial swelling, sore throat, tinnitus and trouble swallowing. Eyes: Negative for pain, discharge and itching. Respiratory: Negative for apnea, chest tightness and wheezing. Cardiovascular: Negative for chest pain, palpitations and leg swelling. Gastrointestinal: Positive for nausea. Negative for abdominal pain. Genitourinary: Positive for flank pain. Negative for difficulty urinating and dysuria. Musculoskeletal: Negative for arthralgias, back pain, gait problem, joint swelling and neck pain. Skin: Negative for color change, rash and wound. Neurological: Negative for dizziness and facial asymmetry. Psychiatric/Behavioral: Negative for agitation and behavioral problems. The patient is not nervous/anxious. All other systems reviewed and are negative. Physical Exam  Physical Exam  Vitals and nursing note reviewed. Constitutional:       General: He is not in acute distress. Appearance: He is well-developed. He is not diaphoretic. HENT:      Head: Normocephalic and atraumatic. Right Ear: External ear normal.      Left Ear: External ear normal.   Eyes:      General:         Right eye: No discharge. Left eye: No discharge. Pupils: Pupils are equal, round, and reactive to light. Neck:      Thyroid: No thyromegaly. Trachea: No tracheal deviation. Cardiovascular:      Rate and Rhythm: Normal rate and regular rhythm. Heart sounds: No murmur heard. Pulmonary:      Effort: Pulmonary effort is normal.      Breath sounds: Normal breath sounds. Abdominal:      General: Bowel sounds are normal. There is no distension.       Palpations: Abdomen is soft.      Tenderness: There is no abdominal tenderness. Musculoskeletal:         General: No deformity. Normal range of motion. Cervical back: Normal range of motion and neck supple. Skin:     General: Skin is warm. Capillary Refill: Capillary refill takes less than 2 seconds. Neurological:      Mental Status: He is alert and oriented to person, place, and time. Cranial Nerves: No cranial nerve deficit. Motor: No abnormal muscle tone.    Psychiatric:         Behavior: Behavior normal.         Vital Signs  ED Triage Vitals   Temperature Pulse Respirations Blood Pressure SpO2   07/01/23 1614 07/01/23 1613 07/01/23 1613 07/01/23 1614 07/01/23 1613   97.7 °F (36.5 °C) 79 22 142/77 99 %      Temp Source Heart Rate Source Patient Position - Orthostatic VS BP Location FiO2 (%)   07/01/23 1614 07/01/23 1613 07/01/23 1613 07/01/23 1613 --   Oral Monitor Lying Right arm       Pain Score       07/01/23 1612       10 - Worst Possible Pain           Vitals:    07/01/23 1613 07/01/23 1614   BP:  142/77   Pulse: 79    Patient Position - Orthostatic VS: Lying          Visual Acuity      ED Medications  Medications   lidocaine (LIDODERM) 5 % patch 1 patch (1 patch Topical Medication Applied 7/1/23 1912)   sodium chloride 0.9 % bolus 1,000 mL (0 mL Intravenous Stopped 7/1/23 1808)   ketorolac (TORADOL) injection 15 mg (15 mg Intravenous Given 7/1/23 1637)   HYDROmorphone (DILAUDID) injection 1 mg (1 mg Intravenous Given 7/1/23 1637)   sodium chloride 0.9 % bolus 1,000 mL (1,000 mL Intravenous New Bag 7/1/23 1808)       Diagnostic Studies  Results Reviewed     Procedure Component Value Units Date/Time    Urine Microscopic [[id number]]  (Abnormal) Collected: 07/01/23 1933    Lab Status: Final result Specimen: Urine, Clean Catch Updated: 07/01/23 2004     RBC, UA 1-2 /hpf      WBC, UA 2-4 /hpf      Epithelial Cells None Seen /hpf      Bacteria, UA Occasional /hpf      MUCUS THREADS Occasional    UA w Reflex to Microscopic w Reflex to Culture [[id number]]  (Abnormal) Collected: 07/01/23 1933    Lab Status: Final result Specimen: Urine, Clean Catch Updated: 07/01/23 1959     Color, UA Yellow     Clarity, UA Clear     Specific Gravity, UA 1.032     pH, UA 5.5     Leukocytes, UA Negative     Nitrite, UA Negative     Protein, UA 30 (1+) mg/dl      Glucose, UA Negative mg/dl      Ketones, UA Negative mg/dl      Urobilinogen, UA <2.0 mg/dl      Bilirubin, UA Negative     Occult Blood, UA Negative    Comprehensive metabolic panel [[id number]]  (Abnormal) Collected: 07/01/23 1636    Lab Status: Final result Specimen: Blood from Arm, Right Updated: 07/01/23 1713     Sodium 141 mmol/L      Potassium 4.6 mmol/L      Chloride 112 mmol/L      CO2 24 mmol/L      ANION GAP 5 mmol/L      BUN 24 mg/dL      Creatinine 1.32 mg/dL      Glucose 103 mg/dL      Calcium 9.0 mg/dL      AST 15 U/L      ALT 14 U/L      Alkaline Phosphatase 57 U/L      Total Protein 6.4 g/dL      Albumin 4.2 g/dL      Total Bilirubin 0.41 mg/dL      eGFR 60 ml/min/1.73sq m     Narrative:      [place] guidelines for Chronic Kidney Disease (CKD):   •  Stage 1 with normal or high GFR (GFR > 90 mL/min/1.73 square meters)  •  Stage 2 Mild CKD (GFR = 60-89 mL/min/1.73 square meters)  •  Stage 3A Moderate CKD (GFR = 45-59 mL/min/1.73 square meters)  •  Stage 3B Moderate CKD (GFR = 30-44 mL/min/1.73 square meters)  •  Stage 4 Severe CKD (GFR = 15-29 mL/min/1.73 square meters)  •  Stage 5 End Stage CKD (GFR <15 mL/min/1.73 square meters)  Note: GFR calculation is accurate only with a steady state creatinine    Lipase [[id number]]  (Normal) Collected: 07/01/23 1636    Lab Status: Final result Specimen: Blood from Arm, Right Updated: 07/01/23 1713     Lipase 48 u/L     CBC and differential [[id number]]  (Abnormal) Collected: 07/01/23 1636    Lab Status: Final result Specimen: Blood from Arm, Right Updated: 07/01/23 1648     WBC 11.67 Thousand/uL      RBC 4.63 Million/uL      Hemoglobin 13.5 g/dL      Hematocrit 41.4 %      MCV 89 fL      MCH 29.2 pg      MCHC 32.6 g/dL      RDW 14.7 %      MPV 10.0 fL      Platelets 718 Thousands/uL      nRBC 0 /100 WBCs      Neutrophils Relative 76 %      Immat GRANS % 0 %      Lymphocytes Relative 14 %      Monocytes Relative 8 %      Eosinophils Relative 2 %      Basophils Relative 0 %      Neutrophils Absolute 8.78 Thousands/µL      Immature Grans Absolute 0.03 Thousand/uL      Lymphocytes Absolute 1.68 Thousands/µL      Monocytes Absolute 0.98 Thousand/µL      Eosinophils Absolute 0.17 Thousand/µL      Basophils Absolute 0.03 Thousands/µL                  CT renal stone study abdomen pelvis wo contrast   Final Result by Ben Rocha MD (07/01 1814)      No evidence of obstructive uropathy or nephroureterolithiasis            Workstation performed: [id number]                    Procedures  Procedures         ED Course  ED Course as of 07/01/23 2016   Sat Jul 01, 2023   1842 Patient reassessed. Sleeping on the stretcher. Patient woke up, symptoms have improved, understands we are waiting for urinalysis prior to disposition                               SBIRT 20yo+    Flowsheet Row Most Recent Value   Initial Alcohol Screen: US AUDIT-C     1. How often do you have a drink containing alcohol? 2 Filed at: 07/01/2023 1755   2. How many drinks containing alcohol do you have on a typical day you are drinking? 2 Filed at: 07/01/2023 1755   3a. Male UNDER 65: How often do you have five or more drinks on one occasion? 1 Filed at: 07/01/2023 1755   3b. FEMALE Any Age, or MALE 65+: How often do you have 4 or more drinks on one occassion? 0 Filed at: 07/01/2023 1755   Audit-C Score 5 Filed at: 07/01/2023 1755   RENA: How many times in the past year have you. .. Used an illegal drug or used a prescription medication for non-medical reasons? Never Filed at: 07/01/2023 1755                    Medical Decision Making  Left flank pain. Labs, CT scan, urinalysis unremarkable. Patient states that Dilaudid helps his pain, IV Dilaudid, Toradol, IV fluids given. Patient reassessed, sleeping comfortably. ED examination and work-up unremarkable, patient stable for discharge home, okay to take Tylenol, previously prescribed oxycodone, addition of lidocaine patch, heat, gentle ambulation, PCP follow-up recommended    Left flank pain: acute illness or injury  Amount and/or Complexity of Data Reviewed  Labs: ordered. Details: Ordered and reviewed laboratory studies, no evidence of UTI, no KRISTINE  Radiology: ordered and independent interpretation performed. Details: Ordered and interpreted results of CT scan      Risk  Prescription drug management. Disposition  Final diagnoses:   Left flank pain     Time reflects when diagnosis was documented in both MDM as applicable and the Disposition within this note     Time User Action Codes Description Comment    7/1/2023  8:05 PM Ofilia Loges Add [R10.9] Left flank pain       ED Disposition     ED Disposition   Discharge    Condition   Stable    Date/Time   Sat Jul 1, 2023  8:05 PM    Comment   [facility] discharge to home/self care.                Follow-up Information     Follow up With Specialties Details Why Contact Info Additional Information    Rajesh Esquivel MD [facility] Medicine Schedule an appointment as soon as possible for a visit in 2 days As needed [address] [facility] [address] [facility] Emergency Department Emergency Medicine Go to  As needed [address] [facility], [place], [place], [address]          Patient's Medications   Discharge Prescriptions    LIDOCAINE (LIDODERM) 5 %    Apply 1 patch topically over 12 hours daily for 7 days Remove & Discard patch within 12 hours or as directed by MD       Start Date: 7/1/2023 End Date: 7/8/2023       Order Dose: 1 patch       Quantity: 7 patch    Refills: 0       No discharge procedures on file.     PDMP Review       Value Time User    PDMP Reviewed  Yes 5/31/2023 11:20 AM Thalia Leos MD          ED Provider  Electronically Signed by           Roseline Ko MD  07/01/23 2016

## 2023-07-03 ENCOUNTER — VBI (OUTPATIENT)
Dept: ADMINISTRATIVE | Facility: OTHER | Age: 53
End: 2023-07-03

## 2023-07-03 NOTE — TELEPHONE ENCOUNTER
Elizabeth Saldaña    ED Visit Information     Ed visit date: 7/1/2023  Diagnosis Description: Left flank pain  In Network? Yes [address]  Discharge status: Home  Discharged with meds ? Yes  Number of ED visits to date: 4  ED Severity:3     Outreach Information    Outreach successful: Yes 2  Date letter mailed:N/A  Date Finalized:7/5/2023    Care Coordination    Follow up appointment with pcp: no follow up not scheduled  Transportation issues ? No    Value [employer] type: 3 Day Outreach  Emergent necessity warranted by diagnosis: Yes  [facility] PCP: Yes  Transportation: Friend/Family Transport  If able to choose an ED.  Would you have chosen [facility]: Yes  Called PCP first?: No  Feels able to call PCP for urgent problems ?: Yes  Understands what emergencies can be handled by PCP ?: Yes  Ever any problems getting appointment with PCP for minor emergency/urgency problems?: No  Practice Contacted Patient ?: No  Pt had ED follow up with pcp/staff ?: No    Seen for follow-up out of network ?: No  Reason Patient went to ED instead of Urgent Care or PCP?: Perceived Severity of Illness  Urgent care Education?: Yes  07/03/2023 10:37 AM EDT by Bridger Garcia 07/03/2023 10:37 AM EDT by Meredith Dill (Self) [phone] ([phone] (Mobile)  [phone] (Mobile) Remove  - Left MessageCommunicated - [email or web address]    07/05/2023 01:01 PM EDT by Bridger Garcia 07/05/2023 01:01 PM EDT by Meredith Dill (Self) [phone] ([phone] (Mobile)  [phone] (Mobile) Remove  - Call CompleteCommunicated - ED outreach successful

## 2023-07-04 DIAGNOSIS — K22.4 ESOPHAGEAL DYSMOTILITIES: ICD-10-CM

## 2023-07-05 DIAGNOSIS — M96.1 POST LAMINECTOMY SYNDROME: ICD-10-CM

## 2023-07-05 DIAGNOSIS — G89.4 CHRONIC PAIN SYNDROME: ICD-10-CM

## 2023-07-05 DIAGNOSIS — M53.3 CHRONIC RIGHT SI JOINT PAIN: ICD-10-CM

## 2023-07-05 DIAGNOSIS — F41.8 DEPRESSION WITH ANXIETY: ICD-10-CM

## 2023-07-05 DIAGNOSIS — G89.29 CHRONIC RIGHT SI JOINT PAIN: ICD-10-CM

## 2023-07-05 RX ORDER — DILTIAZEM HYDROCHLORIDE 60 MG/1
TABLET, FILM COATED ORAL
Qty: 180 TABLET | Refills: 0 | Status: SHIPPED | OUTPATIENT
Start: 2023-07-05

## 2023-07-06 RX ORDER — OXYCODONE HYDROCHLORIDE 10 MG/1
10 TABLET ORAL EVERY 6 HOURS PRN
Qty: 120 TABLET | Refills: 0 | Status: SHIPPED | OUTPATIENT
Start: 2023-07-06 | End: 2023-08-05

## 2023-07-06 RX ORDER — DIAZEPAM 5 MG/1
5 TABLET ORAL EVERY 12 HOURS PRN
Qty: 45 TABLET | Refills: 0 | Status: SHIPPED | OUTPATIENT
Start: 2023-07-06

## 2023-07-06 NOTE — TELEPHONE ENCOUNTER
Reviewed PDMP, last prescription written 5/31/23 and filled 6/7/23 for oxycodone patient has controlled substance agreement that is active with me, and is up-to-date on his urine drug testing. Due for repeat drug testing in August after 8/9/23 (60 MME/day). Rx sent to pharmacy. Also due for refill on diazepam, last prescription written 5/31/23 and filled 6/7/23, will send to pharmacy. Has narcan Rx.

## 2023-07-21 ENCOUNTER — OFFICE VISIT (OUTPATIENT)
Dept: HEMATOLOGY ONCOLOGY | Facility: CLINIC | Age: 53
End: 2023-07-21
Payer: MEDICARE

## 2023-07-21 VITALS
HEART RATE: 77 BPM | RESPIRATION RATE: 18 BRPM | SYSTOLIC BLOOD PRESSURE: 140 MMHG | HEIGHT: 73 IN | TEMPERATURE: 97.4 F | OXYGEN SATURATION: 97 % | WEIGHT: 204 LBS | BODY MASS INDEX: 27.04 KG/M2 | DIASTOLIC BLOOD PRESSURE: 76 MMHG

## 2023-07-21 DIAGNOSIS — Z72.0 TOBACCO ABUSE: ICD-10-CM

## 2023-07-21 DIAGNOSIS — D72.9 NEUTROPHILIC LEUKOCYTOSIS: Primary | ICD-10-CM

## 2023-07-21 PROCEDURE — 99215 OFFICE O/P EST HI 40 MIN: CPT | Performed by: PHYSICIAN ASSISTANT

## 2023-07-21 NOTE — PROGRESS NOTES
[address] [facility] [address]  Hematology Ambulatory Follow-Up  Yesenia Nelson, 1970, [id number]  7/21/2023      Assessment and Plan   1. Neutrophilic leukocytosis  2. Tobacco abuse  This is a 77-year-old male with past medical history of the above. Patient underwent myeloproliferative work-up which was negative. I do not believe that patient's leukocytosis is directly related to underlying bone marrow dysfunction. However, the patient does smoke a significant amount of tobacco and has underlying chronic pain with several surgeries and retained implants. Neutrophilic leukocytosis is likely secondary to these reasons. This will likely be a chronic condition. Patient will be discharged today back to PCP office. If neutrophil count increases or if other changes occur to the CBC, he can be seen back in the hematology clinic at any time. Please do not hesitate to reach out to this office. Patient voiced agreement and understanding to the above. Barrier(s) to care: None  The patient is able to self care. 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     Patient was initially seen by Christiano Victoria and Dr. Kraig Parks in late 2018.   At that time, polycythemia vera was under evaluation with an elevated hematocrit. Initial work-up was BCR abl (2017), JAK2 mutation(2018), flow cytometry, CRP MACKENZIE sed rate, B12, LDH, uric acid and testosterone all within normal limits.     Subsequent testing in 2019 demonstrated an elevated platelet count and NAT are testing was requested this to was also negative.     Patient now returns to the office under the impression he may need a phlebotomy however, consult is really related to white blood cell count and platelets.     22/59/9653 WBC = 13.9, hemoglobin 14.2, MCV 87, platelet count 635, ANC 8.7     8/1/2022 WBC 16.5, hemoglobin 15.0, MCV 89, platelet count 511, ANC 10.9     2/13/2023 WBC = 17.2, hemoglobin 12.8, MCV 86, platelet count 447, ANC 14.5, otherwise within normal limits     2/21/2023 WBC = 12.5, hemoglobin 15.3, MCV 90, platelet counts 907, ANC 8.36     04/21/23: She notes that he has significant medical issues. Patient readily admits to noncompliance with treatment secondary to forgetfulness or discomfort relating to therapies. Patient is smoking less but is still smoking around a pack a day or less. Patient does not have any immediate plans to quit but know he is he needs to.     At present, no significant dental issues. However, the patient does have chronic pain of his left jaw due to plate placement after having a fracture. Patient also broke his back in the early 2000's.      5/1/2023: WBC 10.75, hemoglobin 15.8, platelet count 565, JAK2 V6 1F reflex to NAT R/MPL/exon mutations all negative, flow cytometry within normal limits, BCR able FISH, negative. Ferritin = 55    7/1/2023 WBC 11.6, hemoglobin 13.5, MCV 89, platelet count 205    Interval history: Patient feels tired. Chronic pain. Patient follows up with PCP every 3 to 4 months. No plans on quitting tobacco use. Review of Systems   Constitutional: Positive for fatigue. Negative for activity change, appetite change and fever. HENT: Negative for nosebleeds.     Respiratory: Negative for cough, choking and shortness of breath. Cardiovascular: Negative for chest pain, palpitations and leg swelling. Gastrointestinal: Negative for abdominal distention, abdominal pain, anal bleeding, blood in stool, constipation, diarrhea, nausea and vomiting. Endocrine: Negative for cold intolerance. Genitourinary: Negative for hematuria. Musculoskeletal: Negative for myalgias. Skin: Negative for color change, pallor and rash. Allergic/Immunologic: Negative for immunocompromised state. Neurological: Negative for headaches. Hematological: Negative for adenopathy. Does not bruise/bleed easily. All other systems reviewed and are negative.       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   • Dysphagia   • Intractable episodic cluster headache   • Allergic rhinitis   • Abnormal head CT   • Elevated blood pressure reading   • Chronic right SI joint pain   • Depression with anxiety   • Dyshidrotic dermatitis   • Erectile dysfunction of non-organic origin   • Moderate persistent asthma without complication   • Post laminectomy syndrome   • TMJ syndrome   • Dental abscess   • Screening for STD (sexually transmitted disease)   • Lingual tonsil hypertrophy   • Sleep apnea   • Hypertension   • Other chest pain   • Chronic, continuous use of opioids   • Chronic prescription benzodiazepine use   • Medical marijuana use   • Hoarseness   • Tobacco abuse   • Elevated hematocrit   • Elevated platelet count   • Hypercontractile esophagus   • Left shoulder pain   • Cervical radiculopathy   • Elevated serum creatinine   • Pneumonia due to infectious organism   • Esophageal dysphagia   • Incomplete tear of right rotator cuff   • Viral upper respiratory tract infection   • Partial tear of right subscapularis tendon   • Therapeutic opioid induced constipation   • Exposure to sexually transmitted disease (STD)   • Cough   • Gastritis without bleeding   • Peripheral polyneuropathy   • Seborrheic keratosis   • Abnormal urinalysis   • Chronic obstructive asthma (HCC)   • Status post cystoscopy with ureteral stent placement   • Acute viral syndrome   • Encounter for medication monitoring   • History of colon polyps   • Ambulatory dysfunction   • Tinnitus of both ears   • Overweight (BMI 25.0-29. 9)   • Polyarthralgia   • Acute sore throat     Past Medical History:   Diagnosis Date   • Allergic     seasonal    • Allergic rhinitis    • Anxiety    • Arthritis    • Back pain    • Chronic obstructive asthma (HCC)    • Chronic pain syndrome    • Cluster headaches    • Colon polyp    • Depression    • Insomnia    • Kidney stones    • Laryngospasm    • Leukocytosis    • Migraine    • Myocardial infarction [facility])    • Nephrolithiasis    • Organic impotence    • Pneumonia due to infectious organism 1/16/2019   • Seasonal allergies    • Sleep apnea     does not use CPAP   • Stroke [facility] 2015   • TMJ (temporomandibular joint syndrome)    • Wheezing     last assessed 05/19/17     Past Surgical History:   Procedure Laterality Date   • BACK SURGERY      *9, [id number], nervectomy 2006, total of 10   • BUCCAL MASS EXCISION N/A 3/26/2018    Procedure: BIOPSY LINGUAL TONSIL (FLOW/ STANDARD PATH)  EVAL UNDER ANESTHESIA;  Surgeon: Shalini Perez MD;  Location: [facility];  Service: ENT   • COLONOSCOPY     • FL RETROGRADE PYELOGRAM  10/21/2020   • HERNIA REPAIR     • KIDNEY SURGERY     • KNEE ARTHROSCOPY     • LITHOTRIPSY      multiple   • LITHOTRIPSY     • LUMBAR DISC SURGERY  2015   • MANDIBLE FRACTURE SURGERY      titanium plate   • PELVIC SYMPHYSIS FUSION     • IL CYSTO/URETERO W/LITHOTRIPSY &INDWELL STENT INSRT Right 10/21/2020    Procedure: CYSTOSCOPY URETEROSCOPY WITH LITHOTRIPSY HOLMIUM LASER, RETROGRADE PYELOGRAM AND INSERTION STENT URETERAL;  Surgeon: Costa Cardona MD; Location: [facility];  Service: Urology   • CT ESOPHAGOGASTRODUODENOSCOPY TRANSORAL DIAGNOSTIC N/A 2/22/2018    Procedure: ESOPHAGOGASTRODUODENOSCOPY (EGD); Surgeon: Carole Newton MD;  Location: [facility]; Service: Gastroenterology   • CT ESOPHAGOGASTRODUODENOSCOPY TRANSORAL DIAGNOSTIC N/A 4/1/2019    Procedure: EGD W/ DILATION;  Surgeon: Carole Newton MD;  Location: [facility]; Service: Gastroenterology   • CT FORREST IMPLTJ NSTIM ELTRDS PLATE/PADDLE EDRL N/A 9/8/2016    Procedure: DORSAL COLUMN STIMULATOR PLACEMENT (IMPULSE MONITORING);   Surgeon: Cyndee Mckinney MD;  Location: [facility];  Service: Orthopedics   • CT REVJ/RMVL IMPLANTED SPINAL NEUROSTIM GENERATOR Left 6/9/2017    Procedure: REMOVAL OF A THORACIC SCS PLACED VIA LAMINECTOMY AND REMOVAL LEFT BUTTOCK GENERATOR; IMPULSE MONITORING;  Surgeon: Kimberley Amor MD;  Location: [facility];  Service: Neurosurgery   • CT SURGICAL ARTHROSCOPY SHOULDER W/ROTATOR CUFF RPR Right 4/4/2019    Procedure: RIGHT SHOULDER ARTHROSCOPIC SUBSCAPULARIS TENDON REPAIR;  Surgeon: Jena Mancini MD;  Location: [facility];  Service: Orthopedics   • SACROILIAC JOINT FUSION  2015   • SHOULDER SURGERY Left     2   • UPPER GASTROINTESTINAL ENDOSCOPY       Family History   Problem Relation Age of Onset   • Leukemia Mother 68   • Hypertension Mother    • Diabetes Father    • Obesity Father    • Liver cancer Father    • Heart disease Father    • Diabetes Brother    • Hypertension Brother    • Skin cancer Maternal Grandfather 80   • Cancer Family      Social History     Socioeconomic History   • Marital status:      Spouse name: Not on file   • Number of children: Not on file   • Years of education: Not on file   • Highest education level: Not on file   Occupational History   • Occupation: Disability   Tobacco Use   • Smoking status: Every Day     Packs/day: 1.00     Years: 25.00     Total pack years: 25.00     Types: Cigarettes   • Smokeless tobacco: Former Vaping Use   • Vaping Use: Every day   • Substances: THC   Substance and Sexual Activity   • Alcohol use: Yes     Comment: rarely    • Drug use: Yes     Frequency: 7.0 times per week     Types: Marijuana     Comment: medical marijuana daily for chronic pain 1/2 ounce   • Sexual activity: Yes     Partners: Female   Other Topics Concern   • Not on file   Social History Narrative    As per Allscripts: Denied: History of Current smoker        As per allscripts: Smoking     Social Determinants of Health     Financial Resource Strain: High Risk (10/25/2022)    Overall Financial Resource Strain (CARDIA)    • Difficulty of Paying Living Expenses: Hard   Food Insecurity: Food Insecurity Present (10/14/2020)    Hunger Vital Sign    • Worried About Running Out of Food in the Last Year: Often true    • Ran Out of Food in the Last Year: Often true   Transportation Needs: No Transportation Needs (5/13/2022)    PRAPARE - Transportation    • Lack of Transportation (Medical): No    • Lack of Transportation (Non-Medical):  No   Physical Activity: Not on file   Stress: Stress Concern Present (5/13/2022)    [address] [facility]    • Feeling of Stress : Very much   Social Connections: Not on file   Intimate Partner Violence: Not on file   Housing Stability: Low Risk  (5/13/2022)    Housing Stability Vital Sign    • Unable to Pay for Housing in the Last Year: No    • Number of Places Lived in the Last Year: 1    • Unstable Housing in the Last Year: No       Current Outpatient Medications:   •  acetaminophen (TYLENOL) 325 mg tablet, Take 3 tablets (975 mg total) by mouth every 8 (eight) hours as needed for mild pain, Disp: 270 tablet, Rfl: 0  •  albuterol (PROVENTIL HFA,VENTOLIN HFA) 90 mcg/act inhaler, Inhale 2 puffs every 6 (six) hours as needed for wheezing, Disp: 18 g, Rfl: 5  •  amLODIPine (NORVASC) 5 mg tablet, Take 1 tablet (5 mg total) by mouth daily, Disp: 90 tablet, Rfl: 1  •  diazepam (VALIUM) 5 mg tablet, Take 1 tablet (5 mg total) by mouth every 12 (twelve) hours as needed for anxiety or muscle spasms, Disp: 45 tablet, Rfl: 0  •  diltiazem (CARDIZEM) 60 mg tablet, TAKE 1 TABLET(60 MG) BY MOUTH TWICE DAILY, Disp: 180 tablet, Rfl: 0  •  fluticasone-vilanterol (BREO ELLIPTA) 200-25 MCG/INH inhaler, Inhale 1 puff daily Rinse mouth after use. (Patient taking differently: Inhale 1 puff if needed Rinse mouth after use.), Disp: 3 Inhaler, Rfl: 3  •  oxyCODONE (ROXICODONE) 10 MG TABS, Take 1 tablet (10 mg total) by mouth every 6 (six) hours as needed for severe pain Take 1 tablet (10mg) by mouth every 6 hours as needed for severe pain. Max daily amount : 4 tablets (40mg) Max Daily Amount: 40 mg, Disp: 120 tablet, Rfl: 0  •  amLODIPine (NORVASC) 2.5 mg tablet, Take 1 tablet (2.5 mg total) by mouth daily (Patient not taking: Reported on 7/21/2023), Disp: 60 tablet, Rfl: 0  •  hyoscyamine (LEVSIN/SL) 0.125 mg SL tablet, Take 1 tablet (0.125 mg total) by mouth every 6 (six) hours as needed for cramping, Disp: 90 tablet, Rfl: 5  •  lidocaine (Lidoderm) 5 %, Apply 1 patch topically over 12 hours daily for 7 days Remove & Discard patch within 12 hours or as directed by MD, Disp: 7 patch, Rfl: 0  •  naloxone (Narcan) 4 mg/0.1 mL nasal spray, In case of overdose: 1 spray in one nostril x 1, may repeat in 2 min if remains unresponsive. (Patient not taking: Reported on 3/9/2023), Disp: 1 each, Rfl: 0  Allergies   Allergen Reactions   • Other Rash     Adhesive tape       Objective   /76   Pulse 77   Temp (!) 97.4 °F (36.3 °C)   Resp 18   Ht 6' 1" (1.854 m)   Wt 92.5 kg (204 lb)   SpO2 97%   BMI 26.91 kg/m²    Physical Exam  Constitutional:       General: He is not in acute distress. Appearance: He is well-developed. HENT:      Head: Normocephalic and atraumatic.       Right Ear: External ear normal.      Left Ear: External ear normal.      Nose: Nose normal.   Eyes:      General: No scleral icterus. Conjunctiva/sclera: Conjunctivae normal.   Cardiovascular:      Rate and Rhythm: Normal rate. Pulmonary:      Effort: No respiratory distress. Abdominal:      General: There is no distension. Palpations: Abdomen is soft. Skin:     Findings: No rash (on exposed skin. ). Neurological:      Mental Status: He is alert and oriented to person, place, and time. Psychiatric:         Thought Content:  Thought content normal.         Result Review  Labs:  Admission on 07/01/2023, Discharged on 07/01/2023   Component Date Value Ref Range Status   • WBC 07/01/2023 11.67 (H)  4.31 - 10.16 Thousand/uL Final   • RBC 07/01/2023 4.63  3.88 - 5.62 Million/uL Final   • Hemoglobin 07/01/2023 13.5  12.0 - 17.0 g/dL Final   • Hematocrit 07/01/2023 41.4  36.5 - 49.3 % Final   • MCV 07/01/2023 89  82 - 98 fL Final   • MCH 07/01/2023 29.2  26.8 - 34.3 pg Final   • MCHC 07/01/2023 32.6  31.4 - 37.4 g/dL Final   • RDW 07/01/2023 14.7  11.6 - 15.1 % Final   • MPV 07/01/2023 10.0  8.9 - 12.7 fL Final   • Platelets 64/63/5621 300  149 - 390 Thousands/uL Final   • nRBC 07/01/2023 0  /100 WBCs Final   • Neutrophils Relative 07/01/2023 76 (H)  43 - 75 % Final   • Immat GRANS % 07/01/2023 0  0 - 2 % Final   • Lymphocytes Relative 07/01/2023 14  14 - 44 % Final   • Monocytes Relative 07/01/2023 8  4 - 12 % Final   • Eosinophils Relative 07/01/2023 2  0 - 6 % Final   • Basophils Relative 07/01/2023 0  0 - 1 % Final   • Neutrophils Absolute 07/01/2023 8.78 (H)  1.85 - 7.62 Thousands/µL Final   • Immature Grans Absolute 07/01/2023 0.03  0.00 - 0.20 Thousand/uL Final   • Lymphocytes Absolute 07/01/2023 1.68  0.60 - 4.47 Thousands/µL Final   • Monocytes Absolute 07/01/2023 0.98  0.17 - 1.22 Thousand/µL Final   • Eosinophils Absolute 07/01/2023 0.17  0.00 - 0.61 Thousand/µL Final   • Basophils Absolute 07/01/2023 0.03  0.00 - 0.10 Thousands/µL Final   • Sodium 07/01/2023 141  135 - 147 mmol/L Final   • Potassium 07/01/2023 4.6  3.5 - 5.3 mmol/L Final   • Chloride 07/01/2023 112 (H)  96 - 108 mmol/L Final   • CO2 07/01/2023 24  21 - 32 mmol/L Final   • ANION GAP 07/01/2023 5  mmol/L Final   • BUN 07/01/2023 24  5 - 25 mg/dL Final   • Creatinine 07/01/2023 1.32 (H)  0.60 - 1.30 mg/dL Final    Standardized to IDMS reference method   • Glucose 07/01/2023 103  65 - 140 mg/dL Final    If the patient is fasting, the ADA then defines impaired fasting glucose as > 100 mg/dL and diabetes as > or equal to 123 mg/dL. • Calcium 07/01/2023 9.0  8.4 - 10.2 mg/dL Final   • AST 07/01/2023 15  13 - 39 U/L Final   • ALT 07/01/2023 14  7 - 52 U/L Final    Specimen collection should occur prior to Sulfasalazine administration due to the potential for falsely depressed results. • Alkaline Phosphatase 07/01/2023 57  34 - 104 U/L Final   • Total Protein 07/01/2023 6.4  6.4 - 8.4 g/dL Final   • Albumin 07/01/2023 4.2  3.5 - 5.0 g/dL Final   • Total Bilirubin 07/01/2023 0.41  0.20 - 1.00 mg/dL Final    Use of this assay is not recommended for patients undergoing treatment with eltrombopag due to the potential for falsely elevated results. N-acetyl-p-benzoquinone imine (metabolite of Acetaminophen) will generate erroneously low results in samples for patients that have taken an overdose of Acetaminophen.    • eGFR 07/01/2023 60  ml/min/1.73sq m Final   • Lipase 07/01/2023 48  11 - 82 u/L Final   • Color, UA 07/01/2023 Yellow   Final   • Clarity, UA 07/01/2023 Clear   Final   • Specific Gravity, UA 07/01/2023 1.032 (H)  1.003 - 1.030 Final   • pH, UA 07/01/2023 5.5  4.5, 5.0, 5.5, 6.0, 6.5, 7.0, 7.5, 8.0 Final   • Leukocytes, UA 07/01/2023 Negative  Negative Final   • Nitrite, UA 07/01/2023 Negative  Negative Final   • Protein, UA 07/01/2023 30 (1+) (A)  Negative mg/dl Final   • Glucose, UA 07/01/2023 Negative  Negative mg/dl Final   • Ketones, UA 07/01/2023 Negative  Negative mg/dl Final   • Urobilinogen, UA 07/01/2023 <2.0  <2.0 mg/dl mg/dl Final   • Bilirubin, UA 07/01/2023 Negative  Negative Final   • Occult Blood, UA 07/01/2023 Negative  Negative Final   • RBC, UA 07/01/2023 1-2  None Seen, 1-2 /hpf Final   • WBC, UA 07/01/2023 2-4 (A)  None Seen, 1-2 /hpf Final   • Epithelial Cells 07/01/2023 None Seen  None Seen, Occasional /hpf Final   • Bacteria, UA 07/01/2023 Occasional  None Seen, Occasional /hpf Final   • MUCUS THREADS 07/01/2023 Occasional (A)  None Seen Final       Please note: This report has been generated by a voice recognition software system. Therefore there may be syntax, spelling, and/or grammatical errors. Please call if you have any questions.

## 2023-08-04 DIAGNOSIS — M53.3 CHRONIC RIGHT SI JOINT PAIN: ICD-10-CM

## 2023-08-04 DIAGNOSIS — G89.4 CHRONIC PAIN SYNDROME: ICD-10-CM

## 2023-08-04 DIAGNOSIS — G89.29 CHRONIC RIGHT SI JOINT PAIN: ICD-10-CM

## 2023-08-04 DIAGNOSIS — F41.8 DEPRESSION WITH ANXIETY: ICD-10-CM

## 2023-08-04 DIAGNOSIS — M96.1 POST LAMINECTOMY SYNDROME: ICD-10-CM

## 2023-08-04 RX ORDER — DIAZEPAM 5 MG/1
5 TABLET ORAL EVERY 12 HOURS PRN
Qty: 45 TABLET | Refills: 0 | Status: SHIPPED | OUTPATIENT
Start: 2023-08-04

## 2023-08-04 RX ORDER — OXYCODONE HYDROCHLORIDE 10 MG/1
10 TABLET ORAL EVERY 6 HOURS PRN
Qty: 120 TABLET | Refills: 0 | Status: SHIPPED | OUTPATIENT
Start: 2023-08-04 | End: 2023-09-03

## 2023-08-04 NOTE — TELEPHONE ENCOUNTER
Reviewed PDMP, last Rx for valium and oxycodone written 7/6/23, appropriate for refill, sent to pharmacy. Has controlled substance agreement active with MICHAELA hanks on urine testing.

## 2023-09-04 DIAGNOSIS — M96.1 POST LAMINECTOMY SYNDROME: ICD-10-CM

## 2023-09-04 DIAGNOSIS — K22.4 ESOPHAGEAL DYSMOTILITIES: ICD-10-CM

## 2023-09-04 DIAGNOSIS — F41.8 DEPRESSION WITH ANXIETY: ICD-10-CM

## 2023-09-04 DIAGNOSIS — J45.40 MODERATE PERSISTENT ASTHMA WITHOUT COMPLICATION: ICD-10-CM
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
## 2023-09-04 RX ORDER — DIAZEPAM 5 MG/1
5 TABLET ORAL EVERY 12 HOURS PRN
Qty: 45 TABLET | Refills: 0 | Status: CANCELLED | OUTPATIENT
Start: 2023-09-04

## 2023-09-05 RX ORDER — DILTIAZEM HYDROCHLORIDE 60 MG/1
60 TABLET, FILM COATED ORAL 2 TIMES DAILY
Qty: 180 TABLET | Refills: 0 | Status: SHIPPED | OUTPATIENT
Start: 2023-09-05

## 2023-09-06 RX ORDER — ACETAMINOPHEN 325 MG/1
975 TABLET ORAL EVERY 8 HOURS PRN
Qty: 270 TABLET | Refills: 0 | Status: SHIPPED | OUTPATIENT
Start: 2023-09-06

## 2023-09-21 ENCOUNTER — TELEPHONE (OUTPATIENT)
Dept: OBGYN CLINIC | Facility: HOSPITAL | Age: 53
End: 2023-09-21

## 2023-09-21 NOTE — TELEPHONE ENCOUNTER
Caller: Patient's spouse    Doctor: HAND    Reason for call: Seen in [place] ED 9/21/23  Records in Epic    Right hand    Finger avulsion, initial encounter   [S61.861S] Open wound of finger with tendon injury, initial encounter       Call back#: [phone]

## 2023-09-22 ENCOUNTER — HOSPITAL ENCOUNTER (OUTPATIENT)
Dept: RADIOLOGY | Facility: HOSPITAL | Age: 53
End: 2023-09-22
Attending: STUDENT IN AN ORGANIZED HEALTH CARE EDUCATION/TRAINING PROGRAM
Payer: MEDICARE

## 2023-09-22 ENCOUNTER — OFFICE VISIT (OUTPATIENT)
Dept: OBGYN CLINIC | Facility: CLINIC | Age: 53
End: 2023-09-22
Payer: MEDICARE

## 2023-09-22 VITALS — WEIGHT: 204 LBS | BODY MASS INDEX: 27.04 KG/M2 | HEIGHT: 73 IN

## 2023-09-22 DIAGNOSIS — S61.212A LACERATION OF RIGHT MIDDLE FINGER WITHOUT FOREIGN BODY WITHOUT DAMAGE TO NAIL, INITIAL ENCOUNTER: Primary | ICD-10-CM

## 2023-09-22 DIAGNOSIS — S61.322A: ICD-10-CM

## 2023-09-22 PROCEDURE — 99214 OFFICE O/P EST MOD 30 MIN: CPT | Performed by: STUDENT IN AN ORGANIZED HEALTH CARE EDUCATION/TRAINING PROGRAM

## 2023-09-22 PROCEDURE — 73140 X-RAY EXAM OF FINGER(S): CPT

## 2023-09-22 PROCEDURE — 64450 NJX AA&/STRD OTHER PN/BRANCH: CPT | Performed by: STUDENT IN AN ORGANIZED HEALTH CARE EDUCATION/TRAINING PROGRAM

## 2023-09-22 NOTE — PROGRESS NOTES
ORTHOPAEDIC HAND, WRIST, AND ELBOW OFFICE  VISIT       ASSESSMENT/PLAN:      Diagnoses and all orders for this visit:    Laceration of right middle finger without foreign body without damage to nail, initial encounter  -     XR finger right third digit-middle; Future  -     Nerve block  -     sulfamethoxazole-trimethoprim (BACTRIM DS) 800-160 mg per tablet; Take 1 tablet by mouth every 12 (twelve) hours for 10 days          52 y.o. male with right long finger laceration   Treatment options and expected outcomes were discussed. The patient verbalized understanding of exam findings and treatment plan. The patient was given the opportunity to ask questions. Questions were answered to the patient's satisfaction. Digital block was performed to remove dressing and better evaluate the wound. Clinical images were taken and uploaded   Discussed with the patient that this should heal well with non operative treatment options. The patient was advised to perform dressing changes every day or every other day. He may wash with soap and water. A prescription was provided for Bactrim for 10 days. Follow up in 1 week to reassess wound  Smoking cessation was discussed. Follow Up:  1 week       To Do Next Visit:  Re-evaluation of current issue          Mitesh Mahoney MD  Attending, Orthopaedic Surgery  Hand, Wrist, and Elbow Surgery  [facility]    ____________________________________________________________________________________________________________________________________________      CHIEF COMPLAINT:  Chief Complaint   Patient presents with   • Right Middle Finger - Pain       SUBJECTIVE:  Patient is a 52 y.o. RHD male who presents today for evaluation and treatment of right long finger laceration. The patient states on 9/20/23 he was sweeping and cut his long finger on a metal broom. The patient presented to the ED after the injury where x-rays were taken.  The patient states he is unsure if sutures were placed and almost passed out a few time due to blood. He was discharged in [address] [facility] but states he did not fill the prescription yet. The patient smokes 1 pack every 2 days.      Occupation- disabled due to back          I have personally reviewed all the relevant PMH, PSH, SH, FH, Medications and allergies      PAST MEDICAL HISTORY:  Past Medical History:   Diagnosis Date   • Allergic     seasonal    • Allergic rhinitis    • Anxiety    • Arthritis    • Back pain    • Chronic obstructive asthma ([address])    • Chronic pain syndrome    • Cluster headaches    • Colon polyp    • Depression    • Insomnia    • Kidney stones    • Laryngospasm    • Leukocytosis    • Migraine    • Myocardial infarction [facility])    • Nephrolithiasis    • Organic impotence    • Pneumonia due to infectious organism 1/16/2019   • Seasonal allergies    • Sleep apnea     does not use CPAP   • Stroke [facility] 2015   • TMJ (temporomandibular joint syndrome)    • Wheezing     last assessed 05/19/17       PAST SURGICAL HISTORY:  Past Surgical History:   Procedure Laterality Date   • BACK SURGERY      *9, [id number], nervectomy 2006, total of 10   • BUCCAL MASS EXCISION N/A 3/26/2018    Procedure: BIOPSY LINGUAL TONSIL (FLOW/ STANDARD PATH)  EVAL UNDER ANESTHESIA;  Surgeon: Malik Banks MD;  Location: [facility] MAIN OR;  Service: ENT   • COLONOSCOPY     • FL RETROGRADE PYELOGRAM  10/21/2020   • HERNIA REPAIR     • KIDNEY SURGERY     • KNEE ARTHROSCOPY     • LITHOTRIPSY      multiple   • LITHOTRIPSY     • LUMBAR DISC SURGERY  2015   • MANDIBLE FRACTURE SURGERY      titanium plate   • PELVIC SYMPHYSIS FUSION     • OH CYSTO/URETERO W/LITHOTRIPSY &INDWELL STENT INSRT Right 10/21/2020    Procedure: CYSTOSCOPY URETEROSCOPY WITH LITHOTRIPSY HOLMIUM LASER, RETROGRADE PYELOGRAM AND INSERTION STENT URETERAL;  Surgeon: Aimee Norwood MD;  Location: [facility] Main OR;  Service: Urology   • OH ESOPHAGOGASTRODUODENOSCOPY TRANSORAL DIAGNOSTIC N/A 2/22/2018 Procedure: ESOPHAGOGASTRODUODENOSCOPY (EGD); Surgeon: Jessie Camacho MD;  Location: [facility] GI LAB; Service: Gastroenterology   • NC ESOPHAGOGASTRODUODENOSCOPY TRANSORAL DIAGNOSTIC N/A 4/1/2019    Procedure: EGD W/ DILATION;  Surgeon: Jessie Camacho MD;  Location: [facility] GI LAB; Service: Gastroenterology   • NC FORREST IMPLTJ NSTIM ELTRDS PLATE/PADDLE EDRL N/A 9/8/2016    Procedure: DORSAL COLUMN STIMULATOR PLACEMENT (IMPULSE MONITORING);   Surgeon: Gissel Mcallister MD;  Location: [facility] MAIN OR;  Service: Orthopedics   • NC REVJ/RMVL IMPLANTED SPINAL NEUROSTIM GENERATOR Left 6/9/2017    Procedure: REMOVAL OF A THORACIC SCS PLACED VIA LAMINECTOMY AND REMOVAL LEFT BUTTOCK GENERATOR; IMPULSE MONITORING;  Surgeon: Gagan Hurd MD;  Location:  MAIN OR;  Service: Neurosurgery   • NC SURGICAL ARTHROSCOPY SHOULDER W/ROTATOR CUFF RPR Right 4/4/2019    Procedure: RIGHT SHOULDER ARTHROSCOPIC SUBSCAPULARIS TENDON REPAIR;  Surgeon: Garry Wheatley MD;  Location: [facility] MAIN OR;  Service: Orthopedics   • SACROILIAC JOINT FUSION  2015   • SHOULDER SURGERY Left     2   • UPPER GASTROINTESTINAL ENDOSCOPY         FAMILY HISTORY:  Family History   Problem Relation Age of Onset   • Leukemia Mother 68   • Hypertension Mother    • Diabetes Father    • Obesity Father    • Liver cancer Father    • Heart disease Father    • Diabetes Brother    • Hypertension Brother    • Skin cancer Maternal Grandfather [address] [facility],  [address]   • Cancer Family        SOCIAL HISTORY:  Social History     Tobacco Use   • Smoking status: Every Day     Packs/day: 1.00     Years: 25.00     Total pack years: 25.00     Types: Cigarettes   • Smokeless tobacco: Former   Vaping Use   • Vaping Use: Every day   • Substances: THC   Substance Use Topics   • Alcohol use: Yes     Comment: rarely    • Drug use: Yes     Frequency: 7.0 times per week     Types: Marijuana     Comment: medical marijuana daily for chronic pain 1/2 ounce       MEDICATIONS:    Current Outpatient Medications:   • acetaminophen (TYLENOL) 325 mg tablet, Take 3 tablets (975 mg total) by mouth every 8 (eight) hours as needed for mild pain, Disp: 270 tablet, Rfl: 0  •  albuterol (PROVENTIL HFA,VENTOLIN HFA) 90 mcg/act inhaler, Inhale 2 puffs every 6 (six) hours as needed for wheezing, Disp: 18 g, Rfl: 5  •  amLODIPine (NORVASC) 5 mg tablet, Take 1 tablet (5 mg total) by mouth daily, Disp: 90 tablet, Rfl: 1  •  diazepam (VALIUM) 5 mg tablet, Take 1 tablet (5 mg total) by mouth every 12 (twelve) hours as needed for anxiety or muscle spasms, Disp: 45 tablet, Rfl: 0  •  diltiazem (CARDIZEM) 60 mg tablet, Take 1 tablet (60 mg total) by mouth 2 (two) times a day, Disp: 180 tablet, Rfl: 0  •  fluticasone-vilanterol (BREO ELLIPTA) 200-25 MCG/INH inhaler, Inhale 1 puff daily Rinse mouth after use. (Patient taking differently: Inhale 1 puff if needed Rinse mouth after use.), Disp: 3 Inhaler, Rfl: 3  •  oxyCODONE (ROXICODONE) 10 MG TABS, Take 1 tablet (10 mg total) by mouth every 6 (six) hours as needed for moderate pain or severe pain Max Daily Amount: 40 mg, Disp: 120 tablet, Rfl: 0  •  sulfamethoxazole-trimethoprim (BACTRIM DS) 800-160 mg per tablet, Take 1 tablet by mouth every 12 (twelve) hours for 10 days, Disp: 20 tablet, Rfl: 0  •  amLODIPine (NORVASC) 2.5 mg tablet, Take 1 tablet (2.5 mg total) by mouth daily (Patient not taking: Reported on 7/21/2023), Disp: 60 tablet, Rfl: 0  •  hyoscyamine (LEVSIN/SL) 0.125 mg SL tablet, Take 1 tablet (0.125 mg total) by mouth every 6 (six) hours as needed for cramping, Disp: 90 tablet, Rfl: 5  •  lidocaine (Lidoderm) 5 %, Apply 1 patch topically over 12 hours daily for 7 days Remove & Discard patch within 12 hours or as directed by MD, Disp: 7 patch, Rfl: 0  •  naloxone (Narcan) 4 mg/0.1 mL nasal spray, In case of overdose: 1 spray in one nostril x 1, may repeat in 2 min if remains unresponsive.  (Patient not taking: Reported on 3/9/2023), Disp: 1 each, Rfl: 0    ALLERGIES:  Allergies   Allergen Reactions   • Other Rash     Adhesive tape           REVIEW OF SYSTEMS:  Musculoskeletal:        As noted in HPI. All other systems reviewed and are negative. VITALS:  There were no vitals filed for this visit. LABS:  HgA1c:   Lab Results   Component Value Date    HGBA1C 5.9 12/07/2017     BMP:   Lab Results   Component Value Date    GLUCOSE 90 12/24/2021    CALCIUM 9.0 07/01/2023     (H) 10/30/2015    K 4.6 07/01/2023    CO2 24 07/01/2023     (H) 07/01/2023    BUN 24 07/01/2023    CREATININE 1.32 (H) 07/01/2023       _____________________________________________________  PHYSICAL EXAMINATION:  General: Well developed and well nourished, alert & oriented x 3, appears comfortable  Psychiatric: Normal  HEENT: Normocephalic, Atraumatic Trachea Midline, No torticollis  Pulmonary: No audible wheezing or respiratory distress   Abdomen/GI: Non tender, non distended   Cardiovascular: No pitting edema, 2+ radial pulse   Skin: No Masses, No Fluctuation, No Ulcerations  Neurovascular: Sensation Intact to the Median, Ulnar, Radial Nerve, Motor Intact to the Median, Ulnar, Radial Nerve and Pulses Intact  Musculoskeletal: Normal, except as noted in detailed exam and in HPI. MUSCULOSKELETAL EXAMINATION:  Right long finger   16mm long by 14mm wide abrasive wound to the level of subcutaneous tissue  Extensor mechanism intact clinically  No exposed bone or tendon  ___________________________________________________  STUDIES REVIEWED:  Xrays of the right long finger were reviewed and independently interpreted in PACS by Dr. Jaky Stern and demonstrate possible base distal phalanx fracture. PROCEDURES PERFORMED:  Nerve block    Date/Time: 9/22/2023 1:00 PM    Performed by: Arash Montano MD  Authorized by: Arash Montano MD    Patient location:  [facility] Protocol:  Consent: Verbal consent obtained.   Consent given by: patient  Patient identity confirmed: verbally with patient      Indications:     Indications:  Pain relief  Location:     Body area:  Upper extremity    Upper extremity nerve:  Digital    Laterality:  Right  Procedure details (see MAR for exact dosages): Block needle gauge:  25 G    Anesthetic injected:  Lidocaine 1% w/o epi  Post-procedure details:     Patient tolerance of procedure:   Tolerated well, no immediate complications           _____________________________________________________      Scribe Attestation    I,:  Cintia Guzmán MA am acting as a scribe while in the presence of the attending physician.:       I,:  Navjot Campbell MD personally performed the services described in this documentation    as scribed in my presence.:

## 2023-09-23 RX ORDER — SULFAMETHOXAZOLE AND TRIMETHOPRIM 800; 160 MG/1; MG/1
1 TABLET ORAL EVERY 12 HOURS SCHEDULED
Qty: 20 TABLET | Refills: 0 | Status: SHIPPED | OUTPATIENT
Start: 2023-09-23 | End: 2023-10-03

## 2023-09-29 ENCOUNTER — OFFICE VISIT (OUTPATIENT)
Dept: OBGYN CLINIC | Facility: CLINIC | Age: 53
End: 2023-09-29
Payer: MEDICARE

## 2023-09-29 VITALS
HEART RATE: 118 BPM | BODY MASS INDEX: 27.04 KG/M2 | HEIGHT: 73 IN | WEIGHT: 204 LBS | SYSTOLIC BLOOD PRESSURE: 138 MMHG | DIASTOLIC BLOOD PRESSURE: 102 MMHG

## 2023-09-29 DIAGNOSIS — S61.212A LACERATION OF RIGHT MIDDLE FINGER WITHOUT FOREIGN BODY WITHOUT DAMAGE TO NAIL, INITIAL ENCOUNTER: Primary | ICD-10-CM

## 2023-09-29 DIAGNOSIS — F41.8 DEPRESSION WITH ANXIETY: ICD-10-CM

## 2023-09-29 DIAGNOSIS — M25.511 ACUTE PAIN OF RIGHT SHOULDER: ICD-10-CM

## 2023-09-29 DIAGNOSIS — M96.1 POST LAMINECTOMY SYNDROME: ICD-10-CM

## 2023-09-29 PROCEDURE — 99213 OFFICE O/P EST LOW 20 MIN: CPT | Performed by: STUDENT IN AN ORGANIZED HEALTH CARE EDUCATION/TRAINING PROGRAM

## 2023-09-29 RX ORDER — OXYCODONE HYDROCHLORIDE 10 MG/1
10 TABLET ORAL EVERY 6 HOURS PRN
Qty: 120 TABLET | Refills: 0 | Status: SHIPPED | OUTPATIENT
Start: 2023-10-03

## 2023-09-29 RX ORDER — METHOCARBAMOL 500 MG/1
500 TABLET, FILM COATED ORAL 2 TIMES DAILY PRN
Qty: 20 TABLET | Refills: 0 | Status: SHIPPED | OUTPATIENT
Start: 2023-09-29

## 2023-09-29 RX ORDER — DIAZEPAM 5 MG/1
5 TABLET ORAL EVERY 12 HOURS PRN
Qty: 45 TABLET | Refills: 0 | Status: SHIPPED | OUTPATIENT
Start: 2023-10-03

## 2023-09-29 NOTE — PROGRESS NOTES
ORTHOPAEDIC HAND, WRIST, AND ELBOW OFFICE  VISIT       ASSESSMENT/PLAN:      Diagnoses and all orders for this visit:    Laceration of right middle finger without foreign body without damage to nail, initial encounter  -     methocarbamol (ROBAXIN) 500 mg tablet; Take 1 tablet (500 mg total) by mouth 2 (two) times a day as needed for muscle spasms    Acute pain of right shoulder  -     Ambulatory Referral to Orthopedic Surgery; Future          52 y.o. male with right long finger laceration sustained 9/20/23  Pt overall healing well. No signs of infx today. At this time, can cover with a Band-Aid and use the splint to help maintain extension. Rx for robaxin sent to pharmacy  Call if any concerns  We will refer pt to Dr. Leatha Hdz for his shoulder pain      Follow Up:  10 days       To Do Next Visit:  Re-evaluation of current issue          Hoa Cohen MD  Attending, Orthopaedic Surgery  Hand, Wrist, and Elbow Surgery  [facility] Orthopaedic [facility]    ____________________________________________________________________________________________________________________________________________      CHIEF COMPLAINT:  Chief Complaint   Patient presents with   • Right Middle Finger - Follow-up       SUBJECTIVE:  Patient is a 52 y.o. RHD male who presents today for follow up of right long finger laceration sustained on 09/20/23 when he cut his finger on a metal broom. Pt states his wound has stopped bleeding, but he still has a lot of pain here. He has continued to take Bactrim 2x/day. Uses ibuprofen, aleve and tylenol for pain. In addition pt states he has had pain in his right shoulder since this incident. He did have rotator cuff surgery by Dr. Mckay Patel approx 5 years ago. RTC surgery 5 years     Smokes 1 pack every 2 days.   Occupation - disabled d/t back     I have personally reviewed all the relevant PMH, PSH, SH, FH, Medications and allergies      PAST MEDICAL HISTORY:  Past Medical History:   Diagnosis Date • Allergic     seasonal    • Allergic rhinitis    • Anxiety    • Arthritis    • Back pain    • Chronic obstructive asthma (HCC)    • Chronic pain syndrome    • Cluster headaches    • Colon polyp    • Depression    • Insomnia    • Kidney stones    • Laryngospasm    • Leukocytosis    • Migraine    • Myocardial infarction [facility])    • Nephrolithiasis    • Organic impotence    • Pneumonia due to infectious organism 1/16/2019   • Seasonal allergies    • Sleep apnea     does not use CPAP   • Stroke [facility] 2015   • TMJ (temporomandibular joint syndrome)    • Wheezing     last assessed 05/19/17       PAST SURGICAL HISTORY:  Past Surgical History:   Procedure Laterality Date   • BACK SURGERY      *9, [id number], nervectomy 2006, total of 10   • BUCCAL MASS EXCISION N/A 3/26/2018    Procedure: BIOPSY LINGUAL TONSIL (FLOW/ STANDARD PATH)  EVAL UNDER ANESTHESIA;  Surgeon: Aurelia Dandy, MD;  Location: [facility] MA[facility] OR;  Service: ENT   • COLONOSCOPY     • FL RETROGRADE PYELOGRAM  10/21/2020   • HERNIA REPAIR     • KIDNEY SURGERY     • KNEE ARTHROSCOPY     • LITHOTRIPSY      multiple   • LITHOTRIPSY     • LUMBAR DISC SURGERY  2015   • MANDIBLE FRACTURE SURGERY      titanium plate   • PELVIC SYMPHYSIS FUSION     • [place] CYSTO/URETERO W/LITHOTRIPSY &INDWELL STENT INSRT Right 10/21/2020    Procedure: CYSTOSCOPY URETEROSCOPY WITH LITHOTRIPSY HOLMIUM LASER, RETROGRADE PYELOGRAM AND INSERTION STENT URETERAL;  Surgeon: Woodrow Huizar MD;  Location: [facility] Main OR;  Service: Urology   • W[place] ESOPHAGOGASTRODUODENOSCOPY TRANSORAL DIAGNOSTIC N/A 2/22/2018    Procedure: ESOPHAGOGASTRODUODENOSCOPY (EGD); Surgeon: Amalia Ricci MD;  Location: [facility] GI LAB; Service: Gastroenterology   • W[place] ESOPHAGOGASTRODUODENOSCOPY TRANSORAL DIAGNOSTIC N/A 4/1/2019    Procedure: EGD W/ DILATION;  Surgeon: Amalia Ricci MD;  Location: [facility] SP GI LAB;   Service: Gastroenterology   • [place] FORREST IMPLTJ NSTIM ELTRDS PLATE/PADDLE EDRL N/A 9/8/2016    Procedure: DORSAL COLUMN STIMULATOR PLACEMENT (IMPULSE MONITORING);   Surgeon: Oksana Pérez MD;  Location: [facility] OR;  Service: Orthopedics   • [place] REVJ/RMVL IMPLANTED SPINAL NEUROSTIM GENERATOR Left 6/9/2017    Procedure: REMOVAL OF A THORACIC SCS PLACED VIA LAMINECTOMY AND REMOVAL LEFT BUTTOCK GENERATOR; IMPULSE MONITORING;  Surgeon: Anders Shahid MD;  Location: [facility] OR;  Service: Neurosurgery   • MN SURGICAL ARTHROSCOPY SHOULDER W/ROTATOR CUFF RPR Right 4/4/2019    Procedure: RIGHT SHOULDER ARTHROSCOPIC SUBSCAPULARIS TENDON REPAIR;  Surgeon: Fredo Dalton MD;  Location: [facility] MAIN OR;  Service: Orthopedics   • SACROILIAC JOINT FUSION  2015   • SHOULDER SURGERY Left     2   • UPPER GASTROINTESTINAL ENDOSCOPY         FAMILY HISTORY:  Family History   Problem Relation Age of Onset   • Leukemia Mother 68   • Hypertension Mother    • Diabetes Father    • Obesity Father    • Liver cancer Father    • Heart disease Father    • Diabetes Brother    • Hypertension Brother    • Skin cancer Maternal Grandfather 80   • Cancer Family        SOCIAL HISTORY:  Social History     Tobacco Use   • Smoking status: Every Day     Packs/day: 1.00     Years: 25.00     Total pack years: 25.00     Types: Cigarettes   • Smokeless tobacco: Former   Vaping Use   • Vaping Use: Every day   • Substances: THC   Substance Use Topics   • Alcohol use: Yes     Comment: rarely    • Drug use: Yes     Frequency: 7.0 times per week     Types: Marijuana     Comment: medical marijuana daily for chronic pain 1/2 ounce       MEDICATIONS:    Current Outpatient Medications:   •  acetaminophen (TYLENOL) 325 mg tablet, Take 3 tablets (975 mg total) by mouth every 8 (eight) hours as needed for mild pain, Disp: 270 tablet, Rfl: 0  •  albuterol (PROVENTIL HFA,VENTOLIN HFA) 90 mcg/act inhaler, Inhale 2 puffs every 6 (six) hours as needed for wheezing, Disp: 18 g, Rfl: 5  •  amLODIPine (NORVASC) 5 mg tablet, Take 1 tablet (5 mg total) by mouth daily, Disp: 90 tablet, Rfl: 1  • diltiazem (CARDIZEM) 60 mg tablet, Take 1 tablet (60 mg total) by mouth 2 (two) times a day, Disp: 180 tablet, Rfl: 0  •  fluticasone-vilanterol (BREO ELLIPTA) 200-25 MCG/INH inhaler, Inhale 1 puff daily Rinse mouth after use. (Patient taking differently: Inhale 1 puff if needed Rinse mouth after use.), Disp: 3 Inhaler, Rfl: 3  •  methocarbamol (ROBAXIN) 500 mg tablet, Take 1 tablet (500 mg total) by mouth 2 (two) times a day as needed for muscle spasms, Disp: 20 tablet, Rfl: 0  •  sulfamethoxazole-trimethoprim (BACTRIM DS) 800-160 mg per tablet, Take 1 tablet by mouth every 12 (twelve) hours for 10 days, Disp: 20 tablet, Rfl: 0  •  amLODIPine (NORVASC) 2.5 mg tablet, Take 1 tablet (2.5 mg total) by mouth daily (Patient not taking: Reported on 7/21/2023), Disp: 60 tablet, Rfl: 0  •  [START ON 10/3/2023] diazepam (VALIUM) 5 mg tablet, Take 1 tablet (5 mg total) by mouth every 12 (twelve) hours as needed for anxiety or muscle spasms Do not start before October 3, 2023., Disp: 45 tablet, Rfl: 0  •  hyoscyamine (LEVSIN/SL) 0.125 mg SL tablet, Take 1 tablet (0.125 mg total) by mouth every 6 (six) hours as needed for cramping, Disp: 90 tablet, Rfl: 5  •  lidocaine (Lidoderm) 5 %, Apply 1 patch topically over 12 hours daily for 7 days Remove & Discard patch within 12 hours or as directed by MD, Disp: 7 patch, Rfl: 0  •  naloxone (Narcan) 4 mg/0.1 mL nasal spray, In case of overdose: 1 spray in one nostril x 1, may repeat in 2 min if remains unresponsive. (Patient not taking: Reported on 3/9/2023), Disp: 1 each, Rfl: 0  •  [START ON 10/3/2023] oxyCODONE (ROXICODONE) 10 MG TABS, Take 1 tablet (10 mg total) by mouth every 6 (six) hours as needed for moderate pain or severe pain Max Daily Amount: 40 mg Do not start before October 3, 2023., Disp: 120 tablet, Rfl: 0    ALLERGIES:  Allergies   Allergen Reactions   • Other Rash     Adhesive tape           REVIEW OF SYSTEMS:  Musculoskeletal:        As noted in HPI.    All other systems reviewed and are negative. VITALS:  Vitals:    09/29/23 1445   BP: (!) 138/102   Pulse: (!) 118       LABS:  HgA1c:   Lab Results   Component Value Date    HGBA1C 5.9 12/07/2017     BMP:   Lab Results   Component Value Date    GLUCOSE 90 12/24/2021    CALCIUM 9.0 07/01/2023     (H) 10/30/2015    K 4.6 07/01/2023    CO2 24 07/01/2023     (H) 07/01/2023    BUN 24 07/01/2023    CREATININE 1.32 (H) 07/01/2023       _____________________________________________________  PHYSICAL EXAMINATION:  General: Well developed and well nourished, alert & oriented x 3, appears comfortable  Psychiatric: Normal  HEENT: Normocephalic, Atraumatic Trachea Midline, No torticollis  Pulmonary: No audible wheezing or respiratory distress   Abdomen/GI: Non tender, non distended   Cardiovascular: No pitting edema, 2+ radial pulse   Skin: No Masses, No Erythema, No Fluctuation, No Ulcerations  Neurovascular: Sensation Intact to the Median, Ulnar, Radial Nerve, Motor Intact to the Median, Ulnar, Radial Nerve and Pulses Intact  Musculoskeletal: Normal, except as noted in detailed exam and in HPI. MUSCULOSKELETAL EXAMINATION:  Right Long Finger:  16mm W x 18mm L abrasive wound down to the level of the subcutaneous tissue on the dorsal middle finger near level of DIP. Extensor mechanism intact.   No exposed bone or tendon.    ___________________________________________________  STUDIES REVIEWED:  No studies to review         PROCEDURES PERFORMED:  Procedures  No Procedures performed today    _____________________________________________________      Sharda Martinez I,:  Mis Ivan PA-C am acting as a scribe while in the presence of the attending physician.:       I,:  Cathren Kocher, MD personally performed the services described in this documentation    as scribed in my presence.:

## 2023-09-29 NOTE — TELEPHONE ENCOUNTER
Rx sent to pharmacy with start date 10/3/23; UTD on controlled substance agreement, due for repeat [employer] testing 2/2024, PDMP reviewed.

## 2023-10-04 ENCOUNTER — HOSPITAL ENCOUNTER (OUTPATIENT)
Dept: RADIOLOGY | Facility: HOSPITAL | Age: 53
Discharge: HOME | End: 2023-10-04
Attending: ORTHOPAEDIC SURGERY
Payer: MEDICARE

## 2023-10-04 ENCOUNTER — OFFICE VISIT (OUTPATIENT)
Dept: OBGYN CLINIC | Facility: CLINIC | Age: 53
End: 2023-10-04
Payer: MEDICARE

## 2023-10-04 ENCOUNTER — TELEPHONE (OUTPATIENT)
Dept: FAMILY MEDICINE CLINIC | Facility: CLINIC | Age: 53
End: 2023-10-04

## 2023-10-04 VITALS
SYSTOLIC BLOOD PRESSURE: 175 MMHG | DIASTOLIC BLOOD PRESSURE: 110 MMHG | HEIGHT: 73 IN | WEIGHT: 210.4 LBS | HEART RATE: 74 BPM | BODY MASS INDEX: 27.89 KG/M2

## 2023-10-04 DIAGNOSIS — M25.511 ACUTE PAIN OF RIGHT SHOULDER: ICD-10-CM

## 2023-10-04 DIAGNOSIS — M96.1 POST LAMINECTOMY SYNDROME: ICD-10-CM

## 2023-10-04 PROCEDURE — 73030 X-RAY EXAM OF SHOULDER: CPT

## 2023-10-04 PROCEDURE — 99214 OFFICE O/P EST MOD 30 MIN: CPT | Performed by: ORTHOPAEDIC SURGERY

## 2023-10-04 RX ORDER — OXYCODONE HYDROCHLORIDE 10 MG/1
10 TABLET ORAL EVERY 6 HOURS PRN
Qty: 120 TABLET | Refills: 0 | Status: CANCELLED | OUTPATIENT
Start: 2023-10-04

## 2023-10-04 RX ORDER — METHYLPREDNISOLONE 4 MG/1
TABLET ORAL
Qty: 21 TABLET | Refills: 0 | Status: SHIPPED | OUTPATIENT
Start: 2023-10-04

## 2023-10-04 NOTE — PROGRESS NOTES
[address]  [address]  [facility] [place] [address]  [phone]       Elle Schmitt  [id number]  1970    ORTHOPAEDIC SURGERY OUTPATIENT NOTE  10/4/2023      HISTORY:  52 y.o. male presents today to establish care for right shoulder pain. He was referred by Dr. Dagmar Deluca. He has history of right shoulder rotator cuff repair (subscap) arthroscopy by Dr. Sheng Leo on 4/4/20219. He reports a history of previous rotator cuff repair prior to that as well. He reports on 9/20 he cut his finger and pulled his arm back quickly and had pain in the shoulder. Since then he reports continued pain in the anterior shoulder.        Past Medical History:   Diagnosis Date   • Allergic     seasonal    • Allergic rhinitis    • Anxiety    • Arthritis    • Back pain    • Chronic obstructive asthma    • Chronic pain syndrome    • Cluster headaches    • Colon polyp    • Depression    • Insomnia    • Kidney stones    • Laryngospasm    • Leukocytosis    • Migraine    • Myocardial infarction [facility])    • Nephrolithiasis    • Organic impotence    • Pneumonia due to infectious organism 1/16/2019   • Seasonal allergies    • Sleep apnea     does not use CPAP   • Stroke [facility]) 2015   • TMJ (temporomandibular joint syndrome)    • Wheezing     last assessed 05/19/17       Past Surgical History:   Procedure Laterality Date   • BACK SURGERY      *9, [id number], nervectomy 2006, total of 10   • BUCCAL MASS EXCISION N/A 3/26/2018    Procedure: BIOPSY LINGUAL TONSIL (FLOW/ STANDARD PATH)  EVAL UNDER ANESTHESIA;  Surgeon: Edvin Walton MD;  [facility];  Service: ENT   • COLONOSCOPY     • FL RETROGRADE PYELOGRAM  10/21/2020   • HERNIA REPAIR     • KIDNEY SURGERY     • KNEE ARTHROSCOPY     • LITHOTRIPSY      multiple   • LITHOTRIPSY     • LUMBAR DISC SURGERY  2015   • MANDIBLE FRACTURE SURGERY      titanium plate   • PELVIC SYMPHYSIS FUSION     • GA CYSTO/URETERO W/LITHOTRIPSY &INDWELL STENT INSRT Right 10/21/2020    Procedure: CYSTOSCOPY URETEROSCOPY WITH LITHOTRIPSY HOLMIUM LASER, RETROGRADE PYELOGRAM AND INSERTION STENT URETERAL;  Surgeon: Lul Russell MD;  Location[facility];  Service: Urology   • NY ESOPHAGOGASTRODUODENOSCOPY TRANSORAL DIAGNOSTIC N/A 2/22/2018    Procedure: ESOPHAGOGASTRODUODENOSCOPY (EGD); Surgeon: Ginger Fields MD;  Location: [facility]; Service: Gastroenterology   • NY ESOPHAGOGASTRODUODENOSCOPY TRANSORAL DIAGNOSTIC N/A 4/1/2019    Procedure: EGD W/ DILATION;  Surgeon: Ginger Fields MD;  Location: [facility]; Service: Gastroenterology   • NY FORREST IMPLTJ NSTIM ELTRDS PLATE/PADDLE EDRL N/A 9/8/2016    Procedure: DORSAL COLUMN STIMULATOR PLACEMENT (IMPULSE MONITORING);   Surgeon: Artur Saunders MD;  [facility];  Service: Orthopedics   • NY REVJ/RMVL IMPLANTED SPINAL NEUROSTIM GENERATOR Left 6/9/2017    Procedure: REMOVAL OF A THORACIC SCS PLACED VIA LAMINECTOMY AND REMOVAL LEFT BUTTOCK GENERATOR; IMPULSE MONITORING;  Surgeon: Karina Mo MD;  Location: [facility];  Service: Neurosurgery   • NY SURGICAL ARTHROSCOPY SHOULDER W/ROTATOR CUFF RPR Right 4/4/2019    Procedure: RIGHT SHOULDER ARTHROSCOPIC SUBSCAPULARIS TENDON REPAIR;  Surgeon: Rexann Litten, MD;  [facility];  Service: Orthopedics   • SACROILIAC JOINT FUSION  2015   • SHOULDER SURGERY Left     2   • UPPER GASTROINTESTINAL ENDOSCOPY         Social History     Socioeconomic History   • Marital status:      Spouse name: Not on file   • Number of children: Not on file   • Years of education: Not on file   • Highest education level: Not on file   Occupational History   • Occupation: Disability   Tobacco Use   • Smoking status: Every Day     Packs/day: 1.00     Years: 25.00     Total pack years: 25.00     Types: Cigarettes   • Smokeless tobacco: Former   Vaping Use   • Vaping Use: Every day   • Substances: THC   Substance and Sexual Activity   • Alcohol use: Yes     Comment: rarely    • Drug use: Yes     Frequency: 7.0 times per week     Types: Marijuana     Comment: medical marijuana daily for chronic pain 1/2 ounce   • Sexual activity: Yes     Partners: Female   Other Topics Concern   • Not on file   Social History Narrative    As per Allscripts: Denied: History of Current smoker        As per allscripts: Smoking     Social Determinants of Health     Financial Resource Strain: High Risk (10/25/2022)    Overall Financial Resource Strain (CARDIA)    • Difficulty of Paying Living Expenses: Hard   Food Insecurity: Food Insecurity Present (10/14/2020)    Hunger Vital Sign    • Worried About Running Out of Food in the Last Year: Often true    • Ran Out of Food in the Last Year: Often true   Transportation Needs: No Transportation Needs (5/13/2022)    PRAPARE - Transportation    • Lack of Transportation (Medical): No    • Lack of Transportation (Non-Medical):  No   Physical Activity: Not on file   Stress: Stress Concern Present (5/13/2022)    [facility]    • Feeling of Stress : Very much   Social Connections: Not on file   Intimate Partner Violence: Not on file   Housing Stability: Low Risk  (5/13/2022)    Housing Stability Vital Sign    • Unable to Pay for Housing in the Last Year: No    • Number of Places Lived in the Last Year: 1    • Unstable Housing in the Last Year: No       Family History   Problem Relation Age of Onset   • Leukemia Mother 68   • Hypertension Mother    • Diabetes Father    • Obesity Father    • Liver cancer Father    • Heart disease Father    • Diabetes Brother    • Hypertension Brother    • Skin cancer Maternal Grandfather 80   • Cancer Family         Patient's Medications   New Prescriptions    No medications on file   Previous Medications    ACETAMINOPHEN (TYLENOL) 325 MG TABLET    Take 3 tablets (975 mg total) by mouth every 8 (eight) hours as needed for mild pain    ALBUTEROL (PROVENTIL HFA,VENTOLIN HFA) 90 MCG/ACT INHALER    Inhale 2 puffs every 6 (six) hours as needed for wheezing    AMLODIPINE (NORVASC) 2.5 MG TABLET    Take 1 tablet (2.5 mg total) by mouth daily    AMLODIPINE (NORVASC) 5 MG TABLET    Take 1 tablet (5 mg total) by mouth daily    DIAZEPAM (VALIUM) 5 MG TABLET    Take 1 tablet (5 mg total) by mouth every 12 (twelve) hours as needed for anxiety or muscle spasms Do not start before October 3, 2023. DILTIAZEM (CARDIZEM) 60 MG TABLET    Take 1 tablet (60 mg total) by mouth 2 (two) times a day    FLUTICASONE-VILANTEROL (BREO ELLIPTA) 200-25 MCG/INH INHALER    Inhale 1 puff daily Rinse mouth after use. HYOSCYAMINE (LEVSIN/SL) 0.125 MG SL TABLET    Take 1 tablet (0.125 mg total) by mouth every 6 (six) hours as needed for cramping    LIDOCAINE (LIDODERM) 5 %    Apply 1 patch topically over 12 hours daily for 7 days Remove & Discard patch within 12 hours or as directed by MD    METHOCARBAMOL (ROBAXIN) 500 MG TABLET    Take 1 tablet (500 mg total) by mouth 2 (two) times a day as needed for muscle spasms    NALOXONE (NARCAN) 4 MG/0.1 ML NASAL SPRAY    In case of overdose: 1 spray in one nostril x 1, may repeat in 2 min if remains unresponsive. OXYCODONE (ROXICODONE) 10 MG TABS    Take 1 tablet (10 mg total) by mouth every 6 (six) hours as needed for moderate pain or severe pain Max Daily Amount: 40 mg Do not start before October 3, 2023. Modified Medications    No medications on file   Discontinued Medications    No medications on file       Allergies   Allergen Reactions   • Other Rash     Adhesive tape        BP (!) 175/110 (BP Location: Left arm, Patient Position: Sitting, Cuff Size: Standard)   Pulse 74   Ht 6' 1" (1.854 m)   Wt 95.4 kg (210 lb 6.4 oz)   BMI 27.76 kg/m²      REVIEW OF SYSTEMS:  Constitutional: Negative. HEENT: Negative. Respiratory: Negative. Skin: Negative. Neurological: Negative.     Psychiatric/Behavioral: Negative. Musculoskeletal: Negative except for that mentioned in the HPI. PHYSICAL EXAM:     RIGHT SHOULDER:     NVI axillary/medial/radial/ulnar and sensory intact     Forward flexion:   90 degrees   Abduction:  80 degrees   External rotation at 0 degrees:  30 degrees   Internal rotation: L5     STRENGTH:  Forward flexion:  5/5   Abduction:  5/5   External rotation:  5/5   Internal rotation:  painful with resisted     Speed test: Negative  Yergason's: Negative   Tender to palpation ACJ: Negative   Tender to palpation LHB: positive  Dumont test: Negative  [place]'s: Negative  Hornblower's: Negative  Lift off: Negative  Belly press: positive  Bear hug: positive  External lag sign: Negative  Cross-body adduction: Negative  Sulcus sign: Negative  Janette's test: Negative  Drop arm test: Negative        No scapular winging or dyskinesis     Capillary refill brisk   Full ROM of elbows bilaterally      Skin:  No ecchymosis/abrasion/erythema/warmth     Cervical spine:   Full rotation, side bending, flexion and extension without radiating pain  Spurling's: Negative    IMAGING:  XR of the right shoulder taken today was reviewed and this demonstrates no acute fracture or dislocation. No significant degenerative changes. ASSESSMENT AND PLAN: 52 y.o. male  With right shoulder pain, previous RTC repair, with pain over the anterior shoulder after trauma and positive subscap testing. We will order an MRI to evaluate his RTC due to previous repair. We will see him back after her MRI.      Scribe Attestation      I,:  Oma Byrd PA-C am acting as a scribe while in the presence of the attending physician.:       I,:  Lui Fountain personally performed the services described in this documentation    as scribed in my presence.:

## 2023-10-04 NOTE — TELEPHONE ENCOUNTER
I refilled medication on 9/29 with start date 10/3, but apparently it needs a prior auth, which I filled out today, but patient needs updated UDS and office visit to comply with the insurance requirements. Since he cannot come in until next week and the millenium testing takes weeks to come back, I asked clinical staff to call his insurance company and find out if we can get a temporary prior auth approved similar to the one from 4/2023. The out of pocket cost for the Rx is apparently over $200, and there was no Alfrescox coupon for his dosage of medication (only have a coupon for 5 mg). I spoke with Jasiel Ortega via phone and let him know we will reach back out to him after clinical staff is able to figure out if the temporary Flakito Margarita is an option.

## 2023-10-06 ENCOUNTER — TELEPHONE (OUTPATIENT)
Dept: FAMILY MEDICINE CLINIC | Facility: CLINIC | Age: 53
End: 2023-10-06

## 2023-10-06 DIAGNOSIS — Z79.891 LONG TERM (CURRENT) USE OF OPIATE ANALGESIC: Primary | ICD-10-CM

## 2023-10-06 NOTE — TELEPHONE ENCOUNTER
Encounter for forms    Please refer to the following information:       Type of Form: Yordy Jacobsen    Date of Visit (if applicable):     Doctor: Jim Nelson    Fax number: [phone]    Patient phone number:  [phone]    Original in Dr. Jim Nelson folder to be completed.

## 2023-10-06 NOTE — TELEPHONE ENCOUNTER
Reviewed paperwork from insurance, for renewal request they state that urine drug screen must be performed every 12 months, not every 6 months. Filled out renewal request again gave it to clinical staff for faxing and to reach out to insurance company so that they may review the prior Auth again. Since [facility] testing does take several weeks to come back, I also did place urine drug orders that patient could do at the lab so at least we have some repeats results in process.

## 2023-10-09 ENCOUNTER — OFFICE VISIT (OUTPATIENT)
Dept: FAMILY MEDICINE CLINIC | Facility: CLINIC | Age: 53
End: 2023-10-09

## 2023-10-09 VITALS
DIASTOLIC BLOOD PRESSURE: 90 MMHG | BODY MASS INDEX: 28.76 KG/M2 | RESPIRATION RATE: 18 BRPM | HEART RATE: 65 BPM | TEMPERATURE: 97 F | HEIGHT: 73 IN | SYSTOLIC BLOOD PRESSURE: 173 MMHG | WEIGHT: 217 LBS | OXYGEN SATURATION: 100 %

## 2023-10-09 DIAGNOSIS — I10 HYPERTENSION, UNSPECIFIED TYPE: ICD-10-CM

## 2023-10-09 DIAGNOSIS — Z79.891 LONG TERM PRESCRIPTION OPIATE USE: ICD-10-CM

## 2023-10-09 DIAGNOSIS — Z79.899 LONG-TERM CURRENT USE OF BENZODIAZEPINE: ICD-10-CM

## 2023-10-09 DIAGNOSIS — Z00.00 ANNUAL PHYSICAL EXAM: Primary | ICD-10-CM

## 2023-10-09 RX ORDER — CEPHALEXIN 500 MG/1
CAPSULE ORAL
COMMUNITY
Start: 2023-09-23

## 2023-10-09 NOTE — PROGRESS NOTES
ADULT ANNUAL PHYSICAL  [facility]    NAME: Chanel Max  AGE: 52 y.o. SEX: male  : 1970     DATE: 10/9/2023     Assessment and Plan:     Problem List Items Addressed This Visit          Cardiovascular and Mediastinum    Hypertension     BP above goal, but patient in significant pain given he is out of his chronic pain medication and may also be experiencing some degree of opiate withdrawal. On cardizem for hypercontractile esophagus, continue for now, measure BP at home and record if remains above goal despite improvement in pain once he is able to resume his pain medications (prior auth in-process).             Other    Long-term current use of benzodiazepine    Relevant Orders    [facility] All Prescribed Meds and Special Instructions    Amphetamines, Methamphetamines    Butalbital    Phenobarbital    Secobarbital    Alprazolam    Clonazepam    Diazepam    Lorazepam    Gabapentin    Pregabalin    Cocaine    Heroin    Buprenorphine    Levorphanol    Meperidine    Naltrexone    Fentanyl    Methadone    Oxycodone    Tapentadol    THC    Tramadol    Codeine, Hydrocodone, Hydropmorphone, Morphine    Bath Salts    Ethyl Glucuronide/Ethyl Sulfate    Kratom    Spice    Methylphenidate    Phentermine    Validity Oxidant    Validity Creatinine    Validity pH    Validity Specific    Long term prescription opiate use    Relevant Orders    [facility] All Prescribed Meds and Special Instructions    Amphetamines, Methamphetamines    Butalbital    Phenobarbital    Secobarbital    Alprazolam    Clonazepam    Diazepam    Lorazepam    Gabapentin    Pregabalin    Cocaine    Heroin    Buprenorphine    Levorphanol    Meperidine    Naltrexone    Fentanyl    Methadone    Oxycodone    Tapentadol    THC    Tramadol    Codeine, Hydrocodone, Hydropmorphone, Morphine    Bath Salts    Ethyl Glucuronide/Ethyl Sulfate    Kratom    Spice    Methylphenidate    Phentermine Validity Oxidant    Validity Creatinine    Validity pH    Validity Specific     Other Visit Diagnoses       Annual physical exam    -  Primary            Immunizations and preventive care screenings were discussed with patient today. Appropriate education was printed on patient's after visit summary. Counseling:  Dental Health: discussed importance of regular tooth brushing, flossing, and dental visits. Exercise: the importance of regular exercise/physical activity was discussed. Recommend exercise 3-5 times per week for at least 30 minutes. Return in about 3 months (around 1/9/2024). Chief Complaint:     Chief Complaint   Patient presents with    Physical Exam     Shoulder and back pain       History of Present Illness:     Adult Annual Physical   Patient here for a comprehensive physical exam. The patient reports problems - right shoulder pain exacerbated by right middle finger wound . Patient has chronic back and right shoulder pain secondary to multiple prior injuries and surgeries. Three weeks ago, patient was assisting aunt with housekeeping. During that time, patient had unintentionally injured their right middle finger with a broken metal broomstick. Patient immediately went to the ER and received tetanus prophylaxis and referral to orthopedics. At referral, patient had X-ray showing some possible bone chipping (fracture) but no signs of infection. Patient has since had exacerbation of chronic back and shoulder pain, likely due to sudden jerking movement of right arm/shoulder backwards following injury. Patient has had difficulty filling medication prescriptions over the past week due to complications with insurance, prior Sarai Blowers is in-process. As such, patient has been experiencing high levels of pain. Due for UDS for insurance as well. UTD on colonoscopy, due for CT chest in January. Diet and Physical Activity  Diet/Nutrition: poor diet and frequent junk food.    Exercise:  no excersize past three weeks due to acute injury, otherwise patient has been walking around 2 hours a day . General Health  Sleep: sleeps poorly, gets more than 8 hours of sleep on average, unrefreshing sleep and wakes up 1-4 times a night and is may be awake in the middle of the night for an hour . Hearing: normal - bilateral and patient endorses left ear tinnitus . Vision: most recent eye exam <1 year ago, wears glasses and patient notes declining vision with more predominant worsening of left eye vision . Dental: no dental visits for >1 year and has frequent dental pain with eating .  Health  Symptoms include: none     Review of Systems:     Review of Systems   Constitutional:  Positive for activity change (decreased activity 2/2 pain) and fatigue. Negative for chills and fever. HENT:  Positive for tinnitus (left side, referred to ENT previously). Negative for congestion, facial swelling, rhinorrhea, sinus pain, sore throat and trouble swallowing. Eyes:  Negative for photophobia, pain and redness. Respiratory:  Negative for cough, choking, chest tightness and shortness of breath. Cardiovascular:  Negative for chest pain. Gastrointestinal:  Positive for constipation (chronic OIC) and rectal pain. Negative for abdominal pain, blood in stool, diarrhea, nausea and vomiting. Genitourinary:  Negative for difficulty urinating, dysuria, enuresis, flank pain and hematuria. Musculoskeletal:  Positive for back pain and neck pain. Negative for gait problem. Skin:  Positive for wound (right middle finger wound). Neurological:  Negative for dizziness.       Past Medical History:     Past Medical History:   Diagnosis Date    Allergic     seasonal     Allergic rhinitis     Anxiety     Arthritis     Back pain     Chronic obstructive asthma     Chronic pain syndrome     Cluster headaches     Colon polyp     Depression     Insomnia     Kidney stones     Laryngospasm     Leukocytosis     Migraine     Myocardial infarction [facility])     Nephrolithiasis     Organic impotence     Pneumonia due to infectious organism 1/16/2019    Seasonal allergies     Sleep apnea     does not use CPAP    Stroke ([address]) 2015    TMJ (temporomandibular joint syndrome)     Wheezing     last assessed 05/19/17      Past Surgical History:     Past Surgical History:   Procedure Laterality Date    BACK SURGERY      *9, [id number], nervectomy 2006, total of 10    BUCCAL MASS EXCISION N/A 3/26/2018    Procedure: BIOPSY LINGUAL TONSIL (FLOW/ STANDARD PATH)  EVAL UNDER ANESTHESIA;  Surgeon: Roberth Escalante MD;  [facility];  Service: ENT    COLONOSCOPY      FL RETROGRADE PYELOGRAM  10/21/2020    HERNIA REPAIR      KIDNEY SURGERY      KNEE ARTHROSCOPY      LITHOTRIPSY      multiple    LITHOTRIPSY      LUMBAR [address]  2015    MANDIBLE FRACTURE SURGERY      titanium plate    PELVIC SYMPHYSIS FUSION      CO CYSTO/URETERO W/LITHOTRIPSY &INDWELL STENT INSRT Right 10/21/2020    Procedure: CYSTOSCOPY URETEROSCOPY WITH LITHOTRIPSY HOLMIUM LASER, RETROGRADE PYELOGRAM AND INSERTION STENT URETERAL;  Surgeon: Krystin Shannon MD;  [facility];  Service: Urology    CO ESOPHAGOGASTRODUODENOSCOPY TRANSORAL DIAGNOSTIC N/A 2/22/2018    Procedure: ESOPHAGOGASTRODUODENOSCOPY (EGD); Surgeon: Konstantin Perez MD;  Location: [facility]; Service: Gastroenterology    CO ESOPHAGOGASTRODUODENOSCOPY TRANSORAL DIAGNOSTIC N/A 4/1/2019    Procedure: EGD W/ DILATION;  Surgeon: Konstantin Perez MD;  [facility]; Service: Gastroenterology    CO FORREST IMPLTJ NSTIM ELTRDS PLATE/PADDLE EDRL N/A 9/8/2016    Procedure: DORSAL COLUMN STIMULATOR PLACEMENT (IMPULSE MONITORING);   Surgeon: Marvin Monahan MD;  Location: [facility];  Service: Orthopedics    CO REVJ/RMVL IMPLANTED SPINAL NEUROSTIM GENERATOR Left 6/9/2017    Procedure: REMOVAL OF A THORACIC SCS PLACED VIA LAMINECTOMY AND REMOVAL LEFT BUTTOCK GENERATOR; IMPULSE MONITORING;  Surgeon: Kevin Cheung MD; Location: [facility];  Service: Neurosurgery    CO SURGICAL ARTHROSCOPY SHOULDER W/ROTATOR CUFF RPR Right 4/4/2019    Procedure: RIGHT SHOULDER ARTHROSCOPIC SUBSCAPULARIS TENDON REPAIR;  Surgeon: Maryanne Pena MD;  [facility];  Service: Orthopedics    SACROILIAC JOINT FUSION  2015    SHOULDER SURGERY Left     2    UPPER GASTROINTESTINAL ENDOSCOPY        Family History:     Family History   Problem Relation Age of Onset    Leukemia Mother 68    Hypertension Mother     Diabetes Father     Obesity Father     Liver cancer Father     Heart disease Father     Diabetes Brother     Hypertension Brother     Skin cancer Maternal Grandfather 80    Cancer Family       Social History:     Social History     Socioeconomic History    Marital status:      Spouse name: None    Number of children: None    Years of education: None    Highest education level: None   Occupational History    Occupation: Disability   Tobacco Use    Smoking status: Every Day     Packs/day: 1.00     Years: 25.00     Total pack years: 25.00     Types: Cigarettes    Smokeless tobacco: Former   Vaping Use    Vaping Use: Every day    Substances: THC   Substance and Sexual Activity    Alcohol use: Yes     Comment: rarely     Drug use: Yes     Frequency: 7.0 times per week     Types: Marijuana     Comment: medical marijuana daily for chronic pain 1/2 ounce    Sexual activity: Yes     Partners: Female   Other Topics Concern    None   Social History Narrative    As per All[employer]: Denied: History of Current smoker        As per allscripts: Smoking     Social Determinants of Health     Financial Resource Strain: High Risk (10/25/2022)    Overall Financial Resource Strain (CARDIA)     Difficulty of Paying Living Expenses: Hard   Food Insecurity: Food Insecurity Present (10/14/2020)    Hunger Vital Sign     Worried About Running Out of Food in the Last Year: Often true     Ran Out of Food in the Last Year: Often true   Transportation Needs: No Transportation Needs (5/13/2022)    PRAPARE - Transportation     Lack of Transportation (Medical): No     Lack of Transportation (Non-Medical): No   Physical Activity: Not on file   Stress: Stress Concern Present (5/13/2022)    [facility]     Feeling of Stress : Very much   Social Connections: Not on file   Intimate Partner Violence: Not on file   Housing Stability: [address]  (5/13/2022)    Housing Stability Vital Sign     Unable to Pay for Housing in the Last Year: No     Number of [address] in the Last Year: 1     Unstable Housing in the Last Year: No      Current Medications:     Current Outpatient Medications   Medication Sig Dispense Refill    acetaminophen (TYLENOL) 325 mg tablet Take 3 tablets (975 mg total) by mouth every 8 (eight) hours as needed for mild pain 270 tablet 0    amLODIPine (NORVASC) 5 mg tablet Take 1 tablet (5 mg total) by mouth daily 90 tablet 1    cephalexin (KEFLEX) 500 mg capsule TAKE 1 CAPSULE BY MOUTH THREE TIMES DAILY X7 DAYS      diazepam (VALIUM) 5 mg tablet Take 1 tablet (5 mg total) by mouth every 12 (twelve) hours as needed for anxiety or muscle spasms Do not start before October 3, 2023. 45 tablet 0    diltiazem (CARDIZEM) 60 mg tablet Take 1 tablet (60 mg total) by mouth 2 (two) times a day 180 tablet 0    methocarbamol (ROBAXIN) 500 mg tablet Take 1 tablet (500 mg total) by mouth 2 (two) times a day as needed for muscle spasms 20 tablet 0    oxyCODONE (ROXICODONE) 10 MG TABS Take 1 tablet (10 mg total) by mouth every 6 (six) hours as needed for moderate pain or severe pain Max Daily Amount: 40 mg Do not start before October 3, 2023. 120 tablet 0    albuterol (PROVENTIL HFA,VENTOLIN HFA) 90 mcg/act inhaler Inhale 2 puffs every 6 (six) hours as needed for wheezing 18 g 5    fluticasone-vilanterol (BREO ELLIPTA) 200-25 MCG/INH inhaler Inhale 1 puff daily Rinse mouth after use.  (Patient taking differently: Inhale 1 puff if needed Rinse mouth after use.) 3 Inhaler 3    hyoscyamine (LEVSIN/SL) 0.125 mg SL tablet Take 1 tablet (0.125 mg total) by mouth every 6 (six) hours as needed for cramping 90 tablet 5    lidocaine (Lidoderm) 5 % Apply 1 patch topically over 12 hours daily for 7 days Remove & Discard patch within 12 hours or as directed by MD 7 patch 0    methylPREDNISolone 4 MG tablet therapy pack Use as directed on package (Patient not taking: Reported on 10/9/2023) 21 tablet 0    naloxone (Narcan) 4 mg/0.1 mL nasal spray In case of overdose: 1 spray in one nostril x 1, may repeat in 2 min if remains unresponsive. (Patient not taking: Reported on 3/9/2023) 1 each 0     No current facility-administered medications for this visit. Allergies: Allergies   Allergen Reactions    Other Rash     Adhesive tape      Physical Exam:     BP (!) 173/90 (BP Location: Left arm, Patient Position: Sitting, Cuff Size: Standard)   Pulse 65   Temp (!) 97 °F (36.1 °C)   Resp 18   Ht 6' 1" (1.854 m)   Wt 98.4 kg (217 lb)   SpO2 100%   BMI 28.63 kg/m²     Physical Exam  Vitals reviewed. Constitutional:       General: He is awake. He is not in acute distress. Appearance: Normal appearance. He is overweight. He is not ill-appearing or diaphoretic. HENT:      Head: Normocephalic and atraumatic. Right Ear: External ear normal.      Left Ear: External ear normal.      Nose: Nose normal.   Eyes:      General: Lids are normal.      Extraocular Movements: Extraocular movements intact. Conjunctiva/sclera: Conjunctivae normal.   Cardiovascular:      Rate and Rhythm: Normal rate and regular rhythm. Heart sounds: Normal heart sounds. No murmur heard. No friction rub. No gallop. Pulmonary:      Effort: Pulmonary effort is normal. No respiratory distress. Breath sounds: Normal breath sounds. No wheezing, rhonchi or rales. Abdominal:      General: Bowel sounds are normal. There is no distension. Palpations: Abdomen is soft. Tenderness: There is no abdominal tenderness. There is no guarding. Musculoskeletal:         General: Tenderness (right shoulder) present. Right shoulder: Tenderness present. Decreased range of motion. Neurological:      Mental Status: He is alert and oriented to person, place, and time. Mental status is at baseline. Psychiatric:         Attention and Perception: Attention and perception normal.         Mood and Affect: Mood and affect normal.         Speech: Speech normal.         Behavior: Behavior normal. Behavior is cooperative. Thought Content:  Thought content normal.         Cognition and Memory: Cognition and memory normal.       Semaj Simms MD  [address] [place] [address]

## 2023-10-11 LAB
6MAM UR QL CFM: NEGATIVE NG/ML
7AMINOCLONAZEPAM UR QL CFM: NEGATIVE NG/ML
A-OH ALPRAZ UR QL CFM: NEGATIVE NG/ML
ACCEPTABLE CREAT UR QL: NORMAL MG/DL
ACCEPTIBLE SP GR UR QL: NORMAL
AMPHET UR QL CFM: ABNORMAL NG/ML
BUPRENORPHINE UR QL CFM: NEGATIVE NG/ML
BUTALBITAL UR QL CFM: NEGATIVE NG/ML
BZE UR QL CFM: NEGATIVE NG/ML
CODEINE UR QL CFM: NEGATIVE NG/ML
EDDP UR QL CFM: NEGATIVE NG/ML
ETHYL GLUCURONIDE UR QL CFM: NEGATIVE NG/ML
ETHYL SULFATE UR QL SCN: NEGATIVE NG/ML
EUTYLONE UR QL: NEGATIVE NG/ML
FENTANYL UR QL CFM: NEGATIVE NG/ML
GLIADIN IGG SER IA-ACNC: NEGATIVE NG/ML
HYDROCODONE UR QL CFM: ABNORMAL NG/ML
HYDROMORPHONE UR QL CFM: NEGATIVE NG/ML
LORAZEPAM UR QL CFM: NEGATIVE NG/ML
ME-PHENIDATE UR QL CFM: NEGATIVE NG/ML
MEPERIDINE UR QL CFM: NEGATIVE NG/ML
METHADONE UR QL CFM: NEGATIVE NG/ML
METHAMPHET UR QL CFM: NEGATIVE NG/ML
MORPHINE UR QL CFM: NEGATIVE NG/ML
NALTREXONE UR QL CFM: NEGATIVE NG/ML
NITRITE UR QL: NORMAL UG/ML
NORBUPRENORPHINE UR QL CFM: NEGATIVE NG/ML
NORDIAZEPAM UR QL CFM: NORMAL NG/ML
NORFENTANYL UR QL CFM: NEGATIVE NG/ML
NORHYDROCODONE UR QL CFM: ABNORMAL NG/ML
NORMEPERIDINE UR QL CFM: NEGATIVE NG/ML
NOROXYCODONE UR QL CFM: NORMAL NG/ML
OXAZEPAM UR QL CFM: NORMAL NG/ML
OXYCODONE UR QL CFM: NORMAL NG/ML
OXYMORPHONE UR QL CFM: NORMAL NG/ML
PARA-FLUOROFENTANYL QUANTIFICATION: NORMAL NG/ML
PHENOBARB UR QL CFM: NEGATIVE NG/ML
RESULT ALL_PRESCRIBED MEDS AND SPECIAL INSTRUCTIONS: NORMAL
SECOBARBITAL UR QL CFM: NEGATIVE NG/ML
SL AMB 4-ANPP QUANTIFICATION: NORMAL NG/ML
SL AMB 5F-ADB-M7 METABOLITE QUANTIFICATION: NEGATIVE NG/ML
SL AMB 7-OH-MITRAGYNINE (KRATOM ALKALOID) QUANTIFICATION: NEGATIVE NG/ML
SL AMB AB-FUBINACA-M3 METABOLITE QUANTIFICATION: NEGATIVE NG/ML
SL AMB ACETYL FENTANYL QUANTIFICATION: NORMAL NG/ML
SL AMB ACETYL NORFENTANYL QUANTIFICATION: NORMAL NG/ML
SL AMB ACRYL FENTANYL QUANTIFICATION: NORMAL NG/ML
SL AMB CARFENTANIL QUANTIFICATION: NORMAL NG/ML
SL AMB CTHC (MARIJUANA METABOLITE) QUANTIFICATION: ABNORMAL NG/ML
SL AMB DEXTRORPHAN (DEXTROMETHORPHAN METABOLITE) QUANT: NEGATIVE NG/ML
SL AMB GABAPENTIN QUANTIFICATION: NEGATIVE
SL AMB JWH018 METABOLITE QUANTIFICATION: NEGATIVE NG/ML
SL AMB JWH073 METABOLITE QUANTIFICATION: NEGATIVE NG/ML
SL AMB MDMB-FUBINACA-M1 METABOLITE QUANTIFICATION: NEGATIVE NG/ML
SL AMB METHYLONE QUANTIFICATION: NEGATIVE NG/ML
SL AMB N-DESMETHYL-TRAMADOL QUANTIFICATION: NEGATIVE NG/ML
SL AMB PHENTERMINE QUANTIFICATION: NEGATIVE NG/ML
SL AMB PREGABALIN QUANTIFICATION: NEGATIVE
SL AMB RCS4 METABOLITE QUANTIFICATION: NEGATIVE NG/ML
SL AMB RITALINIC ACID QUANTIFICATION: NEGATIVE NG/ML
SMOOTH MUSCLE AB TITR SER IF: NEGATIVE NG/ML
SPECIMEN DRAWN SERPL: NEGATIVE NG/ML
SPECIMEN PH ACCEPTABLE UR: NORMAL
TAPENTADOL UR QL CFM: NEGATIVE NG/ML
TEMAZEPAM UR QL CFM: NORMAL NG/ML
TRAMADOL UR QL CFM: NEGATIVE NG/ML
URATE/CREAT 24H UR: NEGATIVE NG/ML

## 2023-10-11 NOTE — ASSESSMENT & PLAN NOTE
BP above goal, but patient in significant pain given he is out of his chronic pain medication and may also be experiencing some degree of opiate withdrawal. On cardizem for hypercontractile esophagus, continue for now, measure BP at home and record if remains above goal despite improvement in pain once he is able to resume his pain medications (prior auth in-process).

## 2023-10-11 NOTE — PATIENT INSTRUCTIONS
Wellness Visit for Adults   AMBULATORY CARE:   A wellness visit  is when you see your healthcare provider to get screened for health problems. Your healthcare provider will also give you advice on how to stay healthy. Write down your questions so you remember to ask them. Ask your healthcare provider how often you should have a wellness visit. What happens at a wellness visit:  Your healthcare provider will ask about your health, and your family history of health problems. This includes high blood pressure, heart disease, and cancer. He or she will ask if you have symptoms that concern you, if you smoke, and about your mood. You may also be asked about your intake of medicines, supplements, food, and alcohol. Any of the following may be done: Your weight  will be checked. Your height may also be checked so your body mass index (BMI) can be calculated. Your BMI shows if you are at a healthy weight. Your blood pressure  and heart rate will be checked. Your temperature may also be checked. Blood and urine tests  may be done. Blood tests may be done to check your cholesterol levels. Abnormal cholesterol levels increase your risk for heart disease and stroke. You may also need a blood or urine test to check for diabetes if you are at increased risk. Urine tests may be done to look for signs of an infection or kidney disease. A physical exam  includes checking your heartbeat and lungs with a stethoscope. Your healthcare provider may also check your skin to look for sun damage. Screening tests  may be recommended. A screening test is done to check for diseases that may not cause symptoms. The screening tests you may need depend on your age, gender, family history, and lifestyle habits. For example, colorectal screening may be recommended if you are 48years old or older. Screening tests you need if you are a woman:   A Pap smear  is used to screen for cervical cancer.  Pap smears are usually done every 3 to 5 years depending on your age. You may need them more often if you have had abnormal Pap smear test results in the past. Ask your healthcare provider how often you should have a Pap smear. A mammogram  is an x-ray of your breasts to screen for breast cancer. Experts recommend mammograms every 2 years starting at age 48 years. You may need a mammogram at age 52 years or younger if you have an increased risk for breast cancer. Talk to your healthcare provider about when you should start having mammograms and how often you need them. Vaccines you may need:   Get an influenza vaccine  every year. The influenza vaccine protects you from the flu. Several types of viruses cause the flu. The viruses change over time, so new vaccines are made each year. Get a tetanus-diphtheria (Td) booster vaccine  every 10 years. This vaccine protects you against tetanus and diphtheria. Tetanus is a severe infection that may cause painful muscle spasms and lockjaw. Diphtheria is a severe bacterial infection that causes a thick covering in the back of your mouth and throat. Get a human papillomavirus (HPV) vaccine  if you are female and aged 23 to 32 or male 23 to 24 and never received it. This vaccine protects you from HPV infection. HPV is the most common infection spread by sexual contact. HPV may also cause vaginal, penile, and anal cancers. Get a pneumococcal vaccine  if you are aged 72 years or older. The pneumococcal vaccine is an injection given to protect you from pneumococcal disease. Pneumococcal disease is an infection caused by pneumococcal bacteria. The infection may cause pneumonia, meningitis, or an ear infection. Get a shingles vaccine  if you are 60 or older, even if you have had shingles before. The shingles vaccine is an injection to protect you from the varicella-zoster virus. This is the same virus that causes chickenpox.  Shingles is a painful rash that develops in people who had chickenpox or have been exposed to the virus. How to eat healthy:  My Plate is a model for planning healthy meals. It shows the types and amounts of foods that should go on your plate. Fruits and vegetables make up about half of your plate, and grains and protein make up the other half. A serving of dairy is included on the side of your plate. The amount of calories and serving sizes you need depends on your age, gender, weight, and height. Examples of healthy foods are listed below:  Eat a variety of vegetables  such as dark green, red, and orange vegetables. You can also include canned vegetables low in sodium (salt) and frozen vegetables without added butter or sauces. Eat a variety of fresh fruits , canned fruit in 100% juice, frozen fruit, and dried fruit. Include whole grains. At least half of the grains you eat should be whole grains. Examples include whole-wheat bread, wheat pasta, brown rice, and whole-grain cereals such as oatmeal.    Eat a variety of protein foods such as seafood (fish and shellfish), lean meat, and poultry without skin (turkey and chicken). Examples of lean meats include pork leg, shoulder, or tenderloin, and beef round, sirloin, tenderloin, and extra lean ground beef. Other protein foods include eggs and egg substitutes, beans, peas, soy products, nuts, and seeds. Choose low-fat dairy products such as skim or 1% milk or low-fat yogurt, cheese, and cottage cheese. Limit unhealthy fats  such as butter, hard margarine, and shortening. Exercise:  Exercise at least 30 minutes per day on most days of the week. Some examples of exercise include walking, biking, dancing, and swimming. You can also fit in more physical activity by taking the stairs instead of the elevator or parking farther away from stores. Include muscle strengthening activities 2 days each week. Regular exercise provides many health benefits.  It helps you manage your weight, and decreases your risk for type 2 diabetes, heart disease, stroke, and high blood pressure. Exercise can also help improve your mood. Ask your healthcare provider about the best exercise plan for you. General health and safety guidelines:   Do not smoke. Nicotine and other chemicals in cigarettes and cigars can cause lung damage. Ask your healthcare provider for information if you currently smoke and need help to quit. E-cigarettes or smokeless tobacco still contain nicotine. Talk to your healthcare provider before you use these products. Limit alcohol. A drink of alcohol is 12 ounces of beer, 5 ounces of wine, or 1½ ounces of liquor. Lose weight, if needed. Being overweight increases your risk of certain health conditions. These include heart disease, high blood pressure, type 2 diabetes, and certain types of cancer. Protect your skin. Do not sunbathe or use tanning beds. Use sunscreen with a SPF 15 or higher. Apply sunscreen at least 15 minutes before you go outside. Reapply sunscreen every 2 hours. Wear protective clothing, hats, and sunglasses when you are outside. Drive safely. Always wear your seatbelt. Make sure everyone in your car wears a seatbelt. A seatbelt can save your life if you are in an accident. Do not use your cell phone when you are driving. This could distract you and cause an accident. Pull over if you need to make a call or send a text message. Practice safe sex. Use latex condoms if are sexually active and have more than one partner. Your healthcare provider may recommend screening tests for sexually transmitted infections (STIs). Wear helmets, lifejackets, and protective gear. Always wear a helmet when you ride a bike or motorcycle, go skiing, or play sports that could cause a head injury. Wear protective equipment when you play sports. Wear a lifejacket when you are on a boat or doing water sports.     © Copyright [employer]s 2023 Information is for End User's use only and may not be sold, redistributed or otherwise used for commercial purposes. The above information is an  only. It is not intended as medical advice for individual conditions or treatments. Talk to your doctor, nurse or pharmacist before following any medical regimen to see if it is safe and effective for you.

## 2023-10-14 ENCOUNTER — HOSPITAL ENCOUNTER (OUTPATIENT)
Dept: MRI IMAGING | Facility: HOSPITAL | Age: 53
Discharge: HOME | End: 2023-10-14
Payer: MEDICARE

## 2023-10-14 DIAGNOSIS — M25.511 ACUTE PAIN OF RIGHT SHOULDER: ICD-10-CM

## 2023-10-14 PROCEDURE — G1004 CDSM NDSC: HCPCS

## 2023-10-14 PROCEDURE — 73221 MRI JOINT UPR EXTREM W/O DYE: CPT

## 2023-10-20 ENCOUNTER — TELEPHONE (OUTPATIENT)
Dept: FAMILY MEDICINE CLINIC | Facility: CLINIC | Age: 53
End: 2023-10-20

## 2023-10-20 DIAGNOSIS — M96.1 POST LAMINECTOMY SYNDROME: Primary | ICD-10-CM

## 2023-10-20 RX ORDER — OXYCODONE HYDROCHLORIDE 5 MG/1
10 TABLET ORAL EVERY 6 HOURS PRN
Qty: 56 TABLET | Refills: 0 | Status: SHIPPED | OUTPATIENT
Start: 2023-10-20 | End: 2023-10-27

## 2023-10-20 NOTE — TELEPHONE ENCOUNTER
Spoke with patient via phone, as clinical staff informed me the prior Auth submitted to his insurance company for his oxycodone was rejected since he did not have an updated urine drug screen result, despite the form stating it was needed every 12 months, the insurance insists it is needed every 6 months. Will need to resubmit prior Auth. Patient did complete [facility] testing on 10/9/23, which showed positive THC (anticipated, as patient uses medical marijuana), positive oxycodone as expected, positive diazepam as expected. [facility] testing did show positive hydrocodone with metabolite, which patient had reported to me that he had taken some of his mother's Vicodin after he had run out of his own medication, but he is aware that this is in violation of our contract and was very regretful of the situation. His [facility] testing also unexpectedly came back positive for amphetamine, which I discussed with patient today; he was very surprised and perplexed at the positive amphetamine result, and when I asked him if he had taken any other medications besides his mother's Vicodin and those he is prescribed, he told me the only other medication that he had taken was a blue Adderall pill that his friend gave him, as the friend told him that the pill "will help his back and help him get out of bed" as patient had not been out of bed due to his severe pain. I informed patient that Adderall is an amphetamine and he stated "I had no idea" and " I would have told you if I would have known". This patient has been under my care for several years and has always been forthcoming with me regarding his substance use, so I do believe that he was not aware Adderall was an amphetamine, especially since he knew how important the urine drug screening results are to the prior Auth process for his oxycodone.   The patient reports he has not taken any Adderall since that time but does admit to taking a few of his mom's Vicodin due to having so few of his oxycodone left (he was given a temporary fill of 60 tablets on 10/9/23 and will run out early next week). As patient seems genuine regarding his ignorance to Adderall being an amphetamine and reports he has not used Adderall since, I advised patient to go to the lab to obtain repeat UDS at this time to confirm the amphetamine is negative. Fentanyl, Suboxone, tramadol and oxycodone testing is also still active in the computer as that is what the insurance requires, so I advised patient to go to lab and have all of that testing done so we may submit the results (once received) to his insurance along with the resubmitted prior Auth. I advised patient that I will not break our controlled substance agreement at this time as he was honest about the situation, but I did advise him that he I am going to have to put him on probation and perform urine drug testing more frequently, on a monthly basis for the first 3 months to start with, and he will need to have more frequent follow-ups. Patient was very apologetic regarding the situation and was in full agreement with the probation requirements as outlined above. Since patient is almost out of his medication and we now will have to wait until the repeat urine drug testing results are received before we can resubmit the prior Auth, I called his pharmacy to discuss whether there was an option to send a short supply of oxycodone 5 mg which patient can pay for using a [facility] coupon (the out-of-pocket cost of the oxycodone 10 mg was close to $300 per patient; [facility] does not have a coupon for the 10 mg tabs). The pharmacist at [facility] said that they would except the [facility] coupon for the 5 mg oxycodone and I sent over a 1 week supply of oxycodone 5 mg (56 tablets, as patient will be taking 2 tablets (10 mg total) Q6H PRN for moderate/severe pain) and also sent the [facility] coupon to the patient's phone through the website.   Per the pharmacist recommendations, I did make a notation in the sig that the patient will be paying for this prescription using a [facility] coupon and not to run it through insurance since it is just a short supply while awaiting a prior Auth. I did inform patient that he must use the oxycodone 5 mg judiciously as we cannot predict how long it will take for the prior Auth to be completed for his chronic prescription and I cannot send more medication after this until the prior Auth is approved. Patient acknowledged understanding and is in agreement with the plan above. Will plan to have patient come to the office in 1 month for follow-up, sooner if repeat UDS remains positive for amphetamine.

## 2023-10-23 ENCOUNTER — TELEPHONE (OUTPATIENT)
Age: 53
End: 2023-10-23

## 2023-10-23 NOTE — TELEPHONE ENCOUNTER
Caller: Patient's spouse Ceferino Stewart    Doctor: Felipa Goldmann    Reason for call: Patient scheduled for an MRI f/u appointment 10/26 for right shoulder. He is in tears and his pain level is a 10. Patient is taking percocet for his back, but it is not helping his shoulder. Patient is willing to be seen in [facility] or [facility] [place] for an earlier appointment. Please advise.     Call back#: [phone]

## 2023-10-24 ENCOUNTER — TELEPHONE (OUTPATIENT)
Dept: FAMILY MEDICINE CLINIC | Facility: CLINIC | Age: 53
End: 2023-10-24

## 2023-10-24 NOTE — TELEPHONE ENCOUNTER
Caller: Patient    Doctor: Jerson Izquierdo      Reason for call: Patient call stating he has not been unable to do the testes the doctor has ask him to do. Patient states that he has not been able to get out of the bed because of his shoulder pain. Patient states he has a appointment on Thursday with Ortho and he is going to go to the lab and get his testing done.         Number: [phone]

## 2023-10-26 ENCOUNTER — TELEPHONE (OUTPATIENT)
Dept: FAMILY MEDICINE CLINIC | Facility: CLINIC | Age: 53
End: 2023-10-26

## 2023-10-26 ENCOUNTER — APPOINTMENT (OUTPATIENT)
Dept: LAB | Facility: HOSPITAL | Age: 53
End: 2023-10-26
Payer: MEDICARE

## 2023-10-26 ENCOUNTER — OFFICE VISIT (OUTPATIENT)
Dept: OBGYN CLINIC | Facility: CLINIC | Age: 53
End: 2023-10-26
Payer: MEDICARE

## 2023-10-26 VITALS
BODY MASS INDEX: 29.34 KG/M2 | SYSTOLIC BLOOD PRESSURE: 161 MMHG | HEIGHT: 73 IN | HEART RATE: 74 BPM | WEIGHT: 221.4 LBS | DIASTOLIC BLOOD PRESSURE: 105 MMHG

## 2023-10-26 DIAGNOSIS — T14.8XXA CONTUSION OF BONE: ICD-10-CM

## 2023-10-26 DIAGNOSIS — M25.511 ACUTE PAIN OF RIGHT SHOULDER: Primary | ICD-10-CM

## 2023-10-26 DIAGNOSIS — Z79.891 LONG TERM (CURRENT) USE OF OPIATE ANALGESIC: ICD-10-CM

## 2023-10-26 LAB
AMPHETAMINES SERPL QL SCN: NEGATIVE
BARBITURATES UR QL: NEGATIVE
BENZODIAZ UR QL: POSITIVE
COCAINE UR QL: NEGATIVE
OPIATES UR QL SCN: POSITIVE
OXYCODONE+OXYMORPHONE UR QL SCN: POSITIVE
PCP UR QL: NEGATIVE
THC UR QL: POSITIVE

## 2023-10-26 PROCEDURE — 80365 DRUG SCREENING OXYCODONE: CPT

## 2023-10-26 PROCEDURE — 99214 OFFICE O/P EST MOD 30 MIN: CPT | Performed by: ORTHOPAEDIC SURGERY

## 2023-10-26 PROCEDURE — 80307 DRUG TEST PRSMV CHEM ANLYZR: CPT

## 2023-10-26 NOTE — TELEPHONE ENCOUNTER
Patient called in stating he completed his testing and would like to know if him Medication could be sent to Pharmacy

## 2023-10-26 NOTE — PROGRESS NOTES
[address]  [address]  [facility] [place] [address]  [phone]       Mathieu Oyster  [id number]  1970    ORTHOPAEDIC SURGERY OUTPATIENT NOTE  10/26/2023      HISTORY:  52 y.o. male presents today follow-up for right shoulder pain. Patient is here to discuss MRI of the right shoulder. He has history of right shoulder rotator cuff repair (subscap) arthroscopy by Dr. Janett Fournier on 4/4/20219. He states his pain is getting worse. He is having pain over anterolateral right shoulder. He states he has been resting and had not been moving the shoulder. He did take the prednisone taper and states he has crazy dreams while on it. He has been icing and taking OTC NSAIDS for pain relief.        Past Medical History:   Diagnosis Date    Allergic     seasonal     Allergic rhinitis     Anxiety     Arthritis     Back pain     Chronic obstructive asthma     Chronic pain syndrome     Cluster headaches     Colon polyp     Depression     Insomnia     Kidney stones     Laryngospasm     Leukocytosis     Migraine     Myocardial infarction ([address])     Nephrolithiasis     Organic impotence     Pneumonia due to infectious organism 1/16/2019    Seasonal allergies     Sleep apnea     does not use CPAP    Stroke ([address]) 2015    TMJ (temporomandibular joint syndrome)     Wheezing     last assessed 05/19/17       Past Surgical History:   Procedure Laterality Date    BACK SURGERY      *9, [id number], nervectomy 2006, total of 10    BUCCAL MASS EXCISION N/A 3/26/2018    Procedure: BIOPSY LINGUAL TONSIL (FLOW/ STANDARD PATH)  EVAL UNDER ANESTHESIA;  Surgeon: Aurea Gonzalez MD;  Location[facility];  Service: ENT    COLONOSCOPY      FL RETROGRADE PYELOGRAM  10/21/2020    HERNIA REPAIR      KIDNEY SURGERY      KNEE ARTHROSCOPY      LITHOTRIPSY      multiple    LITHOTRIPSY      LUMBAR [address]  2015    MANDIBLE FRACTURE SURGERY      titanium plate    PELVIC SYMPHYSIS FUSION ND CYSTO/URETERO W/LITHOTRIPSY &INDWELL STENT INSRT Right 10/21/2020    Procedure: CYSTOSCOPY URETEROSCOPY WITH LITHOTRIPSY HOLMIUM LASER, RETROGRADE PYELOGRAM AND INSERTION STENT URETERAL;  Surgeon: Maryuri Jang MD;  [facility];  Service: Urology    ND ESOPHAGOGASTRODUODENOSCOPY TRANSORAL DIAGNOSTIC N/A 2/22/2018    Procedure: ESOPHAGOGASTRODUODENOSCOPY (EGD); Surgeon: Dhruv Valadez MD;  [facility]; Service: Gastroenterology    ND ESOPHAGOGASTRODUODENOSCOPY TRANSORAL DIAGNOSTIC N/A 4/1/2019    Procedure: EGD W/ DILATION;  Surgeon: Dhruv Valadez MD;  [facility]; Service: Gastroenterology    ND FORREST IMPLTJ NSTIM ELTRDS PLATE/PADDLE EDRL N/A 9/8/2016    Procedure: DORSAL COLUMN STIMULATOR PLACEMENT (IMPULSE MONITORING);   Surgeon: Juventino Regalado MD;  [facility];  Service: Orthopedics    ND REVJ/RMVL IMPLANTED SPINAL NEUROSTIM GENERATOR Left 6/9/2017    Procedure: REMOVAL OF A THORACIC SCS PLACED VIA LAMINECTOMY AND REMOVAL LEFT BUTTOCK GENERATOR; IMPULSE MONITORING;  Surgeon: Jaden Vega MD;  Location[facility];  Service: Neurosurgery    ND SURGICAL ARTHROSCOPY SHOULDER W/ROTATOR CUFF RPR Right 4/4/2019    Procedure: RIGHT SHOULDER ARTHROSCOPIC SUBSCAPULARIS TENDON REPAIR;  Surgeon: Armand Potts MD;  [facility];  Service: Orthopedics    SACROILIAC JOINT FUSION  2015    SHOULDER SURGERY Left     2    UPPER GASTROINTESTINAL ENDOSCOPY         Social History     Socioeconomic History    Marital status:      Spouse name: Not on file    Number of children: Not on file    Years of education: Not on file    Highest education level: Not on file   Occupational History    Occupation: Disability   Tobacco Use    Smoking status: Every Day     Packs/day: 1.00     Years: 25.00     Total pack years: 25.00     Types: Cigarettes    Smokeless tobacco: Former   Vaping Use    Vaping Use: Every day    Substances: THC   Substance and Sexual Activity Alcohol use: Yes     Comment: rarely     Drug use: Yes     Frequency: 7.0 times per week     Types: Marijuana     Comment: medical marijuana daily for chronic pain 1/2 ounce    Sexual activity: Yes     Partners: Female   Other Topics Concern    Not on file   Social History Narrative    As per Allscripts: Denied: History of Current smoker        As per allscripts: Smoking     Social Determinants of Health     Financial Resource Strain: High Risk (10/25/2022)    Overall Financial Resource Strain (CARDIA)     Difficulty of Paying Living Expenses: Hard   Food Insecurity: Food Insecurity Present (10/14/2020)    Hunger Vital Sign     Worried About Running Out of Food in the Last Year: Often true     Ran Out of Food in the Last Year: Often true   Transportation Needs: No Transportation Needs (5/13/2022)    PRAPARE - Transportation     Lack of Transportation (Medical): No     Lack of Transportation (Non-Medical):  No   Physical Activity: Not on file   Stress: Stress Concern Present (5/13/2022)    [facility]     Feeling of Stress : Very much   Social Connections: Not on file   Intimate Partner Violence: Not on file   Housing Stability: Low Risk  (5/13/2022)    Housing Stability Vital Sign     Unable to Pay for Housing in the Last Year: No     Number of [address] in the Last Year: 1     Unstable Housing in the Last Year: No       Family History   Problem Relation Age of Onset    Leukemia Mother 68    Hypertension Mother     Diabetes Father     Obesity Father     Liver cancer Father     Heart disease Father     Diabetes Brother     Hypertension Brother     Skin cancer Maternal Grandfather 80    Cancer Family         Patient's Medications   New Prescriptions    No medications on file   Previous Medications    ACETAMINOPHEN (TYLENOL) 325 MG TABLET    Take 3 tablets (975 mg total) by mouth every 8 (eight) hours as needed for mild pain    ALBUTEROL (PROVENTIL HFA,VENTOLIN HFA) 90 MCG/ACT INHALER    Inhale 2 puffs every 6 (six) hours as needed for wheezing    AMLODIPINE (NORVASC) 5 MG TABLET    Take 1 tablet (5 mg total) by mouth daily    CEPHALEXIN (KEFLEX) 500 MG CAPSULE    TAKE 1 CAPSULE BY MOUTH THREE TIMES DAILY X7 DAYS    DIAZEPAM (VALIUM) 5 MG TABLET    Take 1 tablet (5 mg total) by mouth every 12 (twelve) hours as needed for anxiety or muscle spasms Do not start before October 3, 2023. DILTIAZEM (CARDIZEM) 60 MG TABLET    Take 1 tablet (60 mg total) by mouth 2 (two) times a day    FLUTICASONE-VILANTEROL (BREO ELLIPTA) 200-25 MCG/INH INHALER    Inhale 1 puff daily Rinse mouth after use. HYOSCYAMINE (LEVSIN/SL) 0.125 MG SL TABLET    Take 1 tablet (0.125 mg total) by mouth every 6 (six) hours as needed for cramping    LIDOCAINE (LIDODERM) 5 %    Apply 1 patch topically over 12 hours daily for 7 days Remove & Discard patch within 12 hours or as directed by MD    METHOCARBAMOL (ROBAXIN) 500 MG TABLET    Take 1 tablet (500 mg total) by mouth 2 (two) times a day as needed for muscle spasms    METHYLPREDNISOLONE 4 MG TABLET THERAPY PACK    Use as directed on package    NALOXONE (NARCAN) 4 MG/0.1 ML NASAL SPRAY    In case of overdose: 1 spray in one nostril x 1, may repeat in 2 min if remains unresponsive. OXYCODONE (ROXICODONE) 10 MG TABS    Take 1 tablet (10 mg total) by mouth every 6 (six) hours as needed for moderate pain or severe pain Max Daily Amount: 40 mg Do not start before October 3, 2023.     OXYCODONE (ROXICODONE) 5 IMMEDIATE RELEASE TABLET    Take 2 tablets (10 mg total) by mouth every 6 (six) hours as needed for moderate pain or severe pain for up to 7 days *Patient will not be billing this Rx through insurance (short supply while awaiting prior auth for 10 mg tabs); will be using [facility] coupon* Max Daily Amount: 40 mg   Modified Medications    No medications on file   Discontinued Medications    No medications on file       Allergies   Allergen Reactions    Other Rash     Adhesive tape        BP (!) 161/105 (BP Location: Left arm, Patient Position: Sitting, Cuff Size: Standard)   Pulse 74   Ht 6' 1" (1.854 m)   Wt 100 kg (221 lb 6.4 oz)   BMI 29.21 kg/m²      REVIEW OF SYSTEMS:  Constitutional: Negative. HEENT: Negative. Respiratory: Negative. Skin: Negative. Neurological: Negative. Psychiatric/Behavioral: Negative. Musculoskeletal: Negative except for that mentioned in the HPI.     PHYSICAL EXAM:      RIGHT SHOULDER:     NVI axillary/medial/radial/ulnar and sensory intact     Forward flexion:   180 degrees   Abduction:  180 degrees   External rotation at 90 degrees abduction:  90 degrees   Internal rotation at 90 degrees abduction:   90 degrees   External rotation at 0 degrees:  70 degrees   Internal rotation: T7      STRENGTH:  Forward flexion:  5/5   Abduction:  5/5   External rotation:  5/5   Internal rotation:  5/5     Speed test: Negative  Yergason's: Negative   Tender to palpation ACJ: Negative   Tender to palpation LHB: Negative  Dumont test: Negative  Tehama's: Negative  Hornblower's: Negative  Lift off: Negative  Belly press: Negative  Bear hug: Negative  External lag sign: Negative  Cross-body adduction: Negative  Sulcus sign: Negative  Janette's test: Negative  Drop arm test: Negative     Reflexes:  Brachioradialis:  Symmetric bilaterally  Triceps: Symmetric bilaterally  Biceps: Symmetric bilaterally  Patella tendon: Symmetric bilaterally  Achilles tendon: Symmetric bilaterally  Babinski's: Negative  Cinthia sign: Negative     No scapular winging or dyskinesis     Capillary refill brisk   Full ROM of elbows bilaterally      Skin:  No ecchymosis/abrasion/erythema/warmth     Cervical spine:   Full rotation, side bending, flexion and extension without radiating pain  Spurling's: Negative    IMAGING:  MRI right shoulder demonstrates no evidence of rotator cuff tear, AC joint arthritis    ASSESSMENT AND PLAN: 52 y.o. male  Right shoulder contusion       MRI right shoulder was reviewed in the office today. Discussed with patient to take Vit D and Calcium for bone healling. Encouraged patient to move the shoulder around to prevent increased stiffness. Physical therapy order was given out today for range of motion, stretching and strengthening exercises. Ice 20 minutes at a time as needed for pain relief. He may take over-the-counter Motrin, Aleve or Advil for pain relief.   He is to follow-up in 6 weeks for reevaluation      Scribe Attestation      I,:  Luh Stephens am acting as a scribe while in the presence of the attending physician.:       I,:  Sage Moss personally performed the services described in this documentation    as scribed in my presence.:

## 2023-10-27 LAB — BUPRENORPHINE UR QL CFM: NEGATIVE NG/ML

## 2023-10-31 LAB
FENTANYL+NORFENTANYL PNL UR: NEGATIVE PG/ML
LABORATORY COMMENT REPORT: NORMAL
TRAMADOL UR QL SCN: NEGATIVE NG/ML

## 2023-11-01 ENCOUNTER — TELEPHONE (OUTPATIENT)
Dept: FAMILY MEDICINE CLINIC | Facility: CLINIC | Age: 53
End: 2023-11-01

## 2023-11-01 NOTE — TELEPHONE ENCOUNTER
As results were all received, completed prior auth form with results of drug testing and gave it to clinical staff for faxing

## 2023-11-02 ENCOUNTER — TELEPHONE (OUTPATIENT)
Dept: FAMILY MEDICINE CLINIC | Facility: CLINIC | Age: 53
End: 2023-11-02

## 2023-11-02 DIAGNOSIS — M96.1 POST LAMINECTOMY SYNDROME: ICD-10-CM

## 2023-11-02 DIAGNOSIS — F41.8 DEPRESSION WITH ANXIETY: ICD-10-CM

## 2023-11-02 RX ORDER — DIAZEPAM 5 MG/1
5 TABLET ORAL EVERY 12 HOURS PRN
Qty: 45 TABLET | Refills: 0 | Status: SHIPPED | OUTPATIENT
Start: 2023-11-02

## 2023-11-02 RX ORDER — OXYCODONE HYDROCHLORIDE 10 MG/1
10 TABLET ORAL EVERY 6 HOURS PRN
Qty: 120 TABLET | Refills: 0 | Status: SHIPPED | OUTPATIENT
Start: 2023-11-02

## 2023-11-02 NOTE — TELEPHONE ENCOUNTER
I call and spoke with the pharmacy staff inference to the approval we have receive. There staff explain to me that they are not able to use the script sent to them on 10/3/2023. They state that the doctor has to send in a new script.

## 2023-11-02 NOTE — TELEPHONE ENCOUNTER
Very glad to hear the prior auth was approved. New Rx sent to pharmacy for oxycodone and valium, called patient to let him know.

## 2023-11-12 LAB
OXYCODONE UR QL CFM: 3167 NG/ML
OXYCODONE+OXYMORPHONE SERPLBLD QL SCN: POSITIVE
OXYCODONE+OXYMORPHONE UR QL SCN: NORMAL NG/ML
OXYCODONE+OXYMORPHONE UR QL SCN: POSITIVE
OXYMORPHONE UR CFM-MCNC: 3119 NG/ML
OXYMORPHONE UR QL CFM: POSITIVE

## 2023-11-14 ENCOUNTER — TELEPHONE (OUTPATIENT)
Dept: GASTROENTEROLOGY | Facility: CLINIC | Age: 53
End: 2023-11-14

## 2023-11-17 ENCOUNTER — TELEPHONE (OUTPATIENT)
Dept: GASTROENTEROLOGY | Facility: CLINIC | Age: 53
End: 2023-11-17

## 2023-11-17 NOTE — TELEPHONE ENCOUNTER
LM that we would need to do a F/U office visit by 4/24 if we were going to continue prescribing any medications.

## 2023-11-30 DIAGNOSIS — F41.8 DEPRESSION WITH ANXIETY: ICD-10-CM

## 2023-11-30 DIAGNOSIS — M96.1 POST LAMINECTOMY SYNDROME: ICD-10-CM
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
## 2023-12-01 RX ORDER — ACETAMINOPHEN 325 MG/1
975 TABLET ORAL EVERY 8 HOURS PRN
Qty: 270 TABLET | Refills: 0 | Status: SHIPPED | OUTPATIENT
Start: 2023-12-01

## 2023-12-01 RX ORDER — OXYCODONE HYDROCHLORIDE 10 MG/1
10 TABLET ORAL EVERY 6 HOURS PRN
Qty: 120 TABLET | Refills: 0 | Status: SHIPPED | OUTPATIENT
Start: 2023-12-01

## 2023-12-01 RX ORDER — DIAZEPAM 5 MG/1
5 TABLET ORAL EVERY 12 HOURS PRN
Qty: 45 TABLET | Refills: 0 | Status: SHIPPED | OUTPATIENT
Start: 2023-12-01

## 2023-12-01 NOTE — TELEPHONE ENCOUNTER
Refills sent to pharmacy, has controlled substance agreement with me, next drug testing due Jan/Feb 2024.

## 2023-12-07 ENCOUNTER — OFFICE VISIT (OUTPATIENT)
Dept: OBGYN CLINIC | Facility: CLINIC | Age: 53
End: 2023-12-07
Payer: MEDICARE

## 2023-12-07 ENCOUNTER — HOSPITAL ENCOUNTER (OUTPATIENT)
Dept: RADIOLOGY | Facility: HOSPITAL | Age: 53
End: 2023-12-07
Attending: ORTHOPAEDIC SURGERY
Payer: MEDICARE

## 2023-12-07 VITALS
WEIGHT: 213.8 LBS | DIASTOLIC BLOOD PRESSURE: 119 MMHG | HEART RATE: 88 BPM | SYSTOLIC BLOOD PRESSURE: 186 MMHG | BODY MASS INDEX: 28.34 KG/M2 | HEIGHT: 73 IN

## 2023-12-07 DIAGNOSIS — M50.30 DDD (DEGENERATIVE DISC DISEASE), CERVICAL: ICD-10-CM

## 2023-12-07 DIAGNOSIS — M54.12 RADICULOPATHY, CERVICAL REGION: ICD-10-CM

## 2023-12-07 DIAGNOSIS — M54.12 RADICULOPATHY, CERVICAL REGION: Primary | ICD-10-CM

## 2023-12-07 PROCEDURE — 72040 X-RAY EXAM NECK SPINE 2-3 VW: CPT

## 2023-12-07 PROCEDURE — 99214 OFFICE O/P EST MOD 30 MIN: CPT | Performed by: ORTHOPAEDIC SURGERY
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
## 2023-12-19 ENCOUNTER — HOSPITAL ENCOUNTER (OUTPATIENT)
Dept: MRI IMAGING | Facility: HOSPITAL | Age: 53
Discharge: HOME | End: 2023-12-19
Attending: ORTHOPAEDIC SURGERY
Payer: MEDICARE

## 2023-12-19 DIAGNOSIS — M50.30 DDD (DEGENERATIVE DISC DISEASE), CERVICAL: ICD-10-CM

## 2023-12-19 DIAGNOSIS — M96.1 POST LAMINECTOMY SYNDROME: ICD-10-CM

## 2023-12-19 DIAGNOSIS — M54.12 RADICULOPATHY, CERVICAL REGION: ICD-10-CM

## 2023-12-19 DIAGNOSIS — F41.8 DEPRESSION WITH ANXIETY: ICD-10-CM

## 2023-12-19 PROCEDURE — 72141 MRI NECK SPINE W/O DYE: CPT

## 2023-12-19 PROCEDURE — G1004 CDSM NDSC: HCPCS

## 2023-12-20 DIAGNOSIS — K22.4 ESOPHAGEAL DYSMOTILITIES: ICD-10-CM

## 2023-12-20 RX ORDER — DILTIAZEM HYDROCHLORIDE 60 MG/1
TABLET, FILM COATED ORAL
Qty: 180 TABLET | Refills: 0 | Status: SHIPPED | OUTPATIENT
Start: 2023-12-20

## 2023-12-21 RX ORDER — OXYCODONE HYDROCHLORIDE 10 MG/1
10 TABLET ORAL EVERY 6 HOURS PRN
Qty: 120 TABLET | Refills: 0 | Status: SHIPPED | OUTPATIENT
Start: 2023-12-30

## 2023-12-21 RX ORDER — DIAZEPAM 5 MG/1
5 TABLET ORAL EVERY 12 HOURS PRN
Qty: 45 TABLET | Refills: 0 | Status: SHIPPED | OUTPATIENT
Start: 2023-12-30

## 2023-12-21 NOTE — TELEPHONE ENCOUNTER
Noted patient's early refill request due to reduced office hours during holiday season. PDMP reviewed, last prescriptions for oxycodone and Valium were both filled 12/1/23, so refill will be due around 12/30/23.  Sent both prescriptions today dated to be filled 12/30/23, so that patient does not have to worry about being unable to reach the office to request the refills.    Staff, can you please call patient and let him know that I sent the prescriptions to the pharmacy with the fill date of 12/30/23? Thanks in advance!

## 2024-01-08 ENCOUNTER — TELEPHONE (OUTPATIENT)
Dept: FAMILY MEDICINE CLINIC | Facility: CLINIC | Age: 54
End: 2024-01-08

## 2024-01-08 NOTE — TELEPHONE ENCOUNTER
Reviewed MRI results with patient via phone, advised him to reach out to Orthopedics for further management regarding his R shoulder pain. We can reschedule his visit this Thursday to next month, please call patient to cancel Thursday's appt and schedule for sometime in February. Will get UDS at that time. Thank you!

## 2024-01-08 NOTE — TELEPHONE ENCOUNTER
Patient is calling sating he has increased pain in r arm. He is coming in on Thursday to see you he wanted to know if you could give him a call he has not really heard from the specialty office. He also wanted to know if he will need to have a urine prior to the visit on Thursday.

## 2024-01-22 ENCOUNTER — TELEPHONE (OUTPATIENT)
Dept: OTHER | Facility: OTHER | Age: 54
End: 2024-01-22

## 2024-01-22 ENCOUNTER — TELEMEDICINE (OUTPATIENT)
Dept: FAMILY MEDICINE CLINIC | Facility: CLINIC | Age: 54
End: 2024-01-22
Payer: MEDICARE

## 2024-01-22 ENCOUNTER — TELEPHONE (OUTPATIENT)
Dept: FAMILY MEDICINE CLINIC | Facility: CLINIC | Age: 54
End: 2024-01-22

## 2024-01-22 DIAGNOSIS — U07.1 COVID-19: Primary | ICD-10-CM

## 2024-01-22 PROCEDURE — 99213 OFFICE O/P EST LOW 20 MIN: CPT

## 2024-01-22 RX ORDER — NIRMATRELVIR AND RITONAVIR 300-100 MG
3 KIT ORAL 2 TIMES DAILY
Qty: 30 TABLET | Refills: 0 | Status: SHIPPED | OUTPATIENT
Start: 2024-01-22 | End: 2024-01-27

## 2024-01-22 NOTE — TELEPHONE ENCOUNTER
Received a call from Pharmacy with nursing at the office. GFR 60, which is on the borderline for Paxlovid renal dosing. Gave pharmacy verbal order to change to renal dosing. Pharmacy agreed to change it.

## 2024-01-22 NOTE — PROGRESS NOTES
COVID-19 Outpatient Progress Note    Assessment/Plan:    Problem List Items Addressed This Visit       COVID-19 - Primary     Pt tested positive for covid yesterday with symptom onset 4d ago. He is high risk with tobacco use, will start paxlovid. Pt counseled on appropriate use and risk for interaction with some of his pain medication, advised halving doses while he takes paxlovid.         Relevant Medications    nirmatrelvir & ritonavir (Paxlovid, 300/100,) tablet therapy pack      Disposition:     Risks and benefits of COVID-19 vaccination was discussed with patient.     Discussed COVID-19 antibody testing with the patient.     If you choose to have a COVID-19 antibody test, you should understand some important limitations of this test.  Antibody tests detect the body's immune response to infection rather than detecting the virus itself.  It can take weeks for antibodies to show up in the blood.  For this reason, antibody tests are not used to detect or manage infection. They may be better for tracking infections in the community. Current antibody tests have not undergone the usual strict FDA approval process. The FDA decided to make it easier to have more tests available. The result is that these new tests may not be reliable.     Since COVID-19 antibody testing is so new, we do not know how accurate it is.  You can have a positive test and have not actually been infected. You also can have a negative test even though you might have been infected. There are other coronaviruses that are known to cause the common cold. Many people may have antibodies to these other viruses that could make the COVID-19 antibody test positive.  More importantly, even if you test positive, this does not necessarily mean you are immune to the virus.    Even so, your [facility] provider understands that you may still want a COVID-19 antibody test and can order this test for you.  It is important that you review the results with your  provider and decide together how to use them.     Discussed symptom directed medication options with patient. Discussed vitamin D, vitamin C, and/or zinc supplementation with patient.     Patient meets criteria for Paxlovid and they have been counseled appropriately regarding risks, benefits, side effects, and alternative treatment options. After discussion, patient agrees to treatment.    Possible side effects of Paxlovid?    Possible side effects of Paxlovid are:  - Liver Problems. Notify us right away if you start to experience loss of appetite, yellowing of your skin and the whites of eyes (jaundice), dark-colored urine, pale colored stools and itchy skin, stomach area (abdominal) pain.  - Resistance to HIV Medicines. If you have untreated HIV infection, Paxlovid may lead to some HIV medicines not working as well in the future.  - Other possible side effects include: altered sense of taste, diarrhea, high blood pressure, or muscle aches.    I have spent a total time of 20 minutes on the day of the encounter for this patient including       Encounter provider: Chris Vidales MD     Provider located at: [address] [address] [phone]     Recent Visits  No visits were found meeting these conditions.  Showing recent visits within past 7 days and meeting all other requirements  Today's Visits  Date Type Provider Dept   01/22/24 Telephone Marilyn Yan Pg [facility]   Showing today's visits and meeting all other requirements  Future Appointments  No visits were found meeting these conditions.  Showing future appointments within next 150 days and meeting all other requirements     This virtual check-in was done via [employer] and patient was informed that this is a secure, HIPAA-compliant platform. He agrees to proceed.    Patient agrees to participate in a virtual check in via telephone or video visit instead of presenting to the office  to address urgent/immediate medical needs. Patient is aware this is a billable service. He acknowledged consent and understanding of privacy and security of the video platform. The patient has agreed to participate and understands they can discontinue the visit at any time.    After connecting through [employer], the patient was identified by name and date of birth. Yefri Bennett was informed that this was a telemedicine visit and that the exam was being conducted confidentially over secure lines. My office door was closed. No one else was in the room. Yefri Bennett acknowledged consent and understanding of privacy and security of the telemedicine visit. I informed the patient that I have reviewed his record in Epic and presented the opportunity for him to ask any questions regarding the visit today. The patient agreed to participate.     Verification of patient location:  Patient is located in the following state in which I hold an active license: [place]    Subjective:   Yefri Bennett is a 53 y.o. male who is concerned about COVID-19. Patient's symptoms include fever, chills, nasal congestion, diarrhea, myalgias and headache.     - Date of symptom onset: 1/19/2024      COVID-19 vaccination status: Fully vaccinated (primary series)    Exposure:   Contact with a person who is under investigation (PUI) for or who is positive for COVID-19 within the last 14 days?: Yes    Hospitalized recently for fever and/or lower respiratory symptoms?: No      Currently a healthcare worker that is involved in direct patient care?: No      Works in a special setting where the risk of COVID-19 transmission may be high? (this may include long-term care, correctional and FCI facilities; homeless shelters; assisted-living facilities and group homes.): No      Resident in a special setting where the risk of COVID-19 transmission may be high? (this may include long-term care, correctional and [facility]CI facilities; homeless shelters;  "assisted-living facilities and group homes.): No      Pt \"feels like death.\" Repots calf pain, denies change in color to legs. Had positive home covid test, positive exposure from fiance.    Lab Results   Component Value Date    SARSCOV2 Negative 02/13/2023    SARSCOV2 Not Detected 06/22/2020       Review of Systems   Constitutional:  Positive for chills and fever.   HENT:  Positive for congestion.    Gastrointestinal:  Positive for diarrhea.   Musculoskeletal:  Positive for myalgias.   Neurological:  Positive for headaches.     Current Outpatient Medications on File Prior to Visit   Medication Sig    acetaminophen (TYLENOL) 325 mg tablet Take 3 tablets (975 mg total) by mouth every 8 (eight) hours as needed for mild pain    albuterol (PROVENTIL HFA,VENTOLIN HFA) 90 mcg/act inhaler Inhale 2 puffs every 6 (six) hours as needed for wheezing    amLODIPine (NORVASC) 5 mg tablet Take 1 tablet (5 mg total) by mouth daily    diazepam (VALIUM) 5 mg tablet Take 1 tablet (5 mg total) by mouth every 12 (twelve) hours as needed for anxiety or muscle spasms Do not start before December 30, 2023.    diltiazem (CARDIZEM) 60 mg tablet TAKE 1 TABLET(60 MG) BY MOUTH TWICE DAILY    fluticasone-vilanterol (BREO ELLIPTA) 200-25 MCG/INH inhaler Inhale 1 puff daily Rinse mouth after use. (Patient taking differently: Inhale 1 puff if needed Rinse mouth after use.)    naloxone (Narcan) 4 mg/0.1 mL nasal spray In case of overdose: 1 spray in one nostril x 1, may repeat in 2 min if remains unresponsive. (Patient not taking: Reported on 3/9/2023)    oxyCODONE (ROXICODONE) 10 MG TABS Take 1 tablet (10 mg total) by mouth every 6 (six) hours as needed for moderate pain or severe pain Max Daily Amount: 40 mg Do not start before December 30, 2023.    [DISCONTINUED] cephalexin (KEFLEX) 500 mg capsule TAKE 1 CAPSULE BY MOUTH THREE TIMES DAILY X7 DAYS    [DISCONTINUED] hyoscyamine (LEVSIN/SL) 0.125 mg SL tablet Take 1 tablet (0.125 mg total) by mouth " every 6 (six) hours as needed for cramping    [DISCONTINUED] lidocaine (Lidoderm) 5 % Apply 1 patch topically over 12 hours daily for 7 days Remove & Discard patch within 12 hours or as directed by MD    [DISCONTINUED] methocarbamol (ROBAXIN) 500 mg tablet Take 1 tablet (500 mg total) by mouth 2 (two) times a day as needed for muscle spasms    [DISCONTINUED] methylPREDNISolone 4 MG tablet therapy pack Use as directed on package (Patient not taking: Reported on 10/9/2023)         Tobacco and Toxic Substance Assessment and Intervention:     Tobacco use screening performed        Objective:    There were no vitals taken for this visit.       Physical Exam  Constitutional:       General: He is not in acute distress.  HENT:      Head: Normocephalic and atraumatic.   Eyes:      Conjunctiva/sclera: Conjunctivae normal.   Pulmonary:      Effort: No respiratory distress.   Neurological:      Mental Status: He is alert.   Psychiatric:         Mood and Affect: Mood normal.       Chris Vidales MD

## 2024-01-22 NOTE — TELEPHONE ENCOUNTER
[facility] called stating they need his most recent GFR prior to dispensing medication. On call notified via TC.

## 2024-01-22 NOTE — ASSESSMENT & PLAN NOTE
Pt tested positive for covid yesterday with symptom onset 4d ago. He is high risk with tobacco use, will start paxlovid. Pt counseled on appropriate use and risk for interaction with some of his pain medication, advised halving doses while he takes paxlovid.

## 2024-01-22 NOTE — TELEPHONE ENCOUNTER
Patient called in stating he tested Positive for Covid on Saturday. He states he has bad body aches ,Flem in his chest he denies having any cough just states he overall feels really bad and would like Paxlovid

## 2024-01-25 DIAGNOSIS — F41.8 DEPRESSION WITH ANXIETY: ICD-10-CM

## 2024-01-25 DIAGNOSIS — M96.1 POST LAMINECTOMY SYNDROME: ICD-10-CM

## 2024-01-25 RX ORDER — ACETAMINOPHEN 325 MG/1
975 TABLET ORAL EVERY 8 HOURS PRN
Qty: 270 TABLET | Refills: 0 | Status: SHIPPED | OUTPATIENT
Start: 2024-01-25

## 2024-01-25 RX ORDER — OXYCODONE HYDROCHLORIDE 10 MG/1
10 TABLET ORAL EVERY 6 HOURS PRN
Qty: 120 TABLET | Refills: 0 | Status: SHIPPED | OUTPATIENT
Start: 2024-01-25

## 2024-01-25 RX ORDER — DIAZEPAM 5 MG/1
5 TABLET ORAL EVERY 12 HOURS PRN
Qty: 45 TABLET | Refills: 0 | Status: SHIPPED | OUTPATIENT
Start: 2024-01-25

## 2024-01-26 ENCOUNTER — DOCUMENTATION (OUTPATIENT)
Dept: FAMILY MEDICINE CLINIC | Facility: CLINIC | Age: 54
End: 2024-01-26

## 2024-01-26 ENCOUNTER — TELEPHONE (OUTPATIENT)
Dept: FAMILY MEDICINE CLINIC | Facility: CLINIC | Age: 54
End: 2024-01-26

## 2024-01-26 DIAGNOSIS — N48.5 PENILE ULCER: Primary | ICD-10-CM

## 2024-01-26 RX ORDER — LIDOCAINE HYDROCHLORIDE 20 MG/ML
1 JELLY TOPICAL AS NEEDED
Qty: 50 ML | Refills: 0 | Status: SHIPPED | OUTPATIENT
Start: 2024-01-26 | End: 2024-02-02

## 2024-01-26 NOTE — TELEPHONE ENCOUNTER
Patient called and he is having some symptoms since on Paxlovid. He is reporting blistering and peeling skin, abdominal pain diarrhea headaches. He has 2 more days of dosing to take and he is not sure if he should continue. He would like a return call. Please review. Thank you

## 2024-01-29 ENCOUNTER — TELEPHONE (OUTPATIENT)
Dept: FAMILY MEDICINE CLINIC | Facility: CLINIC | Age: 54
End: 2024-01-29

## 2024-01-29 NOTE — TELEPHONE ENCOUNTER
Patient called and states he is feeling much better appointment cancelled.patient states seem to have dried up with the rash and he is improving with the covid symptoms as well. He would like to stay in a few more days as he is recovering. Just JEZ

## 2024-01-31 NOTE — TELEPHONE ENCOUNTER
Spoke with patient 1/26/24 (late entry note), reported blister cluster on penile shaft, for which I recommended an office visit 1/29/24 to swab for HSV.

## 2024-02-05 ENCOUNTER — TELEPHONE (OUTPATIENT)
Age: 54
End: 2024-02-05

## 2024-02-05 NOTE — TELEPHONE ENCOUNTER
Caller: Self    Doctor: Ta    Reason for call: Patient following up on MRI results. Did provider review them? He would like a call back to discuss    Call back#: [phone]

## 2024-02-09 NOTE — TELEPHONE ENCOUNTER
LVM for pt. stating he has a ref. to see spine and pain. Gave phone number for spine and pain, told pt he can speak with a rep. to locate doctor nearest his location and schedule appt. to review MRI results: Central line to spine and pain [phone]

## 2024-02-20 ENCOUNTER — APPOINTMENT (EMERGENCY)
Dept: CT IMAGING | Facility: HOSPITAL | Age: 54
End: 2024-02-20
Payer: MEDICARE

## 2024-02-20 ENCOUNTER — APPOINTMENT (EMERGENCY)
Dept: RADIOLOGY | Facility: HOSPITAL | Age: 54
End: 2024-02-20
Payer: MEDICARE

## 2024-02-20 ENCOUNTER — HOSPITAL ENCOUNTER (EMERGENCY)
Facility: HOSPITAL | Age: 54
Discharge: HOME | End: 2024-02-21
Attending: EMERGENCY MEDICINE
Payer: MEDICARE

## 2024-02-20 VITALS
HEART RATE: 84 BPM | WEIGHT: 210 LBS | RESPIRATION RATE: 16 BRPM | DIASTOLIC BLOOD PRESSURE: 107 MMHG | OXYGEN SATURATION: 99 % | TEMPERATURE: 98.2 F | BODY MASS INDEX: 27.71 KG/M2 | SYSTOLIC BLOOD PRESSURE: 161 MMHG
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
## 2024-02-20 DIAGNOSIS — R10.9 ABDOMINAL PAIN: ICD-10-CM

## 2024-02-20 DIAGNOSIS — R51.9 HEADACHE: ICD-10-CM

## 2024-02-20 DIAGNOSIS — M54.9 BACK PAIN: Primary | ICD-10-CM

## 2024-02-20 LAB
ALBUMIN SERPL BCP-MCNC: 4.3 G/DL (ref 3.5–5)
ALP SERPL-CCNC: 77 U/L (ref 34–104)
ALT SERPL W P-5'-P-CCNC: 11 U/L (ref 7–52)
ANION GAP SERPL CALCULATED.3IONS-SCNC: 7 MMOL/L
AST SERPL W P-5'-P-CCNC: 12 U/L (ref 13–39)
BASOPHILS # BLD AUTO: 0.06 THOUSANDS/ÂΜL (ref 0–0.1)
BASOPHILS NFR BLD AUTO: 1 % (ref 0–1)
BILIRUB SERPL-MCNC: 0.41 MG/DL (ref 0.2–1)
BUN SERPL-MCNC: 16 MG/DL (ref 5–25)
CALCIUM SERPL-MCNC: 9.2 MG/DL (ref 8.4–10.2)
CARDIAC TROPONIN I PNL SERPL HS: 4 NG/L
CHLORIDE SERPL-SCNC: 109 MMOL/L (ref 96–108)
CO2 SERPL-SCNC: 24 MMOL/L (ref 21–32)
CREAT SERPL-MCNC: 1.35 MG/DL (ref 0.6–1.3)
EOSINOPHIL # BLD AUTO: 0.31 THOUSAND/ÂΜL (ref 0–0.61)
EOSINOPHIL NFR BLD AUTO: 3 % (ref 0–6)
ERYTHROCYTE [DISTWIDTH] IN BLOOD BY AUTOMATED COUNT: 14.7 % (ref 11.6–15.1)
FLUAV RNA RESP QL NAA+PROBE: NEGATIVE
FLUBV RNA RESP QL NAA+PROBE: NEGATIVE
GFR SERPL CREATININE-BSD FRML MDRD: 58 ML/MIN/1.73SQ M
GLUCOSE SERPL-MCNC: 95 MG/DL (ref 65–140)
HCT VFR BLD AUTO: 47 % (ref 36.5–49.3)
HGB BLD-MCNC: 15.3 G/DL (ref 12–17)
IMM GRANULOCYTES # BLD AUTO: 0.04 THOUSAND/UL (ref 0–0.2)
IMM GRANULOCYTES NFR BLD AUTO: 0 % (ref 0–2)
LYMPHOCYTES # BLD AUTO: 2.58 THOUSANDS/ÂΜL (ref 0.6–4.47)
LYMPHOCYTES NFR BLD AUTO: 23 % (ref 14–44)
MCH RBC QN AUTO: 28.8 PG (ref 26.8–34.3)
MCHC RBC AUTO-ENTMCNC: 32.6 G/DL (ref 31.4–37.4)
MCV RBC AUTO: 89 FL (ref 82–98)
MONOCYTES # BLD AUTO: 0.89 THOUSAND/ÂΜL (ref 0.17–1.22)
MONOCYTES NFR BLD AUTO: 8 % (ref 4–12)
NEUTROPHILS # BLD AUTO: 7.49 THOUSANDS/ÂΜL (ref 1.85–7.62)
NEUTS SEG NFR BLD AUTO: 65 % (ref 43–75)
NRBC BLD AUTO-RTO: 0 /100 WBCS
PLATELET # BLD AUTO: 303 THOUSANDS/UL (ref 149–390)
PMV BLD AUTO: 10.4 FL (ref 8.9–12.7)
POTASSIUM SERPL-SCNC: 4.1 MMOL/L (ref 3.5–5.3)
PROT SERPL-MCNC: 6.8 G/DL (ref 6.4–8.4)
RBC # BLD AUTO: 5.31 MILLION/UL (ref 3.88–5.62)
RSV RNA RESP QL NAA+PROBE: NEGATIVE
SARS-COV-2 RNA RESP QL NAA+PROBE: NEGATIVE
SODIUM SERPL-SCNC: 140 MMOL/L (ref 135–147)
WBC # BLD AUTO: 11.37 THOUSAND/UL (ref 4.31–10.16)

## 2024-02-20 PROCEDURE — 99284 EMERGENCY DEPT VISIT MOD MDM: CPT

## 2024-02-20 PROCEDURE — 80053 COMPREHEN METABOLIC PANEL: CPT

## 2024-02-20 PROCEDURE — 96374 THER/PROPH/DIAG INJ IV PUSH: CPT

## 2024-02-20 PROCEDURE — 99285 EMERGENCY DEPT VISIT HI MDM: CPT | Performed by: EMERGENCY MEDICINE

## 2024-02-20 PROCEDURE — 96361 HYDRATE IV INFUSION ADD-ON: CPT

## 2024-02-20 PROCEDURE — 84484 ASSAY OF TROPONIN QUANT: CPT

## 2024-02-20 PROCEDURE — 73502 X-RAY EXAM HIP UNI 2-3 VIEWS: CPT

## 2024-02-20 PROCEDURE — 71260 CT THORAX DX C+: CPT

## 2024-02-20 PROCEDURE — 93005 ELECTROCARDIOGRAM TRACING: CPT

## 2024-02-20 PROCEDURE — 74177 CT ABD & PELVIS W/CONTRAST: CPT

## 2024-02-20 PROCEDURE — 85025 COMPLETE CBC W/AUTO DIFF WBC: CPT

## 2024-02-20 PROCEDURE — 36415 COLL VENOUS BLD VENIPUNCTURE: CPT

## 2024-02-20 PROCEDURE — G1004 CDSM NDSC: HCPCS

## 2024-02-20 PROCEDURE — 0241U HB NFCT DS VIR RESP RNA 4 TRGT: CPT

## 2024-02-20 RX ORDER — KETOROLAC TROMETHAMINE 30 MG/ML
15 INJECTION, SOLUTION INTRAMUSCULAR; INTRAVENOUS ONCE
Status: DISCONTINUED | OUTPATIENT
Start: 2024-02-20 | End: 2024-02-20

## 2024-02-20 RX ORDER — ACETAMINOPHEN 325 MG/1
975 TABLET ORAL ONCE
Status: COMPLETED | OUTPATIENT
Start: 2024-02-20 | End: 2024-02-20

## 2024-02-20 RX ORDER — MORPHINE SULFATE 10 MG/ML
6 INJECTION, SOLUTION INTRAMUSCULAR; INTRAVENOUS ONCE
Status: COMPLETED | OUTPATIENT
Start: 2024-02-20 | End: 2024-02-20

## 2024-02-20 RX ADMIN — MORPHINE SULFATE 6 MG: 10 INJECTION INTRAVENOUS at 22:17

## 2024-02-20 RX ADMIN — ACETAMINOPHEN 975 MG: 325 TABLET, FILM COATED ORAL at 23:34

## 2024-02-20 RX ADMIN — IOHEXOL 100 ML: 350 INJECTION, SOLUTION INTRAVENOUS at 22:26

## 2024-02-20 RX ADMIN — SODIUM CHLORIDE 1000 ML: 0.9 INJECTION, SOLUTION INTRAVENOUS at 22:43

## 2024-02-21 DIAGNOSIS — M96.1 POST LAMINECTOMY SYNDROME: ICD-10-CM

## 2024-02-21 DIAGNOSIS — F41.8 DEPRESSION WITH ANXIETY: ICD-10-CM

## 2024-02-21 PROBLEM — Z11.3 SCREENING FOR STD (SEXUALLY TRANSMITTED DISEASE): Status: RESOLVED | Noted: 2018-02-28 | Resolved: 2024-02-21

## 2024-02-21 PROBLEM — J06.9 VIRAL UPPER RESPIRATORY TRACT INFECTION: Status: RESOLVED | Noted: 2019-03-18 | Resolved: 2024-02-21

## 2024-02-21 PROBLEM — R05.9 COUGH: Status: RESOLVED | Noted: 2019-10-25 | Resolved: 2024-02-21

## 2024-02-21 PROBLEM — J18.9 PNEUMONIA DUE TO INFECTIOUS ORGANISM: Status: RESOLVED | Noted: 2019-01-16 | Resolved: 2024-02-21

## 2024-02-21 LAB
ATRIAL RATE: 66 BPM
P AXIS: 69 DEGREES
PR INTERVAL: 160 MS
QRS AXIS: 71 DEGREES
QRSD INTERVAL: 86 MS
QT INTERVAL: 362 MS
QTC INTERVAL: 379 MS
T WAVE AXIS: 68 DEGREES
VENTRICULAR RATE: 66 BPM

## 2024-02-21 PROCEDURE — 93010 ELECTROCARDIOGRAM REPORT: CPT | Performed by: INTERNAL MEDICINE

## 2024-02-21 RX ORDER — METHOCARBAMOL 500 MG/1
500 TABLET, FILM COATED ORAL 2 TIMES DAILY
Qty: 20 TABLET | Refills: 0 | Status: SHIPPED | OUTPATIENT
Start: 2024-02-21

## 2024-02-21 RX ORDER — DIAZEPAM 5 MG/1
5 TABLET ORAL EVERY 12 HOURS PRN
Qty: 45 TABLET | Refills: 0 | Status: SHIPPED | OUTPATIENT
Start: 2024-02-26

## 2024-02-21 RX ORDER — KETOROLAC TROMETHAMINE 30 MG/ML
15 INJECTION, SOLUTION INTRAMUSCULAR; INTRAVENOUS ONCE
Status: DISCONTINUED | OUTPATIENT
Start: 2024-02-21 | End: 2024-02-21

## 2024-02-21 RX ORDER — OXYCODONE HYDROCHLORIDE 10 MG/1
10 TABLET ORAL EVERY 6 HOURS PRN
Qty: 120 TABLET | Refills: 0 | Status: SHIPPED | OUTPATIENT
Start: 2024-02-26

## 2024-02-21 RX ORDER — LIDOCAINE 50 MG/G
1 PATCH TOPICAL EVERY 24 HOURS
Qty: 10 PATCH | Refills: 0 | Status: SHIPPED | OUTPATIENT
Start: 2024-02-21 | End: 2024-03-02

## 2024-02-21 RX ADMIN — DICLOFENAC SODIUM TOPICAL GEL, 1% 2 G: 10 GEL TOPICAL at 00:24

## 2024-02-21 NOTE — ED PROVIDER NOTES
History  Chief Complaint   Patient presents with    Pain     Pt fell 2 DAYS AGO on ice and then again down several steps on left side. Pt did hit head but does NOT take aspirin daily or blood thinners. Pt c/o lower back pain, left sided abdominal pain. Denies LOC.     53-year-old male with history of chronic pain syndrome on chronic opioid and benzo's, presents after a fall 2 days ago that he describes as a mechanical fall down approximately 6 steps.  He states he hit the left side of his head and left hip abdomen and chest wall.  He is complaining of pain in the previously mentioned areas as well as low back.  He describes the left-sided abdominal pain as sharp.  Patient also endorsed diaphoresis.  He also has shortness of breath due to abdominal pain, states he is unable to take a deep breath because of it.  Denies fever/chills, tremors, hallucinations.        Prior to Admission Medications   Prescriptions Last Dose Informant Patient Reported? Taking?   acetaminophen (TYLENOL) 325 mg tablet   No No   Sig: Take 3 tablets (975 mg total) by mouth every 8 (eight) hours as needed for mild pain   albuterol (PROVENTIL HFA,VENTOLIN HFA) 90 mcg/act inhaler  Self No No   Sig: Inhale 2 puffs every 6 (six) hours as needed for wheezing   amLODIPine (NORVASC) 5 mg tablet  Self No No   Sig: Take 1 tablet (5 mg total) by mouth daily   diazepam (VALIUM) 5 mg tablet   No No   Sig: Take 1 tablet (5 mg total) by mouth every 12 (twelve) hours as needed for anxiety or muscle spasms   diltiazem (CARDIZEM) 60 mg tablet   No No   Sig: TAKE 1 TABLET(60 MG) BY MOUTH TWICE DAILY   fluticasone-vilanterol (BREO ELLIPTA) 200-25 MCG/INH inhaler  Self No No   Sig: Inhale 1 puff daily Rinse mouth after use.   Patient taking differently: Inhale 1 puff if needed Rinse mouth after use.   lidocaine (XYLOCAINE) 2 % topical gel   No No   Sig: Apply 1 Application topically as needed for mild pain for up to 7 days   naloxone (Narcan) 4 mg/0.1 mL nasal  spray  Self No No   Sig: In case of overdose: 1 spray in one nostril x 1, may repeat in 2 min if remains unresponsive.   Patient not taking: Reported on 3/9/2023   oxyCODONE (ROXICODONE) 10 MG TABS   No No   Sig: Take 1 tablet (10 mg total) by mouth every 6 (six) hours as needed for moderate pain or severe pain Max Daily Amount: 40 mg      Facility-Administered Medications: None       Past Medical History:   Diagnosis Date    Allergic     seasonal     Allergic rhinitis     Anxiety     Arthritis     Back pain     Chronic obstructive asthma     Chronic pain syndrome     Cluster headaches     Colon polyp     Depression     Insomnia     Kidney stones     Laryngospasm     Leukocytosis     Migraine     Myocardial infarction (HCC)     Nephrolithiasis     Organic impotence     Pneumonia due to infectious organism 1/16/2019    Seasonal allergies     Sleep apnea     does not use CPAP    Stroke (HCC) 2015    TMJ (temporomandibular joint syndrome)     Wheezing     last assessed 05/19/17       Past Surgical History:   Procedure Laterality Date    BACK SURGERY      *9, [id number], nervectomy 2006, total of 10    BUCCAL MASS EXCISION N/A 3/26/2018    Procedure: BIOPSY LINGUAL TONSIL (FLOW/ STANDARD PATH)  EVAL UNDER ANESTHESIA;  Surgeon: Deric Easton MD;  [facility];  Service: ENT    COLONOSCOPY      FL RETROGRADE PYELOGRAM  10/21/2020    HERNIA REPAIR      KIDNEY SURGERY      KNEE ARTHROSCOPY      LITHOTRIPSY      multiple    LITHOTRIPSY      LUMBAR DISC SURGERY  2015    MANDIBLE FRACTURE SURGERY      titanium plate    PELVIC SYMPHYSIS FUSION      MN CYSTO/URETERO W/LITHOTRIPSY &INDWELL STENT INSRT Right 10/21/2020    Procedure: CYSTOSCOPY URETEROSCOPY WITH LITHOTRIPSY HOLMIUM LASER, RETROGRADE PYELOGRAM AND INSERTION STENT URETERAL;  Surgeon: Anthony Richardson MD;  [facility];  Service: Urology    MN ESOPHAGOGASTRODUODENOSCOPY TRANSORAL DIAGNOSTIC N/A 2/22/2018    Procedure: ESOPHAGOGASTRODUODENOSCOPY (EGD);   Surgeon: Yolis Mares MD;  [facility]: [facility];  Service: Gastroenterology    RI ESOPHAGOGASTRODUODENOSCOPY TRANSORAL DIAGNOSTIC N/A 4/1/2019    Procedure: EGD W/ DILATION;  Surgeon: Yolis Mares MD;  [facility];  Service: Gastroenterology    RI FORREST IMPLTJ NSTIM ELTRDS PLATE/PADDLE EDRL N/A 9/8/2016    Procedure: DORSAL COLUMN STIMULATOR PLACEMENT (IMPULSE MONITORING);  Surgeon: Martin Doe MD;  Location[facility];  Service: Orthopedics    RI REVJ/RMVL IMPL SPI NPG/RCVR DTCH CONNJ ELTRD RA Left 6/9/2017    Procedure: REMOVAL OF A THORACIC SCS PLACED VIA LAMINECTOMY AND REMOVAL LEFT BUTTOCK GENERATOR; IMPULSE MONITORING;  Surgeon: Steven Parr MD;  Location: [facility];  Service: Neurosurgery    RI SURGICAL ARTHROSCOPY SHOULDER W/ROTATOR CUFF RPR Right 4/4/2019    Procedure: RIGHT SHOULDER ARTHROSCOPIC SUBSCAPULARIS TENDON REPAIR;  Surgeon: David Merritt MD;  [facility];  Service: Orthopedics    SACROILIAC JOINT FUSION  2015    SHOULDER SURGERY Left     2    UPPER GASTROINTESTINAL ENDOSCOPY         Family History   Problem Relation Age of Onset    Leukemia Mother 77    Hypertension Mother     Diabetes Father     Obesity Father     Liver cancer Father     Heart disease Father     Diabetes Brother     Hypertension Brother     Skin cancer Maternal Grandfather 99    Cancer Family      I have reviewed and agree with the history as documented.    E-Cigarette/Vaping    E-Cigarette Use Current Every Day User     Cartridges/Day less than one a day      E-Cigarette/Vaping Substances    Nicotine No     THC Yes     CBD No     Flavoring No     Other No     Unknown No      Social History     Tobacco Use    Smoking status: Every Day     Current packs/day: 1.00     Average packs/day: 1 pack/day for 25.0 years (25.0 ttl pk-yrs)     Types: Cigarettes    Smokeless tobacco: Former   Vaping Use    Vaping status: Every Day    Substances: THC   Substance Use Topics    Alcohol use: Yes      Comment: rarely     Drug use: Yes     Frequency: 7.0 times per week     Types: Marijuana     Comment: medical marijuana daily for chronic pain 1/2 ounce        Review of Systems   Constitutional:  Positive for diaphoresis. Negative for chills and fever.   HENT:  Negative for congestion and sore throat.    Eyes:  Negative for photophobia and visual disturbance.   Respiratory:  Positive for shortness of breath (due to abd pain). Negative for cough.    Gastrointestinal:  Positive for abdominal pain (left sided). Negative for constipation, diarrhea, nausea and vomiting.   Genitourinary:  Negative for difficulty urinating.   Musculoskeletal:  Positive for arthralgias (left hip) and back pain. Negative for myalgias.   Skin:  Negative for rash.   Neurological:  Positive for headaches (left side).   Psychiatric/Behavioral:  Negative for agitation.        Physical Exam  ED Triage Vitals   Temperature Pulse Respirations Blood Pressure SpO2   02/20/24 2112 02/20/24 2112 02/20/24 2112 02/20/24 2112 02/20/24 2112   98.2 °F (36.8 °C) 84 16 (!) 161/107 99 %      Temp src Heart Rate Source Patient Position - Orthostatic VS BP Location FiO2 (%)   -- -- -- -- --             Pain Score       02/20/24 2334       8             Orthostatic Vital Signs  Vitals:    02/20/24 2112   BP: (!) 161/107   Pulse: 84       Physical Exam  Vitals and nursing note reviewed.   Constitutional:       General: He is not in acute distress.     Appearance: He is well-developed. He is not ill-appearing.   HENT:      Head: Normocephalic and atraumatic.      Right Ear: External ear normal.      Left Ear: External ear normal.      Nose: Nose normal.      Mouth/Throat:      Mouth: Mucous membranes are moist.   Eyes:      Extraocular Movements: Extraocular movements intact.   Cardiovascular:      Rate and Rhythm: Normal rate and regular rhythm.      Pulses: Normal pulses.      Heart sounds: Normal heart sounds. No murmur heard.  Pulmonary:      Effort: Pulmonary  effort is normal. No respiratory distress.      Breath sounds: Normal breath sounds.   Abdominal:      Palpations: Abdomen is soft.      Tenderness: There is abdominal tenderness (left mid abdomen).   Musculoskeletal:         General: Tenderness (left hip, left paraspinal muscles) present. Normal range of motion.      Cervical back: Neck supple.   Skin:     General: Skin is warm and dry.   Neurological:      General: No focal deficit present.      Mental Status: He is alert.   Psychiatric:         Mood and Affect: Mood normal.         ED Medications  Medications   Diclofenac Sodium (VOLTAREN) 1 % topical gel 2 g (2 g Topical Given 2/21/24 0024)   sodium chloride 0.9 % bolus 1,000 mL (1,000 mL Intravenous New Bag 2/20/24 2243)   morphine injection 6 mg (6 mg Intravenous Given 2/20/24 2217)   iohexol (OMNIPAQUE) 350 MG/ML injection (MULTI-DOSE) 100 mL (100 mL Intravenous Given 2/20/24 2226)   acetaminophen (TYLENOL) tablet 975 mg (975 mg Oral Given 2/20/24 2334)       Diagnostic Studies  Results Reviewed       Procedure Component Value Units Date/Time    FLU/RSV/COVID - if FLU/RSV clinically relevant [[id number]]  (Normal) Collected: 02/20/24 2135    Lab Status: Final result Specimen: Nares from Nose Updated: 02/20/24 2259     SARS-CoV-2 Negative     INFLUENZA A PCR Negative     INFLUENZA B PCR Negative     RSV PCR Negative    Narrative:      FOR PEDIATRIC PATIENTS - copy/paste COVID Guidelines URL to browser: [email or web address]    SARS-CoV-2 assay is a Nucleic Acid Amplification assay intended for the  qualitative detection of nucleic acid from SARS-CoV-2 in nasopharyngeal  swabs. Results are for the presumptive identification of SARS-CoV-2 RNA.    Positive results are indicative of infection with SARS-CoV-2, the virus  causing COVID-19, but do not rule out bacterial infection or co-infection  with other viruses. Laboratories within the [place] and  its  territories are required to report all positive results to the appropriate  public health authorities. Negative results do not preclude SARS-CoV-2  infection and should not be used as the sole basis for treatment or other  patient management decisions. Negative results must be combined with  clinical observations, patient history, and epidemiological information.  This test has not been FDA cleared or approved.    This test has been authorized by FDA under an Emergency Use Authorization  (EUA). This test is only authorized for the duration of time the  declaration that circumstances exist justifying the authorization of the  emergency use of an in vitro diagnostic tests for detection of SARS-CoV-2  virus and/or diagnosis of COVID-19 infection under section 564(b)(1) of  the Act, 21 U.S.C. 360bbb-3(b)(1), unless the authorization is terminated  or revoked sooner. The test has been validated but independent review by FDA  and CLIA is pending.    Test performed using [employer] GeneXpert: This RT-PCR assay targets N2,  a region unique to SARS-CoV-2. A conserved region in the E-gene was chosen  for pan-Sarbecovirus detection which includes SARS-CoV-2.    According to CMS-2020-01-R, this platform meets the definition of high-throughput technology.    HS Troponin 0hr (reflex protocol) [[id number]]  (Normal) Collected: 02/20/24 2135    Lab Status: Final result Specimen: Blood from Arm, Left Updated: 02/20/24 2204     hs TnI 0hr 4 ng/L     Comprehensive metabolic panel [[id number]]  (Abnormal) Collected: 02/20/24 2135    Lab Status: Final result Specimen: Blood from Arm, Left Updated: 02/20/24 2157     Sodium 140 mmol/L      Potassium 4.1 mmol/L      Chloride 109 mmol/L      CO2 24 mmol/L      ANION GAP 7 mmol/L      BUN 16 mg/dL      Creatinine 1.35 mg/dL      Glucose 95 mg/dL      Calcium 9.2 mg/dL      AST 12 U/L      ALT 11 U/L      Alkaline Phosphatase 77 U/L      Total Protein 6.8 g/dL      Albumin 4.3 g/dL      Total  Bilirubin 0.41 mg/dL      eGFR 58 ml/min/1.73sq m     Narrative:      National Kidney Disease Foundation guidelines for Chronic Kidney Disease (CKD):     Stage 1 with normal or high GFR (GFR > 90 mL/min/1.73 square meters)    Stage 2 Mild CKD (GFR = 60-89 mL/min/1.73 square meters)    Stage 3A Moderate CKD (GFR = 45-59 mL/min/1.73 square meters)    Stage 3B Moderate CKD (GFR = 30-44 mL/min/1.73 square meters)    Stage 4 Severe CKD (GFR = 15-29 mL/min/1.73 square meters)    Stage 5 End Stage CKD (GFR <15 mL/min/1.73 square meters)  Note: GFR calculation is accurate only with a steady state creatinine    CBC and differential [[id number]]  (Abnormal) Collected: 02/20/24 2135    Lab Status: Final result Specimen: Blood from Arm, Left Updated: 02/20/24 2143     WBC 11.37 Thousand/uL      RBC 5.31 Million/uL      Hemoglobin 15.3 g/dL      Hematocrit 47.0 %      MCV 89 fL      MCH 28.8 pg      MCHC 32.6 g/dL      RDW 14.7 %      MPV 10.4 fL      Platelets 303 Thousands/uL      nRBC 0 /100 WBCs      Neutrophils Relative 65 %      Immat GRANS % 0 %      Lymphocytes Relative 23 %      Monocytes Relative 8 %      Eosinophils Relative 3 %      Basophils Relative 1 %      Neutrophils Absolute 7.49 Thousands/µL      Immature Grans Absolute 0.04 Thousand/uL      Lymphocytes Absolute 2.58 Thousands/µL      Monocytes Absolute 0.89 Thousand/µL      Eosinophils Absolute 0.31 Thousand/µL      Basophils Absolute 0.06 Thousands/µL                    CT chest abdomen pelvis w contrast   Final Result by Zander Arredondo MD (02/21 0005)      No traumatic abnormality               Workstation performed: [id number]         CT recon only thoracolumbar   Final Result by Zander Arredondo MD (02/21 0011)      No fracture or traumatic subluxation.               Workstation performed: [id number]         XR hip/pelv 2-3 vws left if performed    (Results Pending)         Procedures  ECG 12 Lead Documentation Only    Date/Time: 2/20/2024 9:41 PM    Performed  by: Jordy Tyler DO  Authorized by: Jordy Tyler DO    Indications / Diagnosis:  Diaphoresis  Patient location:  ED  Interpretation:     Interpretation: normal    Rate:     ECG rate:  66    ECG rate assessment: normal    Rhythm:     Rhythm: sinus rhythm    Ectopy:     Ectopy: none    QRS:     QRS axis:  Normal    QRS intervals:  Normal  Conduction:     Conduction: normal    ST segments:     ST segments:  Normal  T waves:     T waves: normal          ED Course  ED Course as of 02/21/24 0030   Tue Feb 20, 2024 2144 CBC and differential(!)  Minor leukocytosis, otherwise within normal limits   2158 Comprehensive metabolic panel(!)  Creatinine at recent baseline, otherwise within normal limits.                             SBIRT 20yo+      Flowsheet Row Most Recent Value   Initial Alcohol Screen: US AUDIT-C     1. How often do you have a drink containing alcohol? 0 Filed at: 02/20/2024 2113   2. How many drinks containing alcohol do you have on a typical day you are drinking?  0 Filed at: 02/20/2024 2113   3a. Male UNDER 65: How often do you have five or more drinks on one occasion? 0 Filed at: 02/20/2024 2113   3b. FEMALE Any Age, or MALE 65+: How often do you have 4 or more drinks on one occassion? 0 Filed at: 02/20/2024 2113   Audit-C Score 0 Filed at: 02/20/2024 2113   RENA: How many times in the past year have you...    Used an illegal drug or used a prescription medication for non-medical reasons? Never Filed at: 02/20/2024 2113                  Medical Decision Making  Yefri came to the ER due to continued abdominal, back, headache, hip pain after mechanical fall down approximately 5-6 steps.  Patient was uncomfortable and pain was well-controlled with morphine, Tylenol, and diclofenac gel.  CT of chest abdomen pelvis with thoracolumbar recon showed no acute abnormalities, fractures, malalignments.  X-ray hip was also negative.  Patient was also diaphoretic when he arrived, cardiac workup  was negative with EKG showing no ischemic changes and troponin negative.  Patient had no complaints of chest pain.  Due to negative workup, patient was discharged with pain control of Robaxin and lidocaine patches.  Patient was instructed to follow closely with his PCP for further evaluation and treatment.  Patient understood and agreed to plan.  Patient discharged in stable condition.  Strict return precautions given if symptoms are worsening or not resolving.      Amount and/or Complexity of Data Reviewed  Labs: ordered. Decision-making details documented in ED Course.  Radiology: ordered.    Risk  OTC drugs.  Prescription drug management.          Disposition  Final diagnoses:   Abdominal pain   Headache   Fall, initial encounter   Back pain     Time reflects when diagnosis was documented in both MDM as applicable and the Disposition within this note       Time User Action Codes Description Comment    2/21/2024 12:11 AM Jordy Tyler Add [M54.9] Back pain     2/21/2024 12:11 AM Jordy Tyler Add [R10.9] Abdominal pain     2/21/2024 12:11 AM Jordy Tyler Add [R51.9] Headache     2/21/2024 12:12 AM Jordy Tyler Add [W19.XXXA] Fall, initial encounter     2/21/2024 12:12 AM Jordy Tyler Modify [R10.9] Abdominal pain     2/21/2024 12:12 AM Jordy Tyler Remove [M54.9] Back pain     2/21/2024 12:12 AM Jordy Tyler Add [M54.9] Back pain     2/21/2024 12:12 AM Jordy Tyler Modify [R10.9] Abdominal pain     2/21/2024 12:12 AM Jordy Tyler Modify [M54.9] Back pain           ED Disposition       ED Disposition   Discharge    Condition   Stable    Date/Time   Wed Feb 21, 2024 0011    Comment   Yefri Bennett discharge to home/self care.                   Follow-up Information    None         Patient's Medications   Discharge Prescriptions    LIDOCAINE (LIDODERM) 5 %    Apply 1 patch topically over 12 hours every 24 hours for 10 days Remove & Discard patch within 12 hours or  as directed by MD       Start Date: 2/21/2024 End Date: 3/2/2024       Order Dose: 1 patch       Quantity: 10 patch    Refills: 0    METHOCARBAMOL (ROBAXIN) 500 MG TABLET    Take 1 tablet (500 mg total) by mouth 2 (two) times a day       Start Date: 2/21/2024 End Date: --       Order Dose: 500 mg       Quantity: 20 tablet    Refills: 0     No discharge procedures on file.    PDMP Review         Value Time User    PDMP Reviewed  Yes 1/25/2024  2:23 PM Sharon Clemente MD             ED Provider  Attending physically available and evaluated Yefri Bennett. I managed the patient along with the ED Attending.    Electronically Signed by           Jordy Tyler DO  02/21/24 0030

## 2024-02-22 ENCOUNTER — VBI (OUTPATIENT)
Dept: FAMILY MEDICINE CLINIC | Facility: CLINIC | Age: 54
End: 2024-02-22

## 2024-02-22 NOTE — TELEPHONE ENCOUNTER
02/22/24 10:53 AM    Patient contacted post ED visit, first outreach attempt made. Message was left for patient to return a call to the [facility] Department at [place]: Phone [phone].    Thank you.  [place] Aguilera MA  [facility]

## 2024-02-22 NOTE — LETTER
[address] [id number] [phone]    Date: 02/26/24  Yefri Bennett  [address] [address]    Dear Yefri:                                                                                                                              Thank you for choosing [facility] emergency department for care.  Your primary care provider wants to make sure that your ongoing medical care is being addressed. If you require follow up care as a result of your emergency department visit, there are a few things the practice would like you to know.                As part of the network's continuing commitment to caring for our patients, we have added more same day appointments and have extended office hours to meet your medical needs. After hours, on-call physicians are available via your primary care provider's main office line.               We encourage you to contact our office prior to seeking treatment to discuss your symptoms with the medical staff.  Together, we can determine the correct course of action.  A majority of non-emergent conditions such as: common cold, flu-like symptoms, fevers, strains/sprains, dislocations, minor burns, cuts and animal bites can be treated at [facility] facilities. Diagnostic testing is available at some sites.               Of course, if you are experiencing a life threatening medical emergency call 911 or proceed directly to the nearest emergency room.    Your nearest [facility] facility is conveniently located at:    [facility] ([facility])  [address] [place] [address]  [phone]  SKIP THE WAIT  Conveniently offered at most [facility] locations  [place] your spot online at [email or web address] or on the [facility]    Sincerely,    [facility]  Dept: [phone]

## 2024-02-23 NOTE — TELEPHONE ENCOUNTER
02/23/24 9:50 AM    Patient contacted post ED visit, second outreach attempt made. Message was left for patient to return a call to the [facility] Department at [place]: Phone [phone].    Thank you.  [place] Aguilera MA  [facility]

## 2024-02-26 NOTE — TELEPHONE ENCOUNTER
02/26/24 9:54 AM    Patient contacted post ED visit, phone outreaches were unsuccessful; patient does not have MyChart, a MyChart letter has not been sent.     Thank you.  [place] Aguilera MA  [facility]

## 2024-02-27 NOTE — ED ATTENDING ATTESTATION
2/20/2024  I, Suraj Lomax DO, saw and evaluated the patient. I have discussed the patient with the resident/non-physician practitioner and agree with the resident's/non-physician practitioner's findings, Plan of Care, and MDM as documented in the resident's/non-physician practitioner's note, except where noted. All available labs and Radiology studies were reviewed.  I was present for key portions of any procedure(s) performed by the resident/non-physician practitioner and I was immediately available to provide assistance.       At this point I agree with the current assessment done in the Emergency Department.  I have conducted an independent evaluation of this patient a history and physical is as follows:    ED Course         Critical Care Time  Procedures

## 2024-03-22 DIAGNOSIS — F41.8 DEPRESSION WITH ANXIETY: ICD-10-CM

## 2024-03-22 DIAGNOSIS — M96.1 POST LAMINECTOMY SYNDROME: ICD-10-CM

## 2024-03-22 DIAGNOSIS — R10.9 ABDOMINAL PAIN: ICD-10-CM

## 2024-03-22 DIAGNOSIS — F11.90 CHRONIC, CONTINUOUS USE OF OPIOIDS: Primary | ICD-10-CM

## 2024-03-22 DIAGNOSIS — M54.9 BACK PAIN: ICD-10-CM

## 2024-03-24 DIAGNOSIS — K22.4 ESOPHAGEAL DYSMOTILITIES: ICD-10-CM

## 2024-03-25 ENCOUNTER — TELEPHONE (OUTPATIENT)
Dept: FAMILY MEDICINE CLINIC | Facility: CLINIC | Age: 54
End: 2024-03-25

## 2024-03-25 RX ORDER — ACETAMINOPHEN 325 MG/1
975 TABLET ORAL EVERY 8 HOURS PRN
Qty: 270 TABLET | Refills: 0 | Status: SHIPPED | OUTPATIENT
Start: 2024-03-25

## 2024-03-25 RX ORDER — DIAZEPAM 5 MG/1
5 TABLET ORAL EVERY 12 HOURS PRN
Qty: 45 TABLET | Refills: 0 | Status: SHIPPED | OUTPATIENT
Start: 2024-03-25

## 2024-03-25 RX ORDER — METHOCARBAMOL 500 MG/1
500 TABLET, FILM COATED ORAL 3 TIMES DAILY PRN
Qty: 60 TABLET | Refills: 0 | Status: SHIPPED | OUTPATIENT
Start: 2024-03-25

## 2024-03-25 RX ORDER — OXYCODONE HYDROCHLORIDE 10 MG/1
10 TABLET ORAL EVERY 6 HOURS PRN
Qty: 120 TABLET | Refills: 0 | Status: SHIPPED | OUTPATIENT
Start: 2024-03-25

## 2024-03-25 NOTE — TELEPHONE ENCOUNTER
Caller: Patient    Doctor: Bryn    Reason for call: Patient call requesting his medication to be refill. Patient is requesting his oxyCodone 10mg, and Diazepam 5mg.  Please review thank you      Number: [phone]

## 2024-03-25 NOTE — TELEPHONE ENCOUNTER
Meds sent to pharmacy, and I changed his dosing for his robaxin also from scheduled BID to TID PRN. In addition, I ordered a urine drug test as we'd discussed previously (he is not due for Millenium testing, this is just because he had (+) amphetamine on his Millenium testing in October after taking a friend's Adderall, and his amphetamine was negative on UDS after that, but we'd discussed repeating it around March as part of our controlled substance agreement) so he can go to any [facility] lab and get that done. Please let me know if there are questions, thanks!

## 2024-03-26 RX ORDER — DILTIAZEM HYDROCHLORIDE 60 MG/1
TABLET, FILM COATED ORAL
Qty: 180 TABLET | Refills: 1 | Status: SHIPPED | OUTPATIENT
Start: 2024-03-26

## 2024-04-20 DIAGNOSIS — M96.1 POST LAMINECTOMY SYNDROME: ICD-10-CM

## 2024-04-20 DIAGNOSIS — Z79.891 LONG TERM PRESCRIPTION OPIATE USE: Primary | ICD-10-CM

## 2024-04-20 DIAGNOSIS — Z79.899 LONG-TERM CURRENT USE OF BENZODIAZEPINE: ICD-10-CM

## 2024-04-20 DIAGNOSIS — F41.8 DEPRESSION WITH ANXIETY: ICD-10-CM

## 2024-04-22 RX ORDER — DIAZEPAM 5 MG/1
5 TABLET ORAL EVERY 12 HOURS PRN
Qty: 45 TABLET | Refills: 0 | Status: SHIPPED | OUTPATIENT
Start: 2024-04-24

## 2024-04-22 RX ORDER — OXYCODONE HYDROCHLORIDE 10 MG/1
10 TABLET ORAL EVERY 6 HOURS PRN
Qty: 120 TABLET | Refills: 0 | Status: SHIPPED | OUTPATIENT
Start: 2024-04-24

## 2024-04-22 NOTE — TELEPHONE ENCOUNTER
Can you please call patient and let him know I sent his refills (start date is  for both) but that he really needs to go the lab and give the urine drug tests done ASAP.     I had originally ordered the standard UDS last month as part of our agreement, but I re-ordered it today and added on the additional tests that his insurance required in October for his prior auth, since I saw that it is due to  24. I want to make sure we have the results we need so we can seamlessly complete the next prior auth, so please let him know he needs to go ASAP.    Thanks!

## 2024-05-01 ENCOUNTER — OFFICE VISIT (OUTPATIENT)
Dept: GASTROENTEROLOGY | Facility: CLINIC | Age: 54
End: 2024-05-01
Payer: MEDICARE

## 2024-05-01 ENCOUNTER — TELEPHONE (OUTPATIENT)
Dept: GASTROENTEROLOGY | Facility: CLINIC | Age: 54
End: 2024-05-01

## 2024-05-01 VITALS
HEART RATE: 85 BPM | WEIGHT: 210 LBS | BODY MASS INDEX: 27.83 KG/M2 | HEIGHT: 73 IN | DIASTOLIC BLOOD PRESSURE: 107 MMHG | SYSTOLIC BLOOD PRESSURE: 159 MMHG

## 2024-05-01 DIAGNOSIS — K22.4 ESOPHAGEAL DYSMOTILITIES: ICD-10-CM

## 2024-05-01 DIAGNOSIS — R13.19 ESOPHAGEAL DYSPHAGIA: Primary | ICD-10-CM

## 2024-05-01 DIAGNOSIS — K29.70 GASTRITIS WITHOUT BLEEDING, UNSPECIFIED CHRONICITY, UNSPECIFIED GASTRITIS TYPE: ICD-10-CM

## 2024-05-01 DIAGNOSIS — G89.4 CHRONIC PAIN SYNDROME: ICD-10-CM

## 2024-05-01 PROCEDURE — 99214 OFFICE O/P EST MOD 30 MIN: CPT | Performed by: INTERNAL MEDICINE

## 2024-05-01 NOTE — PROGRESS NOTES
[facility] [place] - Outpatient Consultation  Yefri Bennett 53 y.o. male MRN: [id number]  Encounter: [id number]          ASSESSMENT AND PLAN:      1. Esophageal dysphagia  -     FL barium swallow; Future; Expected date: 05/01/2024  -     EGD; Future; Expected date: 05/01/2024    2. Gastritis without bleeding, unspecified chronicity, unspecified gastritis type    3. Chronic pain syndrome    4. Esophageal dysmotilities      Patient complains of difficulty swallowing food sticking in the chest, has to regurgitate and bring the food up once in a while, afraid to eat, not sure if lost weight.  No GI bleeding.  Had EGD dilatation done in June 2022,'s concern about esophageal dysmotility.  Recent chest abdomen pelvis CT scan unremarkable for any significant pathology, this was reviewed with the patient.  Most likely esophageal dysmotility/spastic distal esophagus.  His manometry study done in 2018 had revealed small hiatal hernia and hypercontractile disorder.    Encourage patient to eat slowly, chew food well, avoid cold drinks.  Will also get a barium swallow to evaluate this further.  Plan reviewed with patient .      ______________________________________________________________________    HPI:      Patient came in for evaluation of his history of difficulty swallowing, foods sticking in the chest, sometimes is afraid to eat, denies any blood in stools melena hematochezia or hematemesis.  Tries to eat slowly and chew the food well.  This may happen with solids rarely with liquids.  Sometimes wakes up in the middle of the night with coughing.  Appetite is fair weight stable denies any chest pain shortness of breath fever chills rash, has had EGD dilatation done with bougie couple years ago with some relief.  Recent CT of the chest abdomen pelvis was unremarkable.  Reasonably active, quite anxious.  Diet medications more than 10 systems reviewed.      REVIEW OF SYSTEMS:    CONSTITUTIONAL: Denies any  fever, chills, rigors, and weight loss.  HEENT: No earache or tinnitus.  CARDIOVASCULAR: No chest pain or palpitations.   RESPIRATORY: Denies any cough, hemoptysis, shortness of breath or dyspnea on exertion.  GASTROINTESTINAL: As noted in the History of Present Illness.   GENITOURINARY: Denies any hematuria or dysuria.  NEUROLOGIC: No dizziness or vertigo.   MUSCULOSKELETAL: Denies any joint swellings.  SKIN: Denies skin rashes or itching.   ENDOCRINE: Denies excessive thirst. Denies intolerance to heat or cold.  PSYCHOSOCIAL: Denies depression or anxiety. Denies any recent memory loss.       Historical Information   Past Medical History:   Diagnosis Date   • Allergic     seasonal    • Allergic rhinitis    • Anxiety    • Arthritis    • Back pain    • Chronic obstructive asthma    • Chronic pain syndrome    • Cluster headaches    • Colon polyp    • Depression    • Insomnia    • Kidney stones    • Laryngospasm    • Leukocytosis    • Migraine    • Myocardial infarction (HCC)    • Nephrolithiasis    • Organic impotence    • Pneumonia due to infectious organism 1/16/2019   • Seasonal allergies    • Sleep apnea     does not use CPAP   • Stroke (HCC) 2015   • TMJ (temporomandibular joint syndrome)    • Wheezing     last assessed 05/19/17     Past Surgical History:   Procedure Laterality Date   • BACK SURGERY      *9, [id number], nervectomy 2006, total of 10   • BUCCAL MASS EXCISION N/A 3/26/2018    Procedure: BIOPSY LINGUAL TONSIL (FLOW/ STANDARD PATH)  EVAL UNDER ANESTHESIA;  Surgeon: Deric Easton MD;  [facility];  Service: ENT   • COLONOSCOPY     • FL RETROGRADE PYELOGRAM  10/21/2020   • HERNIA REPAIR     • KIDNEY SURGERY     • KNEE ARTHROSCOPY     • LITHOTRIPSY      multiple   • LITHOTRIPSY     • LUMBAR DISC SURGERY  2015   • MANDIBLE FRACTURE SURGERY      titanium plate   • PELVIC SYMPHYSIS FUSION     • ME CYSTO/URETERO W/LITHOTRIPSY &INDWELL STENT INSRT Right 10/21/2020    Procedure: CYSTOSCOPY URETEROSCOPY  WITH LITHOTRIPSY HOLMIUM LASER, RETROGRADE PYELOGRAM AND INSERTION STENT URETERAL;  Surgeon: Anthony Richardson MD;  [facility];  Service: Urology   • MS ESOPHAGOGASTRODUODENOSCOPY TRANSORAL DIAGNOSTIC N/A 2/22/2018    Procedure: ESOPHAGOGASTRODUODENOSCOPY (EGD);  Surgeon: Yolis Mares MD;  [facility];  Service: Gastroenterology   • MS ESOPHAGOGASTRODUODENOSCOPY TRANSORAL DIAGNOSTIC N/A 4/1/2019    Procedure: EGD W/ DILATION;  Surgeon: Yolis Mares MD;  [facility];  Service: Gastroenterology   • MS FORREST IMPLTJ NSTIM ELTRDS PLATE/PADDLE EDRL N/A 9/8/2016    Procedure: DORSAL COLUMN STIMULATOR PLACEMENT (IMPULSE MONITORING);  Surgeon: Martin Doe MD;  [facility];  Service: Orthopedics   • MS REVJ/RMVL IMPL SPI NPG/RCVR DTCH CONNJ ELTRD RA Left 6/9/2017    Procedure: REMOVAL OF A THORACIC SCS PLACED VIA LAMINECTOMY AND REMOVAL LEFT BUTTOCK GENERATOR; IMPULSE MONITORING;  Surgeon: Steven Parr MD;  [facility];  Service: Neurosurgery   • MS SURGICAL ARTHROSCOPY SHOULDER W/ROTATOR CUFF RPR Right 4/4/2019    Procedure: RIGHT SHOULDER ARTHROSCOPIC SUBSCAPULARIS TENDON REPAIR;  Surgeon: David Merritt MD;  [facility];  Service: Orthopedics   • SACROILIAC JOINT FUSION  2015   • SHOULDER SURGERY Left     2   • UPPER GASTROINTESTINAL ENDOSCOPY       Social History   Social History     Substance and Sexual Activity   Alcohol Use Yes    Comment: rarely      Social History     Substance and Sexual Activity   Drug Use Yes   • Frequency: 7.0 times per week   • Types: Marijuana    Comment: medical marijuana daily for chronic pain 1/2 ounce     Social History     Tobacco Use   Smoking Status Every Day   • Current packs/day: 1.00   • Average packs/day: 1 pack/day for 25.0 years (25.0 ttl pk-yrs)   • Types: Cigarettes   Smokeless Tobacco Former     Family History   Problem Relation Age of Onset   • Leukemia Mother 77   • Hypertension Mother    • Diabetes  "Father    • Obesity Father    • Liver cancer Father    • Heart disease Father    • Diabetes Brother    • Hypertension Brother    • Skin cancer Maternal Grandfather 99   • Cancer Family        Meds/Allergies       Current Outpatient Medications:   •  acetaminophen (TYLENOL) 325 mg tablet  •  albuterol (PROVENTIL HFA,VENTOLIN HFA) 90 mcg/act inhaler  •  diazepam (VALIUM) 5 mg tablet  •  fluticasone-vilanterol (BREO ELLIPTA) 200-25 MCG/INH inhaler  •  lidocaine (LIDODERM) 5 %  •  oxyCODONE (ROXICODONE) 10 MG TABS  •  amLODIPine (NORVASC) 5 mg tablet  •  diltiazem (CARDIZEM) 60 mg tablet  •  lidocaine (XYLOCAINE) 2 % topical gel  •  methocarbamol (ROBAXIN) 500 mg tablet  •  naloxone (Narcan) 4 mg/0.1 mL nasal spray    Allergies   Allergen Reactions   • Other Rash     Adhesive tape           Objective     Blood pressure (!) 159/107, pulse 85, height 6' 1\" (1.854 m), weight 95.3 kg (210 lb). Body mass index is 27.71 kg/m².        PHYSICAL EXAM:      General Appearance:   Alert, cooperative, no distress   HEENT:   Normocephalic, atraumatic, anicteric.     Neck:  Supple, symmetrical, trachea midline   Lungs:   Clear to auscultation bilaterally; no rales, rhonchi or wheezing; respirations unlabored    Heart::   Regular rate and rhythm; no murmur.   Abdomen:   Soft, non-tender, non-distended; normal bowel sounds; no masses, no organomegaly    Genitalia:   Deferred    Rectal:   Deferred    Extremities:  No cyanosis, clubbing or edema    Skin:  No jaundice, rashes, or lesions    Lymph nodes:  No palpable cervical lymphadenopathy        Lab Results:   No visits with results within 1 Day(s) from this visit.   Latest known visit with results is:   Admission on 02/20/2024, Discharged on 02/21/2024   Component Date Value   • WBC 02/20/2024 11.37 (H)    • RBC 02/20/2024 5.31    • Hemoglobin 02/20/2024 15.3    • Hematocrit 02/20/2024 47.0    • MCV 02/20/2024 89    • MCH 02/20/2024 28.8    • MCHC 02/20/2024 32.6    • RDW 02/20/2024 " 14.7    • MPV 02/20/2024 10.4    • Platelets 02/20/2024 303    • nRBC 02/20/2024 0    • Segmented % 02/20/2024 65    • Immature Grans % 02/20/2024 0    • Lymphocytes % 02/20/2024 23    • Monocytes % 02/20/2024 8    • Eosinophils Relative 02/20/2024 3    • Basophils Relative 02/20/2024 1    • Absolute Neutrophils 02/20/2024 7.49    • Absolute Immature Grans 02/20/2024 0.04    • Absolute Lymphocytes 02/20/2024 2.58    • Absolute Monocytes 02/20/2024 0.89    • Eosinophils Absolute 02/20/2024 0.31    • Basophils Absolute 02/20/2024 0.06    • Sodium 02/20/2024 140    • Potassium 02/20/2024 4.1    • Chloride 02/20/2024 109 (H)    • CO2 02/20/2024 24    • ANION GAP 02/20/2024 7    • BUN 02/20/2024 16    • Creatinine 02/20/2024 1.35 (H)    • Glucose 02/20/2024 95    • Calcium 02/20/2024 9.2    • AST 02/20/2024 12 (L)    • ALT 02/20/2024 11    • Alkaline Phosphatase 02/20/2024 77    • Total Protein 02/20/2024 6.8    • Albumin 02/20/2024 4.3    • Total Bilirubin 02/20/2024 0.41    • eGFR 02/20/2024 58    • SARS-CoV-2 02/20/2024 Negative    • INFLUENZA A PCR 02/20/2024 Negative    • INFLUENZA B PCR 02/20/2024 Negative    • RSV PCR 02/20/2024 Negative    • hs TnI 0hr 02/20/2024 4    • Ventricular Rate 02/20/2024 66    • Atrial Rate 02/20/2024 66    • RI Interval 02/20/2024 160    • QRSD Interval 02/20/2024 86    • QT Interval 02/20/2024 362    • QTC Interval 02/20/2024 379    • P Axis 02/20/2024 69    • QRS Axis 02/20/2024 71    • T Wave Axis 02/20/2024 68          Radiology Results:   No results found.

## 2024-05-01 NOTE — TELEPHONE ENCOUNTER
Scheduled date of EGD(as of today): 5/3/24  Physician performing EGD: Dr. Bhatt  Location of EGD:   Instructions reviewed with patient by: [facility]  Clearances: n/a

## 2024-05-01 NOTE — H&P (VIEW-ONLY)
[facility] [place] - Outpatient Consultation  Yefri Bennett 53 y.o. male MRN: [id number]  Encounter: [id number]          ASSESSMENT AND PLAN:      1. Esophageal dysphagia  -     FL barium swallow; Future; Expected date: 05/01/2024  -     EGD; Future; Expected date: 05/01/2024    2. Gastritis without bleeding, unspecified chronicity, unspecified gastritis type    3. Chronic pain syndrome    4. Esophageal dysmotilities      Patient complains of difficulty swallowing food sticking in the chest, has to regurgitate and bring the food up once in a while, afraid to eat, not sure if lost weight.  No GI bleeding.  Had EGD dilatation done in June 2022,'s concern about esophageal dysmotility.  Recent chest abdomen pelvis CT scan unremarkable for any significant pathology, this was reviewed with the patient.  Most likely esophageal dysmotility/spastic distal esophagus.  His manometry study done in 2018 had revealed small hiatal hernia and hypercontractile disorder.    Encourage patient to eat slowly, chew food well, avoid cold drinks.  Will also get a barium swallow to evaluate this further.  Plan reviewed with patient .      ______________________________________________________________________    HPI:      Patient came in for evaluation of his history of difficulty swallowing, foods sticking in the chest, sometimes is afraid to eat, denies any blood in stools melena hematochezia or hematemesis.  Tries to eat slowly and chew the food well.  This may happen with solids rarely with liquids.  Sometimes wakes up in the middle of the night with coughing.  Appetite is fair weight stable denies any chest pain shortness of breath fever chills rash, has had EGD dilatation done with bougie couple years ago with some relief.  Recent CT of the chest abdomen pelvis was unremarkable.  Reasonably active, quite anxious.  Diet medications more than 10 systems reviewed.      REVIEW OF SYSTEMS:    CONSTITUTIONAL: Denies any  fever, chills, rigors, and weight loss.  HEENT: No earache or tinnitus.  CARDIOVASCULAR: No chest pain or palpitations.   RESPIRATORY: Denies any cough, hemoptysis, shortness of breath or dyspnea on exertion.  GASTROINTESTINAL: As noted in the History of Present Illness.   GENITOURINARY: Denies any hematuria or dysuria.  NEUROLOGIC: No dizziness or vertigo.   MUSCULOSKELETAL: Denies any joint swellings.  SKIN: Denies skin rashes or itching.   ENDOCRINE: Denies excessive thirst. Denies intolerance to heat or cold.  PSYCHOSOCIAL: Denies depression or anxiety. Denies any recent memory loss.       Historical Information   Past Medical History:   Diagnosis Date   • Allergic     seasonal    • Allergic rhinitis    • Anxiety    • Arthritis    • Back pain    • Chronic obstructive asthma    • Chronic pain syndrome    • Cluster headaches    • Colon polyp    • Depression    • Insomnia    • Kidney stones    • Laryngospasm    • Leukocytosis    • Migraine    • Myocardial infarction (HCC)    • Nephrolithiasis    • Organic impotence    • Pneumonia due to infectious organism 1/16/2019   • Seasonal allergies    • Sleep apnea     does not use CPAP   • Stroke (HCC) 2015   • TMJ (temporomandibular joint syndrome)    • Wheezing     last assessed 05/19/17     Past Surgical History:   Procedure Laterality Date   • BACK SURGERY      *9, [id number], nervectomy 2006, total of 10   • BUCCAL MASS EXCISION N/A 3/26/2018    Procedure: BIOPSY LINGUAL TONSIL (FLOW/ STANDARD PATH)  EVAL UNDER ANESTHESIA;  Surgeon: Deric Easton MD;  [facility];  Service: ENT   • COLONOSCOPY     • FL RETROGRADE PYELOGRAM  10/21/2020   • HERNIA REPAIR     • KIDNEY SURGERY     • KNEE ARTHROSCOPY     • LITHOTRIPSY      multiple   • LITHOTRIPSY     • LUMBAR DISC SURGERY  2015   • MANDIBLE FRACTURE SURGERY      titanium plate   • PELVIC SYMPHYSIS FUSION     • RI CYSTO/URETERO W/LITHOTRIPSY &INDWELL STENT INSRT Right 10/21/2020    Procedure: CYSTOSCOPY URETEROSCOPY  WITH LITHOTRIPSY HOLMIUM LASER, RETROGRADE PYELOGRAM AND INSERTION STENT URETERAL;  Surgeon: Anthony Richardson MD;  [facility];  Service: Urology   • NC ESOPHAGOGASTRODUODENOSCOPY TRANSORAL DIAGNOSTIC N/A 2/22/2018    Procedure: ESOPHAGOGASTRODUODENOSCOPY (EGD);  Surgeon: Yolis Mares MD;  [facility];  Service: Gastroenterology   • NC ESOPHAGOGASTRODUODENOSCOPY TRANSORAL DIAGNOSTIC N/A 4/1/2019    Procedure: EGD W/ DILATION;  Surgeon: Yolis Mares MD;  [facility];  Service: Gastroenterology   • NC FORREST IMPLTJ NSTIM ELTRDS PLATE/PADDLE EDRL N/A 9/8/2016    Procedure: DORSAL COLUMN STIMULATOR PLACEMENT (IMPULSE MONITORING);  Surgeon: Martin Doe MD;  [facility];  Service: Orthopedics   • NC REVJ/RMVL IMPL SPI NPG/RCVR DTCH CONNJ ELTRD RA Left 6/9/2017    Procedure: REMOVAL OF A THORACIC SCS PLACED VIA LAMINECTOMY AND REMOVAL LEFT BUTTOCK GENERATOR; IMPULSE MONITORING;  Surgeon: Steven Parr MD;  [facility];  Service: Neurosurgery   • NC SURGICAL ARTHROSCOPY SHOULDER W/ROTATOR CUFF RPR Right 4/4/2019    Procedure: RIGHT SHOULDER ARTHROSCOPIC SUBSCAPULARIS TENDON REPAIR;  Surgeon: David Merritt MD;  [facility];  Service: Orthopedics   • SACROILIAC JOINT FUSION  2015   • SHOULDER SURGERY Left     2   • UPPER GASTROINTESTINAL ENDOSCOPY       Social History   Social History     Substance and Sexual Activity   Alcohol Use Yes    Comment: rarely      Social History     Substance and Sexual Activity   Drug Use Yes   • Frequency: 7.0 times per week   • Types: Marijuana    Comment: medical marijuana daily for chronic pain 1/2 ounce     Social History     Tobacco Use   Smoking Status Every Day   • Current packs/day: 1.00   • Average packs/day: 1 pack/day for 25.0 years (25.0 ttl pk-yrs)   • Types: Cigarettes   Smokeless Tobacco Former     Family History   Problem Relation Age of Onset   • Leukemia Mother 77   • Hypertension Mother    • Diabetes  "Father    • Obesity Father    • Liver cancer Father    • Heart disease Father    • Diabetes Brother    • Hypertension Brother    • Skin cancer Maternal Grandfather 99   • Cancer Family        Meds/Allergies       Current Outpatient Medications:   •  acetaminophen (TYLENOL) 325 mg tablet  •  albuterol (PROVENTIL HFA,VENTOLIN HFA) 90 mcg/act inhaler  •  diazepam (VALIUM) 5 mg tablet  •  fluticasone-vilanterol (BREO ELLIPTA) 200-25 MCG/INH inhaler  •  lidocaine (LIDODERM) 5 %  •  oxyCODONE (ROXICODONE) 10 MG TABS  •  amLODIPine (NORVASC) 5 mg tablet  •  diltiazem (CARDIZEM) 60 mg tablet  •  lidocaine (XYLOCAINE) 2 % topical gel  •  methocarbamol (ROBAXIN) 500 mg tablet  •  naloxone (Narcan) 4 mg/0.1 mL nasal spray    Allergies   Allergen Reactions   • Other Rash     Adhesive tape           Objective     Blood pressure (!) 159/107, pulse 85, height 6' 1\" (1.854 m), weight 95.3 kg (210 lb). Body mass index is 27.71 kg/m².        PHYSICAL EXAM:      General Appearance:   Alert, cooperative, no distress   HEENT:   Normocephalic, atraumatic, anicteric.     Neck:  Supple, symmetrical, trachea midline   Lungs:   Clear to auscultation bilaterally; no rales, rhonchi or wheezing; respirations unlabored    Heart::   Regular rate and rhythm; no murmur.   Abdomen:   Soft, non-tender, non-distended; normal bowel sounds; no masses, no organomegaly    Genitalia:   Deferred    Rectal:   Deferred    Extremities:  No cyanosis, clubbing or edema    Skin:  No jaundice, rashes, or lesions    Lymph nodes:  No palpable cervical lymphadenopathy        Lab Results:   No visits with results within 1 Day(s) from this visit.   Latest known visit with results is:   Admission on 02/20/2024, Discharged on 02/21/2024   Component Date Value   • WBC 02/20/2024 11.37 (H)    • RBC 02/20/2024 5.31    • Hemoglobin 02/20/2024 15.3    • Hematocrit 02/20/2024 47.0    • MCV 02/20/2024 89    • MCH 02/20/2024 28.8    • MCHC 02/20/2024 32.6    • RDW 02/20/2024 " 14.7    • MPV 02/20/2024 10.4    • Platelets 02/20/2024 303    • nRBC 02/20/2024 0    • Segmented % 02/20/2024 65    • Immature Grans % 02/20/2024 0    • Lymphocytes % 02/20/2024 23    • Monocytes % 02/20/2024 8    • Eosinophils Relative 02/20/2024 3    • Basophils Relative 02/20/2024 1    • Absolute Neutrophils 02/20/2024 7.49    • Absolute Immature Grans 02/20/2024 0.04    • Absolute Lymphocytes 02/20/2024 2.58    • Absolute Monocytes 02/20/2024 0.89    • Eosinophils Absolute 02/20/2024 0.31    • Basophils Absolute 02/20/2024 0.06    • Sodium 02/20/2024 140    • Potassium 02/20/2024 4.1    • Chloride 02/20/2024 109 (H)    • CO2 02/20/2024 24    • ANION GAP 02/20/2024 7    • BUN 02/20/2024 16    • Creatinine 02/20/2024 1.35 (H)    • Glucose 02/20/2024 95    • Calcium 02/20/2024 9.2    • AST 02/20/2024 12 (L)    • ALT 02/20/2024 11    • Alkaline Phosphatase 02/20/2024 77    • Total Protein 02/20/2024 6.8    • Albumin 02/20/2024 4.3    • Total Bilirubin 02/20/2024 0.41    • eGFR 02/20/2024 58    • SARS-CoV-2 02/20/2024 Negative    • INFLUENZA A PCR 02/20/2024 Negative    • INFLUENZA B PCR 02/20/2024 Negative    • RSV PCR 02/20/2024 Negative    • hs TnI 0hr 02/20/2024 4    • Ventricular Rate 02/20/2024 66    • Atrial Rate 02/20/2024 66    • CO Interval 02/20/2024 160    • QRSD Interval 02/20/2024 86    • QT Interval 02/20/2024 362    • QTC Interval 02/20/2024 379    • P Axis 02/20/2024 69    • QRS Axis 02/20/2024 71    • T Wave Axis 02/20/2024 68          Radiology Results:   No results found.

## 2024-05-03 ENCOUNTER — ANESTHESIA (OUTPATIENT)
Dept: GASTROENTEROLOGY | Facility: HOSPITAL | Age: 54
End: 2024-05-03

## 2024-05-03 ENCOUNTER — HOSPITAL ENCOUNTER (OUTPATIENT)
Dept: GASTROENTEROLOGY | Facility: HOSPITAL | Age: 54
Setting detail: OUTPATIENT SURGERY
Discharge: HOME | End: 2024-05-03
Attending: INTERNAL MEDICINE
Payer: MEDICARE

## 2024-05-03 ENCOUNTER — ANESTHESIA EVENT (OUTPATIENT)
Dept: GASTROENTEROLOGY | Facility: HOSPITAL | Age: 54
End: 2024-05-03

## 2024-05-03 VITALS
TEMPERATURE: 97.6 F | SYSTOLIC BLOOD PRESSURE: 128 MMHG | HEART RATE: 77 BPM | RESPIRATION RATE: 18 BRPM | DIASTOLIC BLOOD PRESSURE: 89 MMHG | BODY MASS INDEX: 26.88 KG/M2 | HEIGHT: 73 IN | OXYGEN SATURATION: 97 % | WEIGHT: 202.8 LBS

## 2024-05-03 DIAGNOSIS — R13.19 ESOPHAGEAL DYSPHAGIA: ICD-10-CM

## 2024-05-03 PROCEDURE — C1726 CATH, BAL DIL, NON-VASCULAR: HCPCS | Performed by: INTERNAL MEDICINE

## 2024-05-03 PROCEDURE — 43239 EGD BIOPSY SINGLE/MULTIPLE: CPT | Performed by: INTERNAL MEDICINE

## 2024-05-03 PROCEDURE — 88305 TISSUE EXAM BY PATHOLOGIST: CPT | Performed by: STUDENT IN AN ORGANIZED HEALTH CARE EDUCATION/TRAINING PROGRAM

## 2024-05-03 PROCEDURE — 43249 ESOPH EGD DILATION <30 MM: CPT | Performed by: INTERNAL MEDICINE

## 2024-05-03 RX ORDER — PROPOFOL 10 MG/ML
INJECTION, EMULSION INTRAVENOUS CONTINUOUS PRN
Status: DISCONTINUED | OUTPATIENT
Start: 2024-05-03 | End: 2024-05-03

## 2024-05-03 RX ORDER — SODIUM CHLORIDE, SODIUM LACTATE, POTASSIUM CHLORIDE, CALCIUM CHLORIDE 600; 310; 30; 20 MG/100ML; MG/100ML; MG/100ML; MG/100ML
INJECTION, SOLUTION INTRAVENOUS CONTINUOUS PRN
Status: DISCONTINUED | OUTPATIENT
Start: 2024-05-03 | End: 2024-05-03

## 2024-05-03 RX ORDER — SODIUM CHLORIDE, SODIUM LACTATE, POTASSIUM CHLORIDE, CALCIUM CHLORIDE 600; 310; 30; 20 MG/100ML; MG/100ML; MG/100ML; MG/100ML
125 INJECTION, SOLUTION INTRAVENOUS CONTINUOUS
Status: CANCELLED | OUTPATIENT
Start: 2024-05-03

## 2024-05-03 RX ORDER — PROPOFOL 10 MG/ML
INJECTION, EMULSION INTRAVENOUS AS NEEDED
Status: DISCONTINUED | OUTPATIENT
Start: 2024-05-03 | End: 2024-05-03

## 2024-05-03 RX ORDER — LIDOCAINE HYDROCHLORIDE 10 MG/ML
0.5 INJECTION, SOLUTION EPIDURAL; INFILTRATION; INTRACAUDAL; PERINEURAL ONCE AS NEEDED
Status: CANCELLED | OUTPATIENT
Start: 2024-05-03

## 2024-05-03 RX ADMIN — SODIUM CHLORIDE, SODIUM LACTATE, POTASSIUM CHLORIDE, AND CALCIUM CHLORIDE: .6; .31; .03; .02 INJECTION, SOLUTION INTRAVENOUS at 15:22

## 2024-05-03 RX ADMIN — PROPOFOL 200 MG: 10 INJECTION, EMULSION INTRAVENOUS at 15:30

## 2024-05-03 RX ADMIN — PROPOFOL 150 MCG/KG/MIN: 10 INJECTION, EMULSION INTRAVENOUS at 15:30

## 2024-05-03 NOTE — ANESTHESIA PREPROCEDURE EVALUATION
Procedure:  EGD    NELLI non-compliant CPAP    Relevant Problems   CARDIO   (+) Hypertension   (+) Other chest pain      GI/HEPATIC   (+) Dysphagia   (+) Esophageal dysphagia      /RENAL   (+) Nephrolithiasis      NEURO/PSYCH   (+) Chronic pain syndrome   (+) Chronic right SI joint pain   (+) Chronic, continuous use of opioids   (+) Depression with anxiety   (+) Intractable episodic cluster headache      PULMONARY   (+) Chronic obstructive asthma   (+) Moderate persistent asthma without complication   (+) Sleep apnea      Other   (+) Lingual tonsil hypertrophy        Physical Exam    Airway    Mallampati score: II  TM Distance: >3 FB  Neck ROM: full     Dental   No notable dental hx     Cardiovascular  Rhythm: regular, Rate: normal, Cardiovascular exam normal    Pulmonary  Pulmonary exam normal Breath sounds clear to auscultation    Other Findings        Anesthesia Plan  ASA Score- 3     Anesthesia Type- IV sedation with anesthesia with ASA Monitors.         Additional Monitors:     Airway Plan:     Comment: Discussed risks/benefits, including medication reactions, awareness, aspiration, and serious/life threatening complications.    Plan to maintain native airway with IVGA, monitored with EtCO2.       Plan Factors-Exercise tolerance (METS): >4 METS.    Chart reviewed.    Patient summary reviewed.      Patient instructed to abstain from smoking on day of procedure. Patient did not smoke on day of surgery.            Induction- intravenous.    Postoperative Plan-     Informed Consent- Anesthetic plan and risks discussed with patient.  I personally reviewed this patient with the CRNA. Discussed and agreed on the Anesthesia Plan with the CRNA..

## 2024-05-03 NOTE — ANESTHESIA POSTPROCEDURE EVALUATION
Post-Op Assessment Note    CV Status:  Stable  Pain Score: 0    Pain management: adequate       Mental Status:  Alert and awake   Hydration Status:  Euvolemic   PONV Controlled:  Controlled   Airway Patency:  Patent     Post Op Vitals Reviewed: Yes    No anethesia notable event occurred.    Staff: Anesthesiologist, CRNA               BP      Temp 97.5 °F (36.4 °C) (05/03/24 1542)    Pulse 77 (05/03/24 1542)   Resp      SpO2

## 2024-05-03 NOTE — INTERVAL H&P NOTE
H&P reviewed. After examining the patient I find no changes in the patients condition since the H&P had been written.    Vitals:    05/03/24 1337   BP: (!) 165/116   Pulse: 93   Resp: 18   Temp: 97.7 °F (36.5 °C)   SpO2: 99%

## 2024-05-06 PROCEDURE — 88305 TISSUE EXAM BY PATHOLOGIST: CPT | Performed by: STUDENT IN AN ORGANIZED HEALTH CARE EDUCATION/TRAINING PROGRAM

## 2024-05-08 NOTE — RESULT ENCOUNTER NOTE
Please call the patient regarding his abnormal result.  EGD biopsy consistent with reflux, follow up in  office as needed

## 2024-05-16 DIAGNOSIS — F41.8 DEPRESSION WITH ANXIETY: ICD-10-CM

## 2024-05-16 DIAGNOSIS — M96.1 POST LAMINECTOMY SYNDROME: ICD-10-CM

## 2024-05-16 NOTE — TELEPHONE ENCOUNTER
Medication: diazepam (VALIUM)     Dose/Frequency:  Take 1 tablet (5 mg total) by mouth every 12 (twelve) hours as needed for anxiety or muscle spasms     Quantity: 45    Pharmacy: [facility] #[id number] [place] - [address] [[id number]]    Office:   [x] PCP/Provider -   [] Speciality/Provider -     Does the patient have enough for 3 days?   [x] Yes   [] No - Send as HP to POD         Medication: oxyCODONE (ROXICODONE)     Dose/Frequency: Take 1 tablet (10 mg total) by mouth every 6 (six) hours as needed for moderate pain or severe pain Max Daily Amount: 40 mg     Quantity: 120    Pharmacy: [facility] #[id number] [place] - [address] [[id number]]    Office:   [x] PCP/Provider -   [] Speciality/Provider -     Does the patient have enough for 3 days?   [x] Yes   [] No - Send as HP to POD

## 2024-05-17 RX ORDER — OXYCODONE HYDROCHLORIDE 10 MG/1
10 TABLET ORAL EVERY 6 HOURS PRN
Qty: 120 TABLET | Refills: 0 | Status: SHIPPED | OUTPATIENT
Start: 2024-05-22 | End: 2024-05-23 | Stop reason: SDUPTHER

## 2024-05-17 RX ORDER — DIAZEPAM 5 MG/1
5 TABLET ORAL EVERY 12 HOURS PRN
Qty: 45 TABLET | Refills: 0 | Status: SHIPPED | OUTPATIENT
Start: 2024-05-22

## 2024-05-17 NOTE — TELEPHONE ENCOUNTER
Sent meds to start 5/22/24 per PDMP review, but please tell patient he needs to go to lab for the urine drug testing ASAP as they will likely need the results for his prior auth, which is due at this time, as well as to keep to our controlled substance agreement contract. Please ask him to go next week.

## 2024-05-19 ENCOUNTER — LAB (OUTPATIENT)
Dept: LAB | Facility: CLINIC | Age: 54
End: 2024-05-19
Payer: MEDICARE

## 2024-05-19 DIAGNOSIS — Z79.899 LONG-TERM CURRENT USE OF BENZODIAZEPINE: ICD-10-CM

## 2024-05-19 DIAGNOSIS — Z79.891 LONG TERM PRESCRIPTION OPIATE USE: ICD-10-CM

## 2024-05-19 LAB — BUPRENORPHINE UR QL SCN: NEGATIVE

## 2024-05-19 PROCEDURE — 80348 DRUG SCREENING BUPRENORPHINE: CPT

## 2024-05-19 PROCEDURE — 80307 DRUG TEST PRSMV CHEM ANLYZR: CPT

## 2024-05-19 PROCEDURE — 80324 DRUG SCREEN AMPHETAMINES 1/2: CPT

## 2024-05-19 PROCEDURE — 80365 DRUG SCREENING OXYCODONE: CPT

## 2024-05-19 PROCEDURE — 80359 METHYLENEDIOXYAMPHETAMINES: CPT

## 2024-05-19 PROCEDURE — 80349 CANNABINOIDS NATURAL: CPT

## 2024-05-19 PROCEDURE — 80361 OPIATES 1 OR MORE: CPT

## 2024-05-19 PROCEDURE — 80346 BENZODIAZEPINES1-12: CPT

## 2024-05-22 ENCOUNTER — TELEPHONE (OUTPATIENT)
Age: 54
End: 2024-05-22

## 2024-05-22 ENCOUNTER — TELEPHONE (OUTPATIENT)
Dept: FAMILY MEDICINE CLINIC | Facility: CLINIC | Age: 54
End: 2024-05-22

## 2024-05-22 LAB — FENTANYL+NORFENTANYL PNL UR: NEGATIVE PG/ML

## 2024-05-22 NOTE — TELEPHONE ENCOUNTER
I will call the Patient but there is nothing we can do as we are no longer responsible for Prior Auths in the office. Since the pods have opened there is a  Prior auth team that handles this just for future notice

## 2024-05-22 NOTE — TELEPHONE ENCOUNTER
Reached out to patient regarding a message from team stating he had concerns about his medication I reached out to Patient to explain the process for Prior Authorizations Patient was yelling screaming and unwilling to listen to me as I was trying to explain even though he did his drug test on Saturday Prior Auth team will not process the Authorization until they have the full results of the urine drug test to send over to the insurance.  I asked Patient multiple times to calm down and let me speak  and explain  he continued to yell Patient did not want to listen and I could not continue the conversation called  was ended .

## 2024-05-22 NOTE — TELEPHONE ENCOUNTER
Per my documentation in my encounter 4/20/24, I was aware his prior auth would be expiring on 5/2/24, which is why I ordered the drug testing [required for the last prior auth] on 4/20/24, to give enough time for the results to come back before the prior auth was started. Unfortunately, the drug testing was not done until 5/19/24, so the results are not yet back.

## 2024-05-22 NOTE — TELEPHONE ENCOUNTER
[id number] - [facility] [place], [place] - [address]     diazepam (VALIUM) 5 mg tablet [[id number]   oxyCODONE (ROXICODONE) 10 MG TABS [[id number]]     Pharmacy telling him these meds needs a prior authorization before he can get them.    Clinical -   He's supposed to go to [place] but can't without these medication.  He's upset since he was told at last minute this information about getting the prior auth.  Can you call him about this?  Is there anything he can do to make this process quicker so he can get prescription asap.?

## 2024-05-23 ENCOUNTER — TELEPHONE (OUTPATIENT)
Dept: FAMILY MEDICINE CLINIC | Facility: CLINIC | Age: 54
End: 2024-05-23

## 2024-05-23 DIAGNOSIS — M96.1 POST LAMINECTOMY SYNDROME: ICD-10-CM

## 2024-05-23 RX ORDER — OXYCODONE HYDROCHLORIDE 10 MG/1
10 TABLET ORAL EVERY 6 HOURS PRN
Qty: 120 TABLET | Refills: 0 | Status: SHIPPED | OUTPATIENT
Start: 2024-05-23

## 2024-05-23 NOTE — TELEPHONE ENCOUNTER
Patient called back to request refill sent to a different [facility]. Warm transfer to [facility]/ [facility] clinical
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
## 2024-05-23 NOTE — TELEPHONE ENCOUNTER
Spoke with patient via phone, prior auth has not yet been initiated for his oxycodone and valium, as the results from some of his drug testing is still in-process. Clinical staff spoke with his insurance company, who recommended considering using a Good Rx coupon for this month's prescriptions.    With Good Rx coupon, full dispense amount of valium (#45) is around $10, and full dispense amount of oxycodone (#120) is around $40 at patient's preferred pharmacy, [facility].  Patient is amenable to using Good Rx coupon for this months prescription fill, and prior auth will be initiated by once the remainder of his urine drug testing results are available.
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
## 2024-05-23 NOTE — TELEPHONE ENCOUNTER
Patient called to let Dr Clemente know that he did go to [facility] on [address] to  his medication, however, he was only able to  a QTY of 90. The script was sent as qty of 120, but the pharmacy only had a qty of 90 to give patient.

## 2024-05-24 LAB
LABORATORY COMMENT REPORT: NORMAL
TRAMADOL UR QL SCN: NEGATIVE NG/ML

## 2024-05-28 LAB
1OH-MIDAZOLAM UR CFM-MCNC: NOT DETECTED NG/MG CREAT
6MAM UR QL SCN: NEGATIVE NG/ML
7AMINOCLONAZEPAM UR CFM-MCNC: NOT DETECTED NG/MG CREAT
A-OH ALPRAZ UR QL CFM: NOT DETECTED NG/MG CREAT
ACCEPTABLE CREAT UR QL: 272 MG/DL
ALPRAZ/CREAT UR CFM: NOT DETECTED NG/MG CREAT
AMPHET UR QL CFM: 314 NG/MG CREAT
AMPHET UR QL SCN: ABNORMAL NG/ML
BARBITURATES UR QL SCN: NEGATIVE NG/ML
BENZODIAZ UR QL SCN: ABNORMAL NG/ML
BUPRENORPHINE UR QL CFM: NEGATIVE NG/ML
CANNABINOIDS UR QL SCN: ABNORMAL NG/ML
CANNABINOIDS/CREAT UR: 156 NG/MG CREAT
CARISOPRODOL UR QL: NEGATIVE NG/ML
CLONAZEPAM/CREAT UR CFM: NOT DETECTED NG/MG CREAT
COCAINE+BZE UR QL SCN: NEGATIVE NG/ML
CODEINE UR QL CFM: NOT DETECTED NG/MG CREAT
DHC UR CFM-MCNC: NOT DETECTED NG/MG CREAT
DIAZEPAM/CREAT UR: NOT DETECTED NG/MG CREAT
ETHYL GLUCURONIDE UR QL SCN: NEGATIVE NG/ML
FENTANYL UR QL SCN: NEGATIVE NG/ML
FLUNITRAZEPAM UR-MCNC: NOT DETECTED NG/MG CREAT
GABAPENTIN SERPLBLD QL SCN: NEGATIVE UG/ML
HYDROCODONE UR QL CFM: NOT DETECTED NG/MG CREAT
HYDROMORPHONE UR QL CFM: NOT DETECTED NG/MG CREAT
LORAZEPAM UR CFM-MCNC: NOT DETECTED NG/MG CREAT
MDMA UR QL CFM: NOT DETECTED NG/MG CREAT
MDMA UR QL CFM: NOT DETECTED NG/MG CREAT
METHADONE UR QL SCN: NEGATIVE NG/ML
METHAMPHET UR QL CFM: 172 NG/MG CREAT
MIDAZOLAM/CREAT UR CFM: NOT DETECTED NG/MG CREAT
MORPHINE UR QL CFM: NOT DETECTED NG/MG CREAT
NITRITE UR QL STRIP: NEGATIVE UG/ML
NORCODEINE/CREAT UR CFM: NOT DETECTED NG/MG CREAT
NORDIAZEPAM UR CFM-MCNC: 306 NG/MG CREAT
NORFLUNITRAZEPAM UR-MCNC: NOT DETECTED NG/MG CREAT
NORHYDROCODONE UR CFM-MCNC: NOT DETECTED NG/MG CREAT
NORMORPHINE: NOT DETECTED NG/MG CREAT
NOROXYCODONE UR CFM-MCNC: 1217 NG/MG CREAT
NOROXYMORPHONE/CREAT UR CFM: 344 NG/MG CREAT
OH-ETHYLFLURAZ UR CFM-MCNC: NOT DETECTED NG/MG CREAT
OH-TRIAZOLAM UR CFM-MCNC: NOT DETECTED NG/MG CREAT
OPIATES UR QL SCN: NEGATIVE NG/ML
OXAZEPAM CTO UR CFM-MCNC: 110 NG/MG CREAT
OXYCODONE UR QL CFM: 775 NG/MG CREAT
OXYCODONE+OXYMORPHONE UR QL SCN: ABNORMAL NG/ML
OXYMORPHONE UR QL CFM: 1254 NG/MG CREAT
PCP UR QL SCN: NEGATIVE NG/ML
PROPOXYPH UR QL SCN: NEGATIVE NG/ML
SPECIMEN PH ACCEPTABLE UR: 5.8 (ref 4.5–8.9)
TAPENTADOL UR QL SCN: NEGATIVE NG/ML
TEMAZEPAM UR CFM-MCNC: 185 NG/MG CREAT
TRAMADOL UR QL SCN: NEGATIVE NG/ML

## 2024-05-29 ENCOUNTER — TELEPHONE (OUTPATIENT)
Age: 54
End: 2024-05-29

## 2024-05-29 NOTE — TELEPHONE ENCOUNTER
Called and left message for patient requesting he return a call to the office, as we need to discuss the results of his urine drug testing. It can wait until tomorrow, I will try to call him again tomorrow if I am unable to touch base with him today.

## 2024-05-30 LAB
OXYCODONE UR QL CFM: 2071 NG/ML
OXYCODONE+OXYMORPHONE SERPLBLD QL SCN: POSITIVE
OXYCODONE+OXYMORPHONE UR QL SCN: NORMAL NG/ML
OXYCODONE+OXYMORPHONE UR QL SCN: POSITIVE
OXYMORPHONE UR CFM-MCNC: 4110 NG/ML
OXYMORPHONE UR QL CFM: POSITIVE

## 2024-06-02 NOTE — TELEPHONE ENCOUNTER
PA for diazepam not required     Reason PA not Required (screenshot if applicable)              Message sent to provider pool yes

## 2024-06-02 NOTE — TELEPHONE ENCOUNTER
PA for Diazepam    Submitted via    [x]CMM-KEY [id number]  [][employer]-Case ID #   []Faxed to plan   []Other website   []Phone call Case ID #     Office notes sent, clinical questions answered. Awaiting determination    Turnaround time for your insurance to make a decision on your Prior Authorization can take 7-21 business days.

## 2024-06-02 NOTE — TELEPHONE ENCOUNTER
PA for Oxycodone    Submitted via    [x]CMM-KEY [id number]  [][employer]-Case ID #   []Faxed to plan   []Other website   []Phone call Case ID #     Office notes sent, clinical questions answered. Awaiting determination    Turnaround time for your insurance to make a decision on your Prior Authorization can take 7-21 business days.

## 2024-06-15 DIAGNOSIS — M54.9 BACK PAIN: ICD-10-CM

## 2024-06-15 DIAGNOSIS — R10.9 ABDOMINAL PAIN: ICD-10-CM

## 2024-06-15 DIAGNOSIS — M96.1 POST LAMINECTOMY SYNDROME: ICD-10-CM

## 2024-06-15 RX ORDER — LIDOCAINE 50 MG/G
1 PATCH TOPICAL EVERY 24 HOURS
Qty: 10 PATCH | Refills: 0 | Status: SHIPPED | OUTPATIENT
Start: 2024-06-15 | End: 2024-06-25

## 2024-06-15 RX ORDER — ACETAMINOPHEN 325 MG/1
975 TABLET ORAL EVERY 8 HOURS PRN
Qty: 270 TABLET | Refills: 0 | Status: SHIPPED | OUTPATIENT
Start: 2024-06-15

## 2024-06-17 ENCOUNTER — PATIENT MESSAGE (OUTPATIENT)
Dept: FAMILY MEDICINE CLINIC | Facility: CLINIC | Age: 54
End: 2024-06-17

## 2024-06-17 RX ORDER — OXYCODONE HYDROCHLORIDE 10 MG/1
10 TABLET ORAL EVERY 6 HOURS PRN
Qty: 30 TABLET | Refills: 0 | Status: SHIPPED | OUTPATIENT
Start: 2024-06-17 | End: 2024-06-24

## 2024-06-17 NOTE — TELEPHONE ENCOUNTER
Patient last filled oxycodone on 5/23/24 but was only able to get 90 tabs (not 120) as the pharmacy did not have full dispense amount. Left message via phone to inform patient I will send the 7 day Rx and to call the office as soon as he can. Also sent [employer] message.

## 2024-06-22 DIAGNOSIS — M96.1 POST LAMINECTOMY SYNDROME: ICD-10-CM

## 2024-06-22 DIAGNOSIS — F41.8 DEPRESSION WITH ANXIETY: ICD-10-CM

## 2024-06-22 RX ORDER — OXYCODONE HYDROCHLORIDE 10 MG/1
10 TABLET ORAL EVERY 6 HOURS PRN
Qty: 30 TABLET | Refills: 0 | Status: CANCELLED | OUTPATIENT
Start: 2024-06-22 | End: 2024-06-29

## 2024-06-22 RX ORDER — DIAZEPAM 5 MG/1
5 TABLET ORAL EVERY 12 HOURS PRN
Qty: 45 TABLET | Refills: 0 | Status: CANCELLED | OUTPATIENT
Start: 2024-06-22

## 2024-06-24 NOTE — TELEPHONE ENCOUNTER
Patient called and is leaving for trip tonight and wanted to see if refills could be done today before he leaves,    Thank you

## 2024-07-11 ENCOUNTER — APPOINTMENT (EMERGENCY)
Dept: CT IMAGING | Facility: HOSPITAL | Age: 54
DRG: 243 | End: 2024-07-11
Payer: MEDICARE

## 2024-07-11 ENCOUNTER — APPOINTMENT (EMERGENCY)
Dept: RADIOLOGY | Facility: HOSPITAL | Age: 54
DRG: 243 | End: 2024-07-11
Payer: MEDICARE

## 2024-07-11 ENCOUNTER — HOSPITAL ENCOUNTER (INPATIENT)
Facility: HOSPITAL | Age: 54
LOS: 1 days | Discharge: HOME | DRG: 243 | End: 2024-07-13
Attending: EMERGENCY MEDICINE | Admitting: INTERNAL MEDICINE
Payer: MEDICARE

## 2024-07-11 DIAGNOSIS — R07.9 CHEST PAIN: Primary | ICD-10-CM

## 2024-07-11 DIAGNOSIS — F41.9 ANXIETY: ICD-10-CM

## 2024-07-11 DIAGNOSIS — R06.02 SHORTNESS OF BREATH: ICD-10-CM

## 2024-07-11 DIAGNOSIS — K22.4 ESOPHAGEAL DYSMOTILITIES: ICD-10-CM

## 2024-07-11 DIAGNOSIS — R03.0 ELEVATED BLOOD PRESSURE READING: ICD-10-CM

## 2024-07-11 DIAGNOSIS — J40 BRONCHITIS: ICD-10-CM

## 2024-07-11 PROBLEM — J45.909 ASTHMA: Status: ACTIVE | Noted: 2024-07-11

## 2024-07-11 LAB
2HR DELTA HS TROPONIN: -1 NG/L
4HR DELTA HS TROPONIN: 1 NG/L
ALBUMIN SERPL BCG-MCNC: 4.3 G/DL (ref 3.5–5)
ALP SERPL-CCNC: 95 U/L (ref 34–104)
ALT SERPL W P-5'-P-CCNC: 14 U/L (ref 7–52)
ANION GAP SERPL CALCULATED.3IONS-SCNC: 8 MMOL/L (ref 4–13)
AST SERPL W P-5'-P-CCNC: 16 U/L (ref 13–39)
ATRIAL RATE: 85 BPM
BASOPHILS # BLD AUTO: 0.08 THOUSANDS/ÂΜL (ref 0–0.1)
BASOPHILS NFR BLD AUTO: 1 % (ref 0–1)
BILIRUB SERPL-MCNC: 0.34 MG/DL (ref 0.2–1)
BUN SERPL-MCNC: 14 MG/DL (ref 5–25)
CALCIUM SERPL-MCNC: 9.5 MG/DL (ref 8.4–10.2)
CARDIAC TROPONIN I PNL SERPL HS: 12 NG/L
CARDIAC TROPONIN I PNL SERPL HS: 13 NG/L
CARDIAC TROPONIN I PNL SERPL HS: 14 NG/L
CHLORIDE SERPL-SCNC: 109 MMOL/L (ref 96–108)
CO2 SERPL-SCNC: 27 MMOL/L (ref 21–32)
CREAT SERPL-MCNC: 1.28 MG/DL (ref 0.6–1.3)
EOSINOPHIL # BLD AUTO: 0.46 THOUSAND/ÂΜL (ref 0–0.61)
EOSINOPHIL NFR BLD AUTO: 3 % (ref 0–6)
ERYTHROCYTE [DISTWIDTH] IN BLOOD BY AUTOMATED COUNT: 15 % (ref 11.6–15.1)
FLUAV RNA RESP QL NAA+PROBE: NEGATIVE
FLUBV RNA RESP QL NAA+PROBE: NEGATIVE
GFR SERPL CREATININE-BSD FRML MDRD: 62 ML/MIN/1.73SQ M
GLUCOSE SERPL-MCNC: 85 MG/DL (ref 65–140)
HCT VFR BLD AUTO: 51.3 % (ref 36.5–49.3)
HGB BLD-MCNC: 16.5 G/DL (ref 12–17)
IMM GRANULOCYTES # BLD AUTO: 0.07 THOUSAND/UL (ref 0–0.2)
IMM GRANULOCYTES NFR BLD AUTO: 1 % (ref 0–2)
LIPASE SERPL-CCNC: 136 U/L (ref 11–82)
LYMPHOCYTES # BLD AUTO: 3.09 THOUSANDS/ÂΜL (ref 0.6–4.47)
LYMPHOCYTES NFR BLD AUTO: 23 % (ref 14–44)
MCH RBC QN AUTO: 28.9 PG (ref 26.8–34.3)
MCHC RBC AUTO-ENTMCNC: 32.2 G/DL (ref 31.4–37.4)
MCV RBC AUTO: 90 FL (ref 82–98)
MONOCYTES # BLD AUTO: 0.95 THOUSAND/ÂΜL (ref 0.17–1.22)
MONOCYTES NFR BLD AUTO: 7 % (ref 4–12)
NEUTROPHILS # BLD AUTO: 9.01 THOUSANDS/ÂΜL (ref 1.85–7.62)
NEUTS SEG NFR BLD AUTO: 65 % (ref 43–75)
NRBC BLD AUTO-RTO: 0 /100 WBCS
P AXIS: 55 DEGREES
PLATELET # BLD AUTO: 303 THOUSANDS/UL (ref 149–390)
PLATELET # BLD AUTO: 366 THOUSANDS/UL (ref 149–390)
PMV BLD AUTO: 10.1 FL (ref 8.9–12.7)
PMV BLD AUTO: 9.8 FL (ref 8.9–12.7)
POTASSIUM SERPL-SCNC: 4.2 MMOL/L (ref 3.5–5.3)
PR INTERVAL: 142 MS
PROT SERPL-MCNC: 7.3 G/DL (ref 6.4–8.4)
QRS AXIS: 76 DEGREES
QRSD INTERVAL: 82 MS
QT INTERVAL: 356 MS
QTC INTERVAL: 423 MS
RBC # BLD AUTO: 5.7 MILLION/UL (ref 3.88–5.62)
RSV RNA RESP QL NAA+PROBE: NEGATIVE
SARS-COV-2 RNA RESP QL NAA+PROBE: NEGATIVE
SODIUM SERPL-SCNC: 144 MMOL/L (ref 135–147)
T WAVE AXIS: 71 DEGREES
VENTRICULAR RATE: 85 BPM
WBC # BLD AUTO: 13.66 THOUSAND/UL (ref 4.31–10.16)

## 2024-07-11 PROCEDURE — 96374 THER/PROPH/DIAG INJ IV PUSH: CPT

## 2024-07-11 PROCEDURE — 85025 COMPLETE CBC W/AUTO DIFF WBC: CPT | Performed by: EMERGENCY MEDICINE

## 2024-07-11 PROCEDURE — 93005 ELECTROCARDIOGRAM TRACING: CPT

## 2024-07-11 PROCEDURE — 71046 X-RAY EXAM CHEST 2 VIEWS: CPT

## 2024-07-11 PROCEDURE — 93010 ELECTROCARDIOGRAM REPORT: CPT | Performed by: INTERNAL MEDICINE

## 2024-07-11 PROCEDURE — 71275 CT ANGIOGRAPHY CHEST: CPT

## 2024-07-11 PROCEDURE — 84484 ASSAY OF TROPONIN QUANT: CPT

## 2024-07-11 PROCEDURE — 85049 AUTOMATED PLATELET COUNT: CPT

## 2024-07-11 PROCEDURE — 0241U HB NFCT DS VIR RESP RNA 4 TRGT: CPT

## 2024-07-11 PROCEDURE — 99285 EMERGENCY DEPT VISIT HI MDM: CPT

## 2024-07-11 PROCEDURE — 83690 ASSAY OF LIPASE: CPT | Performed by: EMERGENCY MEDICINE

## 2024-07-11 PROCEDURE — 80053 COMPREHEN METABOLIC PANEL: CPT | Performed by: EMERGENCY MEDICINE

## 2024-07-11 PROCEDURE — 84484 ASSAY OF TROPONIN QUANT: CPT | Performed by: EMERGENCY MEDICINE

## 2024-07-11 PROCEDURE — 99223 1ST HOSP IP/OBS HIGH 75: CPT | Performed by: HOSPITALIST

## 2024-07-11 PROCEDURE — 99285 EMERGENCY DEPT VISIT HI MDM: CPT | Performed by: EMERGENCY MEDICINE

## 2024-07-11 PROCEDURE — 36415 COLL VENOUS BLD VENIPUNCTURE: CPT | Performed by: EMERGENCY MEDICINE

## 2024-07-11 PROCEDURE — 74174 CTA ABD&PLVS W/CONTRAST: CPT

## 2024-07-11 RX ORDER — OXYCODONE HYDROCHLORIDE 5 MG/1
5 TABLET ORAL EVERY 6 HOURS PRN
Status: DISCONTINUED | OUTPATIENT
Start: 2024-07-11 | End: 2024-07-11

## 2024-07-11 RX ORDER — NICOTINE 21 MG/24HR
1 PATCH, TRANSDERMAL 24 HOURS TRANSDERMAL DAILY
Status: DISCONTINUED | OUTPATIENT
Start: 2024-07-11 | End: 2024-07-13 | Stop reason: HOSPADM

## 2024-07-11 RX ORDER — METOPROLOL SUCCINATE 25 MG/1
25 TABLET, EXTENDED RELEASE ORAL DAILY
Status: DISCONTINUED | OUTPATIENT
Start: 2024-07-11 | End: 2024-07-12

## 2024-07-11 RX ORDER — DIAZEPAM 5 MG/1
5 TABLET ORAL ONCE AS NEEDED
Status: DISCONTINUED | OUTPATIENT
Start: 2024-07-11 | End: 2024-07-11

## 2024-07-11 RX ORDER — PANTOPRAZOLE SODIUM 40 MG/10ML
40 INJECTION, POWDER, LYOPHILIZED, FOR SOLUTION INTRAVENOUS ONCE
Status: COMPLETED | OUTPATIENT
Start: 2024-07-11 | End: 2024-07-11

## 2024-07-11 RX ORDER — PANTOPRAZOLE SODIUM 40 MG/1
40 TABLET, DELAYED RELEASE ORAL
Status: DISCONTINUED | OUTPATIENT
Start: 2024-07-11 | End: 2024-07-13 | Stop reason: HOSPADM

## 2024-07-11 RX ORDER — DIAZEPAM 2 MG/1
2 TABLET ORAL EVERY 6 HOURS PRN
Status: DISCONTINUED | OUTPATIENT
Start: 2024-07-11 | End: 2024-07-12

## 2024-07-11 RX ORDER — LIDOCAINE 50 MG/G
1 PATCH TOPICAL DAILY PRN
Status: DISCONTINUED | OUTPATIENT
Start: 2024-07-11 | End: 2024-07-12

## 2024-07-11 RX ORDER — FLUTICASONE FUROATE AND VILANTEROL 200; 25 UG/1; UG/1
1 POWDER RESPIRATORY (INHALATION) DAILY
Status: DISCONTINUED | OUTPATIENT
Start: 2024-07-11 | End: 2024-07-13 | Stop reason: HOSPADM

## 2024-07-11 RX ORDER — OXYCODONE HYDROCHLORIDE 10 MG/1
10 TABLET ORAL EVERY 6 HOURS PRN
Status: DISCONTINUED | OUTPATIENT
Start: 2024-07-11 | End: 2024-07-13 | Stop reason: HOSPADM

## 2024-07-11 RX ORDER — HYDROXYZINE HYDROCHLORIDE 25 MG/1
25 TABLET, FILM COATED ORAL ONCE
Status: CANCELLED | OUTPATIENT
Start: 2024-07-11 | End: 2024-07-11

## 2024-07-11 RX ORDER — DIAZEPAM 5 MG/1
5 TABLET ORAL EVERY 12 HOURS PRN
Status: DISCONTINUED | OUTPATIENT
Start: 2024-07-11 | End: 2024-07-11

## 2024-07-11 RX ORDER — ENOXAPARIN SODIUM 100 MG/ML
40 INJECTION SUBCUTANEOUS DAILY
Status: DISCONTINUED | OUTPATIENT
Start: 2024-07-11 | End: 2024-07-13 | Stop reason: HOSPADM

## 2024-07-11 RX ORDER — SUCRALFATE 1 G/1
1 TABLET ORAL ONCE
Status: COMPLETED | OUTPATIENT
Start: 2024-07-11 | End: 2024-07-11

## 2024-07-11 RX ORDER — ACETAMINOPHEN 325 MG/1
975 TABLET ORAL EVERY 6 HOURS PRN
Status: DISCONTINUED | OUTPATIENT
Start: 2024-07-11 | End: 2024-07-12

## 2024-07-11 RX ORDER — SERTRALINE HYDROCHLORIDE 25 MG/1
25 TABLET, FILM COATED ORAL DAILY
Status: DISCONTINUED | OUTPATIENT
Start: 2024-07-11 | End: 2024-07-13 | Stop reason: HOSPADM

## 2024-07-11 RX ORDER — OXYCODONE HYDROCHLORIDE 10 MG/1
10 TABLET ORAL ONCE
Status: COMPLETED | OUTPATIENT
Start: 2024-07-11 | End: 2024-07-11

## 2024-07-11 RX ORDER — ALBUTEROL SULFATE 90 UG/1
2 AEROSOL, METERED RESPIRATORY (INHALATION) EVERY 6 HOURS PRN
Status: DISCONTINUED | OUTPATIENT
Start: 2024-07-11 | End: 2024-07-13 | Stop reason: HOSPADM

## 2024-07-11 RX ADMIN — ALBUTEROL SULFATE 2 PUFF: 90 AEROSOL, METERED RESPIRATORY (INHALATION) at 22:24

## 2024-07-11 RX ADMIN — SUCRALFATE 1 G: 1 TABLET ORAL at 06:21

## 2024-07-11 RX ADMIN — OXYCODONE HYDROCHLORIDE 10 MG: 10 TABLET ORAL at 22:51

## 2024-07-11 RX ADMIN — OXYCODONE HYDROCHLORIDE 5 MG: 5 TABLET ORAL at 17:15

## 2024-07-11 RX ADMIN — IOHEXOL 100 ML: 350 INJECTION, SOLUTION INTRAVENOUS at 07:45

## 2024-07-11 RX ADMIN — SERTRALINE HYDROCHLORIDE 25 MG: 25 TABLET ORAL at 12:59

## 2024-07-11 RX ADMIN — FLUTICASONE FUROATE AND VILANTEROL TRIFENATATE 1 PUFF: 200; 25 POWDER RESPIRATORY (INHALATION) at 12:59

## 2024-07-11 RX ADMIN — PANTOPRAZOLE SODIUM 40 MG: 40 TABLET, DELAYED RELEASE ORAL at 15:31

## 2024-07-11 RX ADMIN — ENOXAPARIN SODIUM 40 MG: 40 INJECTION SUBCUTANEOUS at 12:35

## 2024-07-11 RX ADMIN — OXYCODONE HYDROCHLORIDE 10 MG: 10 TABLET ORAL at 06:20

## 2024-07-11 RX ADMIN — DIAZEPAM 2 MG: 2 TABLET ORAL at 17:15

## 2024-07-11 RX ADMIN — METOPROLOL SUCCINATE 25 MG: 25 TABLET, EXTENDED RELEASE ORAL at 12:34

## 2024-07-11 RX ADMIN — ACETAMINOPHEN 975 MG: 325 TABLET, FILM COATED ORAL at 17:15

## 2024-07-11 RX ADMIN — PANTOPRAZOLE SODIUM 40 MG: 40 INJECTION, POWDER, FOR SOLUTION INTRAVENOUS at 06:20

## 2024-07-11 RX ADMIN — NICOTINE 1 PATCH: 21 PATCH, EXTENDED RELEASE TRANSDERMAL at 12:34

## 2024-07-11 RX ADMIN — DILTIAZEM HYDROCHLORIDE 30 MG: 30 TABLET, FILM COATED ORAL at 07:00

## 2024-07-11 NOTE — ASSESSMENT & PLAN NOTE
Patient has hx of chronic anxiety  Anxiety has been exacerbated in past month due to separation from fiance  Patient reports trouble sleeping, waking up with palpitations and diaphoresis   Patient reports fatigue and dizziness when walking,   Patient also complains of decreased in appetite    Plan  Diazepam 2mg oral Q6 PRN  Sertraline 25 mg

## 2024-07-11 NOTE — H&P
[facility]  H&P  Name: Yefri Bennett 53 y.o. male I MRN: [id number]  Unit/Bed#: ED-30 I Date of Admission: 7/11/2024   Date of Service: 7/11/2024 I Hospital Day: 0      Assessment & Plan   * Chest pain  Assessment & Plan  Patient presents to ED after being awaken overnight from sharp stabbing chest pain radiating to his right shoulder and neck  Patient associates pain with diaphoresis, palpitations, shortness of breath, and lips and mouth numbness  Patient's pain is reproducible upon palpation and worsens on inspiration  Patient has been having episodes of chest pain, waking him up to sleep for past 5 days  Reports reports fatigue, dizziness and decrease in appetite  Patient has history of anxiety which has been exacerbated for the past 1 month due to his separation from his fiancée  In the ED patient received a dose of diltiazem 30 Mg along with Valium for his hypertension and anxiety  Patient's ECG showed normal sinus rhythm and no significant ST segment changes  CTA: No aortic dissection or intramural hematoma. Approximately 5 mm density along the left anterior ascending aortic wall projecting into the lumen   Patient troponin was 13 on arrival and 12 at 2 hr  Differential includes but no limited to: ACS vs. Anxiety vs. GERD vs. MSK     Plan  Diazepam 2mg oral Q6 PRN for anxiety  Lidocaine 1 patch topical PRN for pain  Acetaminophen 975 mg oral PRN for pain  Pantoprazole twice daily  Telemetry    Anxiety  Assessment & Plan  Patient has hx of chronic anxiety  Anxiety has been exacerbated in past month due to separation from [facility]  Patient reports trouble sleeping, waking up with palpitations and diaphoresis   Patient reports fatigue and dizziness when walking,   Patient also complains of decreased in appetite    Plan  Diazepam 2mg oral Q6 PRN  Sertraline 25 mg      Hypertension  Assessment & Plan  Patient has hx of HTN, does not take medications    Plan  Toprol 25 mg once daily      Asthma  Assessment & Plan  Continue albuterol inhaler PRN Q6       VTE Pharmacologic Prophylaxis: VTE Score: 3 Moderate Risk (Score 3-4) - Pharmacological DVT Prophylaxis Ordered: enoxaparin (Lovenox).  Code Status: Level 1 - Full Code   Discussion with family: Attempted to update  (mother) via phone. Unable to contact.    Anticipated Length of Stay: Patient will be admitted on an observation basis with an anticipated length of stay of less than 2 midnights secondary to management of chest pain.    Total Time Spent on Date of Encounter in care of patient: 45 mins. This time was spent on one or more of the following: performing physical exam; counseling and coordination of care; obtaining or reviewing history; documenting in the medical record; reviewing/ordering tests, medications or procedures; communicating with other healthcare professionals and discussing with patient's family/caregivers.    Chief Complaint: Chest pain    History of Present Illness:  Yefri Bennett is a 53 y.o. male with a PMH of chronic obstructive asthma, hypertension, anxiety who presents with presented with chest pain which occurred overnight.  Pain was sharp in nature radiating to his right neck associated with diaphoresis along with numbness of the lips and mouth.  Patient also complains of shortness of breath.  Patient denies any abdominal pain, no back pain no cough no dizziness no fever no headache no nausea or vomiting.  Patient complains that he has been feeling heavy in his chest for the past 5 days along with episodes of anxiety.  Patient recently had a break-up with his fiancée which occurred 1 month ago.  Ever since then patient has difficulty sleeping, waking up with diaphoresis and palpitations.  Patient also complains of chronic fatigue, decrease in appetite.  In the ED patient had elevated blood pressure for which she was given diltiazem, along with Valium for anxiety.  Patient is being admitted to [facility]  service for management of chest pain.  Review of Systems:  Review of Systems   Constitutional:  Positive for activity change, appetite change, diaphoresis and fatigue.   HENT: Negative.     Respiratory:  Positive for cough and shortness of breath. Negative for choking and wheezing.    Cardiovascular:  Positive for chest pain and palpitations.   Gastrointestinal: Negative.  Negative for constipation, diarrhea, nausea and vomiting.   Genitourinary: Negative.    Musculoskeletal: Negative.    Skin: Negative.    Neurological:  Positive for dizziness.       Past Medical and Surgical History:   Past Medical History:   Diagnosis Date    Allergic     seasonal     Allergic rhinitis     Anxiety     Arthritis     Back pain     Chronic obstructive asthma     Chronic pain syndrome     Cluster headaches     Colon polyp     Depression     Insomnia     Kidney stones     Laryngospasm     Leukocytosis     Migraine     Myocardial infarction (HCC)     Nephrolithiasis     Organic impotence     Pneumonia due to infectious organism 1/16/2019    Seasonal allergies     Sleep apnea     does not use CPAP    Stroke (HCC) 2015    TMJ (temporomandibular joint syndrome)     Wheezing     last assessed 05/19/17       Past Surgical History:   Procedure Laterality Date    BACK SURGERY      *9, 6910-3300, nervectomy 2006, total of 10    BUCCAL MASS EXCISION N/A 3/26/2018    Procedure: BIOPSY LINGUAL TONSIL (FLOW/ STANDARD PATH)  EVAL UNDER ANESTHESIA;  Surgeon: Deric Easton MD;  Location: [facility];  Service: ENT    COLONOSCOPY      FL RETROGRADE PYELOGRAM  10/21/2020    HERNIA REPAIR      KIDNEY SURGERY      KNEE ARTHROSCOPY      LITHOTRIPSY      multiple    LITHOTRIPSY      LUMBAR DISC SURGERY  2015    MANDIBLE FRACTURE SURGERY      titanium plate    PELVIC SYMPHYSIS FUSION      FL CYSTO/URETERO W/LITHOTRIPSY &INDWELL STENT INSRT Right 10/21/2020    Procedure: CYSTOSCOPY URETEROSCOPY WITH LITHOTRIPSY HOLMIUM LASER, RETROGRADE PYELOGRAM AND INSERTION  STENT URETERAL;  Surgeon: Anthony Richardson MD;  Location[facility];  Service: Urology    IA ESOPHAGOGASTRODUODENOSCOPY TRANSORAL DIAGNOSTIC N/A 2/22/2018    Procedure: ESOPHAGOGASTRODUODENOSCOPY (EGD);  Surgeon: Yolis Mares MD;  Location: [facility];  Service: Gastroenterology    IA ESOPHAGOGASTRODUODENOSCOPY TRANSORAL DIAGNOSTIC N/A 4/1/2019    Procedure: EGD W/ DILATION;  Surgeon: Yolis Mares MD;  [facility];  Service: Gastroenterology    IA FORREST IMPLTJ NSTIM ELTRDS PLATE/PADDLE EDRL N/A 9/8/2016    Procedure: DORSAL COLUMN STIMULATOR PLACEMENT (IMPULSE MONITORING);  Surgeon: Martin Doe MD;  Location: [facility];  Service: Orthopedics    IA REVJ/RMVL IMPL SPI NPG/RCVR DTCH CONNJ ELTRD RA Left 6/9/2017    Procedure: REMOVAL OF A THORACIC SCS PLACED VIA LAMINECTOMY AND REMOVAL LEFT BUTTOCK GENERATOR; IMPULSE MONITORING;  Surgeon: Steven Parr MD;  Location: [facility];  Service: Neurosurgery    IA SURGICAL ARTHROSCOPY SHOULDER W/ROTATOR CUFF RPR Right 4/4/2019    Procedure: RIGHT SHOULDER ARTHROSCOPIC SUBSCAPULARIS TENDON REPAIR;  Surgeon: David Merritt MD;  [facility];  Service: Orthopedics    SACROILIAC JOINT FUSION  2015    SHOULDER SURGERY Left     2    UPPER GASTROINTESTINAL ENDOSCOPY         Meds/Allergies:  Prior to Admission medications    Medication Sig Start Date End Date Taking? Authorizing Provider   acetaminophen (TYLENOL) 325 mg tablet Take 3 tablets (975 mg total) by mouth every 8 (eight) hours as needed for mild pain 6/15/24   Sharon Clemente MD   albuterol (PROVENTIL HFA,VENTOLIN HFA) 90 mcg/act inhaler Inhale 2 puffs every 6 (six) hours as needed for wheezing 5/13/22   Fausto Abreu MD   amLODIPine (NORVASC) 5 mg tablet Take 1 tablet (5 mg total) by mouth daily  Patient not taking: Reported on 5/1/2024 3/16/23   Dalton Elliott DO   diazepam (VALIUM) 5 mg tablet Take 1 tablet (5 mg total) by mouth every 12 (twelve) hours as needed for anxiety or  muscle spasms Do not start before May 22, 2024. 5/22/24   Sharon Clemente MD   diltiazem (CARDIZEM) 60 mg tablet Take one tablet by mouth twice daily  Patient not taking: Reported on 5/1/2024 3/26/24   Jr Dominguez MD   fluticasone-vilanterol (BREO ELLIPTA) 200-25 MCG/INH inhaler Inhale 1 puff daily Rinse mouth after use.  Patient taking differently: Inhale 1 puff if needed Rinse mouth after use. 3/18/19   Sharon Clemente MD   lidocaine (LIDODERM) 5 % Apply 1 patch topically over 12 hours every 24 hours for 10 days Remove & Discard patch within 12 hours or as directed by MD 6/15/24 6/25/24  Sharon Clemente MD   lidocaine (XYLOCAINE) 2 % topical gel Apply 1 Application topically as needed for mild pain for up to 7 days  Patient not taking: Reported on 5/3/2024 1/26/24 2/2/24  Sharon Clemente MD   methocarbamol (ROBAXIN) 500 mg tablet Take 1 tablet (500 mg total) by mouth 3 (three) times a day as needed for muscle spasms  Patient not taking: Reported on 5/1/2024 3/25/24   Sharon lCemente MD   naloxone (Narcan) 4 mg/0.1 mL nasal spray In case of overdose: 1 spray in one nostril x 1, may repeat in 2 min if remains unresponsive.  Patient not taking: Reported on 3/9/2023 3/9/21   Rebeka Martins MD     I have reviewed home medications with patient personally.    Allergies:   Allergies   Allergen Reactions    Other Rash     Adhesive tape       Social History:  Marital Status:    Occupation: N/A  Patient Pre-hospital Living Situation: Home  Patient Pre-hospital Level of Mobility: walks  Patient Pre-hospital Diet Restrictions: N/A  Substance Use History:   Social History     Substance and Sexual Activity   Alcohol Use Yes    Alcohol/week: 2.0 standard drinks of alcohol    Types: 2 Shots of liquor per week    Comment: rarely      Social History     Tobacco Use   Smoking Status Every Day    Current packs/day: 1.00    Average packs/day: 1 pack/day for 25.0 years (25.0 ttl pk-yrs)    Types: Cigarettes   Smokeless Tobacco Former     Social History      Substance and Sexual Activity   Drug Use Yes    Frequency: 7.0 times per week    Types: Marijuana    Comment: medical marijuana daily for chronic pain 1/2 ounce       Family History:  Family History   Problem Relation Age of Onset    Leukemia Mother 77    Hypertension Mother     Diabetes Father     Obesity Father     Liver cancer Father     Heart disease Father     Diabetes Brother     Hypertension Brother     Skin cancer Maternal Grandfather 99    Cancer Family        Physical Exam:     Vitals:   Blood Pressure: (!) 174/103 (07/11/24 1234)  Pulse: 60 (07/11/24 1234)  Temperature: 98 °F (36.7 °C) (07/11/24 0535)  Temp Source: Oral (07/11/24 0535)  Respirations: 18 (07/11/24 1234)  SpO2: 100 % (07/11/24 1234)    Physical Exam  Constitutional:       General: He is in acute distress.      Appearance: He is ill-appearing.   HENT:      Head: Normocephalic and atraumatic.      Right Ear: External ear normal.      Left Ear: External ear normal.      Nose: Nose normal.      Mouth/Throat:      Mouth: Mucous membranes are moist.      Pharynx: Oropharynx is clear.   Eyes:      Extraocular Movements: Extraocular movements intact.      Conjunctiva/sclera: Conjunctivae normal.      Pupils: Pupils are equal, round, and reactive to light.   Cardiovascular:      Rate and Rhythm: Normal rate and regular rhythm.      Pulses: Normal pulses.      Heart sounds: Normal heart sounds.   Pulmonary:      Effort: Pulmonary effort is normal. No respiratory distress.      Breath sounds: Normal breath sounds. No wheezing, rhonchi or rales.   Abdominal:      General: Bowel sounds are normal.      Palpations: Abdomen is soft.      Tenderness: There is no abdominal tenderness.   Musculoskeletal:         General: Tenderness present.      Comments: Reproducible chest pain upon palpation   Skin:     General: Skin is warm.      Capillary Refill: Capillary refill takes less than 2 seconds.   Neurological:      General: No focal deficit present.       Mental Status: He is alert and oriented to person, place, and time.          Additional Data:     Lab Results:  Results from last 7 days   Lab Units 07/11/24  1240 07/11/24  0548   WBC Thousand/uL  --  13.66*   HEMOGLOBIN g/dL  --  16.5   HEMATOCRIT %  --  51.3*   PLATELETS Thousands/uL 303 366   SEGS PCT %  --  65   LYMPHO PCT %  --  23   MONO PCT %  --  7   EOS PCT %  --  3     Results from last 7 days   Lab Units 07/11/24  0548   SODIUM mmol/L 144   POTASSIUM mmol/L 4.2   CHLORIDE mmol/L 109*   CO2 mmol/L 27   BUN mg/dL 14   CREATININE mg/dL 1.28   ANION GAP mmol/L 8   CALCIUM mg/dL 9.5   ALBUMIN g/dL 4.3   TOTAL BILIRUBIN mg/dL 0.34   ALK PHOS U/L 95   ALT U/L 14   AST U/L 16   GLUCOSE RANDOM mg/dL 85             Lab Results   Component Value Date    HGBA1C 5.9 12/07/2017    HGBA1C 5.7 05/16/2017           Lines/Drains:  Invasive Devices       Peripheral Intravenous Line  Duration             Peripheral IV 07/11/24 Right;Ventral (anterior) Forearm <1 day                        Imaging: Reviewed radiology reports from this admission including: CTA dissection study  CTA dissection protocol chest/abdomen/pelvis   Final Result by Dalton Marques MD (07/11 0825)      No aortic dissection or intramural hematoma. Approximately 5 mm density along the left anterior ascending aortic wall projecting into the lumen with thin stalk, likely a small pedunculated noncalcified plaque/clot.      No additional acute abnormality in the chest, abdomen or pelvis.         Workstation performed: [id number]         XR chest 2 views   ED Interpretation by Hayley Jane MD (07/11 0634)   Per my interpretation: No acute cardiopulmonary exam      Final Result by Steven Montgomery DO (07/11 0655)      No acute cardiopulmonary disease is seen.            Workstation performed: [id number]             EKG and Other Studies Reviewed on Admission:   EKG: NSR. HR 70.    ** Please Note: This note has been constructed using a voice  recognition system. **

## 2024-07-11 NOTE — ASSESSMENT & PLAN NOTE
Patient presents to ED after being awaken overnight from sharp stabbing chest pain radiating to his right shoulder and neck  Patient associates pain with diaphoresis, palpitations, shortness of breath, and lips and mouth numbness  Patient's pain is reproducible upon palpation and worsens on inspiration  Patient has been having episodes of chest pain, waking him up to sleep for past 5 days  Reports reports fatigue, dizziness and decrease in appetite  Patient has history of anxiety which has been exacerbated for the past 1 month due to his separation from his fiancée  In the ED patient received a dose of diltiazem 30 Mg along with Valium for his hypertension and anxiety  Patient's ECG showed normal sinus rhythm and no significant ST segment changes  CTA: No aortic dissection or intramural hematoma. Approximately 5 mm density along the left anterior ascending aortic wall projecting into the lumen   Patient troponin was 13 on arrival and 12 at 2 hr  Differential includes but no limited to: ACS vs. Anxiety vs. GERD vs. MSK     Plan  Diazepam 2mg oral Q6 PRN for anxiety  Lidocaine 1 patch topical PRN for pain  Acetaminophen 975 mg oral PRN for pain  Pantoprazole twice daily  Telemetry

## 2024-07-11 NOTE — PROGRESS NOTES
[facility]  Progress Note  Name: Yefri Bennett I  MRN: [id number]  Unit/Bed#: ED-30 I Date of Admission: 7/11/2024   Date of Service: 7/11/2024 I Hospital Day: 0    Assessment & Plan   * Chest pain  Assessment & Plan  Patient presents to ED after being awaken overnight from sharp stabbing chest pain radiating to his right shoulder and neck  Patient associates pain with diaphoresis, palpitations, shortness of breath, and lips and mouth numbness  Patient's pain is reproducible upon palpation and worsens on inspiration  Patient has been having episodes of chest pain, waking him up to sleep for past 5 days  Reports reports fatigue, dizziness and decrease in appetite  Patient has history of anxiety which has been exacerbated for the past 1 month due to his separation from his fiancée  In the ED patient received a dose of diltiazem 30 Mg along with Valium for his hypertension and anxiety  Patient's ECG showed normal sinus rhythm and no significant ST segment changes  CTA: No aortic dissection or intramural hematoma. Approximately 5 mm density along the left anterior ascending aortic wall projecting into the lumen   Patient troponin was 13 on arrival and 12 at 2 hr  Differential includes but no limited to: ACS vs. Anxiety vs. GERD vs. MSK     Plan  Diazepam 2mg oral Q6 PRN for anxiety  Lidocaine 1 patch topical PRN for pain  Acetaminophen 975 mg oral PRN for pain  Pantoprazole twice daily  Telemetry    Anxiety  Assessment & Plan  Patient has hx of chronic anxiety  Anxiety has been exacerbated in past month due to separation from [facility]  Patient reports trouble sleeping, waking up with palpitations and diaphoresis   Patient reports fatigue and dizziness when walking,   Patient also complains of decreased in appetite    Plan  Diazepam 2mg oral Q6 PRN  Sertraline 25 mg      Hypertension  Assessment & Plan  Patient has hx of HTN, does not take medications    Plan  Toprol 25 mg once daily      Asthma  Assessment & Plan  Continue albuterol inhaler PRN Q6             VTE Pharmacologic Prophylaxis: VTE Score: 3 Moderate Risk (Score 3-4) - Pharmacological DVT Prophylaxis Ordered: enoxaparin (Lovenox).  Code Status: Level 1 - Full Code   Discussion with family: Attempted to update  (mother) via phone. Unable to contact.    Anticipated Length of Stay: Patient will be admitted on an observation basis with an anticipated length of stay of less than 2 midnights secondary to management of chest pain.    Total Time Spent on Date of Encounter in care of patient: 45 mins. This time was spent on one or more of the following: performing physical exam; counseling and coordination of care; obtaining or reviewing history; documenting in the medical record; reviewing/ordering tests, medications or procedures; communicating with other healthcare professionals and discussing with patient's family/caregivers.    Chief Complaint: Chest pain    History of Present Illness:  Yefri Bennett is a 53 y.o. male with a PMH of chronic obstructive asthma, hypertension, anxiety who presents with presented with chest pain which occurred overnight.  Pain was sharp in nature radiating to his right neck associated with diaphoresis along with numbness of the lips and mouth.  Patient also complains of shortness of breath.  Patient denies any abdominal pain, no back pain no cough no dizziness no fever no headache no nausea or vomiting.  Patient complains that he has been feeling heavy in his chest for the past 5 days along with episodes of anxiety.  Patient recently had a break-up with his fiancée which occurred 1 month ago.  Ever since then patient has difficulty sleeping, waking up with diaphoresis and palpitations.  Patient also complains of chronic fatigue, decrease in appetite.  In the ED patient had elevated blood pressure for which she was given diltiazem, along with Valium for anxiety.  Patient is being admitted to [facility]  service for management of chest pain.    Review of Systems:  Review of Systems   Constitutional:  Positive for activity change, appetite change, diaphoresis and fatigue.   HENT: Negative.     Respiratory:  Positive for cough and shortness of breath. Negative for choking and wheezing.    Cardiovascular:  Positive for chest pain and palpitations.   Gastrointestinal: Negative.  Negative for constipation, diarrhea, nausea and vomiting.   Genitourinary: Negative.    Musculoskeletal: Negative.    Skin: Negative.    Neurological:  Positive for dizziness.       Past Medical and Surgical History:   Past Medical History:   Diagnosis Date    Allergic     seasonal     Allergic rhinitis     Anxiety     Arthritis     Back pain     Chronic obstructive asthma     Chronic pain syndrome     Cluster headaches     Colon polyp     Depression     Insomnia     Kidney stones     Laryngospasm     Leukocytosis     Migraine     Myocardial infarction (HCC)     Nephrolithiasis     Organic impotence     Pneumonia due to infectious organism 1/16/2019    Seasonal allergies     Sleep apnea     does not use CPAP    Stroke (HCC) 2015    TMJ (temporomandibular joint syndrome)     Wheezing     last assessed 05/19/17       Past Surgical History:   Procedure Laterality Date    BACK SURGERY      *9, [id number], nervectomy 2006, total of 10    BUCCAL MASS EXCISION N/A 3/26/2018    Procedure: BIOPSY LINGUAL TONSIL (FLOW/ STANDARD PATH)  EVAL UNDER ANESTHESIA;  Surgeon: Deric Easton MD;  Location: [facility];  Service: ENT    COLONOSCOPY      FL RETROGRADE PYELOGRAM  10/21/2020    HERNIA REPAIR      KIDNEY SURGERY      KNEE ARTHROSCOPY      LITHOTRIPSY      multiple    LITHOTRIPSY      LUMBAR DISC SURGERY  2015    MANDIBLE FRACTURE SURGERY      titanium plate    PELVIC SYMPHYSIS FUSION      WI CYSTO/URETERO W/LITHOTRIPSY &INDWELL STENT INSRT Right 10/21/2020    Procedure: CYSTOSCOPY URETEROSCOPY WITH LITHOTRIPSY HOLMIUM LASER, RETROGRADE PYELOGRAM AND  INSERTION STENT URETERAL;  Surgeon: Anthony Richardson MD;  Location[facility];  Service: Urology    ID ESOPHAGOGASTRODUODENOSCOPY TRANSORAL DIAGNOSTIC N/A 2/22/2018    Procedure: ESOPHAGOGASTRODUODENOSCOPY (EGD);  Surgeon: Yolis Mares MD;  [facility]: [facility];  Service: Gastroenterology    ID ESOPHAGOGASTRODUODENOSCOPY TRANSORAL DIAGNOSTIC N/A 4/1/2019    Procedure: EGD W/ DILATION;  Surgeon: Yolis Mares MD;  [facility];  Service: Gastroenterology    ID FORREST IMPLTJ NSTIM ELTRDS PLATE/PADDLE EDRL N/A 9/8/2016    Procedure: DORSAL COLUMN STIMULATOR PLACEMENT (IMPULSE MONITORING);  Surgeon: Martin Doe MD;  Location: [facility];  Service: Orthopedics    ID REVJ/RMVL IMPL SPI NPG/RCVR DTCH CONNJ ELTRD RA Left 6/9/2017    Procedure: REMOVAL OF A THORACIC SCS PLACED VIA LAMINECTOMY AND REMOVAL LEFT BUTTOCK GENERATOR; IMPULSE MONITORING;  Surgeon: Steven Parr MD;  Location: [facility];  Service: Neurosurgery    ID SURGICAL ARTHROSCOPY SHOULDER W/ROTATOR CUFF RPR Right 4/4/2019    Procedure: RIGHT SHOULDER ARTHROSCOPIC SUBSCAPULARIS TENDON REPAIR;  Surgeon: David Merritt MD;  [facility];  Service: Orthopedics    SACROILIAC JOINT FUSION  2015    SHOULDER SURGERY Left     2    UPPER GASTROINTESTINAL ENDOSCOPY         Meds/Allergies:  Prior to Admission medications    Medication Sig Start Date End Date Taking? Authorizing Provider   acetaminophen (TYLENOL) 325 mg tablet Take 3 tablets (975 mg total) by mouth every 8 (eight) hours as needed for mild pain 6/15/24   Sharon Clemente MD   albuterol (PROVENTIL HFA,VENTOLIN HFA) 90 mcg/act inhaler Inhale 2 puffs every 6 (six) hours as needed for wheezing 5/13/22   Fausto Abreu MD   amLODIPine (NORVASC) 5 mg tablet Take 1 tablet (5 mg total) by mouth daily  Patient not taking: Reported on 5/1/2024 3/16/23   Dalton Elliott DO   diazepam (VALIUM) 5 mg tablet Take 1 tablet (5 mg total) by mouth every 12 (twelve) hours as needed for  anxiety or muscle spasms Do not start before May 22, 2024. 5/22/24   Sharon Clemente MD   diltiazem (CARDIZEM) 60 mg tablet Take one tablet by mouth twice daily  Patient not taking: Reported on 5/1/2024 3/26/24   Jr Dominguez MD   fluticasone-vilanterol (BREO ELLIPTA) 200-25 MCG/INH inhaler Inhale 1 puff daily Rinse mouth after use.  Patient taking differently: Inhale 1 puff if needed Rinse mouth after use. 3/18/19   Sharon Clemente MD   lidocaine (LIDODERM) 5 % Apply 1 patch topically over 12 hours every 24 hours for 10 days Remove & Discard patch within 12 hours or as directed by MD 6/15/24 6/25/24  Sharon Clemente MD   lidocaine (XYLOCAINE) 2 % topical gel Apply 1 Application topically as needed for mild pain for up to 7 days  Patient not taking: Reported on 5/3/2024 1/26/24 2/2/24  Sharon Clemente MD   methocarbamol (ROBAXIN) 500 mg tablet Take 1 tablet (500 mg total) by mouth 3 (three) times a day as needed for muscle spasms  Patient not taking: Reported on 5/1/2024 3/25/24   Sharon Clemente MD   naloxone (Narcan) 4 mg/0.1 mL nasal spray In case of overdose: 1 spray in one nostril x 1, may repeat in 2 min if remains unresponsive.  Patient not taking: Reported on 3/9/2023 3/9/21   Rebeka Martins MD     I have reviewed home medications with patient personally.    Allergies:   Allergies   Allergen Reactions    Other Rash     Adhesive tape       Social History:  Marital Status:    Occupation: N/A  Patient Pre-hospital Living Situation: Home  Patient Pre-hospital Level of Mobility: walks  Patient Pre-hospital Diet Restrictions: N/A  Substance Use History:   Social History     Substance and Sexual Activity   Alcohol Use Yes    Alcohol/week: 2.0 standard drinks of alcohol    Types: 2 Shots of liquor per week    Comment: rarely      Social History     Tobacco Use   Smoking Status Every Day    Current packs/day: 1.00    Average packs/day: 1 pack/day for 25.0 years (25.0 ttl pk-yrs)    Types: Cigarettes   Smokeless Tobacco Former      Social History     Substance and Sexual Activity   Drug Use Yes    Frequency: 7.0 times per week    Types: Marijuana    Comment: medical marijuana daily for chronic pain 1/2 ounce       Family History:  Family History   Problem Relation Age of Onset    Leukemia Mother 77    Hypertension Mother     Diabetes Father     Obesity Father     Liver cancer Father     Heart disease Father     Diabetes Brother     Hypertension Brother     Skin cancer Maternal Grandfather 99    Cancer Family        Physical Exam:     Vitals:   Blood Pressure: 159/90 (07/11/24 1147)  Pulse: 62 (07/11/24 1147)  Temperature: 98 °F (36.7 °C) (07/11/24 0535)  Temp Source: Oral (07/11/24 0535)  Respirations: 20 (07/11/24 1147)  SpO2: 98 % (07/11/24 1147)    Physical Exam  Constitutional:       General: He is in acute distress.      Appearance: He is ill-appearing.   HENT:      Head: Normocephalic and atraumatic.      Right Ear: External ear normal.      Left Ear: External ear normal.      Nose: Nose normal.      Mouth/Throat:      Mouth: Mucous membranes are moist.      Pharynx: Oropharynx is clear.   Eyes:      Extraocular Movements: Extraocular movements intact.      Conjunctiva/sclera: Conjunctivae normal.      Pupils: Pupils are equal, round, and reactive to light.   Cardiovascular:      Rate and Rhythm: Normal rate and regular rhythm.      Pulses: Normal pulses.      Heart sounds: Normal heart sounds.   Pulmonary:      Effort: Pulmonary effort is normal. No respiratory distress.      Breath sounds: Normal breath sounds. No wheezing, rhonchi or rales.   Abdominal:      General: Bowel sounds are normal.      Palpations: Abdomen is soft.      Tenderness: There is no abdominal tenderness.   Musculoskeletal:         General: Tenderness present.      Comments: Reproducible chest pain upon palpation   Skin:     General: Skin is warm.      Capillary Refill: Capillary refill takes less than 2 seconds.   Neurological:      General: No focal deficit  present.      Mental Status: He is alert and oriented to person, place, and time.      Physical Exam  Constitutional:       General: He is in acute distress.      Appearance: He is ill-appearing.   HENT:      Head: Normocephalic and atraumatic.      Right Ear: External ear normal.      Left Ear: External ear normal.      Nose: Nose normal.      Mouth/Throat:      Mouth: Mucous membranes are moist.      Pharynx: Oropharynx is clear.   Eyes:      Extraocular Movements: Extraocular movements intact.      Conjunctiva/sclera: Conjunctivae normal.      Pupils: Pupils are equal, round, and reactive to light.   Cardiovascular:      Rate and Rhythm: Normal rate and regular rhythm.      Pulses: Normal pulses.      Heart sounds: Normal heart sounds.   Pulmonary:      Effort: Pulmonary effort is normal. No respiratory distress.      Breath sounds: Normal breath sounds. No wheezing, rhonchi or rales.   Abdominal:      General: Bowel sounds are normal.      Palpations: Abdomen is soft.      Tenderness: There is no abdominal tenderness.   Musculoskeletal:         General: Tenderness present.      Comments: Reproducible chest pain upon palpation   Skin:     General: Skin is warm.      Capillary Refill: Capillary refill takes less than 2 seconds.   Neurological:      General: No focal deficit present.      Mental Status: He is alert and oriented to person, place, and time.          Additional Data:     Lab Results:  Results from last 7 days   Lab Units 07/11/24  0548   WBC Thousand/uL 13.66*   HEMOGLOBIN g/dL 16.5   HEMATOCRIT % 51.3*   PLATELETS Thousands/uL 366   SEGS PCT % 65   LYMPHO PCT % 23   MONO PCT % 7   EOS PCT % 3     Results from last 7 days   Lab Units 07/11/24  0548   SODIUM mmol/L 144   POTASSIUM mmol/L 4.2   CHLORIDE mmol/L 109*   CO2 mmol/L 27   BUN mg/dL 14   CREATININE mg/dL 1.28   ANION GAP mmol/L 8   CALCIUM mg/dL 9.5   ALBUMIN g/dL 4.3   TOTAL BILIRUBIN mg/dL 0.34   ALK PHOS U/L 95   ALT U/L 14   AST U/L 16    GLUCOSE RANDOM mg/dL 85             Lab Results   Component Value Date    HGBA1C 5.9 12/07/2017    HGBA1C 5.7 05/16/2017           Lines/Drains:  Invasive Devices       Peripheral Intravenous Line  Duration             Peripheral IV 07/11/24 Right;Ventral (anterior) Forearm <1 day                        Imaging: Reviewed radiology reports from this admission including: CTA dissection study  CTA dissection protocol chest/abdomen/pelvis   Final Result by Dalton Marques MD (07/11 0825)      No aortic dissection or intramural hematoma. Approximately 5 mm density along the left anterior ascending aortic wall projecting into the lumen with thin stalk, likely a small pedunculated noncalcified plaque/clot.      No additional acute abnormality in the chest, abdomen or pelvis.         Workstation performed: [id number]         XR chest 2 views   ED Interpretation by Hayley Jane MD (07/11 0634)   Per my interpretation: No acute cardiopulmonary exam      Final Result by Steven Montgomery DO (07/11 0655)      No acute cardiopulmonary disease is seen.            Workstation performed: [id number]             EKG and Other Studies Reviewed on Admission:   EKG: NSR. HR 80.    ** Please Note: This note has been constructed using a voice recognition system. **

## 2024-07-11 NOTE — ED PROVIDER NOTES
"History  Chief Complaint   Patient presents with    Chest Pain     Patient reports being woken up from sleep a few hours ago with sharp chest pressure and SOB. Patient reports feeling sick \"like I'm drunk\" for past few days with HA, dizziness, and palpitations. No meds PTA       Chest Pain  Associated symptoms: diaphoresis, numbness (face and lip), palpitations and shortness of breath    Associated symptoms: no abdominal pain, no back pain, no cough, no dizziness, no fever, no headache, no nausea and not vomiting        Yefri Bennett is a 53 y.o. male with history of anxiety on diazepam, chronic back pain, chronic obstructive asthma, history of stroke, hypertension presenting for chest pain.    Patient reports waking up from sleep in the last few hours with chest pain described as initially sharp but then pressure with shortness of breath, dizziness, diaphoresis, and facial numbness. Patient denies having experienced similar symptoms in the past. Not specifically worse with deep breaths, does not radiate, no nausea or vomiting. Reports smoking, casual alcohol use, reports marijuana use but no other recreational drug use. Patient also reports recent illness symptoms including congestion, subjective fevers, chills, cough.  Reports significant other was ill recently.    Prior to Admission Medications   Prescriptions Last Dose Informant Patient Reported? Taking?   acetaminophen (TYLENOL) 325 mg tablet   No No   Sig: Take 3 tablets (975 mg total) by mouth every 8 (eight) hours as needed for mild pain   albuterol (PROVENTIL HFA,VENTOLIN HFA) 90 mcg/act inhaler  Self No No   Sig: Inhale 2 puffs every 6 (six) hours as needed for wheezing   amLODIPine (NORVASC) 5 mg tablet  Self No No   Sig: Take 1 tablet (5 mg total) by mouth daily   Patient not taking: Reported on 5/1/2024   diazepam (VALIUM) 5 mg tablet   No No   Sig: Take 1 tablet (5 mg total) by mouth every 12 (twelve) hours as needed for anxiety or muscle spasms Do not " start before May 22, 2024.   diltiazem (CARDIZEM) 60 mg tablet  Self No No   Sig: Take one tablet by mouth twice daily   Patient not taking: Reported on 5/1/2024   fluticasone-vilanterol (BREO ELLIPTA) 200-25 MCG/INH inhaler  Self No No   Sig: Inhale 1 puff daily Rinse mouth after use.   Patient taking differently: Inhale 1 puff if needed Rinse mouth after use.   lidocaine (LIDODERM) 5 %   No No   Sig: Apply 1 patch topically over 12 hours every 24 hours for 10 days Remove & Discard patch within 12 hours or as directed by MD   lidocaine (XYLOCAINE) 2 % topical gel   No No   Sig: Apply 1 Application topically as needed for mild pain for up to 7 days   Patient not taking: Reported on 5/3/2024   methocarbamol (ROBAXIN) 500 mg tablet  Self No No   Sig: Take 1 tablet (500 mg total) by mouth 3 (three) times a day as needed for muscle spasms   Patient not taking: Reported on 5/1/2024   naloxone (Narcan) 4 mg/0.1 mL nasal spray  Self No No   Sig: In case of overdose: 1 spray in one nostril x 1, may repeat in 2 min if remains unresponsive.   Patient not taking: Reported on 3/9/2023      Facility-Administered Medications: None       Past Medical History:   Diagnosis Date    Allergic     seasonal     Allergic rhinitis     Anxiety     Arthritis     Back pain     Chronic obstructive asthma     Chronic pain syndrome     Cluster headaches     Colon polyp     Depression     Insomnia     Kidney stones     Laryngospasm     Leukocytosis     Migraine     Myocardial infarction (HCC)     Nephrolithiasis     Organic impotence     Pneumonia due to infectious organism 1/16/2019    Seasonal allergies     Sleep apnea     does not use CPAP    Stroke (HCC) 2015    TMJ (temporomandibular joint syndrome)     Wheezing     last assessed 05/19/17       Past Surgical History:   Procedure Laterality Date    BACK SURGERY      *9, [id number], nervectomy 2006, total of 10    BUCCAL MASS EXCISION N/A 3/26/2018    Procedure: BIOPSY LINGUAL TONSIL (FLOW/  STANDARD PATH)  EVAL UNDER ANESTHESIA;  Surgeon: Deric Easton MD;  [facility];  Service: ENT    COLONOSCOPY      FL RETROGRADE PYELOGRAM  10/21/2020    HERNIA REPAIR      KIDNEY SURGERY      KNEE ARTHROSCOPY      LITHOTRIPSY      multiple    LITHOTRIPSY      LUMBAR DISC SURGERY  2015    MANDIBLE FRACTURE SURGERY      titanium plate    PELVIC SYMPHYSIS FUSION      KY CYSTO/URETERO W/LITHOTRIPSY &INDWELL STENT INSRT Right 10/21/2020    Procedure: CYSTOSCOPY URETEROSCOPY WITH LITHOTRIPSY HOLMIUM LASER, RETROGRADE PYELOGRAM AND INSERTION STENT URETERAL;  Surgeon: Anthony Richardson MD;  [facility];  Service: Urology    KY ESOPHAGOGASTRODUODENOSCOPY TRANSORAL DIAGNOSTIC N/A 2/22/2018    Procedure: ESOPHAGOGASTRODUODENOSCOPY (EGD);  Surgeon: Yolis Mares MD;  [facility]: [facility];  Service: Gastroenterology    KY ESOPHAGOGASTRODUODENOSCOPY TRANSORAL DIAGNOSTIC N/A 4/1/2019    Procedure: EGD W/ DILATION;  Surgeon: Yolis Mares MD;  [facility];  Service: Gastroenterology    KY FORREST IMPLTJ NSTIM ELTRDS PLATE/PADDLE EDRL N/A 9/8/2016    Procedure: DORSAL COLUMN STIMULATOR PLACEMENT (IMPULSE MONITORING);  Surgeon: Martin Doe MD;  [facility];  Service: Orthopedics    KY REVJ/RMVL IMPL SPI NPG/RCVR DTCH CONNJ ELTRD RA Left 6/9/2017    Procedure: REMOVAL OF A THORACIC SCS PLACED VIA LAMINECTOMY AND REMOVAL LEFT BUTTOCK GENERATOR; IMPULSE MONITORING;  Surgeon: Steven Parr MD;  Location[facility];  Service: Neurosurgery    KY SURGICAL ARTHROSCOPY SHOULDER W/ROTATOR CUFF RPR Right 4/4/2019    Procedure: RIGHT SHOULDER ARTHROSCOPIC SUBSCAPULARIS TENDON REPAIR;  Surgeon: David Merritt MD;  [facility];  Service: Orthopedics    SACROILIAC JOINT FUSION  2015    SHOULDER SURGERY Left     2    UPPER GASTROINTESTINAL ENDOSCOPY         Family History   Problem Relation Age of Onset    Leukemia Mother 77    Hypertension Mother     Diabetes Father     Obesity Father      Liver cancer Father     Heart disease Father     Diabetes Brother     Hypertension Brother     Skin cancer Maternal Grandfather 99    Cancer Family      I have reviewed and agree with the history as documented.    E-Cigarette/Vaping    E-Cigarette Use Current Some Day User     Cartridges/Day less than one a day      E-Cigarette/Vaping Substances    Nicotine No     THC Yes     CBD No     Flavoring No     Other No     Unknown No      Social History     Tobacco Use    Smoking status: Every Day     Current packs/day: 1.00     Average packs/day: 1 pack/day for 25.0 years (25.0 ttl pk-yrs)     Types: Cigarettes    Smokeless tobacco: Former   Vaping Use    Vaping status: Some Days    Substances: THC   Substance Use Topics    Alcohol use: Yes     Alcohol/week: 2.0 standard drinks of alcohol     Types: 2 Shots of liquor per week     Comment: rarely     Drug use: Yes     Frequency: 7.0 times per week     Types: Marijuana     Comment: medical marijuana daily for chronic pain 1/2 ounce        Review of Systems   Constitutional:  Positive for diaphoresis. Negative for chills and fever.   HENT:  Positive for congestion.    Eyes:  Negative for pain and visual disturbance.   Respiratory:  Positive for chest tightness and shortness of breath. Negative for cough, wheezing and stridor.    Cardiovascular:  Positive for chest pain and palpitations. Negative for leg swelling.   Gastrointestinal:  Negative for abdominal pain, constipation, diarrhea, nausea and vomiting.   Genitourinary:  Negative for dysuria and hematuria.   Musculoskeletal:  Negative for arthralgias and back pain.   Skin:  Negative for color change, pallor and rash.   Neurological:  Positive for numbness (face and lip). Negative for dizziness, syncope, light-headedness and headaches.   Psychiatric/Behavioral:  Negative for behavioral problems.    All other systems reviewed and are negative.      Physical Exam  ED Triage Vitals   Temperature Pulse Respirations Blood  Pressure SpO2   07/11/24 0535 07/11/24 0533 07/11/24 0533 07/11/24 0533 07/11/24 0533   98 °F (36.7 °C) 83 (!) 28 (!) 173/118 99 %      Temp Source Heart Rate Source Patient Position - Orthostatic VS BP Location FiO2 (%)   07/11/24 0535 07/11/24 0533 07/11/24 0533 07/11/24 0533 --   Oral Monitor Sitting Right arm       Pain Score       07/11/24 0533       8             Orthostatic Vital Signs  Vitals:    07/11/24 0533 07/11/24 0700   BP: (!) 173/118 (!) 168/127   Pulse: 83    Patient Position - Orthostatic VS: Sitting        Physical Exam  Vitals and nursing note reviewed.   Constitutional:       Appearance: Normal appearance. He is well-developed. He is ill-appearing.   HENT:      Head: Normocephalic and atraumatic.      Nose: No rhinorrhea.      Mouth/Throat:      Mouth: Mucous membranes are moist.      Pharynx: Oropharynx is clear.   Eyes:      Extraocular Movements: Extraocular movements intact.      Conjunctiva/sclera: Conjunctivae normal.   Cardiovascular:      Rate and Rhythm: Normal rate and regular rhythm.      Pulses: Normal pulses.      Heart sounds: Normal heart sounds. No murmur heard.  Pulmonary:      Effort: Pulmonary effort is normal. Tachypnea present. No respiratory distress.      Breath sounds: Normal breath sounds. No decreased breath sounds, wheezing, rhonchi or rales.   Abdominal:      General: Abdomen is flat. Bowel sounds are normal.      Palpations: Abdomen is soft.      Tenderness: There is no abdominal tenderness.   Musculoskeletal:      Cervical back: Neck supple.      Right lower leg: No edema.      Left lower leg: No edema.   Skin:     General: Skin is warm and dry.      Capillary Refill: Capillary refill takes less than 2 seconds.   Neurological:      General: No focal deficit present.      Mental Status: He is alert and oriented to person, place, and time.   Psychiatric:         Mood and Affect: Mood normal.         Behavior: Behavior normal.         ED Medications  Medications    diazepam (VALIUM) tablet 5 mg (has no administration in time range)   sucralfate (CARAFATE) tablet 1 g (1 g Oral Given 7/11/24 0621)   pantoprazole (PROTONIX) injection 40 mg (40 mg Intravenous Given 7/11/24 0620)   oxyCODONE (ROXICODONE) immediate release tablet 10 mg (10 mg Oral Given 7/11/24 0620)   diltiazem (CARDIZEM) tablet 30 mg (30 mg Oral Given 7/11/24 0700)   iohexol (OMNIPAQUE) 350 MG/ML injection (MULTI-DOSE) 100 mL (100 mL Intravenous Given 7/11/24 0745)       Diagnostic Studies  Results Reviewed       Procedure Component Value Units Date/Time    HS Troponin I 2hr [[id number]]  (Normal) Collected: 07/11/24 0753    Lab Status: Final result Specimen: Blood from Arm, Right Updated: 07/11/24 0823     hs TnI 2hr 12 ng/L      Delta 2hr hsTnI -1 ng/L     HS Troponin I 4hr [[id number]]     Lab Status: No result Specimen: Blood     FLU/RSV/COVID - if FLU/RSV clinically relevant [[id number]]  (Normal) Collected: 07/11/24 0548    Lab Status: Final result Specimen: Nares from Nose Updated: 07/11/24 0642     SARS-CoV-2 Negative     INFLUENZA A PCR Negative     INFLUENZA B PCR Negative     RSV PCR Negative    Narrative:      FOR PEDIATRIC PATIENTS - copy/paste COVID Guidelines URL to browser: [email or web address]    SARS-CoV-2 assay is a Nucleic Acid Amplification assay intended for the  qualitative detection of nucleic acid from SARS-CoV-2 in nasopharyngeal  swabs. Results are for the presumptive identification of SARS-CoV-2 RNA.    Positive results are indicative of infection with SARS-CoV-2, the virus  causing COVID-19, but do not rule out bacterial infection or co-infection  with other viruses. Laboratories within the [place] and its  territories are required to report all positive results to the appropriate  public health authorities. Negative results do not preclude SARS-CoV-2  infection and should not be used as the sole basis for treatment or  other  patient management decisions. Negative results must be combined with  clinical observations, patient history, and epidemiological information.  This test has not been FDA cleared or approved.    This test has been authorized by FDA under an Emergency Use Authorization  (EUA). This test is only authorized for the duration of time the  declaration that circumstances exist justifying the authorization of the  emergency use of an in vitro diagnostic tests for detection of SARS-CoV-2  virus and/or diagnosis of COVID-19 infection under section 564(b)(1) of  the Act, 21 U.S.C. 360bbb-3(b)(1), unless the authorization is terminated  or revoked sooner. The test has been validated but independent review by FDA  and CLIA is pending.    Test performed using [employer]: This RT-PCR assay targets N2,  a region unique to SARS-CoV-2. A conserved region in the E-gene was chosen  for pan-Sarbecovirus detection which includes SARS-CoV-2.    According to CMS-2020-01-R, this platform meets the definition of high-throughput technology.    HS Troponin 0hr (reflex protocol) [[id number]]  (Normal) Collected: 07/11/24 0548    Lab Status: Final result Specimen: Blood from Arm, Right Updated: 07/11/24 0629     hs TnI 0hr 13 ng/L     Comprehensive metabolic panel [[id number]]  (Abnormal) Collected: 07/11/24 0548    Lab Status: Final result Specimen: Blood from Arm, Right Updated: 07/11/24 0623     Sodium 144 mmol/L      Potassium 4.2 mmol/L      Chloride 109 mmol/L      CO2 27 mmol/L      ANION GAP 8 mmol/L      BUN 14 mg/dL      Creatinine 1.28 mg/dL      Glucose 85 mg/dL      Calcium 9.5 mg/dL      AST 16 U/L      ALT 14 U/L      Alkaline Phosphatase 95 U/L      Total Protein 7.3 g/dL      Albumin 4.3 g/dL      Total Bilirubin 0.34 mg/dL      eGFR 62 ml/min/1.73sq m     Narrative:      National Kidney Disease Foundation guidelines for Chronic Kidney Disease (CKD):     Stage 1 with normal or high GFR (GFR > 90 mL/min/1.73 square  meters)    Stage 2 Mild CKD (GFR = 60-89 mL/min/1.73 square meters)    Stage 3A Moderate CKD (GFR = 45-59 mL/min/1.73 square meters)    Stage 3B Moderate CKD (GFR = 30-44 mL/min/1.73 square meters)    Stage 4 Severe CKD (GFR = 15-29 mL/min/1.73 square meters)    Stage 5 End Stage CKD (GFR <15 mL/min/1.73 square meters)  Note: GFR calculation is accurate only with a steady state creatinine    Lipase [[id number]]  (Abnormal) Collected: 07/11/24 0548    Lab Status: Final result Specimen: Blood from Arm, Right Updated: 07/11/24 0623     Lipase 136 u/L     CBC and differential [[id number]]  (Abnormal) Collected: 07/11/24 0548    Lab Status: Final result Specimen: Blood from Arm, Right Updated: 07/11/24 0601     WBC 13.66 Thousand/uL      RBC 5.70 Million/uL      Hemoglobin 16.5 g/dL      Hematocrit 51.3 %      MCV 90 fL      MCH 28.9 pg      MCHC 32.2 g/dL      RDW 15.0 %      MPV 10.1 fL      Platelets 366 Thousands/uL      nRBC 0 /100 WBCs      Segmented % 65 %      Immature Grans % 1 %      Lymphocytes % 23 %      Monocytes % 7 %      Eosinophils Relative 3 %      Basophils Relative 1 %      Absolute Neutrophils 9.01 Thousands/µL      Absolute Immature Grans 0.07 Thousand/uL      Absolute Lymphocytes 3.09 Thousands/µL      Absolute Monocytes 0.95 Thousand/µL      Eosinophils Absolute 0.46 Thousand/µL      Basophils Absolute 0.08 Thousands/µL                    CTA dissection protocol chest/abdomen/pelvis   Final Result by Dalton Marques MD (07/11 0825)      No aortic dissection or intramural hematoma. Approximately 5 mm density along the left anterior ascending aortic wall projecting into the lumen with thin stalk, likely a small pedunculated noncalcified plaque/clot.      No additional acute abnormality in the chest, abdomen or pelvis.         Workstation performed: [id number]         XR chest 2 views   ED Interpretation by Hayley Jane MD (07/11 0634)   Per my interpretation: No acute cardiopulmonary exam       Final Result by Steven Montgomery DO (07/11 0655)      No acute cardiopulmonary disease is seen.            Workstation performed: [id number]               Procedures  ECG 12 Lead Documentation Only    Date/Time: 7/11/2024 5:50 AM    Performed by: Hayley Jane MD  Authorized by: Hayley Jane MD    Indications / Diagnosis:  CP, dizziness, shortness of breath  Patient location:  ED  Previous ECG:     Previous ECG:  Compared to current    Comparison ECG info:  2/20/2024    Similarity:  No change  Interpretation:     Interpretation: normal    Quality:     Tracing quality:  Limited by artifact  Rate:     ECG rate:  85    ECG rate assessment: normal    Rhythm:     Rhythm: sinus rhythm    Ectopy:     Ectopy: none    QRS:     QRS axis:  Normal    QRS intervals:  Normal  Conduction:     Conduction: normal    ST segments:     ST segments:  Non-specific  T waves:     T waves: non-specific    Comments:      QTc 423        ED Course  ED Course as of 07/11/24 0849   Thu Jul 11, 2024   0546 Seen and evaluated at bedside.   0551 PDMP reviewed  06/24/2024 05/17/2024 1 Oxycodone Hcl (Ir) 10 Mg Tab 120.00 30 [place] [id number] [facility] ([id number]) 0 60.00 [place]ME [place]  06/24/2024 05/17/2024 1 Diazepam 5 Mg Tablet 45.00 22 [place] [id number] [facility] ([id number]) 0 1.02 [place]  06/17/2024 06/17/2024 1 Oxycodone Hcl (Ir) 10 Mg Tab 30.00 7 [place] [id number] [facility] ([id number]) 0 64.29 [place]   0552 Blood Pressure(!): 173/118   0552 SpO2: 99 %   0552 Respirations(!): 28   0552 Pulse: 83   0552 Temperature: 98 °F (36.7 °C)   0608 WBC(!): 13.66   0608 Hemoglobin: 16.5   0608 Platelet Count: 366   0631 Comprehensive metabolic panel(!)  Normal electrolytes, slightly elevated but at baseline, normal liver function.   0632 LIPASE(!): 136  Slightly elevated, not at level where fully suspect pancreatitis   0633 hs TnI 0hr: 13  Slightly elevated, will need 2 hr   0643 FLU/RSV/COVID - if FLU/RSV clinically relevant  Normal   0658 CTA dissection  protocol chest/abdomen/pelvis  Will ordered Dissection study as patient is still complaining of pressure and facial tingling   0700 Given patient's history, and presenting symptoms    0722 Blood Pressure(!): 168/127   0818 Spoke with patient, he is willing to be admitted given ongoing pain. Will reach out to [facility] when CT is read.   0824 Delta 2hr hsTnI: -1  Unliekly to be ACS   0829 CTA dissection protocol chest/abdomen/pelvis  IMPRESSION:     No aortic dissection or intramural hematoma. Approximately 5 mm density along the left anterior ascending aortic wall projecting into the lumen with thin stalk, likely a small pedunculated noncalcified plaque/clot.     No additional acute abnormality in the chest, abdomen or pelvis.             HEART Risk Score      Flowsheet Row Most Recent Value   Heart Score Risk Calculator    History 2 Filed at: 07/11/2024 0647   ECG 0 Filed at: 07/11/2024 0647   Age 1 Filed at: 07/11/2024 0647   Risk Factors 2 Filed at: 07/11/2024 0647   Troponin 1 Filed at: 07/11/2024 0647   HEART Score 6 Filed at: 07/11/2024 0647                        SBIRT 20yo+      Flowsheet Row Most Recent Value   Initial Alcohol Screen: US AUDIT-C     1. How often do you have a drink containing alcohol? 0 Filed at: 07/11/2024 0533   2. How many drinks containing alcohol do you have on a typical day you are drinking?  0 Filed at: 07/11/2024 0533   3a. Male UNDER 65: How often do you have five or more drinks on one occasion? 0 Filed at: 07/11/2024 0533   Audit-C Score 0 Filed at: 07/11/2024 0533   RENA: How many times in the past year have you...    Used an illegal drug or used a prescription medication for non-medical reasons? Never Filed at: 07/11/2024 0533                  Medical Decision Making  Yefri Bennett is a 53 y.o. male presenting with chest pain. Associated symptoms: Breath, dizziness, diaphoresis, facial numbness. Vitals remarkable for hypertension. Exam remarkable for ill appearing, tachypenic, exam  otherwise unremarkable.      DDX including but not limited to: ACS, MI, PTX, pneumonia, dissection, pleurisy, pericarditis, myocarditis, rhabdomyolysis, GI etiology.     Based on results available while the patient was in the ED:  Patient was given Carafate, Protonix.  Valium, oxycodone, diltiazem.   On reevaluation patient was not significantly improved with his symptoms.  Continues to have chest pressure.  Troponin was 13, ECG without acute ischemic changes, chest x-ray without acute cardiopulmonary disease, heart score 6.    Lab work otherwise remarkable for slight leukocytosis of 13.16, however patient seems to have elevated leukocytosis at baseline,     Obtained CT dissection study which thankfully did not show acute pathology.     See ED course for further updates.    Based on these results and H&P, plan to admit for further monitoring of his chest pain given ongoing symptoms and risk factors.    Discussed all results with patient including lab work, imaging, and evaluation.  All questions answered.  Patient is agreeable to discharge. Initial ED assessment. After evaluation and workup in the emergency department Discussed patient's case with [facility] regarding admission who accepted the patient for further evaluation and management under Dr. Landon (PARESH).    Amount and/or Complexity of Data Reviewed  External Data Reviewed: labs, radiology, ECG and notes.  Labs: ordered. Decision-making details documented in ED Course.  Radiology: ordered and independent interpretation performed. Decision-making details documented in ED Course.  ECG/medicine tests: ordered and independent interpretation performed.    Risk  Prescription drug management.  Decision regarding hospitalization.          Disposition  Final diagnoses:   Chest pain   Shortness of breath   Elevated blood pressure reading     Time reflects when diagnosis was documented in both MDM as applicable and the Disposition within this note       Time User Action Codes  Description Comment    7/11/2024  8:22 AM Hayley Jane Add [R07.9] Chest pain     7/11/2024  8:22 AM Hayley Jane Add [R06.02] Shortness of breath     7/11/2024  8:22 AM Hayley Jane [R03.0] Elevated blood pressure reading           ED Disposition       ED Disposition   Admit    Condition   Stable    Date/Time   u Jul 11, 2024 0788    Comment   Case was discussed with PAR[facility] and the patient's admission status was agreed to be Admission Status: observation status to the service of Dr. Landon .               Follow-up Information    None         Patient's Medications   Discharge Prescriptions    No medications on file     No discharge procedures on file.    PDMP Review         Value Time User    PDMP Reviewed  Yes 6/26/2024  4:20 PM Sharon Clemente MD             ED Provider  Attending physically available and evaluated Yefri Bennett. I managed the patient along with the ED Attending.    Electronically Signed by           Hayley Jane MD  07/11/24 0881

## 2024-07-12 ENCOUNTER — TELEPHONE (OUTPATIENT)
Age: 54
End: 2024-07-12

## 2024-07-12 PROBLEM — J40 BRONCHITIS: Status: ACTIVE | Noted: 2024-07-12

## 2024-07-12 PROBLEM — Z87.442 HISTORY OF KIDNEY STONES: Status: ACTIVE | Noted: 2024-07-12

## 2024-07-12 LAB
ANION GAP SERPL CALCULATED.3IONS-SCNC: 6 MMOL/L (ref 4–13)
ATRIAL RATE: 55 BPM
ATRIAL RATE: 60 BPM
BASOPHILS # BLD AUTO: 0.08 THOUSANDS/ÂΜL (ref 0–0.1)
BASOPHILS NFR BLD AUTO: 1 % (ref 0–1)
BUN SERPL-MCNC: 17 MG/DL (ref 5–25)
CALCIUM SERPL-MCNC: 9 MG/DL (ref 8.4–10.2)
CARDIAC TROPONIN I PNL SERPL HS: 11 NG/L
CHLORIDE SERPL-SCNC: 106 MMOL/L (ref 96–108)
CO2 SERPL-SCNC: 28 MMOL/L (ref 21–32)
CREAT SERPL-MCNC: 1.17 MG/DL (ref 0.6–1.3)
EOSINOPHIL # BLD AUTO: 0.43 THOUSAND/ÂΜL (ref 0–0.61)
EOSINOPHIL NFR BLD AUTO: 3 % (ref 0–6)
ERYTHROCYTE [DISTWIDTH] IN BLOOD BY AUTOMATED COUNT: 14.6 % (ref 11.6–15.1)
GFR SERPL CREATININE-BSD FRML MDRD: 69 ML/MIN/1.73SQ M
GLUCOSE P FAST SERPL-MCNC: 102 MG/DL (ref 65–99)
GLUCOSE SERPL-MCNC: 102 MG/DL (ref 65–140)
HCT VFR BLD AUTO: 47.6 % (ref 36.5–49.3)
HGB BLD-MCNC: 15.4 G/DL (ref 12–17)
IMM GRANULOCYTES # BLD AUTO: 0.08 THOUSAND/UL (ref 0–0.2)
IMM GRANULOCYTES NFR BLD AUTO: 1 % (ref 0–2)
LYMPHOCYTES # BLD AUTO: 2.88 THOUSANDS/ÂΜL (ref 0.6–4.47)
LYMPHOCYTES NFR BLD AUTO: 21 % (ref 14–44)
MCH RBC QN AUTO: 29.1 PG (ref 26.8–34.3)
MCHC RBC AUTO-ENTMCNC: 32.4 G/DL (ref 31.4–37.4)
MCV RBC AUTO: 90 FL (ref 82–98)
MONOCYTES # BLD AUTO: 1.09 THOUSAND/ÂΜL (ref 0.17–1.22)
MONOCYTES NFR BLD AUTO: 8 % (ref 4–12)
NEUTROPHILS # BLD AUTO: 9.43 THOUSANDS/ÂΜL (ref 1.85–7.62)
NEUTS SEG NFR BLD AUTO: 66 % (ref 43–75)
NRBC BLD AUTO-RTO: 0 /100 WBCS
P AXIS: 63 DEGREES
P AXIS: 64 DEGREES
PLATELET # BLD AUTO: 327 THOUSANDS/UL (ref 149–390)
PMV BLD AUTO: 10 FL (ref 8.9–12.7)
POTASSIUM SERPL-SCNC: 4.5 MMOL/L (ref 3.5–5.3)
PR INTERVAL: 158 MS
PR INTERVAL: 160 MS
PROCALCITONIN SERPL-MCNC: 0.06 NG/ML
QRS AXIS: 78 DEGREES
QRS AXIS: 78 DEGREES
QRSD INTERVAL: 90 MS
QRSD INTERVAL: 92 MS
QT INTERVAL: 396 MS
QT INTERVAL: 404 MS
QTC INTERVAL: 386 MS
QTC INTERVAL: 396 MS
RBC # BLD AUTO: 5.3 MILLION/UL (ref 3.88–5.62)
SODIUM SERPL-SCNC: 140 MMOL/L (ref 135–147)
T WAVE AXIS: 69 DEGREES
T WAVE AXIS: 78 DEGREES
VENTRICULAR RATE: 55 BPM
VENTRICULAR RATE: 60 BPM
WBC # BLD AUTO: 13.99 THOUSAND/UL (ref 4.31–10.16)

## 2024-07-12 PROCEDURE — 80048 BASIC METABOLIC PNL TOTAL CA: CPT

## 2024-07-12 PROCEDURE — 84145 PROCALCITONIN (PCT): CPT

## 2024-07-12 PROCEDURE — 85025 COMPLETE CBC W/AUTO DIFF WBC: CPT

## 2024-07-12 PROCEDURE — 93010 ELECTROCARDIOGRAM REPORT: CPT | Performed by: INTERNAL MEDICINE

## 2024-07-12 PROCEDURE — 99232 SBSQ HOSP IP/OBS MODERATE 35: CPT | Performed by: INTERNAL MEDICINE

## 2024-07-12 RX ORDER — ONDANSETRON 2 MG/ML
4 INJECTION INTRAMUSCULAR; INTRAVENOUS EVERY 6 HOURS PRN
Status: DISCONTINUED | OUTPATIENT
Start: 2024-07-12 | End: 2024-07-13 | Stop reason: HOSPADM

## 2024-07-12 RX ORDER — AZITHROMYCIN 250 MG/1
500 TABLET, FILM COATED ORAL EVERY 24 HOURS
Status: DISCONTINUED | OUTPATIENT
Start: 2024-07-12 | End: 2024-07-13 | Stop reason: HOSPADM

## 2024-07-12 RX ORDER — METHOCARBAMOL 500 MG/1
500 TABLET, FILM COATED ORAL EVERY 6 HOURS PRN
Status: DISCONTINUED | OUTPATIENT
Start: 2024-07-12 | End: 2024-07-13 | Stop reason: HOSPADM

## 2024-07-12 RX ORDER — DIAZEPAM 5 MG/1
5 TABLET ORAL EVERY 6 HOURS PRN
Status: DISCONTINUED | OUTPATIENT
Start: 2024-07-12 | End: 2024-07-13 | Stop reason: HOSPADM

## 2024-07-12 RX ORDER — LIDOCAINE 50 MG/G
1 PATCH TOPICAL DAILY
Status: DISCONTINUED | OUTPATIENT
Start: 2024-07-12 | End: 2024-07-13 | Stop reason: HOSPADM

## 2024-07-12 RX ORDER — HYDRALAZINE HYDROCHLORIDE 20 MG/ML
5 INJECTION INTRAMUSCULAR; INTRAVENOUS EVERY 6 HOURS PRN
Status: DISCONTINUED | OUTPATIENT
Start: 2024-07-12 | End: 2024-07-12

## 2024-07-12 RX ORDER — GUAIFENESIN 600 MG/1
600 TABLET, EXTENDED RELEASE ORAL EVERY 12 HOURS SCHEDULED
Status: DISCONTINUED | OUTPATIENT
Start: 2024-07-12 | End: 2024-07-13 | Stop reason: HOSPADM

## 2024-07-12 RX ORDER — HYDRALAZINE HYDROCHLORIDE 20 MG/ML
5 INJECTION INTRAMUSCULAR; INTRAVENOUS ONCE
Status: COMPLETED | OUTPATIENT
Start: 2024-07-12 | End: 2024-07-12

## 2024-07-12 RX ORDER — DILTIAZEM HYDROCHLORIDE 60 MG/1
60 CAPSULE, EXTENDED RELEASE ORAL EVERY 12 HOURS SCHEDULED
Status: DISCONTINUED | OUTPATIENT
Start: 2024-07-12 | End: 2024-07-13 | Stop reason: HOSPADM

## 2024-07-12 RX ORDER — LABETALOL HYDROCHLORIDE 5 MG/ML
10 INJECTION, SOLUTION INTRAVENOUS ONCE
Status: DISCONTINUED | OUTPATIENT
Start: 2024-07-12 | End: 2024-07-12

## 2024-07-12 RX ORDER — ACETAMINOPHEN 325 MG/1
975 TABLET ORAL DAILY
Status: DISCONTINUED | OUTPATIENT
Start: 2024-07-12 | End: 2024-07-13 | Stop reason: HOSPADM

## 2024-07-12 RX ADMIN — DILTIAZEM HYDROCHLORIDE 60 MG: 60 CAPSULE, EXTENDED RELEASE ORAL at 21:48

## 2024-07-12 RX ADMIN — PANTOPRAZOLE SODIUM 40 MG: 40 TABLET, DELAYED RELEASE ORAL at 07:56

## 2024-07-12 RX ADMIN — OXYCODONE HYDROCHLORIDE 10 MG: 10 TABLET ORAL at 07:56

## 2024-07-12 RX ADMIN — AZITHROMYCIN 500 MG: 250 TABLET, FILM COATED ORAL at 11:48

## 2024-07-12 RX ADMIN — GUAIFENESIN 600 MG: 600 TABLET ORAL at 11:48

## 2024-07-12 RX ADMIN — ALBUTEROL SULFATE 2 PUFF: 90 AEROSOL, METERED RESPIRATORY (INHALATION) at 07:27

## 2024-07-12 RX ADMIN — ENOXAPARIN SODIUM 40 MG: 40 INJECTION SUBCUTANEOUS at 09:14

## 2024-07-12 RX ADMIN — DIAZEPAM 5 MG: 5 TABLET ORAL at 22:57

## 2024-07-12 RX ADMIN — GUAIFENESIN 600 MG: 600 TABLET ORAL at 21:48

## 2024-07-12 RX ADMIN — ACETAMINOPHEN 975 MG: 325 TABLET, FILM COATED ORAL at 11:48

## 2024-07-12 RX ADMIN — METOPROLOL SUCCINATE 25 MG: 25 TABLET, EXTENDED RELEASE ORAL at 09:12

## 2024-07-12 RX ADMIN — SERTRALINE HYDROCHLORIDE 25 MG: 25 TABLET ORAL at 09:12

## 2024-07-12 RX ADMIN — ONDANSETRON 4 MG: 2 INJECTION INTRAMUSCULAR; INTRAVENOUS at 13:48

## 2024-07-12 RX ADMIN — PANTOPRAZOLE SODIUM 40 MG: 40 TABLET, DELAYED RELEASE ORAL at 18:48

## 2024-07-12 RX ADMIN — OXYCODONE HYDROCHLORIDE 10 MG: 10 TABLET ORAL at 21:49

## 2024-07-12 RX ADMIN — HYDRALAZINE HYDROCHLORIDE 5 MG: 20 INJECTION, SOLUTION INTRAMUSCULAR; INTRAVENOUS at 23:02

## 2024-07-12 RX ADMIN — DIAZEPAM 2 MG: 2 TABLET ORAL at 09:12

## 2024-07-12 RX ADMIN — NICOTINE 1 PATCH: 21 PATCH, EXTENDED RELEASE TRANSDERMAL at 09:13

## 2024-07-12 NOTE — ASSESSMENT & PLAN NOTE
Assessment  Past medical history of chronic bronchitis on chart review.  Patient is known smoker 1 pack/day for 25 years.  History of chronic productive cough.   On admission productive sputum yellow.  Endorse that coughing causes shortness of breath and on and off fevers with chills  Marked coughing during physical exam is debilitating for patient.   On physical exam lungs were clear to auscultation bilaterally.  Labs on admission WBC of 13.66 on admission consistent with patient's history of neutrophilic lymphocytosis secondary to ssmoking history  and underlying chronic pain with several surgeries and retained implants.     Plan  Started azithromycin 500 mg for 5 days.   Mucinex 600 mg every 12 hours  Acetaminophen 975 mg oral PRN for fever and chills  Sputum sample collection for culture and Gram stain.  Trend procalcitonin to rule out pneumonia  Can monitor but do not trend the elevated white blood cell

## 2024-07-12 NOTE — QUICK NOTE
Received communication that patient was complaining of chest pain.  Went to evaluate patient bedside.  Patient vitals were stable no tachycardia or tachypnea.  Patient had complaints of a 6 out of 10 substernal chest pain, with radiation to the neck, reproducible on palpation.  He endorsed shortness of breath, with a slightly productive cough, which has been ongoing for past few days.  No nausea/vomiting.  EKG was done at bedside and showed normal sinus rhythm.  He did complain of chills but his temperature was 98.4.CXR on admission clear.  Patient usually takes 10 IR oxycodone tablets for pain relief however he was not on his home regimen in the hospital.  Might be having chills due to slight withdrawal adjusted his oxycodone dosage to home regimen.  Will follow-up troponin levels.

## 2024-07-12 NOTE — PROGRESS NOTES
Progress Note  Name: Yefri Bennett I  MRN: [id number]  Unit/Bed#: S -01 I Date of Admission: 7/11/2024   Date of Service: 7/12/2024 I Hospital Day: 0    Assessment & Plan   * Chest pain: Secondary to MSK versus anxiety vs esophageal dysmotility disorder  Assessment & Plan  Patient presents to ED after being awaken overnight from sharp stabbing chest pain radiating to his right shoulder and neck.   Patient's pain is reproducible upon palpation and worsens with coughing.   Patient associates pain with diaphoresis, palpitations, shortness of breath  Patient has history of anxiety which has been exacerbated for the past 1 month due to his separation from his fiancée  In the ED patient received a dose of diltiazem 30 Mg along with Valium.  The combination of which improved his blood pressure and anxiety   Patient requested to get oxycodone and Valium at the same time because he takes them at the same time for home medications.  Educated patient on risks of respiratory depression.  Negative troponins and normal sinus rhythm EKG. DERIC score of 0.   CTA: No aortic dissection or intramural hematoma.    Chart review: Esophageal dysmotility/spastic distal esophagus. His manometry study done in 2018 had revealed small hiatal hernia and hypercontractile disorder.  History of noncompliance to diltiazem.    Plan  Increased space diazepam 60 mg every 12 hours 2 degrees blood pressure secondary to anxiety.  Educated patient on importance of taking this medication.  Pantoprazole 40 mgs twice daily   Robaxin 500 mg oral to manage MSK pain  Lidocaine 1 patch topical PRN for MSK pain  Follow management for anxiety as listed above to help mitigate chest pain.  Schedule outpatient stress test.  Removed patient from telemetry.   In terms of internal medicine standpoint expecting discharge tomorrow    Anxiety  Assessment & Plan  Patient has hx of chronic anxiety for at least 10 years attributed to history of multiple surgeries for  back and kidney stones.    Anxiety has been exacerbated in past month due to separation from fiance'. Endorses trouble sleeping, waking up with palpitations and diaphoresis   Patient also complains of decreased in appetite with unintentional weight loss  In the ED patient received a dose of diltiazem 30 Mg along with Valium.  The combination of which improved his blood pressure and anxiety   Patient requested to get oxycodone and Valium at the same time because he takes them at the same time for home medications.  Educated patient on risks of respiratory depression.  Plan  Increased Valium dosage to 5 mg as per home medication dosage.    Patient is agreeable to take oxycodone and Valium 1 hour apart.  Started the patient on Zoloft.  Educated how patient must be compliant and we will take at least 4 weeks to see the effect on managing anxiety.  Increased Valium dosage to 5 mg as per previous primary care physician documentation.    Chronic bronchitis  Assessment & Plan  Assessment  Past medical history of chronic bronchitis on chart review.  Patient is known smoker 1 pack/day for 25 years.  History of chronic productive cough.   On admission productive sputum yellow.  Endorse that coughing causes shortness of breath and on and off fevers with chills  Marked coughing during physical exam is debilitating for patient.   On physical exam lungs were clear to auscultation bilaterally.  Labs on admission WBC of 13.66 on admission consistent with patient's history of neutrophilic lymphocytosis secondary to ssmoking history  and underlying chronic pain with several surgeries and retained implants.     Plan  Started azithromycin 500 mg for 5 days.   Mucinex 600 mg every 12 hours  Acetaminophen 975 mg oral PRN for fever and chills  Sputum sample  Trend procalcitonin to rule out pneumonia  Can monitor but do not trend the elevated white blood cell    Hypertension  Assessment & Plan  Patient has hx of HTN, does not take  medications  Blood pressure improved to diltiazem (Cardizem) 60 mg tablet in the ED.  Patient was previously prescribed metoprolol succinate 25 mg daily by primary care provider 6 months ago.  Primary care provider stopped it because it was causing patient anxiety, palpitations, and tremors.    Plan  Discontinue Toprol   Continue Diltiazem 60 mg tablet   Monitor blood pressure    History of kidney stones  Assessment & Plan  Patient has history of recurrent kidney stones bilaterally that required surgeries.   During history taking on 712 patient reported that he is having decreased urinary flow some dribbling.  Patient denies seeing blood in urine.  On physical exam there was no abdominal tenderness.  Casillas's punch bilaterally did not elicit tenderness.    Plan   Monitor pain for symptoms of dysuria, urinary retention,  Ordered bladder scan  Follow-up outpatient    Asthma  Assessment & Plan  Continue albuterol inhaler PRN Q6  VTE Pharmacologic Prophylaxis: VTE Score: 3 Moderate Risk (Score 3-4) - Pharmacological DVT Prophylaxis Ordered: enoxaparin (Lovenox).    Mobility:   Basic Mobility Inpatient Raw Score: 24  JH-HLM Goal: 8: Walk 250 feet or more  JH-HLM Achieved: 8: Walk 250 feet ot more  JH-HLM Goal achieved. Continue to encourage appropriate mobility.    Patient Centered Rounds: I performed bedside rounds with nursing staff today.   Discussions with Specialists or Other Care Team Provider: none    Education and Discussions with Family / Patient: Updated  (mother) at bedside.    Total Time Spent on Date of Encounter in care of patient: 60 mins. This time was spent on one or more of the following: performing physical exam; counseling and coordination of care; obtaining or reviewing history; documenting in the medical record; reviewing/ordering tests, medications or procedures; communicating with other healthcare professionals and discussing with patient's family/caregivers.    Current Length of  Stay: 0 day(s)  Current Patient Status: Observation   Certification Statement: The patient will continue to require additional inpatient hospital stay due to chest pain, anxiety, and hypertension management.  Discharge Plan: Anticipate discharge tomorrow to home.    Code Status: Level 1 - Full Code    Subjective:     Night team was called to see this patient who was reporting  chest pain. Patient vitals were stable no tachycardia or tachypnea.  Patient had complaints of a 6 out of 10 substernal chest pain, with radiation to the neck, reproducible on palpation.  He endorsed shortness of breath, with a slightly productive cough, which has been ongoing for past few days.  No nausea/vomiting.  EKG was done at bedside and showed normal sinus rhythm.  He did complain of chills but his temperature was 98.4.  Patient usually takes 10 IR oxycodone tablets for pain relief however he was not on his home regimen in the hospital.  Night team suspected might be having chills due to slight withdrawal adjusted his oxycodone dosage to home regimen.      Patient was examined at bedside.  On inspection patient was quite diaphoretic was getting labs drawn and was feeling anxious.  Reports on and off fevers and chills.  Requested if he could shower because of all the profuse sweating.  Reports coughing exacerbates chest pain and shortness of breath.  Continues to report some nausea and requested some medication for it.  Review of systems patient also reported some lower back pain and urinary dribbling.  Patient denies hematuria.  Patient had just received oxycodone 10 mg.  Patient requested to get Valium and oxycodone together.  Educated patient on the risks of respiratory depression when taking combination of medications.   Patient also denies headache changes in vision  vomiting, wheezing, abdominal pain, or lower extremity swelling.    Objective:     Vitals:   Temp (24hrs), Av.3 °F (36.8 °C), Min:98.1 °F (36.7 °C), Max:98.5 °F  (36.9 °C)    Temp:  [98.1 °F (36.7 °C)-98.5 °F (36.9 °C)] 98.1 °F (36.7 °C)  HR:  [56-64] 62  Resp:  [17-20] 17  BP: (142-170)/() 169/105  SpO2:  [95 %-99 %] 99 %  There is no height or weight on file to calculate BMI.     Input and Output Summary (last 24 hours):     Intake/Output Summary (Last 24 hours) at 7/12/2024 1430  Last data filed at 7/12/2024 1344  Gross per 24 hour   Intake 960 ml   Output 300 ml   Net 660 ml       Physical Exam:   Physical Exam  Constitutional:       Appearance: He is ill-appearing and diaphoretic.   HENT:      Head: Normocephalic and atraumatic.      Mouth/Throat:      Mouth: Mucous membranes are moist.   Eyes:      Pupils: Pupils are equal, round, and reactive to light.   Cardiovascular:      Rate and Rhythm: Regular rhythm. Tachycardia present.      Pulses: Normal pulses.      Heart sounds: Normal heart sounds.   Pulmonary:      Breath sounds: Normal breath sounds.      Comments: Tachypneic.  Abdominal:      General: Abdomen is flat. Bowel sounds are normal.      Palpations: Abdomen is soft.      Tenderness: There is no right CVA tenderness or left CVA tenderness.   Musculoskeletal:      Cervical back: Normal range of motion.   Skin:     General: Skin is warm.      Capillary Refill: Capillary refill takes less than 2 seconds.      Comments: Profuse diaphoresis noted on inspection.    Neurological:      General: No focal deficit present.      Mental Status: He is alert.   Psychiatric:      Comments: Behavior and thought process demonstrated anxiety. overall is very pleasant     Review of systems as per subjective note above.    Additional Data:     Labs:  Results from last 7 days   Lab Units 07/12/24  0829   WBC Thousand/uL 13.99*   HEMOGLOBIN g/dL 15.4   HEMATOCRIT % 47.6   PLATELETS Thousands/uL 327   SEGS PCT % 66   LYMPHO PCT % 21   MONO PCT % 8   EOS PCT % 3     Results from last 7 days   Lab Units 07/12/24  0829 07/11/24  0548   SODIUM mmol/L 140 144   POTASSIUM mmol/L 4.5  4.2   CHLORIDE mmol/L 106 109*   CO2 mmol/L 28 27   BUN mg/dL 17 14   CREATININE mg/dL 1.17 1.28   ANION GAP mmol/L 6 8   CALCIUM mg/dL 9.0 9.5   ALBUMIN g/dL  --  4.3   TOTAL BILIRUBIN mg/dL  --  0.34   ALK PHOS U/L  --  95   ALT U/L  --  14   AST U/L  --  16   GLUCOSE RANDOM mg/dL 102 85                 Results from last 7 days   Lab Units 07/12/24  0829   PROCALCITONIN ng/ml 0.06       Lines/Drains:  Invasive Devices       Peripheral Intravenous Line  Duration             Peripheral IV 07/11/24 Right;Ventral (anterior) Forearm 1 day                      Imaging: Personally reviewed the following imaging: abdominal/pelvic CT and xray(s)    Recent Cultures (last 7 days):         Last 24 Hours Medication List:   Current Facility-Administered Medications   Medication Dose Route Frequency Provider Last Rate    acetaminophen  975 mg Oral Daily Re Turner MD      albuterol  2 puff Inhalation Q6H PRN Betty Ryder MD      azithromycin  500 mg Oral Q24H Re Turner MD      diazepam  5 mg Oral Q6H PRN Re Turner MD      diltiazem  60 mg Oral Q12H [facility] Re Turner MD      enoxaparin  40 mg Subcutaneous Daily Betty Ryder MD      fluticasone-vilanterol  1 puff Inhalation Daily Betty Ryder MD      guaiFENesin  600 mg Oral Q12H [facility] Re Turner MD      lidocaine  1 patch Topical Daily Re Turner MD      methocarbamol  500 mg Oral Q6H PRN Re Turner MD      nicotine  1 patch Transdermal Daily Betty Ryder MD      ondansetron  4 mg Intravenous Q6H PRN Re Turner MD      oxyCODONE  10 mg Oral Q6H PRN Sandi Ponce MD      pantoprazole  40 mg Oral BID AC Betty Ryder MD      sertraline  25 mg Oral Daily Betty Ryder MD          Today, Patient Was Seen By: Magdalena Salguero MD    **Please Note: This note may have been constructed using a voice recognition system.**[facility]

## 2024-07-12 NOTE — TELEPHONE ENCOUNTER
Patient went to ER last night for sickness, he is now admitted stating he has a shadow on his heart. He would like Dr Clemente to help try to figure out what is going on as he has not heard from anyone about his condition yet in the hospital. 10pm last night was the last time he spoke to the nurses.

## 2024-07-12 NOTE — ASSESSMENT & PLAN NOTE
Patient has hx of HTN, does not take medications  Blood pressure improved to diltiazem (Cardizem) 60 mg tablet in the ED.  Patient was previously prescribed metoprolol succinate 25 mg daily by primary care provider 6 months ago.  Primary care provider stopped it because it was causing patient anxiety, palpitations, and tremors.    Plan  Discontinue Toprol   Continue Diltiazem 60 mg tablet   Monitor blood pressure

## 2024-07-12 NOTE — PLAN OF CARE
Problem: Potential for Falls  Goal: Patient will remain free of falls  Description: INTERVENTIONS:  - Educate patient/family on patient safety including physical limitations  - Instruct patient to call for assistance with activity   - Consult OT/PT to assist with strengthening/mobility   - Keep Call bell within reach  - Keep bed low and locked with side rails adjusted as appropriate  - Keep care items and personal belongings within reach  - Initiate and maintain comfort rounds  - Make Fall Risk Sign visible to staff  - Offer Toileting every 2 Hours, in advance of need  - Initiate/Maintain alarm  - Obtain necessary fall risk management equipment:   - Apply yellow socks and bracelet for high fall risk patients  - Consider moving patient to room near nurses station  Outcome: Progressing      [place]

## 2024-07-12 NOTE — ASSESSMENT & PLAN NOTE
Patient has hx of chronic anxiety for at least 10 years attributed to history of multiple surgeries for back and kidney stones.    Anxiety has been exacerbated in past month due to separation from fiance'. Endorses trouble sleeping, waking up with palpitations, diaphoresis, and persistent nausea.  Patient also complains of decreased in appetite with unintentional weight loss  In the ED patient received a dose of diltiazem 30 Mg along with Valium.  The combination of which improved his blood pressure and anxiety.   Patient requested to get oxycodone and Valium at the same time because he takes them at the same time for home medications.  Educated patient on risks of respiratory depression.   Plan  Increased Valium dosage to 5 mg as per home medication dosage.    Patient is agreeable to take oxycodone and Valium 1 hour apart.  Started the patient on Zoloft.  Educated how patient must be compliant and we will take at least 4 weeks to see the effect on managing anxiety.  Increased Valium dosage to 5 mg as per previous primary care physician documentation.  Zofran for nausea as needed.

## 2024-07-12 NOTE — UTILIZATION REVIEW
"Initial Clinical Review    Admission: Date/Time/Statement:   Admission Orders (From admission, onward)       Ordered        07/11/24 0836  Place in Observation  Once                          Orders Placed This Encounter   Procedures    Place in Observation     Standing Status:   Standing     Number of Occurrences:   1     Order Specific Question:   Level of Care     Answer:   Med Surg [16]     ED Arrival Information       Expected   -    Arrival   7/11/2024 05:23    Acuity   Emergent              Means of arrival   Walk-In    Escorted by   -    Service   Hospitalist    Admission type   Emergency              Arrival complaint   Chest Pain, Dizziness, N/V             Chief Complaint   Patient presents with    Chest Pain     Patient reports being woken up from sleep a few hours ago with sharp chest pressure and SOB. Patient reports feeling sick \"like I'm drunk\" for past few days with HA, dizziness, and palpitations. No meds PTA       Initial Presentation: 53 y.o. male with a PMH of chronic obstructive asthma, hypertension, anxiety who presented with chest pain which occurred overnight. Pain was sharp in nature radiating to his right neck associated with diaphoresis along with numbness of the lips and mouth. Patient also complains of shortness of breath. Patient complains that he has been feeling heavy in his chest for the past 5 days along with episodes of anxiety. Patient recently had a break-up with his fiancée which occurred 1 month ago. Ever since then patient has difficulty sleeping, waking up with diaphoresis and palpitations. Patient also complains of chronic fatigue, decrease in appetite. Plan: Observation for chest pain, anxiety, HTN: Diazepam 2mg oral Q6 PRN for anxiety , telemetry, pantoprazole bid, Toprol.     7/12:    Internal medicine: Initial testing including chest x-ray troponins EKG all negative. Patient continues to complain of chest pain and is very anxious. Today he reports cough with productive " of greenish-yellow sputum. Add Toradol for pain control. Azithromycin x 3 days. Space diazepam 60 mg to q 12 hrs.     ED Triage Vitals   Temperature Pulse Respirations Blood Pressure SpO2 Pain Score   07/11/24 0535 07/11/24 0533 07/11/24 0533 07/11/24 0533 07/11/24 0533 07/11/24 0533   98 °F (36.7 °C) 83 (!) 28 (!) 173/118 99 % 8     Weight (last 2 days)       None            Vital Signs (last 3 days)       Date/Time Temp Pulse Resp BP MAP (mmHg) SpO2 O2 Device Patient Position - Orthostatic VS Estelle Coma Scale Score Pain    07/12/24 0756 -- -- -- -- -- -- -- -- -- 8    07/12/24 07:09:07 98.1 °F (36.7 °C) 62 17 169/105 126 99 % None (Room air) Lying -- --    07/11/24 2251 -- -- -- -- -- -- -- -- -- 9    07/11/24 2206 -- -- -- -- -- -- -- -- -- 8    07/11/24 22:02:51 98.4 °F (36.9 °C) 56 20 142/86 105 98 % -- -- -- --    07/11/24 2100 -- -- -- -- -- -- None (Room air) -- 15 --    07/11/24 2041 -- -- -- 170/104 -- -- None (Room air) Lying -- --    07/11/24 20:38:34 98.5 °F (36.9 °C) 64 18 -- -- 99 % -- -- -- --    07/11/24 1715 -- -- -- -- -- -- -- -- -- 8    07/11/24 1600 -- 59 -- 147/78 109 96 % -- -- -- --    07/11/24 1530 -- -- -- -- -- -- -- -- -- 6    07/11/24 1500 -- 61 -- 143/86 109 95 % -- -- -- --    07/11/24 1400 -- 60 -- 148/77 105 95 % -- -- -- --    07/11/24 1234 -- 60 18 174/103 133 100 % -- Lying -- --    07/11/24 1147 -- 62 20 159/90 118 98 % None (Room air) Lying -- --    07/11/24 0930 -- 67 20 160/102 125 98 % None (Room air) Lying -- --    07/11/24 0700 -- -- -- 168/127 -- -- -- -- -- --    07/11/24 0620 -- -- -- -- -- -- -- -- -- 8    07/11/24 0617 -- -- -- -- -- -- -- -- 15 --    07/11/24 0615 -- -- -- -- -- -- None (Room air) -- -- --    07/11/24 0535 98 °F (36.7 °C) -- -- -- -- -- -- -- -- --    07/11/24 0533 -- 83 28 173/118 142 99 % None (Room air) Sitting -- 8              Pertinent Labs/Diagnostic Test Results:   Radiology:  CTA dissection protocol chest/abdomen/pelvis   Final  Interpretation by Dalton Marques MD (07/11 0825)      No aortic dissection or intramural hematoma. Approximately 5 mm density along the left anterior ascending aortic wall projecting into the lumen with thin stalk, likely a small pedunculated noncalcified plaque/clot.      No additional acute abnormality in the chest, abdomen or pelvis.         Workstation performed: [id number]         XR chest 2 views   ED Interpretation by Hayley Jane MD (07/11 0634)   Per my interpretation: No acute cardiopulmonary exam      Final Interpretation by Steven Montgomery DO (07/11 0655)      No acute cardiopulmonary disease is seen.            Workstation performed: [id number]           Cardiology:  ECG 12 lead   Final Result by Jonathon Alcala DO (07/12 0726)   Sinus bradycardia   Otherwise normal ECG   When compared with ECG of 11-JUL-2024 22:35, (unconfirmed)   No significant change was found   Confirmed by Jonathon Alcala ([id number]) on 7/12/2024 7:26:48 AM      ECG 12 lead   Final Result by Jonathon Alcala DO (07/12 0726)    Poor data quality, interpretation may be adversely affected   Normal sinus rhythm   Normal ECG   When compared with ECG of 11-JUL-2024 05:35,   No significant change was found   Confirmed by Jonathon Alcala ([id number]) on 7/12/2024 7:26:46 AM        GI:  No orders to display       Results from last 7 days   Lab Units 07/11/24  0548   SARS-COV-2  Negative     Results from last 7 days   Lab Units 07/12/24  0829 07/11/24  1240 07/11/24  0548   WBC Thousand/uL 13.99*  --  13.66*   HEMOGLOBIN g/dL 15.4  --  16.5   HEMATOCRIT % 47.6  --  51.3*   PLATELETS Thousands/uL 327 303 366   TOTAL NEUT ABS Thousands/µL 9.43*  --  9.01*         Results from last 7 days   Lab Units 07/11/24  0548   SODIUM mmol/L 144   POTASSIUM mmol/L 4.2   CHLORIDE mmol/L 109*   CO2 mmol/L 27   ANION GAP mmol/L 8   BUN mg/dL 14   CREATININE mg/dL 1.28   EGFR ml/min/1.73sq m 62   CALCIUM mg/dL 9.5     Results from last  7 days   Lab Units 07/11/24  0548   AST U/L 16   ALT U/L 14   ALK PHOS U/L 95   TOTAL PROTEIN g/dL 7.3   ALBUMIN g/dL 4.3   TOTAL BILIRUBIN mg/dL 0.34         Results from last 7 days   Lab Units 07/11/24  0548   GLUCOSE RANDOM mg/dL 85         Results from last 7 days   Lab Units 07/11/24  2350 07/11/24  1240 07/11/24  0753 07/11/24  0548   HS TNI 0HR ng/L 11  --   --  13   HS TNI 2HR ng/L  --   --  12  --    HSTNI D2 ng/L  --   --  -1  --    HS TNI 4HR ng/L  --  14  --   --    HSTNI D4 ng/L  --  1  --   --            Results from last 7 days   Lab Units 07/11/24  0548   LIPASE u/L 136*           Results from last 7 days   Lab Units 07/11/24  0548   INFLUENZA A PCR  Negative   INFLUENZA B PCR  Negative   RSV PCR  Negative         ED Treatment-Medication Administration from 07/11/2024 0523 to 07/11/2024 2012         Date/Time Order Dose Route Action     07/11/2024 0621 sucralfate (CARAFATE) tablet 1 g 1 g Oral Given     07/11/2024 0620 pantoprazole (PROTONIX) injection 40 mg 40 mg Intravenous Given     07/11/2024 0620 oxyCODONE (ROXICODONE) immediate release tablet 10 mg 10 mg Oral Given     07/11/2024 0700 diltiazem (CARDIZEM) tablet 30 mg 30 mg Oral Given     07/11/2024 0745 iohexol (OMNIPAQUE) 350 MG/ML injection (MULTI-DOSE) 100 mL 100 mL Intravenous Given     07/11/2024 1259 fluticasone-vilanterol 200-25 mcg/actuation 1 puff 1 puff Inhalation Given     07/11/2024 1234 nicotine (NICODERM CQ) 21 mg/24 hr TD 24 hr patch 1 patch 1 patch Transdermal Medication Applied     07/11/2024 1235 enoxaparin (LOVENOX) subcutaneous injection 40 mg 40 mg Subcutaneous Given     07/11/2024 1715 diazepam (VALIUM) tablet 2 mg 2 mg Oral Given     07/11/2024 1715 acetaminophen (TYLENOL) tablet 975 mg 975 mg Oral Given     07/11/2024 1531 pantoprazole (PROTONIX) EC tablet 40 mg 40 mg Oral Given     07/11/2024 1234 metoprolol succinate (TOPROL-XL) 24 hr tablet 25 mg 25 mg Oral Given     07/11/2024 1259 sertraline (ZOLOFT) tablet 25 mg  25 mg Oral Given     07/11/2024 1715 oxyCODONE (ROXICODONE) IR tablet 5 mg 5 mg Oral Given            Past Medical History:   Diagnosis Date    Allergic     seasonal     Allergic rhinitis     Anxiety     Arthritis     Back pain     Chronic obstructive asthma     Chronic pain syndrome     Cluster headaches     Colon polyp     Coronary artery disease     Depression     Insomnia     Kidney stones     Laryngospasm     Leukocytosis     Migraine     Myocardial infarction (HCC)     Nephrolithiasis     Organic impotence     Pneumonia due to infectious organism 01/16/2019    Seasonal allergies     Sleep apnea     does not use CPAP    Stroke (HCC) 2015    TMJ (temporomandibular joint syndrome)     Wheezing     last assessed 05/19/17     Present on Admission:   Hypertension      Admitting Diagnosis: Shortness of breath [R06.02]  Chest pain [R07.9]  Elevated blood pressure reading [R03.0]  Age/Sex: 53 y.o. male  Admission Orders:  Scheduled Medications:  enoxaparin, 40 mg, Subcutaneous, Daily  fluticasone-vilanterol, 1 puff, Inhalation, Daily  metoprolol succinate, 25 mg, Oral, Daily  nicotine, 1 patch, Transdermal, Daily  pantoprazole, 40 mg, Oral, BID AC  sertraline, 25 mg, Oral, Daily      Continuous IV Infusions:     PRN Meds:  acetaminophen, 975 mg, Oral, Q6H PRN  albuterol, 2 puff, Inhalation, Q6H PRN  diazepam, 2 mg, Oral, Q6H PRN  lidocaine, 1 patch, Topical, Daily PRN  oxyCODONE, 10 mg, Oral, Q6H PRN        None    Network Utilization Review Department  ATTENTION: Please call with any questions or concerns to [phone] and carefully listen to the prompts so that you are directed to the right person. All voicemails are confidential.   For Discharge needs, contact Care Management DC Support Team at [phone] opt. 2  Send all requests for admission clinical reviews, approved or denied determinations and any other requests to dedicated fax number below belonging to the campus where the patient is receiving treatment.  List of dedicated fax numbers for the Facilities:  FACILITY NAME UR FAX NUMBER   ADMISSION DENIALS (Administrative/Medical Necessity) [phone]   DISCHARGE SUPPORT TEAM (NETWORK) [phone]   PARENT CHILD HEALTH (Maternity/NICU/Pediatrics) [phone]   [facility] [phone]   General acute hospital [phone]   [facility] [phone]   [facility] [phone]   [facility] [phone]   [facility] [phone]   [facility] [phone]   [facility] [phone]   [facility] [phone]   [facility] [phone]   [facility] [phone]   [facility] [phone]

## 2024-07-12 NOTE — ASSESSMENT & PLAN NOTE
Patient presents to ED after being awaken overnight from sharp stabbing chest pain radiating to his right shoulder and neck.   Patient's pain is reproducible upon palpation and worsens with coughing.   Patient associates pain with diaphoresis, palpitations, shortness of breath  Patient has history of anxiety which has been exacerbated for the past 1 month due to his separation from his fiancée  In the ED patient received a dose of diltiazem 30 Mg along with Valium.  The combination of which improved his blood pressure and anxiety   Patient requested to get oxycodone and Valium at the same time because he takes them at the same time for home medications.  Educated patient on risks of respiratory depression.  Negative troponins and normal sinus rhythm EKG. DERIC score of 0.   CTA: No aortic dissection or intramural hematoma.    Chart review: Esophageal dysmotility/spastic distal esophagus. His manometry study done in 2018 had revealed small hiatal hernia and hypercontractile disorder.  History of noncompliance to diltiazem.    Plan  Increased space diazepam 60 mg every 12 hours 2 degrees blood pressure secondary to anxiety.  Educated patient on importance of taking this medication.  Pantoprazole 40 mgs twice daily   Robaxin 500 mg oral to manage MSK pain  Lidocaine 1 patch topical PRN for MSK pain  Follow management for anxiety as listed above to help mitigate chest pain.  Schedule outpatient stress test.  Removed patient from telemetry.   In terms of internal medicine standpoint expecting discharge tomorrow

## 2024-07-12 NOTE — ASSESSMENT & PLAN NOTE
Patient has history of recurrent kidney stones bilaterally that required surgeries.   During history taking on 712 patient reported that he is having decreased urinary flow some dribbling.  Patient denies seeing blood in urine.  On physical exam there was no abdominal tenderness.  Casillas's punch bilaterally did not elicit tenderness.    Plan   Monitor pain for symptoms of dysuria, urinary retention,  Ordered bladder scan  Follow-up outpatient

## 2024-07-13 VITALS
HEART RATE: 51 BPM | SYSTOLIC BLOOD PRESSURE: 139 MMHG | RESPIRATION RATE: 16 BRPM | DIASTOLIC BLOOD PRESSURE: 96 MMHG | OXYGEN SATURATION: 99 % | TEMPERATURE: 98.6 F

## 2024-07-13 PROCEDURE — 99239 HOSP IP/OBS DSCHRG MGMT >30: CPT | Performed by: INTERNAL MEDICINE

## 2024-07-13 RX ORDER — AZITHROMYCIN 500 MG/1
500 TABLET, FILM COATED ORAL EVERY 24 HOURS
Qty: 1 TABLET | Refills: 0 | Status: SHIPPED | OUTPATIENT
Start: 2024-07-14 | End: 2024-07-15

## 2024-07-13 RX ORDER — SODIUM CHLORIDE 9 MG/ML
100 INJECTION, SOLUTION INTRAVENOUS ONCE
Status: CANCELLED | OUTPATIENT
Start: 2024-07-13 | End: 2024-07-13

## 2024-07-13 RX ORDER — MAGNESIUM SULFATE HEPTAHYDRATE 40 MG/ML
2 INJECTION, SOLUTION INTRAVENOUS ONCE
Status: DISCONTINUED | OUTPATIENT
Start: 2024-07-13 | End: 2024-07-13

## 2024-07-13 RX ORDER — METOCLOPRAMIDE HYDROCHLORIDE 5 MG/ML
10 INJECTION INTRAMUSCULAR; INTRAVENOUS ONCE
Status: COMPLETED | OUTPATIENT
Start: 2024-07-13 | End: 2024-07-13

## 2024-07-13 RX ORDER — SERTRALINE HYDROCHLORIDE 25 MG/1
25 TABLET, FILM COATED ORAL DAILY
Qty: 30 TABLET | Refills: 0 | Status: SHIPPED | OUTPATIENT
Start: 2024-07-14 | End: 2024-07-19 | Stop reason: SDUPTHER

## 2024-07-13 RX ORDER — SUMATRIPTAN 6 MG/.5ML
6 INJECTION, SOLUTION SUBCUTANEOUS ONCE
Status: COMPLETED | OUTPATIENT
Start: 2024-07-13 | End: 2024-07-13

## 2024-07-13 RX ORDER — BUTALBITAL, ACETAMINOPHEN AND CAFFEINE 50; 325; 40 MG/1; MG/1; MG/1
1 TABLET ORAL ONCE
Status: COMPLETED | OUTPATIENT
Start: 2024-07-13 | End: 2024-07-13

## 2024-07-13 RX ORDER — KETOROLAC TROMETHAMINE 30 MG/ML
30 INJECTION, SOLUTION INTRAMUSCULAR; INTRAVENOUS ONCE
Status: COMPLETED | OUTPATIENT
Start: 2024-07-13 | End: 2024-07-13

## 2024-07-13 RX ORDER — DIPHENHYDRAMINE HYDROCHLORIDE 50 MG/ML
25 INJECTION INTRAMUSCULAR; INTRAVENOUS ONCE
Status: COMPLETED | OUTPATIENT
Start: 2024-07-13 | End: 2024-07-13

## 2024-07-13 RX ORDER — DILTIAZEM HYDROCHLORIDE 60 MG/1
60 TABLET, FILM COATED ORAL 2 TIMES DAILY
Qty: 60 TABLET | Refills: 0 | Status: SHIPPED | OUTPATIENT
Start: 2024-07-13 | End: 2024-08-12

## 2024-07-13 RX ORDER — SUMATRIPTAN 25 MG/1
50 TABLET, FILM COATED ORAL ONCE
Status: CANCELLED | OUTPATIENT
Start: 2024-07-13 | End: 2024-07-13

## 2024-07-13 RX ORDER — MAGNESIUM SULFATE HEPTAHYDRATE 40 MG/ML
2 INJECTION, SOLUTION INTRAVENOUS ONCE
Status: COMPLETED | OUTPATIENT
Start: 2024-07-13 | End: 2024-07-13

## 2024-07-13 RX ADMIN — SERTRALINE HYDROCHLORIDE 25 MG: 25 TABLET ORAL at 08:43

## 2024-07-13 RX ADMIN — GUAIFENESIN 600 MG: 600 TABLET ORAL at 08:42

## 2024-07-13 RX ADMIN — DIPHENHYDRAMINE HYDROCHLORIDE 25 MG: 50 INJECTION, SOLUTION INTRAMUSCULAR; INTRAVENOUS at 11:39

## 2024-07-13 RX ADMIN — DIAZEPAM 5 MG: 5 TABLET ORAL at 09:58

## 2024-07-13 RX ADMIN — DILTIAZEM HYDROCHLORIDE 60 MG: 60 CAPSULE, EXTENDED RELEASE ORAL at 08:42

## 2024-07-13 RX ADMIN — ACETAMINOPHEN 975 MG: 325 TABLET, FILM COATED ORAL at 08:42

## 2024-07-13 RX ADMIN — AZITHROMYCIN 500 MG: 250 TABLET, FILM COATED ORAL at 09:54

## 2024-07-13 RX ADMIN — BUTALBITAL, ACETAMINOPHEN, AND CAFFEINE 1 TABLET: 325; 50; 40 TABLET ORAL at 11:39

## 2024-07-13 RX ADMIN — METOCLOPRAMIDE 10 MG: 5 INJECTION, SOLUTION INTRAMUSCULAR; INTRAVENOUS at 04:55

## 2024-07-13 RX ADMIN — FLUTICASONE FUROATE AND VILANTEROL TRIFENATATE 1 PUFF: 200; 25 POWDER RESPIRATORY (INHALATION) at 09:54

## 2024-07-13 RX ADMIN — OXYCODONE HYDROCHLORIDE 10 MG: 10 TABLET ORAL at 08:48

## 2024-07-13 RX ADMIN — PANTOPRAZOLE SODIUM 40 MG: 40 TABLET, DELAYED RELEASE ORAL at 08:42

## 2024-07-13 RX ADMIN — ENOXAPARIN SODIUM 40 MG: 40 INJECTION SUBCUTANEOUS at 08:42

## 2024-07-13 RX ADMIN — KETOROLAC TROMETHAMINE 30 MG: 30 INJECTION, SOLUTION INTRAMUSCULAR; INTRAVENOUS at 04:55

## 2024-07-13 RX ADMIN — DIPHENHYDRAMINE HYDROCHLORIDE 25 MG: 50 INJECTION, SOLUTION INTRAMUSCULAR; INTRAVENOUS at 05:00

## 2024-07-13 RX ADMIN — NICOTINE 1 PATCH: 21 PATCH, EXTENDED RELEASE TRANSDERMAL at 08:47

## 2024-07-13 RX ADMIN — METOCLOPRAMIDE 10 MG: 5 INJECTION, SOLUTION INTRAMUSCULAR; INTRAVENOUS at 11:40

## 2024-07-13 RX ADMIN — SUMATRIPTAN 6 MG: 6 INJECTION, SOLUTION SUBCUTANEOUS at 04:58

## 2024-07-13 RX ADMIN — MAGNESIUM SULFATE HEPTAHYDRATE 2 G: 40 INJECTION, SOLUTION INTRAVENOUS at 05:04

## 2024-07-13 RX ADMIN — KETOROLAC TROMETHAMINE 30 MG: 30 INJECTION, SOLUTION INTRAMUSCULAR; INTRAVENOUS at 11:39

## 2024-07-13 NOTE — DISCHARGE INSTR - AVS FIRST PAGE
Dear Yefri Bennett,     It was our pleasure to care for you here at [facility].  It is our hope that we were always able to exceed the expected standards for your care during your stay.  You were hospitalized due to chest pain.  You were cared for on the third floor by Magdalena Salguero MD under the service of Hosea Ochoa MD with the [facility] who covers for your primary care physician (PCP), Sharon Clemente MD, while you were hospitalized.  If you have any questions or concerns related to this hospitalization, you may contact us at .  For follow up as well as any medication refills, we recommend that you follow up with your primary care physician.  A registered nurse will reach out to you by phone within a few days after your discharge to answer any additional questions that you may have after going home.  However, at this time we provide for you here, the most important instructions / recommendations at discharge:     Notable Medication Adjustments -   We restarted your diltiazem 60 mg for your esophageal dysmotility.  Take 1 tablet two times daily.   You were started on azithromycin 500 mg for chronic bronchitis.  Take your last dose 1 tablet tomorrow on 7/14/2024  We started you on Zoloft 25 mg to help manage your anxiety.  Will take at least 4 weeks to see improvement in your anxiety.  Take 1 tablet daily starting tomorrow  Stop taking amlodipine 5 mg tablet.   Take Tylenol or ibuprofen as needed.  No other medication changes have been made.  Important follow up information -   Please follow-up with your primary care provider within 1 week of discharge.  Other Instructions -   If you feel worsening symptoms of chest pain, shortness of breathe, fever, and chills please return to the ED.   Please review this entire after visit summary as additional general instructions including medication list, appointments, activity, diet, any  pertinent wound care, and other additional recommendations from your care team that may be provided for you.    Sincerely,     Magdaelna Salguero MD

## 2024-07-13 NOTE — ASSESSMENT & PLAN NOTE
Assessment  Past medical history of chronic bronchitis on chart review.  Patient is known smoker 1 pack/day for 25 years.  History of chronic productive cough.   On admission productive sputum yellow.  Endorsed that coughing causes shortness of breath and on and off fevers with chills  Marked coughing during physical exam is debilitating for patient.   On physical exam lungs were clear to auscultation bilaterally.  Labs on admission WBC of 13.66 on admission consistent with patient's history of neutrophilic lymphocytosis secondary to ssmoking history  and underlying chronic pain with several surgeries and retained implants.     Plan  Continue azithromycin 500 mg for 1 more day.    Acetaminophen oral PRN

## 2024-07-13 NOTE — ASSESSMENT & PLAN NOTE
Patient presents to ED after being awaken overnight from sharp stabbing chest pain radiating to his right shoulder and neck.   Patient's chest pain is reproducible upon palpation and worsens with coughing.   Patient associates pain with diaphoresis, palpitations, shortness of breath  Patient has history of anxiety which has been exacerbated for the past 1 month due to his separation from his fiancée  In the ED patient received a dose of diltiazem 30 Mg along with Valium.  The combination of which improved his blood pressure and anxiety   Negative troponins and normal sinus rhythm EKG. DERIC score of 0.    No acute cardiopulmonary disease is seen on CXRAY  Chart review: Esophageal dysmotility/spastic distal esophagus. His manometry study done in 2018 had revealed small hiatal hernia and hypercontractile disorder.  History of noncompliance to diltiazem.  In terms of internal medicine standpoint expecting discharge today.    Plan  Follow management for anxiety as listed bellow to help mitigate chest pain.  Schedule outpatient stress test.

## 2024-07-13 NOTE — UTILIZATION REVIEW
SEE INITIAL REVIEW AT BOTTOM    OBSERVATION ADMISSION 7/11/2024 0836  CONVERTED TO  INPATIENT ADMISSION 7/13/2024 1524  DUE TO HEAVY CHEST FEELING/ ACUTE CHEST PAIN  Admission Orders (From admission, onward)       Ordered        07/13/24 1524  INPATIENT ADMISSION  Once            07/11/24 0836  Place in Observation  Once                          Orders Placed This Encounter   Procedures    INPATIENT ADMISSION     Standing Status:   Standing     Number of Occurrences:   1     Order Specific Question:   Level of Care     Answer:   Med Surg [16]     Order Specific Question:   Estimated length of stay     Answer:   More than 2 Midnights     Order Specific Question:   Certification     Answer:   I certify that inpatient services are medically necessary for this patient for a duration of greater than two midnights. See H&P and MD Progress Notes for additional information about the patient's course of treatment.     Continued Stay Review    Date: 7/13 Changed to Inpatient                         Assessment/Plan:   Atypical chest pain  Acute bronchitis  Anxiety  Essential hypertension, HAD Toradol for pain control. Azithromycin x 3 days. DC 7/13    Vital Signs (last 3 days)       Date/Time Temp Pulse Resp BP MAP (mmHg) SpO2 O2 Device Patient Position - Orthostatic VS [place] Coma Scale Score Pain    07/13/24 1423 -- -- -- -- -- -- -- -- -- 5    07/13/24 1139 -- -- -- -- -- -- -- -- -- 7    07/13/24 0848 -- -- -- -- -- -- -- -- -- 8    07/13/24 0842 -- -- -- 139/96 -- -- -- -- -- 8    07/13/24 0840 -- -- -- -- -- -- -- -- 15 --    07/13/24 07:16:43 98.6 °F (37 °C) 51 16 147/94 112 99 % None (Room air) Lying -- --    07/13/24 0455 -- -- -- -- -- -- -- -- -- 10 - Worst Possible Pain    07/12/24 23:45:23 -- 58 -- 145/91 109 95 % -- -- -- --    07/12/24 22:33:07 98 °F (36.7 °C) 52 17 161/105 124 98 % -- -- -- --    07/12/24 2149 -- -- -- -- -- -- -- -- -- 8    07/12/24 2148 -- -- -- 154/102 -- -- -- -- -- --    07/12/24 15:20:26  -- 55 15 136/82 100 97 % None (Room air) Lying -- --    07/12/24 1148 -- -- -- -- -- -- -- -- -- 5 07/12/24 0756 -- -- -- -- -- -- -- -- -- 8 07/12/24 07:09:07 98.1 °F (36.7 °C) 62 17 169/105 126 99 % None (Room air) Lying -- --    07/11/24 2251 -- -- -- -- -- -- -- -- -- 9 07/11/24 2206 -- -- -- -- -- -- -- -- -- 8 07/11/24 22:02:51 98.4 °F (36.9 °C) 56 20 142/86 105 98 % -- -- -- --    07/11/24 2100 -- -- -- -- -- -- None (Room air) -- 15 --    07/11/24 2041 -- -- -- 170/104 -- -- None (Room air) Lying -- --    07/11/24 20:38:34 98.5 °F (36.9 °C) 64 18 -- -- 99 % -- -- -- --    07/11/24 1715 -- -- -- -- -- -- -- -- -- 8 07/11/24 1600 -- 59 -- 147/78 109 96 % -- -- -- --    07/11/24 1530 -- -- -- -- -- -- -- -- -- 6 07/11/24 1500 -- 61 -- 143/86 109 95 % -- -- -- --    07/11/24 1400 -- 60 -- 148/77 105 95 % -- -- -- --    07/11/24 1234 -- 60 18 174/103 133 100 % -- Lying -- --    07/11/24 1147 -- 62 20 159/90 118 98 % None (Room air) Lying -- --    07/11/24 0930 -- 67 20 160/102 125 98 % None (Room air) Lying -- --    07/11/24 0700 -- -- -- 168/127 -- -- -- -- -- --    07/11/24 0620 -- -- -- -- -- -- -- -- -- 8    07/11/24 0617 -- -- -- -- -- -- -- -- 15 --    07/11/24 0615 -- -- -- -- -- -- None (Room air) -- -- --    07/11/24 0535 98 °F (36.7 °C) -- -- -- -- -- -- -- -- --    07/11/24 0533 -- 83 28 173/118 142 99 % None (Room air) Sitting -- 8          Weight (last 2 days)       None              Pertinent Labs/Diagnostic Results:   Radiology:  CTA dissection protocol chest/abdomen/pelvis   Final Interpretation by Dalton Marques MD (07/11 0825)      No aortic dissection or intramural hematoma. Approximately 5 mm density along the left anterior ascending aortic wall projecting into the lumen with thin stalk, likely a small pedunculated noncalcified plaque/clot.      No additional acute abnormality in the chest, abdomen or pelvis.         Workstation performed: [id number]         XR chest 2  "views   ED Interpretation by Hayley Jane MD (07/11 0634)   Per my interpretation: No acute cardiopulmonary exam      Final Interpretation by Steven Montgomery DO (07/11 0655)      No acute cardiopulmonary disease is seen.            Workstation performed: [id number]           Cardiology:  ECG 12 lead   Final Result by Jonathon Alcala DO (07/12 0726)   Sinus bradycardia   Otherwise normal ECG   When compared with ECG of 11-JUL-2024 22:35, (unconfirmed)   No significant change was found   Confirmed by Jonathon Alcala ([id number]) on 7/12/2024 7:26:48 AM      ECG 12 lead   Final Result by Jonathon Alcala DO (07/12 0726)   * Poor data quality, interpretation may be adversely affected   Normal sinus rhythm   Normal ECG   When compared with ECG of 11-JUL-2024 05:35,   No significant change was found   Confirmed by Jonathon Alcala ([id number]) on 7/12/2024 7:26:46 AM        GI:  No orders to display       Results from last 7 days   Lab Units 07/11/24  0548   SARS-COV-2  Negative     Results from last 7 days   Lab Units 07/12/24  0829 07/11/24  1240 07/11/24  0548   WBC Thousand/uL 13.99*  --  13.66*   HEMOGLOBIN g/dL 15.4  --  16.5   HEMATOCRIT % 47.6  --  51.3*   PLATELETS Thousands/uL 327 303 366   TOTAL NEUT ABS Thousands/µL 9.43*  --  9.01*         Results from last 7 days   Lab Units 07/12/24  0829 07/11/24  0548   SODIUM mmol/L 140 144   POTASSIUM mmol/L 4.5 4.2   CHLORIDE mmol/L 106 109*   CO2 mmol/L 28 27   ANION GAP mmol/L 6 8   BUN mg/dL 17 14   CREATININE mg/dL 1.17 1.28   EGFR ml/min/1.73sq m 69 62   CALCIUM mg/dL 9.0 9.5     Results from last 7 days   Lab Units 07/11/24  0548   AST U/L 16   ALT U/L 14   ALK PHOS U/L 95   TOTAL PROTEIN g/dL 7.3   ALBUMIN g/dL 4.3   TOTAL BILIRUBIN mg/dL 0.34         Results from last 7 days   Lab Units 07/12/24  0829 07/11/24  0548   GLUCOSE RANDOM mg/dL 102 85             No results found for: \"BETA-HYDROXYBUTYRATE\"                   Results from last 7 " days   Lab Units 07/11/24  2350 07/11/24  1240 07/11/24  0753 07/11/24  0548   HS TNI 0HR ng/L 11  --   --  13   HS TNI 2HR ng/L  --   --  12  --    HSTNI D2 ng/L  --   --  -1  --    HS TNI 4HR ng/L  --  14  --   --    HSTNI D4 ng/L  --  1  --   --                  Results from last 7 days   Lab Units 07/12/24  0829   PROCALCITONIN ng/ml 0.06                                     Results from last 7 days   Lab Units 07/11/24  0548   LIPASE u/L 136*                     Results from last 7 days   Lab Units 07/11/24  0548   INFLUENZA A PCR  Negative   INFLUENZA B PCR  Negative   RSV PCR  Negative                                               Medications:   Scheduled Medications:  acetaminophen, 975 mg, Oral, Daily  azithromycin, 500 mg, Oral, Q24H  diltiazem, 60 mg, Oral, Q12H MERRILL  enoxaparin, 40 mg, Subcutaneous, Daily  fluticasone-vilanterol, 1 puff, Inhalation, Daily  guaiFENesin, 600 mg, Oral, Q12H MERRILL  lidocaine, 1 patch, Topical, Daily  nicotine, 1 patch, Transdermal, Daily  pantoprazole, 40 mg, Oral, BID AC  sertraline, 25 mg, Oral, Daily      Continuous IV Infusions:     PRN Meds:  albuterol, 2 puff, Inhalation, Q6H PRN  diazepam, 5 mg, Oral, Q6H PRN  methocarbamol, 500 mg, Oral, Q6H PRN  ondansetron, 4 mg, Intravenous, Q6H PRN  oxyCODONE, 10 mg, Oral, Q6H PRN    Discharge Plan: D  '  Network Utilization Review Department  ATTENTION: Please call with any questions or concerns to [phone] and carefully listen to the prompts so that you are directed to the right person. All voicemails are confidential.   For Discharge needs, contact Care Management DC Support Team at [phone] opt. [phone]  Send all requests for admission clinical reviews, approved or denied determinations and any other requests to dedicated fax number below belonging to the campus where the patient is receiving treatment. List of dedicated fax numbers for the Facilities:  FACILITY NAME UR FAX NUMBER   ADMISSION DENIALS (Administrative/Medical  Necessity) [phone]   DISCHARGE SUPPORT TEAM (NETWORK) [phone]   PARENT CHILD HEALTH (Maternity/NICU/Pediatrics) [phone]   [facility] [phone]   [facility] [phone]   [facility] [phone]   [facility] [phone]   [facility] [phone]   [facility] [phone]   [facility] [phone]   [facility] [phone]   [facility] [phone]   [facility] [phone]   [facility] [phone]   [facility] [phone]

## 2024-07-13 NOTE — ASSESSMENT & PLAN NOTE
Patient has history of recurrent kidney stones bilaterally that required multiple surgeries.   Patient denies seeing blood in urine.  On physical exam there was no abdominal tenderness.  Casillas's punch bilaterally did not elicit tenderness.    Plan   Monitor pain for symptoms of dysuria, urinary retention,  Follow-up outpatient

## 2024-07-13 NOTE — ASSESSMENT & PLAN NOTE
Patient has hx of HTN but not compliant with medications  Was previously prescribed diltiazem for esophageal dysmotility disorder.  Blood pressure improved to diltiazem (Cardizem) 60 mg tablet every 12 hours.   Diltiazem has also been improving patient's chest pain (multifactorial etiology).     Plan  Continue Diltiazem 60 mg tablet   Monitor blood pressure

## 2024-07-13 NOTE — DISCHARGE SUMMARY
Formerly [facility]  Discharge- Yefri Bennett 1970, 53 y.o. male MRN: [id number]  Unit/Bed#: S -01 Encounter: [id number]  Primary Care Provider: Sharon Clemente MD   Date and time admitted to hospital: 7/11/2024  5:27 AM    * Chest pain: Secondary to MSK versus anxiety vs esophageal dysmotility disorder  Assessment & Plan  Patient presents to ED after being awaken overnight from sharp stabbing chest pain radiating to his right shoulder and neck.   Patient's pain is reproducible upon palpation and worsens with coughing.   Patient associates pain with diaphoresis, palpitations, shortness of breath  Patient has history of anxiety which has been exacerbated for the past 1 month due to his separation from his fiancée  In the ED patient received a dose of diltiazem 30 Mg along with Valium.  The combination of which improved his blood pressure and anxiety   Negative troponins and normal sinus rhythm EKG. DERIC score of 0.   Chart review: Esophageal dysmotility/spastic distal esophagus. His manometry study done in 2018 had revealed small hiatal hernia and hypercontractile disorder.  History of noncompliance to diltiazem.  In terms of internal medicine standpoint expecting discharge today.    Plan  Follow management for anxiety as listed bellow to help mitigate chest pain.  Schedule outpatient stress test.    Anxiety  Assessment & Plan  Patient has hx of chronic anxiety for at least 10 years attributed to history of multiple surgeries for back and kidney stones.    Anxiety has been exacerbated in past month due to separation [facility] Endorses trouble sleeping, waking up with palpitations, diaphoresis, and persistent nausea.  Patient also complains of decreased in appetite with unintentional weight loss  In the ED patient received a dose of diltiazem 30 Mg along with Valium.  The combination of which improved his blood pressure and anxiety.   Patient requested to get oxycodone and Valium at the  same time because he takes them at the same time for home medications.  Educated patient on risks of respiratory depression.     Plan  Continue Valium 5 mg as prescribed by primary care provider.    Started the patient on Zoloft.  Educated how patient must be compliant and we will take at least 4 weeks to see the effect on managing anxiety.    Chronic bronchitis  Assessment & Plan  Assessment  Past medical history of chronic bronchitis on chart review.  Patient is known smoker 1 pack/day for 25 years.  History of chronic productive cough.   On admission productive sputum yellow.  Endorsed that coughing causes shortness of breath and on and off fevers with chills  Marked coughing during physical exam is debilitating for patient.   On physical exam lungs were clear to auscultation bilaterally.  Labs on admission WBC of 13.66 on admission consistent with patient's history of neutrophilic lymphocytosis secondary to ssmoking history  and underlying chronic pain with several surgeries and retained implants.     Plan  Continue azithromycin 500 mg for 1 more day.    Acetaminophen oral PRN     Hypertension  Assessment & Plan  Patient has hx of HTN but not compliant with medications  Was previously prescribed diltiazem for esophageal dysmotility disorder.  Blood pressure improved to diltiazem (Cardizem) 60 mg tablet every 12 hours.   Diltiazem has also been improving patient's chest pain (multifactorial etiology).     Plan  Continue Diltiazem 60 mg tablet   Monitor blood pressure    History of kidney stones  Assessment & Plan  Patient has history of recurrent kidney stones bilaterally that required multiple surgeries.   Patient denies seeing blood in urine.  On physical exam there was no abdominal tenderness.  Casillas's punch bilaterally did not elicit tenderness.    Plan   Monitor pain for symptoms of dysuria, urinary retention,  Follow-up outpatient     Asthma  Assessment & Plan  Continue albuterol inhaler PRN  Q6      Medical Problems       Resolved Problems  Date Reviewed: 7/11/2024   None       Discharging Resident: Magdalena Salguero MD  Discharging Attending: No att. providers found  PCP: Sharon Clemente MD  Admission Date:   Admission Orders (From admission, onward)       Ordered        07/13/24 1524  INPATIENT ADMISSION  Once            07/11/24 0836  Place in Observation  Once                          Discharge Date: 07/13/24    Consultations During Hospital Stay:  No consultations were necessary during this admission    Procedures Performed:   No procedures were performed    Significant Findings / Test Results:   No acute cardiopulmonary disease is seen on CXRAY.   Auscultation of the lungs lung sounds were clear.    Incidental Findings:    ON CTA: No aortic dissection or intramural hematoma. Approximately 5 mm density along the left anterior ascending aortic wall projecting into the lumen with thin stalk, likely a small pedunculated noncalcified plaque/clot.       Test Results Pending at Discharge (will require follow up):  Sputum culture and Gram stain pending     Outpatient Tests Requested:  Follow-up with primary care provider for refill for medications and continued management    Complications: No complications were observed this admission    Reason for Admission: Chest pain    Hospital Course:     Yefri Bennett is a 53 y.o. male patient who originally presented to the hospital on 7/11/2024 due to chest pain which occurred overnight. Pain was sharp in nature radiating to his right neck associated with diaphoresis along with numbness of the lips and mouth. Patient also complains of shortness of breath. Patient denies any abdominal pain, no back pain no cough no dizziness no fever no headache no nausea or vomiting. Patient complains that he has been feeling heavy in his chest for the past 5 days along with episodes of anxiety. Patient recently had a break-up with his fiancée which occurred 1 month ago. Ever since then  patient has difficulty sleeping, waking up with diaphoresis and palpitations. Patient also complains of chronic fatigue, decrease in appetite. In the ED patient had elevated blood pressure for which she was given diltiazem, along with Valium for anxiety. The combination of these medications stabilized patient's blood pressure from 173/118 to stable blood pressure range of 143-148/77-86 in the ED.     Patient was transferred to inpatient for chest pain management. Patient vitals were stable no tachycardia or tachypnea.  Night team was called to see this patient who was reporting  chest pain. Patient had complaints of a 6 out of 10 substernal chest pain, with radiation to the neck, reproducible on palpation.  He endorsed shortness of breath, with a slightly productive cough, which has been ongoing for past few days.  No nausea/vomiting.  EKG was done at bedside and showed normal sinus rhythm.  He did complain of chills but his temperature was 98.4.  Patient usually takes 10 IR oxycodone tablets for pain relief however he was not on his home regimen in the hospital.  Night team suspected might be having chills due to slight withdrawal adjusted his oxycodone dosage to home regimen.   Troponin levels were unremarkable thus not trended.     The following day on 7/12/2024, patient was sweating profusely. He endorses how needles/getting labs drawn increase his anxiety. Continues to report cough with productive of greenish-yellow sputum. Chest pain was reproducible on coughing and on chest palpation. Review of systems patient also reported some lower back pain and urinary dribbling. Patient denies hematuria. Patient had just received oxycodone 10 mg. Patient requested to get Valium and oxycodone together. Educated patient on the risks of respiratory depression when taking combination of medications.  Added Toradol for pain control added azithromycin 500 mg every 24 hours for treatment of chronic bronchitis. Patients blood  pressure elevated. Patient had received 1 dose of metoprolol. This  was discontinued because it may have been contributing to patient's anxiety and diaphoresis.  Primary team changed blood pressure management to diltiazem 60 mg administered twice a day.    On 7/13/2024 the patient treated by the night team for a migraine-like headache presentation.  Administered migraine cocktail of sumatriptan, caffeine, Toradol, Benadryl, metochlorpromide, and magnessium.  Also gave patient oxygen therapy was examined at bedside.  Night team also addressed patient's blood pressure.  The previous day patient received 1 dose of metoprolol which was discontinued because it may have been contributing to patient's anxiety and diaphoresis.  And was then switched to diltiazem 60 mg administered twice a day.  Night team administered hydralazine 5 mg injection when the patient's blood pressure was 161/105 with a MAP of 124 which decreased the blood pressure in 2 hours 245/91 with a MAP of 109.     Later in the early morning the primary team went to examine the patient at bedside.  Patient has reported improved pain levels overall.  Continued patient's azithromycin day 2 of 3.  Patient was drowsy due to lack of sleep and also attributed to Benadryl .  Her physical exam and assessment, patient was cleared for discharge as of today.               Please see above list of diagnoses and related plan for additional information.     Condition at Discharge: stable    Discharge Day Visit / Exam:   Subjective:      The patient treated by the night team for a migraine-like headache presentation.  Administered migraine cocktail of sumatriptan, caffeine, Toradol, Benadryl, metochlorpromide, and magnessium.  Also gave patient oxygen therapy was examined at bedside.  Night team also addressed patient's blood pressure.  The previous day patient received 1 dose of metoprolol which was discontinued because it may have been contributing to patient's anxiety  and diaphoresis.  And was then switched to diltiazem 60 mg administered twice a day.  Night team administered hydralazine 5 mg injection when the patient's blood pressure was 161/105 with a MAP of 124 which decreased the blood pressure in 2 hours 245/91 with a MAP of 109.      Patient continues to have yellowish sputum with occasional green.  Reports that he is just tired and feels confused since last night but  feels the pain is slightly better.  Patient was concerned because he have not he has not had a migraine headache in about 6 months.  We reviewed previous imaging of the head and brain done at previous assessments.  Answered patient's questions and concerns regarding pain management and blood pressure management.  Patient expressed how he ran out of diltiazem and needed a refill.     Vitals: Blood Pressure: 139/96 (07/13/24 0842)  Pulse: (!) 51 (07/13/24 0716)  Temperature: 98.6 °F (37 °C) (07/13/24 0716)  Temp Source: Oral (07/13/24 0716)  Respirations: 16 (07/13/24 0716)  SpO2: 99 % (07/13/24 0716)  Exam:   Physical Exam   Constitutional:       Appearance: He is ill-appearing and diaphoretic.   HENT:      Head: Normocephalic and atraumatic.      Mouth/Throat:      Mouth: Mucous membranes are moist.   Eyes:      Pupils: Pupils are equal, round, and reactive to light.   Cardiovascular:      Rate and Rhythm: Regular rhythm. Tachycardia present.      Pulses: Normal pulses.      Heart sounds: Normal heart sounds.   Pulmonary:      Breath sounds: Normal breath sounds.      Comments: Tachypneic.  Abdominal:      General: Abdomen is flat. Bowel sounds are normal.      Palpations: Abdomen is soft.      Tenderness: There is no right CVA tenderness or left CVA tenderness.   Musculoskeletal:      Cervical back: Normal range of motion.   Skin:     General: Skin is warm.      Capillary Refill: Capillary refill takes less than 2 seconds.      Comments: Profuse diaphoresis noted on inspection.    Neurological:       General: No focal deficit present.      Mental Status: He is alert.   Psychiatric:      Comments: Anxiety levels are still high but better compared to the day before. Patient was very drowsy, yawned during most of encounter.       Discussion with Family: Updated  (mother) at bedside.    Discharge instructions/Information to patient and family:   See after visit summary for information provided to patient and family.      Provisions for Follow-Up Care:  See after visit summary for information related to follow-up care and any pertinent home health orders.      Mobility at time of Discharge:   Basic Mobility Inpatient Raw Score: 24  JH-HLM Goal: 8: Walk 250 feet or more  JH-HLM Achieved: 6: Walk 10 steps or more  HLM Goal achieved. Continue to encourage appropriate mobility.     Disposition:   Home    Planned Readmission: no    Discharge Medications:  See after visit summary for reconciled discharge medications provided to patient and/or family.      **Please Note: This note may have been constructed using a voice recognition system**

## 2024-07-13 NOTE — PLAN OF CARE
Problem: Potential for Falls  Goal: Patient will remain free of falls  Description: INTERVENTIONS:  - Educate patient/family on patient safety including physical limitations  - Instruct patient to call for assistance with activity   - Consult OT/PT to assist with strengthening/mobility   - Keep Call bell within reach  - Keep bed low and locked with side rails adjusted as appropriate  - Keep care items and personal belongings within reach  - Initiate and maintain comfort rounds  - Make Fall Risk Sign visible to staff  - Offer Toileting every 2 Hours, in advance of need  - Initiate/Maintain be and chair alarm  - Obtain necessary fall risk management equipment:   - Apply yellow socks and bracelet for high fall risk patients  - Consider moving patient to room near nurses station  Outcome: Progressing

## 2024-07-15 ENCOUNTER — OFFICE VISIT (OUTPATIENT)
Dept: FAMILY MEDICINE CLINIC | Facility: CLINIC | Age: 54
End: 2024-07-15
Payer: MEDICARE

## 2024-07-15 ENCOUNTER — TRANSITIONAL CARE MANAGEMENT (OUTPATIENT)
Dept: FAMILY MEDICINE CLINIC | Facility: CLINIC | Age: 54
End: 2024-07-15

## 2024-07-15 VITALS
BODY MASS INDEX: 27.99 KG/M2 | TEMPERATURE: 97.8 F | OXYGEN SATURATION: 98 % | SYSTOLIC BLOOD PRESSURE: 140 MMHG | WEIGHT: 211.2 LBS | HEIGHT: 73 IN | HEART RATE: 88 BPM | DIASTOLIC BLOOD PRESSURE: 90 MMHG

## 2024-07-15 DIAGNOSIS — I10 HYPERTENSION, UNSPECIFIED TYPE: ICD-10-CM

## 2024-07-15 DIAGNOSIS — Z79.891 LONG TERM PRESCRIPTION OPIATE USE: ICD-10-CM

## 2024-07-15 DIAGNOSIS — R93.89 ABNORMAL COMPUTED TOMOGRAPHY ANGIOGRAPHY (CTA): Primary | ICD-10-CM

## 2024-07-15 DIAGNOSIS — K76.0 HEPATIC STEATOSIS: ICD-10-CM

## 2024-07-15 DIAGNOSIS — Z71.89 COORDINATION OF COMPLEX CARE: Primary | ICD-10-CM

## 2024-07-15 DIAGNOSIS — R71.8 ELEVATED HEMATOCRIT: ICD-10-CM

## 2024-07-15 DIAGNOSIS — E66.3 OVERWEIGHT (BMI 25.0-29.9): ICD-10-CM

## 2024-07-15 DIAGNOSIS — R74.8 ELEVATED LIPASE: ICD-10-CM

## 2024-07-15 DIAGNOSIS — Z11.3 ROUTINE SCREENING FOR STI (SEXUALLY TRANSMITTED INFECTION): ICD-10-CM

## 2024-07-15 DIAGNOSIS — Z79.899 LONG-TERM CURRENT USE OF BENZODIAZEPINE: ICD-10-CM

## 2024-07-15 PROCEDURE — 99495 TRANSJ CARE MGMT MOD F2F 14D: CPT | Performed by: FAMILY MEDICINE

## 2024-07-15 NOTE — PROGRESS NOTES
Transition of Care Visit  Name: Yefri Bennett      : 1970      MRN: [id number]  Encounter Provider: Sharon Clemente MD  Encounter Date: 7/15/2024   Encounter department: [facility]    Assessment & Plan   1. Abnormal computed tomography angiography (CTA)  -     Ambulatory Referral to Cardiovascular Surgery; Future  2. Elevated hematocrit  -     CBC and differential; Future  3. Hepatic steatosis  -     Lipase; Future  -     Hepatic function panel; Future  -     Protime-INR; Future  4. Elevated lipase  -     Lipase; Future  5. Routine screening for STI (sexually transmitted infection)  -     Chlamydia/GC amplified DNA by PCR; Future  -     RPR-Syphilis Screening (Total Syphilis IGG/IGM); Future  -     Hepatitis B surface antigen; Future  -     Hepatitis C antibody; Future  -     HIV 1/2 AG/AB w Reflex [facility] for 2 yr old and above; Future  6. Long term prescription opiate use  -     Drug Screen Routine w /Conf and Adulteration, urine.; Future  7. Long-term current use of benzodiazepine  -     Drug Screen Routine w /Conf and Adulteration, urine.; Future  8. Hypertension, unspecified type  -     Basic metabolic panel; Future  9. Overweight (BMI 25.0-29.9)  -     Lipid Panel with Direct LDL reflex; Future  -     TSH, 3rd generation with Free T4 reflex; Future       Summary:  Regarding CTA finding of pedunculated noncalcified plaque/clot projecting into ascending aorta, plan to start patient on statin/ASA for CVA risk reduction (pending results of CBC/CMP). Also sent message to vascular surgery regarding CTA findings, they recommended evaluation by [facility] given ascending aortic involvement, referral made for patient to be evaluated regarding whether surgical intervention may be indicated.     Will call patient and plan to increase zoloft in 2 weeks to 50mg (therapeutic dose) for anxiety/depression.    Labs ordered as outlined above, including STI testing per patient request. Repeat UDS ordered as  part of controlled substance agreement with me, patient is in agreement with repeat drug testing. Follow up in 4 weeks.        History of Present Illness   Transitional Care Management Review:   Yefri Bennett is a 53 y.o. male here for TCM follow up.     During the TCM phone call patient stated:  TCM Call       Date and time call was made  7/15/2024  8:20 AM    Hospital care reviewed  Records reviewed    Patient was hospitialized at  [facility]    Date of Admission  07/11/24    Date of discharge  07/13/24    Diagnosis  Chest pain: Secondary to MSK versus anxiety vs esophageal dysmotility disorder    Disposition  Home    Were the patients medications reviewed and updated  Yes    Current Symptoms  Back pain - left side    Lower abdominal pain severity  Moderate    Lower abdominal pain onset  Ongoing    Back pain, left side, severity  Moderate    Back pain, left side, onset  Ongoing    Back pain, right side, severity  Moderate    Back pain, right side, onset  Ongoing          TCM Call       Post hospital issues  Reduced activity; Poor ADL (Activities of Daily Living) performance    Should patient be enrolled in anticoag monitoring?  No    Scheduled for follow up?  Yes    Patient refusal reason  Patient states he will call back in a few days    Did you obtain your prescribed medications  Yes    Do you need help managing your prescriptions or medications  No    Is transportation to your appointment needed  No    I have advised the patient to call PCP with any new or worsening symptoms  Marilyn Yan    Living Arrangements  Family members    Are you recieving any outpatient services  No    Are you recieving home care services  No    Are you using any community resources  No    Current waiver services  No    Have you fallen in the last 12 months  No    Interperter language line needed  No    Counseling  Patient    Counseling topics  Diagnostic results; Activities of daily living; instructions for management;  "Importance of RX compliance; Risk factor reduction          HPI    Discharged 7/13/24 following chest pain/ACS rule-out admission, EKG nonischemic, trops neg. Incidental finding on 7/11/24 CTA c/a/p dissection protocol study: Approximately 5 mm density along the left anterior ascending aortic wall projecting into the lumen with thin stalk, likely a small pedunculated noncalcified plaque/clot. No further workup done related to finding. Patient's pain improved and BP improved on cardizem 60 mg BID during admission, however patient has not been taking the cardizem since discharge due to being unsure regarding what it was for. Rx is for Cardizem 60 mg BID for HTN/esophageal dysmotility. Patient ended up being treated for bronchitis with azithromycin. Zoloft started during hospitalization for anxiety, on 25 mg QD. Lipase 136 (previous 48) but denies any epigastric discomfort. Still smoking, pre-contemplative of cessation.      Review of Systems   Constitutional:  Negative for chills and fever.   HENT:  Positive for trouble swallowing.         Esophageal dysmotility/jackhammer esophagus, at baseline   Respiratory:  Negative for chest tightness and shortness of breath.    Cardiovascular:  Negative for chest pain, palpitations and leg swelling.   Gastrointestinal:  Negative for abdominal pain, diarrhea, nausea and vomiting.   Genitourinary:  Negative for dysuria.   Musculoskeletal:  Positive for arthralgias and back pain.        Chronic lumbar back pain   Neurological:  Negative for syncope, weakness and light-headedness.   Psychiatric/Behavioral:  Negative for agitation, sleep disturbance and suicidal ideas. The patient is nervous/anxious.         Stressors related to recent break-up     Objective     /90 (BP Location: Left arm, Patient Position: Sitting, Cuff Size: Large)   Pulse 88   Temp 97.8 °F (36.6 °C) (Tympanic)   Ht 6' 1\" (1.854 m)   Wt 95.8 kg (211 lb 3.2 oz)   SpO2 98%   BMI 27.86 kg/m²     Physical " Exam  Vitals reviewed.   Constitutional:       General: He is not in acute distress.     Appearance: He is well-developed.   HENT:      Head: Normocephalic and atraumatic.   Eyes:      Conjunctiva/sclera: Conjunctivae normal.   Cardiovascular:      Rate and Rhythm: Normal rate and regular rhythm.   Pulmonary:      Effort: Pulmonary effort is normal. No respiratory distress.      Breath sounds: Normal breath sounds.   Abdominal:      General: Bowel sounds are normal.      Palpations: Abdomen is soft.      Tenderness: There is no abdominal tenderness.   Skin:     General: Skin is warm and dry.   Neurological:      Mental Status: He is alert. Mental status is at baseline.   Psychiatric:         Behavior: Behavior normal.       Medications have been reviewed by provider in current encounter

## 2024-07-15 NOTE — UTILIZATION REVIEW
SEE INITIAL REVIEW AT BOTTOM    OBSERVATION ADMISSION 7/11/2024 0836  CONVERTED TO  INPATIENT ADMISSION 7/13/2024 1524  DUE TO HEAVY CHEST FEELING/ ACUTE CHEST PAIN  Admission Orders (From admission, onward)       Ordered        07/13/24 1524  INPATIENT ADMISSION  Once            07/11/24 0836  Place in Observation  Once                          Orders Placed This Encounter   Procedures    INPATIENT ADMISSION     Standing Status:   Standing     Number of Occurrences:   1     Order Specific Question:   Level of Care     Answer:   Med Surg [16]     Order Specific Question:   Estimated length of stay     Answer:   More than 2 Midnights     Order Specific Question:   Certification     Answer:   I certify that inpatient services are medically necessary for this patient for a duration of greater than two midnights. See H&P and MD Progress Notes for additional information about the patient's course of treatment.     Continued Stay Review    Date: 7/13 Changed to Inpatient                         Assessment/Plan:   Atypical chest pain  Acute bronchitis  Anxiety  Essential hypertension, HAD Toradol for pain control. Azithromycin x 3 days. DC 7/13    Vital Signs (last 3 days) before discharge       Date/Time Temp Pulse Resp BP MAP (mmHg) SpO2 O2 Device Patient Position - Orthostatic VS Kenton Coma Scale Score Pain    07/13/24 1423 -- -- -- -- -- -- -- -- -- 5    07/13/24 1139 -- -- -- -- -- -- -- -- -- 7    07/13/24 0848 -- -- -- -- -- -- -- -- -- 8    07/13/24 0842 -- -- -- 139/96 -- -- -- -- -- 8    07/13/24 0840 -- -- -- -- -- -- -- -- 15 --    07/13/24 07:16:43 98.6 °F (37 °C) 51 16 147/94 112 99 % None (Room air) Lying -- --    07/13/24 0455 -- -- -- -- -- -- -- -- -- 10 - Worst Possible Pain    07/12/24 23:45:23 -- 58 -- 145/91 109 95 % -- -- -- --    07/12/24 22:33:07 98 °F (36.7 °C) 52 17 161/105 124 98 % -- -- -- --    07/12/24 2149 -- -- -- -- -- -- -- -- -- 8    07/12/24 2148 -- -- -- 154/102 -- -- -- -- -- --     07/12/24 15:20:26 -- 55 15 136/82 100 97 % None (Room air) Lying -- --    07/12/24 1148 -- -- -- -- -- -- -- -- -- 5 07/12/24 0756 -- -- -- -- -- -- -- -- -- 8 07/12/24 07:09:07 98.1 °F (36.7 °C) 62 17 169/105 126 99 % None (Room air) Lying -- --    07/11/24 2251 -- -- -- -- -- -- -- -- -- 9 07/11/24 2206 -- -- -- -- -- -- -- -- -- 8 07/11/24 22:02:51 98.4 °F (36.9 °C) 56 20 142/86 105 98 % -- -- -- --    07/11/24 2100 -- -- -- -- -- -- None (Room air) -- 15 --    07/11/24 2041 -- -- -- 170/104 -- -- None (Room air) Lying -- --    07/11/24 20:38:34 98.5 °F (36.9 °C) 64 18 -- -- 99 % -- -- -- --    07/11/24 1715 -- -- -- -- -- -- -- -- -- 8 07/11/24 1600 -- 59 -- 147/78 109 96 % -- -- -- --    07/11/24 1530 -- -- -- -- -- -- -- -- -- 6 07/11/24 1500 -- 61 -- 143/86 109 95 % -- -- -- --    07/11/24 1400 -- 60 -- 148/77 105 95 % -- -- -- --    07/11/24 1234 -- 60 18 174/103 133 100 % -- Lying -- --    07/11/24 1147 -- 62 20 159/90 118 98 % None (Room air) Lying -- --    07/11/24 0930 -- 67 20 160/102 125 98 % None (Room air) Lying -- --    07/11/24 0700 -- -- -- 168/127 -- -- -- -- -- --    07/11/24 0620 -- -- -- -- -- -- -- -- -- 8    07/11/24 0617 -- -- -- -- -- -- -- -- 15 --    07/11/24 0615 -- -- -- -- -- -- None (Room air) -- -- --    07/11/24 0535 98 °F (36.7 °C) -- -- -- -- -- -- -- -- --    07/11/24 0533 -- 83 28 173/118 142 99 % None (Room air) Sitting -- 8          Weight (last 2 days) before discharge       None              Pertinent Labs/Diagnostic Results:   Radiology:  CTA dissection protocol chest/abdomen/pelvis   Final Interpretation by Dalton Marques MD (07/11 0825)      No aortic dissection or intramural hematoma. Approximately 5 mm density along the left anterior ascending aortic wall projecting into the lumen with thin stalk, likely a small pedunculated noncalcified plaque/clot.      No additional acute abnormality in the chest, abdomen or pelvis.         Workstation  "performed: [id number]         XR chest 2 views   ED Interpretation by Hayley Jane MD (07/11 0634)   Per my interpretation: No acute cardiopulmonary exam      Final Interpretation by Steven Montgomery DO (07/11 0655)      No acute cardiopulmonary disease is seen.            Workstation performed: [id number]           Cardiology:  ECG 12 lead   Final Result by Jonathon Alcala DO (07/12 0726)   Sinus bradycardia   Otherwise normal ECG   When compared with ECG of 11-JUL-2024 22:35, (unconfirmed)   No significant change was found   Confirmed by Jonathon Alcala ([id number]) on 7/12/2024 7:26:48 AM      ECG 12 lead   Final Result by Jonathon Alcala DO (07/12 0726)   * Poor data quality, interpretation may be adversely affected   Normal sinus rhythm   Normal ECG   When compared with ECG of 11-JUL-2024 05:35,   No significant change was found   Confirmed by Jonathon Alcala ([id number]) on 7/12/2024 7:26:46 AM        GI:  No orders to display       Results from last 7 days   Lab Units 07/11/24  0548   SARS-COV-2  Negative     Results from last 7 days   Lab Units 07/12/24  0829 07/11/24  1240 07/11/24  0548   WBC Thousand/uL 13.99*  --  13.66*   HEMOGLOBIN g/dL 15.4  --  16.5   HEMATOCRIT % 47.6  --  51.3*   PLATELETS Thousands/uL 327 303 366   TOTAL NEUT ABS Thousands/µL 9.43*  --  9.01*         Results from last 7 days   Lab Units 07/12/24  0829 07/11/24  0548   SODIUM mmol/L 140 144   POTASSIUM mmol/L 4.5 4.2   CHLORIDE mmol/L 106 109*   CO2 mmol/L 28 27   ANION GAP mmol/L 6 8   BUN mg/dL 17 14   CREATININE mg/dL 1.17 1.28   EGFR ml/min/1.73sq m 69 62   CALCIUM mg/dL 9.0 9.5     Results from last 7 days   Lab Units 07/11/24  0548   AST U/L 16   ALT U/L 14   ALK PHOS U/L 95   TOTAL PROTEIN g/dL 7.3   ALBUMIN g/dL 4.3   TOTAL BILIRUBIN mg/dL 0.34         Results from last 7 days   Lab Units 07/12/24  0829 07/11/24  0548   GLUCOSE RANDOM mg/dL 102 85             No results found for: \"BETA-HYDROXYBUTYRATE\" "                   Results from last 7 days   Lab Units 07/11/24  2350 07/11/24  1240 07/11/24  0753 07/11/24  0548   HS TNI 0HR ng/L 11  --   --  13   HS TNI 2HR ng/L  --   --  12  --    HSTNI D2 ng/L  --   --  -1  --    HS TNI 4HR ng/L  --  14  --   --    HSTNI D4 ng/L  --  1  --   --                  Results from last 7 days   Lab Units 07/12/24  0829   PROCALCITONIN ng/ml 0.06                                     Results from last 7 days   Lab Units 07/11/24  0548   LIPASE u/L 136*                     Results from last 7 days   Lab Units 07/11/24  0548   INFLUENZA A PCR  Negative   INFLUENZA B PCR  Negative   RSV PCR  Negative                                               Medications:   Scheduled Medications:  acetaminophen, 975 mg, Oral, Daily  azithromycin, 500 mg, Oral, Q24H  diltiazem, 60 mg, Oral, Q12H MERRILL  enoxaparin, 40 mg, Subcutaneous, Daily  fluticasone-vilanterol, 1 puff, Inhalation, Daily  guaiFENesin, 600 mg, Oral, Q12H MERRILL  lidocaine, 1 patch, Topical, Daily  nicotine, 1 patch, Transdermal, Daily  pantoprazole, 40 mg, Oral, BID AC  sertraline, 25 mg, Oral, Daily      Continuous IV Infusions:  No current facility-administered medications for this encounter.    PRN Meds:  albuterol, 2 puff, Inhalation, Q6H PRN  diazepam, 5 mg, Oral, Q6H PRN  methocarbamol, 500 mg, Oral, Q6H PRN  ondansetron, 4 mg, Intravenous, Q6H PRN  oxyCODONE, 10 mg, Oral, Q6H PRN    Discharge Plan: D  '  Network Utilization Review Department  ATTENTION: Please call with any questions or concerns to [phone] and carefully listen to the prompts so that you are directed to the right person. All voicemails are confidential.   For Discharge needs, contact Care Management DC Support Team at [phone] opt. [phone]  Send all requests for admission clinical reviews, approved or denied determinations and any other requests to dedicated fax number below belonging to the campus where the patient is receiving treatment. List of dedicated fax  numbers for the Facilities:  FACILITY NAME UR FAX NUMBER   ADMISSION DENIALS (Administrative/Medical Necessity) [phone]   DISCHARGE SUPPORT TEAM (NETWORK) [phone]   PARENT CHILD HEALTH (Maternity/NICU/Pediatrics) [phone]   [facility] [phone]   [facility] [phone]   [facility] [phone]   [facility] [phone]   [facility] [phone]   [facility] [phone]   [facility] [phone]   [facility] [phone]   [facility] [phone]   [facility] [phone]   [facility] [phone]   [facility] [phone]

## 2024-07-15 NOTE — ED ATTENDING ATTESTATION
7/11/2024  I, Catracho Nelson MD, saw and evaluated the patient. I have discussed the patient with the resident/non-physician practitioner and agree with the resident's/non-physician practitioner's findings, Plan of Care, and MDM as documented in the resident's/non-physician practitioner's note, except where noted. All available labs and Radiology studies were reviewed.  I was present for key portions of any procedure(s) performed by the resident/non-physician practitioner and I was immediately available to provide assistance.       At this point I agree with the current assessment done in the Emergency Department.  I have conducted an independent evaluation of this patient a history and physical is as follows:  Chest pain over the past few hours, woke up with symptoms.  + cardiac risk factors.  Initial EKG and troponin 13.  CTA dissection due to chest pain and hypertension.  Care to Dr. Alan at 0745.  Plan to follow-up on CTA, second troponin and disposition accordingly.     Results Reviewed       Procedure Component Value Units Date/Time    HS Troponin I 4hr [[id number]]  (Normal) Collected: 07/11/24 1240    Lab Status: Final result Specimen: Blood from Arm, Right Updated: 07/11/24 1315     hs TnI 4hr 14 ng/L      Delta 4hr hsTnI 1 ng/L     Platelet count [[id number]]  (Normal) Collected: 07/11/24 1240    Lab Status: Final result Specimen: Blood from Arm, Right Updated: 07/11/24 1254     Platelets 303 Thousands/uL      MPV 9.8 fL     HS Troponin I 2hr [[id number]]  (Normal) Collected: 07/11/24 0753    Lab Status: Final result Specimen: Blood from Arm, Right Updated: 07/11/24 0823     hs TnI 2hr 12 ng/L      Delta 2hr hsTnI -1 ng/L     FLU/RSV/COVID - if FLU/RSV clinically relevant [[id number]]  (Normal) Collected: 07/11/24 0548    Lab Status: Final result Specimen: Nares from Nose Updated: 07/11/24 0642     SARS-CoV-2 Negative     INFLUENZA A PCR Negative     INFLUENZA B PCR Negative     RSV PCR Negative     Narrative:      FOR PEDIATRIC PATIENTS - copy/paste COVID Guidelines URL to browser: [email or web address]    SARS-CoV-2 assay is a Nucleic Acid Amplification assay intended for the  qualitative detection of nucleic acid from SARS-CoV-2 in nasopharyngeal  swabs. Results are for the presumptive identification of SARS-CoV-2 RNA.    Positive results are indicative of infection with SARS-CoV-2, the virus  causing COVID-19, but do not rule out bacterial infection or co-infection  with other viruses. Laboratories within the [place] and its  territories are required to report all positive results to the appropriate  public health authorities. Negative results do not preclude SARS-CoV-2  infection and should not be used as the sole basis for treatment or other  patient management decisions. Negative results must be combined with  clinical observations, patient history, and epidemiological information.  This test has not been FDA cleared or approved.    This test has been authorized by FDA under an Emergency Use Authorization  (EUA). This test is only authorized for the duration of time the  declaration that circumstances exist justifying the authorization of the  emergency use of an in vitro diagnostic tests for detection of SARS-CoV-2  virus and/or diagnosis of COVID-19 infection under section 564(b)(1) of  the Act, 21 U.S.C. 360bbb-3(b)(1), unless the authorization is terminated  or revoked sooner. The test has been validated but independent review by FDA  and CLIA is pending.    Test performed using goviral GeneXpert: This RT-PCR assay targets N2,  a region unique to SARS-CoV-2. A conserved region in the E-gene was chosen  for pan-Sarbecovirus detection which includes SARS-CoV-2.    According to CMS-2020-01-R, this platform meets the definition of high-throughput technology.    HS Troponin 0hr (reflex protocol) [[id number]]  (Normal) Collected: 07/11/24 0548     Lab Status: Final result Specimen: Blood from Arm, Right Updated: 07/11/24 0629     hs TnI 0hr 13 ng/L     Comprehensive metabolic panel [[id number]]  (Abnormal) Collected: 07/11/24 0548    Lab Status: Final result Specimen: Blood from Arm, Right Updated: 07/11/24 0623     Sodium 144 mmol/L      Potassium 4.2 mmol/L      Chloride 109 mmol/L      CO2 27 mmol/L      ANION GAP 8 mmol/L      BUN 14 mg/dL      Creatinine 1.28 mg/dL      Glucose 85 mg/dL      Calcium 9.5 mg/dL      AST 16 U/L      ALT 14 U/L      Alkaline Phosphatase 95 U/L      Total Protein 7.3 g/dL      Albumin 4.3 g/dL      Total Bilirubin 0.34 mg/dL      eGFR 62 ml/min/1.73sq m     Narrative:      National Kidney Disease Foundation guidelines for Chronic Kidney Disease (CKD):     Stage 1 with normal or high GFR (GFR > 90 mL/min/1.73 square meters)    Stage 2 Mild CKD (GFR = 60-89 mL/min/1.73 square meters)    Stage 3A Moderate CKD (GFR = 45-59 mL/min/1.73 square meters)    Stage 3B Moderate CKD (GFR = 30-44 mL/min/1.73 square meters)    Stage 4 Severe CKD (GFR = 15-29 mL/min/1.73 square meters)    Stage 5 End Stage CKD (GFR <15 mL/min/1.73 square meters)  Note: GFR calculation is accurate only with a steady state creatinine    Lipase [[id number]]  (Abnormal) Collected: 07/11/24 0548    Lab Status: Final result Specimen: Blood from Arm, Right Updated: 07/11/24 0623     Lipase 136 u/L     CBC and differential [[id number]]  (Abnormal) Collected: 07/11/24 0548    Lab Status: Final result Specimen: Blood from Arm, Right Updated: 07/11/24 0601     WBC 13.66 Thousand/uL      RBC 5.70 Million/uL      Hemoglobin 16.5 g/dL      Hematocrit 51.3 %      MCV 90 fL      MCH 28.9 pg      MCHC 32.2 g/dL      RDW 15.0 %      MPV 10.1 fL      Platelets 366 Thousands/uL      nRBC 0 /100 WBCs      Segmented % 65 %      Immature Grans % 1 %      Lymphocytes % 23 %      Monocytes % 7 %      Eosinophils Relative 3 %      Basophils Relative 1 %      Absolute Neutrophils 9.01  Thousands/µL      Absolute Immature Grans 0.07 Thousand/uL      Absolute Lymphocytes 3.09 Thousands/µL      Absolute Monocytes 0.95 Thousand/µL      Eosinophils Absolute 0.46 Thousand/µL      Basophils Absolute 0.08 Thousands/µL           CTA dissection protocol chest/abdomen/pelvis   Final Result by Dalton Marques MD (07/11 0825)      No aortic dissection or intramural hematoma. Approximately 5 mm density along the left anterior ascending aortic wall projecting into the lumen with thin stalk, likely a small pedunculated noncalcified plaque/clot.      No additional acute abnormality in the chest, abdomen or pelvis.         Workstation performed: [id number]         XR chest 2 views   ED Interpretation by Hayley Jane MD (07/11 0634)   Per my interpretation: No acute cardiopulmonary exam      Final Result by Steven Montgomery DO (07/11 0655)      No acute cardiopulmonary disease is seen.            Workstation performed: [id number]               ED Course         Critical Care Time  Procedures

## 2024-07-15 NOTE — UTILIZATION REVIEW
"Initial Clinical Review    Admission: Date/Time/Statement:   Admission Orders (From admission, onward)       Ordered        07/13/24 1524  INPATIENT ADMISSION  Once            07/11/24 0836  Place in Observation  Once                          Orders Placed This Encounter   Procedures    Place in Observation     Standing Status:   Standing     Number of Occurrences:   1     Order Specific Question:   Level of Care     Answer:   Med Surg [16]    INPATIENT ADMISSION     Standing Status:   Standing     Number of Occurrences:   1     Order Specific Question:   Level of Care     Answer:   Med Surg [16]     Order Specific Question:   Estimated length of stay     Answer:   More than 2 Midnights     Order Specific Question:   Certification     Answer:   I certify that inpatient services are medically necessary for this patient for a duration of greater than two midnights. See H&P and MD Progress Notes for additional information about the patient's course of treatment.     ED Arrival Information       Expected   -    Arrival   7/11/2024 05:23    Acuity   Emergent              Means of arrival   Walk-In    Escorted by   -    Service   Hospitalist    Admission type   Emergency              Arrival complaint   Chest Pain, Dizziness, N/V             Chief Complaint   Patient presents with    Chest Pain     Patient reports being woken up from sleep a few hours ago with sharp chest pressure and SOB. Patient reports feeling sick \"like I'm drunk\" for past few days with HA, dizziness, and palpitations. No meds PTA       Initial Presentation: 53 y.o. male with a PMH of chronic obstructive asthma, hypertension, anxiety who presented with chest pain which occurred overnight. Pain was sharp in nature radiating to his right neck associated with diaphoresis along with numbness of the lips and mouth. Patient also complains of shortness of breath. Patient complains that he has been feeling heavy in his chest for the past 5 days along with " episodes of anxiety. Patient recently had a break-up with his fiancée which occurred 1 month ago. Ever since then patient has difficulty sleeping, waking up with diaphoresis and palpitations. Patient also complains of chronic fatigue, decrease in appetite. Plan: Observation for chest pain, anxiety, HTN: Diazepam 2mg oral Q6 PRN for anxiety , telemetry, pantoprazole bid, Toprol.     7/12:    Internal medicine: Initial testing including chest x-ray troponins EKG all negative. Patient continues to complain of chest pain and is very anxious. Today he reports cough with productive of greenish-yellow sputum. Add Toradol for pain control. Azithromycin x 3 days. Space diazepam 60 mg to q 12 hrs.     ED Triage Vitals   Temperature Pulse Respirations Blood Pressure SpO2 Pain Score   07/11/24 0535 07/11/24 0533 07/11/24 0533 07/11/24 0533 07/11/24 0533 07/11/24 0533   98 °F (36.7 °C) 83 (!) 28 (!) 173/118 99 % 8     Weight (last 2 days) before discharge       None            Vital Signs (last 3 days) before discharge       Date/Time Temp Pulse Resp BP MAP (mmHg) SpO2 O2 Device Patient Position - Orthostatic VS [place] Coma Scale Score Pain    07/13/24 1423 -- -- -- -- -- -- -- -- -- 5    07/13/24 1139 -- -- -- -- -- -- -- -- -- 7    07/13/24 0848 -- -- -- -- -- -- -- -- -- 8    07/13/24 0842 -- -- -- 139/96 -- -- -- -- -- 8    07/13/24 0840 -- -- -- -- -- -- -- -- 15 --    07/13/24 07:16:43 98.6 °F (37 °C) 51 16 147/94 112 99 % None (Room air) Lying -- --    07/13/24 0455 -- -- -- -- -- -- -- -- -- 10 - Worst Possible Pain    07/12/24 23:45:23 -- 58 -- 145/91 109 95 % -- -- -- --    07/12/24 22:33:07 98 °F (36.7 °C) 52 17 161/105 124 98 % -- -- -- --    07/12/24 2149 -- -- -- -- -- -- -- -- -- 8 07/12/24 2148 -- -- -- 154/102 -- -- -- -- -- --    07/12/24 15:20:26 -- 55 15 136/82 100 97 % None (Room air) Lying -- --    07/12/24 1148 -- -- -- -- -- -- -- -- -- 5 07/12/24 0756 -- -- -- -- -- -- -- -- -- 8 07/12/24  07:09:07 98.1 °F (36.7 °C) 62 17 169/105 126 99 % None (Room air) Lying -- --    07/11/24 2251 -- -- -- -- -- -- -- -- -- 9 07/11/24 2206 -- -- -- -- -- -- -- -- -- 8 07/11/24 22:02:51 98.4 °F (36.9 °C) 56 20 142/86 105 98 % -- -- -- --    07/11/24 2100 -- -- -- -- -- -- None (Room air) -- 15 --    07/11/24 2041 -- -- -- 170/104 -- -- None (Room air) Lying -- --    07/11/24 20:38:34 98.5 °F (36.9 °C) 64 18 -- -- 99 % -- -- -- --    07/11/24 1715 -- -- -- -- -- -- -- -- -- 8 07/11/24 1600 -- 59 -- 147/78 109 96 % -- -- -- --    07/11/24 1530 -- -- -- -- -- -- -- -- -- 6    07/11/24 1500 -- 61 -- 143/86 109 95 % -- -- -- --    07/11/24 1400 -- 60 -- 148/77 105 95 % -- -- -- --    07/11/24 1234 -- 60 18 174/103 133 100 % -- Lying -- --    07/11/24 1147 -- 62 20 159/90 118 98 % None (Room air) Lying -- --    07/11/24 0930 -- 67 20 160/102 125 98 % None (Room air) Lying -- --    07/11/24 0700 -- -- -- 168/127 -- -- -- -- -- --    07/11/24 0620 -- -- -- -- -- -- -- -- -- 8 07/11/24 0617 -- -- -- -- -- -- -- -- 15 --    07/11/24 0615 -- -- -- -- -- -- None (Room air) -- -- --    07/11/24 0535 98 °F (36.7 °C) -- -- -- -- -- -- -- -- --    07/11/24 0533 -- 83 28 173/118 142 99 % None (Room air) Sitting -- 8              Pertinent Labs/Diagnostic Test Results:   Radiology:  CTA dissection protocol chest/abdomen/pelvis   Final Interpretation by Dalton Marques MD (07/11 0825)      No aortic dissection or intramural hematoma. Approximately 5 mm density along the left anterior ascending aortic wall projecting into the lumen with thin stalk, likely a small pedunculated noncalcified plaque/clot.      No additional acute abnormality in the chest, abdomen or pelvis.         Workstation performed: [id number]         XR chest 2 views   ED Interpretation by Hayley Jane MD (07/11 0634)   Per my interpretation: No acute cardiopulmonary exam      Final Interpretation by Steven Montgomery DO (07/11 0655)      No  acute cardiopulmonary disease is seen.            Workstation performed: [id number]           Cardiology:  ECG 12 lead   Final Result by Jonathon Alcala DO (07/12 0726)   Sinus bradycardia   Otherwise normal ECG   When compared with ECG of 11-JUL-2024 22:35, (unconfirmed)   No significant change was found   Confirmed by Jonathon Alcala ([id number]) on 7/12/2024 7:26:48 AM      ECG 12 lead   Final Result by Jonathon Alcala DO (07/12 0726)    Poor data quality, interpretation may be adversely affected   Normal sinus rhythm   Normal ECG   When compared with ECG of 11-JUL-2024 05:35,   No significant change was found   Confirmed by Jonathon Alcala ([id number]) on 7/12/2024 7:26:46 AM        GI:  No orders to display       Results from last 7 days   Lab Units 07/11/24  0548   SARS-COV-2  Negative     Results from last 7 days   Lab Units 07/12/24  0829 07/11/24  1240 07/11/24  0548   WBC Thousand/uL 13.99*  --  13.66*   HEMOGLOBIN g/dL 15.4  --  16.5   HEMATOCRIT % 47.6  --  51.3*   PLATELETS Thousands/uL 327 303 366   TOTAL NEUT ABS Thousands/µL 9.43*  --  9.01*         Results from last 7 days   Lab Units 07/12/24  0829 07/11/24  0548   SODIUM mmol/L 140 144   POTASSIUM mmol/L 4.5 4.2   CHLORIDE mmol/L 106 109*   CO2 mmol/L 28 27   ANION GAP mmol/L 6 8   BUN mg/dL 17 14   CREATININE mg/dL 1.17 1.28   EGFR ml/min/1.73sq m 69 62   CALCIUM mg/dL 9.0 9.5     Results from last 7 days   Lab Units 07/11/24  0548   AST U/L 16   ALT U/L 14   ALK PHOS U/L 95   TOTAL PROTEIN g/dL 7.3   ALBUMIN g/dL 4.3   TOTAL BILIRUBIN mg/dL 0.34         Results from last 7 days   Lab Units 07/12/24  0829 07/11/24  0548   GLUCOSE RANDOM mg/dL 102 85         Results from last 7 days   Lab Units 07/11/24  2350 07/11/24  1240 07/11/24  0753 07/11/24  0548   HS TNI 0HR ng/L 11  --   --  13   HS TNI 2HR ng/L  --   --  12  --    HSTNI D2 ng/L  --   --  -1  --    HS TNI 4HR ng/L  --  14  --   --    HSTNI D4 ng/L  --  1  --   --            Results  from last 7 days   Lab Units 07/11/24  0548   LIPASE u/L 136*           Results from last 7 days   Lab Units 07/11/24  0548   INFLUENZA A PCR  Negative   INFLUENZA B PCR  Negative   RSV PCR  Negative         ED Treatment-Medication Administration from 07/11/2024 0523 to 07/11/2024 2012         Date/Time Order Dose Route Action     07/11/2024 0621 sucralfate (CARAFATE) tablet 1 g 1 g Oral Given     07/11/2024 0620 pantoprazole (PROTONIX) injection 40 mg 40 mg Intravenous Given     07/11/2024 0620 oxyCODONE (ROXICODONE) immediate release tablet 10 mg 10 mg Oral Given     07/11/2024 0700 diltiazem (CARDIZEM) tablet 30 mg 30 mg Oral Given     07/11/2024 0745 iohexol (OMNIPAQUE) 350 MG/ML injection (MULTI-DOSE) 100 mL 100 mL Intravenous Given     07/11/2024 1259 fluticasone-vilanterol 200-25 mcg/actuation 1 puff 1 puff Inhalation Given     07/11/2024 1234 nicotine (NICODERM CQ) 21 mg/24 hr TD 24 hr patch 1 patch 1 patch Transdermal Medication Applied     07/11/2024 1235 enoxaparin (LOVENOX) subcutaneous injection 40 mg 40 mg Subcutaneous Given     07/11/2024 1715 diazepam (VALIUM) tablet 2 mg 2 mg Oral Given     07/11/2024 1715 acetaminophen (TYLENOL) tablet 975 mg 975 mg Oral Given     07/11/2024 1531 pantoprazole (PROTONIX) EC tablet 40 mg 40 mg Oral Given     07/11/2024 1234 metoprolol succinate (TOPROL-XL) 24 hr tablet 25 mg 25 mg Oral Given     07/11/2024 1259 sertraline (ZOLOFT) tablet 25 mg 25 mg Oral Given     07/11/2024 1715 oxyCODONE (ROXICODONE) IR tablet 5 mg 5 mg Oral Given            Past Medical History:   Diagnosis Date    Allergic     seasonal     Allergic rhinitis     Anxiety     Arthritis     Back pain     Chronic obstructive asthma     Chronic pain syndrome     Cluster headaches     Colon polyp     Coronary artery disease     Depression     Insomnia     Kidney stones     Laryngospasm     Leukocytosis     Migraine     Myocardial infarction (HCC)     Nephrolithiasis     Organic impotence     Pneumonia  due to infectious organism 01/16/2019    Seasonal allergies     Sleep apnea     does not use CPAP    Stroke (HCC) 2015    TMJ (temporomandibular joint syndrome)     Wheezing     last assessed 05/19/17     Present on Admission:   Hypertension      Admitting Diagnosis: Shortness of breath [R06.02]  Chest pain [R07.9]  Elevated blood pressure reading [R03.0]  Age/Sex: 53 y.o. male  Admission Orders:  Scheduled Medications:  No current facility-administered medications for this encounter.    Continuous IV Infusions:  No current facility-administered medications for this encounter.    PRN Meds:  No current facility-administered medications for this encounter.      None    Network Utilization Review Department  ATTENTION: Please call with any questions or concerns to [phone] and carefully listen to the prompts so that you are directed to the right person. All voicemails are confidential.   For Discharge needs, contact Care Management DC Support Team at [phone] opt. 2  Send all requests for admission clinical reviews, approved or denied determinations and any other requests to dedicated fax number below belonging to the campus where the patient is receiving treatment. List of dedicated fax numbers for the Facilities:  FACILITY NAME UR FAX NUMBER   ADMISSION DENIALS (Administrative/Medical Necessity) [phone]   DISCHARGE SUPPORT TEAM (NETWORK) [phone]   PARENT CHILD HEALTH (Maternity/NICU/Pediatrics) [phone]   [facility] [phone]   [facility] [phone]   [facility] [phone]   [facility] [phone]   [facility] [phone]   [facility] [phone]   [facility] [phone]   [facility] [phone]   [facility] [phone]   [facility]  [facility] [phone]   [facility] [phone]   [facility] [phone]

## 2024-07-15 NOTE — PATIENT INSTRUCTIONS
Please come back in 4 weeks to see me.    Call around 7/18-7/19 for your refill, and I will increase the zoloft for your anxiety.  Get the blood work done (fasting) and if your liver enzymes look good I will start you on a statin for cholesterol because of the finding on your cat scan, but I will ask the radiologist if it looks more like a clot or a plaque of cholesterol.     Hold off on the diltiazem for now, and measure BP at home.

## 2024-07-15 NOTE — UTILIZATION REVIEW
NOTIFICATION OF INPATIENT ADMISSION   AUTHORIZATION REQUEST   SERVICING FACILITY:   [place] [address]  Tax ID: [id number]  NPI: [id number]   ATTENDING PROVIDER:  Attending Name and NPI#: Hosea Ochoa Md [[id number]]  Address: [address] [place] [address]  Phone: [phone]     ADMISSION INFORMATION:  Place of Service: Inpatient Acute [facility]  Place of Service Code: 21  Inpatient Admission Date/Time: 7/13/24  3:24 PM  Discharge Date/Time: 7/13/2024  3:39 PM  Admitting Diagnosis Code/Description:  Shortness of breath [R06.02]  Chest pain [R07.9]  Elevated blood pressure reading [R03.0]     UTILIZATION REVIEW CONTACT:  Estephania Tadeo Utilization   Network Utilization Review Department  Phone: [phone]  Fax: [phone]  Email: [email or web address]  Contact for approvals/pending authorizations, clinical reviews, and discharge.     PHYSICIAN ADVISORY SERVICES:  Medical Necessity Denial & Gjgb-qj-Tudz Review  Phone: [phone]  Fax: [phone]  Email: [email or web address]     DISCHARGE SUPPORT TEAM:  For Patients Discharge Needs & Updates  Phone: [phone] opt. 2 Fax: [phone]  Email: [email or web address]       Tracheostomy in place

## 2024-07-16 ENCOUNTER — PATIENT OUTREACH (OUTPATIENT)
Dept: FAMILY MEDICINE CLINIC | Facility: CLINIC | Age: 54
End: 2024-07-16

## 2024-07-16 NOTE — PROGRESS NOTES
"HRR referral received via IB. Chart reviewed. Patient recently admitted to  at [facility] - with CP 2\" to MSK vs anxiety vs esophageal dysmotility disorder.     This RNCM called patient and introduced self and explained the role of the RNCM and CCM services. Patient says he is doing okay since home but still feels kind of sluggish. He says he thinks it is probably the heat though. He says his AC  and he is putting a new one in right now.     Patient denies any CP or new complaints. His breathing is good. He states that he has his new medication and all previous ones and he is taking them as directed. Patient declined reviewing medications at this time.     Patient denies any questions, concerns or the need for CCM services.   "

## 2024-07-19 DIAGNOSIS — M96.1 POST LAMINECTOMY SYNDROME: ICD-10-CM

## 2024-07-19 DIAGNOSIS — F41.9 ANXIETY: ICD-10-CM

## 2024-07-19 RX ORDER — ACETAMINOPHEN 325 MG/1
975 TABLET ORAL EVERY 8 HOURS PRN
Qty: 270 TABLET | Refills: 0 | Status: SHIPPED | OUTPATIENT
Start: 2024-07-19

## 2024-07-23 ENCOUNTER — PATIENT MESSAGE (OUTPATIENT)
Dept: FAMILY MEDICINE CLINIC | Facility: CLINIC | Age: 54
End: 2024-07-23

## 2024-07-23 NOTE — TELEPHONE ENCOUNTER
Pt calling regarding status of med refill. Stated he is still waiting on Zoloft, Oxycodone 10 mg ir 120 tablets and the Diazepam 5 mg 45 tablets.     Oxycodone and diazepam not on current med list; please contact to advise.

## 2024-07-24 DIAGNOSIS — F41.9 ANXIETY: ICD-10-CM

## 2024-07-24 DIAGNOSIS — M96.1 POST LAMINECTOMY SYNDROME: ICD-10-CM

## 2024-07-24 DIAGNOSIS — F41.8 DEPRESSION WITH ANXIETY: ICD-10-CM

## 2024-07-24 DIAGNOSIS — M53.3 CHRONIC RIGHT SI JOINT PAIN: Primary | ICD-10-CM

## 2024-07-24 DIAGNOSIS — G89.29 CHRONIC RIGHT SI JOINT PAIN: Primary | ICD-10-CM

## 2024-07-24 RX ORDER — OXYCODONE HYDROCHLORIDE 10 MG/1
10 TABLET ORAL EVERY 4 HOURS PRN
Qty: 120 TABLET | Refills: 0 | Status: SHIPPED | OUTPATIENT
Start: 2024-07-24

## 2024-07-24 RX ORDER — DIAZEPAM 5 MG/1
5 TABLET ORAL EVERY 12 HOURS PRN
Qty: 45 TABLET | Refills: 0 | Status: SHIPPED | OUTPATIENT
Start: 2024-07-24

## 2024-07-24 RX ORDER — SERTRALINE HYDROCHLORIDE 25 MG/1
50 TABLET, FILM COATED ORAL DAILY
Qty: 30 TABLET | Refills: 0 | Status: SHIPPED | OUTPATIENT
Start: 2024-07-24 | End: 2024-07-24 | Stop reason: SDUPTHER

## 2024-07-26 ENCOUNTER — TELEPHONE (OUTPATIENT)
Dept: FAMILY MEDICINE CLINIC | Facility: CLINIC | Age: 54
End: 2024-07-26

## 2024-07-26 NOTE — TELEPHONE ENCOUNTER
Received from [facility] in regards to his medication that is not covered by his ins. Please review, paperwork ([facility] drug change request) is in your folder

## 2024-07-30 ENCOUNTER — TELEPHONE (OUTPATIENT)
Dept: CARDIAC SURGERY | Facility: CLINIC | Age: 54
End: 2024-07-30

## 2024-08-08 ENCOUNTER — OFFICE VISIT (OUTPATIENT)
Dept: CARDIAC SURGERY | Facility: CLINIC | Age: 54
End: 2024-08-08
Payer: MEDICARE

## 2024-08-08 VITALS
WEIGHT: 208.8 LBS | HEART RATE: 66 BPM | DIASTOLIC BLOOD PRESSURE: 110 MMHG | HEIGHT: 73 IN | BODY MASS INDEX: 27.67 KG/M2 | OXYGEN SATURATION: 100 % | SYSTOLIC BLOOD PRESSURE: 159 MMHG

## 2024-08-08 DIAGNOSIS — R93.89 ABNORMAL COMPUTED TOMOGRAPHY ANGIOGRAPHY (CTA): ICD-10-CM

## 2024-08-08 DIAGNOSIS — I10 UNCONTROLLED HYPERTENSION: ICD-10-CM

## 2024-08-08 DIAGNOSIS — I74.11 EMBOLISM AND THROMBOSIS OF THORACIC AORTA (HCC): Primary | ICD-10-CM

## 2024-08-08 PROCEDURE — 99244 OFF/OP CNSLTJ NEW/EST MOD 40: CPT | Performed by: THORACIC SURGERY (CARDIOTHORACIC VASCULAR SURGERY)

## 2024-08-08 NOTE — PROGRESS NOTES
Consultation - Cardiothoracic Surgery   Yefri Bennett 53 y.o. male MRN: [id number]    Physician Requesting Consult: Dr. Sharon Clemente (PCP)    Reason for Consult / Principal Problem: Ascending aortic wall plaque/clot     History of Present Illness: Yefri Bennett is a 53 y.o. year old male who has been referred for an abnormal finding on his CTA CAP/Dissection protocol obtained on 7/11/24. He had presented to [facility] with chest pain and shortness of breath. Work up for ACS and dissection was negative, however on CTA, there was a finding of a 5 cm density on the left anterior ascending aortic wall with a thin stalk, likely a small penunculated noncalcified plaque or clot. During his hospitalization, patient had significantly elevated blood pressures, up to 250/110, which was treated with hydralazine and diltiazem. He was discharged home on 7/13/24 and followed up with his PCP on 7/15. At that time, he was referred to cardiac surgery for evaluation of this finding.     Patient has a PMHx anxiety, arthritis, NELLI (no CPAP), chronic pain, kidney stones (multiple surgeries for this), back pain (patient states 12 surgeries for this), CVA in 2015 (no deficits), migraines/cluster headaches, chronic pain, depression, TMJ, esophageal dysmotility. He is a current smoker, 1 PPD, and also uses marijuana (smokes and uses edibles). He is also on oxycodone daily for chronic pain. He does not drink alcohol. He was previously employed as a , however is now on medical disability. Upon interview today, patient reports symptoms of chest pain (that he reports is sudden and severe), shortness of breath, diaphoresis, LEYVA, lightheadedness, dizziness. He states that he is not very active due to his pain, and reports that he has been so fatigued that he has barely been getting out of bed for the last two weeks. He takes care of his mom, age 78, who has leukemia. He states that he had a heart murmur as a child and previously  saw a cardiologist, but the murmur went away. He states that he had two cardiac catheterizations in the past, but does not remember when, and I did not find any details in his chart or Care Everywhere. He denies history of MI. He has been checking his blood pressures at home, and SBP has run around 170's. No known family history of aneurysms, dissections or SCD.     Past Medical History:  Past Medical History:   Diagnosis Date    Allergic     seasonal     Allergic rhinitis     Anxiety     Arthritis     Back pain     Chronic obstructive asthma     Chronic pain syndrome     Cluster headaches     Colon polyp     Coronary artery disease     Depression     Insomnia     Kidney stones     Laryngospasm     Leukocytosis     Migraine     Myocardial infarction (HCC)     Nephrolithiasis     Organic impotence     Pneumonia due to infectious organism 01/16/2019    Seasonal allergies     Sleep apnea     does not use CPAP    Stroke (HCC) 2015    TMJ (temporomandibular joint syndrome)     Wheezing     last assessed 05/19/17         Past Surgical History:   Past Surgical History:   Procedure Laterality Date    ABDOMINAL SURGERY      BACK SURGERY      *9, [id number], nervectomy 2006, total of 10    BUCCAL MASS EXCISION N/A 03/26/2018    Procedure: BIOPSY LINGUAL TONSIL (FLOW/ STANDARD PATH)  EVAL UNDER ANESTHESIA;  Surgeon: Deric Easton MD;  Location: [facility];  Service: ENT    COLONOSCOPY      FL RETROGRADE PYELOGRAM  10/21/2020    HERNIA REPAIR      KIDNEY SURGERY      KNEE ARTHROSCOPY      LITHOTRIPSY      multiple    LITHOTRIPSY      LUMBAR DISC SURGERY  2015    MANDIBLE FRACTURE SURGERY      titanium plate    PELVIC SYMPHYSIS FUSION      IA CYSTO/URETERO W/LITHOTRIPSY &INDWELL STENT INSRT Right 10/21/2020    Procedure: CYSTOSCOPY URETEROSCOPY WITH LITHOTRIPSY HOLMIUM LASER, RETROGRADE PYELOGRAM AND INSERTION STENT URETERAL;  Surgeon: Anthony Richardson MD;  Location[facility];  Service: Urology    IA  ESOPHAGOGASTRODUODENOSCOPY TRANSORAL DIAGNOSTIC N/A 02/22/2018    Procedure: ESOPHAGOGASTRODUODENOSCOPY (EGD);  Surgeon: Yolis Mares MD;  [facility]: [facility];  Service: Gastroenterology    AL ESOPHAGOGASTRODUODENOSCOPY TRANSORAL DIAGNOSTIC N/A 04/01/2019    Procedure: EGD W/ DILATION;  Surgeon: Yolis Mares MD;  [facility];  Service: Gastroenterology    AL FORREST IMPLTJ NSTIM ELTRDS PLATE/PADDLE EDRL N/A 09/08/2016    Procedure: DORSAL COLUMN STIMULATOR PLACEMENT (IMPULSE MONITORING);  Surgeon: Martin Doe MD;  Location: [facility];  Service: Orthopedics    AL REVJ/RMVL IMPL SPI NPG/RCVR DTCH CONNJ ELTRD RA Left 06/09/2017    Procedure: REMOVAL OF A THORACIC SCS PLACED VIA LAMINECTOMY AND REMOVAL LEFT BUTTOCK GENERATOR; IMPULSE MONITORING;  Surgeon: Steven Parr MD;  Location: [facility];  Service: Neurosurgery    AL SURGICAL ARTHROSCOPY SHOULDER W/ROTATOR CUFF RPR Right 04/04/2019    Procedure: RIGHT SHOULDER ARTHROSCOPIC SUBSCAPULARIS TENDON REPAIR;  Surgeon: David Merritt MD;  [facility];  Service: Orthopedics    SACROILIAC JOINT FUSION  2015    SHOULDER SURGERY Left     2    TONSILLECTOMY      UPPER GASTROINTESTINAL ENDOSCOPY           Family History:  Family History   Problem Relation Age of Onset    Leukemia Mother 77    Hypertension Mother     Diabetes Father     Obesity Father     Liver cancer Father     Heart disease Father     Diabetes Brother     Hypertension Brother     Skin cancer Maternal Grandfather 99    Cancer Family          Social History:      Social History     Substance and Sexual Activity   Alcohol Use Not Currently    Alcohol/week: 2.0 standard drinks of alcohol    Types: 2 Shots of liquor per week     Social History     Substance and Sexual Activity   Drug Use Yes    Frequency: 7.0 times per week    Types: Marijuana    Comment: medical marijuana daily for chronic pain 1/2 ounce     Social History     Tobacco Use   Smoking Status Every Day    Current  packs/day: 0.50    Average packs/day: 1 pack/day for 25.6 years (25.3 ttl pk-yrs)    Types: Cigarettes    Start date: 2024   Smokeless Tobacco Former       Home Medications:   Prior to Admission medications    Medication Sig Start Date End Date Taking? Authorizing Provider   acetaminophen (TYLENOL) 325 mg tablet Take 3 tablets (975 mg total) by mouth every 8 (eight) hours as needed for mild pain 7/19/24  Yes Sharon Clemente MD   albuterol (PROVENTIL HFA,VENTOLIN HFA) 90 mcg/act inhaler Inhale 2 puffs every 6 (six) hours as needed for wheezing 5/13/22  Yes Fausto Abreu MD   diazepam (VALIUM) 5 mg tablet Take 1 tablet (5 mg total) by mouth every 12 (twelve) hours as needed for anxiety or muscle spasms 7/24/24  Yes Andrews Baker MD   diltiazem (CARDIZEM) 60 mg tablet Take 1 tablet (60 mg total) by mouth 2 (two) times a day Take one tablet by mouth twice daily 7/13/24 8/12/24 Yes Re Turner MD   fluticasone-vilanterol (BREO ELLIPTA) 200-25 MCG/INH inhaler Inhale 1 puff daily Rinse mouth after use.  Patient taking differently: Inhale 1 puff if needed Rinse mouth after use. 3/18/19  Yes Sharon Clemente MD   methocarbamol (ROBAXIN) 500 mg tablet Take 1 tablet (500 mg total) by mouth 3 (three) times a day as needed for muscle spasms 3/25/24  Yes Sharon Clemente MD   naloxone (NARCAN) 4 mg/0.1 mL nasal spray Administer 1 spray into a nostril. If no response after 2-3 minutes, give another dose in the other nostril using a new spray. 7/24/24 7/24/25 Yes Andrews Baker MD   oxyCODONE (ROXICODONE) 10 MG TABS Take 1 tablet (10 mg total) by mouth every 4 (four) hours as needed for moderate pain Max Daily Amount: 60 mg 7/24/24  Yes Andrews Baker MD   sertraline (ZOLOFT) 50 mg tablet Take 1 tablet (50 mg total) by mouth daily for 180 doses 7/24/24 1/20/25 Yes Andrews Baker MD       Allergies:  Allergies   Allergen Reactions    Other Rash     Adhesive tape       Review of Systems:     Review of Systems   Constitutional:  Positive for  "diaphoresis and fatigue. Negative for chills and fever.   HENT:  Negative for ear pain and sore throat.    Eyes:  Negative for pain and visual disturbance.   Respiratory:  Positive for shortness of breath. Negative for cough.    Cardiovascular:  Positive for chest pain. Negative for palpitations and leg swelling.   Gastrointestinal:  Negative for abdominal pain and vomiting.   Genitourinary:  Negative for dysuria and hematuria.   Musculoskeletal:  Positive for arthralgias and back pain.   Skin:  Negative for color change and rash.   Neurological:  Positive for dizziness and light-headedness. Negative for seizures and syncope.   Psychiatric/Behavioral:  Positive for sleep disturbance. The patient is nervous/anxious.    All other systems reviewed and are negative.      Vital Signs:     Vitals:    08/08/24 1048 08/08/24 1057   BP: (!) 153/102 (!) 159/110   BP Location: Left arm Right arm   Patient Position: Sitting Sitting   Cuff Size: Standard Standard   Pulse: 66    SpO2: 100%    Weight: 94.7 kg (208 lb 12.8 oz)    Height: 6' 1\" (1.854 m)        Physical Exam:     Physical Exam  Vitals reviewed.   Constitutional:       Appearance: Normal appearance.   HENT:      Head: Normocephalic and atraumatic.   Eyes:      Pupils: Pupils are equal, round, and reactive to light.   Neck:      Vascular: No carotid bruit or JVD.   Cardiovascular:      Rate and Rhythm: Regular rhythm. Tachycardia present.      Pulses:           Carotid pulses are 1+ on the right side and 1+ on the left side.       Radial pulses are 2+ on the right side and 2+ on the left side.        Dorsalis pedis pulses are 2+ on the right side and 2+ on the left side.      Heart sounds: Normal heart sounds. No murmur heard.     No friction rub. No gallop.   Pulmonary:      Effort: Pulmonary effort is normal.      Breath sounds: Normal breath sounds. No wheezing, rhonchi or rales.   Abdominal:      Palpations: Abdomen is soft.      Tenderness: There is no abdominal " "tenderness.   Musculoskeletal:      Cervical back: Normal range of motion.   Skin:     General: Skin is warm and dry.      Capillary Refill: Capillary refill takes less than 2 seconds.   Neurological:      General: No focal deficit present.      Mental Status: He is alert.      Sensory: Sensation is intact.   Psychiatric:         Attention and Perception: Attention normal.         Mood and Affect: Mood is anxious.         Speech: Speech normal.         Behavior: Behavior normal. Behavior is cooperative.         Lab Results:               Invalid input(s): \"LABGLOM\"      Lab Results   Component Value Date    HGBA1C 5.9 12/07/2017     Lab Results   Component Value Date    CKTOTAL 201 12/03/2021    CKMB 2.0 12/03/2021    CKMBINDEX 1.0 12/03/2021    TROPONINI <0.02 06/24/2018       Imaging Studies:     CTA Chest/Abdomen/Pelvis: 7/11/24  IMPRESSION:     No aortic dissection or intramural hematoma. Approximately 5 mm density along the left anterior ascending aortic wall projecting into the lumen with thin stalk, likely a small pedunculated noncalcified plaque/clot.     No additional acute abnormality in the chest, abdomen or pelvis.    CT Chest/abdomen/pelvis with contrast: 2/20/24  IMPRESSION:     No traumatic abnormality    I have personally reviewed pertinent reports.      Assessment:  Patient Active Problem List    Diagnosis Date Noted    Chronic bronchitis 07/12/2024    History of kidney stones 07/12/2024    Chest pain: Secondary to MSK versus anxiety vs esophageal dysmotility disorder 07/11/2024    Anxiety 07/11/2024    Asthma 07/11/2024    COVID-19 01/22/2024    Long term prescription opiate use 10/09/2023    Acute sore throat 01/26/2023    Overweight (BMI 25.0-29.9) 01/06/2023    Polyarthralgia 01/06/2023    Tinnitus of both ears 01/05/2023    Ambulatory dysfunction 10/18/2022    History of colon polyps 05/04/2021    Encounter for medication monitoring 02/20/2021    Acute viral syndrome 02/08/2021    Status post " cystoscopy with ureteral stent placement 10/22/2020    Chronic obstructive asthma     Abnormal urinalysis 10/20/2020    Peripheral polyneuropathy 06/23/2020    Seborrheic keratosis 06/23/2020    Gastritis without bleeding 06/03/2020    Therapeutic opioid induced constipation 08/29/2019    Exposure to sexually transmitted disease (STD) 08/29/2019    Partial tear of right subscapularis tendon 03/27/2019    Incomplete tear of right rotator cuff 03/18/2019    Esophageal dysphagia 03/01/2019    Elevated serum creatinine 01/16/2019    Hypercontractile esophagus 12/21/2018    Left shoulder pain 12/21/2018    Cervical radiculopathy 12/21/2018    Elevated hematocrit 12/17/2018    Elevated platelet count 12/17/2018    Hoarseness 10/30/2018    Tobacco abuse 10/30/2018    Medical marijuana use 07/23/2018    Chronic, continuous use of opioids 07/09/2018    Long-term current use of benzodiazepine 07/09/2018    Other chest pain 06/24/2018    Sleep apnea     Hypertension     Lingual tonsil hypertrophy 03/02/2018    Dental abscess 02/28/2018    Intractable episodic cluster headache 02/13/2018    Dysphagia 02/12/2018    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    Abnormal head CT 08/03/2017    Post laminectomy syndrome 03/22/2017    Elevated blood pressure reading 01/09/2017    Status post insertion of spinal cord stimulator 09/08/2016    Chronic pain syndrome     Neuropathic pain 05/27/2016    Nephrolithiasis 05/27/2016    TMJ syndrome 02/01/2016    Moderate persistent asthma without complication 10/26/2015    Chronic right SI joint pain 09/23/2015    Allergic rhinitis 05/22/2015    Dyshidrotic dermatitis 06/12/2014    Depression with anxiety 09/05/2013    Erectile dysfunction of non-organic origin 09/05/2013     Ascending aortic wall density - likely clot     Plan:  Density on Aortic wall is most likely clot, and patient should be started on anticoagulation. No preference for NOAC vs Warfarin. Will contact PCP to  start and monitor AC.     After 3 months of anticoagulation, patient should return to our office with follow up imaging - CTA chest and transthoracic echo. If clot is resolved, we can likely stop AC at that point.     We have also referred patient to cardiology for HTN management and possible further work up of his chest pain and shortness of breath.     Yefri Bennett was comfortable with our recommendations, and their questions were answered to their satisfaction.  Thank you for allowing us to participate in the care of this patient.       The patient recently had a screening colonoscopy in July 2021.  Therefore GI referral is not indicated at this time.     SIGNATURE: Greer Magallanes PA-C  DATE: 08/08/24  TIME: 11:41 AM    * This note was completed in part utilizing [employer] direct voice recognition software.   Grammatical errors, random word insertion, spelling mistakes, and incomplete sentences may be an occasional consequence of the system secondary to software limitations, ambient noise and hardware issues. At the time of dictation, efforts were made to edit, clarify and /or correct errors. Please read the chart carefully and recognize, using context, where substitutions have occurred.  If you have any questions or concerns about the context, text or information contained within the body of this dictation, please contact myself, the provider, for further clarification.

## 2024-08-08 NOTE — LETTER
August 8, 2024     Sharon Clemente MD  [address] [place] [id number]    Patient: Yefri Bennett   YOB: 1970   Date of Visit: 8/8/2024       Dear Dr. Clemente:    Thank you for referring Yefri Bennett to me for evaluation. Below are my notes for this consultation.    If you have questions, please do not hesitate to call me. I look forward to following your patient along with you.         Sincerely,        Deric De Los Santos MD        CC: No Recipients    Greer Magallanes PA-C  8/8/2024 12:12 PM  Attested  Consultation - Cardiothoracic Surgery   Yefri Bennett 53 y.o. male MRN: [id number]    Physician Requesting Consult: Dr. Sharon Clemente (PCP)    Reason for Consult / Principal Problem: Ascending aortic wall plaque/clot     History of Present Illness: Yefri Bennett is a 53 y.o. year old male who has been referred for an abnormal finding on his CTA CAP/Dissection protocol obtained on 7/11/24. He had presented to [facility] with chest pain and shortness of breath. Work up for ACS and dissection was negative, however on CTA, there was a finding of a 5 cm density on the left anterior ascending aortic wall with a thin stalk, likely a small penunculated noncalcified plaque or clot. During his hospitalization, patient had significantly elevated blood pressures, up to 250/110, which was treated with hydralazine and diltiazem. He was discharged home on 7/13/24 and followed up with his PCP on 7/15. At that time, he was referred to cardiac surgery for evaluation of this finding.     Patient has a PMHx anxiety, arthritis, NELLI (no CPAP), chronic pain, kidney stones (multiple surgeries for this), back pain (patient states 12 surgeries for this), CVA in 2015 (no deficits), migraines/cluster headaches, chronic pain, depression, TMJ, esophageal dysmotility. He is a current smoker, 1 PPD, and also uses marijuana (smokes and uses edibles). He is also on oxycodone daily for chronic pain. He does not drink alcohol. He was  previously employed as a , however is now on medical disability. Upon interview today, patient reports symptoms of chest pain (that he reports is sudden and severe), shortness of breath, diaphoresis, LEYVA, lightheadedness, dizziness. He states that he is not very active due to his pain, and reports that he has been so fatigued that he has barely been getting out of bed for the last two weeks. He takes care of his mom, age 78, who has leukemia. He states that he had a heart murmur as a child and previously saw a cardiologist, but the murmur went away. He states that he had two cardiac catheterizations in the past, but does not remember when, and I did not find any details in his chart or Care Everywhere. He denies history of MI. He has been checking his blood pressures at home, and SBP has run around 170's. No known family history of aneurysms, dissections or SCD.     Past Medical History:  Past Medical History:   Diagnosis Date   • Allergic     seasonal    • Allergic rhinitis    • Anxiety    • Arthritis    • Back pain    • Chronic obstructive asthma    • Chronic pain syndrome    • Cluster headaches    • Colon polyp    • Coronary artery disease    • Depression    • Insomnia    • Kidney stones    • Laryngospasm    • Leukocytosis    • Migraine    • Myocardial infarction (HCC)    • Nephrolithiasis    • Organic impotence    • Pneumonia due to infectious organism 01/16/2019   • Seasonal allergies    • Sleep apnea     does not use CPAP   • Stroke (HCC) 2015   • TMJ (temporomandibular joint syndrome)    • Wheezing     last assessed 05/19/17         Past Surgical History:   Past Surgical History:   Procedure Laterality Date   • ABDOMINAL SURGERY     • BACK SURGERY      *9, [id number], nervectomy 2006, total of 10   • BUCCAL MASS EXCISION N/A 03/26/2018    Procedure: BIOPSY LINGUAL TONSIL (FLOW/ STANDARD PATH)  EVAL UNDER ANESTHESIA;  Surgeon: Deric Easton MD;  [facility];  Service: ENT   •  COLONOSCOPY     • FL RETROGRADE PYELOGRAM  10/21/2020   • HERNIA REPAIR     • KIDNEY SURGERY     • KNEE ARTHROSCOPY     • LITHOTRIPSY      multiple   • LITHOTRIPSY     • LUMBAR DISC SURGERY  2015   • MANDIBLE FRACTURE SURGERY      titanium plate   • PELVIC SYMPHYSIS FUSION     • KS CYSTO/URETERO W/LITHOTRIPSY &INDWELL STENT INSRT Right 10/21/2020    Procedure: CYSTOSCOPY URETEROSCOPY WITH LITHOTRIPSY HOLMIUM LASER, RETROGRADE PYELOGRAM AND INSERTION STENT URETERAL;  Surgeon: Anthony Richardson MD;  [facility];  Service: Urology   • KS ESOPHAGOGASTRODUODENOSCOPY TRANSORAL DIAGNOSTIC N/A 02/22/2018    Procedure: ESOPHAGOGASTRODUODENOSCOPY (EGD);  Surgeon: Yolis Mares MD;  [facility]: [facility];  Service: Gastroenterology   • KS ESOPHAGOGASTRODUODENOSCOPY TRANSORAL DIAGNOSTIC N/A 04/01/2019    Procedure: EGD W/ DILATION;  Surgeon: Yolis Mares MD;  [facility];  Service: Gastroenterology   • KS FORREST IMPLTJ NSTIM ELTRDS PLATE/PADDLE EDRL N/A 09/08/2016    Procedure: DORSAL COLUMN STIMULATOR PLACEMENT (IMPULSE MONITORING);  Surgeon: Martin Doe MD;  Location[facility];  Service: Orthopedics   • KS REVJ/RMVL IMPL SPI NPG/RCVR DTCH CONNJ ELTRD RA Left 06/09/2017    Procedure: REMOVAL OF A THORACIC SCS PLACED VIA LAMINECTOMY AND REMOVAL LEFT BUTTOCK GENERATOR; IMPULSE MONITORING;  Surgeon: Steven Parr MD;  Location: [facility];  Service: Neurosurgery   • KS SURGICAL ARTHROSCOPY SHOULDER W/ROTATOR CUFF RPR Right 04/04/2019    Procedure: RIGHT SHOULDER ARTHROSCOPIC SUBSCAPULARIS TENDON REPAIR;  Surgeon: David Merritt MD;  [facility];  Service: Orthopedics   • SACROILIAC JOINT FUSION  2015   • SHOULDER SURGERY Left     2   • TONSILLECTOMY     • UPPER GASTROINTESTINAL ENDOSCOPY           Family History:  Family History   Problem Relation Age of Onset   • Leukemia Mother 77   • Hypertension Mother    • Diabetes Father    • Obesity Father    • Liver cancer Father    • Heart  disease Father    • Diabetes Brother    • Hypertension Brother    • Skin cancer Maternal Grandfather 99   • Cancer Family          Social History:      Social History     Substance and Sexual Activity   Alcohol Use Not Currently   • Alcohol/week: 2.0 standard drinks of alcohol   • Types: 2 Shots of liquor per week     Social History     Substance and Sexual Activity   Drug Use Yes   • Frequency: 7.0 times per week   • Types: Marijuana    Comment: medical marijuana daily for chronic pain 1/2 ounce     Social History     Tobacco Use   Smoking Status Every Day   • Current packs/day: 0.50   • Average packs/day: 1 pack/day for 25.6 years (25.3 ttl pk-yrs)   • Types: Cigarettes   • Start date: 2024   Smokeless Tobacco Former       Home Medications:   Prior to Admission medications    Medication Sig Start Date End Date Taking? Authorizing Provider   acetaminophen (TYLENOL) 325 mg tablet Take 3 tablets (975 mg total) by mouth every 8 (eight) hours as needed for mild pain 7/19/24  Yes Sharon Clemente MD   albuterol (PROVENTIL HFA,VENTOLIN HFA) 90 mcg/act inhaler Inhale 2 puffs every 6 (six) hours as needed for wheezing 5/13/22  Yes Fausto Abreu MD   diazepam (VALIUM) 5 mg tablet Take 1 tablet (5 mg total) by mouth every 12 (twelve) hours as needed for anxiety or muscle spasms 7/24/24  Yes Andrews Baker MD   diltiazem (CARDIZEM) 60 mg tablet Take 1 tablet (60 mg total) by mouth 2 (two) times a day Take one tablet by mouth twice daily 7/13/24 8/12/24 Yes Re Turner MD   fluticasone-vilanterol (BREO ELLIPTA) 200-25 MCG/INH inhaler Inhale 1 puff daily Rinse mouth after use.  Patient taking differently: Inhale 1 puff if needed Rinse mouth after use. 3/18/19  Yes Sharon Clemente MD   methocarbamol (ROBAXIN) 500 mg tablet Take 1 tablet (500 mg total) by mouth 3 (three) times a day as needed for muscle spasms 3/25/24  Yes Sharon Clemente MD   naloxone (NARCAN) 4 mg/0.1 mL nasal spray Administer 1 spray into a nostril. If no response after  "2-3 minutes, give another dose in the other nostril using a new spray. 7/24/24 7/24/25 Yes Andrews Baker MD   oxyCODONE (ROXICODONE) 10 MG TABS Take 1 tablet (10 mg total) by mouth every 4 (four) hours as needed for moderate pain Max Daily Amount: 60 mg 7/24/24  Yes Andrews Baker MD   sertraline (ZOLOFT) 50 mg tablet Take 1 tablet (50 mg total) by mouth daily for 180 doses 7/24/24 1/20/25 Yes Andrews Baker MD       Allergies:  Allergies   Allergen Reactions   • Other Rash     Adhesive tape       Review of Systems:     Review of Systems   Constitutional:  Positive for diaphoresis and fatigue. Negative for chills and fever.   HENT:  Negative for ear pain and sore throat.    Eyes:  Negative for pain and visual disturbance.   Respiratory:  Positive for shortness of breath. Negative for cough.    Cardiovascular:  Positive for chest pain. Negative for palpitations and leg swelling.   Gastrointestinal:  Negative for abdominal pain and vomiting.   Genitourinary:  Negative for dysuria and hematuria.   Musculoskeletal:  Positive for arthralgias and back pain.   Skin:  Negative for color change and rash.   Neurological:  Positive for dizziness and light-headedness. Negative for seizures and syncope.   Psychiatric/Behavioral:  Positive for sleep disturbance. The patient is nervous/anxious.    All other systems reviewed and are negative.      Vital Signs:     Vitals:    08/08/24 1048 08/08/24 1057   BP: (!) 153/102 (!) 159/110   BP Location: Left arm Right arm   Patient Position: Sitting Sitting   Cuff Size: Standard Standard   Pulse: 66    SpO2: 100%    Weight: 94.7 kg (208 lb 12.8 oz)    Height: 6' 1\" (1.854 m)        Physical Exam:     Physical Exam  Vitals reviewed.   Constitutional:       Appearance: Normal appearance.   HENT:      Head: Normocephalic and atraumatic.   Eyes:      Pupils: Pupils are equal, round, and reactive to light.   Neck:      Vascular: No carotid bruit or JVD.   Cardiovascular:      Rate and " "Rhythm: Regular rhythm. Tachycardia present.      Pulses:           Carotid pulses are 1+ on the right side and 1+ on the left side.       Radial pulses are 2+ on the right side and 2+ on the left side.        Dorsalis pedis pulses are 2+ on the right side and 2+ on the left side.      Heart sounds: Normal heart sounds. No murmur heard.     No friction rub. No gallop.   Pulmonary:      Effort: Pulmonary effort is normal.      Breath sounds: Normal breath sounds. No wheezing, rhonchi or rales.   Abdominal:      Palpations: Abdomen is soft.      Tenderness: There is no abdominal tenderness.   Musculoskeletal:      Cervical back: Normal range of motion.   Skin:     General: Skin is warm and dry.      Capillary Refill: Capillary refill takes less than 2 seconds.   Neurological:      General: No focal deficit present.      Mental Status: He is alert.      Sensory: Sensation is intact.   Psychiatric:         Attention and Perception: Attention normal.         Mood and Affect: Mood is anxious.         Speech: Speech normal.         Behavior: Behavior normal. Behavior is cooperative.         Lab Results:               Invalid input(s): \"LABGLOM\"      Lab Results   Component Value Date    HGBA1C 5.9 12/07/2017     Lab Results   Component Value Date    CKTOTAL 201 12/03/2021    CKMB 2.0 12/03/2021    CKMBINDEX 1.0 12/03/2021    TROPONINI <0.02 06/24/2018       Imaging Studies:     CTA Chest/Abdomen/Pelvis: 7/11/24  IMPRESSION:     No aortic dissection or intramural hematoma. Approximately 5 mm density along the left anterior ascending aortic wall projecting into the lumen with thin stalk, likely a small pedunculated noncalcified plaque/clot.     No additional acute abnormality in the chest, abdomen or pelvis.    CT Chest/abdomen/pelvis with contrast: 2/20/24  IMPRESSION:     No traumatic abnormality    I have personally reviewed pertinent reports.      Assessment:  Patient Active Problem List    Diagnosis Date Noted   • " Chronic bronchitis 07/12/2024   • History of kidney stones 07/12/2024   • Chest pain: Secondary to MSK versus anxiety vs esophageal dysmotility disorder 07/11/2024   • Anxiety 07/11/2024   • Asthma 07/11/2024   • COVID-19 01/22/2024   • Long term prescription opiate use 10/09/2023   • Acute sore throat 01/26/2023   • Overweight (BMI 25.0-29.9) 01/06/2023   • Polyarthralgia 01/06/2023   • Tinnitus of both ears 01/05/2023   • Ambulatory dysfunction 10/18/2022   • History of colon polyps 05/04/2021   • Encounter for medication monitoring 02/20/2021   • Acute viral syndrome 02/08/2021   • Status post cystoscopy with ureteral stent placement 10/22/2020   • Chronic obstructive asthma    • Abnormal urinalysis 10/20/2020   • Peripheral polyneuropathy 06/23/2020   • Seborrheic keratosis 06/23/2020   • Gastritis without bleeding 06/03/2020   • Therapeutic opioid induced constipation 08/29/2019   • Exposure to sexually transmitted disease (STD) 08/29/2019   • Partial tear of right subscapularis tendon 03/27/2019   • Incomplete tear of right rotator cuff 03/18/2019   • Esophageal dysphagia 03/01/2019   • Elevated serum creatinine 01/16/2019   • Hypercontractile esophagus 12/21/2018   • Left shoulder pain 12/21/2018   • Cervical radiculopathy 12/21/2018   • Elevated hematocrit 12/17/2018   • Elevated platelet count 12/17/2018   • Hoarseness 10/30/2018   • Tobacco abuse 10/30/2018   • Medical marijuana use 07/23/2018   • Chronic, continuous use of opioids 07/09/2018   • Long-term current use of benzodiazepine 07/09/2018   • Other chest pain 06/24/2018   • Sleep apnea    • Hypertension    • Lingual tonsil hypertrophy 03/02/2018   • Dental abscess 02/28/2018   • Intractable episodic cluster headache 02/13/2018   • Dysphagia 02/12/2018   • 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   • Abnormal head CT 08/03/2017   • Post laminectomy syndrome 03/22/2017   • Elevated blood pressure reading 01/09/2017   • Status post  insertion of spinal cord stimulator 09/08/2016   • Chronic pain syndrome    • Neuropathic pain 05/27/2016   • Nephrolithiasis 05/27/2016   • TMJ syndrome 02/01/2016   • Moderate persistent asthma without complication 10/26/2015   • Chronic right SI joint pain 09/23/2015   • Allergic rhinitis 05/22/2015   • Dyshidrotic dermatitis 06/12/2014   • Depression with anxiety 09/05/2013   • Erectile dysfunction of non-organic origin 09/05/2013     Ascending aortic wall density - likely clot     Plan:  Density on Aortic wall is most likely clot, and patient should be started on anticoagulation. No preference for NOAC vs Warfarin. Will contact PCP to start and monitor AC.     After 3 months of anticoagulation, patient should return to our office with follow up imaging - CTA chest and transthoracic echo. If clot is resolved, we can likely stop AC at that point.     We have also referred patient to cardiology for HTN management and possible further work up of his chest pain and shortness of breath.     Yefri Bennett was comfortable with our recommendations, and their questions were answered to their satisfaction.  Thank you for allowing us to participate in the care of this patient.       The patient recently had a screening colonoscopy in July 2021.  Therefore GI referral is not indicated at this time.     SIGNATURE: Greer Magallanes PA-C  DATE: 08/08/24  TIME: 11:41 AM    * This note was completed in part utilizing [employer] direct voice recognition software.   Grammatical errors, random word insertion, spelling mistakes, and incomplete sentences may be an occasional consequence of the system secondary to software limitations, ambient noise and hardware issues. At the time of dictation, efforts were made to edit, clarify and /or correct errors. Please read the chart carefully and recognize, using context, where substitutions have occurred.  If you have any questions or concerns about the context, text or information  contained within the body of this dictation, please contact myself, the provider, for further clarification.  Attestation signed by Deric De Los Santos MD at 8/8/2024 12:25 PM:  Attending Attestation:    I supervised the Advanced Practitioner on 8/8/2024.  I discussed the case with the Advanced Practitioner, reviewed the note and agree.    The patient is a 53-year-old male referred for evaluation of the incidental finding on the CTA of the chest abdomen and pelvis back in 7/11/2024.  At the time he presented to the hospital complaining of chest pain and shortness of breath, workup during the time was negative and he was discharged home.  He has significant history for anxiety, chronic pain, CVA, migraines, depression, esophageal dysmotility.  He is a current day smoker, he also uses THC and oxycodone for chronic back pain.  He is disable secondary to chronic back pain.  He is currently asymptomatic, his only complaint is fatigue and sleepiness.  I personally reviewed the diagnostic images, a CTA of the chest abdomen and pelvis on 7/11/2024 shows an ascending aorta of normal size, there is a 5 mm mass in the distal ascending aorta, this could represent plaque or clot.  There is no aneurysm, no dissection, no IM age nor KAREEN.  I explained the findings to the patient, there is no need for surgical intervention at this point.  I recommend anticoagulation for 3 months and I like to see him back in 3 months as well with a repeat CTA of the chest.  I explained to him the importance of maintaining adequate blood pressure control, he is currently hypertensive, we talked about the importance of avoiding tobacco products.  I will refer him to cardiology to consider further ischemic workup as he has risk factors for CAD.

## 2024-08-12 ENCOUNTER — TELEPHONE (OUTPATIENT)
Dept: FAMILY MEDICINE CLINIC | Facility: CLINIC | Age: 54
End: 2024-08-12

## 2024-08-12 DIAGNOSIS — E78.5 HYPERLIPIDEMIA, UNSPECIFIED HYPERLIPIDEMIA TYPE: Primary | ICD-10-CM

## 2024-08-12 DIAGNOSIS — I74.10 AORTIC THROMBUS (HCC): ICD-10-CM

## 2024-08-12 RX ORDER — ATORVASTATIN CALCIUM 40 MG/1
40 TABLET, FILM COATED ORAL DAILY
Qty: 90 TABLET | Refills: 0 | Status: SHIPPED | OUTPATIENT
Start: 2024-08-12

## 2024-08-19 DIAGNOSIS — M53.3 CHRONIC RIGHT SI JOINT PAIN: ICD-10-CM

## 2024-08-19 DIAGNOSIS — G89.29 CHRONIC RIGHT SI JOINT PAIN: ICD-10-CM

## 2024-08-19 DIAGNOSIS — F41.8 DEPRESSION WITH ANXIETY: ICD-10-CM

## 2024-08-19 DIAGNOSIS — M96.1 POST LAMINECTOMY SYNDROME: ICD-10-CM

## 2024-08-20 ENCOUNTER — TELEPHONE (OUTPATIENT)
Age: 54
End: 2024-08-20

## 2024-08-20 RX ORDER — DIAZEPAM 5 MG
5 TABLET ORAL EVERY 12 HOURS PRN
Qty: 45 TABLET | Refills: 0 | Status: SHIPPED | OUTPATIENT
Start: 2024-08-20

## 2024-08-20 RX ORDER — OXYCODONE HYDROCHLORIDE 10 MG/1
10 TABLET ORAL EVERY 4 HOURS PRN
Qty: 120 TABLET | Refills: 0 | Status: SHIPPED | OUTPATIENT
Start: 2024-08-20

## 2024-08-20 NOTE — TELEPHONE ENCOUNTER
PA for Atorvastatin (LIPITOR) 40 mg tablet SUBMITTED     via    [x]CMM-KEY:  [id number]  []Surescripts-Case ID #   []Faxed to plan   []Other website   []Phone call Case ID #     Office notes sent, clinical questions answered. Awaiting determination    Turnaround time for your insurance to make a decision on your Prior Authorization can take 7-21 business days.

## 2024-08-21 DIAGNOSIS — R10.9 ABDOMINAL PAIN: ICD-10-CM

## 2024-08-21 DIAGNOSIS — M54.9 BACK PAIN: ICD-10-CM

## 2024-08-22 RX ORDER — METHOCARBAMOL 500 MG/1
TABLET, FILM COATED ORAL
Qty: 60 TABLET | Refills: 0 | Status: SHIPPED | OUTPATIENT
Start: 2024-08-22

## 2024-08-23 NOTE — TELEPHONE ENCOUNTER
PA for Atorvastatin (LIPITOR) 40 mg tablet  not required     Reason (screenshot if applicable)          Patient advised by          [x] MyChart Message  [] Phone call   []LMOM  []L/M to call office as no active Communication consent on file  []Unable to leave detailed message as VM not approved on Communication consent       Pharmacy advised by    [x]Fax  []Phone call

## 2024-08-26 ENCOUNTER — TELEPHONE (OUTPATIENT)
Age: 54
End: 2024-08-26

## 2024-08-26 NOTE — TELEPHONE ENCOUNTER
Patient called in regarding medications. He would like a call from PCP as he states he just wants a quickl review of which medications he should be taking. States he just picked up new script for lipitor and feels confused with all the medications. Please advise.

## 2024-09-03 ENCOUNTER — APPOINTMENT (OUTPATIENT)
Dept: LAB | Facility: CLINIC | Age: 54
End: 2024-09-03
Payer: MEDICARE

## 2024-09-03 DIAGNOSIS — K76.0 HEPATIC STEATOSIS: ICD-10-CM

## 2024-09-03 DIAGNOSIS — Z79.899 LONG-TERM CURRENT USE OF BENZODIAZEPINE: ICD-10-CM

## 2024-09-03 DIAGNOSIS — Z11.3 ROUTINE SCREENING FOR STI (SEXUALLY TRANSMITTED INFECTION): ICD-10-CM

## 2024-09-03 DIAGNOSIS — Z79.891 LONG TERM PRESCRIPTION OPIATE USE: ICD-10-CM

## 2024-09-03 DIAGNOSIS — R71.8 ELEVATED HEMATOCRIT: ICD-10-CM

## 2024-09-03 DIAGNOSIS — I10 HYPERTENSION, UNSPECIFIED TYPE: ICD-10-CM

## 2024-09-03 DIAGNOSIS — R74.8 ELEVATED LIPASE: ICD-10-CM

## 2024-09-03 DIAGNOSIS — I74.11 EMBOLISM AND THROMBOSIS OF THORACIC AORTA (HCC): ICD-10-CM

## 2024-09-03 DIAGNOSIS — E66.3 OVERWEIGHT (BMI 25.0-29.9): ICD-10-CM

## 2024-09-03 LAB
ALBUMIN SERPL BCG-MCNC: 4.5 G/DL (ref 3.5–5)
ALP SERPL-CCNC: 79 U/L (ref 34–104)
ALT SERPL W P-5'-P-CCNC: 19 U/L (ref 7–52)
ANION GAP SERPL CALCULATED.3IONS-SCNC: 5 MMOL/L (ref 4–13)
AST SERPL W P-5'-P-CCNC: 18 U/L (ref 13–39)
BASOPHILS # BLD AUTO: 0.09 THOUSANDS/ÂΜL (ref 0–0.1)
BASOPHILS NFR BLD AUTO: 1 % (ref 0–1)
BILIRUB DIRECT SERPL-MCNC: 0.06 MG/DL (ref 0–0.2)
BILIRUB SERPL-MCNC: 0.55 MG/DL (ref 0.2–1)
BUN SERPL-MCNC: 19 MG/DL (ref 5–25)
CALCIUM SERPL-MCNC: 9.4 MG/DL (ref 8.4–10.2)
CHLORIDE SERPL-SCNC: 107 MMOL/L (ref 96–108)
CHOLEST SERPL-MCNC: 200 MG/DL
CO2 SERPL-SCNC: 29 MMOL/L (ref 21–32)
CREAT SERPL-MCNC: 1.05 MG/DL (ref 0.6–1.3)
EOSINOPHIL # BLD AUTO: 0.39 THOUSAND/ÂΜL (ref 0–0.61)
EOSINOPHIL NFR BLD AUTO: 3 % (ref 0–6)
ERYTHROCYTE [DISTWIDTH] IN BLOOD BY AUTOMATED COUNT: 15.1 % (ref 11.6–15.1)
GFR SERPL CREATININE-BSD FRML MDRD: 78 ML/MIN/1.73SQ M
GLUCOSE P FAST SERPL-MCNC: 89 MG/DL (ref 65–99)
HBV SURFACE AG SER QL: NORMAL
HCT VFR BLD AUTO: 49.4 % (ref 36.5–49.3)
HCV AB SER QL: NORMAL
HDLC SERPL-MCNC: 48 MG/DL
HGB BLD-MCNC: 15.9 G/DL (ref 12–17)
HIV 1+2 AB+HIV1 P24 AG SERPL QL IA: NORMAL
HIV 2 AB SERPL QL IA: NORMAL
HIV1 AB SERPL QL IA: NORMAL
HIV1 P24 AG SERPL QL IA: NORMAL
IMM GRANULOCYTES # BLD AUTO: 0.06 THOUSAND/UL (ref 0–0.2)
IMM GRANULOCYTES NFR BLD AUTO: 0 % (ref 0–2)
INR PPP: 0.88 (ref 0.85–1.19)
LDLC SERPL CALC-MCNC: 112 MG/DL (ref 0–100)
LIPASE SERPL-CCNC: 207 U/L (ref 11–82)
LYMPHOCYTES # BLD AUTO: 4.08 THOUSANDS/ÂΜL (ref 0.6–4.47)
LYMPHOCYTES NFR BLD AUTO: 30 % (ref 14–44)
MCH RBC QN AUTO: 28.6 PG (ref 26.8–34.3)
MCHC RBC AUTO-ENTMCNC: 32.2 G/DL (ref 31.4–37.4)
MCV RBC AUTO: 89 FL (ref 82–98)
MONOCYTES # BLD AUTO: 0.9 THOUSAND/ÂΜL (ref 0.17–1.22)
MONOCYTES NFR BLD AUTO: 7 % (ref 4–12)
NEUTROPHILS # BLD AUTO: 8.2 THOUSANDS/ÂΜL (ref 1.85–7.62)
NEUTS SEG NFR BLD AUTO: 59 % (ref 43–75)
NRBC BLD AUTO-RTO: 0 /100 WBCS
PLATELET # BLD AUTO: 354 THOUSANDS/UL (ref 149–390)
PMV BLD AUTO: 10.6 FL (ref 8.9–12.7)
POTASSIUM SERPL-SCNC: 4.3 MMOL/L (ref 3.5–5.3)
PROT SERPL-MCNC: 7.3 G/DL (ref 6.4–8.4)
PROTHROMBIN TIME: 12.7 SECONDS (ref 12.3–15)
RBC # BLD AUTO: 5.55 MILLION/UL (ref 3.88–5.62)
SODIUM SERPL-SCNC: 141 MMOL/L (ref 135–147)
TREPONEMA PALLIDUM IGG+IGM AB [PRESENCE] IN SERUM OR PLASMA BY IMMUNOASSAY: NORMAL
TRIGL SERPL-MCNC: 199 MG/DL
TSH SERPL DL<=0.05 MIU/L-ACNC: 3.1 UIU/ML (ref 0.45–4.5)
WBC # BLD AUTO: 13.72 THOUSAND/UL (ref 4.31–10.16)

## 2024-09-03 PROCEDURE — 83690 ASSAY OF LIPASE: CPT

## 2024-09-03 PROCEDURE — 80048 BASIC METABOLIC PNL TOTAL CA: CPT

## 2024-09-03 PROCEDURE — 80361 OPIATES 1 OR MORE: CPT

## 2024-09-03 PROCEDURE — 80076 HEPATIC FUNCTION PANEL: CPT

## 2024-09-03 PROCEDURE — 85025 COMPLETE CBC W/AUTO DIFF WBC: CPT

## 2024-09-03 PROCEDURE — 80349 CANNABINOIDS NATURAL: CPT

## 2024-09-03 PROCEDURE — 80307 DRUG TEST PRSMV CHEM ANLYZR: CPT

## 2024-09-03 PROCEDURE — 87340 HEPATITIS B SURFACE AG IA: CPT

## 2024-09-03 PROCEDURE — 87591 N.GONORRHOEAE DNA AMP PROB: CPT

## 2024-09-03 PROCEDURE — 84443 ASSAY THYROID STIM HORMONE: CPT

## 2024-09-03 PROCEDURE — 36415 COLL VENOUS BLD VENIPUNCTURE: CPT

## 2024-09-03 PROCEDURE — 80061 LIPID PANEL: CPT

## 2024-09-03 PROCEDURE — 87389 HIV-1 AG W/HIV-1&-2 AB AG IA: CPT

## 2024-09-03 PROCEDURE — 87491 CHLMYD TRACH DNA AMP PROBE: CPT

## 2024-09-03 PROCEDURE — 80346 BENZODIAZEPINES1-12: CPT

## 2024-09-03 PROCEDURE — 86803 HEPATITIS C AB TEST: CPT

## 2024-09-03 PROCEDURE — 85610 PROTHROMBIN TIME: CPT

## 2024-09-03 PROCEDURE — 80365 DRUG SCREENING OXYCODONE: CPT

## 2024-09-03 PROCEDURE — 86780 TREPONEMA PALLIDUM: CPT

## 2024-09-04 LAB
C TRACH DNA SPEC QL NAA+PROBE: NEGATIVE
N GONORRHOEA DNA SPEC QL NAA+PROBE: NEGATIVE

## 2024-09-05 ENCOUNTER — OFFICE VISIT (OUTPATIENT)
Dept: FAMILY MEDICINE CLINIC | Facility: CLINIC | Age: 54
End: 2024-09-05
Payer: MEDICARE

## 2024-09-05 VITALS
TEMPERATURE: 97.9 F | DIASTOLIC BLOOD PRESSURE: 88 MMHG | SYSTOLIC BLOOD PRESSURE: 142 MMHG | OXYGEN SATURATION: 98 % | BODY MASS INDEX: 27.7 KG/M2 | HEIGHT: 73 IN | WEIGHT: 209 LBS | RESPIRATION RATE: 20 BRPM | HEART RATE: 100 BPM

## 2024-09-05 DIAGNOSIS — K22.4 ESOPHAGEAL DYSMOTILITIES: ICD-10-CM

## 2024-09-05 DIAGNOSIS — F41.8 DEPRESSION WITH ANXIETY: ICD-10-CM

## 2024-09-05 DIAGNOSIS — J45.40 MODERATE PERSISTENT ASTHMA WITHOUT COMPLICATION: ICD-10-CM

## 2024-09-05 DIAGNOSIS — I10 PRIMARY HYPERTENSION: Primary | ICD-10-CM

## 2024-09-05 PROCEDURE — 99214 OFFICE O/P EST MOD 30 MIN: CPT | Performed by: FAMILY MEDICINE

## 2024-09-05 RX ORDER — ALBUTEROL SULFATE 90 UG/1
2 AEROSOL, METERED RESPIRATORY (INHALATION) EVERY 6 HOURS PRN
Qty: 18 G | Refills: 5 | Status: SHIPPED | OUTPATIENT
Start: 2024-09-05

## 2024-09-05 NOTE — PATIENT INSTRUCTIONS
Please call the insurance company and ask them for a list of Therapists in the area that accept your insurance, and let me know if you need a referral. Please come back in 3-4 weeks and bring ALL of your medications to that visit so we can go over them.

## 2024-09-05 NOTE — PROGRESS NOTES
Ambulatory Visit  Name: Yefri Bennett      : 1970      MRN: [id number]  Encounter Provider: Sharon Clemente MD  Encounter Date: 2024   Encounter department: [facility]    Assessment & Plan   1. 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 Moderate persistent asthma without complication  -     albuterol (PROVENTIL HFA,VENTOLIN HFA) 90 mcg/act inhaler; Inhale 2 puffs every 6 (six) hours as needed for wheezing  3. Depression with anxiety  Assessment & Plan:  Patient amenable to continuing Zoloft 50 mg daily in the setting of recent break-up with which he is coping  We did discuss that a dose increase may be beneficial, but he would like to hold off for now  Advised patient to call his insurance company and request a list of therapists in the area that participate with his insurance  4. Esophageal dysmotilities  -     diltiazem (CARDIZEM) 60 mg tablet; Take 1 tablet (60 mg total) by mouth 2 (two) times a day Take one tablet by mouth twice daily       History of Present Illness     HPI    Patient reports he is taking his medications as prescribed, but that he had been mixing up some medications (robaxin with cardizem) for about a week, which made him very sleepy, but resolved. Agrees to bring pills to next visit for review, especially given recently prescribed statin as well as blood thinner for thoracic aortic thrombus per cardiothoracic surgery.  Reports measuring blood pressures at home and they are usually in the 120s-130s/70s-80s. Reports mood is fair, still coping with stress of recent breakup with his partner. He is taking his Zoloft 50 mg daily but is unsure  "whether or not it was helping, however he does not want to increase the dose at this time and is more interested in looking into options for talk therapy. Denies any SI/HI.  Otherwise, chronic pain is managed with current medication regimen, not yet due for refill.  Does need refill on his albuterol inhaler.    Review of Systems   Constitutional:  Negative for chills and fever.   Respiratory:  Negative for chest tightness and shortness of breath.    Cardiovascular:  Negative for chest pain, palpitations and leg swelling.   Gastrointestinal:  Negative for abdominal pain, diarrhea, nausea and vomiting.   Genitourinary:  Negative for dysuria.   Musculoskeletal:  Positive for arthralgias, back pain and myalgias.        Chronic back pain 2/2 post-laminectomy syndrome   Neurological:  Negative for syncope, weakness and light-headedness.   Psychiatric/Behavioral:  Positive for dysphoric mood. Negative for agitation, self-injury, sleep disturbance and suicidal ideas. The patient is nervous/anxious.         Anxious, frustrated and sad regarding break-up with partner but no SI or HI     Medical History Reviewed by provider this encounter:  Tobacco  Allergies  Meds  Problems  Med Hx  Surg Hx  Fam Hx       Objective     /88 (BP Location: Left arm, Patient Position: Sitting, Cuff Size: Large)   Pulse 100   Temp 97.9 °F (36.6 °C)   Resp 20   Ht 6' 0.5\" (1.842 m)   Wt 94.8 kg (209 lb)   SpO2 98%   BMI 27.96 kg/m²     Physical Exam  Vitals reviewed.   Constitutional:       General: He is not in acute distress.     Appearance: He is well-developed.   HENT:      Head: Normocephalic and atraumatic.   Eyes:      General:         Right eye: No discharge.         Left eye: No discharge.      Conjunctiva/sclera: Conjunctivae normal.   Cardiovascular:      Rate and Rhythm: Normal rate and regular rhythm.   Pulmonary:      Effort: Pulmonary effort is normal. No respiratory distress.      Breath sounds: Normal breath " sounds.   Abdominal:      General: Bowel sounds are normal. There is no distension.      Palpations: Abdomen is soft.      Tenderness: There is no abdominal tenderness.   Musculoskeletal:      Right lower leg: No edema.      Left lower leg: No edema.   Skin:     General: Skin is warm and dry.   Neurological:      Mental Status: He is alert and oriented to person, place, and time.   Psychiatric:         Mood and Affect: Mood normal.         Behavior: Behavior normal.         Thought Content: Thought content normal.         Judgment: Judgment normal.

## 2024-09-06 LAB
1OH-MIDAZOLAM UR CFM-MCNC: NOT DETECTED NG/MG CREAT
6MAM UR QL SCN: NEGATIVE NG/ML
7AMINOCLONAZEPAM UR CFM-MCNC: NOT DETECTED NG/MG CREAT
A-OH ALPRAZ UR QL CFM: NOT DETECTED NG/MG CREAT
ACCEPTABLE CREAT UR QL: 182 MG/DL
ALPRAZ/CREAT UR CFM: NOT DETECTED NG/MG CREAT
AMPHET UR QL SCN: NEGATIVE NG/ML
BARBITURATES UR QL SCN: NEGATIVE NG/ML
BENZODIAZ UR QL SCN: ABNORMAL NG/ML
BUPRENORPHINE UR QL CFM: NEGATIVE NG/ML
CANNABINOIDS UR QL SCN: ABNORMAL NG/ML
CANNABINOIDS/CREAT UR: 143 NG/MG CREAT
CARISOPRODOL UR QL: NEGATIVE NG/ML
CLONAZEPAM/CREAT UR CFM: NOT DETECTED NG/MG CREAT
COCAINE+BZE UR QL SCN: NEGATIVE NG/ML
CODEINE UR QL CFM: NOT DETECTED NG/MG CREAT
DHC UR CFM-MCNC: NOT DETECTED NG/MG CREAT
DIAZEPAM/CREAT UR: NOT DETECTED NG/MG CREAT
ETHYL GLUCURONIDE UR QL SCN: NEGATIVE NG/ML
FENTANYL UR QL SCN: NEGATIVE NG/ML
FLUNITRAZEPAM UR-MCNC: NOT DETECTED NG/MG CREAT
GABAPENTIN SERPLBLD QL SCN: NEGATIVE UG/ML
HYDROCODONE UR QL CFM: NOT DETECTED NG/MG CREAT
HYDROMORPHONE UR QL CFM: NOT DETECTED NG/MG CREAT
LORAZEPAM UR CFM-MCNC: NOT DETECTED NG/MG CREAT
METHADONE UR QL SCN: NEGATIVE NG/ML
MIDAZOLAM/CREAT UR CFM: NOT DETECTED NG/MG CREAT
MORPHINE UR QL CFM: NOT DETECTED NG/MG CREAT
NITRITE UR QL STRIP: NEGATIVE UG/ML
NORCODEINE/CREAT UR CFM: NOT DETECTED NG/MG CREAT
NORDIAZEPAM UR CFM-MCNC: 98 NG/MG CREAT
NORFLUNITRAZEPAM UR-MCNC: NOT DETECTED NG/MG CREAT
NORHYDROCODONE UR CFM-MCNC: NOT DETECTED NG/MG CREAT
NORMORPHINE: NOT DETECTED NG/MG CREAT
NOROXYCODONE UR CFM-MCNC: 715 NG/MG CREAT
NOROXYMORPHONE/CREAT UR CFM: 329 NG/MG CREAT
OH-ETHYLFLURAZ UR CFM-MCNC: NOT DETECTED NG/MG CREAT
OH-TRIAZOLAM UR CFM-MCNC: NOT DETECTED NG/MG CREAT
OPIATES UR QL SCN: NEGATIVE NG/ML
OXAZEPAM CTO UR CFM-MCNC: 109 NG/MG CREAT
OXYCODONE UR QL CFM: 324 NG/MG CREAT
OXYCODONE+OXYMORPHONE UR QL SCN: ABNORMAL NG/ML
OXYMORPHONE UR QL CFM: 1618 NG/MG CREAT
PCP UR QL SCN: NEGATIVE NG/ML
PROPOXYPH UR QL SCN: NEGATIVE NG/ML
SPECIMEN PH ACCEPTABLE UR: 6.3 (ref 4.5–8.9)
TAPENTADOL UR QL SCN: NEGATIVE NG/ML
TEMAZEPAM UR CFM-MCNC: 100 NG/MG CREAT
TRAMADOL UR QL SCN: NEGATIVE NG/ML

## 2024-09-06 RX ORDER — DILTIAZEM HCL 60 MG
60 TABLET ORAL 2 TIMES DAILY
Qty: 180 TABLET | Refills: 0 | Status: SHIPPED | OUTPATIENT
Start: 2024-09-06

## 2024-09-06 NOTE — ASSESSMENT & PLAN NOTE
Patient amenable to continuing Zoloft 50 mg daily in the setting of recent break-up with which he is coping  We did discuss that a dose increase may be beneficial, but he would like to hold off for now  Advised patient to call his insurance company and request a list of therapists in the area that participate with his insurance

## 2024-09-06 NOTE — ASSESSMENT & PLAN NOTE
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

## 2024-09-16 DIAGNOSIS — F41.9 ANXIETY: ICD-10-CM

## 2024-09-16 DIAGNOSIS — M96.1 POST LAMINECTOMY SYNDROME: ICD-10-CM

## 2024-09-16 DIAGNOSIS — E78.5 HYPERLIPIDEMIA, UNSPECIFIED HYPERLIPIDEMIA TYPE: ICD-10-CM

## 2024-09-16 DIAGNOSIS — I74.10 AORTIC THROMBUS (HCC): ICD-10-CM

## 2024-09-16 DIAGNOSIS — M54.9 BACK PAIN: ICD-10-CM

## 2024-09-16 DIAGNOSIS — G89.29 CHRONIC RIGHT SI JOINT PAIN: ICD-10-CM

## 2024-09-16 DIAGNOSIS — K22.4 ESOPHAGEAL DYSMOTILITIES: ICD-10-CM

## 2024-09-16 DIAGNOSIS — M53.3 CHRONIC RIGHT SI JOINT PAIN: ICD-10-CM

## 2024-09-16 DIAGNOSIS — F41.8 DEPRESSION WITH ANXIETY: ICD-10-CM

## 2024-09-16 DIAGNOSIS — R10.9 ABDOMINAL PAIN: ICD-10-CM

## 2024-09-16 RX ORDER — DIAZEPAM 5 MG
5 TABLET ORAL EVERY 12 HOURS PRN
Qty: 45 TABLET | Refills: 0 | Status: SHIPPED | OUTPATIENT
Start: 2024-09-19

## 2024-09-16 RX ORDER — OXYCODONE HYDROCHLORIDE 10 MG/1
10 TABLET ORAL EVERY 4 HOURS PRN
Qty: 120 TABLET | Refills: 0 | Status: SHIPPED | OUTPATIENT
Start: 2024-09-19

## 2024-09-17 RX ORDER — ACETAMINOPHEN 325 MG/1
975 TABLET ORAL EVERY 8 HOURS PRN
Qty: 270 TABLET | Refills: 0 | Status: SHIPPED | OUTPATIENT
Start: 2024-09-17

## 2024-09-17 RX ORDER — ATORVASTATIN CALCIUM 40 MG/1
40 TABLET, FILM COATED ORAL DAILY
Qty: 90 TABLET | Refills: 1 | Status: SHIPPED | OUTPATIENT
Start: 2024-09-17

## 2024-09-17 RX ORDER — DILTIAZEM HCL 60 MG
60 TABLET ORAL 2 TIMES DAILY
Qty: 60 TABLET | Refills: 5 | Status: SHIPPED | OUTPATIENT
Start: 2024-09-17

## 2024-09-18 RX ORDER — METHOCARBAMOL 500 MG/1
500 TABLET, FILM COATED ORAL 3 TIMES DAILY PRN
Qty: 90 TABLET | Refills: 0 | Status: SHIPPED | OUTPATIENT
Start: 2024-09-18

## 2024-09-23 ENCOUNTER — OFFICE VISIT (OUTPATIENT)
Dept: FAMILY MEDICINE CLINIC | Facility: CLINIC | Age: 54
End: 2024-09-23
Payer: MEDICARE

## 2024-09-23 VITALS
SYSTOLIC BLOOD PRESSURE: 128 MMHG | OXYGEN SATURATION: 96 % | TEMPERATURE: 96 F | BODY MASS INDEX: 27.09 KG/M2 | WEIGHT: 204.4 LBS | HEIGHT: 73 IN | HEART RATE: 76 BPM | DIASTOLIC BLOOD PRESSURE: 90 MMHG

## 2024-09-23 DIAGNOSIS — I10 PRIMARY HYPERTENSION: ICD-10-CM

## 2024-09-23 DIAGNOSIS — F41.8 DEPRESSION WITH ANXIETY: Primary | ICD-10-CM

## 2024-09-23 DIAGNOSIS — K22.4 HYPERCONTRACTILE ESOPHAGUS: ICD-10-CM

## 2024-09-23 DIAGNOSIS — Z79.899 ENCOUNTER FOR MEDICATION REVIEW: ICD-10-CM

## 2024-09-23 PROCEDURE — 99213 OFFICE O/P EST LOW 20 MIN: CPT | Performed by: FAMILY MEDICINE

## 2024-09-23 NOTE — PATIENT INSTRUCTIONS
"The zoloft (sertraline) is for anxiety and depression, please cut the pill in half so you are taking 25 mg, once daily, for 2 weeks, then after 2 weeks increase to 1 tablet daily. In the morning is the best time to take it.     Go home, take 7 of the pills and cut them in half, put the 14 \"half-pills\" in your pill container for 1st 14 days, then starting at day 15 put a whole pill in the pill container.     So, you'll be taking 2.5 pills each morning (diltiazem, eliquis, and 1/2 pill of zoloft) and 2 pills each night (diltiazem, eliquis) for the next 2 weeks, then starting at day 15 you'll be taking 3 pills (diltiazem, eliquis, and full pill of zoloft) each morning and 2 pills each night (diltiazem, eliquis) from then on.    Call me if you have any issues/questions!  "

## 2024-09-23 NOTE — PROGRESS NOTES
Ambulatory Visit  Name: Yefri Bennett      : 1970      MRN: [id number]  Encounter Provider: Sharon Clemente MD  Encounter Date: 2024   Encounter department: [facility]    Assessment & Plan  Depression with anxiety  -Patient amenable to restarting Zoloft at this time  -Instructed patient to cut 50 mg tablets in half so he is taking only 25 mg for the first 2 weeks, then increase to 1 full tablet (50 mg) daily thereafter  -Advised patient to continue to seek out support from his social network as he is coping with breaking up with his fiancée; he is considering looking into therapy       Encounter for medication review  -Reviewed indications for diltiazem, Eliquis, with patient as well as proper dosing for each medication  -Personally labeled each medication bottle so patient understands all of his medications fully  -Patient may be able to stop Eliquis pending 3-month follow-up with vascular surgery if repeat imaging shows resolution of ascending aortic thrombus       Hypercontractile esophagus  -Continue diltiazem  -Follow-up with GI as 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  Patient has not been taking Zoloft on a regular basis but is amenable to restarting it as his anxiety has been out of control since breaking up with his fiancée, Michelle.  Denies any SI or HI, she is having some difficulty coping especially since he still sees her quite frequently around the area. He has a full bottle of 50 mg tablets of Zoloft already and he has brought with him today and is very comfortable using a pill cutter to reduce  "the dose to 25 mg for the first 2 weeks.  Patient also has his bottles of diltiazem and Eliquis with him, he is taking the diltiazem for both hypertension and esophageal dysmotility, and taking the Eliquis per recommendation of vascular surgery for a distal ascending aorta thrombus seen on imaging previously.  Patient has no other questions regarding his other medications as he has been on them for quite some time.    History obtained from : patient  Review of Systems   Constitutional:  Negative for chills and fever.   HENT:  Positive for trouble swallowing.         Follows with GI for jackhammer esophagus, on diltiazem   Respiratory:  Negative for chest tightness and shortness of breath.    Cardiovascular:  Negative for chest pain, palpitations and leg swelling.   Gastrointestinal:  Negative for abdominal pain, diarrhea, nausea and vomiting.   Genitourinary:  Negative for dysuria.   Musculoskeletal:  Negative for gait problem.   Neurological:  Negative for syncope, weakness and light-headedness.   Psychiatric/Behavioral:  Positive for dysphoric mood. Negative for agitation, behavioral problems, self-injury, sleep disturbance and suicidal ideas. The patient is nervous/anxious.      Medical History Reviewed by provider this encounter:     .      Objective     /90 (BP Location: Left arm, Patient Position: Sitting, Cuff Size: Large)   Pulse 76   Temp (!) 96 °F (35.6 °C) (Tympanic)   Ht 6' 0.5\" (1.842 m)   Wt 92.7 kg (204 lb 6.4 oz)   SpO2 96%   BMI 27.34 kg/m²     Physical Exam  Vitals reviewed.   Constitutional:       General: He is not in acute distress.     Appearance: He is well-developed.   HENT:      Head: Normocephalic and atraumatic.   Eyes:      Conjunctiva/sclera: Conjunctivae normal.   Cardiovascular:      Rate and Rhythm: Normal rate and regular rhythm.   Pulmonary:      Effort: Pulmonary effort is normal. No respiratory distress.      Breath sounds: Normal breath sounds.   Abdominal:      " General: Bowel sounds are normal.      Palpations: Abdomen is soft.      Tenderness: There is no abdominal tenderness.   Skin:     General: Skin is warm and dry.   Neurological:      Mental Status: He is alert and oriented to person, place, and time.   Psychiatric:         Mood and Affect: Mood normal.         Behavior: Behavior normal.

## 2024-10-11 ENCOUNTER — TELEPHONE (OUTPATIENT)
Dept: FAMILY MEDICINE CLINIC | Facility: CLINIC | Age: 54
End: 2024-10-11

## 2024-10-11 DIAGNOSIS — M96.1 POST LAMINECTOMY SYNDROME: ICD-10-CM

## 2024-10-11 DIAGNOSIS — K22.4 ESOPHAGEAL DYSMOTILITIES: ICD-10-CM

## 2024-10-11 DIAGNOSIS — F41.8 DEPRESSION WITH ANXIETY: ICD-10-CM

## 2024-10-11 DIAGNOSIS — F41.9 ANXIETY: ICD-10-CM

## 2024-10-11 DIAGNOSIS — G89.29 CHRONIC RIGHT SI JOINT PAIN: ICD-10-CM

## 2024-10-11 DIAGNOSIS — M53.3 CHRONIC RIGHT SI JOINT PAIN: ICD-10-CM

## 2024-10-11 DIAGNOSIS — J45.40 MODERATE PERSISTENT ASTHMA WITHOUT COMPLICATION: ICD-10-CM

## 2024-10-11 RX ORDER — ALBUTEROL SULFATE 90 UG/1
2 INHALANT RESPIRATORY (INHALATION) EVERY 6 HOURS PRN
Qty: 18 G | Refills: 0 | Status: SHIPPED | OUTPATIENT
Start: 2024-10-11

## 2024-10-11 RX ORDER — ACETAMINOPHEN 325 MG/1
975 TABLET ORAL EVERY 8 HOURS PRN
Qty: 270 TABLET | Refills: 0 | Status: SHIPPED | OUTPATIENT
Start: 2024-10-11

## 2024-10-11 RX ORDER — DIAZEPAM 5 MG
5 TABLET ORAL EVERY 12 HOURS PRN
Qty: 45 TABLET | Refills: 0 | Status: SHIPPED | OUTPATIENT
Start: 2024-10-17

## 2024-10-11 RX ORDER — DILTIAZEM HCL 60 MG
60 TABLET ORAL 2 TIMES DAILY
Qty: 60 TABLET | Refills: 0 | Status: SHIPPED | OUTPATIENT
Start: 2024-10-11

## 2024-10-11 RX ORDER — OXYCODONE HYDROCHLORIDE 10 MG/1
10 TABLET ORAL EVERY 4 HOURS PRN
Qty: 120 TABLET | Refills: 0 | Status: SHIPPED | OUTPATIENT
Start: 2024-10-17

## 2024-10-11 NOTE — TELEPHONE ENCOUNTER
Patient is having issues with the [id number] - [facility] [place] - [address] [[id number]]     As they wont fill the medications oxyCODONE (ROXICODONE) 10 MG TABS  diltiazem (CARDIZEM) 60 mg tablet    Until the 17th.     Patient stated that last month he picked them up on the 15th, and needs the provider to authorize the pharmacy to fill the medication. Please advise back to patient if medication was able to be filled.

## 2024-10-11 NOTE — TELEPHONE ENCOUNTER
Per PDMP, Rxs were written 9/16 and picked up 9/19, which is why I dated the fill date for 9/17. Please review with pharmacy if the dates in PDMP are not correct for some reason.

## 2024-11-05 ENCOUNTER — HOSPITAL ENCOUNTER (OUTPATIENT)
Dept: CT IMAGING | Facility: HOSPITAL | Age: 54
Discharge: HOME | End: 2024-11-05
Payer: MEDICARE

## 2024-11-05 ENCOUNTER — HOSPITAL ENCOUNTER (OUTPATIENT)
Dept: NON INVASIVE DIAGNOSTICS | Facility: HOSPITAL | Age: 54
Discharge: HOME | End: 2024-11-05
Payer: MEDICARE

## 2024-11-05 VITALS
HEIGHT: 72 IN | SYSTOLIC BLOOD PRESSURE: 128 MMHG | WEIGHT: 204 LBS | HEART RATE: 76 BPM | DIASTOLIC BLOOD PRESSURE: 90 MMHG | BODY MASS INDEX: 27.63 KG/M2

## 2024-11-05 DIAGNOSIS — I74.11 EMBOLISM AND THROMBOSIS OF THORACIC AORTA (HCC): ICD-10-CM

## 2024-11-05 DIAGNOSIS — R93.89 ABNORMAL COMPUTED TOMOGRAPHY ANGIOGRAPHY (CTA): ICD-10-CM

## 2024-11-05 LAB
AORTIC ROOT: 3.4 CM
APICAL FOUR CHAMBER EJECTION FRACTION: 61 %
ASCENDING AORTA: 3.7 CM
BSA FOR ECHO PROCEDURE: 2.15 M2
E WAVE DECELERATION TIME: 249 MS
E/A RATIO: 0.81
FRACTIONAL SHORTENING: 26 (ref 28–44)
INTERVENTRICULAR SEPTUM IN DIASTOLE (PARASTERNAL SHORT AXIS VIEW): 1.4 CM
INTERVENTRICULAR SEPTUM: 1.4 CM (ref 0.6–1.1)
LAAS-AP2: 22.4 CM2
LAAS-AP4: 17.1 CM2
LEFT ATRIUM SIZE: 3.4 CM
LEFT ATRIUM VOLUME (MOD BIPLANE): 56 ML
LEFT ATRIUM VOLUME INDEX (MOD BIPLANE): 25.9 ML/M2
LEFT INTERNAL DIMENSION IN SYSTOLE: 3.9 CM (ref 2.1–4)
LEFT VENTRICULAR INTERNAL DIMENSION IN DIASTOLE: 5.3 CM (ref 3.5–6)
LEFT VENTRICULAR POSTERIOR WALL IN END DIASTOLE: 1.4 CM
LEFT VENTRICULAR STROKE VOLUME: 71 ML
LVSV (TEICH): 71 ML
MV E'TISSUE VEL-SEP: 6 CM/S
MV PEAK A VEL: 0.94 M/S
MV PEAK E VEL: 76 CM/S
MV STENOSIS PRESSURE HALF TIME: 72 MS
MV VALVE AREA P 1/2 METHOD: 3.06
RA PRESSURE ESTIMATED: 3 MMHG
RIGHT ATRIAL 2D VOLUME: 33 ML
RIGHT ATRIUM AREA SYSTOLE A4C: 13.3 CM2
RIGHT VENTRICLE ID DIMENSION: 3.5 CM
RV PSP: 24 MMHG
SL CV LEFT ATRIUM LENGTH A2C: 6.3 CM
SL CV LV EF: 60
SL CV PED ECHO LEFT VENTRICLE DIASTOLIC VOLUME (MOD BIPLANE) 2D: 136 ML
SL CV PED ECHO LEFT VENTRICLE SYSTOLIC VOLUME (MOD BIPLANE) 2D: 65 ML
TR MAX PG: 21 MMHG
TR PEAK VELOCITY: 2.3 M/S
TRICUSPID ANNULAR PLANE SYSTOLIC EXCURSION: 2 CM
TRICUSPID VALVE PEAK REGURGITATION VELOCITY: 2.31 M/S

## 2024-11-05 PROCEDURE — 93306 TTE W/DOPPLER COMPLETE: CPT | Performed by: INTERNAL MEDICINE

## 2024-11-05 PROCEDURE — 93306 TTE W/DOPPLER COMPLETE: CPT

## 2024-11-05 PROCEDURE — 71275 CT ANGIOGRAPHY CHEST: CPT

## 2024-11-05 RX ADMIN — IOHEXOL 150 ML: 350 INJECTION, SOLUTION INTRAVENOUS at 14:06

## 2024-11-05 NOTE — DISCHARGE INSTRUCTIONS
Extravasation   WHAT YOU NEED TO KNOW:   What is extravasation?  Extravasation is when fluid leaks out of your vein and into the soft tissue around an IV. The fluid is a vesicant medicine. This means that it can cause tissue damage, blisters, or severe tissue loss. Some examples of vesicant medicines include chemo medicines, contrast liquid, certain antibiotics, and seizure medicine.  What are the signs and symptoms of extravasation?   Pain around the IV site    Inflammation and tightness of your skin    Trouble flushing the IV catheter    Pale, cool skin    Blisters    What causes extravasation?   The IV catheter may push through the side of your vein.    The IV catheter may move from where it is inserted.    Fluid may leak through the area where the catheter enters the vein.    The vein may be fragile and may tear with the IV fluid.    Fluid may leak through the side of your vein.    A blockage may cause the medicine or fluid to build up.    What increases my risk for extravasation?   Fragile veins or skin    Not being able to see the IV site    IV treatments over a long period of time    IV treatments that are given quickly or that give a large amount of fluid    Type of medicine being given, such as a chemo medicine    How is extravasation treated?  Your healthcare provider may do any or all of the following:  Stop the IV infusion    Remove the IV    Outline the area with a marker    Take pictures of the area    Raise and rest your limb on pillows    Apply a hot or cold compress on the area    Inject medicine into the tissue    How can I manage my symptoms?   Continue to apply ice on the area  as directed or for 15 to 20 minutes every hour. Use an ice pack, or put crushed ice in a plastic bag. Cover it with a towel. Ice helps prevent tissue damage and decreases swelling and pain.    Continue to apply heat on the area  as directed or for 20 to 30 minutes every 2 hours. Heat helps decrease pain and muscle  spasms.    Continue to elevate the area  above the level of your heart as often as you can. This will help decrease swelling and pain. Prop your limb on pillows or blankets to keep it elevated comfortably.         Ask how to clean the area.  Your healthcare provider may bandage the area. He or she may tell you which products you can apply on the area, such as a mild soap.    Medicine  may help relieve pain or inflammation:    Acetaminophen  decreases pain and fever. It is available without a doctor's order. Ask how much to take and how often to take it. Follow directions. Read the labels of all other medicines you are using to see if they also contain acetaminophen, or ask your doctor or pharmacist. Acetaminophen can cause liver damage if not taken correctly. Do not use more than 4 grams (4,000 milligrams) total of acetaminophen in one day.     NSAIDs , such as ibuprofen, help decrease swelling, pain, and fever. This medicine is available with or without a doctor's order. NSAIDs can cause stomach bleeding or kidney problems in certain people. If you take blood thinner medicine, always ask your healthcare provider if NSAIDs are safe for you. Always read the medicine label and follow directions.    When should I seek immediate care?   You have severe pain.      When should I call my doctor?   You have a fever.    Your pain does not get better, or gets worse.    The area becomes more red and swollen.    You see red streaks coming from the area.    You have questions or concerns about your condition or care.    CARE AGREEMENT:   You have the right to help plan your care. Learn about your health condition and how it may be treated. Discuss treatment options with your healthcare providers to decide what care you want to receive. You always have the right to refuse treatment. The above information is an  only. It is not intended as medical advice for individual conditions or treatments. Talk to your doctor,  nurse or pharmacist before following any medical regimen to see if it is safe and effective for you.  © Copyright [employer] 2022 Information is for End User's use only and may not be sold, redistributed or otherwise used for commercial purposes. All illustrations and images included in CareNotes® are the copyrighted property of [employer] or [employer]

## 2024-11-07 NOTE — NURSING NOTE
US guided IV placed by ED RN with great blood return and no complaints of pain/discomfort with NS flush. Extravasation occurred to lue. Alternating heat and ice application was performed. Pt. denied having changes in sensation or ROM. No skin discoloration or alteration was observed. Pt only complaint was of pain at the site with resolving finger numbness. Instructed to continue with altering heat and ice application and notify provider if increase swelling, pain, paresthesia or skin alteration/discoloration occurs. Pt verbalized understanding.

## 2024-11-11 DIAGNOSIS — F41.8 DEPRESSION WITH ANXIETY: ICD-10-CM

## 2024-11-11 DIAGNOSIS — J45.40 MODERATE PERSISTENT ASTHMA WITHOUT COMPLICATION: ICD-10-CM

## 2024-11-11 DIAGNOSIS — M53.3 CHRONIC RIGHT SI JOINT PAIN: ICD-10-CM

## 2024-11-11 DIAGNOSIS — G89.29 CHRONIC RIGHT SI JOINT PAIN: ICD-10-CM

## 2024-11-11 DIAGNOSIS — M54.9 BACK PAIN: ICD-10-CM

## 2024-11-11 DIAGNOSIS — K22.4 ESOPHAGEAL DYSMOTILITIES: ICD-10-CM

## 2024-11-11 DIAGNOSIS — E78.5 HYPERLIPIDEMIA, UNSPECIFIED HYPERLIPIDEMIA TYPE: ICD-10-CM

## 2024-11-11 DIAGNOSIS — I74.10 AORTIC THROMBUS (HCC): ICD-10-CM

## 2024-11-11 DIAGNOSIS — F41.9 ANXIETY: ICD-10-CM

## 2024-11-11 DIAGNOSIS — M96.1 POST LAMINECTOMY SYNDROME: ICD-10-CM

## 2024-11-11 DIAGNOSIS — R10.9 ABDOMINAL PAIN: ICD-10-CM

## 2024-11-12 RX ORDER — ALBUTEROL SULFATE 90 UG/1
2 INHALANT RESPIRATORY (INHALATION) EVERY 6 HOURS PRN
Qty: 18 G | Refills: 1 | Status: SHIPPED | OUTPATIENT
Start: 2024-11-12

## 2024-11-12 RX ORDER — ATORVASTATIN CALCIUM 40 MG/1
40 TABLET, FILM COATED ORAL DAILY
Qty: 90 TABLET | Refills: 1 | Status: SHIPPED | OUTPATIENT
Start: 2024-11-12

## 2024-11-12 RX ORDER — DILTIAZEM HCL 60 MG
60 TABLET ORAL 2 TIMES DAILY
Qty: 60 TABLET | Refills: 5 | Status: SHIPPED | OUTPATIENT
Start: 2024-11-12

## 2024-11-12 RX ORDER — METHOCARBAMOL 500 MG/1
500 TABLET, FILM COATED ORAL 3 TIMES DAILY PRN
Qty: 90 TABLET | Refills: 1 | Status: SHIPPED | OUTPATIENT
Start: 2024-11-12

## 2024-11-13 RX ORDER — OXYCODONE HYDROCHLORIDE 10 MG/1
10 TABLET ORAL EVERY 4 HOURS PRN
Qty: 120 TABLET | Refills: 0 | Status: SHIPPED | OUTPATIENT
Start: 2024-11-15

## 2024-11-13 RX ORDER — DIAZEPAM 5 MG/1
5 TABLET ORAL EVERY 12 HOURS PRN
Qty: 45 TABLET | Refills: 0 | Status: SHIPPED | OUTPATIENT
Start: 2024-11-15

## 2024-11-13 NOTE — TELEPHONE ENCOUNTER
Reviewed PDMP, last Rx for both meds filled 10/17/24, sent refills with start date of 11/15/24 for both.

## 2024-12-05 ENCOUNTER — OFFICE VISIT (OUTPATIENT)
Dept: CARDIAC SURGERY | Facility: CLINIC | Age: 54
End: 2024-12-05
Payer: MEDICARE

## 2024-12-05 VITALS
WEIGHT: 214.9 LBS | SYSTOLIC BLOOD PRESSURE: 170 MMHG | HEIGHT: 72 IN | DIASTOLIC BLOOD PRESSURE: 98 MMHG | BODY MASS INDEX: 29.11 KG/M2 | HEART RATE: 90 BPM | OXYGEN SATURATION: 94 %

## 2024-12-05 DIAGNOSIS — I74.10 AORTIC THROMBUS (HCC): Primary | ICD-10-CM

## 2024-12-05 PROCEDURE — 99214 OFFICE O/P EST MOD 30 MIN: CPT | Performed by: THORACIC SURGERY (CARDIOTHORACIC VASCULAR SURGERY)

## 2024-12-05 NOTE — PATIENT INSTRUCTIONS
Stop Eliquis.  Encouraged smoking cessation.  Home blood pressure monitoring.  F/u w/ PCP w/ diary of symptomology.

## 2024-12-05 NOTE — PROGRESS NOTES
Aortic Surveillance - Cardiac Surgery   Yefri Bennett 54 y.o. male MRN: [id number]    History of Present Illness: Yefri Bennett is a 54 y.o. year old male who presents to the aortic clinic for a 3 month follow-up with scan. His last visit was on 8/8/2024, please refer to this notation for further patient history details. The distal ascending aorta was noted to have a  5mm lesion concerning for plaque vs thrombus to which anticoagulation & 3 month f/u was recommended. He admits to abiding by his lifting & exertion restrictions since his last visit. He is active & reports walking daily, does not do aerobic exercising or muscle resistance. Admits to increase in fatigue since the last visit. Also increase in SOB/LEYVA particularly w/ far distances & stairs. Also c/o chest pains related to his high anxiety moments, resolves quickly after his anxiety subsides. He denies palpitations, LE edema b/l, back pain, arm pain, numbness/tingling/paresthesias in UE b/l, HA, lightheadedness, dizziness, presyncopal symptoms, or syncopal events today or since his last visit. Changes to his medical history since his last visit consist of adjustment to his anxiety/depression medications. He denies any family history of aortic aneurysms or sudden cardiac death. He sees Dr. Clemente as his primary care physician who manages his cardiac medications including Cardizem 60mg. Continues to smoke 1/2 ppd w/ the intention to quit. Denies ETOH use or drug use of any kind.     Past Medical History:  Past Medical History:   Diagnosis Date    Allergic rhinitis     Anxiety     Aortic thrombus (HCC)     h/o, Eliquis to treat    Arthritis     Asthma     Chronic pain syndrome     Oxycodone PRN    Cluster headaches     Coronary artery disease     Current tobacco use     Depression     Esophageal dysmotility     History of colonic polyps     History of CVA (cerebrovascular accident)     no residual neurologic deficits    History of nephrolithiasis     History of  pneumonia     Hypertension     Insomnia     Nephrolithiasis     Organic impotence     NELLI (obstructive sleep apnea)     no CPAP    Seasonal allergies     Sleep apnea     does not use CPAP    Stroke (HCC) 2015    TMJ (dislocation of temporomandibular joint)     TMJ (temporomandibular joint syndrome)      Past Surgical History:   Past Surgical History:   Procedure Laterality Date    ABDOMINAL SURGERY      BACK SURGERY      *9, [id number], nervectomy 2006, total of 10    BUCCAL MASS EXCISION N/A 03/26/2018    Procedure: BIOPSY LINGUAL TONSIL (FLOW/ STANDARD PATH)  EVAL UNDER ANESTHESIA;  Surgeon: Derci Easton MD;  Location: [facility];  Service: ENT    COLONOSCOPY      FL RETROGRADE PYELOGRAM  10/21/2020    HERNIA REPAIR      KIDNEY SURGERY      KNEE ARTHROSCOPY      LITHOTRIPSY      multiple    LUMBAR DISC SURGERY  2015    MANDIBLE FRACTURE SURGERY      titanium plate    PELVIC SYMPHYSIS FUSION      WA CYSTO/URETERO W/LITHOTRIPSY &INDWELL STENT INSRT Right 10/21/2020    Procedure: CYSTOSCOPY URETEROSCOPY WITH LITHOTRIPSY HOLMIUM LASER, RETROGRADE PYELOGRAM AND INSERTION STENT URETERAL;  Surgeon: Anthony Richardson MD;  Location: [facility];  Service: Urology    WA ESOPHAGOGASTRODUODENOSCOPY TRANSORAL DIAGNOSTIC N/A 02/22/2018    Procedure: ESOPHAGOGASTRODUODENOSCOPY (EGD);  Surgeon: Yolis Mares MD;  Location: [facility];  Service: Gastroenterology    WA ESOPHAGOGASTRODUODENOSCOPY TRANSORAL DIAGNOSTIC N/A 04/01/2019    Procedure: EGD W/ DILATION;  Surgeon: Yolis Mares MD;  [facility]: [facility];  Service: Gastroenterology    WA FORREST IMPLTJ NSTIM ELTRDS PLATE/PADDLE EDRL N/A 09/08/2016    Procedure: DORSAL COLUMN STIMULATOR PLACEMENT (IMPULSE MONITORING);  Surgeon: Martin Doe MD;  Location: [facility];  Service: Orthopedics    WA REVJ/RMVL IMPL SPI NPG/RCVR DTCH CONNJ ELTRD RA Left 06/09/2017    Procedure: REMOVAL OF A THORACIC SCS PLACED VIA LAMINECTOMY AND REMOVAL LEFT BUTTOCK GENERATOR; IMPULSE  MONITORING;  Surgeon: Steven Parr MD;  Location:  MAIN OR;  Service: Neurosurgery    OH SURGICAL ARTHROSCOPY SHOULDER W/ROTATOR CUFF RPR Right 04/04/2019    Procedure: RIGHT SHOULDER ARTHROSCOPIC SUBSCAPULARIS TENDON REPAIR;  Surgeon: David Merritt MD;  Location: [facility];  Service: Orthopedics    SACROILIAC JOINT FUSION  2015    SHOULDER SURGERY Left     2    TONSILLECTOMY      UPPER GASTROINTESTINAL ENDOSCOPY       Family History:  Family History   Problem Relation Age of Onset    Leukemia Mother 77    Hypertension Mother     Diabetes Father     Obesity Father     Liver cancer Father     Heart disease Father     Diabetes Brother     Hypertension Brother     Skin cancer Maternal Grandfather 99    Cancer Family      Social History:  Social History     Substance and Sexual Activity   Alcohol Use Yes    Alcohol/week: 2.0 standard drinks of alcohol    Types: 2 Shots of liquor per week    Comment: occ     Social History     Substance and Sexual Activity   Drug Use Yes    Frequency: 7.0 times per week    Types: Marijuana    Comment: medical marijuana daily for chronic pain 1/2 ounce     Social History     Tobacco Use   Smoking Status Every Day    Current packs/day: 0.50    Average packs/day: 1 pack/day for 25.9 years (25.5 ttl pk-yrs)    Types: Cigarettes    Start date: 2024    Passive exposure: Past   Smokeless Tobacco Former       Home Medications:   Prior to Admission medications    Medication Sig Start Date End Date Taking? Authorizing Provider   acetaminophen (TYLENOL) 325 mg tablet Take 3 tablets (975 mg total) by mouth every 8 (eight) hours as needed for mild pain 10/11/24  Yes Sharon Clemente MD   albuterol (PROVENTIL HFA,VENTOLIN HFA) 90 mcg/act inhaler Inhale 2 puffs every 6 (six) hours as needed for wheezing 11/12/24  Yes Sharon Clemente MD   apixaban (ELIQUIS) 5 mg Take 1 tablet (5 mg total) by mouth 2 (two) times a day 11/12/24 5/11/25 Yes Sharon Clemente MD   atorvastatin (LIPITOR) 40 mg tablet Take 1 tablet  (40 mg total) by mouth daily 11/12/24  Yes Sharon Clemente MD   diazepam (VALIUM) 5 mg tablet Take 1 tablet (5 mg total) by mouth every 12 (twelve) hours as needed for anxiety or muscle spasms Do not start before November 15, 2024. 11/15/24  Yes Sharon Clemente MD   diltiazem (CARDIZEM) 60 mg tablet Take 1 tablet (60 mg total) by mouth 2 (two) times a day Take one tablet by mouth twice daily 11/12/24  Yes Sharon Clemente MD   methocarbamol (ROBAXIN) 500 mg tablet Take 1 tablet (500 mg total) by mouth 3 (three) times a day as needed for muscle spasms 11/12/24  Yes Sharon Clemente MD   oxyCODONE (ROXICODONE) 10 MG TABS Take 1 tablet (10 mg total) by mouth every 4 (four) hours as needed for moderate pain Max Daily Amount: 60 mg Do not start before November 15, 2024. 11/15/24  Yes Sharon Clemente MD   fluticasone-vilanterol (BREO ELLIPTA) 200-25 MCG/INH inhaler Inhale 1 puff daily Rinse mouth after use.  Patient not taking: Reported on 12/5/2024 3/18/19   Sharon Clemente MD   naloxone (NARCAN) 4 mg/0.1 mL nasal spray Administer 1 spray into a nostril. If no response after 2-3 minutes, give another dose in the other nostril using a new spray.  Patient not taking: Reported on 12/5/2024 7/24/24 7/24/25  Andrews Baker MD   sertraline (ZOLOFT) 50 mg tablet Take 1 tablet (50 mg total) by mouth daily for 180 doses  Patient not taking: Reported on 12/5/2024 11/12/24 5/11/25  Andrews Baker MD       Allergies:  Allergies   Allergen Reactions    Other Rash     Adhesive tape       Review of Systems:     Review of Systems   Constitutional:  Positive for fatigue. Negative for activity change, diaphoresis and unexpected weight change.   HENT: Negative.  Negative for dental problem.    Respiratory:  Positive for shortness of breath. Negative for chest tightness and wheezing.    Cardiovascular:  Positive for chest pain. Negative for palpitations and leg swelling.   Gastrointestinal: Negative.  Negative for blood in stool, constipation, diarrhea, nausea and  vomiting.   Genitourinary: Negative.    Musculoskeletal: Negative.  Negative for arthralgias, back pain, gait problem and myalgias.   Skin: Negative.    Neurological: Negative.  Negative for dizziness, syncope, weakness, light-headedness, numbness and headaches.   Hematological: Negative.  Does not bruise/bleed easily.   All other systems reviewed and are negative.      Vital Signs:     Vitals:    12/05/24 1348 12/05/24 1354   BP: (!) 173/104 170/98   BP Location: Left arm Right arm   Patient Position: Sitting Sitting   Cuff Size: Large Large   Pulse: 90    SpO2: 94%    Weight: 97.5 kg (214 lb 14.4 oz)    Height: 6' (1.829 m)      Manual recheck in LUE --> 162/95    Physical Exam:     Physical Exam  Constitutional:       General: He is not in acute distress.     Appearance: Normal appearance. He is well-developed. He is not ill-appearing or toxic-appearing.      Comments: Sitting on exam table in NAD   HENT:      Head: Normocephalic and atraumatic.      Mouth/Throat:      Dentition: Normal dentition. Does not have dentures.      Pharynx: Uvula midline.   Neck:      Vascular: No carotid bruit or JVD.      Trachea: Trachea normal.   Cardiovascular:      Rate and Rhythm: Normal rate and regular rhythm.      Chest Wall: PMI is not displaced.      Heart sounds: S1 normal and S2 normal. No murmur heard.     No friction rub. No S3 or S4 sounds.      Comments: No heaves/lifts on palpation  Pulmonary:      Effort: Pulmonary effort is normal. No accessory muscle usage or respiratory distress.      Breath sounds: Decreased air movement present. No wheezing, rhonchi or rales.   Abdominal:      General: Bowel sounds are normal. There is no distension.      Palpations: Abdomen is not rigid. There is no mass.      Tenderness: There is no abdominal tenderness. There is no guarding or rebound.   Musculoskeletal:      Cervical back: Normal range of motion and neck supple.   Skin:     General: Skin is warm and dry.      Findings: No  "bruising, petechiae or rash.      Nails: There is no clubbing.      Comments: Trace edema b/l LE, no varicosities appreciated to b/l LE   Neurological:      Mental Status: He is alert and oriented to person, place, and time.      Cranial Nerves: No cranial nerve deficit.      Sensory: No sensory deficit.      Comments: No focal deficit appreciated         Lab Results:               Invalid input(s): \"LABGLOM\"      Lab Results   Component Value Date    HGBA1C 5.9 12/07/2017     Lab Results   Component Value Date    CKTOTAL 201 12/03/2021    CKMB 2.0 12/03/2021    CKMBINDEX 1.0 12/03/2021    TROPONINI <0.02 06/24/2018       Imaging Studies:     CTA Chest: resolution of lesion along the distal ascending aorta    Echocardiogram: EF 60%, grade 1 DD, mild MR, mild TR    Results Review Statement: I personally reviewed the following image studies in PACS and associated radiology reports: CT chest and Echocardiogram. My interpretation of the radiology images/reports is: as above.    Assessment:  Patient Active Problem List    Diagnosis Date Noted    Aortic thrombus (HCC) 08/12/2024    Chronic bronchitis 07/12/2024    History of kidney stones 07/12/2024    Chest pain: Secondary to MSK versus anxiety vs esophageal dysmotility disorder 07/11/2024    Anxiety 07/11/2024    Asthma 07/11/2024    COVID-19 01/22/2024    Long term prescription opiate use 10/09/2023    Acute sore throat 01/26/2023    Overweight (BMI 25.0-29.9) 01/06/2023    Polyarthralgia 01/06/2023    Tinnitus of both ears 01/05/2023    Ambulatory dysfunction 10/18/2022    History of colon polyps 05/04/2021    Encounter for medication monitoring 02/20/2021    Acute viral syndrome 02/08/2021    Status post cystoscopy with ureteral stent placement 10/22/2020    Chronic obstructive asthma (HCC)     Abnormal urinalysis 10/20/2020    Peripheral polyneuropathy 06/23/2020    Seborrheic keratosis 06/23/2020    Gastritis without bleeding 06/03/2020    Therapeutic opioid " induced constipation 08/29/2019    Exposure to sexually transmitted disease (STD) 08/29/2019    Partial tear of right subscapularis tendon 03/27/2019    Incomplete tear of right rotator cuff 03/18/2019    Esophageal dysphagia 03/01/2019    Elevated serum creatinine 01/16/2019    Hypercontractile esophagus 12/21/2018    Left shoulder pain 12/21/2018    Cervical radiculopathy 12/21/2018    Elevated hematocrit 12/17/2018    Elevated platelet count 12/17/2018    Hoarseness 10/30/2018    Tobacco abuse 10/30/2018    Medical marijuana use 07/23/2018    Chronic, continuous use of opioids 07/09/2018    Long-term current use of benzodiazepine 07/09/2018    Other chest pain 06/24/2018    Sleep apnea     Hypertension     Lingual tonsil hypertrophy 03/02/2018    Dental abscess 02/28/2018    Intractable episodic cluster headache 02/13/2018    Dysphagia 02/12/2018    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    Abnormal head CT 08/03/2017    Post laminectomy syndrome 03/22/2017    Elevated blood pressure reading 01/09/2017    Status post insertion of spinal cord stimulator 09/08/2016    Chronic pain syndrome     Neuropathic pain 05/27/2016    Nephrolithiasis 05/27/2016    TMJ syndrome 02/01/2016    Moderate persistent asthma without complication 10/26/2015    Chronic right SI joint pain 09/23/2015    Allergic rhinitis 05/22/2015    Dyshidrotic dermatitis 06/12/2014    Depression with anxiety 09/05/2013    Erectile dysfunction of non-organic origin 09/05/2013     Distal ascending aortic thrombus; Ongoing surveillance    Plan:     CTA chest performed prior to the visit today was reviewed.  Radiographic findings of  resolution of distal ascending aortic thrombus , were confirmed and shared with the patient today.    We will stop his Eliquis & have a repeat scan in 6 months to ensure continued resolution of the thrombus.    He should start checking his blood pressure at home with his wrist cuff. A arm/brachial cuff  would be superior if he has continued pressure issues. I have encouraged him to call his primary care provider if his blood pressure remains >140 tonight & again tomorrow AM for further management/direction.    In regards to his cardiac symptomology, I have encouraged him to keep a diary of his symptoms top bring to his next primary care appointment.    Encouraged him to stop smoking & notify his primary care provider if he would like further pharmacologic support.    Yefri Bennett was comfortable with our recommendations, and their questions were answered to their satisfaction.  Thank you for allowing us to participate in the care of this patient.     Aortic Aneurysm Instructions were provided to the patient as follows:    1. No heavy sustained lifting which would require excessive straining  2. Maintain a controlled blood pressure with a goal of 120/80.  3. Follow up in Aortic Clinic as recommended with radiology follow up as instructed  4. Report to the ER or call 911 immediately with the following signs / symptoms: sudden onset of back pain, chest pain or shortness of breath.    The patient recently had a screening colonoscopy in 7/21/2021.  Therefore GI referral is not indicated at this time.     SIGNATURE: Queenie De La Cruz PA-C  DATE: 12/05/24  TIME: 1:56 PM

## 2024-12-05 NOTE — LETTER
December 5, 2024     Sharon Clemente MD  [address] [place] [id number]    Patient: Yefri Bennett   YOB: 1970   Date of Visit: 12/5/2024       Dear Dr. Clemente:    Thank you for referring Yefri Bennett to me for evaluation. Below are my notes for this consultation.    If you have questions, please do not hesitate to call me. I look forward to following your patient along with you.         Sincerely,        eDric De Los Santos MD        CC: No Recipients    Queenie De La Cruz PA-C  12/5/2024  2:33 PM  Attested Addendum  Aortic Surveillance - Cardiac Surgery   Yefri Bennett 54 y.o. male MRN: [id number]    History of Present Illness: Yefri Bennett is a 54 y.o. year old male who presents to the aortic clinic for a 3 month follow-up with scan. His last visit was on 8/8/2024, please refer to this notation for further patient history details. The distal ascending aorta was noted to have a  5mm lesion concerning for plaque vs thrombus to which anticoagulation & 3 month f/u was recommended. He admits to abiding by his lifting & exertion restrictions since his last visit. He is active & reports walking daily, does not do aerobic exercising or muscle resistance. Admits to increase in fatigue since the last visit. Also increase in SOB/LEYVA particularly w/ far distances & stairs. Also c/o chest pains related to his high anxiety moments, resolves quickly after his anxiety subsides. He denies palpitations, LE edema b/l, back pain, arm pain, numbness/tingling/paresthesias in UE b/l, HA, lightheadedness, dizziness, presyncopal symptoms, or syncopal events today or since his last visit. Changes to his medical history since his last visit consist of adjustment to his anxiety/depression medications. He denies any family history of aortic aneurysms or sudden cardiac death. He sees Dr. Clemente as his primary care physician who manages his cardiac medications including Cardizem 60mg. Continues to smoke 1/2 ppd w/ the intention to quit.  Denies ETOH use or drug use of any kind.     Past Medical History:  Past Medical History:   Diagnosis Date   • Allergic rhinitis    • Anxiety    • Aortic thrombus (HCC)     h/o, Eliquis to treat   • Arthritis    • Asthma    • Chronic pain syndrome     Oxycodone PRN   • Cluster headaches    • Coronary artery disease    • Current tobacco use    • Depression    • Esophageal dysmotility    • History of colonic polyps    • History of CVA (cerebrovascular accident)     no residual neurologic deficits   • History of nephrolithiasis    • History of pneumonia    • Hypertension    • Insomnia    • Nephrolithiasis    • Organic impotence    • NELLI (obstructive sleep apnea)     no CPAP   • Seasonal allergies    • Sleep apnea     does not use CPAP   • Stroke (HCC) 2015   • TMJ (dislocation of temporomandibular joint)    • TMJ (temporomandibular joint syndrome)      Past Surgical History:   Past Surgical History:   Procedure Laterality Date   • ABDOMINAL SURGERY     • BACK SURGERY      *9, [id number], nervectomy 2006, total of 10   • BUCCAL MASS EXCISION N/A 03/26/2018    Procedure: BIOPSY LINGUAL TONSIL (FLOW/ STANDARD PATH)  EVAL UNDER ANESTHESIA;  Surgeon: Deric Easton MD;  [facility];  Service: ENT   • COLONOSCOPY     • FL RETROGRADE PYELOGRAM  10/21/2020   • HERNIA REPAIR     • KIDNEY SURGERY     • KNEE ARTHROSCOPY     • LITHOTRIPSY      multiple   • LUMBAR DISC SURGERY  2015   • MANDIBLE FRACTURE SURGERY      titanium plate   • PELVIC SYMPHYSIS FUSION     • WV CYSTO/URETERO W/LITHOTRIPSY &INDWELL STENT INSRT Right 10/21/2020    Procedure: CYSTOSCOPY URETEROSCOPY WITH LITHOTRIPSY HOLMIUM LASER, RETROGRADE PYELOGRAM AND INSERTION STENT URETERAL;  Surgeon: Anthony Richardson MD;  [facility];  Service: Urology   • WV ESOPHAGOGASTRODUODENOSCOPY TRANSORAL DIAGNOSTIC N/A 02/22/2018    Procedure: ESOPHAGOGASTRODUODENOSCOPY (EGD);  Surgeon: Yolis Mares MD;  [facility]: [facility];  Service: Gastroenterology   •  DC ESOPHAGOGASTRODUODENOSCOPY TRANSORAL DIAGNOSTIC N/A 04/01/2019    Procedure: EGD W/ DILATION;  Surgeon: Yolis Mares MD;  [facility];  Service: Gastroenterology   • DC FORREST IMPLTJ NSTIM ELTRDS PLATE/PADDLE EDRL N/A 09/08/2016    Procedure: DORSAL COLUMN STIMULATOR PLACEMENT (IMPULSE MONITORING);  Surgeon: Martin Doe MD;  [facility];  Service: Orthopedics   • DC REVJ/RMVL IMPL SPI NPG/RCVR DTCH CONNJ ELTRD RA Left 06/09/2017    Procedure: REMOVAL OF A THORACIC SCS PLACED VIA LAMINECTOMY AND REMOVAL LEFT BUTTOCK GENERATOR; IMPULSE MONITORING;  Surgeon: Steven Parr MD;  Location: [facility];  Service: Neurosurgery   • DC SURGICAL ARTHROSCOPY SHOULDER W/ROTATOR CUFF RPR Right 04/04/2019    Procedure: RIGHT SHOULDER ARTHROSCOPIC SUBSCAPULARIS TENDON REPAIR;  Surgeon: David Merritt MD;  [facility];  Service: Orthopedics   • SACROILIAC JOINT FUSION  2015   • SHOULDER SURGERY Left     2   • TONSILLECTOMY     • UPPER GASTROINTESTINAL ENDOSCOPY       Family History:  Family History   Problem Relation Age of Onset   • Leukemia Mother 77   • Hypertension Mother    • Diabetes Father    • Obesity Father    • Liver cancer Father    • Heart disease Father    • Diabetes Brother    • Hypertension Brother    • Skin cancer Maternal Grandfather 99   • Cancer Family      Social History:  Social History     Substance and Sexual Activity   Alcohol Use Yes   • Alcohol/week: 2.0 standard drinks of alcohol   • Types: 2 Shots of liquor per week    Comment: occ     Social History     Substance and Sexual Activity   Drug Use Yes   • Frequency: 7.0 times per week   • Types: Marijuana    Comment: medical marijuana daily for chronic pain 1/2 ounce     Social History     Tobacco Use   Smoking Status Every Day   • Current packs/day: 0.50   • Average packs/day: 1 pack/day for 25.9 years (25.5 ttl pk-yrs)   • Types: Cigarettes   • Start date: 2024   • Passive exposure: Past   Smokeless Tobacco Former        Home Medications:   Prior to Admission medications    Medication Sig Start Date End Date Taking? Authorizing Provider   acetaminophen (TYLENOL) 325 mg tablet Take 3 tablets (975 mg total) by mouth every 8 (eight) hours as needed for mild pain 10/11/24  Yes Sharon Clemente MD   albuterol (PROVENTIL HFA,VENTOLIN HFA) 90 mcg/act inhaler Inhale 2 puffs every 6 (six) hours as needed for wheezing 11/12/24  Yes Sharon Clemente MD   apixaban (ELIQUIS) 5 mg Take 1 tablet (5 mg total) by mouth 2 (two) times a day 11/12/24 5/11/25 Yes Sharon Clemente MD   atorvastatin (LIPITOR) 40 mg tablet Take 1 tablet (40 mg total) by mouth daily 11/12/24  Yes Sharon Clemente MD   diazepam (VALIUM) 5 mg tablet Take 1 tablet (5 mg total) by mouth every 12 (twelve) hours as needed for anxiety or muscle spasms Do not start before November 15, 2024. 11/15/24  Yes Sharon Clemente MD   diltiazem (CARDIZEM) 60 mg tablet Take 1 tablet (60 mg total) by mouth 2 (two) times a day Take one tablet by mouth twice daily 11/12/24  Yes Sharon Clemente MD   methocarbamol (ROBAXIN) 500 mg tablet Take 1 tablet (500 mg total) by mouth 3 (three) times a day as needed for muscle spasms 11/12/24  Yes Sharon Clemente MD   oxyCODONE (ROXICODONE) 10 MG TABS Take 1 tablet (10 mg total) by mouth every 4 (four) hours as needed for moderate pain Max Daily Amount: 60 mg Do not start before November 15, 2024. 11/15/24  Yes Sharon Clemente MD   fluticasone-vilanterol (BREO ELLIPTA) 200-25 MCG/INH inhaler Inhale 1 puff daily Rinse mouth after use.  Patient not taking: Reported on 12/5/2024 3/18/19   Sharon Clemente MD   naloxone (NARCAN) 4 mg/0.1 mL nasal spray Administer 1 spray into a nostril. If no response after 2-3 minutes, give another dose in the other nostril using a new spray.  Patient not taking: Reported on 12/5/2024 7/24/24 7/24/25  Anderws Baker MD   sertraline (ZOLOFT) 50 mg tablet Take 1 tablet (50 mg total) by mouth daily for 180 doses  Patient not taking: Reported on 12/5/2024 11/12/24 5/11/25   Andrews Baker MD       Allergies:  Allergies   Allergen Reactions   • Other Rash     Adhesive tape       Review of Systems:     Review of Systems   Constitutional:  Positive for fatigue. Negative for activity change, diaphoresis and unexpected weight change.   HENT: Negative.  Negative for dental problem.    Respiratory:  Positive for shortness of breath. Negative for chest tightness and wheezing.    Cardiovascular:  Positive for chest pain. Negative for palpitations and leg swelling.   Gastrointestinal: Negative.  Negative for blood in stool, constipation, diarrhea, nausea and vomiting.   Genitourinary: Negative.    Musculoskeletal: Negative.  Negative for arthralgias, back pain, gait problem and myalgias.   Skin: Negative.    Neurological: Negative.  Negative for dizziness, syncope, weakness, light-headedness, numbness and headaches.   Hematological: Negative.  Does not bruise/bleed easily.   All other systems reviewed and are negative.      Vital Signs:     Vitals:    12/05/24 1348 12/05/24 1354   BP: (!) 173/104 170/98   BP Location: Left arm Right arm   Patient Position: Sitting Sitting   Cuff Size: Large Large   Pulse: 90    SpO2: 94%    Weight: 97.5 kg (214 lb 14.4 oz)    Height: 6' (1.829 m)      Manual recheck in [facility] --> 162/95    Physical Exam:     Physical Exam  Constitutional:       General: He is not in acute distress.     Appearance: Normal appearance. He is well-developed. He is not ill-appearing or toxic-appearing.      Comments: Sitting on exam table in NAD   HENT:      Head: Normocephalic and atraumatic.      Mouth/Throat:      Dentition: Normal dentition. Does not have dentures.      Pharynx: Uvula midline.   Neck:      Vascular: No carotid bruit or JVD.      Trachea: Trachea normal.   Cardiovascular:      Rate and Rhythm: Normal rate and regular rhythm.      Chest Wall: PMI is not displaced.      Heart sounds: S1 normal and S2 normal. No murmur heard.     No friction rub. No S3 or S4 sounds.  "     Comments: No heaves/lifts on palpation  Pulmonary:      Effort: Pulmonary effort is normal. No accessory muscle usage or respiratory distress.      Breath sounds: Decreased air movement present. No wheezing, rhonchi or rales.   Abdominal:      General: Bowel sounds are normal. There is no distension.      Palpations: Abdomen is not rigid. There is no mass.      Tenderness: There is no abdominal tenderness. There is no guarding or rebound.   Musculoskeletal:      Cervical back: Normal range of motion and neck supple.   Skin:     General: Skin is warm and dry.      Findings: No bruising, petechiae or rash.      Nails: There is no clubbing.      Comments: Trace edema b/l LE, no varicosities appreciated to b/l LE   Neurological:      Mental Status: He is alert and oriented to person, place, and time.      Cranial Nerves: No cranial nerve deficit.      Sensory: No sensory deficit.      Comments: No focal deficit appreciated         Lab Results:               Invalid input(s): \"LABGLOM\"      Lab Results   Component Value Date    HGBA1C 5.9 12/07/2017     Lab Results   Component Value Date    CKTOTAL 201 12/03/2021    CKMB 2.0 12/03/2021    CKMBINDEX 1.0 12/03/2021    TROPONINI <0.02 06/24/2018       Imaging Studies:     CTA Chest: resolution of lesion along the distal ascending aorta    Echocardiogram: EF 60%, grade 1 DD, mild MR, mild TR    Results Review Statement: I personally reviewed the following image studies in PACS and associated radiology reports: CT chest and Echocardiogram. My interpretation of the radiology images/reports is: as above.    Assessment:  Patient Active Problem List    Diagnosis Date Noted   • Aortic thrombus (HCC) 08/12/2024   • Chronic bronchitis 07/12/2024   • History of kidney stones 07/12/2024   • Chest pain: Secondary to MSK versus anxiety vs esophageal dysmotility disorder 07/11/2024   • Anxiety 07/11/2024   • Asthma 07/11/2024   • COVID-19 01/22/2024   • Long term prescription opiate " use 10/09/2023   • Acute sore throat 01/26/2023   • Overweight (BMI 25.0-29.9) 01/06/2023   • Polyarthralgia 01/06/2023   • Tinnitus of both ears 01/05/2023   • Ambulatory dysfunction 10/18/2022   • History of colon polyps 05/04/2021   • Encounter for medication monitoring 02/20/2021   • Acute viral syndrome 02/08/2021   • Status post cystoscopy with ureteral stent placement 10/22/2020   • Chronic obstructive asthma (HCC)    • Abnormal urinalysis 10/20/2020   • Peripheral polyneuropathy 06/23/2020   • Seborrheic keratosis 06/23/2020   • Gastritis without bleeding 06/03/2020   • Therapeutic opioid induced constipation 08/29/2019   • Exposure to sexually transmitted disease (STD) 08/29/2019   • Partial tear of right subscapularis tendon 03/27/2019   • Incomplete tear of right rotator cuff 03/18/2019   • Esophageal dysphagia 03/01/2019   • Elevated serum creatinine 01/16/2019   • Hypercontractile esophagus 12/21/2018   • Left shoulder pain 12/21/2018   • Cervical radiculopathy 12/21/2018   • Elevated hematocrit 12/17/2018   • Elevated platelet count 12/17/2018   • Hoarseness 10/30/2018   • Tobacco abuse 10/30/2018   • Medical marijuana use 07/23/2018   • Chronic, continuous use of opioids 07/09/2018   • Long-term current use of benzodiazepine 07/09/2018   • Other chest pain 06/24/2018   • Sleep apnea    • Hypertension    • Lingual tonsil hypertrophy 03/02/2018   • Dental abscess 02/28/2018   • Intractable episodic cluster headache 02/13/2018   • Dysphagia 02/12/2018   • 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   • Abnormal head CT 08/03/2017   • Post laminectomy syndrome 03/22/2017   • Elevated blood pressure reading 01/09/2017   • Status post insertion of spinal cord stimulator 09/08/2016   • Chronic pain syndrome    • Neuropathic pain 05/27/2016   • Nephrolithiasis 05/27/2016   • TMJ syndrome 02/01/2016   • Moderate persistent asthma without complication 10/26/2015   • Chronic right SI joint pain  09/23/2015   • Allergic rhinitis 05/22/2015   • Dyshidrotic dermatitis 06/12/2014   • Depression with anxiety 09/05/2013   • Erectile dysfunction of non-organic origin 09/05/2013     Distal ascending aortic thrombus; Ongoing surveillance    Plan:     CTA chest performed prior to the visit today was reviewed.  Radiographic findings of  resolution of distal ascending aortic thrombus , were confirmed and shared with the patient today.    We will stop his Eliquis & have a repeat scan in 6 months to ensure continued resolution of the thrombus.    He should start checking his blood pressure at home with his wrist cuff. A arm/brachial cuff would be superior if he has continued pressure issues. I have encouraged him to call his primary care provider if his blood pressure remains >140 tonight & again tomorrow AM for further management/direction.    In regards to his cardiac symptomology, I have encouraged him to keep a diary of his symptoms top bring to his next primary care appointment.    Encouraged him to stop smoking & notify his primary care provider if he would like further pharmacologic support.    Yefri Bennett was comfortable with our recommendations, and their questions were answered to their satisfaction.  Thank you for allowing us to participate in the care of this patient.     Aortic Aneurysm Instructions were provided to the patient as follows:    1. No heavy sustained lifting which would require excessive straining  2. Maintain a controlled blood pressure with a goal of 120/80.  3. Follow up in Aortic Clinic as recommended with radiology follow up as instructed  4. Report to the ER or call 911 immediately with the following signs / symptoms: sudden onset of back pain, chest pain or shortness of breath.    The patient recently had a screening colonoscopy in 7/21/2021.  Therefore GI referral is not indicated at this time.     SIGNATURE: Queenie De La Cruz PA-C  DATE: 12/05/24  TIME: 1:56 PM   Attestation signed by  Deric De Los Santos MD at 12/5/2024  2:57 PM:  Attending Attestation:    I supervised the Advanced Practitioner on 12/5/2024.  I discussed the case with the Advanced Practitioner, reviewed the note and agree.    Patient is a 54-year-old man that returns today for a 3 months follow-up after he was diagnosed with a distal ascending aorta thrombus.  He has been anticoagulated on Eliquis for 4 months.  Repeat CTA on 11/5/2024 shows complete resolution of the thrombus, there is no evidence of aneurysm, KAREEN or IMH.  I explained the findings to the patient and his significant other, at this point I think it safe to stop Eliquis, I explained to him the importance of completely avoiding tobacco products, he has cut on the smoking but not completely.  I would like to see him back in 6 months with a CTA of the chest.

## 2024-12-06 ENCOUNTER — VBI (OUTPATIENT)
Dept: ADMINISTRATIVE | Facility: OTHER | Age: 54
End: 2024-12-06

## 2024-12-06 NOTE — TELEPHONE ENCOUNTER
12/06/24 5:32 PM     Chart reviewed for Blood Pressure ; nothing is submitted to the patient's insurance at this time.     Neetu Guillory MA   [facility]

## 2024-12-09 DIAGNOSIS — M96.1 POST LAMINECTOMY SYNDROME: ICD-10-CM

## 2024-12-09 DIAGNOSIS — F41.8 DEPRESSION WITH ANXIETY: ICD-10-CM

## 2024-12-09 DIAGNOSIS — M53.3 CHRONIC RIGHT SI JOINT PAIN: ICD-10-CM

## 2024-12-09 DIAGNOSIS — E78.5 HYPERLIPIDEMIA, UNSPECIFIED HYPERLIPIDEMIA TYPE: ICD-10-CM

## 2024-12-09 DIAGNOSIS — K22.4 ESOPHAGEAL DYSMOTILITIES: ICD-10-CM

## 2024-12-09 DIAGNOSIS — G89.29 CHRONIC RIGHT SI JOINT PAIN: ICD-10-CM

## 2024-12-10 RX ORDER — ATORVASTATIN CALCIUM 40 MG/1
40 TABLET, FILM COATED ORAL DAILY
Qty: 90 TABLET | Refills: 0 | Status: SHIPPED | OUTPATIENT
Start: 2024-12-10

## 2024-12-10 RX ORDER — DILTIAZEM HCL 60 MG
60 TABLET ORAL 2 TIMES DAILY
Qty: 60 TABLET | Refills: 0 | Status: SHIPPED | OUTPATIENT
Start: 2024-12-10

## 2024-12-11 NOTE — TELEPHONE ENCOUNTER
Pt checking on status of refills; advised atorvastatin and diltiazem sent to pharmacy and still awaiting oxyCODONE and valium.    Please contact to advise once sent to pharmacy as pt does not have enough to last the weekend.

## 2024-12-12 RX ORDER — DIAZEPAM 5 MG/1
5 TABLET ORAL EVERY 12 HOURS PRN
Qty: 45 TABLET | Refills: 0 | Status: SHIPPED | OUTPATIENT
Start: 2024-12-12

## 2024-12-12 RX ORDER — OXYCODONE HYDROCHLORIDE 10 MG/1
10 TABLET ORAL EVERY 4 HOURS PRN
Qty: 120 TABLET | Refills: 0 | Status: SHIPPED | OUTPATIENT
Start: 2024-12-12

## 2024-12-12 NOTE — TELEPHONE ENCOUNTER
Reviewed PDMP, last fill for valium and oxycodone was 11/15/24, so patient should not be out of medication yet. Sent refills to pharmacy. Has follow up with me 1/16/25.

## 2024-12-13 ENCOUNTER — TELEPHONE (OUTPATIENT)
Age: 54
End: 2024-12-13

## 2024-12-13 NOTE — TELEPHONE ENCOUNTER
PA for oxyCODONE (ROXICODONE) 10 MG TABS [facility]    via    [x]CMM-KEY: [id number]  []Surescripts-Case ID#   []Availity-Auth ID # NDC #   []Faxed to plan   []Other website   []Phone call Case ID #     []PA sent as URGENT    All office notes, labs and other pertaining documents and studies sent. Clinical questions answered. Awaiting determination from insurance company.     Turnaround time for your insurance to make a decision on your Prior Authorization can take 7-21 business days.

## 2024-12-16 NOTE — TELEPHONE ENCOUNTER
PA for oxyCODONE (ROXICODONE) 10 MG TABS APPROVED     Date(s) approved 12/16/24-06/16/25    Case #n/a    Patient advised by          []MyChart Message  []Phone call   [x]LMOM  []L/M to call office as no active Communication consent on file  []Unable to leave detailed message as VM not approved on Communication consent       Pharmacy advised by    [x]Fax  []Phone call    Approval letter scanned into [employer] Yes

## 2025-01-03 DIAGNOSIS — J45.40 MODERATE PERSISTENT ASTHMA WITHOUT COMPLICATION: ICD-10-CM

## 2025-01-03 RX ORDER — ALBUTEROL SULFATE 90 UG/1
2 INHALANT RESPIRATORY (INHALATION) EVERY 6 HOURS PRN
Qty: 18 G | Refills: 1 | Status: SHIPPED | OUTPATIENT
Start: 2025-01-03

## 2025-01-07 NOTE — ASSESSMENT & PLAN NOTE
-Patient amenable to restarting Zoloft at this time  -Instructed patient to cut 50 mg tablets in half so he is taking only 25 mg for the first 2 weeks, then increase to 1 full tablet (50 mg) daily thereafter  -Advised patient to continue to seek out support from his social network as he is coping with breaking up with his fiancée; he is considering looking into therapy

## 2025-01-07 NOTE — ASSESSMENT & PLAN NOTE
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

## 2025-01-10 DIAGNOSIS — G89.29 CHRONIC RIGHT SI JOINT PAIN: ICD-10-CM

## 2025-01-10 DIAGNOSIS — M53.3 CHRONIC RIGHT SI JOINT PAIN: ICD-10-CM

## 2025-01-13 ENCOUNTER — OFFICE VISIT (OUTPATIENT)
Dept: FAMILY MEDICINE CLINIC | Facility: CLINIC | Age: 55
End: 2025-01-13
Payer: MEDICARE

## 2025-01-13 VITALS
OXYGEN SATURATION: 98 % | BODY MASS INDEX: 29.16 KG/M2 | SYSTOLIC BLOOD PRESSURE: 161 MMHG | HEART RATE: 85 BPM | WEIGHT: 220 LBS | DIASTOLIC BLOOD PRESSURE: 97 MMHG | HEIGHT: 73 IN | TEMPERATURE: 97.9 F

## 2025-01-13 DIAGNOSIS — M50.20 CERVICAL DISC HERNIATION: ICD-10-CM

## 2025-01-13 DIAGNOSIS — M48.02 DEGENERATIVE CERVICAL SPINAL STENOSIS: Primary | ICD-10-CM

## 2025-01-13 DIAGNOSIS — M96.1 POST LAMINECTOMY SYNDROME: ICD-10-CM

## 2025-01-13 PROCEDURE — 99214 OFFICE O/P EST MOD 30 MIN: CPT | Performed by: FAMILY MEDICINE

## 2025-01-13 RX ORDER — METHYLPREDNISOLONE 4 MG/1
TABLET ORAL
Qty: 21 EACH | Refills: 0 | Status: SHIPPED | OUTPATIENT
Start: 2025-01-13

## 2025-01-13 RX ORDER — DIAZEPAM 5 MG/1
5 TABLET ORAL EVERY 12 HOURS PRN
Qty: 45 TABLET | Refills: 0 | Status: SHIPPED | OUTPATIENT
Start: 2025-01-13

## 2025-01-13 RX ORDER — OXYCODONE HYDROCHLORIDE 10 MG/1
10 TABLET ORAL EVERY 4 HOURS PRN
Qty: 120 TABLET | Refills: 0 | Status: SHIPPED | OUTPATIENT
Start: 2025-01-13

## 2025-01-13 RX ORDER — NAPROXEN 500 MG/1
500 TABLET ORAL 2 TIMES DAILY WITH MEALS
Qty: 14 TABLET | Refills: 0 | Status: SHIPPED | OUTPATIENT
Start: 2025-01-13 | End: 2025-01-23

## 2025-01-13 NOTE — ASSESSMENT & PLAN NOTE
Orders:    diazepam (VALIUM) 5 mg tablet; Take 1 tablet (5 mg total) by mouth every 12 (twelve) hours as needed for anxiety or muscle spasms    Ambulatory Referral to Orthopedic Surgery; Future    naproxen (Naprosyn) 500 mg tablet; Take 1 tablet (500 mg total) by mouth 2 (two) times a day with meals for 7 days

## 2025-01-13 NOTE — ASSESSMENT & PLAN NOTE
Orders:    diazepam (VALIUM) 5 mg tablet; Take 1 tablet (5 mg total) by mouth every 12 (twelve) hours as needed for anxiety or muscle spasms

## 2025-01-13 NOTE — PROGRESS NOTES
Name: Yefri Bennett      : 1970      MRN: [id number]  Encounter Provider: Sharon Clemente MD  Encounter Date: 2025   Encounter department: [facility]  :  Assessment & Plan  Degenerative cervical spinal stenosis    Orders:    Ambulatory Referral to Orthopedic Surgery; Future    naproxen (Naprosyn) 500 mg tablet; Take 1 tablet (500 mg total) by mouth 2 (two) times a day with meals for 7 days    Post laminectomy syndrome    Orders:    diazepam (VALIUM) 5 mg tablet; Take 1 tablet (5 mg total) by mouth every 12 (twelve) hours as needed for anxiety or muscle spasms    Ambulatory Referral to Orthopedic Surgery; Future    naproxen (Naprosyn) 500 mg tablet; Take 1 tablet (500 mg total) by mouth 2 (two) times a day with meals for 7 days    Cervical disc herniation    Orders:    methylPREDNISolone 4 MG tablet therapy pack; Use as directed on package    naproxen (Naprosyn) 500 mg tablet; Take 1 tablet (500 mg total) by mouth 2 (two) times a day with meals for 7 days    Given patient has known cervical degenerative disc disease with herniations seen on 2023 MRI, suspect patient is experiencing acute exacerbation of cervical disc herniation at this time; will trial 1 week of scheduled naproxen, Medrol Dosepak, continue home analgesic regimen of oxycodone and Valium but advised patient not to increase intake or dosage of either; patient amenable to referral to orthopedic surgery at this time for consideration of repeat imaging and/or review any possible options for management.  ED precautions reviewed if any acute neurologic symptoms develop.       History of Present Illness     HPI    Patient is here for acute visit, he has had significantly worse cervical pain in his neck that is shooting down his right arm all the way to his hand, making it difficult to move around without significant pain.  No loss of function or sensation. He already has oxycodone refill and is due for Valium refill,  "which are chronic medications for his postlaminectomy syndrome, but those symptoms are usually more in his lower back and this is limited to his cervical spine.  Denies any known trauma but did have overuse while on vacation over the past couple weeks.  Patient had MRI of his C-spine in December 2023, which showed the following:    C2-C3: Mild facet hypertrophy on the left without central or foraminal.  C3-C4: Small marginal osteophytes bilaterally. Minimal central stenosis and minimal right foraminal narrowing noted.  C4-C5: Mild facet hypertrophy bilaterally with small central protrusion type disc herniation. Mild central stenosis without foraminal narrowing.  C5-C6: Marginal osteophytes and mild facet hypertrophy combine to result in mild central and mild left greater than right foraminal narrowing.  C6-C7: Small marginal osteophytes result in mild bilateral foraminal narrowing. Central canal patent.  C7-T1: No significant central or foraminal narrowing.  Impression: Mild spondylotic degenerative changes are seen in the cervical spine to include small central protrusion type disc herniation at C4-5 contributing to mild central stenosis. Mild foraminal narrowing noted at multiple levels as described.    Review of Systems   Constitutional:  Negative for chills and fever.   Respiratory:  Negative for chest tightness and shortness of breath.    Cardiovascular:  Negative for chest pain and palpitations.   Gastrointestinal:  Negative for abdominal pain, diarrhea, nausea and vomiting.   Musculoskeletal:  Positive for arthralgias, back pain, myalgias and neck pain. Negative for gait problem.   Neurological:  Negative for syncope, weakness, light-headedness and headaches.   Psychiatric/Behavioral:  Positive for sleep disturbance. Negative for suicidal ideas.        Objective   /97 (BP Location: Left arm, Patient Position: Sitting, Cuff Size: Large)   Pulse 85   Temp 97.9 °F (36.6 °C) (Temporal)   Ht 6' 1\" (1.854 " m)   Wt 99.8 kg (220 lb)   SpO2 98%   BMI 29.03 kg/m²      Physical Exam  Vitals reviewed.   Constitutional:       General: He is not in acute distress.     Appearance: He is well-developed.   HENT:      Head: Normocephalic and atraumatic.      Right Ear: External ear normal.      Left Ear: External ear normal.   Cardiovascular:      Rate and Rhythm: Normal rate and regular rhythm.   Pulmonary:      Effort: Pulmonary effort is normal. No respiratory distress.      Breath sounds: Normal breath sounds.   Abdominal:      General: Bowel sounds are normal. There is no distension.      Palpations: Abdomen is soft.      Tenderness: There is no abdominal tenderness.   Musculoskeletal:      Comments: Patient appears in significant pain with even very mild movements of his neck or RLE,  noted to have FROM; sensation grossly intact and motor strength RLE 5/5   Neurological:      Mental Status: He is alert. Mental status is at baseline.   Psychiatric:         Mood and Affect: Mood normal.         Behavior: Behavior normal.

## 2025-01-13 NOTE — TELEPHONE ENCOUNTER
Pt called to try and reschedule appt for earlier, his nerve pain is worse and his whole hand is numb; rescheduled for 1:20 today and he will bring up filling medication.

## 2025-01-16 ENCOUNTER — TELEPHONE (OUTPATIENT)
Dept: OBGYN CLINIC | Facility: CLINIC | Age: 55
End: 2025-01-16

## 2025-01-16 NOTE — TELEPHONE ENCOUNTER
Spoke to patient regarding appointment scheduled with Dr. OLIVER tomorrow 1/17/25. Dr. OLIVER prefers patient to see spine and pain due to previous diagnosis in the patient's chart. The patient got very frustrated due to waiting 3 weeks for this appointment. I let patient know I will send a message to the manager of the office to see if the patient can get an appointment with the spine and pain doctor at our location then if the provider does see any orthopedic issues he can personally go to one of our orthopedic doctors down the hallway. Patient said he is ok with that and wants a call as soon as possible due to him waiting so long for the appointment that is now canceled.

## 2025-01-17 DIAGNOSIS — M48.02 DEGENERATIVE CERVICAL SPINAL STENOSIS: ICD-10-CM

## 2025-01-17 DIAGNOSIS — M50.20 CERVICAL DISC HERNIATION: ICD-10-CM

## 2025-01-17 DIAGNOSIS — M96.1 POST LAMINECTOMY SYNDROME: ICD-10-CM

## 2025-01-21 DIAGNOSIS — M96.1 POST LAMINECTOMY SYNDROME: ICD-10-CM

## 2025-01-21 DIAGNOSIS — M48.02 DEGENERATIVE CERVICAL SPINAL STENOSIS: ICD-10-CM

## 2025-01-21 DIAGNOSIS — M50.20 CERVICAL DISC HERNIATION: ICD-10-CM

## 2025-01-23 RX ORDER — NAPROXEN 500 MG/1
TABLET ORAL
Qty: 14 TABLET | Refills: 0 | OUTPATIENT
Start: 2025-01-23

## 2025-01-23 RX ORDER — NAPROXEN 500 MG/1
TABLET ORAL
Qty: 14 TABLET | Refills: 0 | Status: SHIPPED | OUTPATIENT
Start: 2025-01-23

## 2025-01-31 ENCOUNTER — CONSULT (OUTPATIENT)
Dept: PAIN MEDICINE | Facility: CLINIC | Age: 55
End: 2025-01-31
Payer: MEDICARE

## 2025-01-31 VITALS
HEART RATE: 96 BPM | DIASTOLIC BLOOD PRESSURE: 108 MMHG | BODY MASS INDEX: 28.63 KG/M2 | RESPIRATION RATE: 18 BRPM | HEIGHT: 73 IN | SYSTOLIC BLOOD PRESSURE: 156 MMHG | WEIGHT: 216 LBS

## 2025-01-31 DIAGNOSIS — M62.838 MUSCLE SPASM: ICD-10-CM

## 2025-01-31 DIAGNOSIS — M48.02 FORAMINAL STENOSIS OF CERVICAL REGION: ICD-10-CM

## 2025-01-31 DIAGNOSIS — M50.120 CERVICAL DISC DISORDER WITH RADICULOPATHY OF MID-CERVICAL REGION: Primary | ICD-10-CM

## 2025-01-31 PROCEDURE — 99244 OFF/OP CNSLTJ NEW/EST MOD 40: CPT | Performed by: PHYSICAL MEDICINE & REHABILITATION

## 2025-01-31 RX ORDER — METHOCARBAMOL 750 MG/1
750 TABLET, FILM COATED ORAL 3 TIMES DAILY PRN
Qty: 60 TABLET | Refills: 0 | Status: SHIPPED | OUTPATIENT
Start: 2025-01-31

## 2025-01-31 NOTE — PROGRESS NOTES
Assessment  1. Cervical disc disorder with radiculopathy of mid-cervical region    2. Muscle spasm    3. Foraminal stenosis of cervical region        Plan  Mr. Bennett is a pleasant 54-year-old male significant past medical history of multiple lumbar surgeries, chronic obstructive asthma, peripheral polyneuropathy, cluster headaches, cervical radiculopathy presents to [facility] with worsening neck pain with rating symptoms into the right upper extremity.  During today's evaluation he is demonstrating cervical radiculopathy into the right upper extremity along the C6 and C7 dermatomal distribution with previous MRI cervical spine demonstrating mild central stenosis at C4-C5, C5-C6 and right foraminal stenosis at C5-C6.    Home exercises and physical therapy in the past provided minimal relief.  He is in excruciating pain at this time and is unable to participate in physical therapy due to the severity of pain.  Would like to proceed with cervical epidural to provide more immediate relief in his pain.  At this time we will plan for a C7-T1 cervical epidural steroid injection under fluoroscopy guidance right paramedian approach and also increased muscle relaxer Robaxin to 750 mg 3 times a day as needed for moderate to severe muscle spasms and pain.  All questions answered, patient is agreeable with plan.    My impressions and treatment recommendations were discussed in detail with the patient who verbalized understanding and had no further questions.  Discharge instructions were provided. I personally saw and examined the patient and I agree with the above discussed plan of care.    Orders Placed This Encounter   Procedures    FL spine and pain procedure     Standing Status:   Future     Expected Date:   1/31/2025     Expiration Date:   1/31/2029     Reason for Exam::   GONZALEZ     Anticoagulant hold needed?:   No     New Medications Ordered This Visit   Medications    methocarbamol (Robaxin-750) 750  mg tablet     Sig: Take 1 tablet (750 mg total) by mouth 3 (three) times a day as needed for muscle spasms     Dispense:  60 tablet     Refill:  0       History of Present Illness    Yefri Bennett is a 54 y.o. male presents to [facility] for initial evaluation regarding 3 months duration of isolated neck pain with radiating symptoms into the right upper extremity.  Denies any significant inciting event or recent trauma.  Today reports severe pain rated 9 out of 10 and interfering with activities.  Pain is constant 100% of the time that is described as a shooting, numbness, sharp, pins needles, throbbing pain.  Also reports upper extremity weakness and dropping objects.  Does not use any durable medical equipment ambulation.  Symptoms are worse with standing, bending, sitting, coughing, sneezing, bowel movements.  Reports taking oxycodone, lidocaine and NSAIDs with minimal relief.  Presents today for initial evaluation.    I have personally reviewed and/or updated the patient's past medical history, past surgical history, family history, social history, current medications, allergies, and vital signs today.     Review of Systems   Constitutional:  Negative for fever and unexpected weight change.   HENT:  Negative for trouble swallowing.    Eyes:  Negative for visual disturbance.   Respiratory:  Negative for shortness of breath and wheezing.    Cardiovascular:  Negative for chest pain and palpitations.   Gastrointestinal:  Negative for constipation, diarrhea, nausea and vomiting.   Endocrine: Negative for cold intolerance, heat intolerance and polydipsia.   Genitourinary:  Negative for difficulty urinating and frequency.   Musculoskeletal:  Positive for joint swelling, neck pain and neck stiffness. Negative for arthralgias, gait problem and myalgias.        RUE Pain   Skin:  Negative for rash.   Neurological:  Negative for dizziness, seizures, syncope, weakness and headaches.   Hematological:   Does not bruise/bleed easily.   Psychiatric/Behavioral:  Negative for dysphoric mood.    All other systems reviewed and are negative.      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    Dysphagia    Intractable episodic cluster headache    Allergic rhinitis    Abnormal head CT    Elevated blood pressure reading    Chronic right SI joint pain    Depression with anxiety    Dyshidrotic dermatitis    Erectile dysfunction of non-organic origin    Moderate persistent asthma without complication    Post laminectomy syndrome    TMJ syndrome    Dental abscess    Lingual tonsil hypertrophy    Sleep apnea    Hypertension    Other chest pain    Chronic, continuous use of opioids    Long-term current use of benzodiazepine    Medical marijuana use    Hoarseness    Tobacco abuse    Elevated hematocrit    Elevated platelet count    Hypercontractile esophagus    Left shoulder pain    Cervical radiculopathy    Elevated serum creatinine    Esophageal dysphagia    Incomplete tear of right rotator cuff    Partial tear of right subscapularis tendon    Therapeutic opioid induced constipation    Exposure to sexually transmitted disease (STD)    Gastritis without bleeding    Peripheral polyneuropathy    Seborrheic keratosis    Abnormal urinalysis    Chronic obstructive asthma (HCC)    Status post cystoscopy with ureteral stent placement    Acute viral syndrome    Encounter for medication monitoring    History of colon polyps    Ambulatory dysfunction    Tinnitus of both ears    Overweight (BMI 25.0-29.9)    Polyarthralgia    Acute sore throat    Long term prescription opiate use    COVID-19    Chest pain: Secondary to MSK versus anxiety vs esophageal dysmotility disorder    Anxiety    Asthma    Chronic bronchitis    History of kidney stones       Past Medical History:   Diagnosis  Date    Allergic rhinitis     Anxiety     Aortic thrombus (HCC)     h/o, Eliquis to treat    Arthritis     Asthma     Chronic pain syndrome     Oxycodone PRN    Cluster headaches     Coronary artery disease     Current tobacco use     Depression     Esophageal dysmotility     History of colonic polyps     History of CVA (cerebrovascular accident)     no residual neurologic deficits    History of nephrolithiasis     History of pneumonia     Hypertension     Insomnia     Nephrolithiasis     Organic impotence     NELLI (obstructive sleep apnea)     no CPAP    Seasonal allergies     Sleep apnea     does not use CPAP    Stroke (HCC) 2015    TMJ (dislocation of temporomandibular joint)     TMJ (temporomandibular joint syndrome)        Past Surgical History:   Procedure Laterality Date    ABDOMINAL SURGERY      BACK SURGERY      *9, [id number], nervectomy 2006, total of 10    BUCCAL MASS EXCISION N/A 03/26/2018    Procedure: BIOPSY LINGUAL TONSIL (FLOW/ STANDARD PATH)  EVAL UNDER ANESTHESIA;  Surgeon: Deric Easton MD;  [facility];  Service: ENT    COLONOSCOPY      FL RETROGRADE PYELOGRAM  10/21/2020    HERNIA REPAIR      KIDNEY SURGERY      KNEE ARTHROSCOPY      LITHOTRIPSY      multiple    LUMBAR DISC SURGERY  2015    MANDIBLE FRACTURE SURGERY      titanium plate    PELVIC SYMPHYSIS FUSION      UT CYSTO/URETERO W/LITHOTRIPSY &INDWELL STENT INSRT Right 10/21/2020    Procedure: CYSTOSCOPY URETEROSCOPY WITH LITHOTRIPSY HOLMIUM LASER, RETROGRADE PYELOGRAM AND INSERTION STENT URETERAL;  Surgeon: Anthony Richardson MD;  [facility];  Service: Urology    UT ESOPHAGOGASTRODUODENOSCOPY TRANSORAL DIAGNOSTIC N/A 02/22/2018    Procedure: ESOPHAGOGASTRODUODENOSCOPY (EGD);  Surgeon: Yolis Mares MD;  [facility]: [facility];  Service: Gastroenterology    UT ESOPHAGOGASTRODUODENOSCOPY TRANSORAL DIAGNOSTIC N/A 04/01/2019    Procedure: EGD W/ DILATION;  Surgeon: Yolis Mares MD;  [facility];  Service:  Gastroenterology    IL FORREST IMPLTJ NSTIM ELTRDS PLATE/PADDLE EDRL N/A 09/08/2016    Procedure: DORSAL COLUMN STIMULATOR PLACEMENT (IMPULSE MONITORING);  Surgeon: Martin Doe MD;  Location: [facility];  Service: Orthopedics    IL REVJ/RMVL IMPL SPI NPG/RCVR DTCH CONNJ ELTRD RA Left 06/09/2017    Procedure: REMOVAL OF A THORACIC SCS PLACED VIA LAMINECTOMY AND REMOVAL LEFT BUTTOCK GENERATOR; IMPULSE MONITORING;  Surgeon: Steven Parr MD;  Location: [facility];  Service: Neurosurgery    IL SURGICAL ARTHROSCOPY SHOULDER W/ROTATOR CUFF RPR Right 04/04/2019    Procedure: RIGHT SHOULDER ARTHROSCOPIC SUBSCAPULARIS TENDON REPAIR;  Surgeon: David Merritt MD;  [facility];  Service: Orthopedics    SACROILIAC JOINT FUSION  2015    SHOULDER SURGERY Left     2    TONSILLECTOMY      UPPER GASTROINTESTINAL ENDOSCOPY         Family History   Problem Relation Age of Onset    Leukemia Mother 77    Hypertension Mother     Diabetes Father     Obesity Father     Liver cancer Father     Heart disease Father     Diabetes Brother     Hypertension Brother     Skin cancer Maternal Grandfather 99    Cancer Family        Social History     Occupational History    Occupation: Disability   Tobacco Use    Smoking status: Every Day     Current packs/day: 0.50     Average packs/day: 1 pack/day for 26.1 years (25.5 ttl pk-yrs)     Types: Cigarettes     Start date: 2024     Passive exposure: Past    Smokeless tobacco: Former   Vaping Use    Vaping status: Never Used   Substance and Sexual Activity    Alcohol use: Yes     Alcohol/week: 2.0 standard drinks of alcohol     Types: 2 Shots of liquor per week     Comment: occ    Drug use: Yes     Frequency: 7.0 times per week     Types: Marijuana     Comment: medical marijuana daily for chronic pain 1/2 ounce    Sexual activity: Yes     Partners: Female       Current Outpatient Medications on File Prior to Visit   Medication Sig    acetaminophen (TYLENOL) 325 mg tablet Take 3 tablets (109  "mg total) by mouth every 8 (eight) hours as needed for mild pain    albuterol (PROVENTIL HFA,VENTOLIN HFA) 90 mcg/act inhaler INHALE 2 PUFFS BY MOUTH EVERY 6 HOURS AS NEEDED FOR WHEEZING    diazepam (VALIUM) 5 mg tablet Take 1 tablet (5 mg total) by mouth every 12 (twelve) hours as needed for anxiety or muscle spasms    diltiazem (CARDIZEM) 60 mg tablet Take 1 tablet (60 mg total) by mouth 2 (two) times a day Take one tablet by mouth twice daily    methylPREDNISolone 4 MG tablet therapy pack Use as directed on package    naproxen (NAPROSYN) 500 mg tablet TAKE 1 TABLET(500 MG) BY MOUTH TWICE DAILY WITH MEALS FOR 7 DAYS    oxyCODONE (ROXICODONE) 10 MG TABS Take 1 tablet (10 mg total) by mouth every 4 (four) hours as needed for moderate pain Max Daily Amount: 60 mg    [DISCONTINUED] methocarbamol (ROBAXIN) 500 mg tablet Take 1 tablet (500 mg total) by mouth 3 (three) times a day as needed for muscle spasms    atorvastatin (LIPITOR) 40 mg tablet Take 1 tablet (40 mg total) by mouth daily (Patient not taking: Reported on 1/13/2025)     No current facility-administered medications on file prior to visit.       Allergies   Allergen Reactions    Other Rash     Adhesive tape       Physical Exam    BP (!) 156/108   Pulse 96   Resp 18   Ht 6' 1\" (1.854 m)   Wt 98 kg (216 lb)   BMI 28.50 kg/m²     Constitutional: normal, well developed, well nourished, alert, in no distress and non-toxic and no overt pain behavior.  Eyes: anicteric  HEENT: grossly intact  Neck: supple, symmetric, trachea midline and no masses   Pulmonary:even and unlabored  Cardiovascular:No edema or pitting edema present  Skin:Normal without rashes or lesions and well hydrated  Psychiatric:Mood and affect appropriate  Neurologic:Cranial Nerves II-XII grossly intact  Musculoskeletal:normal gait, tenderness to palpation right side cervical paraspinals, decreased active and passive range of motion with cervical flexion, extension, right sidebending limited " by pain, MMT 5 out of 5 bilateral upper extremities, sensation decreased to light touch in patchy distribution right upper extremity, negative Cinthia reflex bilaterally, positive Spurling reflex right-sided    Imaging

## 2025-02-03 ENCOUNTER — HOSPITAL ENCOUNTER (OUTPATIENT)
Dept: RADIOLOGY | Facility: CLINIC | Age: 55
Discharge: HOME | End: 2025-02-03
Payer: MEDICARE

## 2025-02-03 VITALS
HEART RATE: 77 BPM | TEMPERATURE: 96.3 F | SYSTOLIC BLOOD PRESSURE: 137 MMHG | RESPIRATION RATE: 18 BRPM | DIASTOLIC BLOOD PRESSURE: 88 MMHG | OXYGEN SATURATION: 96 %

## 2025-02-03 DIAGNOSIS — M50.120 CERVICAL DISC DISORDER WITH RADICULOPATHY OF MID-CERVICAL REGION: ICD-10-CM

## 2025-02-03 DIAGNOSIS — M48.02 FORAMINAL STENOSIS OF CERVICAL REGION: ICD-10-CM

## 2025-02-03 DIAGNOSIS — M62.838 MUSCLE SPASM: ICD-10-CM

## 2025-02-03 PROCEDURE — 62321 NJX INTERLAMINAR CRV/THRC: CPT | Performed by: PHYSICAL MEDICINE & REHABILITATION

## 2025-02-03 RX ORDER — METHYLPREDNISOLONE ACETATE 80 MG/ML
80 INJECTION, SUSPENSION INTRA-ARTICULAR; INTRALESIONAL; INTRAMUSCULAR; PARENTERAL; SOFT TISSUE ONCE
Status: COMPLETED | OUTPATIENT
Start: 2025-02-03 | End: 2025-02-03

## 2025-02-03 RX ADMIN — IOHEXOL 1 ML: 300 INJECTION, SOLUTION INTRAVENOUS at 09:50

## 2025-02-03 RX ADMIN — METHYLPREDNISOLONE ACETATE 80 MG: 80 INJECTION, SUSPENSION INTRA-ARTICULAR; INTRALESIONAL; INTRAMUSCULAR; SOFT TISSUE at 09:50

## 2025-02-03 NOTE — DISCHARGE INSTR - LAB
Epidural Steroid Injection   WHAT YOU NEED TO KNOW:   An epidural steroid injection (LALO) is a procedure to inject steroid medicine into the epidural space. The epidural space is between your spinal cord and vertebrae. Steroids reduce inflammation and fluid buildup in your spine that may be causing pain. You may be given pain medicine along with the steroids.          ACTIVITY  Do not drive or operate machinery today.  No strenuous activity today - bending, lifting, etc.  You may resume normal activites starting tomorrow - start slowly and as tolerated.  You may shower today, but no tub baths or hot tubs.  You may have numbness for several hours from the local anesthetic. Please use caution and common sense, especially with weight-bearing activities.    CARE OF THE INJECTION SITE  If you have soreness or pain, apply ice to the area today (20 minutes on/20 minutes off).  Starting tomorrow, you may use warm, moist heat or ice if needed.  You may have an increase or change in your discomfort for 36-48 hours after your treatment.  Apply ice and continue with any pain medication you have been prescribed.  Notify the [facility] if you have any of the following: redness, drainage, swelling, headache, stiff neck or fever above 100°F.    SPECIAL INSTRUCTIONS  Our office will contact you in approximately 14 days for a progress report.    MEDICATIONS  Continue to take all routine medications.  Our office may have instructed you to hold some medications.    As no general anesthesia was used in today's procedure, you should not experience any side effects related to anesthesia.     If you are diabetic, the steroids used in today's injection may temporarily increase your blood sugar levels after the first few days after your injection. Please keep a close eye on your sugars and alert the doctor who manages your diabetes if your sugars are significantly high from your baseline or you are symptomatic.     If you have a  problem specifically related to your procedure, please call our office at [phone].  Problems not related to your procedure should be directed to your primary care physician.

## 2025-02-03 NOTE — H&P
History of Present Illness: The patient is a 54 y.o. male who presents with complaints of neck pain    Past Medical History:   Diagnosis Date    Allergic rhinitis     Anxiety     Aortic thrombus (HCC)     h/o, Eliquis to treat    Arthritis     Asthma     Chronic pain syndrome     Oxycodone PRN    Cluster headaches     Coronary artery disease     Current tobacco use     Depression     Esophageal dysmotility     History of colonic polyps     History of CVA (cerebrovascular accident)     no residual neurologic deficits    History of nephrolithiasis     History of pneumonia     Hypertension     Insomnia     Nephrolithiasis     Organic impotence     NELLI (obstructive sleep apnea)     no CPAP    Seasonal allergies     Sleep apnea     does not use CPAP    Stroke (HCC) 2015    TMJ (dislocation of temporomandibular joint)     TMJ (temporomandibular joint syndrome)        Past Surgical History:   Procedure Laterality Date    ABDOMINAL SURGERY      BACK SURGERY      *9, [id number], nervectomy 2006, total of 10    BUCCAL MASS EXCISION N/A 03/26/2018    Procedure: BIOPSY LINGUAL TONSIL (FLOW/ STANDARD PATH)  EVAL UNDER ANESTHESIA;  Surgeon: Deric Easton MD;  [facility]: [facility];  Service: ENT    COLONOSCOPY      FL RETROGRADE PYELOGRAM  10/21/2020    HERNIA REPAIR      KIDNEY SURGERY      KNEE ARTHROSCOPY      LITHOTRIPSY      multiple    LUMBAR DISC SURGERY  2015    MANDIBLE FRACTURE SURGERY      titanium plate    PELVIC SYMPHYSIS FUSION      TN CYSTO/URETERO W/LITHOTRIPSY &INDWELL STENT INSRT Right 10/21/2020    Procedure: CYSTOSCOPY URETEROSCOPY WITH LITHOTRIPSY HOLMIUM LASER, RETROGRADE PYELOGRAM AND INSERTION STENT URETERAL;  Surgeon: Anthony Richardson MD;  [facility];  Service: Urology    TN ESOPHAGOGASTRODUODENOSCOPY TRANSORAL DIAGNOSTIC N/A 02/22/2018    Procedure: ESOPHAGOGASTRODUODENOSCOPY (EGD);  Surgeon: Yolis Mares MD;  Location: [facility];  Service: Gastroenterology    TN ESOPHAGOGASTRODUODENOSCOPY  TRANSORAL DIAGNOSTIC N/A 04/01/2019    Procedure: EGD W/ DILATION;  Surgeon: Yolis Mares MD;  [facility];  Service: Gastroenterology    NJ FORREST IMPLTJ NSTIM ELTRDS PLATE/PADDLE EDRL N/A 09/08/2016    Procedure: DORSAL COLUMN STIMULATOR PLACEMENT (IMPULSE MONITORING);  Surgeon: Martin Doe MD;  Location[facility];  Service: Orthopedics    NJ REVJ/RMVL IMPL SPI NPG/RCVR DTCH CONNJ ELTRD RA Left 06/09/2017    Procedure: REMOVAL OF A THORACIC SCS PLACED VIA LAMINECTOMY AND REMOVAL LEFT BUTTOCK GENERATOR; IMPULSE MONITORING;  Surgeon: Steven Parr MD;  Location: [facility];  Service: Neurosurgery    NJ SURGICAL ARTHROSCOPY SHOULDER W/ROTATOR CUFF RPR Right 04/04/2019    Procedure: RIGHT SHOULDER ARTHROSCOPIC SUBSCAPULARIS TENDON REPAIR;  Surgeon: David Merritt MD;  [facility];  Service: Orthopedics    SACROILIAC JOINT FUSION  2015    SHOULDER SURGERY Left     2    TONSILLECTOMY      UPPER GASTROINTESTINAL ENDOSCOPY           Current Outpatient Medications:     acetaminophen (TYLENOL) 325 mg tablet, Take 3 tablets (975 mg total) by mouth every 8 (eight) hours as needed for mild pain, Disp: 270 tablet, Rfl: 0    albuterol (PROVENTIL HFA,VENTOLIN HFA) 90 mcg/act inhaler, INHALE 2 PUFFS BY MOUTH EVERY 6 HOURS AS NEEDED FOR WHEEZING, Disp: 18 g, Rfl: 1    atorvastatin (LIPITOR) 40 mg tablet, Take 1 tablet (40 mg total) by mouth daily (Patient not taking: Reported on 1/13/2025), Disp: 90 tablet, Rfl: 0    diazepam (VALIUM) 5 mg tablet, Take 1 tablet (5 mg total) by mouth every 12 (twelve) hours as needed for anxiety or muscle spasms, Disp: 45 tablet, Rfl: 0    diltiazem (CARDIZEM) 60 mg tablet, Take 1 tablet (60 mg total) by mouth 2 (two) times a day Take one tablet by mouth twice daily, Disp: 60 tablet, Rfl: 0    methocarbamol (Robaxin-750) 750 mg tablet, Take 1 tablet (750 mg total) by mouth 3 (three) times a day as needed for muscle spasms, Disp: 60 tablet, Rfl: 0     methylPREDNISolone 4 MG tablet therapy pack, Use as directed on package, Disp: 21 each, Rfl: 0    naproxen (NAPROSYN) 500 mg tablet, TAKE 1 TABLET(500 MG) BY MOUTH TWICE DAILY WITH MEALS FOR 7 DAYS (Patient not taking: Reported on 2/3/2025), Disp: 14 tablet, Rfl: 0    oxyCODONE (ROXICODONE) 10 MG TABS, Take 1 tablet (10 mg total) by mouth every 4 (four) hours as needed for moderate pain Max Daily Amount: 60 mg, Disp: 120 tablet, Rfl: 0    Current Facility-Administered Medications:     iohexol (OMNIPAQUE) 300 mg/mL injection 1 mL, 1 mL, Epidural, Once, Humble Salter DO    methylPREDNISolone acetate (DEPO-MEDROL) injection 80 mg, 80 mg, Epidural, Once, Humble Salter, DO    Allergies   Allergen Reactions    Other Rash     Adhesive tape       Physical Exam:   Vitals:    02/03/25 0929   BP: (!) 157/106   Pulse: 91   Resp: 18   Temp: (!) 96.3 °F (35.7 °C)   SpO2: 100%     General: Awake, Alert, Oriented x 3, Mood and affect appropriate  Respiratory: Respirations even and unlabored  Cardiovascular: Peripheral pulses intact; no edema  Musculoskeletal Exam: Tenderness palpation bilateral cervical paraspinals    ASA Score: 2    Patient/Chart Verification  Patient ID Verified: Verbal  ID Band Applied: No  H&P( within 30 days) Verified: To be obtained in the Procedural area  Allergies Reviewed: Yes  Anticoag/NSAID held?: Yes (ibuprofen LD 1/31, states not taking naproxen, denies other nsaids)  Currently on antibiotics?: No    Assessment:   1. Muscle spasm    2. Cervical disc disorder with radiculopathy of mid-cervical region    3. Foraminal stenosis of cervical region        Plan: GONZALEZ

## 2025-02-07 DIAGNOSIS — G89.29 CHRONIC RIGHT SI JOINT PAIN: ICD-10-CM

## 2025-02-07 DIAGNOSIS — M53.3 CHRONIC RIGHT SI JOINT PAIN: ICD-10-CM

## 2025-02-07 RX ORDER — OXYCODONE HYDROCHLORIDE 10 MG/1
10 TABLET ORAL EVERY 4 HOURS PRN
Qty: 120 TABLET | Refills: 0 | Status: SHIPPED | OUTPATIENT
Start: 2025-02-07

## 2025-02-07 NOTE — TELEPHONE ENCOUNTER
Medication: oxyCODONE (ROXICODONE) 10 MG TABS     Dose/Frequency:     Take 1 tablet (10 mg total) by mouth every 4 (four) hours as needed for moderate pain Max Daily Amount: 60 mg       Quantity:  120 tablet     Pharmacy: [id number] [place] - [phone]     Office:   [x] PCP/Provider - Sharon Clemente MD   [] Speciality/Provider -     Does the patient have enough for 3 days?   [x] Yes   [] No - Send as HP to POD

## 2025-02-08 ENCOUNTER — NURSE TRIAGE (OUTPATIENT)
Dept: OTHER | Facility: OTHER | Age: 55
End: 2025-02-08

## 2025-02-08 NOTE — TELEPHONE ENCOUNTER
"Reason for Disposition  • [1] Face is swollen AND [2] fever    Answer Assessment - Initial Assessment Questions  1. LOCATION: \"Which tooth is hurting?\"  (e.g., right-side/left-side, upper/lower, front/back)         Left upper Canine tooth     2. ONSET: \"When did the toothache start?\"  (e.g., hours, days)               Started a few hours ago     3. SEVERITY: \"How bad is the toothache?\"  (Scale 1-10; mild, moderate or severe)             Current pain 10/10    4. SWELLING: \"Is there any visible swelling of your face?\"             Left cheek and under the eye is swollen     5. OTHER SYMPTOMS: \"Do you have any other symptoms?\" (e.g., fever)              Sweating/chills- thinks he has a fever, unsure         Headache pain        Back pain    Protocols used: Toothache-Adult-AH    "

## 2025-02-08 NOTE — TELEPHONE ENCOUNTER
"Regarding: Head pain/ Broken tooth  ----- Message from Angella GAXIOLA sent at 2/8/2025  4:50 PM EST -----  Pt stated, \" I have a broken tooth I think I may need an antibiotic because I have pain in my head.\"    "

## 2025-02-08 NOTE — TELEPHONE ENCOUNTER
Patient calling in stating he is experiencing a severe toothache and headache. Stated he just received an injection in his neck last week and is unsure if that his causing his head pain or if it's his tooth. Patient rates his tooth pain currently a 10/10. Stated he is experiencing swelling to his left cheek and below his left eye. He has not checked his temperature but believes he has a fever due to having on and off sweating and chills. Recommended patient be evaluated in the nearest ED at this time. Instructed patient to follow up with his PCP next week and establish care with a dentist. Patient verbalized understanding.

## 2025-02-12 ENCOUNTER — OFFICE VISIT (OUTPATIENT)
Dept: DENTISTRY | Facility: CLINIC | Age: 55
End: 2025-02-12

## 2025-02-12 VITALS — HEART RATE: 84 BPM | SYSTOLIC BLOOD PRESSURE: 156 MMHG | DIASTOLIC BLOOD PRESSURE: 102 MMHG

## 2025-02-12 DIAGNOSIS — K04.7 TOOTH ABSCESS: Primary | ICD-10-CM

## 2025-02-12 PROCEDURE — D0140 LIMITED ORAL EVALUATION - PROBLEM FOCUSED: HCPCS

## 2025-02-12 PROCEDURE — D0220 INTRAORAL - PERIAPICAL FIRST RADIOGRAPHIC IMAGE: HCPCS

## 2025-02-12 RX ORDER — AMOXICILLIN 500 MG/1
500 CAPSULE ORAL EVERY 8 HOURS SCHEDULED
Qty: 21 CAPSULE | Refills: 0 | Status: SHIPPED | OUTPATIENT
Start: 2025-02-12 | End: 2025-02-19

## 2025-02-13 NOTE — PROGRESS NOTES
"Procedure Details  11  - INTRAORAL - PERIAPICAL FIRST RADIOGRAPHIC IMAGE   - LIMITED ORAL EVALUATION - PROBLEM FOCUSED    Limited Exam     Yefri Bennett 54 y.o. male presents with self and partner to [place] for Limited exam.   PMH reviewed, no changes, ASA II. Significant medical history: Reviewed with pt. Significant allergies: NKDA--Pt denies Penicillin allergy. Significant medications: Reviewed with pt.     Chief complaint:  \"I am having bad pain on the top left  tooth. It is keeping me up at night. I cannot eat.\"     Consent:  Discussed that limited exam focuses on problem area, and same day tx is not guaranteed.  Patient explained to if they wish to have anything else evaluated, they need to return to the practice at which they are a patient of record or schedule a comprehensive exam afterwards.  Patient understands and consent was given by self via verbal consent.     Subjective history:               Onset: 1 month ago.              Provocation: Spontaneous.              Quality: Sharp, Shooting.              Region: Patient points to tooth #11 .              Severity: 7/10.              Timing: constant.     Objective clinical findings:              Oral cancer screening: normal.              Extraoral exam:  Pt presents with mild extra oral swelling below left zygomatic arch . No lymphadenopathy, no tmj associated symptoms. No trismus, no dysphagia. No asymmetries or ulcerations.   Intraoral exam:  Significant sized caries ##11 . Mild swelling of buccal vestibule. No pus drainage or fistula. FOM, ROM, palate, tongue, and tonsils are within normal limits.                 Radiographs: Single PA - #11 . Parl #11. Caries on DB extending to root.      Assessment:  #11 restorable with RCT, P+C, and crown. Pt does not want to save tooth and explained he has had hx of root canals that have all failed in the past. Due to his extreme dental anxiety, he wants to have the tooth extraction with sedation.    "   Plan:   [facility] referral for ext #11 with sedation.      Referral(s): [facility] for ext #11 with sedation .  Rx: Amoxicillin.  Comprehensive care disposition: Patient expressed interest in becoming a patient of record with one of the  dental clinics.     Patient dismissed ambulatory and alert.     NV: Comp exam and fmx.     Attending: Dr. Jacobs was present in clinic.

## 2025-02-16 ENCOUNTER — HOSPITAL ENCOUNTER (OUTPATIENT)
Facility: HOSPITAL | Age: 55
Setting detail: OBSERVATION
Discharge: HOME | End: 2025-02-17
Attending: EMERGENCY MEDICINE | Admitting: INTERNAL MEDICINE
Payer: MEDICARE

## 2025-02-16 ENCOUNTER — APPOINTMENT (EMERGENCY)
Dept: CT IMAGING | Facility: HOSPITAL | Age: 55
End: 2025-02-16
Payer: MEDICARE

## 2025-02-16 DIAGNOSIS — K02.9 DENTAL CAVITIES: ICD-10-CM

## 2025-02-16 DIAGNOSIS — K12.2 ORAL ABSCESS: ICD-10-CM

## 2025-02-16 DIAGNOSIS — F12.90 MARIJUANA USE: Primary | ICD-10-CM

## 2025-02-16 DIAGNOSIS — K08.89 PAIN, DENTAL: ICD-10-CM

## 2025-02-16 DIAGNOSIS — Z78.9 ALCOHOL USE: ICD-10-CM

## 2025-02-16 LAB
ALBUMIN SERPL BCG-MCNC: 4 G/DL (ref 3.5–5)
ALP SERPL-CCNC: 65 U/L (ref 34–104)
ALT SERPL W P-5'-P-CCNC: 20 U/L (ref 7–52)
ANION GAP SERPL CALCULATED.3IONS-SCNC: 5 MMOL/L (ref 4–13)
AST SERPL W P-5'-P-CCNC: 17 U/L (ref 13–39)
BASOPHILS # BLD AUTO: 0.06 THOUSANDS/ΜL (ref 0–0.1)
BASOPHILS NFR BLD AUTO: 1 % (ref 0–1)
BILIRUB SERPL-MCNC: 0.34 MG/DL (ref 0.2–1)
BUN SERPL-MCNC: 21 MG/DL (ref 5–25)
CALCIUM SERPL-MCNC: 9.2 MG/DL (ref 8.4–10.2)
CHLORIDE SERPL-SCNC: 109 MMOL/L (ref 96–108)
CO2 SERPL-SCNC: 29 MMOL/L (ref 21–32)
CREAT SERPL-MCNC: 1.16 MG/DL (ref 0.6–1.3)
EOSINOPHIL # BLD AUTO: 0.4 THOUSAND/ΜL (ref 0–0.61)
EOSINOPHIL NFR BLD AUTO: 3 % (ref 0–6)
ERYTHROCYTE [DISTWIDTH] IN BLOOD BY AUTOMATED COUNT: 15 % (ref 11.6–15.1)
GFR SERPL CREATININE-BSD FRML MDRD: 70 ML/MIN/1.73SQ M
GLUCOSE SERPL-MCNC: 113 MG/DL (ref 65–140)
HCT VFR BLD AUTO: 47.1 % (ref 36.5–49.3)
HGB BLD-MCNC: 15.7 G/DL (ref 12–17)
IMM GRANULOCYTES # BLD AUTO: 0.05 THOUSAND/UL (ref 0–0.2)
IMM GRANULOCYTES NFR BLD AUTO: 0 % (ref 0–2)
LYMPHOCYTES # BLD AUTO: 2.7 THOUSANDS/ΜL (ref 0.6–4.47)
LYMPHOCYTES NFR BLD AUTO: 20 % (ref 14–44)
MCH RBC QN AUTO: 29.6 PG (ref 26.8–34.3)
MCHC RBC AUTO-ENTMCNC: 33.3 G/DL (ref 31.4–37.4)
MCV RBC AUTO: 89 FL (ref 82–98)
MONOCYTES # BLD AUTO: 0.82 THOUSAND/ΜL (ref 0.17–1.22)
MONOCYTES NFR BLD AUTO: 6 % (ref 4–12)
NEUTROPHILS # BLD AUTO: 9.19 THOUSANDS/ΜL (ref 1.85–7.62)
NEUTS SEG NFR BLD AUTO: 70 % (ref 43–75)
NRBC BLD AUTO-RTO: 0 /100 WBCS
PLATELET # BLD AUTO: 374 THOUSANDS/UL (ref 149–390)
PMV BLD AUTO: 9.4 FL (ref 8.9–12.7)
POTASSIUM SERPL-SCNC: 4.2 MMOL/L (ref 3.5–5.3)
PROT SERPL-MCNC: 6.4 G/DL (ref 6.4–8.4)
RBC # BLD AUTO: 5.3 MILLION/UL (ref 3.88–5.62)
SODIUM SERPL-SCNC: 143 MMOL/L (ref 135–147)
WBC # BLD AUTO: 13.22 THOUSAND/UL (ref 4.31–10.16)

## 2025-02-16 PROCEDURE — 99285 EMERGENCY DEPT VISIT HI MDM: CPT | Performed by: EMERGENCY MEDICINE

## 2025-02-16 PROCEDURE — 36415 COLL VENOUS BLD VENIPUNCTURE: CPT

## 2025-02-16 PROCEDURE — 70491 CT SOFT TISSUE NECK W/DYE: CPT

## 2025-02-16 PROCEDURE — 99284 EMERGENCY DEPT VISIT MOD MDM: CPT

## 2025-02-16 PROCEDURE — 96365 THER/PROPH/DIAG IV INF INIT: CPT

## 2025-02-16 PROCEDURE — 99222 1ST HOSP IP/OBS MODERATE 55: CPT | Performed by: NURSE PRACTITIONER

## 2025-02-16 PROCEDURE — 80053 COMPREHEN METABOLIC PANEL: CPT

## 2025-02-16 PROCEDURE — 96375 TX/PRO/DX INJ NEW DRUG ADDON: CPT

## 2025-02-16 PROCEDURE — 85025 COMPLETE CBC W/AUTO DIFF WBC: CPT

## 2025-02-16 RX ORDER — ACETAMINOPHEN 325 MG/1
975 TABLET ORAL ONCE
Status: COMPLETED | OUTPATIENT
Start: 2025-02-16 | End: 2025-02-16

## 2025-02-16 RX ORDER — KETOROLAC TROMETHAMINE 30 MG/ML
15 INJECTION, SOLUTION INTRAMUSCULAR; INTRAVENOUS ONCE
Status: COMPLETED | OUTPATIENT
Start: 2025-02-16 | End: 2025-02-16

## 2025-02-16 RX ORDER — HYDROMORPHONE HCL IN WATER/PF 6 MG/30 ML
0.2 PATIENT CONTROLLED ANALGESIA SYRINGE INTRAVENOUS
Status: DISCONTINUED | OUTPATIENT
Start: 2025-02-16 | End: 2025-02-17 | Stop reason: HOSPADM

## 2025-02-16 RX ORDER — MORPHINE SULFATE 4 MG/ML
4 INJECTION, SOLUTION INTRAMUSCULAR; INTRAVENOUS ONCE
Status: COMPLETED | OUTPATIENT
Start: 2025-02-16 | End: 2025-02-16

## 2025-02-16 RX ORDER — SODIUM CHLORIDE, SODIUM LACTATE, POTASSIUM CHLORIDE, CALCIUM CHLORIDE 600; 310; 30; 20 MG/100ML; MG/100ML; MG/100ML; MG/100ML
50 INJECTION, SOLUTION INTRAVENOUS CONTINUOUS
Status: DISCONTINUED | OUTPATIENT
Start: 2025-02-17 | End: 2025-02-17 | Stop reason: HOSPADM

## 2025-02-16 RX ORDER — OXYCODONE HYDROCHLORIDE 5 MG/1
5 TABLET ORAL ONCE
Refills: 0 | Status: COMPLETED | OUTPATIENT
Start: 2025-02-16 | End: 2025-02-16

## 2025-02-16 RX ORDER — MORPHINE SULFATE 10 MG/ML
8 INJECTION, SOLUTION INTRAMUSCULAR; INTRAVENOUS ONCE
Status: COMPLETED | OUTPATIENT
Start: 2025-02-16 | End: 2025-02-16

## 2025-02-16 RX ORDER — AMOXICILLIN 250 MG
2 CAPSULE ORAL
Status: DISCONTINUED | OUTPATIENT
Start: 2025-02-16 | End: 2025-02-17 | Stop reason: HOSPADM

## 2025-02-16 RX ORDER — AMPICILLIN AND SULBACTAM 2; 1 G/1; G/1
3 INJECTION, POWDER, FOR SOLUTION INTRAMUSCULAR; INTRAVENOUS ONCE
Status: DISCONTINUED | OUTPATIENT
Start: 2025-02-16 | End: 2025-02-16 | Stop reason: SDUPTHER

## 2025-02-16 RX ORDER — HYDROMORPHONE HCL/PF 1 MG/ML
0.5 SYRINGE (ML) INJECTION
Status: DISCONTINUED | OUTPATIENT
Start: 2025-02-16 | End: 2025-02-17 | Stop reason: HOSPADM

## 2025-02-16 RX ORDER — HYDROMORPHONE HCL IN WATER/PF 6 MG/30 ML
0.2 PATIENT CONTROLLED ANALGESIA SYRINGE INTRAVENOUS
Status: DISCONTINUED | OUTPATIENT
Start: 2025-02-16 | End: 2025-02-17

## 2025-02-16 RX ADMIN — MORPHINE SULFATE 4 MG: 4 INJECTION INTRAVENOUS at 20:10

## 2025-02-16 RX ADMIN — AMPICILLIN AND SULBACTAM 3 G: 10; 5 INJECTION, POWDER, FOR SOLUTION INTRAVENOUS at 20:30

## 2025-02-16 RX ADMIN — OXYCODONE HYDROCHLORIDE 5 MG: 5 TABLET ORAL at 21:47

## 2025-02-16 RX ADMIN — KETOROLAC TROMETHAMINE 15 MG: 30 INJECTION, SOLUTION INTRAMUSCULAR; INTRAVENOUS at 20:10

## 2025-02-16 RX ADMIN — ACETAMINOPHEN 975 MG: 325 TABLET, FILM COATED ORAL at 20:09

## 2025-02-16 RX ADMIN — MORPHINE SULFATE 8 MG: 10 INJECTION INTRAVENOUS at 23:56

## 2025-02-16 RX ADMIN — IOHEXOL 85 ML: 350 INJECTION, SOLUTION INTRAVENOUS at 20:21

## 2025-02-17 ENCOUNTER — TELEPHONE (OUTPATIENT)
Dept: PAIN MEDICINE | Facility: CLINIC | Age: 55
End: 2025-02-17

## 2025-02-17 VITALS
OXYGEN SATURATION: 97 % | HEART RATE: 53 BPM | DIASTOLIC BLOOD PRESSURE: 99 MMHG | RESPIRATION RATE: 16 BRPM | TEMPERATURE: 98.1 F | WEIGHT: 216.05 LBS | SYSTOLIC BLOOD PRESSURE: 162 MMHG | BODY MASS INDEX: 28.5 KG/M2

## 2025-02-17 LAB
ANION GAP SERPL CALCULATED.3IONS-SCNC: 5 MMOL/L (ref 4–13)
BASOPHILS # BLD AUTO: 0.08 THOUSANDS/ΜL (ref 0–0.1)
BASOPHILS NFR BLD AUTO: 1 % (ref 0–1)
BUN SERPL-MCNC: 21 MG/DL (ref 5–25)
CALCIUM SERPL-MCNC: 8.5 MG/DL (ref 8.4–10.2)
CHLORIDE SERPL-SCNC: 110 MMOL/L (ref 96–108)
CO2 SERPL-SCNC: 26 MMOL/L (ref 21–32)
CREAT SERPL-MCNC: 1.11 MG/DL (ref 0.6–1.3)
EOSINOPHIL # BLD AUTO: 0.45 THOUSAND/ΜL (ref 0–0.61)
EOSINOPHIL NFR BLD AUTO: 4 % (ref 0–6)
ERYTHROCYTE [DISTWIDTH] IN BLOOD BY AUTOMATED COUNT: 14.9 % (ref 11.6–15.1)
GFR SERPL CREATININE-BSD FRML MDRD: 73 ML/MIN/1.73SQ M
GLUCOSE SERPL-MCNC: 121 MG/DL (ref 65–140)
HCT VFR BLD AUTO: 45.8 % (ref 36.5–49.3)
HGB BLD-MCNC: 15 G/DL (ref 12–17)
IMM GRANULOCYTES # BLD AUTO: 0.06 THOUSAND/UL (ref 0–0.2)
IMM GRANULOCYTES NFR BLD AUTO: 1 % (ref 0–2)
LYMPHOCYTES # BLD AUTO: 3.03 THOUSANDS/ΜL (ref 0.6–4.47)
LYMPHOCYTES NFR BLD AUTO: 26 % (ref 14–44)
MCH RBC QN AUTO: 29 PG (ref 26.8–34.3)
MCHC RBC AUTO-ENTMCNC: 32.8 G/DL (ref 31.4–37.4)
MCV RBC AUTO: 88 FL (ref 82–98)
MONOCYTES # BLD AUTO: 0.73 THOUSAND/ΜL (ref 0.17–1.22)
MONOCYTES NFR BLD AUTO: 6 % (ref 4–12)
NEUTROPHILS # BLD AUTO: 7.27 THOUSANDS/ΜL (ref 1.85–7.62)
NEUTS SEG NFR BLD AUTO: 62 % (ref 43–75)
NRBC BLD AUTO-RTO: 0 /100 WBCS
PLATELET # BLD AUTO: 350 THOUSANDS/UL (ref 149–390)
PMV BLD AUTO: 9.8 FL (ref 8.9–12.7)
POTASSIUM SERPL-SCNC: 4.2 MMOL/L (ref 3.5–5.3)
RBC # BLD AUTO: 5.18 MILLION/UL (ref 3.88–5.62)
SODIUM SERPL-SCNC: 141 MMOL/L (ref 135–147)
WBC # BLD AUTO: 11.62 THOUSAND/UL (ref 4.31–10.16)

## 2025-02-17 PROCEDURE — 99239 HOSP IP/OBS DSCHRG MGMT >30: CPT | Performed by: INTERNAL MEDICINE

## 2025-02-17 PROCEDURE — 85025 COMPLETE CBC W/AUTO DIFF WBC: CPT | Performed by: NURSE PRACTITIONER

## 2025-02-17 PROCEDURE — 80048 BASIC METABOLIC PNL TOTAL CA: CPT | Performed by: NURSE PRACTITIONER

## 2025-02-17 RX ORDER — DIAZEPAM 5 MG/1
5 TABLET ORAL EVERY 12 HOURS
Status: DISCONTINUED | OUTPATIENT
Start: 2025-02-17 | End: 2025-02-17

## 2025-02-17 RX ORDER — OXYCODONE HYDROCHLORIDE 10 MG/1
10 TABLET ORAL EVERY 6 HOURS PRN
Status: DISCONTINUED | OUTPATIENT
Start: 2025-02-17 | End: 2025-02-17 | Stop reason: HOSPADM

## 2025-02-17 RX ORDER — OXYCODONE HYDROCHLORIDE 10 MG/1
10 TABLET ORAL 4 TIMES DAILY
Status: DISCONTINUED | OUTPATIENT
Start: 2025-02-17 | End: 2025-02-17

## 2025-02-17 RX ORDER — ACETAMINOPHEN 325 MG/1
975 TABLET ORAL EVERY 8 HOURS PRN
Status: DISCONTINUED | OUTPATIENT
Start: 2025-02-17 | End: 2025-02-17 | Stop reason: HOSPADM

## 2025-02-17 RX ORDER — DIAZEPAM 5 MG/1
5 TABLET ORAL EVERY 12 HOURS PRN
Status: DISCONTINUED | OUTPATIENT
Start: 2025-02-17 | End: 2025-02-17 | Stop reason: HOSPADM

## 2025-02-17 RX ORDER — METHOCARBAMOL 750 MG/1
750 TABLET, FILM COATED ORAL 3 TIMES DAILY PRN
Status: DISCONTINUED | OUTPATIENT
Start: 2025-02-17 | End: 2025-02-17 | Stop reason: HOSPADM

## 2025-02-17 RX ORDER — DILTIAZEM HYDROCHLORIDE 30 MG/1
60 TABLET, FILM COATED ORAL 2 TIMES DAILY
Status: DISCONTINUED | OUTPATIENT
Start: 2025-02-17 | End: 2025-02-17 | Stop reason: HOSPADM

## 2025-02-17 RX ADMIN — HYDROMORPHONE HYDROCHLORIDE 0.5 MG: 1 INJECTION, SOLUTION INTRAMUSCULAR; INTRAVENOUS; SUBCUTANEOUS at 00:40

## 2025-02-17 RX ADMIN — HYDROMORPHONE HYDROCHLORIDE 0.5 MG: 1 INJECTION, SOLUTION INTRAMUSCULAR; INTRAVENOUS; SUBCUTANEOUS at 07:43

## 2025-02-17 RX ADMIN — HYDROMORPHONE HYDROCHLORIDE 0.2 MG: 0.2 INJECTION, SOLUTION INTRAMUSCULAR; INTRAVENOUS; SUBCUTANEOUS at 06:21

## 2025-02-17 RX ADMIN — HYDROMORPHONE HYDROCHLORIDE 0.2 MG: 0.2 INJECTION, SOLUTION INTRAMUSCULAR; INTRAVENOUS; SUBCUTANEOUS at 03:05

## 2025-02-17 RX ADMIN — HYDROMORPHONE HYDROCHLORIDE 0.5 MG: 1 INJECTION, SOLUTION INTRAMUSCULAR; INTRAVENOUS; SUBCUTANEOUS at 04:04

## 2025-02-17 RX ADMIN — AMPICILLIN AND SULBACTAM 3 G: 10; 5 INJECTION, POWDER, FOR SOLUTION INTRAVENOUS at 03:01

## 2025-02-17 RX ADMIN — SODIUM CHLORIDE, SODIUM LACTATE, POTASSIUM CHLORIDE, AND CALCIUM CHLORIDE 50 ML/HR: .6; .31; .03; .02 INJECTION, SOLUTION INTRAVENOUS at 00:42

## 2025-02-17 RX ADMIN — DILTIAZEM HYDROCHLORIDE 60 MG: 30 TABLET, FILM COATED ORAL at 09:10

## 2025-02-17 RX ADMIN — HYDROMORPHONE HYDROCHLORIDE 0.2 MG: 0.2 INJECTION, SOLUTION INTRAMUSCULAR; INTRAVENOUS; SUBCUTANEOUS at 09:29

## 2025-02-17 NOTE — ASSESSMENT & PLAN NOTE
Chronic orthopedic pain mostly back and neck.     Plan  Continue oxycodone 10 mg QID  Dilaudid for breakthrough for dental pain

## 2025-02-17 NOTE — ED ATTENDING ATTESTATION
2/16/2025  IVinay DO, saw and evaluated the patient. I have discussed the patient with the resident/non-physician practitioner and agree with the resident's/non-physician practitioner's findings, Plan of Care, and MDM as documented in the resident's/non-physician practitioner's note, except where noted. All available labs and Radiology studies were reviewed.  I was present for key portions of any procedure(s) performed by the resident/non-physician practitioner and I was immediately available to provide assistance.       At this point I agree with the current assessment done in the Emergency Department.  I have conducted an independent evaluation of this patient a history and physical is as follows:    55 yo M coming in for eval of left upper dental pain. Saw general dentist outpatient and referred to oral maxillofacial surgery for tooth extraction. Has been taking amoxicillin without much relief he states. Pain worsening, having difficulty eating.    PE:  The patient is well appearing, non-toxic, in NAD. Head: normocephalic, atraumatic. HEENT: mucous membranes moist.  Dental caries and missing teeth left upper maxillary. Tenderness to the gums and teeth in the region. No trismus, no drooling, no stridor. No elevation of floor of mouth or submandibular erythema, induration, fluctuance.  Tenderness to the left maxillary region.  Lungs: CTA b/l, no resp distress. Heart: RRR. No M/R/G. Abdomen: NT, ND, no R/R/G. Neuro: CN2-12 intact, GCS 15. Normal strength and sensation, normal speech and gait. Cap refill < 2 sec, skin warm and dry. No rashes or lesions.    CT soft tissue neck w/ contrast - unremarkable besides dental caries. No deep neck space infection.  Pain not improving much - takes oxy 10 mg PRN at home.  Admit for pain control and OMFS consultation.    ED Course         Critical Care Time  Procedures

## 2025-02-17 NOTE — PLAN OF CARE
Problem: PAIN - ADULT  Goal: Verbalizes/displays adequate comfort level or baseline comfort level  Description: Interventions:  - Encourage patient to monitor pain and request assistance  - Assess pain using appropriate pain scale  - Administer analgesics based on type and severity of pain and evaluate response  - Implement non-pharmacological measures as appropriate and evaluate response  - Consider cultural and social influences on pain and pain management  - Notify physician/advanced practitioner if interventions unsuccessful or patient reports new pain  Outcome: Progressing     Problem: INFECTION - ADULT  Goal: Absence or prevention of progression during hospitalization  Description: INTERVENTIONS:  - Assess and monitor for signs and symptoms of infection  - Monitor lab/diagnostic results  - Monitor all insertion sites, i.e. indwelling lines, tubes, and drains  - Monitor endotracheal if appropriate and nasal secretions for changes in amount and color  - Lar[place] appropriate cooling/warming therapies per order  - Administer medications as ordered  - Instruct and encourage patient and family to use good hand hygiene technique  - Identify and instruct in appropriate isolation precautions for identified infection/condition  Outcome: Progressing  Goal: Absence of fever/infection during neutropenic period  Description: INTERVENTIONS:  - Monitor WBC    Outcome: Progressing     Problem: SAFETY ADULT  Goal: Patient will remain free of falls  Description: INTERVENTIONS:  - Educate patient/family on patient safety including physical limitations  - Instruct patient to call for assistance with activity   - Consult OT/PT to assist with strengthening/mobility   - Keep Call bell within reach  - Keep bed low and locked with side rails adjusted as appropriate  - Keep care items and personal belongings within reach  - Initiate and maintain comfort rounds  - Make Fall Risk Sign visible to staff  - Offer Toileting every  Hours,  in advance of need  - Initiate/Maintain alarm  - Obtain necessary fall risk management equipment:  - Apply yellow socks and bracelet for high fall risk patients  - Consider moving patient to room near nurses station  Outcome: Progressing  Goal: Maintain or return to baseline ADL function  Description: INTERVENTIONS:  -  Assess patient's ability to carry out ADLs; assess patient's baseline for ADL function and identify physical deficits which impact ability to perform ADLs (bathing, care of mouth/teeth, toileting, grooming, dressing, etc.)  - Assess/evaluate cause of self-care deficits   - Assess range of motion  - Assess patient's mobility; develop plan if impaired  - Assess patient's need for assistive devices and provide as appropriate  - Encourage maximum independence but intervene and supervise when necessary  - Involve family in performance of ADLs  - Assess for home care needs following discharge   - Consider OT consult to assist with ADL evaluation and planning for discharge  - Provide patient education as appropriate  Outcome: Progressing  Goal: Maintains/Returns to pre admission functional level  Description: INTERVENTIONS:  - Perform AM-PAC 6 Click Basic Mobility/ Daily Activity assessment daily.  - Set and communicate daily mobility goal to care team and patient/family/caregiver.   - Collaborate with rehabilitation services on mobility goals if consulted  - Perform Range of Motion  times a day.  - Reposition patient every hours.  - Dangle patient  times a day  - Stand patient  times a day  - Ambulate patient  times a day  - Out of bed to chair  times a day   - Out of bed for meals  times a day  - Out of bed for toileting  - Record patient progress and toleration of activity level   Outcome: Progressing     Problem: DISCHARGE PLANNING  Goal: Discharge to home or other facility with appropriate resources  Description: INTERVENTIONS:  - Identify barriers to discharge w/patient and caregiver  - Arrange for  needed discharge resources and transportation as appropriate  - Identify discharge learning needs (meds, wound care, etc.)  - Arrange for interpretive services to assist at discharge as needed  - Refer to Case Management Department for coordinating discharge planning if the patient needs post-hospital services based on physician/advanced practitioner order or complex needs related to functional status, cognitive ability, or social support system  Outcome: Progressing     Problem: Knowledge Deficit  Goal: Patient/family/caregiver demonstrates understanding of disease process, treatment plan, medications, and discharge instructions  Description: Complete learning assessment and assess knowledge base.  Interventions:  - Provide teaching at level of understanding  - Provide teaching via preferred learning methods  Outcome: Progressing     Problem: Nutrition/Hydration-ADULT  Goal: Nutrient/Hydration intake appropriate for improving, restoring or maintaining nutritional needs  Description: Monitor and assess patient's nutrition/hydration status for malnutrition. Collaborate with interdisciplinary team and initiate plan and interventions as ordered.  Monitor patient's weight and dietary intake as ordered or per policy. Utilize nutrition screening tool and intervene as necessary. Determine patient's food preferences and provide high-protein, high-caloric foods as appropriate.     INTERVENTIONS:  - Monitor oral intake, urinary output, labs, and treatment plans  - Assess nutrition and hydration status and recommend course of action  - Evaluate amount of meals eaten  - Assist patient with eating if necessary   - Allow adequate time for meals  - Recommend/ encourage appropriate diets, oral nutritional supplements, and vitamin/mineral supplements  - Order, calculate, and assess calorie counts as needed  - Recommend, monitor, and adjust tube feedings and TPN/PPN based on assessed needs  - Assess need for intravenous fluids  -  Provide specific nutrition/hydration education as appropriate  - Include patient/family/caregiver in decisions related to nutrition  Outcome: Progressing

## 2025-02-17 NOTE — UTILIZATION REVIEW
Initial Clinical Review    Admission: Date/Time/Statement:   Admission Orders (From admission, onward)       Ordered        02/16/25 2311  Place in Observation  Once                          Orders Placed This Encounter   Procedures    Place in Observation     Standing Status:   Standing     Number of Occurrences:   1     Level of Care:   Med Surg [16]     ED Arrival Information       Expected   -    Arrival   2/16/2025 17:31    Acuity   Urgent              Means of arrival   Walk-In    Escorted by   Family Member    Service   Hospitalist    Admission type   Emergency              Arrival complaint   Dental Pain             Chief Complaint   Patient presents with    Dental Pain     Patient here for eval of dental pain x7 days. Started on Tuesday ABX, pain has increased, states it radiates to the front of his face. Unable to eat. +chills. +HA.        Initial Presentation: 54 y.o. male  with hx  mandible fracture with plate in place, chronic orthopedic pain on opioids , aortic thrombus now off eliquis, CVA, sleep apnea not on cpap, HTN, multiple orthopedic surgeries, nephrolithiasis,  who presents with dental pain.  He was seen as outpatient on 2/12 and given amoxicillin.  He has had ongoing upper dental pain.  He reports significant fear of the dentist due to prior mandible fracture and this has led to poor dentition. Pt also reports neck pain, left thumb and pointer numbness. Had recent injection to neck w pain management. On exam, cervical back tenderness. multiple obvious cavities, cracked teeth, swelling of left cheek and tenderness along left gumline and along anterior lymph nodes of neck.  .  Labs WBC  13.22CT shows multiple caries but no abscess. Pt given IV analgesics, IV abx in ED.  Admitted as OBS with pain 2/2 dental caries . PLan- OMFS consult .Pain control - Continue oxycodone 10 mg QID , add prn IV Dilaudid . NPO after MN . IV Unasyn . Labs in am .   Anticipated Length of Stay/Certification Statement:   Patient will be admitted on an observation basis with an anticipated length of stay of less than 2 midnights secondary to pain control, teeth extraction.     Date: 2/17     ED Treatment-Medication Administration from 02/16/2025 1731 to 02/17/2025 0027         Date/Time Order Dose Route Action     02/16/2025 2010 morphine injection 4 mg 4 mg Intravenous Given     02/16/2025 2010 ketorolac (TORADOL) injection 15 mg 15 mg Intravenous Given     02/16/2025 2009 acetaminophen (TYLENOL) tablet 975 mg 975 mg Oral Given     02/16/2025 2030 ampicillin-sulbactam (UNASYN) 3 g in sodium chloride 0.9 % 100 mL IVPB 3 g Intravenous New Bag     02/16/2025 2021 iohexol (OMNIPAQUE) 350 MG/ML injection (MULTI-DOSE) 85 mL 85 mL Intravenous Given     02/16/2025 2147 oxyCODONE (ROXICODONE) IR tablet 5 mg 5 mg Oral Given     02/16/2025 2356 morphine injection 8 mg 8 mg Intravenous Given            Scheduled Medications:  ampicillin-sulbactam, 3 g, Intravenous, Q6H  diltiazem, 60 mg, Oral, BID      Continuous IV Infusions:  lactated ringers, 50 mL/hr, Intravenous, Continuous      PRN Meds:  acetaminophen, 975 mg, Oral, Q8H PRN  diazepam, 5 mg, Oral, Q12H PRN  HYDROmorphone, 0.5 mg, Intravenous, Q3H PRN x3 2/17   HYDROmorphone, 0.2 mg, Intravenous, Q3H PRN x2 2/17   methocarbamol, 750 mg, Oral, TID PRN  oxyCODONE, 10 mg, Oral, Q6H PRN  senna-docusate sodium, 2 tablet, Oral, HS PRN      ED Triage Vitals [02/16/25 1751]   Temperature Pulse Respirations Blood Pressure SpO2 Pain Score   98.8 °F (37.1 °C) 86 18 (!) 149/107 99 % 10 - Worst Possible Pain     Weight (last 2 days)       Date/Time Weight    02/16/25 1749 98 (216.05)            Vital Signs (last 3 days)       Date/Time Temp Pulse Resp BP MAP (mmHg) SpO2 O2 Device Patient Position - Orthostatic VS Pain    02/17/25 0743 -- -- -- -- -- -- -- -- 9 02/17/25 0700 98.1 °F (36.7 °C) 53 16 162/99 125 97 % -- -- --    02/17/25 0621 -- -- -- -- -- -- -- -- 9 02/17/25 0404 -- -- -- -- -- --  -- -- 8    02/17/25 0305 -- -- -- -- -- -- -- -- 7    02/17/25 0042 -- -- -- -- -- -- -- -- 8    02/17/25 0040 -- -- -- -- -- -- -- -- 8    02/17/25 0027 97.8 °F (36.6 °C) 64 18 147/103 124 97 % -- -- --    02/16/25 2356 -- -- -- -- -- -- -- -- 9    02/16/25 1751 98.8 °F (37.1 °C) 86 18 149/107 124 99 % None (Room air) Sitting 10 - Worst Possible Pain              Pertinent Labs/Diagnostic Test Results:   Radiology:  CT soft tissue neck   Final Interpretation by Arturo Etienne DO (02/16 2130)      No abscess identified.      Scattered dental caries.            Workstation performed: [id number]           Cardiology:  No orders to display     GI:  No orders to display           Results from last 7 days   Lab Units 02/17/25 0409 02/16/25 1925   WBC Thousand/uL 11.62* 13.22*   HEMOGLOBIN g/dL 15.0 15.7   HEMATOCRIT % 45.8 47.1   PLATELETS Thousands/uL 350 374   TOTAL NEUT ABS Thousands/µL 7.27 9.19*         Results from last 7 days   Lab Units 02/17/25 0409 02/16/25 1925   SODIUM mmol/L 141 143   POTASSIUM mmol/L 4.2 4.2   CHLORIDE mmol/L 110* 109*   CO2 mmol/L 26 29   ANION GAP mmol/L 5 5   BUN mg/dL 21 21   CREATININE mg/dL 1.11 1.16   EGFR ml/min/1.73sq m 73 70   CALCIUM mg/dL 8.5 9.2     Results from last 7 days   Lab Units 02/16/25 1925   AST U/L 17   ALT U/L 20   ALK PHOS U/L 65   TOTAL PROTEIN g/dL 6.4   ALBUMIN g/dL 4.0   TOTAL BILIRUBIN mg/dL 0.34         Results from last 7 days   Lab Units 02/17/25 0409 02/16/25 1925   GLUCOSE RANDOM mg/dL 121 113                           Past Medical History:   Diagnosis Date    Allergic rhinitis     Anxiety     Aortic thrombus (HCC)     h/o, Eliquis to treat    Arthritis     Asthma     Chronic pain syndrome     Oxycodone PRN    Cluster headaches     Coronary artery disease     Current tobacco use     Depression     Esophageal dysmotility     History of colonic polyps     History of CVA (cerebrovascular accident)     no residual neurologic deficits    History of  nephrolithiasis     History of pneumonia     Hypertension     Insomnia     Nephrolithiasis     Organic impotence     NELLI (obstructive sleep apnea)     no CPAP    Seasonal allergies     Sleep apnea     does not use CPAP    Stroke (HCC) 2015    TMJ (dislocation of temporomandibular joint)     TMJ (temporomandibular joint syndrome)      Present on Admission:   Hypertension   Chronic, continuous use of opioids      Admitting Diagnosis: Dental cavities [K02.9]  Pain, dental [K08.89]  Oral abscess [K12.2]  Age/Sex: 54 y.o. male    Network Utilization Review Department  ATTENTION: Please call with any questions or concerns to [phone] and carefully listen to the prompts so that you are directed to the right person. All voicemails are confidential.   For Discharge needs, contact Care Management DC Support Team at [phone] opt. [phone]  Send all requests for admission clinical reviews, approved or denied determinations and any other requests to dedicated fax number below belonging to the campus where the patient is receiving treatment. List of dedicated fax numbers for the Facilities:  FACILITY NAME UR FAX NUMBER   ADMISSION DENIALS (Administrative/Medical Necessity) [phone]   DISCHARGE SUPPORT TEAM (NETWORK) [phone]   PARENT CHILD HEALTH (Maternity/NICU/Pediatrics) [phone]   General acute hospital [phone]   [facility] [phone]   [facility] [phone]   [facility] [phone]   [facility] [phone]   [facility] [phone]   [facility] [phone]   [facility] [phone]   [facility] [phone]   [facility] [phone]   [facility] [phone]   [facility]  [phone]

## 2025-02-17 NOTE — PLAN OF CARE
Problem: PAIN - ADULT  Goal: Verbalizes/displays adequate comfort level or baseline comfort level  Description: Interventions:  - Encourage patient to monitor pain and request assistance  - Assess pain using appropriate pain scale  - Administer analgesics based on type and severity of pain and evaluate response  - Implement non-pharmacological measures as appropriate and evaluate response  - Consider cultural and social influences on pain and pain management  - Notify physician/advanced practitioner if interventions unsuccessful or patient reports new pain  Outcome: Completed     Problem: INFECTION - ADULT  Goal: Absence or prevention of progression during hospitalization  Description: INTERVENTIONS:  - Assess and monitor for signs and symptoms of infection  - Monitor lab/diagnostic results  - Monitor all insertion sites, i.e. indwelling lines, tubes, and drains  - Monitor endotracheal if appropriate and nasal secretions for changes in amount and color  - Spencer appropriate cooling/warming therapies per order  - Administer medications as ordered  - Instruct and encourage patient and family to use good hand hygiene technique  - Identify and instruct in appropriate isolation precautions for identified infection/condition  Outcome: Completed  Goal: Absence of fever/infection during neutropenic period  Description: INTERVENTIONS:  - Monitor WBC    Outcome: Completed     Problem: SAFETY ADULT  Goal: Patient will remain free of falls  Description: INTERVENTIONS:  - Educate patient/family on patient safety including physical limitations  - Instruct patient to call for assistance with activity   - Consult OT/PT to assist with strengthening/mobility   - Keep Call bell within reach  - Keep bed low and locked with side rails adjusted as appropriate  - Keep care items and personal belongings within reach  - Initiate and maintain comfort rounds  - Make Fall Risk Sign visible to staff  - Offer Toileting every  Hours, in  advance of need  - Initiate/Maintain alarm  - Obtain necessary fall risk management equipment:   - Apply yellow socks and bracelet for high fall risk patients  - Consider moving patient to room near nurses station  Outcome: Completed  Goal: Maintain or return to baseline ADL function  Description: INTERVENTIONS:  -  Assess patient's ability to carry out ADLs; assess patient's baseline for ADL function and identify physical deficits which impact ability to perform ADLs (bathing, care of mouth/teeth, toileting, grooming, dressing, etc.)  - Assess/evaluate cause of self-care deficits   - Assess range of motion  - Assess patient's mobility; develop plan if impaired  - Assess patient's need for assistive devices and provide as appropriate  - Encourage maximum independence but intervene and supervise when necessary  - Involve family in performance of ADLs  - Assess for home care needs following discharge   - Consider OT consult to assist with ADL evaluation and planning for discharge  - Provide patient education as appropriate  Outcome: Completed  Goal: Maintains/Returns to pre admission functional level  Description: INTERVENTIONS:  - Perform AM-PAC 6 Click Basic Mobility/ Daily Activity assessment daily.  - Set and communicate daily mobility goal to care team and patient/family/caregiver.   - Collaborate with rehabilitation services on mobility goals if consulted  - Perform Range of Motion  times a day.  - Reposition patient every  hours.  - Dangle patient  times a day  - Stand patient  times a day  - Ambulate patient  times a day  - Out of bed to chair  times a day   - Out of bed for meals times a day  - Out of bed for toileting  - Record patient progress and toleration of activity level   Outcome: Completed     Problem: DISCHARGE PLANNING  Goal: Discharge to home or other facility with appropriate resources  Description: INTERVENTIONS:  - Identify barriers to discharge w/patient and caregiver  - Arrange for needed  discharge resources and transportation as appropriate  - Identify discharge learning needs (meds, wound care, etc.)  - Arrange for interpretive services to assist at discharge as needed  - Refer to Case Management Department for coordinating discharge planning if the patient needs post-hospital services based on physician/advanced practitioner order or complex needs related to functional status, cognitive ability, or social support system  Outcome: Completed     Problem: Knowledge Deficit  Goal: Patient/family/caregiver demonstrates understanding of disease process, treatment plan, medications, and discharge instructions  Description: Complete learning assessment and assess knowledge base.  Interventions:  - Provide teaching at level of understanding  - Provide teaching via preferred learning methods  Outcome: Completed     Problem: Nutrition/Hydration-ADULT  Goal: Nutrient/Hydration intake appropriate for improving, restoring or maintaining nutritional needs  Description: Monitor and assess patient's nutrition/hydration status for malnutrition. Collaborate with interdisciplinary team and initiate plan and interventions as ordered.  Monitor patient's weight and dietary intake as ordered or per policy. Utilize nutrition screening tool and intervene as necessary. Determine patient's food preferences and provide high-protein, high-caloric foods as appropriate.     INTERVENTIONS:  - Monitor oral intake, urinary output, labs, and treatment plans  - Assess nutrition and hydration status and recommend course of action  - Evaluate amount of meals eaten  - Assist patient with eating if necessary   - Allow adequate time for meals  - Recommend/ encourage appropriate diets, oral nutritional supplements, and vitamin/mineral supplements  - Order, calculate, and assess calorie counts as needed  - Recommend, monitor, and adjust tube feedings and TPN/PPN based on assessed needs  - Assess need for intravenous fluids  - Provide  specific nutrition/hydration education as appropriate  - Include patient/family/caregiver in decisions related to nutrition  Outcome: Completed

## 2025-02-17 NOTE — DISCHARGE SUMMARY
Discharge Summary - Hospitalist   Name: Yefri Bennett 54 y.o. male I MRN: [id number]  Unit/Bed#: -01 I Date of Admission: 2/16/2025   Date of Service: 2/17/2025 I Hospital Day: 0     Assessment & Plan  Pain due to dental caries  Presents due to worsening dental pain.  Has been seen as op and given amoxicillin.    CT: scattered dental carries, no abscess.    Plan  Plan discussed with OMFS for discharge to office for potential dental extraction  Hypertension  Stable on diltiazem   Chronic, continuous use of opioids  Chronic orthopedic pain mostly back and neck.     Plan  Continue oxycodone 10 mg QID  Dilaudid for breakthrough for dental pain     Discharging Physician / Practitioner: Haile Mishra MD  PCP: Sharon Clemente MD  Admission Date:   Admission Orders (From admission, onward)       Ordered        02/16/25 2311  Place in Observation  Once                          Discharge Date: 02/17/25    Medical Problems       Resolved Problems  Date Reviewed: 12/5/2024   None         Consultations During Hospital Stay:  OMFS    Procedures Performed:   none    Significant Findings / Test Results:   CT soft tissue neck  Result Date: 2/16/2025  Impression: No abscess identified. Scattered dental caries. Workstation performed: [id number]      Incidental Findings:   none     Test Results Pending at Discharge (will require follow up):   none     Outpatient Tests Requested:  none    Complications:  none    Reason for Admission: Dental pain    Hospital Course:     Yefri Bennett is a 54 y.o. male patient who originally presented to the hospital on 2/16/2025 with past medical history significant hypertension initially presented to dental pain secondary to dental caries.  Was evaluated by Omfs and recommended to follow-up with office for tooth extraction later today.  Will follow-up outpatient with primary care doctor approximate 1 week        Please see above list of diagnoses and related plan for additional information.      Condition at Discharge: stable     Discharge Day Visit / Exam:     Subjective:   Denies chest pain, shortness breath and cough, fevers, chills    Exam:   Physical Exam  Constitutional:       General: He is not in acute distress.     Appearance: He is well-developed. He is not diaphoretic.   HENT:      Head: Normocephalic and atraumatic.      Nose: Nose normal.      Mouth/Throat:      Pharynx: No oropharyngeal exudate.   Eyes:      General: No scleral icterus.     Conjunctiva/sclera: Conjunctivae normal.   Neck:      Vascular: No JVD.   Cardiovascular:      Rate and Rhythm: Normal rate and regular rhythm.      Heart sounds: Normal heart sounds. No murmur heard.     No friction rub. No gallop.   Pulmonary:      Effort: Pulmonary effort is normal. No respiratory distress.      Breath sounds: Normal breath sounds. No wheezing or rales.   Chest:      Chest wall: No tenderness.   Abdominal:      General: Bowel sounds are normal. There is no distension.      Palpations: Abdomen is soft.      Tenderness: There is no abdominal tenderness. There is no guarding.   Musculoskeletal:         General: No tenderness or deformity. Normal range of motion.      Cervical back: Normal range of motion.   Lymphadenopathy:      Cervical: No cervical adenopathy.   Skin:     General: Skin is warm and dry.      Findings: No erythema.   Neurological:      Mental Status: He is alert. Mental status is at baseline.      Cranial Nerves: No cranial nerve deficit.      Coordination: Coordination normal.      Deep Tendon Reflexes: Reflexes normal.         Discussion with Family: PT, declined family update    Discharge instructions/Information to patient and family:   See after visit summary for information provided to patient and family.      Provisions for Follow-Up Care:  See after visit summary for information related to follow-up care and any pertinent home health orders.      Disposition:     Home    For Discharges to [facility]  SNF:   Not Applicable to this Patient - Not Applicable to this Patient    Planned Readmission: NONE     Discharge Statement:  I spent 60 minutes discharging the patient. This time was spent on the day of discharge. I had direct contact with the patient on the day of discharge. Greater than 50% of the total time was spent examining patient, answering all patient questions, arranging and discussing plan of care with patient as well as directly providing post-discharge instructions.  Additional time then spent on discharge activities.    Discharge Medications:  See after visit summary for reconciled discharge medications provided to patient and family.      ** Please Note: This note has been constructed using a voice recognition system **

## 2025-02-17 NOTE — ED PROVIDER NOTES
Time reflects when diagnosis was documented in both MDM as applicable and the Disposition within this note       Time User Action Codes Description Comment    2/16/2025 10:57 PM Mallory Capellan Add [K12.2] Oral abscess     2/16/2025 11:11 PM AugustineWali epperson Add [K08.89] Pain, dental     2/16/2025 11:11 PM Wali Katz Add [K02.9] Dental cavities           ED Disposition       ED Disposition   Admit    Condition   Stable    Date/Time   Sun Feb 16, 2025 11:11 PM    Comment   Case was discussed with Bea Busch and the patient's admission status was agreed to be Admission Status: observation status to the service of Dr. Bermudez.               Assessment & Plan       Medical Decision Making  Patient presents with:  Pain along the left upper and lower gums that radiates along cheek towards the left ear and up to the left maxillary sinus and down left side of neck.  The pain has been ongoing for the last 7 days after patient was diagnosed with a dental abscess and started on amoxicillin on Tuesday.  Patient is able to eat soft foods and liquids.  Patient has been having headaches, chills, cold sweats since.  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    Patient seen and examined noted to have multiple obvious cavities, cracked teeth, swelling of left cheek and tenderness along left gumline and along anterior lymph nodes of neck.      Differential diagnosis includes but is not limited to dental abscess, cellulitis, cavities, root canal, Teddy's angina, peritonsillar abscess.      Patient's labs notable for: Mildly elevated white blood cell count of 13.22 and Imaging revealed: On CT soft tissue neck showed scattered dental caries.    Consulted on OMFS (Dr. Soto) recommended either admission for extraction in the OR tomorrow or dose of Decadron emergent extraction tomorrow in office.     Discussed patient's case with Bea Busch  regarding admission who accepted the patient for Dr. Bermudez for further evaluation and management.    Patient was rx'd as below       Amount and/or Complexity of Data Reviewed  Labs: ordered.  Radiology: ordered.    Risk  OTC drugs.  Prescription drug management.  Decision regarding hospitalization.             Medications   ampicillin-sulbactam (UNASYN) 3 g in sodium chloride 0.9 % 100 mL IVPB (has no administration in time range)   HYDROmorphone HCl (DILAUDID) injection 0.2 mg (has no administration in time range)   HYDROmorphone (DILAUDID) injection 0.5 mg (has no administration in time range)   HYDROmorphone HCl (DILAUDID) injection 0.2 mg (has no administration in time range)   senna-docusate sodium (SENOKOT S) 8.6-50 mg per tablet 2 tablet (has no administration in time range)   lactated ringers infusion (has no administration in time range)   morphine injection 4 mg (4 mg Intravenous Given 2/16/25 2010)   ketorolac (TORADOL) injection 15 mg (15 mg Intravenous Given 2/16/25 2010)   acetaminophen (TYLENOL) tablet 975 mg (975 mg Oral Given 2/16/25 2009)   ampicillin-sulbactam (UNASYN) 3 g in sodium chloride 0.9 % 100 mL IVPB (0 g Intravenous Stopped 2/16/25 2100)   iohexol (OMNIPAQUE) 350 MG/ML injection (MULTI-DOSE) 85 mL (85 mL Intravenous Given 2/16/25 2021)   oxyCODONE (ROXICODONE) IR tablet 5 mg (5 mg Oral Given 2/16/25 2147)   morphine injection 8 mg (8 mg Intravenous Given 2/16/25 3426)       ED Risk Strat Scores                                                History of Present Illness       Chief Complaint   Patient presents with    Dental Pain     Patient here for eval of dental pain x7 days. Started on Tuesday ABX, pain has increased, states it radiates to the front of his face. Unable to eat. +chills. +HA.        Past Medical History:   Diagnosis Date    Allergic rhinitis     Anxiety     Aortic thrombus (HCC)     h/o, Eliquis to treat    Arthritis     Asthma     Chronic pain syndrome     Oxycodone PRN     Cluster headaches     Coronary artery disease     Current tobacco use     Depression     Esophageal dysmotility     History of colonic polyps     History of CVA (cerebrovascular accident)     no residual neurologic deficits    History of nephrolithiasis     History of pneumonia     Hypertension     Insomnia     Nephrolithiasis     Organic impotence     NELLI (obstructive sleep apnea)     no CPAP    Seasonal allergies     Sleep apnea     does not use CPAP    Stroke (HCC) 2015    TMJ (dislocation of temporomandibular joint)     TMJ (temporomandibular joint syndrome)       Past Surgical History:   Procedure Laterality Date    ABDOMINAL SURGERY      BACK SURGERY      *9, [id number], nervectomy 2006, total of 10    BUCCAL MASS EXCISION N/A 03/26/2018    Procedure: BIOPSY LINGUAL TONSIL (FLOW/ STANDARD PATH)  EVAL UNDER ANESTHESIA;  Surgeon: Deric Easton MD;  [facility]: [facility];  Service: ENT    COLONOSCOPY      FL RETROGRADE PYELOGRAM  10/21/2020    HERNIA REPAIR      KIDNEY SURGERY      KNEE ARTHROSCOPY      LITHOTRIPSY      multiple    LUMBAR DISC SURGERY  2015    MANDIBLE FRACTURE SURGERY      titanium plate    PELVIC SYMPHYSIS FUSION      NV CYSTO/URETERO W/LITHOTRIPSY &INDWELL STENT INSRT Right 10/21/2020    Procedure: CYSTOSCOPY URETEROSCOPY WITH LITHOTRIPSY HOLMIUM LASER, RETROGRADE PYELOGRAM AND INSERTION STENT URETERAL;  Surgeon: Anthony Richardson MD;  Location[facility];  Service: Urology    NV ESOPHAGOGASTRODUODENOSCOPY TRANSORAL DIAGNOSTIC N/A 02/22/2018    Procedure: ESOPHAGOGASTRODUODENOSCOPY (EGD);  Surgeon: Yolis Mares MD;  Location: [facility];  Service: Gastroenterology    NV ESOPHAGOGASTRODUODENOSCOPY TRANSORAL DIAGNOSTIC N/A 04/01/2019    Procedure: EGD W/ DILATION;  Surgeon: Yolis Mares MD;  [facility]: [facility];  Service: Gastroenterology    NV FORREST IMPLTJ NSTIM ELTRDS PLATE/PADDLE EDRL N/A 09/08/2016    Procedure: DORSAL COLUMN STIMULATOR PLACEMENT (IMPULSE MONITORING);  Surgeon:  Martin Doe MD;  Location: [facility];  Service: Orthopedics    SC REVJ/RMVL IMPL SPI NPG/RCVR DTCH CONNJ ELTRD RA Left 06/09/2017    Procedure: REMOVAL OF A THORACIC SCS PLACED VIA LAMINECTOMY AND REMOVAL LEFT BUTTOCK GENERATOR; IMPULSE MONITORING;  Surgeon: Steven Parr MD;  Location: [facility];  Service: Neurosurgery    SC SURGICAL ARTHROSCOPY SHOULDER W/ROTATOR CUFF RPR Right 04/04/2019    Procedure: RIGHT SHOULDER ARTHROSCOPIC SUBSCAPULARIS TENDON REPAIR;  Surgeon: David Merritt MD;  Location: [facility];  Service: Orthopedics    SACROILIAC JOINT FUSION  2015    SHOULDER SURGERY Left     2    TONSILLECTOMY      UPPER GASTROINTESTINAL ENDOSCOPY        Family History   Problem Relation Age of Onset    Leukemia Mother 77    Hypertension Mother     Diabetes Father     Obesity Father     Liver cancer Father     Heart disease Father     Diabetes Brother     Hypertension Brother     Skin cancer Maternal Grandfather 99    Cancer Family       Social History     Tobacco Use    Smoking status: Every Day     Current packs/day: 0.50     Average packs/day: 1 pack/day for 26.1 years (25.6 ttl pk-yrs)     Types: Cigarettes     Start date: 2024     Passive exposure: Past    Smokeless tobacco: Former   Vaping Use    Vaping status: Never Used   Substance Use Topics    Alcohol use: Yes     Alcohol/week: 2.0 standard drinks of alcohol     Types: 2 Shots of liquor per week     Comment: occ    Drug use: Yes     Frequency: 7.0 times per week     Types: Marijuana     Comment: medical marijuana daily for chronic pain 1/2 ounce      E-Cigarette/Vaping    E-Cigarette Use Never User     Cartridges/Day less than one a day       E-Cigarette/Vaping Substances    Nicotine No     THC Yes     CBD No     Flavoring No     Other No     Unknown No       I have reviewed and agree with the history as documented.     Yefri Bennett is a 54 y.o. male     They presented to the emergency department on February 17, 2025. Patient presents  with:  Pain along the left upper and lower gums that radiates along cheek towards the left ear and up to the left maxillary sinus and down left side of neck.  The pain has been ongoing for the last 7 days after patient was diagnosed with a dental abscess and started on amoxicillin on Tuesday.  Patient is able to eat soft foods and liquids.  Patient has been having headaches, chills, cold sweats since.  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  Patient denies cough, shortness of breath, nausea, vomiting, chest pain, palpitations, abdominal pain, constipation, dysuria, polyuria, hematuria, or any other complaint this time.               Review of Systems   Constitutional:  Positive for chills. Negative for fever.   HENT:  Positive for dental problem, facial swelling and sinus pain. Negative for ear pain and sore throat.    Eyes:  Negative for pain and visual disturbance.   Respiratory:  Negative for cough and shortness of breath.    Cardiovascular:  Negative for chest pain and palpitations.   Gastrointestinal:  Negative for abdominal pain, constipation, diarrhea, nausea and vomiting.   Genitourinary:  Negative for dysuria and hematuria.   Musculoskeletal:  Negative for arthralgias and back pain.   Skin:  Negative for color change and rash.   Neurological:  Negative for seizures and syncope.   All other systems reviewed and are negative.          Objective       ED Triage Vitals [02/16/25 1751]   Temperature Pulse Blood Pressure Respirations SpO2 Patient Position - Orthostatic VS   98.8 °F (37.1 °C) 86 (!) 149/107 18 99 % Sitting      Temp Source Heart Rate Source BP Location FiO2 (%) Pain Score    Oral Monitor Right arm -- 10 - Worst Possible Pain      Vitals      Date and Time Temp Pulse SpO2 Resp BP Pain Score FACES Pain Rating User   02/17/25 0027 97.8 °F (36.6 °C) 64 97 % 18 147/103 -- -- CS   02/16/25 2356 -- -- --  -- -- 9 --    02/16/25 1751 98.8 °F (37.1 °C) 86 99 % 18 149/107 10 - Worst Possible Pain -- DP            Physical Exam  Vitals and nursing note reviewed.   Constitutional:       General: He is not in acute distress.     Appearance: He is well-developed.   HENT:      Head: Normocephalic and atraumatic.      Comments: Tenderness along left sinuses and along left cheek with mild swelling.     Mouth/Throat:      Tongue: No lesions. Tongue does not deviate from midline.      Pharynx: Oropharynx is clear. Uvula midline.      Tonsils: No tonsillar exudate or tonsillar abscesses.      Comments: Multiple teeth cracked, have a cap placed, or have obvious cavities.  Submental tenderness.  Eyes:      Conjunctiva/sclera: Conjunctivae normal.   Neck:      Comments: Tenderness along left anterior cervical lymph nodes.  Cardiovascular:      Rate and Rhythm: Normal rate and regular rhythm.      Pulses: Normal pulses.      Heart sounds: Normal heart sounds. No murmur heard.     No friction rub. No gallop.   Pulmonary:      Effort: Pulmonary effort is normal. No respiratory distress.      Breath sounds: Normal breath sounds. No stridor. No wheezing, rhonchi or rales.   Abdominal:      Palpations: Abdomen is soft.      Tenderness: There is no abdominal tenderness.   Musculoskeletal:         General: No swelling.      Cervical back: Neck supple.   Skin:     General: Skin is warm and dry.      Capillary Refill: Capillary refill takes less than 2 seconds.   Neurological:      Mental Status: He is alert.   Psychiatric:         Mood and Affect: Mood normal.         Results Reviewed       Procedure Component Value Units Date/Time    Comprehensive metabolic panel [[id number]]  (Abnormal) Collected: 02/16/25 1925    Lab Status: Final result Specimen: Blood from Arm, Right Updated: 02/16/25 2001     Sodium 143 mmol/L      Potassium 4.2 mmol/L      Chloride 109 mmol/L      CO2 29 mmol/L      ANION GAP 5 mmol/L      BUN 21 mg/dL       Creatinine 1.16 mg/dL      Glucose 113 mg/dL      Calcium 9.2 mg/dL      AST 17 U/L      ALT 20 U/L      Alkaline Phosphatase 65 U/L      Total Protein 6.4 g/dL      Albumin 4.0 g/dL      Total Bilirubin 0.34 mg/dL      eGFR 70 ml/min/1.73sq m     Narrative:      [employer] guidelines for Chronic Kidney Disease (CKD):     Stage 1 with normal or high GFR (GFR > 90 mL/min/1.73 square meters)    Stage 2 Mild CKD (GFR = 60-89 mL/min/1.73 square meters)    Stage 3A Moderate CKD (GFR = 45-59 mL/min/1.73 square meters)    Stage 3B Moderate CKD (GFR = 30-44 mL/min/1.73 square meters)    Stage 4 Severe CKD (GFR = 15-29 mL/min/1.73 square meters)    Stage 5 End Stage CKD (GFR <15 mL/min/1.73 square meters)  Note: GFR calculation is accurate only with a steady state creatinine    CBC and differential [[id number]]  (Abnormal) Collected: 02/16/25 1925    Lab Status: Final result Specimen: Blood from Arm, Right Updated: 02/16/25 1931     WBC 13.22 Thousand/uL      RBC 5.30 Million/uL      Hemoglobin 15.7 g/dL      Hematocrit 47.1 %      MCV 89 fL      MCH 29.6 pg      MCHC 33.3 g/dL      RDW 15.0 %      MPV 9.4 fL      Platelets 374 Thousands/uL      nRBC 0 /100 WBCs      Segmented % 70 %      Immature Grans % 0 %      Lymphocytes % 20 %      Monocytes % 6 %      Eosinophils Relative 3 %      Basophils Relative 1 %      Absolute Neutrophils 9.19 Thousands/µL      Absolute Immature Grans 0.05 Thousand/uL      Absolute Lymphocytes 2.70 Thousands/µL      Absolute Monocytes 0.82 Thousand/µL      Eosinophils Absolute 0.40 Thousand/µL      Basophils Absolute 0.06 Thousands/µL             CT soft tissue neck   Final Interpretation by Arturo Etienne DO (02/16 2130)      No abscess identified.      Scattered dental caries.            Workstation performed: [id number]             Complex Venous Access Line    Date/Time: 2/16/2025 8:19 PM    Performed by: Wali Katz MD  Authorized by: Wali Katz MD    Patient location:   ED  Other Assisting Provider: No    Consent:     Consent obtained:  Verbal    Consent given by:  Patient    Alternatives discussed:  No treatment, delayed treatment, alternative treatment and observation  Pre-procedure details:     Hand hygiene: Hand hygiene performed prior to insertion      Sterile barrier technique: All elements of maximal sterile technique followed      Skin preparation:  2% chlorhexidine  Procedure details:     Complex Venous Access Line Type: US Guided Peripheral IV      Orientation:  Left    Location:  Arm    Catheter size:  20 gauge    Patient position:  Flat    Ultrasound image availability:  Not saved    Number of attempts:  1    Successful placement: yes      Landmarks identified: yes    Anesthesia (see MAR for exact dosages):     Anesthesia method:  None  Post-procedure details:     Post-procedure:  Dressing applied    Assessment:  Free fluid flow    Post-procedure complications: none      Patient tolerance of procedure:  Tolerated well, no immediate complications      ED Medication and Procedure Management   Prior to Admission Medications   Prescriptions Last Dose Informant Patient Reported? Taking?   acetaminophen (TYLENOL) 325 mg tablet 2/15/2025 Self No Yes   Sig: Take 3 tablets (975 mg total) by mouth every 8 (eight) hours as needed for mild pain   albuterol (PROVENTIL HFA,VENTOLIN HFA) 90 mcg/act inhaler More than a month Self No No   Sig: INHALE 2 PUFFS BY MOUTH EVERY 6 HOURS AS NEEDED FOR WHEEZING   amoxicillin (AMOXIL) 500 mg capsule 2/15/2025  No Yes   Sig: Take 1 capsule (500 mg total) by mouth every 8 (eight) hours for 7 days   atorvastatin (LIPITOR) 40 mg tablet Not Taking Self No No   Sig: Take 1 tablet (40 mg total) by mouth daily   Patient not taking: Reported on 1/13/2025   diazepam (VALIUM) 5 mg tablet 2/15/2025 Self No Yes   Sig: Take 1 tablet (5 mg total) by mouth every 12 (twelve) hours as needed for anxiety or muscle spasms   diltiazem (CARDIZEM) 60 mg tablet  2/15/2025 Self No Yes   Sig: Take 1 tablet (60 mg total) by mouth 2 (two) times a day Take one tablet by mouth twice daily   methocarbamol (Robaxin-750) 750 mg tablet 2/15/2025  No Yes   Sig: Take 1 tablet (750 mg total) by mouth 3 (three) times a day as needed for muscle spasms   methylPREDNISolone 4 MG tablet therapy pack Not Taking Self No No   Sig: Use as directed on package   Patient not taking: Reported on 2/16/2025   naproxen (NAPROSYN) 500 mg tablet Not Taking Self No No   Sig: TAKE 1 TABLET(500 MG) BY MOUTH TWICE DAILY WITH MEALS FOR 7 DAYS   Patient not taking: No sig reported   oxyCODONE (ROXICODONE) 10 MG TABS 2/15/2025  No Yes   Sig: Take 1 tablet (10 mg total) by mouth every 4 (four) hours as needed for moderate pain Max Daily Amount: 60 mg      Facility-Administered Medications: None     Current Discharge Medication List        CONTINUE these medications which have NOT CHANGED    Details   acetaminophen (TYLENOL) 325 mg tablet Take 3 tablets (975 mg total) by mouth every 8 (eight) hours as needed for mild pain  Qty: 270 tablet, Refills: 0    Associated Diagnoses: Post laminectomy syndrome      amoxicillin (AMOXIL) 500 mg capsule Take 1 capsule (500 mg total) by mouth every 8 (eight) hours for 7 days  Qty: 21 capsule, Refills: 0    Associated Diagnoses: Tooth abscess      diazepam (VALIUM) 5 mg tablet Take 1 tablet (5 mg total) by mouth every 12 (twelve) hours as needed for anxiety or muscle spasms  Qty: 45 tablet, Refills: 0    Associated Diagnoses: Post laminectomy syndrome      diltiazem (CARDIZEM) 60 mg tablet Take 1 tablet (60 mg total) by mouth 2 (two) times a day Take one tablet by mouth twice daily  Qty: 60 tablet, Refills: 0    Associated Diagnoses: Esophageal dysmotilities      methocarbamol (Robaxin-750) 750 mg tablet Take 1 tablet (750 mg total) by mouth 3 (three) times a day as needed for muscle spasms  Qty: 60 tablet, Refills: 0    Associated Diagnoses: Cervical disc disorder with  radiculopathy of mid-cervical region; Muscle spasm; Foraminal stenosis of cervical region      oxyCODONE (ROXICODONE) 10 MG TABS Take 1 tablet (10 mg total) by mouth every 4 (four) hours as needed for moderate pain Max Daily Amount: 60 mg  Qty: 120 tablet, Refills: 0    Associated Diagnoses: Chronic right SI joint pain      albuterol (PROVENTIL HFA,VENTOLIN HFA) 90 mcg/act inhaler INHALE 2 PUFFS BY MOUTH EVERY 6 HOURS AS NEEDED FOR WHEEZING  Qty: 18 g, Refills: 1    Comments: Substitution to a formulary equivalent within the same pharmaceutical class is authorized.  Associated Diagnoses: Moderate persistent asthma without complication      atorvastatin (LIPITOR) 40 mg tablet Take 1 tablet (40 mg total) by mouth daily  Qty: 90 tablet, Refills: 0    Associated Diagnoses: Hyperlipidemia, unspecified hyperlipidemia type      methylPREDNISolone 4 MG tablet therapy pack Use as directed on package  Qty: 21 each, Refills: 0    Associated Diagnoses: Cervical disc herniation      naproxen (NAPROSYN) 500 mg tablet TAKE 1 TABLET(500 MG) BY MOUTH TWICE DAILY WITH MEALS FOR 7 DAYS  Qty: 14 tablet, Refills: 0    Associated Diagnoses: Post laminectomy syndrome; Degenerative cervical spinal stenosis; Cervical disc herniation           No discharge procedures on file.  ED SEPSIS DOCUMENTATION   Time reflects when diagnosis was documented in both MDM as applicable and the Disposition within this note       Time User Action Codes Description Comment    2/16/2025 10:57 PM Mallory Capellan Add [K12.2] Oral abscess     2/16/2025 11:11 PM Wali Katz Add [K08.89] Pain, dental     2/16/2025 11:11 PM Wali Katz [K02.9] Dental cavities                  Wali Katz MD  02/17/25 0030

## 2025-02-17 NOTE — ASSESSMENT & PLAN NOTE
Presents due to worsening dental pain.  Has been seen as op and given amoxicillin.    CT: scattered dental carries, no abscess.    Plan  Plan discussed with OMFS for discharge to office for potential dental extraction

## 2025-02-17 NOTE — H&P
H&P - Hospitalist   Name: Yefri Bennett 54 y.o. male I MRN: [id number]  Unit/Bed#: ED-08 I Date of Admission: 2/16/2025   Date of Service: 2/16/2025 I Hospital Day: 0     Assessment & Plan  Pain due to dental caries  Presents due to worsening dental pain.  Has been seen as op and given amoxicillin.    CT: scattered dental carries, no abscess.    Plan  NPO for OMFS tomorrow for extraction(s)  Pain control  Continue unasyn  Npo at midnight   Hypertension  Stable on diltiazem   Chronic, continuous use of opioids  Chronic orthopedic pain mostly back and neck.     Plan  Continue oxycodone 10 mg QID  Dilaudid for breakthrough for dental pain      VTE Pharmacologic Prophylaxis: VTE Score: 3 Moderate Risk (Score 3-4) - Pharmacological DVT Prophylaxis Contraindicated. Sequential Compression Devices Ordered.  Code Status: Level 1 - Full Code   Discussion with family: Updated  (significant other) at bedside.    Anticipated Length of Stay: Patient will be admitted on an observation basis with an anticipated length of stay of less than 2 midnights secondary to pain control, teeth extraction.    History of Present Illness   Chief Complaint: dental pain    Yefri Bennett is a 54 y.o. male with a PMH of mandible fracture with plate in place, chronic orthopedic pain, aortic thrombus now off eliquis, CVA, sleep apnea not on cpap, HTN, multiple orthopedic surgeries, nephrolithiasis,  who presents with dental pain.  He was seen as outpatient on 2/12 and given amoxicillin.  He has had ongoing upper dental pain.  He reports significant fear of the dentist due to prior mandible fracture and this has led to poor dentition.  He underwent CT which shows multiple caries but no abscess.  Case was reviewed by ed with omfs who will plan for extraction tomorrow.      Notes neck pain, left thumb and pointer numbness.  Had recent injection to neck w pain management.    Patient is current cigarette smoker.  He notes marijuana use for  pain.  Denies drug or etoh use.    Review of Systems   HENT:  Positive for dental problem.    Musculoskeletal:  Positive for arthralgias and neck pain.        Chronic pain   Neurological:  Positive for numbness (RUE 2/2 neck pain).   All other systems reviewed and are negative.      Historical Information   Past Medical History:   Diagnosis Date    Allergic rhinitis     Anxiety     Aortic thrombus (HCC)     h/o, Eliquis to treat    Arthritis     Asthma     Chronic pain syndrome     Oxycodone PRN    Cluster headaches     Coronary artery disease     Current tobacco use     Depression     Esophageal dysmotility     History of colonic polyps     History of CVA (cerebrovascular accident)     no residual neurologic deficits    History of nephrolithiasis     History of pneumonia     Hypertension     Insomnia     Nephrolithiasis     Organic impotence     NELLI (obstructive sleep apnea)     no CPAP    Seasonal allergies     Sleep apnea     does not use CPAP    Stroke (HCC) 2015    TMJ (dislocation of temporomandibular joint)     TMJ (temporomandibular joint syndrome)      Past Surgical History:   Procedure Laterality Date    ABDOMINAL SURGERY      BACK SURGERY      *9, [id number], nervectomy 2006, total of 10    BUCCAL MASS EXCISION N/A 03/26/2018    Procedure: BIOPSY LINGUAL TONSIL (FLOW/ STANDARD PATH)  EVAL UNDER ANESTHESIA;  Surgeon: Deric Easton MD;  [facility];  Service: ENT    COLONOSCOPY      FL RETROGRADE PYELOGRAM  10/21/2020    HERNIA REPAIR      KIDNEY SURGERY      KNEE ARTHROSCOPY      LITHOTRIPSY      multiple    LUMBAR DISC SURGERY  2015    MANDIBLE FRACTURE SURGERY      titanium plate    PELVIC SYMPHYSIS FUSION      NE CYSTO/URETERO W/LITHOTRIPSY &INDWELL STENT INSRT Right 10/21/2020    Procedure: CYSTOSCOPY URETEROSCOPY WITH LITHOTRIPSY HOLMIUM LASER, RETROGRADE PYELOGRAM AND INSERTION STENT URETERAL;  Surgeon: Anthony Richardson MD;  [facility];  Service: Urology    NE  ESOPHAGOGASTRODUODENOSCOPY TRANSORAL DIAGNOSTIC N/A 02/22/2018    Procedure: ESOPHAGOGASTRODUODENOSCOPY (EGD);  Surgeon: Yolis Mares MD;  [facility];  Service: Gastroenterology    WI ESOPHAGOGASTRODUODENOSCOPY TRANSORAL DIAGNOSTIC N/A 04/01/2019    Procedure: EGD W/ DILATION;  Surgeon: Yolis Mares MD;  [facility];  Service: Gastroenterology    WI FORREST IMPLTJ NSTIM ELTRDS PLATE/PADDLE EDRL N/A 09/08/2016    Procedure: DORSAL COLUMN STIMULATOR PLACEMENT (IMPULSE MONITORING);  Surgeon: Martin Doe MD;  [facility];  Service: Orthopedics    WI REVJ/RMVL IMPL SPI NPG/RCVR DTCH CONNJ ELTRD RA Left 06/09/2017    Procedure: REMOVAL OF A THORACIC SCS PLACED VIA LAMINECTOMY AND REMOVAL LEFT BUTTOCK GENERATOR; IMPULSE MONITORING;  Surgeon: Steevn Parr MD;  [facility];  Service: Neurosurgery    WI SURGICAL ARTHROSCOPY SHOULDER W/ROTATOR CUFF RPR Right 04/04/2019    Procedure: RIGHT SHOULDER ARTHROSCOPIC SUBSCAPULARIS TENDON REPAIR;  Surgeon: David Merritt MD;  [facility];  Service: Orthopedics    SACROILIAC JOINT FUSION  2015    SHOULDER SURGERY Left     2    TONSILLECTOMY      UPPER GASTROINTESTINAL ENDOSCOPY       Social History     Tobacco Use    Smoking status: Every Day     Current packs/day: 0.50     Average packs/day: 1 pack/day for 26.1 years (25.6 ttl pk-yrs)     Types: Cigarettes     Start date: 2024     Passive exposure: Past    Smokeless tobacco: Former   Vaping Use    Vaping status: Never Used   Substance and Sexual Activity    Alcohol use: Yes     Alcohol/week: 2.0 standard drinks of alcohol     Types: 2 Shots of liquor per week     Comment: occ    Drug use: Yes     Frequency: 7.0 times per week     Types: Marijuana     Comment: medical marijuana daily for chronic pain 1/2 ounce    Sexual activity: Yes     Partners: Female     E-Cigarette/Vaping    E-Cigarette Use Never User     Cartridges/Day less than one a day      E-Cigarette/Vaping  Substances    Nicotine No     THC Yes     CBD No     Flavoring No     Other No     Unknown No      Family History   Problem Relation Age of Onset    Leukemia Mother 77    Hypertension Mother     Diabetes Father     Obesity Father     Liver cancer Father     Heart disease Father     Diabetes Brother     Hypertension Brother     Skin cancer Maternal Grandfather 99    Cancer Family      Social History:  Marital Status:    Occupation:   Patient Pre-hospital Living Situation: Home  Patient Pre-hospital Level of Mobility: walks  Patient Pre-hospital Diet Restrictions:     Meds/Allergies   I have reviewed home medications with patient personally.  Prior to Admission medications    Medication Sig Start Date End Date Taking? Authorizing Provider   acetaminophen (TYLENOL) 325 mg tablet Take 3 tablets (975 mg total) by mouth every 8 (eight) hours as needed for mild pain 10/11/24  Yes Sharon Clemente MD   amoxicillin (AMOXIL) 500 mg capsule Take 1 capsule (500 mg total) by mouth every 8 (eight) hours for 7 days 2/12/25 2/19/25 Yes Kimani Jacobs DMD   diazepam (VALIUM) 5 mg tablet Take 1 tablet (5 mg total) by mouth every 12 (twelve) hours as needed for anxiety or muscle spasms 1/13/25  Yes Sharon Clemente MD   diltiazem (CARDIZEM) 60 mg tablet Take 1 tablet (60 mg total) by mouth 2 (two) times a day Take one tablet by mouth twice daily 12/10/24  Yes Sharon Clemente MD   methocarbamol (Robaxin-750) 750 mg tablet Take 1 tablet (750 mg total) by mouth 3 (three) times a day as needed for muscle spasms 1/31/25  Yes Humble Salter DO   oxyCODONE (ROXICODONE) 10 MG TABS Take 1 tablet (10 mg total) by mouth every 4 (four) hours as needed for moderate pain Max Daily Amount: 60 mg 2/7/25  Yes Andrews Baker MD   albuterol (PROVENTIL HFA,VENTOLIN HFA) 90 mcg/act inhaler INHALE 2 PUFFS BY MOUTH EVERY 6 HOURS AS NEEDED FOR WHEEZING 1/3/25   Sharon Clemente MD   atorvastatin (LIPITOR) 40 mg tablet Take 1 tablet (40 mg total) by mouth  daily  Patient not taking: Reported on 1/13/2025 12/10/24   Sharon Clemente MD   methylPREDNISolone 4 MG tablet therapy pack Use as directed on package  Patient not taking: Reported on 2/16/2025 1/13/25   Sharon Clemente MD   naproxen (NAPROSYN) 500 mg tablet TAKE 1 TABLET(500 MG) BY MOUTH TWICE DAILY WITH MEALS FOR 7 DAYS  Patient not taking: No sig reported 1/23/25   Sharon Clemente MD     Allergies   Allergen Reactions    Other Rash     Adhesive tape       Objective :  Temp:  [98.8 °F (37.1 °C)] 98.8 °F (37.1 °C)  HR:  [86] 86  BP: (149)/(107) 149/107  Resp:  [18] 18  SpO2:  [99 %] 99 %  O2 Device: None (Room air)    Physical Exam  Constitutional:       Appearance: Normal appearance.   HENT:      Head: Normocephalic and atraumatic.      Right Ear: External ear normal.      Left Ear: External ear normal.      Nose: Nose normal.      Mouth/Throat:      Dentition: Abnormal dentition. Dental caries present.      Pharynx: Oropharynx is clear.   Eyes:      Conjunctiva/sclera: Conjunctivae normal.   Cardiovascular:      Rate and Rhythm: Normal rate and regular rhythm.      Pulses: Normal pulses.      Heart sounds: Normal heart sounds.   Pulmonary:      Effort: Pulmonary effort is normal.      Breath sounds: Normal breath sounds.   Abdominal:      General: Bowel sounds are normal.      Palpations: Abdomen is soft.   Musculoskeletal:         General: Normal range of motion.      Cervical back: Normal range of motion. Tenderness present.   Skin:     General: Skin is warm.      Capillary Refill: Capillary refill takes less than 2 seconds.   Neurological:      General: No focal deficit present.      Mental Status: He is alert and oriented to person, place, and time.   Psychiatric:         Mood and Affect: Mood normal.         Behavior: Behavior normal.          Lines/Drains:            Lab Results: I have reviewed the following results:  Results from last 7 days   Lab Units 02/16/25  1925   WBC Thousand/uL 13.22*   HEMOGLOBIN g/dL 15.7    HEMATOCRIT % 47.1   PLATELETS Thousands/uL 374   SEGS PCT % 70   LYMPHO PCT % 20   MONO PCT % 6   EOS PCT % 3     Results from last 7 days   Lab Units 02/16/25  1925   SODIUM mmol/L 143   POTASSIUM mmol/L 4.2   CHLORIDE mmol/L 109*   CO2 mmol/L 29   BUN mg/dL 21   CREATININE mg/dL 1.16   ANION GAP mmol/L 5   CALCIUM mg/dL 9.2   ALBUMIN g/dL 4.0   TOTAL BILIRUBIN mg/dL 0.34   ALK PHOS U/L 65   ALT U/L 20   AST U/L 17   GLUCOSE RANDOM mg/dL 113             Lab Results   Component Value Date    HGBA1C 5.9 12/07/2017    HGBA1C 5.7 05/16/2017           Imaging Results Review: I reviewed radiology reports from this admission including: CT neck soft tissue.  Other Study Results Review: No additional pertinent studies reviewed.    Administrative Statements   I have spent a total time of   minutes in caring for this patient on the day of the visit/encounter including Diagnostic results, Prognosis, Risks and benefits of tx options, Instructions for management, Patient and family education, Importance of tx compliance, Risk factor reductions, Impressions, Counseling / Coordination of care, Documenting in the medical record, Reviewing / ordering tests, medicine, procedures  , Obtaining or reviewing history  , and Communicating with other healthcare professionals .    ** Please Note: This note has been constructed using a voice recognition system. **

## 2025-02-17 NOTE — ASSESSMENT & PLAN NOTE
Presents due to worsening dental pain.  Has been seen as op and given amoxicillin.    CT: scattered dental carries, no abscess.    Plan  NPO for OMFS tomorrow for extraction(s)  Pain control  Continue unasyn  Npo at midnight

## 2025-02-17 NOTE — CONSULTS
Oral and Maxillofacial Surgery Consult    Pt seen 02/17/25 8:36 AM     Assessment  54 y.o. male  evaluated for tooth ache. On bedside dental exam, the patient demonstrates pain localized to the upper right lateral incisor and canine tooth with no signs or symptoms of an acute infection.     Plan:  - Discharge now to go to [facility] clinic at [address]  - Analgesia as per primary: Consider Motrin 400-600mg q6h & Tylenol 500mg q6h x 2d then as needed. Oxycodone 5mg q4h as needed for breakthrough pain  - Abx: Amoxicillin 500mg 1 cap TID x7d  - NPO for procedure under IV sedation in clinic  - Encourage good oral hygiene  - Follow up at outpatient [facility] clinic     - Location: [facility] [place] [address] [address] ([phone])    D/w OMFS attg on call    Outpatient extractions under IV sedation. Pt to have adult escort and be NPO >8 hours.     Inpatient consult to Oral and Maxillofacial Surgery  Consult performed by: Mallory Capellan DMD  Consult ordered by: Michelle Bermudez MD           HPI: 54 y.o. male w PMH aortic thrombus off eliquis. Pt comes to ED complaining of throbbing pain localized to the upper left quadrant. Pt denies fever, chills, SOB.     PMH:   Past Medical History:   Diagnosis Date    Allergic rhinitis     Anxiety     Aortic thrombus (HCC)     h/o, Eliquis to treat    Arthritis     Asthma     Chronic pain syndrome     Oxycodone PRN    Cluster headaches     Coronary artery disease     Current tobacco use     Depression     Esophageal dysmotility     History of colonic polyps     History of CVA (cerebrovascular accident)     no residual neurologic deficits    History of nephrolithiasis     History of pneumonia     Hypertension     Insomnia     Nephrolithiasis     Organic impotence     NELLI (obstructive sleep apnea)     no CPAP    Seasonal allergies     Sleep apnea     does not use CPAP    Stroke (HCC) 2015    TMJ (dislocation of temporomandibular joint)     TMJ  (temporomandibular joint syndrome)         Allergies:   Allergies   Allergen Reactions    Other Rash     Adhesive tape       Meds:     Current Facility-Administered Medications:     acetaminophen (TYLENOL) tablet 975 mg, 975 mg, Oral, Q8H PRN, CHAVA Mcdaniel    ampicillin-sulbactam (UNASYN) 3 g in sodium chloride 0.9 % 100 mL IVPB, 3 g, Intravenous, Q6H, CHAVA Mcdaniel, Stopped at 02/17/25 0421    diazepam (VALIUM) tablet 5 mg, 5 mg, Oral, Q12H PRN, Haile Mishra MD    diltiazem (CARDIZEM) tablet 60 mg, 60 mg, Oral, BID, CHAVA Mcdaniel    HYDROmorphone (DILAUDID) injection 0.5 mg, 0.5 mg, Intravenous, Q3H PRN, CHAVA Mcdaniel, 0.5 mg at 02/17/25 0743    HYDROmorphone HCl (DILAUDID) injection 0.2 mg, 0.2 mg, Intravenous, Q3H PRN, CHAVA Mcdaniel, 0.2 mg at 02/17/25 0621    lactated ringers infusion, 50 mL/hr, Intravenous, Continuous, CHAVA Mcdainel, Last Rate: 50 mL/hr at 02/17/25 0042, 50 mL/hr at 02/17/25 0042    methocarbamol (ROBAXIN) tablet 750 mg, 750 mg, Oral, TID PRN, CHAVA Mcdaniel    oxyCODONE (ROXICODONE) immediate release tablet 10 mg, 10 mg, Oral, Q6H PRN, Haile Mishra MD    senna-docusate sodium (SENOKOT S) 8.6-50 mg per tablet 2 tablet, 2 tablet, Oral, HS PRN, CHAVA Mcdaniel    PSH:   Past Surgical History:   Procedure Laterality Date    ABDOMINAL SURGERY      BACK SURGERY      *9, [id number], nervectomy 2006, total of 10    BUCCAL MASS EXCISION N/A 03/26/2018    Procedure: BIOPSY LINGUAL TONSIL (FLOW/ STANDARD PATH)  EVAL UNDER ANESTHESIA;  Surgeon: Deric Easton MD;  [facility];  Service: ENT    COLONOSCOPY      FL RETROGRADE PYELOGRAM  10/21/2020    HERNIA REPAIR      KIDNEY SURGERY      KNEE ARTHROSCOPY      LITHOTRIPSY      multiple    LUMBAR DISC SURGERY  2015    MANDIBLE FRACTURE SURGERY      titanium plate    PELVIC SYMPHYSIS FUSION      TX CYSTO/URETERO W/LITHOTRIPSY &INDWELL STENT INSRT Right 10/21/2020    Procedure: CYSTOSCOPY URETEROSCOPY WITH LITHOTRIPSY HOLMIUM  LASER, RETROGRADE PYELOGRAM AND INSERTION STENT URETERAL;  Surgeon: Anthony Richardson MD;  [facility];  Service: Urology    HI ESOPHAGOGASTRODUODENOSCOPY TRANSORAL DIAGNOSTIC N/A 02/22/2018    Procedure: ESOPHAGOGASTRODUODENOSCOPY (EGD);  Surgeon: Yolsi Mares MD;  [facility];  Service: Gastroenterology    HI ESOPHAGOGASTRODUODENOSCOPY TRANSORAL DIAGNOSTIC N/A 04/01/2019    Procedure: EGD W/ DILATION;  Surgeon: Yolis Mares MD;  [facility];  Service: Gastroenterology    HI FORREST IMPLTJ NSTIM ELTRDS PLATE/PADDLE EDRL N/A 09/08/2016    Procedure: DORSAL COLUMN STIMULATOR PLACEMENT (IMPULSE MONITORING);  Surgeon: Martin Doe MD;  [facility];  Service: Orthopedics    HI REVJ/RMVL IMPL SPI NPG/RCVR DTCH CONNJ ELTRD RA Left 06/09/2017    Procedure: REMOVAL OF A THORACIC SCS PLACED VIA LAMINECTOMY AND REMOVAL LEFT BUTTOCK GENERATOR; IMPULSE MONITORING;  Surgeon: Steven Parr MD;  [facility];  Service: Neurosurgery    HI SURGICAL ARTHROSCOPY SHOULDER W/ROTATOR CUFF RPR Right 04/04/2019    Procedure: RIGHT SHOULDER ARTHROSCOPIC SUBSCAPULARIS TENDON REPAIR;  Surgeon: David Merritt MD;  [facility];  Service: Orthopedics    SACROILIAC JOINT FUSION  2015    SHOULDER SURGERY Left     2    TONSILLECTOMY      UPPER GASTROINTESTINAL ENDOSCOPY        Family History   Problem Relation Age of Onset    Leukemia Mother 77    Hypertension Mother     Diabetes Father     Obesity Father     Liver cancer Father     Heart disease Father     Diabetes Brother     Hypertension Brother     Skin cancer Maternal Grandfather 99    Cancer Family         Review of Systems     Temp:  [97.8 °F (36.6 °C)-98.8 °F (37.1 °C)] 98.1 °F (36.7 °C)  HR:  [53-86] 53  Resp:  [16-18] 16  BP: (147-162)/() 162/99  SpO2:  [97 %-99 %] 97 %       Intake/Output Summary (Last 24 hours) at 2/17/2025 0836  Last data filed at 2/17/2025 0421  Gross per 24 hour   Intake 382.5 ml   Output 0  ml   Net 382.5 ml        Physical Exam:  GE: AAOx3. NAD.  HEENT:    Head: No facial swelling. No trismus. No tenderness to palpation. No LAD. Inferior border of the mandible is palpable.  Mouth: ~40mm JACOB. Dentition multiple carious teeth including #10 and 11. Uvula midline.     Lab Results: I have personally reviewed pertinent lab results.    Imaging: Results Review Statement: No pertinent imaging studies reviewed.  EKG, Pathology, and Other Studies: Results Review Statement: No pertinent imaging studies reviewed.    Counseling / Coordination of Care  Total floor / unit time spent today 30 minutes.  Greater than 50% of total time was spent with the patient and / or family counseling and / or coordination of care.  A description of the counseling / coordination of care: discussion with patient of findings.        Please call or page OMFS resident on call after hours.

## 2025-03-04 ENCOUNTER — TELEPHONE (OUTPATIENT)
Dept: FAMILY MEDICINE CLINIC | Facility: CLINIC | Age: 55
End: 2025-03-04

## 2025-03-04 DIAGNOSIS — M50.120 CERVICAL DISC DISORDER WITH RADICULOPATHY OF MID-CERVICAL REGION: ICD-10-CM

## 2025-03-04 DIAGNOSIS — M53.3 CHRONIC RIGHT SI JOINT PAIN: ICD-10-CM

## 2025-03-04 DIAGNOSIS — J45.40 MODERATE PERSISTENT ASTHMA WITHOUT COMPLICATION: ICD-10-CM

## 2025-03-04 DIAGNOSIS — G89.29 CHRONIC RIGHT SI JOINT PAIN: ICD-10-CM

## 2025-03-04 DIAGNOSIS — M62.838 MUSCLE SPASM: ICD-10-CM

## 2025-03-04 DIAGNOSIS — M96.1 POST LAMINECTOMY SYNDROME: ICD-10-CM

## 2025-03-04 DIAGNOSIS — M48.02 FORAMINAL STENOSIS OF CERVICAL REGION: ICD-10-CM

## 2025-03-04 RX ORDER — DIAZEPAM 5 MG/1
5 TABLET ORAL EVERY 12 HOURS PRN
Qty: 45 TABLET | Refills: 0 | Status: SHIPPED | OUTPATIENT
Start: 2025-03-04

## 2025-03-04 RX ORDER — ALBUTEROL SULFATE 90 UG/1
2 INHALANT RESPIRATORY (INHALATION) EVERY 6 HOURS PRN
Qty: 18 G | Refills: 0 | Status: SHIPPED | OUTPATIENT
Start: 2025-03-04

## 2025-03-04 RX ORDER — ACETAMINOPHEN 325 MG/1
975 TABLET ORAL EVERY 8 HOURS PRN
Qty: 270 TABLET | Refills: 0 | Status: SHIPPED | OUTPATIENT
Start: 2025-03-04

## 2025-03-04 NOTE — TELEPHONE ENCOUNTER
Called and left a VM with patient regarding recent ER visit and cancellation of his dental extraction.

## 2025-03-06 DIAGNOSIS — F11.90 CHRONIC, CONTINUOUS USE OF OPIOIDS: Primary | ICD-10-CM

## 2025-03-06 DIAGNOSIS — M53.3 CHRONIC RIGHT SI JOINT PAIN: ICD-10-CM

## 2025-03-06 DIAGNOSIS — G89.29 CHRONIC RIGHT SI JOINT PAIN: ICD-10-CM

## 2025-03-06 RX ORDER — OXYCODONE HYDROCHLORIDE 10 MG/1
10 TABLET ORAL EVERY 4 HOURS PRN
Qty: 120 TABLET | Refills: 0 | Status: SHIPPED | OUTPATIENT
Start: 2025-03-06

## 2025-03-06 NOTE — TELEPHONE ENCOUNTER
Medication: Oxycodone     Dose/Frequency: 10 mg     Quantity: one month supply     [facility]     Office:   [x] PCP/Provider -   [] Speciality/Provider -     Does the patient have enough for 3 days?   [] Yes   [x] No - Send as HP to POD

## 2025-03-10 RX ORDER — METHOCARBAMOL 750 MG/1
750 TABLET, FILM COATED ORAL 3 TIMES DAILY PRN
Qty: 60 TABLET | Refills: 0 | Status: SHIPPED | OUTPATIENT
Start: 2025-03-10

## 2025-03-14 ENCOUNTER — OFFICE VISIT (OUTPATIENT)
Dept: DENTISTRY | Facility: CLINIC | Age: 55
End: 2025-03-14

## 2025-03-14 VITALS — DIASTOLIC BLOOD PRESSURE: 103 MMHG | SYSTOLIC BLOOD PRESSURE: 181 MMHG

## 2025-03-14 DIAGNOSIS — Z01.20 ENCOUNTER FOR DENTAL EXAM AND CLEANING W/O ABNORMAL FINDINGS: Primary | ICD-10-CM

## 2025-03-14 PROCEDURE — D0150 COMPREHENSIVE ORAL EVALUATION - NEW OR ESTABLISHED PATIENT: HCPCS

## 2025-03-14 PROCEDURE — D0210 INTRAORAL - COMPLETE SERIES OF RADIOGRAPHIC IMAGES: HCPCS

## 2025-03-14 NOTE — PROGRESS NOTES
Bone loss present and >3mm pockets depths with bleeding. SRP needed and request sent in work que.     Will do caries excavation on tooth causing pain and then perio needs to be addressed prior to other restoratives.     Laura

## 2025-03-17 ENCOUNTER — OFFICE VISIT (OUTPATIENT)
Dept: FAMILY MEDICINE CLINIC | Facility: CLINIC | Age: 55
End: 2025-03-17
Payer: MEDICARE

## 2025-03-17 ENCOUNTER — OFFICE VISIT (OUTPATIENT)
Dept: DENTISTRY | Facility: CLINIC | Age: 55
End: 2025-03-17

## 2025-03-17 VITALS — HEART RATE: 75 BPM | DIASTOLIC BLOOD PRESSURE: 107 MMHG | SYSTOLIC BLOOD PRESSURE: 160 MMHG

## 2025-03-17 VITALS
DIASTOLIC BLOOD PRESSURE: 105 MMHG | BODY MASS INDEX: 29.29 KG/M2 | TEMPERATURE: 97.3 F | HEIGHT: 73 IN | SYSTOLIC BLOOD PRESSURE: 168 MMHG | WEIGHT: 221 LBS | HEART RATE: 79 BPM

## 2025-03-17 DIAGNOSIS — F11.90 CHRONIC, CONTINUOUS USE OF OPIOIDS: Primary | ICD-10-CM

## 2025-03-17 DIAGNOSIS — K04.1 NECROTIC TOOTH: Primary | ICD-10-CM

## 2025-03-17 PROCEDURE — D0140 LIMITED ORAL EVALUATION - PROBLEM FOCUSED: HCPCS

## 2025-03-17 PROCEDURE — 99213 OFFICE O/P EST LOW 20 MIN: CPT

## 2025-03-17 RX ORDER — AMOXICILLIN 500 MG/1
500 CAPSULE ORAL EVERY 8 HOURS SCHEDULED
Qty: 21 CAPSULE | Refills: 0 | Status: SHIPPED | OUTPATIENT
Start: 2025-03-17 | End: 2025-03-24

## 2025-03-17 NOTE — ASSESSMENT & PLAN NOTE
Extensive cervical degenerative disc disease with herniations  On Oxycodone 10 mg every 4H prn; usually takes medication 4 times a day  Robaxin increased to 750 mg TID PRN by Spine and pain; preferred previous dose due to increased sleepiness  Encouraged discussing with Spine and Pain in upcoming appointment about decreasing Robaxin dose  Currently stable on current Oxycodone; continue current dose  Continue Valium 5 mg every 12 hours as needed for anxiety or muscle spasms  Orders:    [facility] All Prescribed Meds and Special Instructions    Amphetamines, Methamphetamines    Butalbital    Phenobarbital    Secobarbital    Alprazolam    Clonazepam    Diazepam    Lorazepam    Gabapentin    Pregabalin    Cocaine    Heroin    Buprenorphine    Levorphanol    Meperidine    Naltrexone    Fentanyl    Methadone    Oxycodone    Tapentadol    THC    Tramadol    Codeine, Hydrocodone, Hydropmorphone, Morphine    Bath Salts    Ethyl Glucuronide/Ethyl Sulfate    Kratom    Spice    Methylphenidate    Phentermine    Validity Oxidant    Validity Creatinine    Validity pH    Validity Specific    Xylazine Definitive Test

## 2025-03-17 NOTE — PROGRESS NOTES
Name: Yefri Bennett      : 1970      MRN: [id number]  Encounter Provider: Massiel Catalan MD  Encounter Date: 3/17/2025   Encounter department: [facility]  :  Assessment & Plan  Chronic, continuous use of opioids  Extensive cervical degenerative disc disease with herniations  On Oxycodone 10 mg every 4H prn; usually takes medication 4 times a day  Robaxin increased to 750 mg TID PRN by Spine and pain; preferred previous dose due to increased sleepiness  Encouraged discussing with Spine and Pain in upcoming appointment about decreasing Robaxin dose  Currently stable on current Oxycodone; continue current dose  Continue Valium 5 mg every 12 hours as needed for anxiety or muscle spasms  Orders:    [facility] Prescribed Meds and Special Instructions    Amphetamines, Methamphetamines    Butalbital    Phenobarbital    Secobarbital    Alprazolam    Clonazepam    Diazepam    Lorazepam    Gabapentin    Pregabalin    Cocaine    Heroin    Buprenorphine    Levorphanol    Meperidine    Naltrexone    Fentanyl    Methadone    Oxycodone    Tapentadol    THC    Tramadol    Codeine, Hydrocodone, Hydropmorphone, Morphine    Bath Salts    Ethyl Glucuronide/Ethyl Sulfate    Kratom    Spice    Methylphenidate    Phentermine    Validity Oxidant    Validity Creatinine    Validity pH    Validity Specific    Xylazine Definitive Test    RTC in 2 months for opioid agreement f/u       History of Present Illness   54 year old male with extensive cervical degenerative spine disease presenting for follow up.     Recently admitted to [facility] on - due to dental pain. Given Morphine for pain during that admission.    Following with dentist due to need for dental extractions.    Nevertheless, back pain has been well-controlled on oxycodone 10 mg. Also following with spine and pain who performed neck injections as well as increased Robaxin dose. Pt remarks preferring previous Robaxin dose due to  "feeling sleepy all the time since medication adjustment.    No concerns today.        Review of Systems   Respiratory:  Negative for shortness of breath.    Cardiovascular:  Negative for chest pain.   Musculoskeletal:  Positive for back pain and neck pain.   Neurological:  Negative for dizziness, light-headedness and numbness.       Objective   BP (!) 168/105 (BP Location: Left arm, Patient Position: Sitting, Cuff Size: Large)   Pulse 79   Temp (!) 97.3 °F (36.3 °C) (Temporal)   Ht 6' 1\" (1.854 m)   Wt 100 kg (221 lb)   BMI 29.16 kg/m²      Physical Exam  Constitutional:       General: He is not in acute distress.     Appearance: Normal appearance. He is not ill-appearing.   HENT:      Head: Normocephalic and atraumatic.   Pulmonary:      Effort: Pulmonary effort is normal. No respiratory distress.   Abdominal:      General: There is no distension.   Skin:     General: Skin is warm.   Neurological:      Mental Status: He is alert and oriented to person, place, and time.   Psychiatric:         Mood and Affect: Mood normal.         Behavior: Behavior normal.         Thought Content: Thought content normal.         Judgment: Judgment normal.         "

## 2025-03-17 NOTE — PROGRESS NOTES
.Limited Exam    Yefri Bennett 54 y.o. male presents with self and wife to [facility] for Limited exam  PMH reviewed, no changes, ASA III. 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 Significant allergies: none. Significant medications: albuterol, oxycodone.    Chief complaint:  Dental pain - patient points to tooth #6  #6 was in pain at last recall visit but no diagnosis    Consent:  Discussed that limited exam focuses on problem area, and same day tx is not guaranteed.  Patient explained to if they wish to have anything else evaluated, they need to return to the practice at which they are a patient of record or schedule a comprehensive exam afterwards.  Patient understands and consent was given by self and wife via signed endodontic tx informed consent.    Subjective history:    Onset: a few days ago.   Provocation: Cold, Biting, Chewing.   Quality: Sharp.   Region: Patient points to tooth #6 .   Severity: 8/10.   Timing: comes and goes.    Objective clinical findings:   Oral cancer screening: normal.   Extraoral exam: no remarkable findings.  Intraoral exam: significantly sized caries, gingival inflammation, significant calculus.     Radiographs: No new radiographs, films are current.     Pulp testing:  #6 Cold: Exaggerated but nonlingering response; Percussion: Normal; Palpation: Normal.  #7 Cold: slight to no response; percussion: normal, palpation normal    Assessment:  #6- dentin exposed: no restorable space for restoration  #7-partially necrotic tooth    Plan:   Patient does not want any root canals  Reviewed comprehensive treatment  Explained that patient would need a root canal to save tooth #7 or the other option would be extraction  Patient opts for extraction  At that point patient will have 5 teeth remaining on the top  Reviewed options with patient  1) Extract remaining maxillary teeth- due to wear and  collapse of bite, teeth do not have a good long term prognosis and plan for maxillary complete denture  2) Plan a maxillary partial denture, but remaining teeth are worn and will need repair and upkeep to keep teeth for as long as possible- told patient that since he does not have any top teeth on the left that the partial denture may rock     Patient opts to extract remaining maxillary teeth and plan a maxillary complete denture. Informed patient that he will need to wait 4-6 weeks to evaluate for healing and then start denture process. Informed patient that it will take about 5 months to get dentures. Told patient that we need to do deep cleanings on the lower in the mean time and the filling #29    Referral(s): [facility] for Extractions teeth 2, 3, 6, 7, 8, 9, 27 and maxillary alveoplasty to prepare for maxillary complete denture .  Rx: Amoxicillin.  Comprehensive care disposition:  Patient of record .    Patient dismissed ambulatory and alert.    NV: SRPs mandibular arch.    Attending: Dr. Jacobs  evaluated patient and helped review treatment options .

## 2025-03-17 NOTE — PROGRESS NOTES
"Name: Yefri Bennett      : 1970      MRN: [id number]  Encounter Provider: Massiel Catalan MD  Encounter Date: 3/17/2025   Encounter department: [facility]  :  Assessment & Plan           History of Present Illness {?Quick Links Encounters * My Last Note * Last Note in Specialty * Snapshot * Since Last Visit * History :39296}  HPI  Review of Systems    Objective {?Quick Links Trend Vitals * Enter New Vitals * Results Review * Timeline (Adult) * Labs * Imaging * Cardiology * Procedures * Lung Cancer Screening * Surgical eConsent :83999}  BP (!) 168/105 (BP Location: Left arm, Patient Position: Sitting, Cuff Size: Large)   Pulse 79   Temp (!) 97.3 °F (36.3 °C) (Temporal)   Ht 6' 1\" (1.854 m)   Wt 100 kg (221 lb)   BMI 29.16 kg/m²      Physical Exam  {Administrative / Billing Section (Optional):91220}  "

## 2025-03-18 ENCOUNTER — TELEPHONE (OUTPATIENT)
Dept: DENTISTRY | Facility: CLINIC | Age: 55
End: 2025-03-18

## 2025-03-19 ENCOUNTER — OFFICE VISIT (OUTPATIENT)
Dept: PAIN MEDICINE | Facility: CLINIC | Age: 55
End: 2025-03-19
Payer: MEDICARE

## 2025-03-19 VITALS
SYSTOLIC BLOOD PRESSURE: 125 MMHG | BODY MASS INDEX: 29.29 KG/M2 | DIASTOLIC BLOOD PRESSURE: 92 MMHG | HEART RATE: 89 BPM | HEIGHT: 73 IN | WEIGHT: 221 LBS

## 2025-03-19 DIAGNOSIS — F11.90 CHRONIC, CONTINUOUS USE OF OPIOIDS: ICD-10-CM

## 2025-03-19 DIAGNOSIS — M50.120 CERVICAL DISC DISORDER WITH RADICULOPATHY OF MID-CERVICAL REGION: ICD-10-CM

## 2025-03-19 DIAGNOSIS — M51.16 LUMBAR DISC DISEASE WITH RADICULOPATHY: ICD-10-CM

## 2025-03-19 DIAGNOSIS — G89.4 CHRONIC PAIN SYNDROME: ICD-10-CM

## 2025-03-19 DIAGNOSIS — M48.02 FORAMINAL STENOSIS OF CERVICAL REGION: Primary | ICD-10-CM

## 2025-03-19 PROCEDURE — 99214 OFFICE O/P EST MOD 30 MIN: CPT | Performed by: NURSE PRACTITIONER

## 2025-03-19 RX ORDER — PREGABALIN 75 MG/1
75 CAPSULE ORAL 2 TIMES DAILY
Qty: 60 CAPSULE | Refills: 1 | Status: SHIPPED | OUTPATIENT
Start: 2025-03-19

## 2025-03-19 NOTE — PROGRESS NOTES
Assessment:  1. Foraminal stenosis of cervical region    2. Cervical disc disorder with radiculopathy of mid-cervical region    3. Lumbar disc disease with radiculopathy    4. Chronic pain syndrome    5. Chronic, continuous use of opioids        Plan:  While the patient was in the office today, I did have a thorough conversation regarding their chronic pain syndrome, medication management, and treatment plan options.    54 y.o. male who presents for a follow up office visit in regards to Neck Pain (Neck pain - Patient c/o pain worse when turning his head to the left and right. ) and Shoulder Pain (Right shoulder).  Patient returns today following up after neck injection on 2/3/2025.  He reports 0 relief after the injection.  He states that his pain is constant sharp, throbbing, and pressure-like, shooting, numbness, pins-and-needles in his neck that radiates down his right arm to his hand.  Its mostly his first 2 fingers in his palm it is affected.  Patient also reports he is having low back pain.  He reports his pain is a 10 out of 10 on the numeric pain scale.  Patient is on oxycodone 10 mg and Valium 5 mg that is prescribed by his PCP.  He reports that he is not getting adequate relief from his medications.  He reports having multiple spine surgeries in the past.  He does admit to using marijuana to supplement his current pain medication.    As there is no current imaging of the patient's lumbar spine would recommend getting an x-ray of his lumbar for some immediate imaging.  Will order an MRI of his lumbar spine also to further evaluate any changes since his last MRI about 3 years ago.    I discussed with the patient that I felt a medication such as pregabalin would be helpful in treating their pain.  I discussed with the patient the type of medication it is, how it works, and that it requires a titration process that is specific to each individual.  I reviewed with the patient that it may take 3 to 4 weeks for  the medications effects to be noticed and that it should never be abruptly stopped. The patient was educated on the medications most common side effects which include dizziness, drowsiness, and swelling of the hands and feet.  Patient was instructed to call the office with any adverse medication side effects. The patient is also aware that if they would like to come off the medication, they need to let us know as suddenly stopping the medication puts them at risk to have a seizure. The patient verbalized an understanding. Will start Pregabalin 75 mg BD.     Will consider starting duloxetine at his next visit if he is not showing much improvement with the pregabalin.    Since the patient had no relief from his cervical epidural injection I think it would be prudent to send him to see neurosurgery in follow-up.    Follow-up is planned in 4 weeks or sooner as warranted. The patient was advised to contact the office should their symptoms worsen in the interim. The patient was agreeable and verbalized an understanding. Discharge instructions were provided.      My impressions and treatment recommendations were discussed in detail with the patient who verbalized understanding and had no further questions.  Discharge instructions were provided. I personally saw and examined the patient and I agree with the above discussed plan of care.    Orders Placed This Encounter   Procedures    XR spine lumbar minimum 4 views non injury     Standing Status:   Future     Expected Date:   3/19/2025     Expiration Date:   3/19/2029     Scheduling Instructions:      Bring along any outside films relating to this procedure.          MRI lumbar spine wo contrast     Standing Status:   Future     Expected Date:   3/19/2025     Expiration Date:   3/19/2029     Scheduling Instructions:      There is no preparation for this test. Please leave your jewelry and valuables at home, wedding rings are the exception. All patients will be required to  change into a hospital gown and pants.  Street clothes are not permitted in the MRI.  Magnetic nail polish must be removed prior to arrival for your test. Please bring your insurance cards, a form of photo ID and a list of your medications with you. Arrive 15 minutes prior to your appointment time in order to register. Please bring any prior CT or MRI studies of this area that were not performed at a [facility].            To schedule this appointment, please contact Central Scheduling at [phone].            Prior to your appointment, please make sure you complete the MRI Screening Form when you e-Check in for your appointment. This will be available starting 7 days before your appointment in [facility]. You may receive an e-mail with an activation code if you do not have a [facility] account. If you do not have access to a device, we will complete your screening at your appointment.     Reason for Exam:   low back pain     What is the patient's sedation requirement?:   No Sedation     Does the patient need medication for Claustrophobia? If yes, order medication at this point.:   No     Does the patient wear a life vest, have an implanted cardiac device, a stimulation device, a sleep apnea stimulator, or a breast tissue expansion device?:   No     Release to patient through [facility]:   Immediate    Ambulatory Referral to Neurosurgery     Standing Status:   Future     Expiration Date:   3/19/2026     Referral Priority:   Routine     Referral Type:   Consult - AMB     Referral Reason:   Specialty Services Required     Referred to Provider:   Craig Robert Goldberg, MD     Requested Specialty:   Neurosurgery     Number of Visits Requested:   1     Expiration Date:   3/19/2026     New Medications Ordered This Visit   Medications    pregabalin (LYRICA) 75 mg capsule     Sig: Take 1 capsule (75 mg total) by mouth 2 (two) times a day     Dispense:  60 capsule     Refill:  1       History of Present Illness:  Yefri  RANI Bennett is a 54 y.o. male who presents for a follow up office visit in regards to Neck Pain (Neck pain - Patient c/o pain worse when turning his head to the left and right. ) and Shoulder Pain (Right shoulder).  Patient returns today following up after neck injection on 2/3/2025.  He reports 0 relief after the injection.  He states that his pain is constant sharp, throbbing, and pressure-like, shooting, numbness, pins-and-needles in his neck that radiates down his right arm to his hand.  Its mostly his first 2 fingers in his palm it is affected.  Patient also reports he is having low back pain.  He reports his pain is a 10 out of 10 on the numeric pain scale.  Patient is on oxycodone 10 mg and Valium 5 mg that is prescribed by his PCP.  He reports that he is not getting adequate relief from his medications.  He reports having multiple spine surgeries in the past.  He does admit to using marijuana to supplement his current pain medication.      I have personally reviewed and/or updated the patient's past medical history, past surgical history, family history, social history, current medications, allergies, and vital signs today.     Review of Systems   Respiratory:  Negative for shortness of breath.    Cardiovascular:  Negative for chest pain.   Gastrointestinal:  Negative for constipation, diarrhea, nausea and vomiting.   Musculoskeletal:  Positive for neck pain. Negative for arthralgias, gait problem, joint swelling and myalgias.        Right shoulder pain   Skin:  Negative for rash.   Neurological:  Negative for dizziness, seizures and weakness.   All other systems reviewed and are negative.      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    Dysphagia    Intractable episodic cluster headache    Allergic rhinitis    Abnormal head CT    Elevated blood  pressure reading    Chronic right SI joint pain    Depression with anxiety    Dyshidrotic dermatitis    Erectile dysfunction of non-organic origin    Moderate persistent asthma without complication    Post laminectomy syndrome    TMJ syndrome    Dental abscess    Lingual tonsil hypertrophy    Sleep apnea    Hypertension    Other chest pain    Chronic, continuous use of opioids    Long-term current use of benzodiazepine    Medical marijuana use    Hoarseness    Tobacco abuse    Elevated hematocrit    Elevated platelet count    Hypercontractile esophagus    Left shoulder pain    Cervical radiculopathy    Elevated serum creatinine    Esophageal dysphagia    Incomplete tear of right rotator cuff    Partial tear of right subscapularis tendon    Therapeutic opioid induced constipation    Exposure to sexually transmitted disease (STD)    Gastritis without bleeding    Peripheral polyneuropathy    Seborrheic keratosis    Abnormal urinalysis    Chronic obstructive asthma (HCC)    Status post cystoscopy with ureteral stent placement    Acute viral syndrome    Encounter for medication monitoring    History of colon polyps    Ambulatory dysfunction    Tinnitus of both ears    Overweight (BMI 25.0-29.9)    Polyarthralgia    Acute sore throat    Long term prescription opiate use    COVID-19    Chest pain: Secondary to MSK versus anxiety vs esophageal dysmotility disorder    Anxiety    Asthma    Chronic bronchitis    History of kidney stones    Cervical disc disorder with radiculopathy of mid-cervical region    Foraminal stenosis of cervical region    Pain due to dental caries    Lumbar disc disease with radiculopathy       Past Medical History:   Diagnosis Date    Allergic rhinitis     Anxiety     Aortic thrombus (HCC)     h/o, Eliquis to treat    Arthritis     Asthma     Chronic pain syndrome     Oxycodone PRN    Cluster headaches     Coronary artery disease     Current tobacco use     Depression     Esophageal dysmotility      History of colonic polyps     History of CVA (cerebrovascular accident)     no residual neurologic deficits    History of nephrolithiasis     History of pneumonia     Hypertension     Insomnia     Nephrolithiasis     Organic impotence     NELLI (obstructive sleep apnea)     no CPAP    Seasonal allergies     Sleep apnea     does not use CPAP    Stroke (HCC) 2015    TMJ (dislocation of temporomandibular joint)     TMJ (temporomandibular joint syndrome)        Past Surgical History:   Procedure Laterality Date    ABDOMINAL SURGERY      BACK SURGERY      *9, [id number], nervectomy 2006, total of 10    BUCCAL MASS EXCISION N/A 03/26/2018    Procedure: BIOPSY LINGUAL TONSIL (FLOW/ STANDARD PATH)  EVAL UNDER ANESTHESIA;  Surgeon: Deric Easton MD;  [facility];  Service: ENT    COLONOSCOPY      FL RETROGRADE PYELOGRAM  10/21/2020    HERNIA REPAIR      KIDNEY SURGERY      KNEE ARTHROSCOPY      LITHOTRIPSY      multiple    LUMBAR DISC SURGERY  2015    MANDIBLE FRACTURE SURGERY      titanium plate    PELVIC SYMPHYSIS FUSION      CA CYSTO/URETERO W/LITHOTRIPSY &INDWELL STENT INSRT Right 10/21/2020    Procedure: CYSTOSCOPY URETEROSCOPY WITH LITHOTRIPSY HOLMIUM LASER, RETROGRADE PYELOGRAM AND INSERTION STENT URETERAL;  Surgeon: Anthony Richardson MD;  [facility];  Service: Urology    CA ESOPHAGOGASTRODUODENOSCOPY TRANSORAL DIAGNOSTIC N/A 02/22/2018    Procedure: ESOPHAGOGASTRODUODENOSCOPY (EGD);  Surgeon: Yolis Mares MD;  [facility];  Service: Gastroenterology    CA ESOPHAGOGASTRODUODENOSCOPY TRANSORAL DIAGNOSTIC N/A 04/01/2019    Procedure: EGD W/ DILATION;  Surgeon: Yolis Mares MD;  [facility];  Service: Gastroenterology    CA FORREST IMPLTJ NSTIM ELTRDS PLATE/PADDLE EDRL N/A 09/08/2016    Procedure: DORSAL COLUMN STIMULATOR PLACEMENT (IMPULSE MONITORING);  Surgeon: Martin Doe MD;  [facility];  Service: Orthopedics    CA REVJ/RMVL IMPL SPI NPG/RCVR DTCH CONNJ ELTRD  RA Left 06/09/2017    Procedure: REMOVAL OF A THORACIC SCS PLACED VIA LAMINECTOMY AND REMOVAL LEFT BUTTOCK GENERATOR; IMPULSE MONITORING;  Surgeon: Steven Parr MD;  Location: [facility];  Service: Neurosurgery    MA SURGICAL ARTHROSCOPY SHOULDER W/ROTATOR CUFF RPR Right 04/04/2019    Procedure: RIGHT SHOULDER ARTHROSCOPIC SUBSCAPULARIS TENDON REPAIR;  Surgeon: David Merritt MD;  [facility];  Service: Orthopedics    SACROILIAC JOINT FUSION  2015    SHOULDER SURGERY Left     2    TONSILLECTOMY      UPPER GASTROINTESTINAL ENDOSCOPY         Family History   Problem Relation Age of Onset    Leukemia Mother 77    Hypertension Mother     Diabetes Father     Obesity Father     Liver cancer Father     Heart disease Father     Diabetes Brother     Hypertension Brother     Skin cancer Maternal Grandfather 99    Cancer Family        Social History     Occupational History    Occupation: Disability   Tobacco Use    Smoking status: Every Day     Current packs/day: 0.50     Average packs/day: 1 pack/day for 26.2 years (25.6 ttl pk-yrs)     Types: Cigarettes     Start date: 2024     Passive exposure: Past    Smokeless tobacco: Former   Vaping Use    Vaping status: Never Used   Substance and Sexual Activity    Alcohol use: Not Currently     Alcohol/week: 2.0 standard drinks of alcohol     Types: 2 Shots of liquor per week     Comment: occ    Drug use: Yes     Frequency: 7.0 times per week     Types: Marijuana     Comment: medical marijuana daily for chronic pain 1/2 ounce    Sexual activity: Yes     Partners: Female       Current Outpatient Medications on File Prior to Visit   Medication Sig    acetaminophen (TYLENOL) 325 mg tablet Take 3 tablets (975 mg total) by mouth every 8 (eight) hours as needed for mild pain    albuterol (PROVENTIL HFA,VENTOLIN HFA) 90 mcg/act inhaler Inhale 2 puffs every 6 (six) hours as needed for wheezing    amoxicillin (AMOXIL) 500 mg capsule Take 1 capsule (500 mg total) by mouth every  "8 (eight) hours for 7 days    diazepam (VALIUM) 5 mg tablet Take 1 tablet (5 mg total) by mouth every 12 (twelve) hours as needed for anxiety or muscle spasms    diltiazem (CARDIZEM) 60 mg tablet Take 1 tablet (60 mg total) by mouth 2 (two) times a day Take one tablet by mouth twice daily    methocarbamol (Robaxin-750) 750 mg tablet Take 1 tablet (750 mg total) by mouth 3 (three) times a day as needed for muscle spasms    oxyCODONE (ROXICODONE) 10 MG TABS Take 1 tablet (10 mg total) by mouth every 4 (four) hours as needed for moderate pain Max Daily Amount: 60 mg    naloxone (NARCAN) 4 mg/0.1 mL nasal spray Administer 1 spray into a nostril. If no response after 2-3 minutes, give another dose in the other nostril using a new spray. (Patient not taking: Reported on 3/19/2025)     No current facility-administered medications on file prior to visit.       Allergies   Allergen Reactions    Other Rash     Adhesive tape       Physical Exam:    /92 (BP Location: Right arm, Patient Position: Sitting, Cuff Size: Standard)   Pulse 89   Ht 6' 1\" (1.854 m)   Wt 100 kg (221 lb)   BMI 29.16 kg/m²     Constitutional:normal, well developed, well nourished, alert, in no distress and non-toxic and no overt pain behavior.  Eyes:anicteric  HEENT:grossly intact  Neck:supple, symmetric, trachea midline and no masses   Pulmonary:even and unlabored  Cardiovascular:No edema or pitting edema present  Skin:Normal without rashes or lesions and well hydrated  Psychiatric:Mood and affect appropriate  Neurologic:Cranial Nerves II-XII grossly intact  Musculoskeletal: normal gait    Imaging - I have reviewed the results of the most recent MRI     MRI C-spine 12/19/2023  CERVICAL DISC SPACES:     C2-C3: Mild facet hypertrophy on the left without central or foraminal.     C3-C4: Small marginal osteophytes bilaterally. Minimal central stenosis and minimal right foraminal narrowing noted.     C4-C5: Mild facet hypertrophy bilaterally with " small central protrusion type disc herniation. Mild central stenosis without foraminal narrowing.     C5-C6: Marginal osteophytes and mild facet hypertrophy combine to result in mild central and mild left greater than right foraminal narrowing.     C6-C7: Small marginal osteophytes result in mild bilateral foraminal narrowing. Central canal patent.     C7-T1: No significant central or foraminal narrowing.     UPPER THORACIC DISC SPACES:  Normal.     OTHER FINDINGS:  None.     IMPRESSION:     Image quality degraded by patient motion artifact. Mild spondylotic degenerative changes are seen in the cervical spine to include small central protrusion type disc herniation at C4-5 contributing to mild central stenosis. Mild foraminal narrowing noted   at multiple levels as described.      MRI Lumbar spine 10/18/2022  LUMBAR DISC SPACES:     L1-L2:  Mild posterior disc bulge.  Facet and ligamentum flavum hypertrophy.  No central canal stenosis or neural foraminal narrowing.     L2-L3:  Mild posterior disc bulge and annular tear.  Facet and ligamentum flavum hypertrophy and osteophytosis.  Mild central canal stenosis and neural foraminal narrowing.     L3-L4:  Posterior disc bulge and paracentral annular tear.  Facet hypertrophy and significant osteophytosis.  Mild central canal stenosis.  Severe right and moderate left neural foraminal narrowing.     L4-L5:  Decompression of the central canal from laminectomies.  Facet osteophytosis.  No neural foraminal narrowing.     L5-S1:  Minimal posterior disc osteophytes and mild facet osteophytosis.  No central canal stenosis or neural foraminal narrowing.     IMPRESSION:        1.  Postsurgical change from L4-5 fusion and decompression of the central canal.  No evidence of discitis or osteomyelitis.  No epidural or spinal fluid collection.  2.  Mildly progressive chronic disc and facet degenerative change.

## 2025-03-19 NOTE — PATIENT INSTRUCTIONS
"Patient Education     Back exercises   The Basics   Written by the doctors and editors at [facility]   Why do I need to do back exercises? -- Back exercises can help ease back pain and might help prevent future back pain. Long term, it is important to strengthen the muscles in your lower back, buttocks, and belly. These are your \"core muscles.\" Stretching exercises are also important to keep your muscles flexible.  Below are some stretching and strengthening exercises that might help you. Other forms of movement can help ease or prevent back pain, too. For example, some people like to walk, do aerobic exercise, or do yoga or tawny chi. The most important thing is to move your body. Your doctor, nurse, or physical therapist can help you find different types of activity that work for you.  What stretching exercises should I do? -- Below are some examples of stretching exercises. Warm up your muscles before stretching to help prevent injury. To warm up, you can walk, jog, or cycle for a few minutes.  Start by repeating each of these stretches 2 to 3 times. Try to hold each stretch for 5 to 10 seconds, and try to do the stretches 2 to 3 times each day. Breathe slowly and deeply as you do the exercises. Never bounce when doing stretches.   Cat-cow stretch (figure 1) - Start on all fours on the floor, with your hands under your shoulders, knees under your hips, and your back flat. First, tuck your chin and tighten your stomach muscles as you round your back (like a \"cat\"). Hold the stretch for about 10 seconds. Rest for a few seconds as you flatten your back. Next, lift your chin and let your belly and lower back sink toward the floor (like a \"cow\"). Hold the stretch for about 10 seconds.   Single knee-to-chest stretches (figure 2) ? While lying on your back, bend your knees with your feet flat on the floor. Pull 1 knee toward your chest until you feel a stretch in your lower back and buttock area. Lower, and repeat with the " other knee. If you have knee problems, pull your knee up by grabbing the back of your thigh instead of the front of your knee. You can also do this exercise by grabbing both knees at the same time.   Lower trunk rotations (figure 3) ? While lying on your back, bend your knees with your feet flat on the floor. Keep your knees and ankles together, and then drop them to 1 side. Keep both of your shoulders touching the floor until you feel a stretch in the muscles at the side of the back. Repeat on the other side.   Lower back stretches seated (figure 4) ? Sit in a chair with your feet spread about shoulder width apart. Then, lean forward until you feel a stretch in your lower back.  What strengthening exercises should I do? -- Below are some examples of strengthening exercises.  Start by doing each exercise 2 to 3 times. Work up to doing each exercise 10 times. Hold each exercise for 3 to 5 seconds, and try to do the exercises 2 to 3 times each day. Do all exercises slowly.   Shoulder blade squeezes (figure 5) ? Pinch your shoulder blades together on your upper back, and hold 3 to 5 seconds. You can also do these 1 side at a time. Sit with good posture, and make sure that your shoulders do not rise up when you do this exercise. Relax.   Pelvic tilts (figure 6) ? Lie on your back with your knees bent and feet flat on the floor. Tighten your stomach muscles, and press your lower back down to the floor. Relax. You should be able to breathe comfortably during this exercise.   Hip lifts (figure 7) ? Lie on your back with your knees bent and feet flat on the floor. Tighten your stomach muscles, keep your back flat, and lift your buttocks off of the floor. Relax. You should feel this in your buttocks, not in your lower back.  What else should I know?    Exercise, including stretching, might be slightly uncomfortable. But you should not have sharp or severe pain. If you do get severe pain, stop what you are doing. If severe  "pain continues, call your doctor or nurse.   Do not hold your breath when exercising. If you tend to hold your breath, try counting out loud when exercising. If any exercise bothers you, stop right away.   Always warm up before exercising. Warm muscles stretch much easier than cool muscles. Stretching cool muscles can lead to injury.   Doing exercises before a meal can be a good way to get into a routine.  All topics are updated as new evidence becomes available and our peer review process is complete.  This topic retrieved from [employer] on: Apr 03, 2024.  Topic 419881 Version 2.0  Release: 32.2.4 - C32.92  © 2024 UpToDate, Inc. and/or its affiliates. All rights reserved.  figure 1: Cat-cow stretch     Start on all fours on the floor, with hands under your shoulders, knees under your hips, and your back flat. First, tuck your chin and tighten your stomach muscles as you round your back (like a \"cat\"). Hold the stretch for about 10 seconds. Rest for a few seconds as you flatten your back. Next, lift your chin and let your belly and lower back sink toward the floor (like a \"cow\"). Hold the stretch for about 10 seconds.  Graphic [id number] Version 1.0  figure 2: Single knee-to-chest stretch     Lie on your back, bend your knees, and have your feet flat on the floor. Pull 1 knee toward your chest until you feel a stretch in your lower back and buttock area. Repeat with the other knee. If you have knee problems, pull your knee up by grabbing the back of your thigh instead of the front of your knee. You can also do this exercise by grabbing both knees at the same time.  Graphic [id number] Version 1.0  figure 3: Lower trunk rotation     While lying on your back, bend your knees and have your feet flat on the floor. Keep your legs together and then drop them to 1 side. Keep both of your shoulders touching the floor until you feel a stretch in the muscles at the side of the back. Repeat on the other side.  Graphic [id number] Version " 1.0  figure 4: Lower back stretch     Sit in a chair with your feet spread about shoulder width apart. Then, lean forward until you feel a stretch in your lower back.  Graphic 438234 Version 1.0  figure 5: Shoulder blade squeezes     Pinch your shoulder blades together on your upper back and hold for 3 to 5 seconds. Make sure that you are sitting with good posture and that your shoulders do not raise up when you do this exercise. Relax.  Graphic [id number] Version 1.0  figure 6: Pelvic tilts     Lie on your back with your knees bent and feet flat on the floor. Tighten your stomach muscles and press your lower back down to the floor. Relax.  Graphic [id number] Version 1.0  figure 7: Hip lifts     Lie on your back with your knees bent and feet flat on the floor. Tighten your stomach muscles and lift your buttocks off of the floor. Relax.  Graphic 380856 Version 1.0  Consumer Information Use and Disclaimer   Disclaimer: This generalized information is a limited summary of diagnosis, treatment, and/or medication information. It is not meant to be comprehensive and should be used as a tool to help the user understand and/or assess potential diagnostic and treatment options. It does NOT include all information about conditions, treatments, medications, side effects, or risks that may apply to a specific patient. It is not intended to be medical advice or a substitute for the medical advice, diagnosis, or treatment of a health care provider based on the health care provider's examination and assessment of a patient's specific and unique circumstances. Patients must speak with a health care provider for complete information about their health, medical questions, and treatment options, including any risks or benefits regarding use of medications. This information does not endorse any treatments or medications as safe, effective, or approved for treating a specific patient. UpToDate, Inc. and its affiliates disclaim any warranty or  liability relating to this information or the use thereof.The use of this information is governed by the Terms of Use, available at https://www.wolterskluwer.com/en/know/clinical-effectiveness-terms. 2024© UpToDate, Inc. and its affiliates and/or licensors. All rights reserved.  Copyright   © 2024 [employer] and/or its affiliates. All rights reserved.

## 2025-03-25 LAB

## 2025-03-29 DIAGNOSIS — J45.40 MODERATE PERSISTENT ASTHMA WITHOUT COMPLICATION: ICD-10-CM

## 2025-03-29 RX ORDER — ALBUTEROL SULFATE 90 UG/1
2 INHALANT RESPIRATORY (INHALATION) EVERY 6 HOURS PRN
Qty: 18 G | Refills: 0 | Status: SHIPPED | OUTPATIENT
Start: 2025-03-29

## 2025-04-01 DIAGNOSIS — M53.3 CHRONIC RIGHT SI JOINT PAIN: ICD-10-CM

## 2025-04-01 DIAGNOSIS — G89.29 CHRONIC RIGHT SI JOINT PAIN: ICD-10-CM

## 2025-04-01 DIAGNOSIS — M96.1 POST LAMINECTOMY SYNDROME: ICD-10-CM

## 2025-04-01 RX ORDER — DIAZEPAM 5 MG/1
5 TABLET ORAL EVERY 12 HOURS PRN
Qty: 45 TABLET | Refills: 0 | Status: SHIPPED | OUTPATIENT
Start: 2025-04-01

## 2025-04-01 RX ORDER — OXYCODONE HYDROCHLORIDE 10 MG/1
10 TABLET ORAL EVERY 4 HOURS PRN
Qty: 120 TABLET | Refills: 0 | Status: SHIPPED | OUTPATIENT
Start: 2025-04-01

## 2025-04-01 NOTE — TELEPHONE ENCOUNTER
Medication:     oxyCODONE (ROXICODONE) 10 MG TABS       Dose/Frequency: Take 1 tablet (10 mg total) by mouth every 4 (four) hours as needed for moderate pain Max Daily Amount: 60 mg     Quantity: 120    Pharm[place] [id number] [place] [address] [id number] [facility]     Office:   [x] PCP/Provider -   [] Speciality/Provider -     Does the patient have enough for 3 days?   [x] Yes   [] No - Send as HP to POD        Medication: diazepam (VALIUM) 5 mg tablet     Dose/Frequency: Take 1 tablet (5 mg total) by mouth every 12 (twelve) hours as needed for anxiety or muscle spasms     Quantity: 45    Pharmacy:  [id number] [address] [facility]     Office:   [x] PCP/Provider -   [] Speciality/Provider -     Does the patient have enough for 3 days?   [x] Yes   [] No - Send as HP to POD

## 2025-04-03 ENCOUNTER — CONSULT (OUTPATIENT)
Dept: NEUROSURGERY | Facility: CLINIC | Age: 55
End: 2025-04-03
Payer: MEDICARE

## 2025-04-03 VITALS
HEIGHT: 73 IN | SYSTOLIC BLOOD PRESSURE: 149 MMHG | BODY MASS INDEX: 29.29 KG/M2 | HEART RATE: 86 BPM | DIASTOLIC BLOOD PRESSURE: 96 MMHG | OXYGEN SATURATION: 97 % | WEIGHT: 221 LBS | TEMPERATURE: 98.1 F

## 2025-04-03 DIAGNOSIS — M48.02 FORAMINAL STENOSIS OF CERVICAL REGION: ICD-10-CM

## 2025-04-03 DIAGNOSIS — M50.120 CERVICAL DISC DISORDER WITH RADICULOPATHY OF MID-CERVICAL REGION: ICD-10-CM

## 2025-04-03 DIAGNOSIS — M54.12 CERVICAL RADICULOPATHY: Primary | ICD-10-CM

## 2025-04-03 DIAGNOSIS — M51.16 LUMBAR DISC DISEASE WITH RADICULOPATHY: ICD-10-CM

## 2025-04-03 PROCEDURE — 99244 OFF/OP CNSLTJ NEW/EST MOD 40: CPT | Performed by: NURSE PRACTITIONER

## 2025-04-03 NOTE — PROGRESS NOTES
Name: Yefri Bennett      : 1970      MRN: [id number]  Encounter Provider: CHAVA Moreno  Encounter Date: 4/3/2025   Encounter department: [facility]  :  Assessment & Plan  Foraminal stenosis of cervical region    Orders:    Ambulatory Referral to Neurosurgery    Cervical disc disorder with radiculopathy of mid-cervical region    Orders:    Ambulatory Referral to Neurosurgery    MRI cervical spine without contrast; Future    Lumbar disc disease with radiculopathy    Orders:    Ambulatory Referral to Neurosurgery    Cervical radiculopathy  Patient seen in outpatient office today as referral by pain management for further workup and evaluation of possible cervical radiculopathy  Patient states his symptoms started around 2024 with neck and right arm pain, after he received an injection last month with PM his symptoms significantly worsened  He is complaining of constant sharp neck pain which radiates constantly into his right anterior arm into his thumb, index and middle fingers.  He does complain of difficulty with fine motor skills on right and dropping objects  He has not seen physical therapy but continues to do stretches and exercises at home.  He did undergo a C7-T1 LALO on 2/3/2025 with no help but instead made his symptoms worse.    Imaging:    MRI cervical spine without 23:Image quality degraded by patient motion artifact. Mild spondylotic degenerative changes are seen in the cervical spine to include small central protrusion type disc herniation at C4-5 contributing to mild central stenosis. Mild foraminal narrowing noted at multiple levels as described.    Plan:  Reviewed prior imaging with patient  Given ongoing symptoms even after trying an injection we will order repeat MRI cervical spine to further evaluate an etiology given patient's symptoms  Continue follow-up with pain management  Patient will follow-up in the next few weeks as  "joint appointment with spine surgeon once imaging completed or sooner symptoms worsen  Patient aware to seek care sooner if she develops any new or worsening neurological changes or red flag signs  Patient aware to contact neurosurgery with any further question or concerns.             History of Present Illness     Yefri Bennett is a 54 y.o. male with past medical history significant for neuropathic pain, chronic pain syndrome, status post spinal cord stimulator insertion with removal, ED, asthma, sleep apnea, hypertension, marijuana use, tobacco abuse, multiple right sided rotator cuff surgeries, and tinnitus.  Patient seen in outpatient office today as a referral by pain management for further workup and evaluation of possible cervical radiculopathy.    Patient appears very uncomfortable in pain during visit.  He states around December 2024 for his neck pain and right arm symptoms really started to bother him.  He states especially over the past month after his LALO injection with pain management his symptoms significantly worsen.    He is currently complaining of 9/10 sharp neck pain which is constant and constantly radiates into his right anterior arm into his thumb, index and middle fingers.  He denies any symptoms in his left arm.  He states numbness and tingling in the same distribution on the right arm is also constant.  He states turning his head especially to the left causes a \"pop\" in his neck with a sharp pain that goes through his body.  He also states walking, sitting up from a chair or driving worsens his symptoms.  He states using heat helps.  He is on different medication for his back pain and those medications have not helped with his neck and arm symptoms.  He states he smokes a lot of marijuana which does not help either.  He reports right arm weakness.  He states dizziness here and there.  He complains of a headache that is been ongoing and is constant for the past 2 weeks into the back of his " head up to the right side of his skull.  He states he is tired and short of breath.  He states he is also not eating great and does not have an appetite.  He also reports some nausea.  He denies recent falls or traumas and states his balance is wobbly at times.  He does report difficulty with fine motor skills on the right and dropping objects.    He has not seen physical therapy for his neck but continues to do stretches and exercises at home usually for his back.  He did undergo a C7-T1 LALO on 2/3/2025 with no help but instead made his symptoms worse.  He states he feels like he has to hold his head up to talk to me today.  He also endorses a lot of right shoulder injuries and surgeries.    Given patient's ongoing symptoms even after injection which have significantly worsened.  Will order MRI cervical spine to further evaluate with history of mild disc herniation at C4-5.  Patient will follow-up as joint appointment with spine surgeon once imaging completed or sooner symptoms worsen.     Neck Pain          Review of Systems   Constitutional: Negative.    HENT: Negative.     Eyes: Negative.    Respiratory: Negative.     Cardiovascular: Negative.    Gastrointestinal: Negative.    Endocrine: Negative.    Genitourinary: Negative.    Musculoskeletal:  Positive for neck pain.        Cervical Radiculopathy/Cervical foraminal stenosis  C/S MRI/XR on 12/2023    C7 T1 GONZALEZ on 2/3/25-- no relief   PT- home exercises     C/o 9/10 constant, sharp, shooting, neck pain radiates to (R) arm/hand x 3 months  +N/T/W on RUE   Decrease ROM, wore when turning head to the left.  Sitting aggravates his pain.  Difficulty walking due to neck pain   Denies any B/B incontinence  Meds: Robaxin, Lyrica, Oxycodone- not helping   LALO in March 2024- no relief  PT @ home    No AC/ smoker Cigarettes & Marijuana       Skin: Negative.    Allergic/Immunologic: Negative.    Neurological: Negative.    Hematological: Negative.    Psychiatric/Behavioral:  Negative.     All other systems reviewed and are negative.    ROS reviewed with patient and agree and changes were made as needed    Past Medical History   Past Medical History:   Diagnosis Date    Allergic rhinitis     Anxiety     Aortic thrombus (HCC)     h/o, Eliquis to treat    Arthritis     Asthma     Chronic pain syndrome     Oxycodone PRN    Cluster headaches     Coronary artery disease     Current tobacco use     Depression     Esophageal dysmotility     History of colonic polyps     History of CVA (cerebrovascular accident)     no residual neurologic deficits    History of nephrolithiasis     History of pneumonia     Hypertension     Insomnia     Nephrolithiasis     Organic impotence     NELLI (obstructive sleep apnea)     no CPAP    Seasonal allergies     Sleep apnea     does not use CPAP    Stroke (HCC) 2015    TMJ (dislocation of temporomandibular joint)     TMJ (temporomandibular joint syndrome)      Past Surgical History:   Procedure Laterality Date    ABDOMINAL SURGERY      BACK SURGERY      *9, [id number], nervectomy 2006, total of 10    BUCCAL MASS EXCISION N/A 03/26/2018    Procedure: BIOPSY LINGUAL TONSIL (FLOW/ STANDARD PATH)  EVAL UNDER ANESTHESIA;  Surgeon: Deric Easton MD;  Location: [facility];  Service: ENT    COLONOSCOPY      FL RETROGRADE PYELOGRAM  10/21/2020    HERNIA REPAIR      KIDNEY SURGERY      KNEE ARTHROSCOPY      LITHOTRIPSY      multiple    LUMBAR DISC SURGERY  2015    MANDIBLE FRACTURE SURGERY      titanium plate    PELVIC SYMPHYSIS FUSION      OR CYSTO/URETERO W/LITHOTRIPSY &INDWELL STENT INSRT Right 10/21/2020    Procedure: CYSTOSCOPY URETEROSCOPY WITH LITHOTRIPSY HOLMIUM LASER, RETROGRADE PYELOGRAM AND INSERTION STENT URETERAL;  Surgeon: Anthony Richardson MD;  Location[facility];  Service: Urology    OR ESOPHAGOGASTRODUODENOSCOPY TRANSORAL DIAGNOSTIC N/A 02/22/2018    Procedure: ESOPHAGOGASTRODUODENOSCOPY (EGD);  Surgeon: Yolis Mares MD;  [facility]: [facility];   Service: Gastroenterology    DE ESOPHAGOGASTRODUODENOSCOPY TRANSORAL DIAGNOSTIC N/A 04/01/2019    Procedure: EGD W/ DILATION;  Surgeon: Yolis Mares MD;  [facility];  Service: Gastroenterology    DE FORREST IMPLTJ NSTIM ELTRDS PLATE/PADDLE EDRL N/A 09/08/2016    Procedure: DORSAL COLUMN STIMULATOR PLACEMENT (IMPULSE MONITORING);  Surgeon: Martin Doe MD;  Location: [facility];  Service: Orthopedics    DE REVJ/RMVL IMPL SPI NPG/RCVR DTCH CONNJ ELTRD RA Left 06/09/2017    Procedure: REMOVAL OF A THORACIC SCS PLACED VIA LAMINECTOMY AND REMOVAL LEFT BUTTOCK GENERATOR; IMPULSE MONITORING;  Surgeon: Steven Parr MD;  Location: [facility];  Service: Neurosurgery    DE SURGICAL ARTHROSCOPY SHOULDER W/ROTATOR CUFF RPR Right 04/04/2019    Procedure: RIGHT SHOULDER ARTHROSCOPIC SUBSCAPULARIS TENDON REPAIR;  Surgeon: David Merritt MD;  [facility];  Service: Orthopedics    SACROILIAC JOINT FUSION  2015    SHOULDER SURGERY Left     2    TONSILLECTOMY      UPPER GASTROINTESTINAL ENDOSCOPY       Family History   Problem Relation Age of Onset    Leukemia Mother 77    Hypertension Mother     Diabetes Father     Obesity Father     Liver cancer Father     Heart disease Father     Diabetes Brother     Hypertension Brother     Skin cancer Maternal Grandfather 99    Cancer Family      he reports that he has been smoking cigarettes. He started smoking about 15 months ago. He has a 25.6 pack-year smoking history. He has been exposed to tobacco smoke. He has quit using smokeless tobacco. He reports that he does not currently use alcohol after a past usage of about 2.0 standard drinks of alcohol per week. He reports current drug use. Frequency: 7.00 times per week. Drug: Marijuana.  Current Outpatient Medications   Medication Instructions    acetaminophen (TYLENOL) 975 mg, Oral, Every 8 hours PRN    albuterol (PROVENTIL HFA,VENTOLIN HFA) 90 mcg/act inhaler 2 puffs, Inhalation, Every 6 hours PRN    diazepam  "(VALIUM) 5 mg, Oral, Every 12 hours PRN    diltiazem (CARDIZEM) 60 mg, Oral, 2 times daily, Take one tablet by mouth twice daily    methocarbamol (ROBAXIN-750) 750 mg, Oral, 3 times daily PRN    naloxone (NARCAN) 4 mg/0.1 mL nasal spray Administer 1 spray into a nostril. If no response after 2-3 minutes, give another dose in the other nostril using a new spray.    oxyCODONE (ROXICODONE) 10 mg, Oral, Every 4 hours PRN    pregabalin (LYRICA) 75 mg, Oral, 2 times daily     Allergies   Allergen Reactions    Other Rash     Adhesive tape      Objective   /96   Pulse 86   Temp 98.1 °F (36.7 °C) (Temporal)   Ht 6' 1\" (1.854 m)   Wt 100 kg (221 lb)   SpO2 97%   BMI 29.16 kg/m²     Physical Exam  Vitals reviewed.   Constitutional:       General: He is not in acute distress.     Appearance: Normal appearance. He is not ill-appearing.   HENT:      Head: Normocephalic and atraumatic.   Eyes:      Extraocular Movements: Extraocular movements intact.      Conjunctiva/sclera: Conjunctivae normal.      Pupils: Pupils are equal, round, and reactive to light.   Neck:      Comments: Limited ROM 2/2 pain  Pulmonary:      Effort: Pulmonary effort is normal. No respiratory distress.   Chest:      Chest wall: No tenderness.   Abdominal:      General: There is no distension.      Palpations: Abdomen is soft.      Tenderness: There is no abdominal tenderness.   Musculoskeletal:         General: Normal range of motion.      Cervical back: Tenderness present. Muscular tenderness present. No spinous process tenderness.   Skin:     General: Skin is warm and dry.   Neurological:      Mental Status: He is oriented to person, place, and time.      Deep Tendon Reflexes:      Reflex Scores:       Bicep reflexes are 1+ on the right side and 1+ on the left side.       Patellar reflexes are 1+ on the right side and 1+ on the left side.  Psychiatric:         Attention and Perception: Attention and perception normal.         Mood and Affect: " Mood and affect normal.         Speech: Speech normal.         Behavior: Behavior normal. Behavior is cooperative.         Thought Content: Thought content normal.         Cognition and Memory: Cognition and memory normal.         Judgment: Judgment normal.       Neurological Exam  Mental Status  Awake, alert and oriented to person, place and time. Oriented to person, place, and time. Memory is normal. Speech is normal.    Cranial Nerves  CN III, IV, VI: Extraocular movements intact bilaterally. Pupils equal round and reactive to light bilaterally.  CN V: Facial sensation is normal.  CN VII: Full and symmetric facial movement.  CN VIII: Hearing is normal.  CN XI: Shoulder shrug strength is normal.  CN XII: Tongue midline without atrophy or fasciculations.    Motor    5/5 throughout except right deltoid/elbow extension/elbow flexion 4 -/5 secondary to pain.    Sensory  Sensation to light touch grossly intact all extremities except decrease sensation in right upper extremity and hand.    Reflexes                                            Right                      Left  Biceps                                 1+                         1+  Patellar                                1+                         1+    Right pathological reflexes: Cinthia's absent. Ankle clonus absent.  Left pathological reflexes: Cinthia's absent. Ankle clonus absent.    Gait  Casual gait is normal including stance, stride, and arm swing.      Radiology Results Review: I personally reviewed the following image studies in PACS and associated radiology reports: CT C-spine and MRI spine. My interpretation of the radiology images/reports is:  .

## 2025-04-03 NOTE — ASSESSMENT & PLAN NOTE
Patient seen in outpatient office today as referral by pain management for further workup and evaluation of possible cervical radiculopathy  Patient states his symptoms started around December 2024 with neck and right arm pain, after he received an injection last month with PM his symptoms significantly worsened  He is complaining of constant sharp neck pain which radiates constantly into his right anterior arm into his thumb, index and middle fingers.  He does complain of difficulty with fine motor skills on right and dropping objects  He has not seen physical therapy but continues to do stretches and exercises at home.  He did undergo a C7-T1 LALO on 2/3/2025 with no help but instead made his symptoms worse.    Imaging:    MRI cervical spine without 12/19/23:Image quality degraded by patient motion artifact. Mild spondylotic degenerative changes are seen in the cervical spine to include small central protrusion type disc herniation at C4-5 contributing to mild central stenosis. Mild foraminal narrowing noted at multiple levels as described.    Plan:  Reviewed prior imaging with patient  Given ongoing symptoms even after trying an injection we will order repeat MRI cervical spine to further evaluate an etiology given patient's symptoms  Continue follow-up with pain management  Patient will follow-up in the next few weeks as joint appointment with spine surgeon once imaging completed or sooner symptoms worsen  Patient aware to seek care sooner if she develops any new or worsening neurological changes or red flag signs  Patient aware to contact neurosurgery with any further question or concerns.

## 2025-04-08 ENCOUNTER — HOSPITAL ENCOUNTER (OUTPATIENT)
Dept: MRI IMAGING | Facility: HOSPITAL | Age: 55
Discharge: HOME | End: 2025-04-08
Payer: MEDICARE

## 2025-04-08 DIAGNOSIS — M50.120 CERVICAL DISC DISORDER WITH RADICULOPATHY OF MID-CERVICAL REGION: ICD-10-CM

## 2025-04-08 PROCEDURE — 72141 MRI NECK SPINE W/O DYE: CPT

## 2025-04-25 ENCOUNTER — OFFICE VISIT (OUTPATIENT)
Dept: NEUROSURGERY | Facility: CLINIC | Age: 55
End: 2025-04-25
Payer: MEDICARE

## 2025-04-25 VITALS
HEART RATE: 96 BPM | TEMPERATURE: 98.1 F | WEIGHT: 215 LBS | SYSTOLIC BLOOD PRESSURE: 146 MMHG | RESPIRATION RATE: 16 BRPM | HEIGHT: 73 IN | OXYGEN SATURATION: 99 % | DIASTOLIC BLOOD PRESSURE: 88 MMHG | BODY MASS INDEX: 28.49 KG/M2

## 2025-04-25 DIAGNOSIS — J45.40 MODERATE PERSISTENT ASTHMA WITHOUT COMPLICATION: ICD-10-CM

## 2025-04-25 DIAGNOSIS — M54.12 CERVICAL RADICULOPATHY: Primary | ICD-10-CM

## 2025-04-25 PROCEDURE — 99214 OFFICE O/P EST MOD 30 MIN: CPT | Performed by: PHYSICIAN ASSISTANT

## 2025-04-25 RX ORDER — METHYLPREDNISOLONE 4 MG/1
TABLET ORAL
Qty: 1 EACH | Refills: 0 | Status: SHIPPED | OUTPATIENT
Start: 2025-04-25

## 2025-04-25 RX ORDER — ALBUTEROL SULFATE 90 UG/1
2 INHALANT RESPIRATORY (INHALATION) EVERY 6 HOURS PRN
Qty: 18 G | Refills: 5 | Status: SHIPPED | OUTPATIENT
Start: 2025-04-25

## 2025-04-25 RX ORDER — OXYCODONE AND ACETAMINOPHEN 5; 325 MG/1; MG/1
1 TABLET ORAL
COMMUNITY
Start: 2025-04-17

## 2025-04-25 RX ORDER — IBUPROFEN 600 MG/1
TABLET, FILM COATED ORAL
COMMUNITY
Start: 2025-04-16 | End: 2025-04-30 | Stop reason: SDUPTHER

## 2025-04-25 RX ORDER — CLINDAMYCIN HYDROCHLORIDE 300 MG/1
CAPSULE ORAL
COMMUNITY
Start: 2025-04-24

## 2025-04-25 RX ORDER — AMOXICILLIN 500 MG/1
TABLET, FILM COATED ORAL
COMMUNITY
Start: 2025-04-16

## 2025-04-25 NOTE — PROGRESS NOTES
Name: Yefri Bennett      : 1970      MRN: [id number]  Encounter Provider: Douglas Finney MD  Encounter Date: 2025   Encounter department: [facility]  :  Assessment & Plan  Cervical radiculopathy  Patient presents for follow up of neck and RUE pain  Patient states his symptoms started around 2024 with neck and right arm pain which got worse after an injection  He is complaining of constant sharp neck pain which radiates constantly into his right anterior arm into his thumb, index and middle fingers.  He does complain of difficulty with fine motor skills on right and dropping objects  He has not seen physical therapy but continues to do stretches and exercises at home.  He did undergo a C7-T1 LALO on 2/3/2025 with no help but instead made his symptoms worse.    Imaging:  MRI cervical spine w/o, 25: Worsened bone marrow edema in left C2-C3 articular pillars, likely due to active facet arthritis. Multilevel degenerative changes of cervical spine with varying degrees of canal stenosis (multilevel mild) and foraminal narrowing (severe right C3-C4, bilateral C5-C6, and bilateral C6-C7)    Plan:  Imaging reviewed in detail with the patient, showing mild to moderate BNF stenosis and multiple levels. There is NF stenosis on the R at C5-6 and C6-7, which could be contributing to his symptoms  Continue follow-up with pain management. Recommend attempting another injection, possible a TFESI at R C6-7 for diagnostic purposes  Patient states his last injection caused him severe pain. He also had a bad experience with a lumbar LALO in the past. He refuses to have any further injections.   Will obtain EMG for further evaluation and to guide possible future surgical planning  Recommend formal PT  Medrol dose pack sent to pharmacy  Follow up after EMG / PT / injections for surgical discussion with Dr Finney if no improvement in symptoms    Orders:    EMG; Future     methylPREDNISolone 4 MG tablet therapy pack; Use as directed on package    Ambulatory Referral to Physical Therapy; Future          History of Present Illness       This is a 54-year-old male with a past medical history significant for chronic pain syndrome on long-term opioids, history of spinal cord stimulator status post removal, history of aortic thrombus previously on Eliquis, current tobacco user, history of stroke with no residual deficits, NELLI without use of CPAP, history of lumbar fusion by Dr. Vasquez who presents today for MRI review and follow-up of right sided neck pain and cervical radiculopathy.  He states that the neck pain started last fall without any known injury.  It radiates of his right sided occiput and down into his trapezius muscle.  In approximately January he started having pain and numbness radiating down his right arm anteriorly into his thumb, first finger, and middle finger.  His thumb and first finger are completely numb.  The pain is searing and keeps him up at night.  He states that currently he has not slept in 2 days secondary to his pain.  He follows closely with pain management and his medications have been adjusted.  He did have a C7-T1 LALO but this caused him significant pain immediately postprocedure.  His neck pain has become worse since that injection.  He has a fear of needles and had a poor experience with a lumbar LALO in the past and refuses to have any further injections.  He is doing home exercises for his neck but has not done formal PT.  He describes weakness in his entire right upper extremity but particularly with his .  He is right-handed and is having difficulty writing and picking up small objects with his fingers.  He has no weakness on exam but does have significant sensory deficits so this could be from his numbness rather than weakness.  He has no left upper extremity symptoms.  He has chronic right leg pain but no new bilateral lower extremity  "deficits or gait instability.  No bowel or bladder dysfunction.    He is a daily smoker.  He smokes marijuana.  He is not on AC or AP medications currently.         Review of Systems   HENT:  Positive for trouble swallowing.    Respiratory:  Positive for chest tightness and shortness of breath (with exertion).    Gastrointestinal: Negative.    Genitourinary: Negative.    Musculoskeletal:  Positive for back pain, myalgias (neck, right shoulder), neck pain (radiates to right shoulder and arm to fingers 1,2,3,) and neck stiffness. Negative for gait problem (will use a cane prn if pain is severe or has to walk a long distance).        C/o Neck \"popping\"   Neurological:  Positive for weakness (right hand), numbness (N/T RUE) and headaches. Negative for seizures.   Psychiatric/Behavioral:  Positive for sleep disturbance.      I have personally reviewed the MA's review of systems and made changes as necessary.    Past Medical History   Past Medical History:   Diagnosis Date    Allergic rhinitis     Anxiety 2000    Aortic thrombus (HCC)     h/o, Eliquis to treat    Arthritis 2010    Asthma     Chronic pain syndrome     Oxycodone PRN    Cluster headaches     Coronary artery disease     Current tobacco use     Depression     Esophageal dysmotility     Headache(784.0) 1990    History of colonic polyps     History of CVA (cerebrovascular accident)     no residual neurologic deficits    History of nephrolithiasis     History of pneumonia     Hypertension     Insomnia     Kidney stone     Nephrolithiasis     Organic impotence     NELLI (obstructive sleep apnea)     no CPAP    Seasonal allergies     Sleep apnea     does not use CPAP    Stroke (HCC) 2015    TMJ (dislocation of temporomandibular joint)     TMJ (temporomandibular joint syndrome)      Past Surgical History:   Procedure Laterality Date    ABDOMINAL SURGERY      BACK SURGERY      *9, 8711-1796, nervectomy 2006, total of 10    BUCCAL MASS EXCISION N/A 03/26/2018    " Procedure: BIOPSY LINGUAL TONSIL (FLOW/ STANDARD PATH)  EVAL UNDER ANESTHESIA;  Surgeon: Deric Easton MD;  [facility]: [facility];  Service: ENT    COLONOSCOPY      FL RETROGRADE PYELOGRAM  10/21/2020    HERNIA REPAIR      KIDNEY SURGERY      KNEE ARTHROSCOPY      LITHOTRIPSY      multiple    LUMBAR DISC SURGERY  2015    MANDIBLE FRACTURE SURGERY      titanium plate    MULTIPLE TOOTH EXTRACTIONS  04/2025    PELVIC SYMPHYSIS FUSION      ID CYSTO/URETERO W/LITHOTRIPSY &INDWELL STENT INSRT Right 10/21/2020    Procedure: CYSTOSCOPY URETEROSCOPY WITH LITHOTRIPSY HOLMIUM LASER, RETROGRADE PYELOGRAM AND INSERTION STENT URETERAL;  Surgeon: Anthony Richardson MD;  [facility];  Service: Urology    ID ESOPHAGOGASTRODUODENOSCOPY TRANSORAL DIAGNOSTIC N/A 02/22/2018    Procedure: ESOPHAGOGASTRODUODENOSCOPY (EGD);  Surgeon: Yolis Mares MD;  [facility]: [facility];  Service: Gastroenterology    ID ESOPHAGOGASTRODUODENOSCOPY TRANSORAL DIAGNOSTIC N/A 04/01/2019    Procedure: EGD W/ DILATION;  Surgeon: Yolis Mares MD;  [facility];  Service: Gastroenterology    ID FORREST IMPLTJ NSTIM ELTRDS PLATE/PADDLE EDRL N/A 09/08/2016    Procedure: DORSAL COLUMN STIMULATOR PLACEMENT (IMPULSE MONITORING);  Surgeon: Martin Doe MD;  Location[facility];  Service: Orthopedics    ID REVJ/RMVL IMPL SPI NPG/RCVR DTCH CONNJ ELTRD RA Left 06/09/2017    Procedure: REMOVAL OF A THORACIC SCS PLACED VIA LAMINECTOMY AND REMOVAL LEFT BUTTOCK GENERATOR; IMPULSE MONITORING;  Surgeon: Steven Parr MD;  Location: [facility];  Service: Neurosurgery    ID SURGICAL ARTHROSCOPY SHOULDER W/ROTATOR CUFF RPR Right 04/04/2019    Procedure: RIGHT SHOULDER ARTHROSCOPIC SUBSCAPULARIS TENDON REPAIR;  Surgeon: David Merritt MD;  [facility];  Service: Orthopedics    SACROILIAC JOINT FUSION  2015    SHOULDER SURGERY Left     2    SPINE SURGERY  2000    TONSILLECTOMY      UPPER GASTROINTESTINAL ENDOSCOPY       Family History  "  Problem Relation Age of Onset    Leukemia Mother 77    Hypertension Mother     Arthritis Mother     Diabetes Father     Obesity Father     Liver cancer Father     Heart disease Father     Diabetes Brother     Hypertension Brother     Skin cancer Maternal Grandfather 99    Stroke Maternal Grandfather     Cancer Family      he reports that he has been smoking cigarettes. He started smoking about 15 months ago. He has a 25.7 pack-year smoking history. He has been exposed to tobacco smoke. He has quit using smokeless tobacco. He reports that he does not currently use alcohol after a past usage of about 2.0 standard drinks of alcohol per week. He reports current drug use. Frequency: 7.00 times per week. Drug: Marijuana.  Current Outpatient Medications   Medication Instructions    acetaminophen (TYLENOL) 975 mg, Oral, Every 8 hours PRN    albuterol (PROVENTIL HFA,VENTOLIN HFA) 90 mcg/act inhaler 2 puffs, Inhalation, Every 6 hours PRN    amoxicillin (AMOXIL) 500 MG tablet     clindamycin (CLEOCIN) 300 MG capsule     diazepam (VALIUM) 5 mg, Oral, Every 12 hours PRN    diltiazem (CARDIZEM) 60 mg, Oral, 2 times daily, Take one tablet by mouth twice daily    ibuprofen (MOTRIN) 600 mg tablet     methocarbamol (ROBAXIN-750) 750 mg, Oral, 3 times daily PRN    methylPREDNISolone 4 MG tablet therapy pack Use as directed on package    naloxone (NARCAN) 4 mg/0.1 mL nasal spray Administer 1 spray into a nostril. If no response after 2-3 minutes, give another dose in the other nostril using a new spray.    oxyCODONE (ROXICODONE) 10 mg, Oral, Every 4 hours PRN    oxyCODONE-acetaminophen (PERCOCET) 5-325 mg per tablet 1 tablet    pregabalin (LYRICA) 75 mg, Oral, 2 times daily     Allergies   Allergen Reactions    Other Rash     Adhesive tape      Objective   /88 (BP Location: Right arm, Patient Position: Sitting, Cuff Size: Large)   Pulse 96   Temp 98.1 °F (36.7 °C) (Temporal)   Resp 16   Ht 6' 1\" (1.854 m)   Wt 97.5 kg (215 " lb)   SpO2 99%   BMI 28.37 kg/m²     Physical Exam  Neurological Exam    General appearance: alert, appears stated age, cooperative and no distress  Head: Normocephalic, without obvious abnormality, atraumatic  Eyes: EOMI, PERRL  Neck:TTP R paraspinal muscles, limited ROM cervical spine with R sided motions  Lungs: non labored breathing  Heart: regular heart rate  Neurologic:   Mental status: Alert, oriented, thought content appropriate  Cranial nerves: grossly intact (Cranial nerves II-XII)  Sensory: decreased to LT R C6-C7 distributions  Motor: moving all extremities without focal weakness   Reflexes: 2+ and symmetric, no hoffmans    Radiology Results Review: I personally reviewed the following image studies in PACS and associated radiology reports: MRI spine. My interpretation of the radiology images/reports is: as above.    Administrative Statements   I have spent a total time of 40 minutes in caring for this patient on the day of the visit/encounter including Diagnostic results, Prognosis, Risks and benefits of tx options, Instructions for management, Patient and family education, Importance of tx compliance, Risk factor reductions, Impressions, Counseling / Coordination of care, Documenting in the medical record, Reviewing/placing orders in the medical record (including tests, medications, and/or procedures), Obtaining or reviewing history  , and Communicating with other healthcare professionals .

## 2025-04-25 NOTE — ASSESSMENT & PLAN NOTE
Patient presents for follow up of neck and RUE pain  Patient states his symptoms started around December 2024 with neck and right arm pain which got worse after an injection  He is complaining of constant sharp neck pain which radiates constantly into his right anterior arm into his thumb, index and middle fingers.  He does complain of difficulty with fine motor skills on right and dropping objects  He has not seen physical therapy but continues to do stretches and exercises at home.  He did undergo a C7-T1 LALO on 2/3/2025 with no help but instead made his symptoms worse.    Imaging:  MRI cervical spine w/o, 4/8/25: Worsened bone marrow edema in left C2-C3 articular pillars, likely due to active facet arthritis. Multilevel degenerative changes of cervical spine with varying degrees of canal stenosis (multilevel mild) and foraminal narrowing (severe right C3-C4, bilateral C5-C6, and bilateral C6-C7)    Plan:  Imaging reviewed in detail with the patient, showing mild to moderate BNF stenosis and multiple levels. There is NF stenosis on the R at C5-6 and C6-7, which could be contributing to his symptoms  Continue follow-up with pain management. Recommend attempting another injection, possible a TFESI at R C6-7 for diagnostic purposes  Patient states his last injection caused him severe pain. He also had a bad experience with a lumbar LALO in the past. He refuses to have any further injections.   Will obtain EMG for further evaluation and to guide possible future surgical planning  Recommend formal PT  Medrol dose pack sent to pharmacy  Follow up after EMG / PT / injections for surgical discussion with Dr Finney if no improvement in symptoms    Orders:    EMG; Future    methylPREDNISolone 4 MG tablet therapy pack; Use as directed on package    Ambulatory Referral to Physical Therapy; Future

## 2025-04-30 DIAGNOSIS — M53.3 CHRONIC RIGHT SI JOINT PAIN: ICD-10-CM

## 2025-04-30 DIAGNOSIS — G89.29 CHRONIC RIGHT SI JOINT PAIN: ICD-10-CM

## 2025-04-30 DIAGNOSIS — M96.1 POST LAMINECTOMY SYNDROME: ICD-10-CM

## 2025-04-30 RX ORDER — DIAZEPAM 5 MG/1
5 TABLET ORAL EVERY 12 HOURS PRN
Qty: 45 TABLET | Refills: 0 | Status: SHIPPED | OUTPATIENT
Start: 2025-04-30

## 2025-04-30 RX ORDER — IBUPROFEN 600 MG/1
600 TABLET, FILM COATED ORAL EVERY 6 HOURS PRN
Qty: 30 TABLET | Refills: 0 | Status: SHIPPED | OUTPATIENT
Start: 2025-04-30

## 2025-04-30 RX ORDER — OXYCODONE HYDROCHLORIDE 10 MG/1
10 TABLET ORAL EVERY 4 HOURS PRN
Qty: 120 TABLET | Refills: 0 | Status: SHIPPED | OUTPATIENT
Start: 2025-04-30

## 2025-04-30 NOTE — TELEPHONE ENCOUNTER
Yefri is requesting to have these refilled so that he can pick them up on/by 5/2; stated he had oral surgery this month that required him to take a little more than usual.    Medication: diazepam (VALIUM)     Dose/Frequency: 5 mg    Quantity: 45    Pharmacy:   [facility] [place] [address] [place] [id number]  Phone: [phone] Fax: [phone]       Office:   [x] PCP/Provider -   [] Speciality/Provider -     Does the patient have enough for 3 days?   [] Yes   [x] No - Send as HP to POD    ___________________________________    Medication: ibuprofen (MOTRIN)    Dose/Frequency: 600 mg    Quantity:     Pharmacy:   [facility] [place] [address] [place] [id number]  Phone: [phone] Fax: [phone]      Office:   [x] PCP/Provider -   [] Speciality/Provider -     Does the patient have enough for 3 days?   [] Yes   [x] No - Send as HP to POD    __________________________________    Medication: oxyCODONE (ROXICODONE)     Dose/Frequency: 10 mg    Quantity: 120    Pharmacy:   [facility] [address] [address]  Phone: [phone] Fax: [phone]      Office:   [x] PCP/Provider -   [] Speciality/Provider -     Does the patient have enough for 3 days?   [] Yes   [x] No - Send as HP to POD

## 2025-05-01 NOTE — PROGRESS NOTES
SCALE AND RP LR and LL       1 carpule/s given-  Oraqix 2.5%   CHIEF CONCERN: pt went to [facility] for extractions. Had to return last week due to suspected infected/ pt rxd antibiotic/ pt reports today last day/ pt reports area still hurts.   PAIN SCALE: 0  ASA CLASS:2  PLAQUE:mild  CALCULUS: Moderate  BLEEDING:    moderate  STAIN : Light  PERIO: 3C    Hand scaled, polished and flossed. Used cavitron    Oral Hygiene Instruction:  recommended brushing 2 x daily for 2 minutes MIN, recommended flossing daily, reviewed dietary precautions. Post op sc/rp instructions placed in AVS, printed and handed/ reviewed with patient.     Soft tissue exam:  soft tissue exam was normal  ExtraOral exam:   Extraoral exam was normal    REFERRALS: no referrals provided         NEXT VISIT:   --->3 MONTH PERIO MAINTENANCE and RESTORATIVE  --> filling, denture impressions after 6 week healing    Last Panorex/ FMX : 3/14/25

## 2025-05-02 ENCOUNTER — OFFICE VISIT (OUTPATIENT)
Dept: DENTISTRY | Facility: CLINIC | Age: 55
End: 2025-05-02

## 2025-05-02 VITALS — SYSTOLIC BLOOD PRESSURE: 160 MMHG | HEART RATE: 73 BPM | DIASTOLIC BLOOD PRESSURE: 99 MMHG

## 2025-05-02 DIAGNOSIS — K05.6 PERIODONTAL DISEASE: Primary | ICD-10-CM

## 2025-05-02 PROCEDURE — D4341 PERIODONTAL SCALING AND ROOT PLANING - 4 OR MORE TEETH PER QUADRANT: HCPCS

## 2025-05-02 PROCEDURE — D4342 PERIODONTAL SCALING AND ROOT PLANING - 1 TO 3 TEETH PER QUADRANT: HCPCS

## 2025-05-20 ENCOUNTER — EVALUATION (OUTPATIENT)
Dept: PHYSICAL THERAPY | Facility: CLINIC | Age: 55
End: 2025-05-20
Attending: PHYSICIAN ASSISTANT
Payer: MEDICARE

## 2025-05-20 DIAGNOSIS — M54.12 CERVICAL RADICULOPATHY: Primary | ICD-10-CM

## 2025-05-20 PROCEDURE — 97162 PT EVAL MOD COMPLEX 30 MIN: CPT

## 2025-05-20 PROCEDURE — 97110 THERAPEUTIC EXERCISES: CPT

## 2025-05-20 NOTE — PROGRESS NOTES
PT EVALUATION    Today's date: 25  Patient name: Yefri Bennett  : 1970  MRN: [id number]  Referring provider: Douglas Finney MD  Dx:   1. Cervical radiculopathy        ASSESSMENT:  Yefri Bennett is a 54 y.o. male who presents with signs and symptoms consistent with referring diagnosis. Patient presents with >6 month history of insidious bilateral neck pain (R>) that has gotten progressively worse despite CSI and medications. Patient presents with pain, decreased strength, decreased ROM, decreased sensation, and postural dysfunction. Upon examination, patient was TTP at the bilateral upper trap (R>L) with some hypertonicity. Patient had a positive spurling testing with some relief with distraction. Patient demonstrates decreased RUE strength along with pain. He had decreased sensation along the first 3 fingers during sensation testing. Symptoms were moderate and severe times throughout examination with patient reporting and visibly with sharp pain in the neck with cervical movement.  Due to these impairments, patient has difficulty performing a/iadls, recreational activities, and engaging in social activities. Therefore, patient would benefit from skilled physical therapy to address the impairments, improve their level of function, and to improve their overall quality of life. Patient was provided education regarding diagnosis, POC, and was provided an HEP at the end of session. No further referral appears necessary at this time based upon examination findings.      Impairments:    restricted ROM    decreased strength   pain with function   activity intolerance   Postural dysfunction     Prognosis:  Fair  Positive and negative prognostic indicator(s):  pain >3 months, high symptom irritability, failed relief with pain management    Goals:    STG (to be met in 4 weeks)  Patient will be independent with basic HEP for self-management.  Patient will improve cervical ROM by 5-10 degrees in all deficient  planes.   Patient will improve UE strength by 1/2 MMT grade in all deficient planes.  Patient will report decreased pain by 25%.  Patient will report at least 25% improvement in sleep.     LTG (to be met by discharge):  Patient will be independent with comprehensive HEP for maintenance after discharge.   Patient will improve FOTO score to equal to or above predicted value.   Patient will improve cervical ROM to WFL to assist with adl/iadls.  Patient will improve UE strength to 5/ MMT grade to assist with adl/iadls.  Patient will be able to return to recreation with minimal pain (<3/10).  Patient will be able to perform household chores without restriction (e.g. yard work).        Planned interventions:  home exercise program, patient education, manual therapy, massage, graded activity, flexibility, functional range of motion exercises, strengthening, McConnel taping, low level laser with IASTM, and modalities prn    Duration in visits:  16  Frequency: 2 visits per week  Duration in weeks:  8  POC expiration: 7/15/2025    History of Current Injury: Patient states neck pain started about October/November and has gotten worse progressively. States it has been interfering with sleep - hasn't slept comfortably in over 2 months. States he's tried everything to get relief including CBD lotion, injection, medication, heat, ice, etc. States pain is constant and frequently sharp with head movements. States before the CSI, he didn't have any clicking of the neck but states his neck constantly clicks along with sharp pains. States overall, he feels like the CSI made symptoms worse. He does endorse numbness and tingling on the R side that goes down to the first 3 fingers. He has seen neurosurgery which patient states he was told he will mostly likely end up needing surgery at some point.  He does endorse some weakness of RUE along with decreased  strength. States he feels like he is dropping things more. Denies bowel/bladder  changes. Patient reports he recently got top teeth removed and was advised not to do any prolonged bending forward     Patient also has a history of bilateral rotator cuff injuries and multiple back surgeries/problems that he still experiences today. States he occasionally feels unsteady but that has been present since back surgery.     Pain location: bilateral neck (R>L)  Pain descriptors: Dull Ache, Sharp  Pain at Currently:  8/10  Pain at Worst:  10/10      Aggravating factors: movements  Easing factors: medications, ice, heat (minimal relief)    Imaging: see imaging tab  Special Questions: see HPI      Hobbies/Interests: fishing, hunting, ride motorcycle  Occupation: on disability  Patient goals: Patient reports goals for physical therapy would be decreased pain, increased mobility, increased strength, and return to recreation        Objective     Postural Observations  Seated posture: fair      Palpation   Left   Hypertonic in the upper trapezius.   Tenderness of the cervical paraspinals, rhomboids and upper trapezius.     Right   Hypertonic in the upper trapezius.   Tenderness of the cervical paraspinals and upper trapezius.     Neurological Testing     Sensation   Cervical/Thoracic   Left   Intact: light touch    Right   Diminished: light touch    Additional Neurological Details  Patient with decreased sensation of R first 3 fingers    Active Range of Motion   Cervical/Thoracic Spine       Cervical    Flexion: 20 degrees  with pain  Extension: 15 degrees     with pain  Left lateral flexion: 30 degrees     with pain  Right lateral flexion: 30 degrees     with pain  Left rotation: 40 degrees with pain  Right rotation: 53 degrees    with pain    Thoracic    Flexion: Active thoracic flexion: deferred due to recent oral surgery precautions.   Extension:  with pain Restriction level: maximal    Additional Active Range of Motion Details  Pt with compensations of rotation and forward flexion during lateral flexion  bilaterally  Mechanical Assessment    Cervical    Supine retractation:   sharp pain reported within first 3 reps - hold for now    Thoracic      Lumbar      Strength/Myotome Testing     Left Shoulder     Planes of Motion   Flexion: 5   Abduction: 5     Right Shoulder     Planes of Motion   Flexion: 4 (p!)   Abduction: 4 (p!)     Left Elbow   Flexion: 5  Extension: 5    Right Elbow   Flexion: 5  Extension: 5    Additional Strength Details   strength TBA next visit    Tests   Cervical     Left   Positive Spurling's Test B.     Right   Positive Spurling's Test B.     Right Shoulder   Positive ULTT1.     General Comments:    Upper quarter screen   Shoulder: unremarkable            Precautions:       Diagnosis:    Precautions:    POC expiration date:   [employer] Access Code:   *asterisks by exercise = given for HEP   Visit Count        Manuals        Cervical STM        Cervical PROM        Cervical joint mobs        C/S distraction/traction                There Ex        UBE        Bilateral ER        Horizontal abd        Cervical ext snags        Cervical rot snags        UT stetch        LS stretch HEP       Cervical nod HEP               Neuro Re-Ed        Scap retraction        TB rows        TB extension        Median nerve glides HEP                               Re-evaluation              Ther Act                                                         Modalities

## 2025-05-22 ENCOUNTER — APPOINTMENT (OUTPATIENT)
Dept: PHYSICAL THERAPY | Facility: CLINIC | Age: 55
End: 2025-05-22
Attending: PHYSICIAN ASSISTANT
Payer: MEDICARE

## 2025-05-27 DIAGNOSIS — M53.3 CHRONIC RIGHT SI JOINT PAIN: ICD-10-CM

## 2025-05-27 DIAGNOSIS — K22.4 ESOPHAGEAL DYSMOTILITIES: ICD-10-CM

## 2025-05-27 DIAGNOSIS — G89.29 CHRONIC RIGHT SI JOINT PAIN: ICD-10-CM

## 2025-05-27 DIAGNOSIS — M96.1 POST LAMINECTOMY SYNDROME: ICD-10-CM

## 2025-05-27 RX ORDER — DILTIAZEM HCL 60 MG
60 TABLET ORAL 2 TIMES DAILY
Qty: 60 TABLET | Refills: 0 | Status: SHIPPED | OUTPATIENT
Start: 2025-05-27 | End: 2025-05-29

## 2025-05-27 NOTE — TELEPHONE ENCOUNTER
Medication: Diazepam    Dose/Frequency: 5mg 1 tab 12 hours    Quantity: 45    Pharmacy: [facility]    Office:   [x] PCP/Provider -   [] Speciality/Provider -     Does the patient have enough for 3 days?   [x] Yes   [] No - Send as HP to POD   Medication: Diltiazem     Dose/Frequency: 60mg 1 tab twice day    Quantity: 60     Pharmacy: [facility]    Office:   [x] PCP/Provider -   [] Speciality/Provider -     Does the patient have enough for 3 days?   [x] Yes   [] No - Send as HP to POD   Medication: Ozycodone    Dose/Frequency: 10mg 1 tab 4 times day    Quantity: 120    Pharmacy: [facility]    Office:   [x] PCP/Provider -   [] Speciality/Provider -     Does the patient have enough for 3 days?   [x] Yes   [] No - Send as HP to POD

## 2025-05-28 ENCOUNTER — PROCEDURE VISIT (OUTPATIENT)
Dept: NEUROLOGY | Facility: CLINIC | Age: 55
End: 2025-05-28
Attending: PHYSICIAN ASSISTANT
Payer: MEDICARE

## 2025-05-28 DIAGNOSIS — M54.12 CERVICAL RADICULOPATHY: ICD-10-CM

## 2025-05-28 PROCEDURE — 95886 MUSC TEST DONE W/N TEST COMP: CPT | Performed by: PHYSICAL MEDICINE & REHABILITATION

## 2025-05-28 PROCEDURE — 95911 NRV CNDJ TEST 9-10 STUDIES: CPT | Performed by: PHYSICAL MEDICINE & REHABILITATION

## 2025-05-28 RX ORDER — OXYCODONE HYDROCHLORIDE 10 MG/1
10 TABLET ORAL EVERY 4 HOURS PRN
Qty: 120 TABLET | Refills: 0 | Status: SHIPPED | OUTPATIENT
Start: 2025-05-28

## 2025-05-28 RX ORDER — DIAZEPAM 5 MG/1
5 TABLET ORAL EVERY 12 HOURS PRN
Qty: 45 TABLET | Refills: 0 | Status: SHIPPED | OUTPATIENT
Start: 2025-05-28

## 2025-05-28 NOTE — TELEPHONE ENCOUNTER
Called pt, he says he will call towards end of June since he currently has so many appointments back to back including surgery. He will definitely call towards end of June to schedule an appointment for July.

## 2025-05-29 ENCOUNTER — TELEPHONE (OUTPATIENT)
Dept: DENTISTRY | Facility: CLINIC | Age: 55
End: 2025-05-29

## 2025-05-29 ENCOUNTER — OFFICE VISIT (OUTPATIENT)
Dept: PHYSICAL THERAPY | Facility: CLINIC | Age: 55
End: 2025-05-29
Attending: PHYSICIAN ASSISTANT
Payer: MEDICARE

## 2025-05-29 DIAGNOSIS — M54.12 CERVICAL RADICULOPATHY: Primary | ICD-10-CM

## 2025-05-29 PROCEDURE — 97140 MANUAL THERAPY 1/> REGIONS: CPT

## 2025-05-29 RX ORDER — DILTIAZEM HCL 60 MG
60 TABLET ORAL 2 TIMES DAILY
Qty: 180 TABLET | Refills: 1 | Status: SHIPPED | OUTPATIENT
Start: 2025-05-29

## 2025-05-29 NOTE — PROGRESS NOTES
Daily Note     Today's date: 2025  Patient name: Yefri Bennett  : 1970  MRN: [id number]  Referring provider: Douglas Finney MD  Dx:   Encounter Diagnosis     ICD-10-CM    1. Cervical radiculopathy  M54.12                      Subjective: Patient report significant pain at the start of session. States he almost didn't come because of the amount of pain he's in. States pain is in the R side of neck and back.      Objective: See treatment diary below      Assessment: Tolerated treatment fair. Due to patient being in significant pain at the start of session, session focused primarily on pain modulation. Patient initially with decreased tolerance to manual therapy that improved after 20 minutes of ice to the neck and lumbar spine. Encouraged patient to continue HEP with a focus on gentle movements of the neck to avoid stiffness. Patient would benefit from continued PT      Plan: Continue per plan of care.      Precautions:     Patient 1:1 from 1235-105 (ice for 20 minutes)  Diagnosis:    Precautions:    POC expiration date:   [employer] Access Code:   *asterisks by exercise = given for HEP   Visit Count  2      Manuals        Cervical STM  AA      Cervical PROM  AAA      Cervical joint mobs        C/S distraction/traction  AA               There Ex        UBE        Bilateral ER        Horizontal abd        Cervical ext snags        Cervical rot snags        UT stetch        LS stretch HEP       Cervical nod HEP               Neuro Re-Ed        Scap retraction        TB rows        TB extension        Median nerve glides HEP       Cervical retraction  X12 supine (to tolerance)                      Re-evaluation              Ther Act                                                         Modalities             Ice  20'

## 2025-06-01 RX ORDER — OXYCODONE HYDROCHLORIDE 10 MG/1
10 TABLET ORAL EVERY 4 HOURS PRN
Qty: 120 TABLET | Refills: 0 | OUTPATIENT
Start: 2025-06-01

## 2025-06-03 ENCOUNTER — TELEPHONE (OUTPATIENT)
Dept: RADIOLOGY | Facility: HOSPITAL | Age: 55
End: 2025-06-03

## 2025-06-03 ENCOUNTER — TELEPHONE (OUTPATIENT)
Age: 55
End: 2025-06-03

## 2025-06-03 DIAGNOSIS — I10 UNCONTROLLED HYPERTENSION: ICD-10-CM

## 2025-06-03 DIAGNOSIS — I74.10 AORTIC THROMBUS (HCC): Primary | ICD-10-CM

## 2025-06-03 NOTE — TELEPHONE ENCOUNTER
[facility] called in to let us know they need an order for pt to have BMP lab done before 06/05 Ct Scan     Pt cannot have testing done without it. Please create a new script and call pt to have them schedule so testing does not have to be rescheduled. Thank you!

## 2025-06-03 NOTE — TELEPHONE ENCOUNTER
Called the cardiology office and asked for a BMP to be ordered and to let the patient know he needs to get them done and that we need the results before we can scan him.

## 2025-06-04 ENCOUNTER — OFFICE VISIT (OUTPATIENT)
Dept: NEUROSURGERY | Facility: CLINIC | Age: 55
End: 2025-06-04
Payer: MEDICARE

## 2025-06-04 ENCOUNTER — APPOINTMENT (OUTPATIENT)
Dept: LAB | Facility: CLINIC | Age: 55
End: 2025-06-04
Attending: PHYSICIAN ASSISTANT
Payer: MEDICARE

## 2025-06-04 VITALS
OXYGEN SATURATION: 98 % | TEMPERATURE: 97.3 F | SYSTOLIC BLOOD PRESSURE: 160 MMHG | HEART RATE: 95 BPM | DIASTOLIC BLOOD PRESSURE: 80 MMHG

## 2025-06-04 DIAGNOSIS — M54.12 RADICULOPATHY, CERVICAL: Primary | ICD-10-CM

## 2025-06-04 DIAGNOSIS — I10 UNCONTROLLED HYPERTENSION: ICD-10-CM

## 2025-06-04 DIAGNOSIS — I74.10 AORTIC THROMBUS (HCC): ICD-10-CM

## 2025-06-04 LAB
ANION GAP SERPL CALCULATED.3IONS-SCNC: 5 MMOL/L (ref 4–13)
BUN SERPL-MCNC: 24 MG/DL (ref 5–25)
CALCIUM SERPL-MCNC: 9.5 MG/DL (ref 8.4–10.2)
CHLORIDE SERPL-SCNC: 110 MMOL/L (ref 96–108)
CO2 SERPL-SCNC: 27 MMOL/L (ref 21–32)
CREAT SERPL-MCNC: 1.3 MG/DL (ref 0.6–1.3)
GFR SERPL CREATININE-BSD FRML MDRD: 60 ML/MIN/1.73SQ M
GLUCOSE SERPL-MCNC: 118 MG/DL (ref 65–140)
POTASSIUM SERPL-SCNC: 4.6 MMOL/L (ref 3.5–5.3)
SODIUM SERPL-SCNC: 142 MMOL/L (ref 135–147)

## 2025-06-04 PROCEDURE — 36415 COLL VENOUS BLD VENIPUNCTURE: CPT

## 2025-06-04 PROCEDURE — 80048 BASIC METABOLIC PNL TOTAL CA: CPT

## 2025-06-04 PROCEDURE — 99214 OFFICE O/P EST MOD 30 MIN: CPT | Performed by: NEUROLOGICAL SURGERY

## 2025-06-04 RX ORDER — CEFAZOLIN SODIUM 2 G/50ML
2000 SOLUTION INTRAVENOUS ONCE
OUTPATIENT
Start: 2025-06-04 | End: 2025-06-04

## 2025-06-04 NOTE — PROGRESS NOTES
Name: Yefri Bennett      : 1970      MRN: [id number]  Encounter Provider: Douglas Finney MD  Encounter Date: 2025   Encounter department: [facility], [place]  :  Assessment & Plan  Radiculopathy, cervical    Orders:    Case request operating room: C5-7 anterior cervical decompression and fusion with neuromonitoring; Standing        History of Present Illness     Patient was previously evaluated on 4/3/25 and 25. He returns with his wife.     He is a 54-year-old male with h/o HTN, NELLI, smoking, aortic thrombus previously on Eliquis, CAD, CVA (no residual neurologic deficits), chronic pain s/p multiple lumbar fusion surgeries mostly by Dr. Doe as well as thoracic SCS placement then removal in  by Dr. Parr who p/w chronic neck pain radiating to R>L arm, mostly in C6 and C7 distributions, a/w worsening numbness and subjective weakness/fine motor dysfunction.      Followed by Pain Medicine s/p GONZALEZ on 2/3/25.     Remains on pregabalin, oxycodone, methocarbamol, diazepam. Tried Medrol PRN.     Not on AC/AP.    Worked with PT as much as tolerated.     MRI cervical spine on 2025 demonstrated chronic degenerative changes most prominent at C5-7.      EMG on 25 reported chronic bilateral C5-7 radiculopathy.    At this time, we discussed C5-7 ACDF for progressive radiculopathy. I also discussed with alternative treatment to maximize conservative therapies per Pain Medicine, but he declined.    We discussed goals of surgery, expected postoperative recovery and timeline, as well as potential risks and complications including but not limited to hemorrhage, stroke, spinal cord injury, vessel injury, transient or permanent voice changes, transient or permanent dysphagia, esophageal perforation, new neurologic deficits (including C5 palsy and Caroline's syndrome), CSF leak, infection, adjacent level disease, pseudoarthrosis, revision surgery.     He agrees to proceed and signs  "consent.    Again, I strongly advised complete smoking cessation to minimize intraoperative bleeding and optimize wound healing as well as bony fusion.    All questions and concerns were addressed during this visit.    HPI     Review of Systems   Constitutional: Negative.    HENT:  Positive for tinnitus and trouble swallowing.    Eyes: Negative.  Negative for visual disturbance.   Respiratory:  Positive for chest tightness and shortness of breath.    Cardiovascular: Negative.    Gastrointestinal: Negative.    Endocrine: Negative.    Genitourinary: Negative.    Musculoskeletal:  Positive for gait problem, neck pain and neck stiffness.        F/U AFTER EMG 5/28/25  Cervical radiculopathy    9/10 neck pain (radiates to right shoulder and arm to fingers 1st, 2nd and third digits,) and neck stiffness  Neck \"popping\"  Trouble swallowing  Chest tightness/SOB w/exertion  Headache  N/T/W  RUE    PT- 5/29/25    No ac/ Daily smoker/ No previous neck sx      Skin: Negative.    Allergic/Immunologic: Negative.    Neurological:  Positive for dizziness, weakness (DROPPING ITEMS OUT OF RIGHT HAND- RIGHT HANDED), numbness and headaches.   Hematological: Negative.    Psychiatric/Behavioral:  Positive for sleep disturbance (due to pain).    All other systems reviewed and are negative.    I have personally reviewed the MA's review of systems and made changes as necessary.    Past Medical History   Past Medical History[1]  Past Surgical History[2]  Family History[3]  he reports that he has been smoking cigarettes. He started smoking about 17 months ago. He has a 25.7 pack-year smoking history. He has been exposed to tobacco smoke. He has quit using smokeless tobacco. He reports that he does not currently use alcohol after a past usage of about 2.0 standard drinks of alcohol per week. He reports current drug use. Frequency: 7.00 times per week. Drug: Marijuana.  Current Outpatient Medications   Medication Instructions    acetaminophen " (TYLENOL) 975 mg, Oral, Every 8 hours PRN    albuterol (PROVENTIL HFA,VENTOLIN HFA) 90 mcg/act inhaler 2 puffs, Inhalation, Every 6 hours PRN    amoxicillin (AMOXIL) 500 MG tablet     clindamycin (CLEOCIN) 300 MG capsule     diazepam (VALIUM) 5 mg, Oral, Every 12 hours PRN    diltiazem (CARDIZEM) 60 mg, Oral, 2 times daily, Morning and before bedtime    ibuprofen (MOTRIN) 600 mg, Oral, Every 6 hours PRN    methocarbamol (ROBAXIN-750) 750 mg, Oral, 3 times daily PRN    methylPREDNISolone 4 MG tablet therapy pack Use as directed on package    naloxone (NARCAN) 4 mg/0.1 mL nasal spray Administer 1 spray into a nostril. If no response after 2-3 minutes, give another dose in the other nostril using a new spray.    oxyCODONE (ROXICODONE) 10 mg, Oral, Every 4 hours PRN    oxyCODONE-acetaminophen (PERCOCET) 5-325 mg per tablet 1 tablet    pregabalin (LYRICA) 75 mg, Oral, 2 times daily   Allergies[4]   Objective   /80 (Patient Position: Standing, Cuff Size: Standard)   Pulse 95   Temp (!) 97.3 °F (36.3 °C) (Temporal)   SpO2 98%     Physical Exam  Vitals and nursing note reviewed.   Constitutional:       Appearance: Normal appearance. He is normal weight.   HENT:      Head: Normocephalic and atraumatic.     Eyes:      General: Lids are normal.      Extraocular Movements: Extraocular movements intact.      Pupils: Pupils are equal, round, and reactive to light.       Cardiovascular:      Rate and Rhythm: Normal rate and regular rhythm.      Pulses: Normal pulses.      Heart sounds: Normal heart sounds.   Pulmonary:      Effort: Pulmonary effort is normal.      Breath sounds: Normal breath sounds.   Abdominal:      General: Abdomen is flat.      Palpations: Abdomen is soft.     Musculoskeletal:         General: Normal range of motion.      Cervical back: Normal range of motion.     Neurological:      General: No focal deficit present.      Mental Status: He is alert and oriented to person, place, and time. Mental  status is at baseline.      GCS: GCS eye subscore is 4. GCS verbal subscore is 5. GCS motor subscore is 6.      Sensory: Sensation is intact.      Motor: Motor strength is normal.Motor function is intact.      Gait: Gait is intact.      Deep Tendon Reflexes: Reflexes are normal and symmetric.     Psychiatric:         Mood and Affect: Mood normal.         Speech: Speech normal.         Behavior: Behavior normal.       Neurological Exam  Mental Status  Alert. Oriented to person, place, time and situation. Oriented to person, place, and time. Speech is normal. Language is fluent with no aphasia. Attention and concentration are normal.    Cranial Nerves  CN II: Visual acuity is normal. Visual fields full to confrontation.  CN III, IV, VI: Extraocular movements intact bilaterally. Normal lids and orbits bilaterally. Pupils equal round and reactive to light bilaterally.  CN V: Facial sensation is normal.  CN VII: Full and symmetric facial movement.  CN VIII: Hearing is normal.  CN IX, X: Palate elevates symmetrically. Normal gag reflex.  CN XI: Shoulder shrug strength is normal.  CN XII: Tongue midline without atrophy or fasciculations.    Motor  Normal muscle bulk throughout. Normal muscle tone. Strength is 5/5 throughout all four extremities.  Worsening neck pain radiating to R>L arm, mostly in C6 and C7 distributions, a/w worsening numbness and subjective weakness/fine motor dysfunction.    Sensory  Normal sensation.    Reflexes  Deep tendon reflexes are 2+ and symmetric in all four extremities.    Gait   Normal gait.      Radiology Results Review: I have reviewed the following images/report studies in PACS: No results found.     Administrative Statements   I have spent a total time of 30 minutes in caring for this patient on the day of the visit/encounter including Diagnostic results, Prognosis, Risks and benefits of tx options, Instructions for management, Patient and family education, Importance of tx compliance,  Risk factor reductions, Impressions, Counseling / Coordination of care, Documenting in the medical record, Reviewing/placing orders in the medical record (including tests, medications, and/or procedures), Obtaining or reviewing history  , and Communicating with other healthcare professionals .           [1]   Past Medical History:  Diagnosis Date    Allergic rhinitis     Anxiety 2000    Aortic thrombus (HCC)     h/o, Eliquis to treat    Arthritis 2010    Asthma     Chronic pain syndrome     Oxycodone PRN    Cluster headaches     Coronary artery disease     Current tobacco use     Depression     Esophageal dysmotility     Headache(784.0) 1990    History of colonic polyps     History of CVA (cerebrovascular accident)     no residual neurologic deficits    History of nephrolithiasis     History of pneumonia     Hypertension     Insomnia     Kidney stone     Nephrolithiasis     Organic impotence     NELLI (obstructive sleep apnea)     no CPAP    Seasonal allergies     Sleep apnea     does not use CPAP    Stroke (HCC) 2015    TMJ (dislocation of temporomandibular joint)     TMJ (temporomandibular joint syndrome)    [2]   Past Surgical History:  Procedure Laterality Date    ABDOMINAL SURGERY      BACK SURGERY      *9, 4611-5008, nervectomy 2006, total of 10    BUCCAL MASS EXCISION N/A 03/26/2018    Procedure: BIOPSY LINGUAL TONSIL (FLOW/ STANDARD PATH)  EVAL UNDER ANESTHESIA;  Surgeon: Deric Easton MD;  Location: [facility];  Service: ENT    COLONOSCOPY      FL RETROGRADE PYELOGRAM  10/21/2020    HERNIA REPAIR      KIDNEY SURGERY      KNEE ARTHROSCOPY      LITHOTRIPSY      multiple    LUMBAR DISC SURGERY  2015    MANDIBLE FRACTURE SURGERY      titanium plate    MULTIPLE TOOTH EXTRACTIONS  04/2025    PELVIC SYMPHYSIS FUSION      DE CYSTO/URETERO W/LITHOTRIPSY &INDWELL STENT INSRT Right 10/21/2020    Procedure: CYSTOSCOPY URETEROSCOPY WITH LITHOTRIPSY HOLMIUM LASER, RETROGRADE PYELOGRAM AND INSERTION STENT URETERAL;  Surgeon:  Anthony Richardson MD;  Location: [facility];  Service: Urology    WY ESOPHAGOGASTRODUODENOSCOPY TRANSORAL DIAGNOSTIC N/A 02/22/2018    Procedure: ESOPHAGOGASTRODUODENOSCOPY (EGD);  Surgeon: Yolis Mares MD;  Location: [facility];  Service: Gastroenterology    WY ESOPHAGOGASTRODUODENOSCOPY TRANSORAL DIAGNOSTIC N/A 04/01/2019    Procedure: EGD W/ DILATION;  Surgeon: Yolis Mares MD;  [facility]: [facility];  Service: Gastroenterology    WY FORREST IMPLTJ NSTIM ELTRDS PLATE/PADDLE EDRL N/A 09/08/2016    Procedure: DORSAL COLUMN STIMULATOR PLACEMENT (IMPULSE MONITORING);  Surgeon: Martin Doe MD;  Location: [facility];  Service: Orthopedics    WY REVJ/RMVL IMPL SPI NPG/RCVR DTCH CONNJ ELTRD RA Left 06/09/2017    Procedure: REMOVAL OF A THORACIC SCS PLACED VIA LAMINECTOMY AND REMOVAL LEFT BUTTOCK GENERATOR; IMPULSE MONITORING;  Surgeon: Steven Parr MD;  Location: [facility];  Service: Neurosurgery    WY SURGICAL ARTHROSCOPY SHOULDER W/ROTATOR CUFF RPR Right 04/04/2019    Procedure: RIGHT SHOULDER ARTHROSCOPIC SUBSCAPULARIS TENDON REPAIR;  Surgeon: David Merritt MD;  [facility];  Service: Orthopedics    SACROILIAC JOINT FUSION  2015    SHOULDER SURGERY Left     2    SPINE SURGERY  2000    TONSILLECTOMY      UPPER GASTROINTESTINAL ENDOSCOPY     [3]   Family History  Problem Relation Name Age of Onset    Leukemia Mother Agueda 77    Hypertension Mother Agueda     Arthritis Mother Agueda     Diabetes Father Prieto keller     Obesity Father Prieto keller     Liver cancer Father Prieto keller     Heart disease Father Prieto keller     Diabetes Brother      Hypertension Brother      Skin cancer Maternal Grandfather Pap 99    Stroke Maternal Grandfather Pap     Cancer Family     [4]   Allergies  Allergen Reactions    Other Rash     Adhesive tape

## 2025-06-12 ENCOUNTER — OFFICE VISIT (OUTPATIENT)
Dept: DENTISTRY | Facility: CLINIC | Age: 55
End: 2025-06-12

## 2025-06-12 VITALS — DIASTOLIC BLOOD PRESSURE: 95 MMHG | SYSTOLIC BLOOD PRESSURE: 160 MMHG | HEART RATE: 105 BPM

## 2025-06-12 DIAGNOSIS — K02.9 DENTAL CARIES: Primary | ICD-10-CM

## 2025-06-12 PROCEDURE — D2392 RESIN-BASED COMPOSITE - 2 SURFACES, POSTERIOR: HCPCS

## 2025-06-12 PROCEDURE — D2391 RESIN-BASED COMPOSITE - 1 SURFACE, POSTERIOR: HCPCS

## 2025-06-12 NOTE — PROGRESS NOTES
Composite Restoration #28-M, 29-DO    Yefri Bennett 54 y.o. male presents with self to [place] for composite restoration  PMH reviewed, no changes, ASA II.    Diagnosis:  Caries #28-M and #29-DO    Prognosis:  good    Consent:  Risks of specific procedure: need for RCT if pulp exposure occurs or in future if pulp is inflamed, need to revise tx plan based on extent of decay, damage to adjacent tooth and/or restoration.  Risks of any dental procedure: post procedural pain or sensitivity, local anesthetic side effects, allergic reaction to dental materials and medications, breakage of local anesthetic needle, aspiration of small dental tools, injury to nearby hard and soft tissues and anatomical structures.  Benefits: prevent further breakdown of tooth and its sequelae.  Alternatives: no tx.  Tx plan for composite restoration #28-M and 29-DO reviewed. Opportunity to ask questions given, all questions answered to degree of medical and dental certainty.  Patient understands and consent given by self via verbal consent.    Anesthesia:  Topical 20% benzocaine.  1 carps 2% Lidocaine 1:100k epi via buccal infiltration.    Procedure details:  Isolation: cotton rolls and high volume suction  Prepped teeth #28-M and 29-DO with high speed handpiece.  Caries removed with round carbide on slow speed.  Band placement: not applicable/needed for this restoration.   Etch with 37% H2PO4 15 seconds. Rinsed and suctioned.  Applied  with 20 second scrub, air dried, and light cured.  Restored with packable (A2 shade) and light cured.  Checked occlusion and adjusted with finishing burs.  Checked contacts with floss  Polished with enhance point.  Verified occlusion and contacts.    Patient dismissed ambulatory and alert.    NV: pre-lime for dentures.    Attending: Dr. Jacobs was present in clinic.

## 2025-06-23 ENCOUNTER — LAB (OUTPATIENT)
Dept: LAB | Facility: CLINIC | Age: 55
End: 2025-06-23
Attending: NEUROLOGICAL SURGERY
Payer: MEDICARE

## 2025-06-23 DIAGNOSIS — M54.12 CERVICAL RADICULOPATHY: ICD-10-CM

## 2025-06-23 DIAGNOSIS — Z01.818 PRE-PROCEDURAL EXAMINATION: ICD-10-CM

## 2025-06-23 LAB
ALBUMIN SERPL BCG-MCNC: 5 G/DL (ref 3.5–5)
ALP SERPL-CCNC: 82 U/L (ref 34–104)
ALT SERPL W P-5'-P-CCNC: 100 U/L (ref 7–52)
ANION GAP SERPL CALCULATED.3IONS-SCNC: 10 MMOL/L (ref 4–13)
APTT PPP: 28 SECONDS (ref 23–34)
AST SERPL W P-5'-P-CCNC: 80 U/L (ref 13–39)
ATRIAL RATE: 85 BPM
BACTERIA UR QL AUTO: ABNORMAL /HPF
BASOPHILS # BLD AUTO: 0.06 THOUSANDS/ÂΜL (ref 0–0.1)
BASOPHILS NFR BLD AUTO: 0 % (ref 0–1)
BILIRUB SERPL-MCNC: 0.7 MG/DL (ref 0.2–1)
BILIRUB UR QL STRIP: NEGATIVE
BUN SERPL-MCNC: 28 MG/DL (ref 5–25)
CALCIUM SERPL-MCNC: 9.7 MG/DL (ref 8.4–10.2)
CHLORIDE SERPL-SCNC: 106 MMOL/L (ref 96–108)
CLARITY UR: CLEAR
CO2 SERPL-SCNC: 26 MMOL/L (ref 21–32)
COLOR UR: YELLOW
CREAT SERPL-MCNC: 1.51 MG/DL (ref 0.6–1.3)
EOSINOPHIL # BLD AUTO: 0.24 THOUSAND/ÂΜL (ref 0–0.61)
EOSINOPHIL NFR BLD AUTO: 2 % (ref 0–6)
ERYTHROCYTE [DISTWIDTH] IN BLOOD BY AUTOMATED COUNT: 14.5 % (ref 11.6–15.1)
EST. AVERAGE GLUCOSE BLD GHB EST-MCNC: 128 MG/DL
GFR SERPL CREATININE-BSD FRML MDRD: 50 ML/MIN/1.73SQ M
GLUCOSE P FAST SERPL-MCNC: 113 MG/DL (ref 65–99)
GLUCOSE UR STRIP-MCNC: NEGATIVE MG/DL
HBA1C MFR BLD: 6.1 %
HCT VFR BLD AUTO: 46 % (ref 36.5–49.3)
HGB BLD-MCNC: 15.4 G/DL (ref 12–17)
HGB UR QL STRIP.AUTO: NEGATIVE
HYALINE CASTS #/AREA URNS LPF: ABNORMAL /LPF
IMM GRANULOCYTES # BLD AUTO: 0.06 THOUSAND/UL (ref 0–0.2)
IMM GRANULOCYTES NFR BLD AUTO: 0 % (ref 0–2)
INR PPP: 0.87 (ref 0.85–1.19)
KETONES UR STRIP-MCNC: NEGATIVE MG/DL
LEUKOCYTE ESTERASE UR QL STRIP: NEGATIVE
LYMPHOCYTES # BLD AUTO: 2.38 THOUSANDS/ÂΜL (ref 0.6–4.47)
LYMPHOCYTES NFR BLD AUTO: 17 % (ref 14–44)
MCH RBC QN AUTO: 29.4 PG (ref 26.8–34.3)
MCHC RBC AUTO-ENTMCNC: 33.5 G/DL (ref 31.4–37.4)
MCV RBC AUTO: 88 FL (ref 82–98)
MONOCYTES # BLD AUTO: 1.61 THOUSAND/ÂΜL (ref 0.17–1.22)
MONOCYTES NFR BLD AUTO: 12 % (ref 4–12)
MUCOUS THREADS UR QL AUTO: ABNORMAL
NEUTROPHILS # BLD AUTO: 9.35 THOUSANDS/ÂΜL (ref 1.85–7.62)
NEUTS SEG NFR BLD AUTO: 69 % (ref 43–75)
NITRITE UR QL STRIP: NEGATIVE
NON-SQ EPI CELLS URNS QL MICRO: ABNORMAL /HPF
NRBC BLD AUTO-RTO: 0 /100 WBCS
P AXIS: 54 DEGREES
PH UR STRIP.AUTO: 5.5 [PH]
PLATELET # BLD AUTO: 340 THOUSANDS/UL (ref 149–390)
PMV BLD AUTO: 9.9 FL (ref 8.9–12.7)
POTASSIUM SERPL-SCNC: 4 MMOL/L (ref 3.5–5.3)
PR INTERVAL: 152 MS
PROT SERPL-MCNC: 7.5 G/DL (ref 6.4–8.4)
PROT UR STRIP-MCNC: ABNORMAL MG/DL
PROTHROMBIN TIME: 12.5 SECONDS (ref 12.3–15)
QRS AXIS: 61 DEGREES
QRSD INTERVAL: 92 MS
QT INTERVAL: 352 MS
QTC INTERVAL: 419 MS
RBC # BLD AUTO: 5.24 MILLION/UL (ref 3.88–5.62)
RBC #/AREA URNS AUTO: ABNORMAL /HPF
SODIUM SERPL-SCNC: 142 MMOL/L (ref 135–147)
SP GR UR STRIP.AUTO: 1.03 (ref 1–1.03)
T WAVE AXIS: 58 DEGREES
UROBILINOGEN UR STRIP-ACNC: <2 MG/DL
VENTRICULAR RATE: 85 BPM
WBC # BLD AUTO: 13.7 THOUSAND/UL (ref 4.31–10.16)
WBC #/AREA URNS AUTO: ABNORMAL /HPF

## 2025-06-23 PROCEDURE — 36415 COLL VENOUS BLD VENIPUNCTURE: CPT

## 2025-06-23 PROCEDURE — 85610 PROTHROMBIN TIME: CPT

## 2025-06-23 PROCEDURE — 85730 THROMBOPLASTIN TIME PARTIAL: CPT

## 2025-06-23 PROCEDURE — 81001 URINALYSIS AUTO W/SCOPE: CPT

## 2025-06-23 PROCEDURE — 85025 COMPLETE CBC W/AUTO DIFF WBC: CPT

## 2025-06-23 PROCEDURE — 87081 CULTURE SCREEN ONLY: CPT

## 2025-06-23 PROCEDURE — 93010 ELECTROCARDIOGRAM REPORT: CPT | Performed by: STUDENT IN AN ORGANIZED HEALTH CARE EDUCATION/TRAINING PROGRAM

## 2025-06-23 PROCEDURE — 93005 ELECTROCARDIOGRAM TRACING: CPT

## 2025-06-23 PROCEDURE — 83036 HEMOGLOBIN GLYCOSYLATED A1C: CPT

## 2025-06-23 PROCEDURE — 80053 COMPREHEN METABOLIC PANEL: CPT

## 2025-06-24 ENCOUNTER — TELEPHONE (OUTPATIENT)
Age: 55
End: 2025-06-24

## 2025-06-24 DIAGNOSIS — R74.8 ABNORMAL TRANSAMINASES: ICD-10-CM

## 2025-06-24 DIAGNOSIS — R79.89 ELEVATED SERUM CREATININE: Primary | ICD-10-CM

## 2025-06-24 DIAGNOSIS — G89.29 CHRONIC RIGHT SI JOINT PAIN: ICD-10-CM

## 2025-06-24 DIAGNOSIS — M53.3 CHRONIC RIGHT SI JOINT PAIN: ICD-10-CM

## 2025-06-24 DIAGNOSIS — M96.1 POST LAMINECTOMY SYNDROME: ICD-10-CM

## 2025-06-24 LAB — MRSA NOSE QL CULT: NORMAL

## 2025-06-24 RX ORDER — DIAZEPAM 5 MG/1
5 TABLET ORAL EVERY 12 HOURS PRN
Qty: 45 TABLET | Refills: 0 | Status: SHIPPED | OUTPATIENT
Start: 2025-06-24

## 2025-06-24 RX ORDER — IBUPROFEN 600 MG/1
600 TABLET, FILM COATED ORAL EVERY 6 HOURS PRN
Qty: 30 TABLET | Refills: 0 | Status: CANCELLED | OUTPATIENT
Start: 2025-06-24

## 2025-06-24 RX ORDER — OXYCODONE HYDROCHLORIDE 10 MG/1
10 TABLET ORAL EVERY 4 HOURS PRN
Qty: 120 TABLET | Refills: 0 | Status: SHIPPED | OUTPATIENT
Start: 2025-06-24

## 2025-06-24 NOTE — TELEPHONE ENCOUNTER
PA for oxyCODONE (ROXICODONE) 10 MG TABS [facility]    via    [x]CMM-KEY: [id number]  []Surescripts-Case ID #   []Availity-Auth ID # NDC [id number]   []Faxed to plan   []Other website   []Phone call Case ID #     [x]PA sent as URGENT    All office notes, labs and other pertaining documents and studies sent. Clinical questions answered. Awaiting determination from insurance company.     Turnaround time for your insurance to make a decision on your Prior Authorization can take 7-21 business days.

## 2025-06-25 ENCOUNTER — ANESTHESIA EVENT (OUTPATIENT)
Dept: PERIOP | Facility: HOSPITAL | Age: 55
DRG: 023 | End: 2025-06-25
Payer: MEDICARE

## 2025-06-25 RX ORDER — IBUPROFEN 600 MG/1
600 TABLET, FILM COATED ORAL EVERY 6 HOURS PRN
Qty: 30 TABLET | Refills: 0 | OUTPATIENT
Start: 2025-06-25

## 2025-06-25 NOTE — TELEPHONE ENCOUNTER
Patient received a call from the [facility] and they're asking for someone in the office to reach out to insurance in regards to the PA for  oxyCODONE (ROXICODONE) 10 MG TABS. Patient was unsure for what exactly. He is aware the PA has been submitted.     Patient has a surgery scheduled 7/7. He was initially told a clearance wasn't needed from Dr Clemente and was sent to have some tests done. Patient is now on vacation prior to his surgery and received a call from the surgeon stating Dr Clemente has to clear him before the surgery. Patient is asking if Dr Clemente can look at all his test results and reach out to his surgeon to clear him without an appointment and if an appointment is needed, he wants to know if it can be done virtually. Please advise back to Yefri to further assist.

## 2025-06-25 NOTE — TELEPHONE ENCOUNTER
I just reviewed the bloodwork that the surgeons ordered for him, there were abnormalities that we need to address and ideally obtain repeat bloodwork before his surgery. I would want to see improvement in his numbers before clearing him fully, and his blood pressure looks like it has been above goal so I would like to see him in person for the clearance if possible. When is he back from vacation?

## 2025-06-25 NOTE — TELEPHONE ENCOUNTER
Spoke with patient via phone, he is unable to come in for a preop visit as he is currently out of the area on vacation, but I advised him that I am unable to clear him for surgery at this time given his abnormalities on the blood work ordered by neurosurgery, as well as his elevated blood pressures documented at recent visits with other providers.  Patient had a bump in his creatinine to 1.51, reports he is no longer taking NSAIDs per neurosurgery, so suspect elevated creatinine may be more due to patient's poor hydration status, as his specific gravity was elevated on his UA and he had hyaline casts which are suggestive of dehydration.  Patient also had elevations in AST (80) and ALT (100), which is new for him. Patient reports taking only 975 mg of Tylenol 2-3 times a day, denies going above the recommended daily amount; he also reports rare alcohol use, drinking only 1-2 drinks a few times a month but denies recent alcohol use.  Advised patient to hydrate well and that we will repeat blood work in 1 week, and if above-mentioned abnormalities persist he will need further workup.  Also checking hepatitis panel, deferring repeat INR as it was normal 6/23/25.  Patient in agreement with the plan and stated he will call his neurosurgeon to move back his surgery date.    Yefri also mentioned that Dr. Finney had some concerns about his history of esophageal dysmotility and narrowing requiring multiple dilation procedures, given his surgery requires manipulation of his esophagus per Yefri's report.  I advised him to discuss with neurosurgery to reach out to GI for their input, but reminded him that they had ordered a barium swallow for him which has not yet been done, so he may want to try and get that scheduled.    Will follow-up with patient once repeat blood work has been obtained.

## 2025-06-26 NOTE — TELEPHONE ENCOUNTER
PA for oxyCODONE (ROXICODONE) 10 MG TABS APPROVED     Date(s) approved 06/26/25-12/26/25    Case # N/A    Patient advised by          [][facility] Message  [x]Phone call   [x]LMOM  []L/M to call office as no active Communication consent on file  []Unable to leave detailed message as VM not approved on Communication consent       Pharmacy advised by    [x]Fax  []Phone call  []Secure Chat     Approval letter scanned into Media Yes

## 2025-07-03 ENCOUNTER — TELEPHONE (OUTPATIENT)
Dept: DENTISTRY | Facility: CLINIC | Age: 55
End: 2025-07-03

## 2025-07-03 NOTE — TELEPHONE ENCOUNTER
Called pt to provide him with the fees for his dentures now that the SFS application has been processed.

## 2025-07-07 ENCOUNTER — ANESTHESIA (OUTPATIENT)
Dept: PERIOP | Facility: HOSPITAL | Age: 55
DRG: 023 | End: 2025-07-07
Payer: MEDICARE

## 2025-07-07 ENCOUNTER — APPOINTMENT (OUTPATIENT)
Dept: LAB | Facility: CLINIC | Age: 55
End: 2025-07-07
Payer: MEDICARE

## 2025-07-07 DIAGNOSIS — R74.8 ABNORMAL TRANSAMINASES: ICD-10-CM

## 2025-07-07 DIAGNOSIS — R79.89 ELEVATED SERUM CREATININE: ICD-10-CM

## 2025-07-07 LAB
ALBUMIN SERPL BCG-MCNC: 4.7 G/DL (ref 3.5–5)
ALP SERPL-CCNC: 79 U/L (ref 34–104)
ALT SERPL W P-5'-P-CCNC: 24 U/L (ref 7–52)
ANION GAP SERPL CALCULATED.3IONS-SCNC: 6 MMOL/L (ref 4–13)
AST SERPL W P-5'-P-CCNC: 22 U/L (ref 13–39)
BILIRUB DIRECT SERPL-MCNC: 0.08 MG/DL (ref 0–0.2)
BILIRUB SERPL-MCNC: 0.49 MG/DL (ref 0.2–1)
BUN SERPL-MCNC: 18 MG/DL (ref 5–25)
CALCIUM SERPL-MCNC: 9.5 MG/DL (ref 8.4–10.2)
CHLORIDE SERPL-SCNC: 105 MMOL/L (ref 96–108)
CO2 SERPL-SCNC: 29 MMOL/L (ref 21–32)
CREAT SERPL-MCNC: 1.19 MG/DL (ref 0.6–1.3)
GFR SERPL CREATININE-BSD FRML MDRD: 67 ML/MIN/1.73SQ M
GLUCOSE P FAST SERPL-MCNC: 94 MG/DL (ref 65–99)
POTASSIUM SERPL-SCNC: 4.9 MMOL/L (ref 3.5–5.3)
PROT SERPL-MCNC: 7 G/DL (ref 6.4–8.4)
SODIUM SERPL-SCNC: 140 MMOL/L (ref 135–147)

## 2025-07-07 PROCEDURE — 80048 BASIC METABOLIC PNL TOTAL CA: CPT

## 2025-07-07 PROCEDURE — 80074 ACUTE HEPATITIS PANEL: CPT

## 2025-07-07 PROCEDURE — 80076 HEPATIC FUNCTION PANEL: CPT

## 2025-07-07 PROCEDURE — 36415 COLL VENOUS BLD VENIPUNCTURE: CPT

## 2025-07-08 LAB
HAV IGM SER QL: NORMAL
HBV CORE IGM SER QL: NORMAL
HBV SURFACE AG SER QL: NORMAL
HCV AB SER QL: NORMAL

## 2025-07-14 ENCOUNTER — TELEPHONE (OUTPATIENT)
Age: 55
End: 2025-07-14

## 2025-07-14 NOTE — TELEPHONE ENCOUNTER
Patient called following up on his test results he needed to have prior to his surgery on 7/28. Please advise if patient is cleared for surgery.

## 2025-07-14 NOTE — TELEPHONE ENCOUNTER
Spoke with patient via phone, advised him that both his liver and kidney numbers have improved greatly, and that I do not anticipate any reason why we would not be able to clear him for his surgery now that his numbers have normalized, but that he needs to come in for a pre-op visit for us to formally clear him for his surgery.  Patient stated he will call back to schedule pre-op appointment with our office.  Surgery scheduled for 7/28/2025.

## 2025-07-17 ENCOUNTER — TELEPHONE (OUTPATIENT)
Dept: FAMILY MEDICINE CLINIC | Facility: CLINIC | Age: 55
End: 2025-07-17

## 2025-07-18 ENCOUNTER — CONSULT (OUTPATIENT)
Dept: FAMILY MEDICINE CLINIC | Facility: CLINIC | Age: 55
End: 2025-07-18
Payer: MEDICARE

## 2025-07-18 VITALS
OXYGEN SATURATION: 98 % | TEMPERATURE: 98 F | HEIGHT: 73 IN | SYSTOLIC BLOOD PRESSURE: 137 MMHG | DIASTOLIC BLOOD PRESSURE: 99 MMHG | HEART RATE: 83 BPM | WEIGHT: 210 LBS | BODY MASS INDEX: 27.83 KG/M2

## 2025-07-18 DIAGNOSIS — Z01.818 PRE-OP EVALUATION: ICD-10-CM

## 2025-07-18 DIAGNOSIS — M48.02 DEGENERATIVE CERVICAL SPINAL STENOSIS: Primary | ICD-10-CM

## 2025-07-18 DIAGNOSIS — M50.20 CERVICAL DISC HERNIATION: ICD-10-CM

## 2025-07-18 PROCEDURE — 99214 OFFICE O/P EST MOD 30 MIN: CPT

## 2025-07-18 NOTE — PROGRESS NOTES
"PRE-OPERATIVE EXAMINATION  Yefri Bennett  1970    Yefri Bennett is a 54 y.o. male with HTN, NELLI, asthma, CKD 2 who is planning to undergo C5-7 anterior cervical decompression and fusion  under general by  on 07/28/2025. The procedure is indicated for the following condition: Cervical spine degenerative changes with radiculopathy. Patient has not had complications with anesthesia in the past.    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    POCT EKG: Normal sinus rhythm (attached in media, similar to previous EKG)    Patient reports that he is still smoking but decreased his consumption to half a pack a day.  Alcohol: None      /99 (BP Location: Left arm, Patient Position: Sitting, Cuff Size: Standard)   Pulse 83   Temp 98 °F (36.7 °C) (Temporal)   Ht 6' 1\" (1.854 m)   Wt 95.3 kg (210 lb)   SpO2 98%   BMI 27.71 kg/m²   Physical Exam  Vitals and nursing note reviewed.   Constitutional:       General: He is not in acute distress.     Appearance: Normal appearance. He is well-developed. He is not ill-appearing.   HENT:      Head: Normocephalic and atraumatic.      Mouth/Throat:      Mouth: Mucous membranes are moist.      Pharynx: Oropharynx is clear.     Eyes:      General: No scleral icterus.     Conjunctiva/sclera: Conjunctivae normal.       Cardiovascular:      Rate and Rhythm: Normal rate and regular rhythm.      Pulses: Normal pulses.      Heart sounds: No murmur heard.  Pulmonary:      Effort: Pulmonary effort is normal. No respiratory distress.      Breath sounds: Normal breath sounds. No wheezing. "   Chest:      Chest wall: No tenderness.   Abdominal:      Palpations: Abdomen is soft.      Tenderness: There is no abdominal tenderness. There is no guarding or rebound.     Musculoskeletal:         General: No swelling.      Cervical back: Neck supple.      Right lower leg: No edema.      Left lower leg: No edema.     Skin:     General: Skin is warm and dry.      Capillary Refill: Capillary refill takes less than 2 seconds.      Coloration: Skin is not jaundiced or pale.      Findings: No lesion.     Neurological:      Mental Status: He is alert and oriented to person, place, and time.      Motor: No weakness.      Gait: Gait normal.     Psychiatric:         Mood and Affect: Mood is anxious.         Behavior: Behavior normal.         Revised Cardiac Risk Index (RCRI) for Pre-Operative Risk   (estimates risk of cardiac complications after noncardiac surgery)    High-risk surgery: No 0 / Yes +1  Intraperitoneal, intrathoracic, suprainguinal vascular  History of ischemic heart disease: No 0 / Yes +1  Hx of MI, (+) exercise test, current chest pain considered due to myocardial ischemia, use of nitrate therapy or ECG with pathological Q waves)  History of CHF: No 0 / Yes +1  Pulmonary edema, B/L rales or S3 gallop; LEYVA, orthopnea, PND, CXR showing pulmonary vascular redistribution)  History of cerebrovascular disease: No 0 / Yes +1  Prior TIA or stroke  Pre-operative treatment with insulin: No 0 / Yes +1  Pre-operative creatinine >2 mg/dL: No 0 / Yes +1    RCRI Scorin points: Class I Risk, 3.9% 30-day risk of death, MI, or cardiac arrest  1 point: Class II Risk, 6.0% 30-day risk of death, MI, or cardiac arrest  2 points: Class III Risk, 10.1% 30-day risk of death, MI, or cardiac arrest  3 points: Class IV Risk, 15% 30-day risk of death, MI, or cardiac arrest  4 points: Class IV Risk, 15% 30-day risk of death, MI, or cardiac arrest  5 points: Class IV Risk, 15% 30-day risk of death, MI, or cardiac arrest  6  points: Class IV Risk, 15% 30-day risk of death, MI, or cardiac arrest    Lab Results   Component Value Date    CREATININE 1.19 07/07/2025       Yefri was seen today for pre-op exam.    Diagnoses and all orders for this visit:    Pre-op evaluation      Recommendations:  Yefri Bennett is undergoing an elective High Risk surgery. He is RCRI class II risk (1 points for high risk surgery) with 6% 30-day risk of death, MI, or cardiac arrest. He may proceed with surgery as planned without further workup.     Discussed with Dr. Clemente, who is in agreement with the plan as outlined above.

## 2025-07-21 DIAGNOSIS — K22.4 ESOPHAGEAL DYSMOTILITIES: ICD-10-CM

## 2025-07-21 DIAGNOSIS — G89.29 CHRONIC RIGHT SI JOINT PAIN: ICD-10-CM

## 2025-07-21 DIAGNOSIS — M53.3 CHRONIC RIGHT SI JOINT PAIN: ICD-10-CM

## 2025-07-21 DIAGNOSIS — M96.1 POST LAMINECTOMY SYNDROME: ICD-10-CM

## 2025-07-21 RX ORDER — DIAZEPAM 5 MG/1
5 TABLET ORAL EVERY 12 HOURS PRN
Qty: 45 TABLET | Refills: 0 | Status: SHIPPED | OUTPATIENT
Start: 2025-07-23

## 2025-07-21 RX ORDER — OXYCODONE HYDROCHLORIDE 10 MG/1
10 TABLET ORAL EVERY 4 HOURS PRN
Qty: 120 TABLET | Refills: 0 | Status: SHIPPED | OUTPATIENT
Start: 2025-07-23

## 2025-07-23 RX ORDER — DILTIAZEM HCL 60 MG
60 TABLET ORAL 2 TIMES DAILY
Qty: 180 TABLET | Refills: 1 | Status: SHIPPED | OUTPATIENT
Start: 2025-07-23

## 2025-07-28 ENCOUNTER — HOSPITAL ENCOUNTER (INPATIENT)
Facility: HOSPITAL | Age: 55
LOS: 2 days | Discharge: HOME | DRG: 023 | End: 2025-08-01
Attending: NEUROLOGICAL SURGERY | Admitting: NEUROLOGICAL SURGERY
Payer: MEDICARE

## 2025-07-28 ENCOUNTER — APPOINTMENT (OUTPATIENT)
Dept: RADIOLOGY | Facility: HOSPITAL | Age: 55
DRG: 023 | End: 2025-07-28
Payer: MEDICARE

## 2025-07-28 DIAGNOSIS — Z98.890 POST-OPERATIVE STATE: Primary | ICD-10-CM

## 2025-07-28 DIAGNOSIS — R03.0 ELEVATED BLOOD PRESSURE READING: ICD-10-CM

## 2025-07-28 DIAGNOSIS — M50.120 CERVICAL DISC DISORDER WITH RADICULOPATHY OF MID-CERVICAL REGION: ICD-10-CM

## 2025-07-28 DIAGNOSIS — I10 PRIMARY HYPERTENSION: ICD-10-CM

## 2025-07-28 LAB
ATRIAL RATE: 66 BPM
CARDIAC TROPONIN I PNL SERPL HS: 4 NG/L (ref ?–50)
P AXIS: 56 DEGREES
PR INTERVAL: 172 MS
QRS AXIS: 65 DEGREES
QRSD INTERVAL: 90 MS
QT INTERVAL: 380 MS
QTC INTERVAL: 399 MS
T WAVE AXIS: 59 DEGREES
VENTRICULAR RATE: 66 BPM

## 2025-07-28 PROCEDURE — C1713 ANCHOR/SCREW BN/BN,TIS/BN: HCPCS | Performed by: NEUROLOGICAL SURGERY

## 2025-07-28 PROCEDURE — 03HY32Z INSERTION OF MONITORING DEVICE INTO UPPER ARTERY, PERCUTANEOUS APPROACH: ICD-10-PCS | Performed by: NURSE ANESTHETIST, CERTIFIED REGISTERED

## 2025-07-28 PROCEDURE — C1781 MESH (IMPLANTABLE): HCPCS | Performed by: NEUROLOGICAL SURGERY

## 2025-07-28 PROCEDURE — 72040 X-RAY EXAM NECK SPINE 2-3 VW: CPT

## 2025-07-28 PROCEDURE — 01N10ZZ RELEASE CERVICAL NERVE, OPEN APPROACH: ICD-10-PCS | Performed by: NEUROLOGICAL SURGERY

## 2025-07-28 PROCEDURE — 84484 ASSAY OF TROPONIN QUANT: CPT | Performed by: PHYSICIAN ASSISTANT

## 2025-07-28 PROCEDURE — 4A133B1 MONITORING OF ARTERIAL PRESSURE, PERIPHERAL, PERCUTANEOUS APPROACH: ICD-10-PCS | Performed by: NURSE ANESTHETIST, CERTIFIED REGISTERED

## 2025-07-28 PROCEDURE — 22845 INSERT SPINE FIXATION DEVICE: CPT | Performed by: NEUROLOGICAL SURGERY

## 2025-07-28 PROCEDURE — 99024 POSTOP FOLLOW-UP VISIT: CPT | Performed by: NEUROLOGICAL SURGERY

## 2025-07-28 PROCEDURE — 22552 ARTHRD ANT NTRBD CERVICAL EA: CPT | Performed by: NEUROLOGICAL SURGERY

## 2025-07-28 PROCEDURE — 20930 SP BONE ALGRFT MORSEL ADD-ON: CPT | Performed by: NEUROLOGICAL SURGERY

## 2025-07-28 PROCEDURE — 0RG20A0 FUSION OF 2 OR MORE CERVICAL VERTEBRAL JOINTS WITH INTERBODY FUSION DEVICE, ANTERIOR APPROACH, ANTERIOR COLUMN, OPEN APPROACH: ICD-10-PCS | Performed by: NEUROLOGICAL SURGERY

## 2025-07-28 PROCEDURE — 22853 INSJ BIOMECHANICAL DEVICE: CPT | Performed by: NEUROLOGICAL SURGERY

## 2025-07-28 PROCEDURE — 4A11X4G MONITORING OF PERIPHERAL NERVOUS ELECTRICAL ACTIVITY, INTRAOPERATIVE, EXTERNAL APPROACH: ICD-10-PCS | Performed by: NEUROLOGICAL SURGERY

## 2025-07-28 PROCEDURE — 22551 ARTHRD ANT NTRBDY CERVICAL: CPT | Performed by: NEUROLOGICAL SURGERY

## 2025-07-28 PROCEDURE — 93010 ELECTROCARDIOGRAM REPORT: CPT | Performed by: INTERNAL MEDICINE

## 2025-07-28 PROCEDURE — 4A133J1 MONITORING OF ARTERIAL PULSE, PERIPHERAL, PERCUTANEOUS APPROACH: ICD-10-PCS | Performed by: NURSE ANESTHETIST, CERTIFIED REGISTERED

## 2025-07-28 PROCEDURE — 93005 ELECTROCARDIOGRAM TRACING: CPT

## 2025-07-28 DEVICE — IMPLANTABLE DEVICE: Type: IMPLANTABLE DEVICE | Site: SPINE CERVICAL | Status: FUNCTIONAL

## 2025-07-28 RX ORDER — METHOCARBAMOL 750 MG/1
750 TABLET, FILM COATED ORAL EVERY 6 HOURS SCHEDULED
Status: DISCONTINUED | OUTPATIENT
Start: 2025-07-28 | End: 2025-07-31 | Stop reason: ALTCHOICE

## 2025-07-28 RX ORDER — SODIUM CHLORIDE 9 MG/ML
75 INJECTION, SOLUTION INTRAVENOUS CONTINUOUS
Status: DISCONTINUED | OUTPATIENT
Start: 2025-07-28 | End: 2025-07-29

## 2025-07-28 RX ORDER — METOPROLOL TARTRATE 1 MG/ML
5 INJECTION, SOLUTION INTRAVENOUS EVERY 6 HOURS PRN
Status: DISCONTINUED | OUTPATIENT
Start: 2025-07-28 | End: 2025-07-31

## 2025-07-28 RX ORDER — ACETAMINOPHEN 325 MG/1
975 TABLET ORAL EVERY 8 HOURS SCHEDULED
Status: DISCONTINUED | OUTPATIENT
Start: 2025-07-28 | End: 2025-07-31

## 2025-07-28 RX ORDER — GLYCOPYRROLATE 0.2 MG/ML
INJECTION INTRAMUSCULAR; INTRAVENOUS AS NEEDED
Status: DISCONTINUED | OUTPATIENT
Start: 2025-07-28 | End: 2025-07-28

## 2025-07-28 RX ORDER — ONDANSETRON 2 MG/ML
4 INJECTION INTRAMUSCULAR; INTRAVENOUS ONCE AS NEEDED
Status: COMPLETED | OUTPATIENT
Start: 2025-07-28 | End: 2025-07-28

## 2025-07-28 RX ORDER — ALBUTEROL SULFATE 90 UG/1
2 INHALANT RESPIRATORY (INHALATION) EVERY 6 HOURS PRN
Status: DISCONTINUED | OUTPATIENT
Start: 2025-07-28 | End: 2025-08-01 | Stop reason: HOSPADM

## 2025-07-28 RX ORDER — SENNOSIDES 8.6 MG
1 TABLET ORAL DAILY
Status: DISCONTINUED | OUTPATIENT
Start: 2025-07-29 | End: 2025-08-01 | Stop reason: HOSPADM

## 2025-07-28 RX ORDER — KETAMINE HCL IN NACL, ISO-OSM 100MG/10ML
SYRINGE (ML) INJECTION AS NEEDED
Status: DISCONTINUED | OUTPATIENT
Start: 2025-07-28 | End: 2025-07-28

## 2025-07-28 RX ORDER — LIDOCAINE HYDROCHLORIDE AND EPINEPHRINE 10; 10 MG/ML; UG/ML
INJECTION, SOLUTION INFILTRATION; PERINEURAL AS NEEDED
Status: DISCONTINUED | OUTPATIENT
Start: 2025-07-28 | End: 2025-07-28 | Stop reason: HOSPADM

## 2025-07-28 RX ORDER — CEFAZOLIN SODIUM 2 G/50ML
SOLUTION INTRAVENOUS AS NEEDED
Status: DISCONTINUED | OUTPATIENT
Start: 2025-07-28 | End: 2025-07-28

## 2025-07-28 RX ORDER — ONDANSETRON 2 MG/ML
INJECTION INTRAMUSCULAR; INTRAVENOUS AS NEEDED
Status: DISCONTINUED | OUTPATIENT
Start: 2025-07-28 | End: 2025-07-28

## 2025-07-28 RX ORDER — CEFAZOLIN SODIUM 1 G/50ML
1000 SOLUTION INTRAVENOUS EVERY 8 HOURS
Status: COMPLETED | OUTPATIENT
Start: 2025-07-28 | End: 2025-07-29

## 2025-07-28 RX ORDER — HYDROMORPHONE HCL/PF 1 MG/ML
SYRINGE (ML) INJECTION AS NEEDED
Status: DISCONTINUED | OUTPATIENT
Start: 2025-07-28 | End: 2025-07-28

## 2025-07-28 RX ORDER — ROCURONIUM BROMIDE 10 MG/ML
INJECTION, SOLUTION INTRAVENOUS AS NEEDED
Status: DISCONTINUED | OUTPATIENT
Start: 2025-07-28 | End: 2025-07-28

## 2025-07-28 RX ORDER — LIDOCAINE HYDROCHLORIDE 10 MG/ML
INJECTION, SOLUTION EPIDURAL; INFILTRATION; INTRACAUDAL; PERINEURAL AS NEEDED
Status: DISCONTINUED | OUTPATIENT
Start: 2025-07-28 | End: 2025-07-28

## 2025-07-28 RX ORDER — LIDOCAINE 50 MG/G
1 PATCH TOPICAL DAILY
Status: DISCONTINUED | OUTPATIENT
Start: 2025-07-29 | End: 2025-08-01 | Stop reason: HOSPADM

## 2025-07-28 RX ORDER — FENTANYL CITRATE 50 UG/ML
INJECTION, SOLUTION INTRAMUSCULAR; INTRAVENOUS AS NEEDED
Status: DISCONTINUED | OUTPATIENT
Start: 2025-07-28 | End: 2025-07-28

## 2025-07-28 RX ORDER — HYDROMORPHONE HCL/PF 1 MG/ML
0.5 SYRINGE (ML) INJECTION EVERY 2 HOUR PRN
Status: DISCONTINUED | OUTPATIENT
Start: 2025-07-28 | End: 2025-07-30

## 2025-07-28 RX ORDER — BUPIVACAINE HYDROCHLORIDE AND EPINEPHRINE 5; 5 MG/ML; UG/ML
INJECTION, SOLUTION EPIDURAL; INTRACAUDAL; PERINEURAL AS NEEDED
Status: DISCONTINUED | OUTPATIENT
Start: 2025-07-28 | End: 2025-07-28 | Stop reason: HOSPADM

## 2025-07-28 RX ORDER — HEPARIN SODIUM 5000 [USP'U]/ML
5000 INJECTION, SOLUTION INTRAVENOUS; SUBCUTANEOUS EVERY 8 HOURS SCHEDULED
Status: DISCONTINUED | OUTPATIENT
Start: 2025-07-29 | End: 2025-08-01 | Stop reason: HOSPADM

## 2025-07-28 RX ORDER — ACETAMINOPHEN 325 MG/1
650 TABLET ORAL EVERY 4 HOURS PRN
Status: DISCONTINUED | OUTPATIENT
Start: 2025-07-28 | End: 2025-07-28

## 2025-07-28 RX ORDER — SODIUM CHLORIDE 9 MG/ML
INJECTION, SOLUTION INTRAVENOUS CONTINUOUS PRN
Status: DISCONTINUED | OUTPATIENT
Start: 2025-07-28 | End: 2025-07-28

## 2025-07-28 RX ORDER — OXYCODONE HYDROCHLORIDE 10 MG/1
10 TABLET ORAL EVERY 4 HOURS PRN
Status: DISCONTINUED | OUTPATIENT
Start: 2025-07-28 | End: 2025-07-31 | Stop reason: ALTCHOICE

## 2025-07-28 RX ORDER — SUCCINYLCHOLINE/SOD CL,ISO/PF 100 MG/5ML
SYRINGE (ML) INTRAVENOUS AS NEEDED
Status: DISCONTINUED | OUTPATIENT
Start: 2025-07-28 | End: 2025-07-28

## 2025-07-28 RX ORDER — PROPOFOL 10 MG/ML
INJECTION, EMULSION INTRAVENOUS CONTINUOUS PRN
Status: DISCONTINUED | OUTPATIENT
Start: 2025-07-28 | End: 2025-07-28

## 2025-07-28 RX ORDER — DIAZEPAM 5 MG/1
5 TABLET ORAL EVERY 12 HOURS PRN
Status: DISCONTINUED | OUTPATIENT
Start: 2025-07-28 | End: 2025-08-01 | Stop reason: HOSPADM

## 2025-07-28 RX ORDER — OXYCODONE HYDROCHLORIDE 10 MG/1
10 TABLET ORAL EVERY 4 HOURS PRN
Refills: 0 | Status: DISCONTINUED | OUTPATIENT
Start: 2025-07-28 | End: 2025-07-28

## 2025-07-28 RX ORDER — CALCIUM CARBONATE 500 MG/1
1000 TABLET, CHEWABLE ORAL DAILY PRN
Status: DISCONTINUED | OUTPATIENT
Start: 2025-07-28 | End: 2025-08-01 | Stop reason: HOSPADM

## 2025-07-28 RX ORDER — HYDROMORPHONE HCL IN WATER/PF 6 MG/30 ML
0.2 PATIENT CONTROLLED ANALGESIA SYRINGE INTRAVENOUS
Status: DISCONTINUED | OUTPATIENT
Start: 2025-07-28 | End: 2025-07-28 | Stop reason: HOSPADM

## 2025-07-28 RX ORDER — MIDAZOLAM HYDROCHLORIDE 2 MG/2ML
INJECTION, SOLUTION INTRAMUSCULAR; INTRAVENOUS AS NEEDED
Status: DISCONTINUED | OUTPATIENT
Start: 2025-07-28 | End: 2025-07-28

## 2025-07-28 RX ORDER — FENTANYL CITRATE/PF 50 MCG/ML
25 SYRINGE (ML) INJECTION
Refills: 0 | Status: DISCONTINUED | OUTPATIENT
Start: 2025-07-28 | End: 2025-07-28 | Stop reason: HOSPADM

## 2025-07-28 RX ORDER — DILTIAZEM HCL 60 MG
60 TABLET ORAL 2 TIMES DAILY
Status: DISCONTINUED | OUTPATIENT
Start: 2025-07-28 | End: 2025-08-01 | Stop reason: HOSPADM

## 2025-07-28 RX ORDER — ONDANSETRON 2 MG/ML
4 INJECTION INTRAMUSCULAR; INTRAVENOUS EVERY 6 HOURS PRN
Status: DISCONTINUED | OUTPATIENT
Start: 2025-07-28 | End: 2025-08-01 | Stop reason: HOSPADM

## 2025-07-28 RX ORDER — SODIUM CHLORIDE, SODIUM LACTATE, POTASSIUM CHLORIDE, CALCIUM CHLORIDE 600; 310; 30; 20 MG/100ML; MG/100ML; MG/100ML; MG/100ML
INJECTION, SOLUTION INTRAVENOUS CONTINUOUS PRN
Status: DISCONTINUED | OUTPATIENT
Start: 2025-07-28 | End: 2025-07-28

## 2025-07-28 RX ORDER — CEFAZOLIN SODIUM 2 G/50ML
2000 SOLUTION INTRAVENOUS ONCE
Status: DISCONTINUED | OUTPATIENT
Start: 2025-07-28 | End: 2025-07-28 | Stop reason: HOSPADM

## 2025-07-28 RX ORDER — DEXAMETHASONE SODIUM PHOSPHATE 10 MG/ML
INJECTION, SOLUTION INTRAMUSCULAR; INTRAVENOUS AS NEEDED
Status: DISCONTINUED | OUTPATIENT
Start: 2025-07-28 | End: 2025-07-28

## 2025-07-28 RX ORDER — DIPHENHYDRAMINE HYDROCHLORIDE 50 MG/ML
12.5 INJECTION, SOLUTION INTRAMUSCULAR; INTRAVENOUS ONCE AS NEEDED
Status: COMPLETED | OUTPATIENT
Start: 2025-07-28 | End: 2025-07-28

## 2025-07-28 RX ORDER — DOCUSATE SODIUM 100 MG/1
100 CAPSULE, LIQUID FILLED ORAL 2 TIMES DAILY
Status: DISCONTINUED | OUTPATIENT
Start: 2025-07-28 | End: 2025-08-01 | Stop reason: HOSPADM

## 2025-07-28 RX ORDER — PROPOFOL 10 MG/ML
INJECTION, EMULSION INTRAVENOUS AS NEEDED
Status: DISCONTINUED | OUTPATIENT
Start: 2025-07-28 | End: 2025-07-28

## 2025-07-28 RX ADMIN — PROPOFOL 50 MG: 10 INJECTION, EMULSION INTRAVENOUS at 09:57

## 2025-07-28 RX ADMIN — HYDROMORPHONE HYDROCHLORIDE 0.2 MG: 0.2 INJECTION, SOLUTION INTRAMUSCULAR; INTRAVENOUS; SUBCUTANEOUS at 13:05

## 2025-07-28 RX ADMIN — METHOCARBAMOL 750 MG: 750 TABLET ORAL at 18:34

## 2025-07-28 RX ADMIN — CEFAZOLIN SODIUM 1000 MG: 1 SOLUTION INTRAVENOUS at 18:34

## 2025-07-28 RX ADMIN — OXYCODONE HYDROCHLORIDE 15 MG: 5 TABLET ORAL at 19:45

## 2025-07-28 RX ADMIN — HYDROMORPHONE HYDROCHLORIDE 0.2 MG: 0.2 INJECTION, SOLUTION INTRAMUSCULAR; INTRAVENOUS; SUBCUTANEOUS at 12:51

## 2025-07-28 RX ADMIN — ROCURONIUM BROMIDE 30 MG: 10 INJECTION, SOLUTION INTRAVENOUS at 08:29

## 2025-07-28 RX ADMIN — DEXMEDETOMIDINE HYDROCHLORIDE 8 MCG: 100 INJECTION, SOLUTION INTRAVENOUS at 12:11

## 2025-07-28 RX ADMIN — PROPOFOL 100 MCG/KG/MIN: 10 INJECTION, EMULSION INTRAVENOUS at 07:44

## 2025-07-28 RX ADMIN — HYDROMORPHONE HYDROCHLORIDE 0.5 MG: 1 INJECTION, SOLUTION INTRAMUSCULAR; INTRAVENOUS; SUBCUTANEOUS at 18:31

## 2025-07-28 RX ADMIN — SODIUM CHLORIDE 75 ML/HR: 0.9 INJECTION, SOLUTION INTRAVENOUS at 18:34

## 2025-07-28 RX ADMIN — PHENYLEPHRINE HYDROCHLORIDE 30 MCG/MIN: 50 INJECTION INTRAVENOUS at 07:44

## 2025-07-28 RX ADMIN — HYDROMORPHONE HYDROCHLORIDE 0.5 MG: 1 INJECTION, SOLUTION INTRAMUSCULAR; INTRAVENOUS; SUBCUTANEOUS at 22:41

## 2025-07-28 RX ADMIN — SODIUM CHLORIDE, SODIUM LACTATE, POTASSIUM CHLORIDE, AND CALCIUM CHLORIDE: .6; .31; .03; .02 INJECTION, SOLUTION INTRAVENOUS at 11:38

## 2025-07-28 RX ADMIN — Medication 50 MG: at 08:16

## 2025-07-28 RX ADMIN — HYDROMORPHONE HYDROCHLORIDE 0.2 MG: 0.2 INJECTION, SOLUTION INTRAMUSCULAR; INTRAVENOUS; SUBCUTANEOUS at 13:28

## 2025-07-28 RX ADMIN — FENTANYL CITRATE 25 MCG: 50 INJECTION INTRAMUSCULAR; INTRAVENOUS at 12:41

## 2025-07-28 RX ADMIN — PROPOFOL 250 MG: 10 INJECTION, EMULSION INTRAVENOUS at 07:43

## 2025-07-28 RX ADMIN — HYDROMORPHONE HYDROCHLORIDE 0.5 MG: 1 INJECTION, SOLUTION INTRAMUSCULAR; INTRAVENOUS; SUBCUTANEOUS at 10:05

## 2025-07-28 RX ADMIN — FENTANYL CITRATE 25 MCG: 50 INJECTION INTRAMUSCULAR; INTRAVENOUS at 12:44

## 2025-07-28 RX ADMIN — FENTANYL CITRATE 25 MCG: 50 INJECTION INTRAMUSCULAR; INTRAVENOUS at 12:33

## 2025-07-28 RX ADMIN — REMIFENTANIL HYDROCHLORIDE 0.2 MCG/KG/MIN: 1 INJECTION, POWDER, LYOPHILIZED, FOR SOLUTION INTRAVENOUS at 07:44

## 2025-07-28 RX ADMIN — HYDROMORPHONE HYDROCHLORIDE 0.5 MG: 1 INJECTION, SOLUTION INTRAMUSCULAR; INTRAVENOUS; SUBCUTANEOUS at 12:13

## 2025-07-28 RX ADMIN — Medication 100 MG: at 07:43

## 2025-07-28 RX ADMIN — DOCUSATE SODIUM 100 MG: 100 CAPSULE, LIQUID FILLED ORAL at 18:34

## 2025-07-28 RX ADMIN — HYDROMORPHONE HYDROCHLORIDE 0.2 MG: 0.2 INJECTION, SOLUTION INTRAMUSCULAR; INTRAVENOUS; SUBCUTANEOUS at 15:27

## 2025-07-28 RX ADMIN — SODIUM CHLORIDE: 0.9 INJECTION, SOLUTION INTRAVENOUS at 07:40

## 2025-07-28 RX ADMIN — ROCURONIUM BROMIDE 20 MG: 10 INJECTION, SOLUTION INTRAVENOUS at 08:39

## 2025-07-28 RX ADMIN — METHOCARBAMOL 750 MG: 750 TABLET ORAL at 22:40

## 2025-07-28 RX ADMIN — FENTANYL CITRATE 25 MCG: 50 INJECTION INTRAMUSCULAR; INTRAVENOUS at 12:36

## 2025-07-28 RX ADMIN — SUGAMMADEX 200 MG: 100 INJECTION, SOLUTION INTRAVENOUS at 09:01

## 2025-07-28 RX ADMIN — CEFAZOLIN SODIUM 2000 MG: 2 SOLUTION INTRAVENOUS at 08:31

## 2025-07-28 RX ADMIN — DIPHENHYDRAMINE HYDROCHLORIDE 12.5 MG: 50 INJECTION, SOLUTION INTRAMUSCULAR; INTRAVENOUS at 13:44

## 2025-07-28 RX ADMIN — ONDANSETRON 4 MG: 2 INJECTION INTRAMUSCULAR; INTRAVENOUS at 07:40

## 2025-07-28 RX ADMIN — HYDROMORPHONE HYDROCHLORIDE 0.5 MG: 1 INJECTION, SOLUTION INTRAMUSCULAR; INTRAVENOUS; SUBCUTANEOUS at 09:57

## 2025-07-28 RX ADMIN — MIDAZOLAM 2 MG: 1 INJECTION INTRAMUSCULAR; INTRAVENOUS at 07:40

## 2025-07-28 RX ADMIN — DILTIAZEM HYDROCHLORIDE 60 MG: 60 TABLET ORAL at 18:34

## 2025-07-28 RX ADMIN — DIAZEPAM 5 MG: 5 TABLET ORAL at 20:24

## 2025-07-28 RX ADMIN — DEXAMETHASONE SODIUM PHOSPHATE 10 MG: 10 INJECTION, SOLUTION INTRAMUSCULAR; INTRAVENOUS at 07:43

## 2025-07-28 RX ADMIN — GLYCOPYRROLATE 0.4 MG: 0.2 INJECTION, SOLUTION INTRAMUSCULAR; INTRAVENOUS at 07:43

## 2025-07-28 RX ADMIN — SODIUM CHLORIDE, SODIUM LACTATE, POTASSIUM CHLORIDE, AND CALCIUM CHLORIDE: .6; .31; .03; .02 INJECTION, SOLUTION INTRAVENOUS at 07:40

## 2025-07-28 RX ADMIN — HYDROMORPHONE HYDROCHLORIDE 0.5 MG: 1 INJECTION, SOLUTION INTRAMUSCULAR; INTRAVENOUS; SUBCUTANEOUS at 10:59

## 2025-07-28 RX ADMIN — OXYCODONE HYDROCHLORIDE 10 MG: 10 TABLET ORAL at 15:20

## 2025-07-28 RX ADMIN — HYDROMORPHONE HYDROCHLORIDE 0.2 MG: 0.2 INJECTION, SOLUTION INTRAMUSCULAR; INTRAVENOUS; SUBCUTANEOUS at 12:59

## 2025-07-28 RX ADMIN — FENTANYL CITRATE 100 MCG: 50 INJECTION INTRAMUSCULAR; INTRAVENOUS at 07:43

## 2025-07-28 RX ADMIN — ONDANSETRON 4 MG: 2 INJECTION INTRAMUSCULAR; INTRAVENOUS at 13:31

## 2025-07-28 RX ADMIN — ACETAMINOPHEN 975 MG: 325 TABLET ORAL at 22:41

## 2025-07-28 RX ADMIN — LIDOCAINE HYDROCHLORIDE 50 MG: 10 INJECTION, SOLUTION EPIDURAL; INFILTRATION; INTRACAUDAL; PERINEURAL at 07:43

## 2025-07-29 ENCOUNTER — APPOINTMENT (OUTPATIENT)
Dept: RADIOLOGY | Facility: HOSPITAL | Age: 55
DRG: 023 | End: 2025-07-29
Attending: PHYSICIAN ASSISTANT
Payer: MEDICARE

## 2025-07-29 ENCOUNTER — APPOINTMENT (OUTPATIENT)
Dept: RADIOLOGY | Facility: HOSPITAL | Age: 55
DRG: 023 | End: 2025-07-29
Payer: MEDICARE

## 2025-07-29 PROBLEM — M25.571 RIGHT ANKLE PAIN: Status: ACTIVE | Noted: 2025-07-29

## 2025-07-29 LAB
2HR DELTA HS TROPONIN: 0 NG/L
4HR DELTA HS TROPONIN: 0 NG/L
ANION GAP SERPL CALCULATED.3IONS-SCNC: 9 MMOL/L (ref 4–13)
APTT PPP: 29 SECONDS (ref 23–34)
BUN SERPL-MCNC: 17 MG/DL (ref 5–25)
CALCIUM SERPL-MCNC: 8.7 MG/DL (ref 8.4–10.2)
CARDIAC TROPONIN I PNL SERPL HS: 4 NG/L (ref ?–50)
CARDIAC TROPONIN I PNL SERPL HS: 4 NG/L (ref ?–50)
CHLORIDE SERPL-SCNC: 107 MMOL/L (ref 96–108)
CO2 SERPL-SCNC: 23 MMOL/L (ref 21–32)
CREAT SERPL-MCNC: 1.28 MG/DL (ref 0.6–1.3)
ERYTHROCYTE [DISTWIDTH] IN BLOOD BY AUTOMATED COUNT: 14.7 % (ref 11.6–15.1)
GFR SERPL CREATININE-BSD FRML MDRD: 62 ML/MIN/1.73SQ M
GLUCOSE SERPL-MCNC: 169 MG/DL (ref 65–140)
HCT VFR BLD AUTO: 42.6 % (ref 36.5–49.3)
HGB BLD-MCNC: 14.3 G/DL (ref 12–17)
INR PPP: 0.96 (ref 0.85–1.19)
MCH RBC QN AUTO: 29.7 PG (ref 26.8–34.3)
MCHC RBC AUTO-ENTMCNC: 33.6 G/DL (ref 31.4–37.4)
MCV RBC AUTO: 88 FL (ref 82–98)
PLATELET # BLD AUTO: 329 THOUSANDS/UL (ref 149–390)
PMV BLD AUTO: 10.2 FL (ref 8.9–12.7)
POTASSIUM SERPL-SCNC: 4.4 MMOL/L (ref 3.5–5.3)
PROTHROMBIN TIME: 13.1 SECONDS (ref 12.3–15)
RBC # BLD AUTO: 4.82 MILLION/UL (ref 3.88–5.62)
SODIUM SERPL-SCNC: 139 MMOL/L (ref 135–147)
WBC # BLD AUTO: 26.27 THOUSAND/UL (ref 4.31–10.16)

## 2025-07-29 PROCEDURE — 97163 PT EVAL HIGH COMPLEX 45 MIN: CPT

## 2025-07-29 PROCEDURE — 99024 POSTOP FOLLOW-UP VISIT: CPT | Performed by: NEUROLOGICAL SURGERY

## 2025-07-29 PROCEDURE — 99245 OFF/OP CONSLTJ NEW/EST HI 55: CPT

## 2025-07-29 PROCEDURE — 72040 X-RAY EXAM NECK SPINE 2-3 VW: CPT

## 2025-07-29 PROCEDURE — 85027 COMPLETE CBC AUTOMATED: CPT | Performed by: NEUROLOGICAL SURGERY

## 2025-07-29 PROCEDURE — 84484 ASSAY OF TROPONIN QUANT: CPT | Performed by: PHYSICIAN ASSISTANT

## 2025-07-29 PROCEDURE — 85730 THROMBOPLASTIN TIME PARTIAL: CPT | Performed by: NEUROLOGICAL SURGERY

## 2025-07-29 PROCEDURE — 73610 X-RAY EXAM OF ANKLE: CPT

## 2025-07-29 PROCEDURE — 80048 BASIC METABOLIC PNL TOTAL CA: CPT | Performed by: NEUROLOGICAL SURGERY

## 2025-07-29 PROCEDURE — 85610 PROTHROMBIN TIME: CPT | Performed by: NEUROLOGICAL SURGERY

## 2025-07-29 PROCEDURE — 97167 OT EVAL HIGH COMPLEX 60 MIN: CPT

## 2025-07-29 RX ORDER — GLYCOPYRROLATE 0.2 MG/ML
0.2 INJECTION INTRAMUSCULAR; INTRAVENOUS EVERY 4 HOURS PRN
Status: DISCONTINUED | OUTPATIENT
Start: 2025-07-29 | End: 2025-07-30

## 2025-07-29 RX ORDER — DEXAMETHASONE SODIUM PHOSPHATE 4 MG/ML
4 INJECTION, SOLUTION INTRA-ARTICULAR; INTRALESIONAL; INTRAMUSCULAR; INTRAVENOUS; SOFT TISSUE EVERY 6 HOURS SCHEDULED
Status: COMPLETED | OUTPATIENT
Start: 2025-07-29 | End: 2025-07-30

## 2025-07-29 RX ORDER — BISACODYL 10 MG
10 SUPPOSITORY, RECTAL RECTAL DAILY PRN
Status: DISCONTINUED | OUTPATIENT
Start: 2025-07-29 | End: 2025-08-01 | Stop reason: HOSPADM

## 2025-07-29 RX ORDER — HALOPERIDOL 5 MG/ML
2 INJECTION INTRAMUSCULAR
Status: DISCONTINUED | OUTPATIENT
Start: 2025-07-29 | End: 2025-07-30

## 2025-07-29 RX ORDER — POLYETHYLENE GLYCOL 3350 17 G/17G
17 POWDER, FOR SOLUTION ORAL DAILY PRN
Status: DISCONTINUED | OUTPATIENT
Start: 2025-07-29 | End: 2025-08-01 | Stop reason: HOSPADM

## 2025-07-29 RX ORDER — LORAZEPAM 2 MG/ML
0.5 INJECTION INTRAMUSCULAR EVERY 4 HOURS PRN
Status: DISCONTINUED | OUTPATIENT
Start: 2025-07-29 | End: 2025-07-30

## 2025-07-29 RX ADMIN — OXYCODONE HYDROCHLORIDE 15 MG: 5 TABLET ORAL at 08:05

## 2025-07-29 RX ADMIN — DEXAMETHASONE SODIUM PHOSPHATE 4 MG: 4 INJECTION INTRA-ARTICULAR; INTRALESIONAL; INTRAMUSCULAR; INTRAVENOUS; SOFT TISSUE at 18:42

## 2025-07-29 RX ADMIN — OXYCODONE HYDROCHLORIDE 15 MG: 5 TABLET ORAL at 21:35

## 2025-07-29 RX ADMIN — KETAMINE HYDROCHLORIDE 0.1 MG/KG/HR: 100 INJECTION INTRAMUSCULAR; INTRAVENOUS at 15:15

## 2025-07-29 RX ADMIN — PHENOL 1 SPRAY: 1.4 SPRAY ORAL at 05:35

## 2025-07-29 RX ADMIN — DILTIAZEM HYDROCHLORIDE 60 MG: 60 TABLET ORAL at 18:42

## 2025-07-29 RX ADMIN — ALBUTEROL SULFATE 2 PUFF: 90 AEROSOL, METERED RESPIRATORY (INHALATION) at 06:27

## 2025-07-29 RX ADMIN — ACETAMINOPHEN 975 MG: 325 TABLET ORAL at 13:57

## 2025-07-29 RX ADMIN — OXYCODONE HYDROCHLORIDE 15 MG: 5 TABLET ORAL at 02:01

## 2025-07-29 RX ADMIN — HEPARIN SODIUM 5000 UNITS: 5000 INJECTION, SOLUTION INTRAVENOUS; SUBCUTANEOUS at 13:58

## 2025-07-29 RX ADMIN — ACETAMINOPHEN 975 MG: 325 TABLET ORAL at 21:13

## 2025-07-29 RX ADMIN — HYDROMORPHONE HYDROCHLORIDE 0.5 MG: 1 INJECTION, SOLUTION INTRAMUSCULAR; INTRAVENOUS; SUBCUTANEOUS at 10:58

## 2025-07-29 RX ADMIN — CEFAZOLIN SODIUM 1000 MG: 1 SOLUTION INTRAVENOUS at 02:02

## 2025-07-29 RX ADMIN — HYDROMORPHONE HYDROCHLORIDE 0.5 MG: 1 INJECTION, SOLUTION INTRAMUSCULAR; INTRAVENOUS; SUBCUTANEOUS at 05:29

## 2025-07-29 RX ADMIN — SENNOSIDES 8.6 MG: 8.6 TABLET, FILM COATED ORAL at 08:05

## 2025-07-29 RX ADMIN — METHOCARBAMOL 750 MG: 750 TABLET ORAL at 05:29

## 2025-07-29 RX ADMIN — METHOCARBAMOL 750 MG: 750 TABLET ORAL at 23:10

## 2025-07-29 RX ADMIN — METHOCARBAMOL 750 MG: 750 TABLET ORAL at 13:57

## 2025-07-29 RX ADMIN — METHOCARBAMOL 750 MG: 750 TABLET ORAL at 18:42

## 2025-07-29 RX ADMIN — HYDROMORPHONE HYDROCHLORIDE 0.5 MG: 1 INJECTION, SOLUTION INTRAMUSCULAR; INTRAVENOUS; SUBCUTANEOUS at 19:07

## 2025-07-29 RX ADMIN — DEXAMETHASONE SODIUM PHOSPHATE 4 MG: 4 INJECTION INTRA-ARTICULAR; INTRALESIONAL; INTRAMUSCULAR; INTRAVENOUS; SOFT TISSUE at 23:10

## 2025-07-29 RX ADMIN — HEPARIN SODIUM 5000 UNITS: 5000 INJECTION, SOLUTION INTRAVENOUS; SUBCUTANEOUS at 21:37

## 2025-07-29 RX ADMIN — HYDROMORPHONE HYDROCHLORIDE 0.5 MG: 1 INJECTION, SOLUTION INTRAMUSCULAR; INTRAVENOUS; SUBCUTANEOUS at 22:57

## 2025-07-29 RX ADMIN — DOCUSATE SODIUM 100 MG: 100 CAPSULE, LIQUID FILLED ORAL at 18:42

## 2025-07-29 RX ADMIN — ALBUTEROL SULFATE 2 PUFF: 90 AEROSOL, METERED RESPIRATORY (INHALATION) at 22:17

## 2025-07-29 RX ADMIN — ACETAMINOPHEN 975 MG: 325 TABLET ORAL at 05:29

## 2025-07-29 RX ADMIN — DOCUSATE SODIUM 100 MG: 100 CAPSULE, LIQUID FILLED ORAL at 08:05

## 2025-07-29 RX ADMIN — DEXAMETHASONE SODIUM PHOSPHATE 4 MG: 4 INJECTION INTRA-ARTICULAR; INTRALESIONAL; INTRAMUSCULAR; INTRAVENOUS; SOFT TISSUE at 13:58

## 2025-07-29 RX ADMIN — DILTIAZEM HYDROCHLORIDE 60 MG: 60 TABLET ORAL at 08:05

## 2025-07-29 RX ADMIN — OXYCODONE HYDROCHLORIDE 15 MG: 5 TABLET ORAL at 16:58

## 2025-07-29 RX ADMIN — CEFAZOLIN SODIUM 1000 MG: 1 SOLUTION INTRAVENOUS at 10:58

## 2025-07-30 LAB
ERYTHROCYTE [DISTWIDTH] IN BLOOD BY AUTOMATED COUNT: 14.8 % (ref 11.6–15.1)
HCT VFR BLD AUTO: 42.1 % (ref 36.5–49.3)
HGB BLD-MCNC: 13.6 G/DL (ref 12–17)
MCH RBC QN AUTO: 28.8 PG (ref 26.8–34.3)
MCHC RBC AUTO-ENTMCNC: 32.3 G/DL (ref 31.4–37.4)
MCV RBC AUTO: 89 FL (ref 82–98)
PLATELET # BLD AUTO: 329 THOUSANDS/UL (ref 149–390)
PMV BLD AUTO: 10.2 FL (ref 8.9–12.7)
RBC # BLD AUTO: 4.72 MILLION/UL (ref 3.88–5.62)
WBC # BLD AUTO: 25.2 THOUSAND/UL (ref 4.31–10.16)

## 2025-07-30 PROCEDURE — 97530 THERAPEUTIC ACTIVITIES: CPT

## 2025-07-30 PROCEDURE — 85027 COMPLETE CBC AUTOMATED: CPT | Performed by: NURSE PRACTITIONER

## 2025-07-30 PROCEDURE — 97116 GAIT TRAINING THERAPY: CPT

## 2025-07-30 PROCEDURE — 97535 SELF CARE MNGMENT TRAINING: CPT

## 2025-07-30 PROCEDURE — 99024 POSTOP FOLLOW-UP VISIT: CPT | Performed by: NEUROLOGICAL SURGERY

## 2025-07-30 PROCEDURE — 99233 SBSQ HOSP IP/OBS HIGH 50: CPT

## 2025-07-30 RX ORDER — HYDROMORPHONE HCL/PF 1 MG/ML
0.5 SYRINGE (ML) INJECTION EVERY 6 HOURS PRN
Status: DISCONTINUED | OUTPATIENT
Start: 2025-07-30 | End: 2025-08-01

## 2025-07-30 RX ORDER — DEXAMETHASONE SODIUM PHOSPHATE 4 MG/ML
4 INJECTION, SOLUTION INTRA-ARTICULAR; INTRALESIONAL; INTRAMUSCULAR; INTRAVENOUS; SOFT TISSUE EVERY 6 HOURS SCHEDULED
Status: COMPLETED | OUTPATIENT
Start: 2025-07-30 | End: 2025-07-31

## 2025-07-30 RX ADMIN — DOCUSATE SODIUM 100 MG: 100 CAPSULE, LIQUID FILLED ORAL at 09:30

## 2025-07-30 RX ADMIN — HYDROMORPHONE HYDROCHLORIDE 0.5 MG: 1 INJECTION, SOLUTION INTRAMUSCULAR; INTRAVENOUS; SUBCUTANEOUS at 21:15

## 2025-07-30 RX ADMIN — DIAZEPAM 5 MG: 5 TABLET ORAL at 15:43

## 2025-07-30 RX ADMIN — ACETAMINOPHEN 975 MG: 325 TABLET ORAL at 13:23

## 2025-07-30 RX ADMIN — HEPARIN SODIUM 5000 UNITS: 5000 INJECTION, SOLUTION INTRAVENOUS; SUBCUTANEOUS at 13:23

## 2025-07-30 RX ADMIN — OXYCODONE HYDROCHLORIDE 15 MG: 5 TABLET ORAL at 04:11

## 2025-07-30 RX ADMIN — DEXAMETHASONE SODIUM PHOSPHATE 4 MG: 4 INJECTION INTRA-ARTICULAR; INTRALESIONAL; INTRAMUSCULAR; INTRAVENOUS; SOFT TISSUE at 23:52

## 2025-07-30 RX ADMIN — DOCUSATE SODIUM 100 MG: 100 CAPSULE, LIQUID FILLED ORAL at 18:16

## 2025-07-30 RX ADMIN — METHOCARBAMOL 750 MG: 750 TABLET ORAL at 11:06

## 2025-07-30 RX ADMIN — HEPARIN SODIUM 5000 UNITS: 5000 INJECTION, SOLUTION INTRAVENOUS; SUBCUTANEOUS at 21:15

## 2025-07-30 RX ADMIN — DEXAMETHASONE SODIUM PHOSPHATE 4 MG: 4 INJECTION INTRA-ARTICULAR; INTRALESIONAL; INTRAMUSCULAR; INTRAVENOUS; SOFT TISSUE at 05:08

## 2025-07-30 RX ADMIN — OXYCODONE HYDROCHLORIDE 15 MG: 5 TABLET ORAL at 14:17

## 2025-07-30 RX ADMIN — HYDROMORPHONE HYDROCHLORIDE 0.5 MG: 1 INJECTION, SOLUTION INTRAMUSCULAR; INTRAVENOUS; SUBCUTANEOUS at 11:06

## 2025-07-30 RX ADMIN — OXYCODONE HYDROCHLORIDE 15 MG: 5 TABLET ORAL at 09:29

## 2025-07-30 RX ADMIN — METHOCARBAMOL 750 MG: 750 TABLET ORAL at 05:08

## 2025-07-30 RX ADMIN — DILTIAZEM HYDROCHLORIDE 60 MG: 60 TABLET ORAL at 09:29

## 2025-07-30 RX ADMIN — HEPARIN SODIUM 5000 UNITS: 5000 INJECTION, SOLUTION INTRAVENOUS; SUBCUTANEOUS at 05:08

## 2025-07-30 RX ADMIN — SENNOSIDES 8.6 MG: 8.6 TABLET, FILM COATED ORAL at 09:30

## 2025-07-30 RX ADMIN — METHOCARBAMOL 750 MG: 750 TABLET ORAL at 18:16

## 2025-07-30 RX ADMIN — METHOCARBAMOL 750 MG: 750 TABLET ORAL at 23:51

## 2025-07-30 RX ADMIN — OXYCODONE HYDROCHLORIDE 15 MG: 5 TABLET ORAL at 20:09

## 2025-07-30 RX ADMIN — ACETAMINOPHEN 975 MG: 325 TABLET ORAL at 05:08

## 2025-07-30 RX ADMIN — DILTIAZEM HYDROCHLORIDE 60 MG: 60 TABLET ORAL at 18:16

## 2025-07-30 RX ADMIN — DEXAMETHASONE SODIUM PHOSPHATE 4 MG: 4 INJECTION INTRA-ARTICULAR; INTRALESIONAL; INTRAMUSCULAR; INTRAVENOUS; SOFT TISSUE at 18:16

## 2025-07-30 RX ADMIN — ACETAMINOPHEN 975 MG: 325 TABLET ORAL at 21:15

## 2025-07-30 RX ADMIN — DEXAMETHASONE SODIUM PHOSPHATE 4 MG: 4 INJECTION INTRA-ARTICULAR; INTRALESIONAL; INTRAMUSCULAR; INTRAVENOUS; SOFT TISSUE at 12:15

## 2025-07-31 ENCOUNTER — APPOINTMENT (INPATIENT)
Dept: RADIOLOGY | Facility: HOSPITAL | Age: 55
DRG: 023 | End: 2025-07-31
Payer: MEDICARE

## 2025-07-31 LAB — GLUCOSE SERPL-MCNC: 171 MG/DL (ref 65–140)

## 2025-07-31 PROCEDURE — 71045 X-RAY EXAM CHEST 1 VIEW: CPT

## 2025-07-31 PROCEDURE — 99024 POSTOP FOLLOW-UP VISIT: CPT | Performed by: NEUROLOGICAL SURGERY

## 2025-07-31 PROCEDURE — 82948 REAGENT STRIP/BLOOD GLUCOSE: CPT

## 2025-07-31 PROCEDURE — 92610 EVALUATE SWALLOWING FUNCTION: CPT

## 2025-07-31 PROCEDURE — 99233 SBSQ HOSP IP/OBS HIGH 50: CPT

## 2025-07-31 PROCEDURE — 99223 1ST HOSP IP/OBS HIGH 75: CPT

## 2025-07-31 PROCEDURE — NC001 PR NO CHARGE: Performed by: FAMILY MEDICINE

## 2025-07-31 RX ORDER — METHYLPREDNISOLONE 4 MG/1
20 TABLET ORAL DAILY
Status: COMPLETED | OUTPATIENT
Start: 2025-08-01 | End: 2025-08-01

## 2025-07-31 RX ORDER — HYDROMORPHONE HYDROCHLORIDE 4 MG/1
4 TABLET ORAL EVERY 4 HOURS PRN
Refills: 0 | Status: DISCONTINUED | OUTPATIENT
Start: 2025-07-31 | End: 2025-08-01 | Stop reason: HOSPADM

## 2025-07-31 RX ORDER — METHYLPREDNISOLONE 4 MG/1
8 TABLET ORAL DAILY
Status: DISCONTINUED | OUTPATIENT
Start: 2025-08-04 | End: 2025-08-01 | Stop reason: HOSPADM

## 2025-07-31 RX ORDER — LABETALOL HYDROCHLORIDE 5 MG/ML
10 INJECTION, SOLUTION INTRAVENOUS EVERY 6 HOURS PRN
Status: DISCONTINUED | OUTPATIENT
Start: 2025-07-31 | End: 2025-07-31

## 2025-07-31 RX ORDER — LABETALOL HYDROCHLORIDE 5 MG/ML
10 INJECTION, SOLUTION INTRAVENOUS EVERY 6 HOURS PRN
Status: DISCONTINUED | OUTPATIENT
Start: 2025-07-31 | End: 2025-08-01 | Stop reason: HOSPADM

## 2025-07-31 RX ORDER — HYDROMORPHONE HYDROCHLORIDE 2 MG/1
2 TABLET ORAL EVERY 4 HOURS PRN
Refills: 0 | Status: DISCONTINUED | OUTPATIENT
Start: 2025-07-31 | End: 2025-08-01 | Stop reason: HOSPADM

## 2025-07-31 RX ORDER — METHYLPREDNISOLONE 4 MG/1
16 TABLET ORAL DAILY
Status: DISCONTINUED | OUTPATIENT
Start: 2025-08-02 | End: 2025-08-01 | Stop reason: HOSPADM

## 2025-07-31 RX ORDER — METHYLPREDNISOLONE 4 MG/1
12 TABLET ORAL DAILY
Status: DISCONTINUED | OUTPATIENT
Start: 2025-08-03 | End: 2025-08-01 | Stop reason: HOSPADM

## 2025-07-31 RX ORDER — ACETAMINOPHEN 325 MG/1
975 TABLET ORAL EVERY 6 HOURS SCHEDULED
Status: DISCONTINUED | OUTPATIENT
Start: 2025-07-31 | End: 2025-08-01 | Stop reason: HOSPADM

## 2025-07-31 RX ORDER — METHYLPREDNISOLONE 4 MG/1
24 TABLET ORAL DAILY
Status: COMPLETED | OUTPATIENT
Start: 2025-07-31 | End: 2025-07-31

## 2025-07-31 RX ORDER — LABETALOL HYDROCHLORIDE 5 MG/ML
10 INJECTION, SOLUTION INTRAVENOUS
Status: DISCONTINUED | OUTPATIENT
Start: 2025-07-31 | End: 2025-07-31

## 2025-07-31 RX ORDER — METHYLPREDNISOLONE 4 MG/1
4 TABLET ORAL DAILY
Status: DISCONTINUED | OUTPATIENT
Start: 2025-08-05 | End: 2025-08-01 | Stop reason: HOSPADM

## 2025-07-31 RX ORDER — HYDRALAZINE HYDROCHLORIDE 20 MG/ML
5 INJECTION INTRAMUSCULAR; INTRAVENOUS EVERY 6 HOURS PRN
Status: DISCONTINUED | OUTPATIENT
Start: 2025-07-31 | End: 2025-07-31

## 2025-07-31 RX ORDER — CYCLOBENZAPRINE HCL 10 MG
10 TABLET ORAL 3 TIMES DAILY PRN
Status: DISCONTINUED | OUTPATIENT
Start: 2025-07-31 | End: 2025-08-01

## 2025-07-31 RX ORDER — HYDRALAZINE HYDROCHLORIDE 20 MG/ML
10 INJECTION INTRAMUSCULAR; INTRAVENOUS EVERY 6 HOURS PRN
Status: DISCONTINUED | OUTPATIENT
Start: 2025-07-31 | End: 2025-08-01 | Stop reason: HOSPADM

## 2025-07-31 RX ADMIN — ACETAMINOPHEN 975 MG: 325 TABLET ORAL at 12:36

## 2025-07-31 RX ADMIN — HEPARIN SODIUM 5000 UNITS: 5000 INJECTION, SOLUTION INTRAVENOUS; SUBCUTANEOUS at 05:08

## 2025-07-31 RX ADMIN — HYDROMORPHONE HYDROCHLORIDE 4 MG: 4 TABLET ORAL at 17:24

## 2025-07-31 RX ADMIN — OXYCODONE HYDROCHLORIDE 15 MG: 5 TABLET ORAL at 05:06

## 2025-07-31 RX ADMIN — LABETALOL HYDROCHLORIDE 10 MG: 5 INJECTION, SOLUTION INTRAVENOUS at 14:19

## 2025-07-31 RX ADMIN — DILTIAZEM HYDROCHLORIDE 60 MG: 60 TABLET ORAL at 17:24

## 2025-07-31 RX ADMIN — ACETAMINOPHEN 975 MG: 325 TABLET ORAL at 23:52

## 2025-07-31 RX ADMIN — METHYLPREDNISOLONE 24 MG: 4 TABLET ORAL at 14:19

## 2025-07-31 RX ADMIN — ACETAMINOPHEN 975 MG: 325 TABLET ORAL at 05:06

## 2025-07-31 RX ADMIN — HEPARIN SODIUM 5000 UNITS: 5000 INJECTION, SOLUTION INTRAVENOUS; SUBCUTANEOUS at 14:19

## 2025-07-31 RX ADMIN — HEPARIN SODIUM 5000 UNITS: 5000 INJECTION, SOLUTION INTRAVENOUS; SUBCUTANEOUS at 21:04

## 2025-07-31 RX ADMIN — OXYCODONE HYDROCHLORIDE 15 MG: 5 TABLET ORAL at 10:37

## 2025-07-31 RX ADMIN — CYCLOBENZAPRINE HYDROCHLORIDE 10 MG: 10 TABLET, FILM COATED ORAL at 12:36

## 2025-07-31 RX ADMIN — ACETAMINOPHEN 975 MG: 325 TABLET ORAL at 17:24

## 2025-07-31 RX ADMIN — DIAZEPAM 5 MG: 5 TABLET ORAL at 07:16

## 2025-07-31 RX ADMIN — DILTIAZEM HYDROCHLORIDE 60 MG: 60 TABLET ORAL at 08:11

## 2025-07-31 RX ADMIN — METOROPROLOL TARTRATE 5 MG: 5 INJECTION, SOLUTION INTRAVENOUS at 11:08

## 2025-07-31 RX ADMIN — DOCUSATE SODIUM 100 MG: 100 CAPSULE, LIQUID FILLED ORAL at 17:24

## 2025-07-31 RX ADMIN — HYDROMORPHONE HYDROCHLORIDE 0.5 MG: 1 INJECTION, SOLUTION INTRAMUSCULAR; INTRAVENOUS; SUBCUTANEOUS at 08:11

## 2025-07-31 RX ADMIN — HYDROMORPHONE HYDROCHLORIDE 0.5 MG: 1 INJECTION, SOLUTION INTRAMUSCULAR; INTRAVENOUS; SUBCUTANEOUS at 20:50

## 2025-07-31 RX ADMIN — ALBUTEROL SULFATE 2 PUFF: 90 AEROSOL, METERED RESPIRATORY (INHALATION) at 13:23

## 2025-07-31 RX ADMIN — METHOCARBAMOL 750 MG: 750 TABLET ORAL at 05:06

## 2025-07-31 RX ADMIN — DEXAMETHASONE SODIUM PHOSPHATE 4 MG: 4 INJECTION INTRA-ARTICULAR; INTRALESIONAL; INTRAMUSCULAR; INTRAVENOUS; SOFT TISSUE at 05:08

## 2025-07-31 RX ADMIN — SENNOSIDES 8.6 MG: 8.6 TABLET, FILM COATED ORAL at 08:11

## 2025-07-31 RX ADMIN — DOCUSATE SODIUM 100 MG: 100 CAPSULE, LIQUID FILLED ORAL at 08:11

## 2025-08-01 VITALS
WEIGHT: 215 LBS | TEMPERATURE: 98 F | HEART RATE: 54 BPM | DIASTOLIC BLOOD PRESSURE: 90 MMHG | OXYGEN SATURATION: 97 % | HEIGHT: 73 IN | SYSTOLIC BLOOD PRESSURE: 157 MMHG | BODY MASS INDEX: 28.49 KG/M2 | RESPIRATION RATE: 15 BRPM

## 2025-08-01 PROCEDURE — 99232 SBSQ HOSP IP/OBS MODERATE 35: CPT

## 2025-08-01 PROCEDURE — 99024 POSTOP FOLLOW-UP VISIT: CPT | Performed by: NEUROLOGICAL SURGERY

## 2025-08-01 RX ORDER — HYDROMORPHONE HYDROCHLORIDE 4 MG/1
4 TABLET ORAL EVERY 4 HOURS PRN
Qty: 30 TABLET | Refills: 0 | Status: SHIPPED | OUTPATIENT
Start: 2025-08-01

## 2025-08-01 RX ORDER — HYDROMORPHONE HCL/PF 1 MG/ML
0.5 SYRINGE (ML) INJECTION EVERY 8 HOURS PRN
Status: DISCONTINUED | OUTPATIENT
Start: 2025-08-01 | End: 2025-08-01 | Stop reason: ALTCHOICE

## 2025-08-01 RX ORDER — METHYLPREDNISOLONE 4 MG/1
TABLET ORAL
Qty: 10 TABLET | Refills: 0 | Status: SHIPPED | OUTPATIENT
Start: 2025-08-02 | End: 2025-08-06

## 2025-08-01 RX ORDER — CYCLOBENZAPRINE HCL 10 MG
10 TABLET ORAL 3 TIMES DAILY
Status: DISCONTINUED | OUTPATIENT
Start: 2025-08-01 | End: 2025-08-01 | Stop reason: HOSPADM

## 2025-08-01 RX ORDER — CYCLOBENZAPRINE HCL 10 MG
10 TABLET ORAL 3 TIMES DAILY PRN
Qty: 21 TABLET | Refills: 0 | Status: SHIPPED | OUTPATIENT
Start: 2025-08-01

## 2025-08-01 RX ORDER — LIDOCAINE 50 MG/G
1 PATCH TOPICAL DAILY
Qty: 14 PATCH | Refills: 0 | Status: SHIPPED | OUTPATIENT
Start: 2025-08-02

## 2025-08-01 RX ADMIN — ACETAMINOPHEN 975 MG: 325 TABLET ORAL at 11:04

## 2025-08-01 RX ADMIN — CYCLOBENZAPRINE HYDROCHLORIDE 10 MG: 10 TABLET, FILM COATED ORAL at 09:26

## 2025-08-01 RX ADMIN — ACETAMINOPHEN 975 MG: 325 TABLET ORAL at 05:33

## 2025-08-01 RX ADMIN — HEPARIN SODIUM 5000 UNITS: 5000 INJECTION, SOLUTION INTRAVENOUS; SUBCUTANEOUS at 05:34

## 2025-08-01 RX ADMIN — HYDROMORPHONE HYDROCHLORIDE 4 MG: 4 TABLET ORAL at 00:18

## 2025-08-01 RX ADMIN — METHYLPREDNISOLONE 20 MG: 4 TABLET ORAL at 08:02

## 2025-08-01 RX ADMIN — HEPARIN SODIUM 5000 UNITS: 5000 INJECTION, SOLUTION INTRAVENOUS; SUBCUTANEOUS at 13:22

## 2025-08-01 RX ADMIN — DILTIAZEM HYDROCHLORIDE 60 MG: 60 TABLET ORAL at 08:02

## 2025-08-01 RX ADMIN — SENNOSIDES 8.6 MG: 8.6 TABLET, FILM COATED ORAL at 08:02

## 2025-08-01 RX ADMIN — DOCUSATE SODIUM 100 MG: 100 CAPSULE, LIQUID FILLED ORAL at 08:03

## 2025-08-01 RX ADMIN — HYDROMORPHONE HYDROCHLORIDE 0.5 MG: 1 INJECTION, SOLUTION INTRAMUSCULAR; INTRAVENOUS; SUBCUTANEOUS at 03:12

## 2025-08-01 RX ADMIN — HYDROMORPHONE HYDROCHLORIDE 4 MG: 4 TABLET ORAL at 08:02

## 2025-08-04 ENCOUNTER — TELEPHONE (OUTPATIENT)
Dept: OTHER | Facility: OTHER | Age: 55
End: 2025-08-04

## 2025-08-04 ENCOUNTER — OFFICE VISIT (OUTPATIENT)
Dept: FAMILY MEDICINE CLINIC | Facility: CLINIC | Age: 55
End: 2025-08-04
Payer: MEDICARE

## 2025-08-04 ENCOUNTER — TELEPHONE (OUTPATIENT)
Dept: NEUROSURGERY | Facility: CLINIC | Age: 55
End: 2025-08-04

## 2025-08-04 VITALS
HEIGHT: 73 IN | OXYGEN SATURATION: 98 % | SYSTOLIC BLOOD PRESSURE: 135 MMHG | WEIGHT: 213 LBS | DIASTOLIC BLOOD PRESSURE: 93 MMHG | HEART RATE: 104 BPM | BODY MASS INDEX: 28.23 KG/M2 | TEMPERATURE: 98 F

## 2025-08-04 DIAGNOSIS — M54.12 CERVICAL RADICULOPATHY: Primary | ICD-10-CM

## 2025-08-04 DIAGNOSIS — K22.4 HYPERCONTRACTILE ESOPHAGUS: ICD-10-CM

## 2025-08-04 DIAGNOSIS — M25.571 ACUTE RIGHT ANKLE PAIN: ICD-10-CM

## 2025-08-04 DIAGNOSIS — I10 PRIMARY HYPERTENSION: ICD-10-CM

## 2025-08-04 PROCEDURE — 99214 OFFICE O/P EST MOD 30 MIN: CPT

## 2025-08-05 LAB
DME PARACHUTE DELIVERY DATE ACTUAL: NORMAL
DME PARACHUTE DELIVERY DATE REQUESTED: NORMAL
DME PARACHUTE ITEM DESCRIPTION: NORMAL
DME PARACHUTE ORDER STATUS: NORMAL
DME PARACHUTE SUPPLIER NAME: NORMAL
DME PARACHUTE SUPPLIER PHONE: NORMAL

## 2025-08-11 ENCOUNTER — CLINICAL SUPPORT (OUTPATIENT)
Dept: NEUROSURGERY | Facility: CLINIC | Age: 55
End: 2025-08-11

## 2025-08-11 PROBLEM — Z98.1 S/P CERVICAL SPINAL FUSION: Status: ACTIVE | Noted: 2025-08-11

## 2025-08-11 PROBLEM — Z98.890 POST-OPERATIVE STATE: Status: ACTIVE | Noted: 2025-08-11

## 2025-08-12 ENCOUNTER — OFFICE VISIT (OUTPATIENT)
Dept: OBGYN CLINIC | Facility: CLINIC | Age: 55
End: 2025-08-12
Payer: MEDICARE

## 2025-08-12 PROBLEM — S93.409A SPRAIN OF ANKLE, INITIAL ENCOUNTER: Status: ACTIVE | Noted: 2025-08-12

## 2025-08-18 DIAGNOSIS — M53.3 CHRONIC RIGHT SI JOINT PAIN: ICD-10-CM

## 2025-08-18 DIAGNOSIS — G89.29 CHRONIC RIGHT SI JOINT PAIN: ICD-10-CM

## 2025-08-18 DIAGNOSIS — M96.1 POST LAMINECTOMY SYNDROME: ICD-10-CM

## 2025-08-18 RX ORDER — DIAZEPAM 5 MG/1
5 TABLET ORAL EVERY 12 HOURS PRN
Qty: 45 TABLET | Refills: 0 | Status: SHIPPED | OUTPATIENT
Start: 2025-08-23 | End: 2025-08-18

## 2025-08-18 RX ORDER — OXYCODONE HYDROCHLORIDE 10 MG/1
10 TABLET ORAL EVERY 4 HOURS PRN
Qty: 120 TABLET | Refills: 0 | Status: SHIPPED | OUTPATIENT
Start: 2025-08-20

## 2025-08-18 RX ORDER — OXYCODONE HYDROCHLORIDE 10 MG/1
10 TABLET ORAL EVERY 4 HOURS PRN
Qty: 120 TABLET | Refills: 0 | Status: SHIPPED | OUTPATIENT
Start: 2025-08-23 | End: 2025-08-18

## 2025-08-18 RX ORDER — DIAZEPAM 5 MG/1
5 TABLET ORAL EVERY 12 HOURS PRN
Qty: 45 TABLET | Refills: 0 | Status: SHIPPED | OUTPATIENT
Start: 2025-08-20

## 2025-08-18 RX ORDER — ACETAMINOPHEN 325 MG/1
975 TABLET ORAL EVERY 8 HOURS PRN
Qty: 270 TABLET | Refills: 0 | Status: SHIPPED | OUTPATIENT
Start: 2025-08-18

## 2025-08-21 ENCOUNTER — TELEPHONE (OUTPATIENT)
Age: 55
End: 2025-08-21

## (undated) DEVICE — ACE WRAP 4 IN STERILE

## (undated) DEVICE — BETHLEHEM UNIV MAJOR ORTHO,KIT: Brand: CARDINAL HEALTH

## (undated) DEVICE — Device

## (undated) DEVICE — SUT MONOCRYL 3-0 PS-2 18 IN Y497G

## (undated) DEVICE — NEEDLE SPINAL18G X 3.5 IN QUINCKE

## (undated) DEVICE — GLOVE SRG BIOGEL ORTHOPEDIC 7

## (undated) DEVICE — BASKET SPECIMEN RETRIVAL 1.9FR 120CM

## (undated) DEVICE — PUDDLE VAC

## (undated) DEVICE — TELFA NON-ADHERENT ABSORBENT DRESSING: Brand: TELFA

## (undated) DEVICE — GUIDEWIRE STRGHT TIP 0.035 IN  SOLO PLUS

## (undated) DEVICE — ARTHROSCOPY FLOOR MAT

## (undated) DEVICE — TUBING SUCTION 5MM X 12 FT

## (undated) DEVICE — CHLORHEXIDINE 4PCT 4 OZ

## (undated) DEVICE — DRESSING EXUDERM SATIN HYDROCOLLOID 4 X 4 IN

## (undated) DEVICE — SCD SEQUENTIAL COMPRESSION COMFORT SLEEVE MEDIUM KNEE LENGTH: Brand: KENDALL SCD

## (undated) DEVICE — VIAL DECANTER

## (undated) DEVICE — BLADE SHAVER CUTTER BN 4MM 13CM COOLCUT

## (undated) DEVICE — PACK TUR

## (undated) DEVICE — SUPPLY FEE STD

## (undated) DEVICE — SKIN MARKER DUAL TIP WITH RULER CAP, FLEXIBLE RULER AND LABELS: Brand: DEVON

## (undated) DEVICE — GLOVE SRG BIOGEL 7

## (undated) DEVICE — INVIEW CLEAR LEGGINGS: Brand: CONVERTORS

## (undated) DEVICE — LIGHT HANDLE COVER SLEEVE DISP BLUE STELLAR

## (undated) DEVICE — 2000CC GUARDIAN II: Brand: GUARDIAN

## (undated) DEVICE — LASER FIBER HOLMIUM 272MICRON

## (undated) DEVICE — UROCATCH BAG

## (undated) DEVICE — GLOVE INDICATOR PI UNDERGLOVE SZ 8.5 BLUE

## (undated) DEVICE — GLOVE SRG BIOGEL 8

## (undated) DEVICE — BETADINE SCRUB BRUSH

## (undated) DEVICE — PACK PBDS UNIVERSAL SPINE RF

## (undated) DEVICE — SUT PDS II 1 CT-1 27 IN Z347H

## (undated) DEVICE — 3M™ STERI-STRIP™ REINFORCED ADHESIVE SKIN CLOSURES, R1547, 1/2 IN X 4 IN (12 MM X 100 MM), 6 STRIPS/ENVELOPE: Brand: 3M™ STERI-STRIP™

## (undated) DEVICE — GLOVE INDICATOR PI UNDERGLOVE SZ 8 BLUE

## (undated) DEVICE — MONITORING SPINAL IMPULSE CASE FEE

## (undated) DEVICE — INTENDED FOR TISSUE SEPARATION, AND OTHER PROCEDURES THAT REQUIRE A SHARP SURGICAL BLADE TO PUNCTURE OR CUT.: Brand: BARD-PARKER SAFETY BLADES SIZE 10, STERILE

## (undated) DEVICE — SHOULDER STABILIZATION KIT,                                    DISPOSABLE 12 PER BOX

## (undated) DEVICE — 3M™ IOBAN™ 2 ANTIMICROBIAL INCISE DRAPE 6650EZ: Brand: IOBAN™ 2

## (undated) DEVICE — NEEDLE SUT SCORPION MULTIFIRE

## (undated) DEVICE — STRL UNIVERSAL MINOR GENERAL: Brand: CARDINAL HEALTH

## (undated) DEVICE — STERILE SURGICAL LUBRICANT,  TUBE: Brand: SURGILUBE

## (undated) DEVICE — EXPRESSEW III SUTURE NEEDLE FOR USE WITH EXPRESSEW II OR III SUTURE PASSER: Brand: EXPRESSEW

## (undated) DEVICE — VAPR COOLPULSE 90 ELECTRODE 90 DEGREES SUCTION WITH INTEGRATED HANDPIECE: Brand: VAPR COOLPULSE

## (undated) DEVICE — CATH URETERAL 5FR X 70 CM FLEX TIP POLYUR BARD

## (undated) DEVICE — STRL UNIVERSAL OUTPATIENT PACK: Brand: CARDINAL HEALTH

## (undated) DEVICE — BURR  OVAL 4MM 13CM 8 FLUTE COOLCUT

## (undated) DEVICE — ANTIBACTERIAL VIOLET BRAIDED (POLYGLACTIN 910), SYNTHETIC ABSORBABLE SUTURE: Brand: COATED VICRYL

## (undated) DEVICE — INTENDED FOR TISSUE SEPARATION, AND OTHER PROCEDURES THAT REQUIRE A SHARP SURGICAL BLADE TO PUNCTURE OR CUT.: Brand: BARD-PARKER ® CARBON RIB-BACK BLADES

## (undated) DEVICE — ABDOMINAL PAD: Brand: DERMACEA

## (undated) DEVICE — 3000CC GUARDIAN II: Brand: GUARDIAN

## (undated) DEVICE — REM POLYHESIVE ADULT PATIENT RETURN ELECTRODE: Brand: VALLEYLAB

## (undated) DEVICE — TUBING ARTHROSCOPY REDUCE PATIENT

## (undated) DEVICE — IMPERVIOUS STOCKINETTE: Brand: DEROYAL

## (undated) DEVICE — 3M™ MICROFOAM™ SURGICAL TAPE 4 ROLLS/CARTON 6 CARTONS/CASE 1528-3: Brand: 3M™ MICROFOAM™

## (undated) DEVICE — GLOVE SRG BIOGEL 8.5

## (undated) DEVICE — PREMIUM DRY TRAY LF: Brand: MEDLINE INDUSTRIES, INC.

## (undated) DEVICE — ANTI-FOG SOLUTION WITH FOAM PAD: Brand: DEVON

## (undated) DEVICE — PROXIMATE PLUS MD MULTI-DIRECTIONAL RELEASE SKIN STAPLERS CONTAINS 35 STAINLESS STEEL STAPLES APPROXIMATE CLOSED DIMENSIONS: 6.9MM X 3.9MM WIDE: Brand: PROXIMATE

## (undated) DEVICE — CHLORAPREP HI-LITE 26ML ORANGE

## (undated) DEVICE — SUCTION BOVIE ENT

## (undated) DEVICE — POSITIONER BEACH CHAIR FOAM KIT

## (undated) DEVICE — PREP SURGICAL PURPREP 26ML

## (undated) DEVICE — CLAMP TOWEL TUBING DISPOSABLE

## (undated) DEVICE — SYRINGE 10ML LL

## (undated) DEVICE — 3M™ TEGADERM™ TRANSPARENT FILM DRESSING FRAME STYLE, 1626W, 4 IN X 4-3/4 IN (10 CM X 12 CM), 50/CT 4CT/CASE: Brand: 3M™ TEGADERM™

## (undated) DEVICE — GLOVE INDICATOR PI UNDERGLOVE SZ 7 BLUE